# Patient Record
Sex: MALE | Race: WHITE | Employment: OTHER | URBAN - NONMETROPOLITAN AREA
[De-identification: names, ages, dates, MRNs, and addresses within clinical notes are randomized per-mention and may not be internally consistent; named-entity substitution may affect disease eponyms.]

---

## 2017-04-18 ENCOUNTER — HOSPITAL ENCOUNTER (OUTPATIENT)
Dept: LAB | Age: 82
Discharge: HOME | End: 2017-04-18
Payer: MEDICARE

## 2017-04-18 LAB
ALBUMIN SERPL-MCNC: 3.8 G/DL (ref 3.5–5.2)
ALBUMIN/GLOBULIN RATIO: 1.3 (ref 1–2.5)
ALP BLD-CCNC: 87 U/L (ref 40–129)
ALT SERPL-CCNC: 15 U/L (ref 5–41)
ANION GAP SERPL CALCULATED.3IONS-SCNC: 12 MMOL/L (ref 9–17)
AST SERPL-CCNC: 28 U/L
BILIRUB SERPL-MCNC: 0.57 MG/DL (ref 0.3–1.2)
BUN BLDV-MCNC: 22 MG/DL (ref 8–23)
BUN/CREAT BLD: 17 (ref 9–20)
CALCIUM SERPL-MCNC: 8.9 MG/DL (ref 8.6–10.4)
CHLORIDE BLD-SCNC: 104 MMOL/L (ref 98–107)
CHOLESTEROL/HDL RATIO: 2.6
CHOLESTEROL: 102 MG/DL
CO2: 24 MMOL/L (ref 20–31)
CREAT SERPL-MCNC: 1.31 MG/DL (ref 0.7–1.2)
ESTIMATED AVERAGE GLUCOSE: 183 MG/DL
GFR AFRICAN AMERICAN: >60 ML/MIN
GFR NON-AFRICAN AMERICAN: 52 ML/MIN
GFR SERPL CREATININE-BSD FRML MDRD: ABNORMAL ML/MIN/{1.73_M2}
GFR SERPL CREATININE-BSD FRML MDRD: ABNORMAL ML/MIN/{1.73_M2}
GLUCOSE BLD-MCNC: 120 MG/DL (ref 70–99)
HBA1C MFR BLD: 8 % (ref 4.8–5.9)
HDLC SERPL-MCNC: 40 MG/DL
LDL CHOLESTEROL: 39 MG/DL (ref 0–130)
POTASSIUM SERPL-SCNC: 4.7 MMOL/L (ref 3.7–5.3)
SODIUM BLD-SCNC: 140 MMOL/L (ref 135–144)
TOTAL PROTEIN: 6.7 G/DL (ref 6.4–8.3)
TRIGL SERPL-MCNC: 113 MG/DL
VLDLC SERPL CALC-MCNC: ABNORMAL MG/DL (ref 1–30)

## 2017-04-18 PROCEDURE — 80053 COMPREHEN METABOLIC PANEL: CPT

## 2017-04-18 PROCEDURE — 80061 LIPID PANEL: CPT

## 2017-04-18 PROCEDURE — 36415 COLL VENOUS BLD VENIPUNCTURE: CPT

## 2017-04-18 PROCEDURE — 83036 HEMOGLOBIN GLYCOSYLATED A1C: CPT

## 2017-09-21 ENCOUNTER — HOSPITAL ENCOUNTER (OUTPATIENT)
Dept: LAB | Age: 82
Discharge: HOME | End: 2017-09-21
Payer: MEDICARE

## 2017-09-21 LAB
ALBUMIN SERPL-MCNC: 4 G/DL (ref 3.5–5.2)
ALBUMIN/GLOBULIN RATIO: 1.3 (ref 1–2.5)
ALP BLD-CCNC: 83 U/L (ref 40–129)
ALT SERPL-CCNC: 14 U/L (ref 5–41)
ANION GAP SERPL CALCULATED.3IONS-SCNC: 11 MMOL/L (ref 9–17)
AST SERPL-CCNC: 24 U/L
BILIRUB SERPL-MCNC: 0.69 MG/DL (ref 0.3–1.2)
BUN BLDV-MCNC: 23 MG/DL (ref 8–23)
BUN/CREAT BLD: 17 (ref 9–20)
CALCIUM SERPL-MCNC: 9.2 MG/DL (ref 8.6–10.4)
CHLORIDE BLD-SCNC: 102 MMOL/L (ref 98–107)
CHOLESTEROL/HDL RATIO: 2.6
CHOLESTEROL: 104 MG/DL
CO2: 26 MMOL/L (ref 20–31)
CREAT SERPL-MCNC: 1.36 MG/DL (ref 0.7–1.2)
ESTIMATED AVERAGE GLUCOSE: 157 MG/DL
GFR AFRICAN AMERICAN: >60 ML/MIN
GFR NON-AFRICAN AMERICAN: 50 ML/MIN
GFR SERPL CREATININE-BSD FRML MDRD: ABNORMAL ML/MIN/{1.73_M2}
GFR SERPL CREATININE-BSD FRML MDRD: ABNORMAL ML/MIN/{1.73_M2}
GLUCOSE BLD-MCNC: 164 MG/DL (ref 70–99)
HBA1C MFR BLD: 7.1 % (ref 4.8–5.9)
HDLC SERPL-MCNC: 40 MG/DL
LDL CHOLESTEROL: 45 MG/DL (ref 0–130)
POTASSIUM SERPL-SCNC: 5.3 MMOL/L (ref 3.7–5.3)
SODIUM BLD-SCNC: 139 MMOL/L (ref 135–144)
TOTAL PROTEIN: 7.1 G/DL (ref 6.4–8.3)
TRIGL SERPL-MCNC: 95 MG/DL
VLDLC SERPL CALC-MCNC: ABNORMAL MG/DL (ref 1–30)

## 2017-09-21 PROCEDURE — 36415 COLL VENOUS BLD VENIPUNCTURE: CPT

## 2017-09-21 PROCEDURE — 83036 HEMOGLOBIN GLYCOSYLATED A1C: CPT

## 2017-09-21 PROCEDURE — 80053 COMPREHEN METABOLIC PANEL: CPT

## 2017-09-21 PROCEDURE — 80061 LIPID PANEL: CPT

## 2018-01-10 ENCOUNTER — HOSPITAL ENCOUNTER (OUTPATIENT)
Age: 83
Discharge: HOME | End: 2018-01-10
Payer: MEDICARE

## 2018-01-10 ENCOUNTER — HOSPITAL ENCOUNTER (OUTPATIENT)
Dept: GENERAL RADIOLOGY | Age: 83
Discharge: HOME | End: 2018-01-10
Payer: MEDICARE

## 2018-01-10 DIAGNOSIS — R10.84 ABDOMINAL PAIN, GENERALIZED: ICD-10-CM

## 2018-01-10 DIAGNOSIS — R31.9 HEMATURIA SYNDROME: ICD-10-CM

## 2018-01-10 PROCEDURE — 74018 RADEX ABDOMEN 1 VIEW: CPT

## 2018-01-23 ENCOUNTER — HOSPITAL ENCOUNTER (OUTPATIENT)
Age: 83
Discharge: HOME | End: 2018-01-23
Payer: MEDICARE

## 2018-01-23 LAB
-: ABNORMAL
AMORPHOUS: ABNORMAL
BACTERIA: ABNORMAL
BILIRUBIN URINE: NEGATIVE
CASTS UA: ABNORMAL /LPF
COLOR: YELLOW
COMMENT UA: ABNORMAL
CRYSTALS, UA: ABNORMAL /HPF
EPITHELIAL CELLS UA: ABNORMAL /HPF (ref 0–5)
GLUCOSE URINE: ABNORMAL
KETONES, URINE: NEGATIVE
LEUKOCYTE ESTERASE, URINE: NEGATIVE
MUCUS: ABNORMAL
NITRITE, URINE: NEGATIVE
OTHER OBSERVATIONS UA: ABNORMAL
PH UA: 5.5 (ref 5–9)
PROTEIN UA: NEGATIVE
RBC UA: ABNORMAL /HPF (ref 0–2)
RENAL EPITHELIAL, UA: ABNORMAL /HPF
SPECIFIC GRAVITY UA: 1.02 (ref 1.01–1.02)
TRICHOMONAS: ABNORMAL
TURBIDITY: CLEAR
URINE HGB: ABNORMAL
UROBILINOGEN, URINE: NORMAL
WBC UA: ABNORMAL /HPF (ref 0–5)
YEAST: ABNORMAL

## 2018-01-23 PROCEDURE — 87086 URINE CULTURE/COLONY COUNT: CPT

## 2018-01-23 PROCEDURE — 81001 URINALYSIS AUTO W/SCOPE: CPT

## 2018-01-24 LAB
CULTURE: NORMAL
CULTURE: NORMAL
Lab: NORMAL
Lab: NORMAL
SPECIMEN DESCRIPTION: NORMAL
SPECIMEN DESCRIPTION: NORMAL
STATUS: NORMAL

## 2018-02-21 ENCOUNTER — HOSPITAL ENCOUNTER (OUTPATIENT)
Dept: LAB | Age: 83
Discharge: HOME | End: 2018-02-21
Payer: MEDICARE

## 2018-02-21 LAB
ALBUMIN SERPL-MCNC: 3.9 G/DL (ref 3.5–5.2)
ALBUMIN/GLOBULIN RATIO: 1.1 (ref 1–2.5)
ALP BLD-CCNC: 72 U/L (ref 40–129)
ALT SERPL-CCNC: 13 U/L (ref 5–41)
ANION GAP SERPL CALCULATED.3IONS-SCNC: 12 MMOL/L (ref 9–17)
AST SERPL-CCNC: 23 U/L
BILIRUB SERPL-MCNC: 0.86 MG/DL (ref 0.3–1.2)
BUN BLDV-MCNC: 28 MG/DL (ref 8–23)
BUN/CREAT BLD: 20 (ref 9–20)
CALCIUM SERPL-MCNC: 10 MG/DL (ref 8.6–10.4)
CHLORIDE BLD-SCNC: 106 MMOL/L (ref 98–107)
CHOLESTEROL/HDL RATIO: 3.2
CHOLESTEROL: 111 MG/DL
CO2: 25 MMOL/L (ref 20–31)
CREAT SERPL-MCNC: 1.43 MG/DL (ref 0.7–1.2)
ESTIMATED AVERAGE GLUCOSE: 174 MG/DL
GFR AFRICAN AMERICAN: 57 ML/MIN
GFR NON-AFRICAN AMERICAN: 47 ML/MIN
GFR SERPL CREATININE-BSD FRML MDRD: ABNORMAL ML/MIN/{1.73_M2}
GFR SERPL CREATININE-BSD FRML MDRD: ABNORMAL ML/MIN/{1.73_M2}
GLUCOSE BLD-MCNC: 129 MG/DL (ref 70–99)
HBA1C MFR BLD: 7.7 % (ref 4.8–5.9)
HDLC SERPL-MCNC: 35 MG/DL
LDL CHOLESTEROL: 52 MG/DL (ref 0–130)
POTASSIUM SERPL-SCNC: 5.2 MMOL/L (ref 3.7–5.3)
PROSTATE SPECIFIC ANTIGEN: 0.74 UG/L
SODIUM BLD-SCNC: 143 MMOL/L (ref 135–144)
TOTAL PROTEIN: 7.3 G/DL (ref 6.4–8.3)
TRIGL SERPL-MCNC: 122 MG/DL
VLDLC SERPL CALC-MCNC: ABNORMAL MG/DL (ref 1–30)

## 2018-02-21 PROCEDURE — 80061 LIPID PANEL: CPT

## 2018-02-21 PROCEDURE — 80053 COMPREHEN METABOLIC PANEL: CPT

## 2018-02-21 PROCEDURE — 83036 HEMOGLOBIN GLYCOSYLATED A1C: CPT

## 2018-02-21 PROCEDURE — G0103 PSA SCREENING: HCPCS

## 2018-02-21 PROCEDURE — 36415 COLL VENOUS BLD VENIPUNCTURE: CPT

## 2018-03-26 ENCOUNTER — APPOINTMENT (OUTPATIENT)
Dept: CT IMAGING | Age: 83
End: 2018-03-26
Payer: MEDICARE

## 2018-03-26 ENCOUNTER — APPOINTMENT (OUTPATIENT)
Dept: MRI IMAGING | Age: 83
DRG: 066 | End: 2018-03-26
Payer: MEDICARE

## 2018-03-26 ENCOUNTER — APPOINTMENT (OUTPATIENT)
Dept: GENERAL RADIOLOGY | Age: 83
End: 2018-03-26
Payer: MEDICARE

## 2018-03-26 ENCOUNTER — HOSPITAL ENCOUNTER (INPATIENT)
Age: 83
LOS: 2 days | Discharge: HOME | DRG: 066 | End: 2018-03-28
Attending: EMERGENCY MEDICINE | Admitting: INTERNAL MEDICINE
Payer: MEDICARE

## 2018-03-26 ENCOUNTER — HOSPITAL ENCOUNTER (EMERGENCY)
Age: 83
Discharge: ANOTHER HOSPITAL | End: 2018-03-26
Attending: EMERGENCY MEDICINE
Payer: MEDICARE

## 2018-03-26 ENCOUNTER — APPOINTMENT (OUTPATIENT)
Dept: VASCULAR LAB | Age: 83
End: 2018-03-26
Payer: MEDICARE

## 2018-03-26 VITALS
WEIGHT: 166 LBS | RESPIRATION RATE: 14 BRPM | TEMPERATURE: 98.2 F | HEIGHT: 64 IN | DIASTOLIC BLOOD PRESSURE: 83 MMHG | HEART RATE: 67 BPM | SYSTOLIC BLOOD PRESSURE: 159 MMHG | OXYGEN SATURATION: 97 % | BODY MASS INDEX: 28.34 KG/M2

## 2018-03-26 DIAGNOSIS — I63.9 CEREBROVASCULAR ACCIDENT (CVA), UNSPECIFIED MECHANISM (HCC): Primary | ICD-10-CM

## 2018-03-26 DIAGNOSIS — S00.83XA CONTUSION OF FACE, INITIAL ENCOUNTER: ICD-10-CM

## 2018-03-26 DIAGNOSIS — R29.898 LEFT LEG WEAKNESS: ICD-10-CM

## 2018-03-26 LAB
% CKMB: 2.7 % (ref 0–3.5)
ABSOLUTE EOS #: 0.22 K/UL (ref 0–0.44)
ABSOLUTE IMMATURE GRANULOCYTE: 0.03 K/UL (ref 0–0.3)
ABSOLUTE LYMPH #: 1.97 K/UL (ref 1.1–3.7)
ABSOLUTE MONO #: 0.66 K/UL (ref 0.1–1.2)
ANION GAP SERPL CALCULATED.3IONS-SCNC: 12 MMOL/L (ref 9–17)
BASOPHILS # BLD: 1 % (ref 0–2)
BASOPHILS ABSOLUTE: 0.04 K/UL (ref 0–0.2)
BUN BLDV-MCNC: 23 MG/DL (ref 8–23)
BUN/CREAT BLD: 17 (ref 9–20)
CALCIUM SERPL-MCNC: 8.8 MG/DL (ref 8.6–10.4)
CHLORIDE BLD-SCNC: 101 MMOL/L (ref 98–107)
CHOLESTEROL/HDL RATIO: 3.3
CHOLESTEROL: 98 MG/DL
CK MB: 3.5 NG/ML
CKMB INTERPRETATION: ABNORMAL
CO2: 24 MMOL/L (ref 20–31)
CREAT SERPL-MCNC: 1.36 MG/DL (ref 0.7–1.2)
DIFFERENTIAL TYPE: ABNORMAL
EOSINOPHILS RELATIVE PERCENT: 3 % (ref 1–4)
GFR AFRICAN AMERICAN: >60 ML/MIN
GFR NON-AFRICAN AMERICAN: 50 ML/MIN
GFR SERPL CREATININE-BSD FRML MDRD: ABNORMAL ML/MIN/{1.73_M2}
GFR SERPL CREATININE-BSD FRML MDRD: ABNORMAL ML/MIN/{1.73_M2}
GLUCOSE BLD-MCNC: 235 MG/DL (ref 70–99)
HCT VFR BLD CALC: 35.4 % (ref 40.7–50.3)
HDLC SERPL-MCNC: 30 MG/DL
HEMOGLOBIN: 11.7 G/DL (ref 13–17)
IMMATURE GRANULOCYTES: 0 %
INR BLD: 1.1 (ref 0.9–1.2)
LDL CHOLESTEROL: 38 MG/DL (ref 0–130)
LYMPHOCYTES # BLD: 25 % (ref 24–43)
MCH RBC QN AUTO: 31.3 PG (ref 25.2–33.5)
MCHC RBC AUTO-ENTMCNC: 33.1 G/DL (ref 28.4–34.8)
MCV RBC AUTO: 94.7 FL (ref 82.6–102.9)
MONOCYTES # BLD: 8 % (ref 3–12)
MYOGLOBIN: 111 NG/ML (ref 28–72)
NRBC AUTOMATED: 0 PER 100 WBC
PARTIAL THROMBOPLASTIN TIME: 28.4 SEC (ref 23.2–34.4)
PDW BLD-RTO: 11.8 % (ref 11.8–14.4)
PLATELET # BLD: 186 K/UL (ref 138–453)
PLATELET ESTIMATE: ABNORMAL
PMV BLD AUTO: 10.1 FL (ref 8.1–13.5)
POTASSIUM SERPL-SCNC: 4.7 MMOL/L (ref 3.7–5.3)
PROTHROMBIN TIME: 11 SEC (ref 9.7–12.2)
RBC # BLD: 3.74 M/UL (ref 4.21–5.77)
RBC # BLD: ABNORMAL 10*6/UL
SEG NEUTROPHILS: 63 % (ref 36–65)
SEGMENTED NEUTROPHILS ABSOLUTE COUNT: 5.07 K/UL (ref 1.5–8.1)
SODIUM BLD-SCNC: 137 MMOL/L (ref 135–144)
TOTAL CK: 130 U/L (ref 39–308)
TRIGL SERPL-MCNC: 151 MG/DL
TROPONIN INTERP: ABNORMAL
TROPONIN T: <0.03 NG/ML
VLDLC SERPL CALC-MCNC: ABNORMAL MG/DL (ref 1–30)
WBC # BLD: 8 K/UL (ref 3.5–11.3)
WBC # BLD: ABNORMAL 10*3/UL

## 2018-03-26 PROCEDURE — 87040 BLOOD CULTURE FOR BACTERIA: CPT

## 2018-03-26 PROCEDURE — 99222 1ST HOSP IP/OBS MODERATE 55: CPT | Performed by: PSYCHIATRY & NEUROLOGY

## 2018-03-26 PROCEDURE — 80061 LIPID PANEL: CPT

## 2018-03-26 PROCEDURE — 36415 COLL VENOUS BLD VENIPUNCTURE: CPT

## 2018-03-26 PROCEDURE — 82550 ASSAY OF CK (CPK): CPT

## 2018-03-26 PROCEDURE — 85610 PROTHROMBIN TIME: CPT

## 2018-03-26 PROCEDURE — 80048 BASIC METABOLIC PNL TOTAL CA: CPT

## 2018-03-26 PROCEDURE — 2580000003 HC RX 258: Performed by: INTERNAL MEDICINE

## 2018-03-26 PROCEDURE — 85025 COMPLETE CBC W/AUTO DIFF WBC: CPT

## 2018-03-26 PROCEDURE — 84484 ASSAY OF TROPONIN QUANT: CPT

## 2018-03-26 PROCEDURE — 6370000000 HC RX 637 (ALT 250 FOR IP): Performed by: EMERGENCY MEDICINE

## 2018-03-26 PROCEDURE — 70450 CT HEAD/BRAIN W/O DYE: CPT

## 2018-03-26 PROCEDURE — 83036 HEMOGLOBIN GLYCOSYLATED A1C: CPT

## 2018-03-26 PROCEDURE — 93970 EXTREMITY STUDY: CPT

## 2018-03-26 PROCEDURE — 73110 X-RAY EXAM OF WRIST: CPT

## 2018-03-26 PROCEDURE — 70486 CT MAXILLOFACIAL W/O DYE: CPT

## 2018-03-26 PROCEDURE — 83874 ASSAY OF MYOGLOBIN: CPT

## 2018-03-26 PROCEDURE — 2060000000 HC ICU INTERMEDIATE R&B

## 2018-03-26 PROCEDURE — 99285 EMERGENCY DEPT VISIT HI MDM: CPT

## 2018-03-26 PROCEDURE — 85730 THROMBOPLASTIN TIME PARTIAL: CPT

## 2018-03-26 PROCEDURE — 82553 CREATINE MB FRACTION: CPT

## 2018-03-26 PROCEDURE — 6360000002 HC RX W HCPCS: Performed by: INTERNAL MEDICINE

## 2018-03-26 PROCEDURE — 6370000000 HC RX 637 (ALT 250 FOR IP): Performed by: INTERNAL MEDICINE

## 2018-03-26 PROCEDURE — 70551 MRI BRAIN STEM W/O DYE: CPT

## 2018-03-26 RX ORDER — ASPIRIN 81 MG/1
81 TABLET, CHEWABLE ORAL DAILY
Status: DISCONTINUED | OUTPATIENT
Start: 2018-03-26 | End: 2018-03-26

## 2018-03-26 RX ORDER — ACETAMINOPHEN 325 MG/1
650 TABLET ORAL EVERY 4 HOURS PRN
Status: DISCONTINUED | OUTPATIENT
Start: 2018-03-26 | End: 2018-03-28 | Stop reason: HOSPADM

## 2018-03-26 RX ORDER — ASPIRIN 81 MG/1
81 TABLET, CHEWABLE ORAL ONCE
Status: COMPLETED | OUTPATIENT
Start: 2018-03-26 | End: 2018-03-26

## 2018-03-26 RX ORDER — SODIUM CHLORIDE 0.9 % (FLUSH) 0.9 %
10 SYRINGE (ML) INJECTION PRN
Status: DISCONTINUED | OUTPATIENT
Start: 2018-03-26 | End: 2018-03-28 | Stop reason: HOSPADM

## 2018-03-26 RX ORDER — CLOPIDOGREL BISULFATE 75 MG/1
75 TABLET ORAL DAILY
Status: DISCONTINUED | OUTPATIENT
Start: 2018-03-26 | End: 2018-03-28 | Stop reason: HOSPADM

## 2018-03-26 RX ORDER — POTASSIUM CHLORIDE 20 MEQ/1
40 TABLET, EXTENDED RELEASE ORAL PRN
Status: DISCONTINUED | OUTPATIENT
Start: 2018-03-26 | End: 2018-03-28 | Stop reason: HOSPADM

## 2018-03-26 RX ORDER — SODIUM CHLORIDE 9 MG/ML
INJECTION, SOLUTION INTRAVENOUS CONTINUOUS
Status: DISCONTINUED | OUTPATIENT
Start: 2018-03-26 | End: 2018-03-28

## 2018-03-26 RX ORDER — ONDANSETRON 2 MG/ML
4 INJECTION INTRAMUSCULAR; INTRAVENOUS EVERY 6 HOURS PRN
Status: DISCONTINUED | OUTPATIENT
Start: 2018-03-26 | End: 2018-03-28 | Stop reason: HOSPADM

## 2018-03-26 RX ORDER — DEXTROSE MONOHYDRATE 25 G/50ML
12.5 INJECTION, SOLUTION INTRAVENOUS PRN
Status: DISCONTINUED | OUTPATIENT
Start: 2018-03-26 | End: 2018-03-28 | Stop reason: HOSPADM

## 2018-03-26 RX ORDER — POTASSIUM CHLORIDE 7.45 MG/ML
10 INJECTION INTRAVENOUS PRN
Status: DISCONTINUED | OUTPATIENT
Start: 2018-03-26 | End: 2018-03-28 | Stop reason: HOSPADM

## 2018-03-26 RX ORDER — SODIUM CHLORIDE 0.9 % (FLUSH) 0.9 %
10 SYRINGE (ML) INJECTION EVERY 12 HOURS SCHEDULED
Status: DISCONTINUED | OUTPATIENT
Start: 2018-03-26 | End: 2018-03-28 | Stop reason: HOSPADM

## 2018-03-26 RX ORDER — ACETYLCYSTEINE 200 MG/ML
600 SOLUTION ORAL; RESPIRATORY (INHALATION) 2 TIMES DAILY
Status: DISCONTINUED | OUTPATIENT
Start: 2018-03-26 | End: 2018-03-28

## 2018-03-26 RX ORDER — NICOTINE POLACRILEX 4 MG
15 LOZENGE BUCCAL PRN
Status: DISCONTINUED | OUTPATIENT
Start: 2018-03-26 | End: 2018-03-28 | Stop reason: HOSPADM

## 2018-03-26 RX ORDER — HEPARIN SODIUM 5000 [USP'U]/ML
5000 INJECTION, SOLUTION INTRAVENOUS; SUBCUTANEOUS EVERY 8 HOURS SCHEDULED
Status: DISCONTINUED | OUTPATIENT
Start: 2018-03-26 | End: 2018-03-28 | Stop reason: HOSPADM

## 2018-03-26 RX ORDER — DEXTROSE MONOHYDRATE 50 MG/ML
100 INJECTION, SOLUTION INTRAVENOUS PRN
Status: DISCONTINUED | OUTPATIENT
Start: 2018-03-26 | End: 2018-03-28 | Stop reason: HOSPADM

## 2018-03-26 RX ORDER — CLOPIDOGREL BISULFATE 75 MG/1
75 TABLET ORAL ONCE
Status: COMPLETED | OUTPATIENT
Start: 2018-03-26 | End: 2018-03-26

## 2018-03-26 RX ORDER — POTASSIUM CHLORIDE 20MEQ/15ML
40 LIQUID (ML) ORAL PRN
Status: DISCONTINUED | OUTPATIENT
Start: 2018-03-26 | End: 2018-03-28 | Stop reason: HOSPADM

## 2018-03-26 RX ORDER — ATORVASTATIN CALCIUM 40 MG/1
40 TABLET, FILM COATED ORAL NIGHTLY
Status: DISCONTINUED | OUTPATIENT
Start: 2018-03-26 | End: 2018-03-28 | Stop reason: HOSPADM

## 2018-03-26 RX ORDER — ASPIRIN 81 MG/1
81 TABLET ORAL NIGHTLY
Status: DISCONTINUED | OUTPATIENT
Start: 2018-03-26 | End: 2018-03-28 | Stop reason: HOSPADM

## 2018-03-26 RX ADMIN — HEPARIN SODIUM 5000 UNITS: 5000 INJECTION, SOLUTION INTRAVENOUS; SUBCUTANEOUS at 21:57

## 2018-03-26 RX ADMIN — CLOPIDOGREL 75 MG: 75 TABLET, FILM COATED ORAL at 21:53

## 2018-03-26 RX ADMIN — ASPIRIN 81 MG 81 MG: 81 TABLET ORAL at 12:04

## 2018-03-26 RX ADMIN — SODIUM CHLORIDE: 9 INJECTION, SOLUTION INTRAVENOUS at 21:57

## 2018-03-26 RX ADMIN — ASPIRIN 81 MG: 81 TABLET, COATED ORAL at 21:53

## 2018-03-26 RX ADMIN — DESMOPRESSIN ACETATE 40 MG: 0.2 TABLET ORAL at 21:53

## 2018-03-26 RX ADMIN — CLOPIDOGREL BISULFATE 75 MG: 75 TABLET ORAL at 12:04

## 2018-03-26 RX ADMIN — Medication 600 MG: at 21:54

## 2018-03-26 ASSESSMENT — ENCOUNTER SYMPTOMS
CONSTIPATION: 0
SHORTNESS OF BREATH: 0
WHEEZING: 0
EYE DISCHARGE: 0
VOMITING: 0
RHINORRHEA: 0
EYE PAIN: 0
NAUSEA: 0
SORE THROAT: 0
COUGH: 0
COLOR CHANGE: 1
DIARRHEA: 0
ABDOMINAL DISTENTION: 0
VOMITING: 0
DIARRHEA: 0
ABDOMINAL PAIN: 0
COUGH: 0
NAUSEA: 0
SORE THROAT: 0
SHORTNESS OF BREATH: 0

## 2018-03-26 NOTE — ED NOTES
Pt still over in radiology. Wife provided with glass of water while she is waiting.       Chandrika Jones RN  03/26/18 1492

## 2018-03-26 NOTE — ED PROVIDER NOTES
hemorrhage 2. Consider acute/subacute infarct right parieto-occipital lobe. An MRI would be helpful for further evaluation as this may be just marked focal progression of chronic ischemic small vessel disease. Final report electronically signed by Rogers Diop on 3/26/2018 9:56 AM    Ct Facial Bones Wo Contrast    Result Date: 3/26/2018  REPORT: CT maxillofacial without contrast TECHNIQUE: Contiguous thin axial slices through the face obtained without IV contrast. Axial, coronal and sagittal reformats made from the source images. INDICATION: Facial trauma status post fall, left orbital edema FINDINGS: Preseptal soft tissue swelling left orbit. The maxilla, mandible and nasal bones are intact. The osseous orbits are intact. The globes are symmetric and normal. No intra-or extraconal mass lesion or inflammation. No foreign body or focal fluid collection seen. The visualized mastoid air cells and paranasal sinuses are clear. Both ostiomeatal units and frontoethmoidal recesses are patent. Nasal septum is deviated from left. The nasopharyngeal soft tissues are within normal limits. Final report electronically signed by Rogers Diop on 3/26/2018 9:59 AM    Left preseptal soft tissue swelling of the orbit.  No findings to suggest traumatic facial injury    Vl Dup Lower Extremity Venous Bilateral    Result Date: 3/26/2018    [facility]  Vascular Lower Extremities DVT Study Procedure   Patient Name   Canary Opitz Date of Study           03/26/2018   Date of Birth  10/31/1930   Gender                  Male   Age            80 year(s)   Race                       Room Number    GOYO   Corporate ID # [id number]   Patient Acct # [de-identified]   MR #           [id number]       Dinorah Client, KAILEY Marilee   Accession #    [id number]  Interpreting Physician  Little Patel   Referring                   Referring Physician     Paula Almazan  Nurse  Practitioner  Additional Comments Results reported to Solis Gonzalez RN/ER, 0619/3106 @ [address]. Procedure Type of Study:   Veins: Lower Extremities DVT Study, Venous Scan Lower Bilateral.  Patient Status:ER. Technical Quality:Adequate visualization. Comments:INDICATIONS: Swelling to left lower extremity  Conclusions   Summary   No evidence of acute superficial or deep venous thrombosis in both lower  extremities. Signature   ----------------------------------------------------------------  Electronically signed by WICHO Vivar(Sonographer) on  03/26/2018 10:58 AM  ----------------------------------------------------------------   ----------------------------------------------------------------  Electronically signed by Alecia Farr(Interpreting  physician) on 03/26/2018 03:58 PM  ----------------------------------------------------------------  Findings:   Right Impression:                  Left Impression:  The common femoral, femoral, deep  The common femoral, femoral, deep  femoral, popliteal, tibials,       femoral, popliteal, tibials, peroneal,  peroneal, and saphenous veins were and saphenous veins were evaluated. evaluated. Chronic changes were    Chronic changes were seen in the common  seen in the mid femoral vein,      femoral, prox. and mid femoral veins, a  popliteal, tibial peroneal trunk   few gastroc/soleal veins, and in SSV.  veins and in the SSV. The GSV was  The GSV was difficult to follow from  difficult to follow in the thigh. the prox. calf to the prox. thigh. Patient S/P CABG. All visualized   Patient S/P CABG. All visualized veins  veins were compressible with       were compressible with normal doppler  normal doppler responses. responses. Edema was noted in the ankle. Edema was noted in the ankle. A complex echoic finding was visualized                                     in the medial popliteal fossa measuring                                     . 75 x 2.75 x 3.82 cm.   Velocities are measured in cm/s ; Diameters are measured in mm Right Lower Extremities DVT Study Measurements Right 2D Measurements +------------------------------------+----------+---------------+----------+ ! Location                            ! Visualized! Compressibility! Thrombosis! +------------------------------------+----------+---------------+----------+ ! Common Femoral                      !Yes       ! Yes            ! None      ! +------------------------------------+----------+---------------+----------+ ! Prox Femoral                        !Yes       ! Yes            ! None      ! +------------------------------------+----------+---------------+----------+ ! Mid Femoral                         !Yes       ! Yes            ! Chronic   ! +------------------------------------+----------+---------------+----------+ ! Dist Femoral                        !Yes       ! Yes            ! None      ! +------------------------------------+----------+---------------+----------+ ! Deep Femoral                        !Yes       ! Yes            ! None      ! +------------------------------------+----------+---------------+----------+ ! Popliteal                           !Yes       ! Yes            ! Chronic   ! +------------------------------------+----------+---------------+----------+ ! Sapheno Femoral Junction            ! Yes       ! Yes            ! None      ! +------------------------------------+----------+---------------+----------+ ! PTV                                 ! Yes       ! Yes            ! None      ! +------------------------------------+----------+---------------+----------+ ! Peroneal                            !Yes       ! Yes            ! None      ! +------------------------------------+----------+---------------+----------+ ! Gastroc                             ! Yes       ! Yes            ! None      ! +------------------------------------+----------+---------------+----------+ ! GSV Thigh                           ! Yes       ! Yes !None      ! +------------------------------------+----------+---------------+----------+ ! GSV Knee                            ! Yes       ! Yes            ! None      ! +------------------------------------+----------+---------------+----------+ ! GSV Ankle                           ! Yes       ! Yes            ! None      ! +------------------------------------+----------+---------------+----------+ ! SSV                                 ! Yes       ! Yes            ! Chronic   ! +------------------------------------+----------+---------------+----------+ Right Doppler Measurements +---------------------------+------+------+--------------------------------+ ! Location                   ! Signal!Reflux! Reflux (msec)                   ! +---------------------------+------+------+--------------------------------+ ! Common Femoral             !Phasic!      !                                ! +---------------------------+------+------+--------------------------------+ ! Prox Femoral               !Phasic!      !                                ! +---------------------------+------+------+--------------------------------+ ! Popliteal                  !Phasic!      !                                ! +---------------------------+------+------+--------------------------------+ Left Lower Extremities DVT Study Measurements Left 2D Measurements +------------------------------------+----------+---------------+----------+ ! Location                            ! Visualized! Compressibility! Thrombosis! +------------------------------------+----------+---------------+----------+ ! Common Femoral                      !Yes       ! Yes            ! Chronic   ! +------------------------------------+----------+---------------+----------+ ! Prox Femoral                        !Yes       ! Yes            ! Chronic   ! +------------------------------------+----------+---------------+----------+ ! Mid Femoral                         !Yes       ! Yes

## 2018-03-26 NOTE — ED NOTES
MRI check sheet given to pt and family. Pt and family sts they prefer to go after CTs and MRI. Dr Annette Dorsey updated. To speak to pt.      Suzanne Hernandez RN  03/26/18 9222

## 2018-03-26 NOTE — ED PROVIDER NOTES
[facility]  ED  Emergency Department Encounter  Emergency Medicine Resident     Pt Name: Kaitlynn Montenegro  MRN: [id number]  Lizagfjoaquim 10/31/1930  Date of evaluation: 3/26/18  PCP:  Todd Lal DO    CHIEF COMPLAINT       Chief Complaint   Patient presents with    Extremity Weakness     [facility]  reported pt had a STK 3 weeks ago and fell a couple of days ago. pt denied concerns at this time. HISTORY OF PRESENT ILLNESS  (Location/Symptom, Timing/Onset, Context/Setting, Quality, Duration, Modifying Factors, Severity.)      Kaitlynn Montenegro is a 80 y.o. male Past medical history of hypertension, hyperlipidemia, diabetes who Is transferred to the ED from [facility] after CT shows subacute versus acute right parietal occipital lobe stroke. Patient presented To [facility] today, 4 days after he fell, hitting his face on the concrete. Patient states is unsure why he fell but thinks his left leg gave out on him. She notes that approximately 1 month ago he was unable to get out of his chair, due to left leg weakness that has been episodic since then. Patient denies loss of consciousness with this most recent fall, denies neck or back pain. Denies dizziness, blurry vision, upper extremity weakness or numbness or confusion. PAST MEDICAL / SURGICAL / SOCIAL / FAMILY HISTORY      has a past medical history of CAD (coronary artery disease); Hyperlipidemia; Hypertension; and Type II or unspecified type diabetes mellitus without mention of complication, not stated as uncontrolled. has a past surgical history that includes Coronary artery bypass graft (2000); Cataract removal with implant (Bilateral, 11/219/2013 and 12/3/2013); and Inguinal hernia repair (Bilateral, 06/19/12). Social History     Social History    Marital status:      Spouse name: N/A    Number of children: N/A    Years of education: N/A     Occupational History    Not on file.      Social History Main Topics    Smoking status: Never Smoker    Smokeless tobacco: Never Used    Alcohol use Yes      Comment: very cvery very seldom    Drug use: No    Sexual activity: Not on file     Other Topics Concern    Not on file     Social History Narrative    No narrative on file       Family History   Problem Relation Age of Onset    Heart Disease Father     Heart Disease Brother        Allergies:  Patient has no known allergies. Home Medications:  Prior to Admission medications    Medication Sig Start Date End Date Taking? Authorizing Provider   ramipril (ALTACE) 2.5 MG capsule Take 2.5 mg by mouth every morning     Historical Provider, MD   metformin (GLUCOPHAGE-XR) 500 MG XR tablet Take 1,000 mg by mouth 2 times daily (with meals)     Historical Provider, MD   glyBURIDE (DIABETA) 5 MG tablet Take 5 mg by mouth 2 times daily (with meals). Historical Provider, MD   simvastatin (ZOCOR) 40 MG tablet Take 80 mg by mouth nightly. Historical Provider, MD   aspirin 81 MG EC tablet Take 81 mg by mouth nightly     Historical Provider, MD       REVIEW OF SYSTEMS    (2-9 systems for level 4, 10 or more for level 5)      Review of Systems   Constitutional: Negative for chills, diaphoresis and fever. HENT: Negative for congestion and sore throat. Eyes: Negative for pain, discharge and visual disturbance. Respiratory: Negative for cough and shortness of breath. Cardiovascular: Negative for chest pain and leg swelling. Gastrointestinal: Negative for abdominal pain, diarrhea, nausea and vomiting. Endocrine: Negative for polydipsia and polyuria. Genitourinary: Negative for dysuria and hematuria. Musculoskeletal: Negative for neck pain and neck stiffness. Skin: Negative for pallor and rash. Allergic/Immunologic: Negative for environmental allergies and food allergies. Neurological: Positive for weakness. Negative for dizziness, numbness and headaches.    Hematological: Negative for

## 2018-03-27 ENCOUNTER — APPOINTMENT (OUTPATIENT)
Dept: CT IMAGING | Age: 83
DRG: 066 | End: 2018-03-27
Payer: MEDICARE

## 2018-03-27 PROBLEM — E11.9 DIABETES (HCC): Status: ACTIVE | Noted: 2018-03-27

## 2018-03-27 PROBLEM — I10 HYPERTENSION: Status: ACTIVE | Noted: 2018-03-27

## 2018-03-27 LAB
ALBUMIN SERPL-MCNC: 3.2 G/DL (ref 3.5–5.2)
ALBUMIN/GLOBULIN RATIO: 1.3 (ref 1–2.5)
ALP BLD-CCNC: 61 U/L (ref 40–129)
ALT SERPL-CCNC: 12 U/L (ref 5–41)
ANION GAP SERPL CALCULATED.3IONS-SCNC: 13 MMOL/L (ref 9–17)
AST SERPL-CCNC: 21 U/L
BILIRUB SERPL-MCNC: 0.69 MG/DL (ref 0.3–1.2)
BUN BLDV-MCNC: 21 MG/DL (ref 8–23)
BUN/CREAT BLD: ABNORMAL (ref 9–20)
CALCIUM SERPL-MCNC: 8.8 MG/DL (ref 8.6–10.4)
CHLORIDE BLD-SCNC: 106 MMOL/L (ref 98–107)
CO2: 20 MMOL/L (ref 20–31)
CREAT SERPL-MCNC: 1.14 MG/DL (ref 0.7–1.2)
ESTIMATED AVERAGE GLUCOSE: 189 MG/DL
GFR AFRICAN AMERICAN: >60 ML/MIN
GFR NON-AFRICAN AMERICAN: >60 ML/MIN
GFR SERPL CREATININE-BSD FRML MDRD: ABNORMAL ML/MIN/{1.73_M2}
GFR SERPL CREATININE-BSD FRML MDRD: ABNORMAL ML/MIN/{1.73_M2}
GLUCOSE BLD-MCNC: 152 MG/DL (ref 70–99)
GLUCOSE BLD-MCNC: 161 MG/DL (ref 75–110)
GLUCOSE BLD-MCNC: 200 MG/DL (ref 75–110)
GLUCOSE BLD-MCNC: 215 MG/DL (ref 75–110)
GLUCOSE BLD-MCNC: 251 MG/DL (ref 75–110)
HBA1C MFR BLD: 8.2 % (ref 4–6)
HCT VFR BLD CALC: 34.8 % (ref 40.7–50.3)
HEMOGLOBIN: 11.3 G/DL (ref 13–17)
LV EF: 55 %
LVEF MODALITY: NORMAL
MCH RBC QN AUTO: 31.7 PG (ref 25.2–33.5)
MCHC RBC AUTO-ENTMCNC: 32.5 G/DL (ref 28.4–34.8)
MCV RBC AUTO: 97.8 FL (ref 82.6–102.9)
NRBC AUTOMATED: 0 PER 100 WBC
PDW BLD-RTO: 11.9 % (ref 11.8–14.4)
PLATELET # BLD: 168 K/UL (ref 138–453)
PMV BLD AUTO: 10.4 FL (ref 8.1–13.5)
POTASSIUM SERPL-SCNC: 4.7 MMOL/L (ref 3.7–5.3)
RBC # BLD: 3.56 M/UL (ref 4.21–5.77)
SODIUM BLD-SCNC: 139 MMOL/L (ref 135–144)
TOTAL PROTEIN: 5.7 G/DL (ref 6.4–8.3)
WBC # BLD: 6.7 K/UL (ref 3.5–11.3)

## 2018-03-27 PROCEDURE — 6360000002 HC RX W HCPCS: Performed by: INTERNAL MEDICINE

## 2018-03-27 PROCEDURE — 94762 N-INVAS EAR/PLS OXIMTRY CONT: CPT

## 2018-03-27 PROCEDURE — 85027 COMPLETE CBC AUTOMATED: CPT

## 2018-03-27 PROCEDURE — G8979 MOBILITY GOAL STATUS: HCPCS

## 2018-03-27 PROCEDURE — 2060000000 HC ICU INTERMEDIATE R&B

## 2018-03-27 PROCEDURE — 70498 CT ANGIOGRAPHY NECK: CPT

## 2018-03-27 PROCEDURE — 6360000004 HC RX CONTRAST MEDICATION: Performed by: INTERNAL MEDICINE

## 2018-03-27 PROCEDURE — 80053 COMPREHEN METABOLIC PANEL: CPT

## 2018-03-27 PROCEDURE — G8978 MOBILITY CURRENT STATUS: HCPCS

## 2018-03-27 PROCEDURE — 36415 COLL VENOUS BLD VENIPUNCTURE: CPT

## 2018-03-27 PROCEDURE — 99223 1ST HOSP IP/OBS HIGH 75: CPT | Performed by: INTERNAL MEDICINE

## 2018-03-27 PROCEDURE — G9169 MEMORY GOAL STATUS: HCPCS

## 2018-03-27 PROCEDURE — 93306 TTE W/DOPPLER COMPLETE: CPT

## 2018-03-27 PROCEDURE — 6370000000 HC RX 637 (ALT 250 FOR IP): Performed by: STUDENT IN AN ORGANIZED HEALTH CARE EDUCATION/TRAINING PROGRAM

## 2018-03-27 PROCEDURE — 92523 SPEECH SOUND LANG COMPREHEN: CPT

## 2018-03-27 PROCEDURE — 97116 GAIT TRAINING THERAPY: CPT

## 2018-03-27 PROCEDURE — 99222 1ST HOSP IP/OBS MODERATE 55: CPT | Performed by: PSYCHIATRY & NEUROLOGY

## 2018-03-27 PROCEDURE — 97162 PT EVAL MOD COMPLEX 30 MIN: CPT

## 2018-03-27 PROCEDURE — 82947 ASSAY GLUCOSE BLOOD QUANT: CPT

## 2018-03-27 PROCEDURE — G9168 MEMORY CURRENT STATUS: HCPCS

## 2018-03-27 PROCEDURE — 70496 CT ANGIOGRAPHY HEAD: CPT

## 2018-03-27 PROCEDURE — 6370000000 HC RX 637 (ALT 250 FOR IP): Performed by: INTERNAL MEDICINE

## 2018-03-27 RX ORDER — UREA 10 %
3 LOTION (ML) TOPICAL NIGHTLY PRN
Status: DISCONTINUED | OUTPATIENT
Start: 2018-03-27 | End: 2018-03-28 | Stop reason: HOSPADM

## 2018-03-27 RX ADMIN — MAGNESIUM HYDROXIDE 30 ML: 400 SUSPENSION ORAL at 20:43

## 2018-03-27 RX ADMIN — HEPARIN SODIUM 5000 UNITS: 5000 INJECTION, SOLUTION INTRAVENOUS; SUBCUTANEOUS at 17:39

## 2018-03-27 RX ADMIN — ASPIRIN 81 MG: 81 TABLET, COATED ORAL at 20:43

## 2018-03-27 RX ADMIN — INSULIN LISPRO 4 UNITS: 100 INJECTION, SOLUTION INTRAVENOUS; SUBCUTANEOUS at 17:39

## 2018-03-27 RX ADMIN — Medication 600 MG: at 08:27

## 2018-03-27 RX ADMIN — INSULIN LISPRO 2 UNITS: 100 INJECTION, SOLUTION INTRAVENOUS; SUBCUTANEOUS at 20:41

## 2018-03-27 RX ADMIN — HEPARIN SODIUM 5000 UNITS: 5000 INJECTION, SOLUTION INTRAVENOUS; SUBCUTANEOUS at 21:49

## 2018-03-27 RX ADMIN — INSULIN LISPRO 6 UNITS: 100 INJECTION, SOLUTION INTRAVENOUS; SUBCUTANEOUS at 12:06

## 2018-03-27 RX ADMIN — Medication 600 MG: at 21:08

## 2018-03-27 RX ADMIN — Medication 3 MG: at 20:43

## 2018-03-27 RX ADMIN — HEPARIN SODIUM 5000 UNITS: 5000 INJECTION, SOLUTION INTRAVENOUS; SUBCUTANEOUS at 06:43

## 2018-03-27 RX ADMIN — INSULIN LISPRO 2 UNITS: 100 INJECTION, SOLUTION INTRAVENOUS; SUBCUTANEOUS at 08:36

## 2018-03-27 RX ADMIN — DESMOPRESSIN ACETATE 40 MG: 0.2 TABLET ORAL at 20:43

## 2018-03-27 RX ADMIN — IOPAMIDOL 90 ML: 755 INJECTION, SOLUTION INTRAVENOUS at 14:01

## 2018-03-27 RX ADMIN — CLOPIDOGREL 75 MG: 75 TABLET, FILM COATED ORAL at 10:36

## 2018-03-27 ASSESSMENT — PAIN SCALES - GENERAL
PAINLEVEL_OUTOF10: 0

## 2018-03-27 NOTE — CONSULTS
NEUROLOGY INPATIENT CONSULT NOTE    3/27/2018         Ana Cook is a  80 y.o. male admitted on 3/26/2018 with  Ischemic stroke [facility]) [I63.9]      History is obtained mostly from the patient and the medical record and from the caregivers. Chart is reviewed and patient is examined. Briefly, this is a  80 y. o.right handed  male with hx of HTN, HLD, DM, CAD was admitted on 3/26/2018 with left lower extremity weakness since Feb 17th or so. Patient's family members stated that he was having weakness when he was in [place]. When he came back he was evaluated by PCP and cardiologist and brain MRI was ordered. Patient did not want to pursue that. He has had recent falls and those are described as mechanical falls. Denied dizziness, vertigo, mental status changes. Couple of days ago he fell forward and hit his head resulting in a large ecchymosis on his left face and neck and periorbitally. He denied headache, vision loss. Denied speech and swallowing difficulties. Denied earache, tinnitus. Denied orthostatic dizziness. Patient also stated that he has been on ASA, Simvastatin daily at home. On admit; he was evaluated by stroke team and started on Plavix in addition to aspirin and statin dose increased. Presently he is on atorvastatin 40 mg nightly. Presently denies headaches, dizziness, blurred vision. Denies speech and swallowing difficulties. No current facility-administered medications on file prior to encounter. Current Outpatient Prescriptions on File Prior to Encounter   Medication Sig Dispense Refill    ramipril (ALTACE) 2.5 MG capsule Take 2.5 mg by mouth every morning       metformin (GLUCOPHAGE-XR) 500 MG XR tablet Take 1,000 mg by mouth 2 times daily (with meals)       glyBURIDE (DIABETA) 5 MG tablet Take 5 mg by mouth 2 times daily (with meals).  simvastatin (ZOCOR) 40 MG tablet Take 80 mg by mouth nightly.         aspirin 81 MG EC tablet Take 81 mg by mouth nightly        Allergies: Reymundo Santoro has No Known Allergies. Past Medical History:   Diagnosis Date    CAD (coronary artery disease)     Hyperlipidemia     Hypertension     Type II or unspecified type diabetes mellitus without mention of complication, not stated as uncontrolled        Past Surgical History:   Procedure Laterality Date    CATARACT REMOVAL WITH IMPLANT Bilateral 11/219/2013 and 12/3/2013    CORONARY ARTERY BYPASS GRAFT  2000    INGUINAL HERNIA REPAIR Bilateral 06/19/12     Social History: Reymundo Santoro  reports that he has never smoked. He has never used smokeless tobacco. He reports that he drinks alcohol. He reports that he does not use drugs.     Family History   Problem Relation Age of Onset    Heart Disease Father     Heart Disease Brother        Current Medications:     sodium chloride flush  10 mL Intravenous 2 times per day    aspirin  81 mg Oral Nightly    sodium chloride flush  10 mL Intravenous 2 times per day    heparin (porcine)  5,000 Units Subcutaneous 3 times per day    atorvastatin  40 mg Oral Nightly    clopidogrel  75 mg Oral Daily    insulin lispro  0-12 Units Subcutaneous TID WC    insulin lispro  0-6 Units Subcutaneous Nightly    acetylcysteine  600 mg Oral BID     PRN Meds include: melatonin, sodium chloride flush, acetaminophen, magnesium hydroxide, ondansetron, sodium chloride flush, acetaminophen, magnesium hydroxide, ondansetron, potassium chloride **OR** potassium chloride **OR** potassium chloride, glucose, dextrose, glucagon (rDNA), dextrose    ROS:   Constitutional Negative for fever and chills   HEENT Negative for ear discharge, ear pain, nosebleed   Eyes Negative for photophobia, pain and discharge   Respiratory Negative for hemoptysis and sputum   Cardiovascular Negative for orthopnea, claudication and PND   Gastrointestinal Negative for abdominal pain, diarrhea, blood in stool   Musculoskeletal Negative for joint pain, negative for myalgia   Skin

## 2018-03-27 NOTE — PLAN OF CARE
Problem: ACTIVITY INTOLERANCE/IMPAIRED MOBILITY  Goal: Mobility/activity is maintained at optimum level for patient  Outcome: Ongoing  Up with minimal assist of one  Intervention: ASSESS FOR BARRIERS TO MOBILITY/ACTIVITY  Gait slightly unsteady  Intervention: TURN PATIENT  Patient able to turn self independently

## 2018-03-27 NOTE — PROGRESS NOTES
Smoking Cessation - topics covered   []  Health Risks  []  Benefits of Quitting   []  Smoking Cessation  [x]  Patient has no history of tobacco use  []  Patient is former smoker. [x]  No need for tobacco cessation education. []  Booklet given  []  Patient verbalizes understanding. []  Patient denies need for tobacco cessation education.   Shawna Holley  10:04 AM

## 2018-03-27 NOTE — PROGRESS NOTES
Physical Therapy    Facility/Department: [facility] NEURO  Initial Assessment    NAME: Michael Roman  : 10/31/1930  MRN: [id number]    Copied from Neurology note filed 3/27/18 at 10:48 am: \"Acute Rt frontal lobe infarct and remote Rt frontoparietal infarcts with resultant left lower extremity weakness\"      Date of Service: 3/27/2018    Patient Diagnosis(es): The encounter diagnosis was Cerebrovascular accident (CVA), unspecified mechanism ([address] has a past medical history of CAD (coronary artery disease); Hyperlipidemia; Hypertension; and Type II or unspecified type diabetes mellitus without mention of complication, not stated as uncontrolled. has a past surgical history that includes Coronary artery bypass graft (); Cataract removal with implant (Bilateral,  and 12/3/2013); and Inguinal hernia repair (Bilateral, 12). Restrictions  Restrictions/Precautions  Restrictions/Precautions: General Precautions, Fall Risk  Required Braces or Orthoses?: No  Position Activity Restriction  Other position/activity restrictions: up as tolerated, up with assist  Vision/Hearing  Vision: Impaired  Vision Exceptions: Wears glasses for reading;Wears glasses for distance  Hearing: Within functional limits     Subjective  General  Patient assessed for rehabilitation services?: Yes  Response To Previous Treatment: Not applicable  Family / Caregiver Present: Yes (wife, daughter)  Follows Commands: Within Functional Limits  Subjective  Subjective: RN and pt agreeable to PT.  Pt alert in chair upon arrival.   Pain Screening  Patient Currently in Pain: Denies  Vital Signs  Patient Currently in Pain: Denies  Pre Treatment Pain Screening  Intervention List: Patient able to continue with treatment    Orientation  Orientation  Overall Orientation Status: Within Normal Limits    Social/Functional History  Social/Functional History  Lives With: Spouse  Type of Home: House  Home Layout: Two level (full bath main and second floor. bedroom upstairs. bat upstairs tub only, wife uses (pt uses main floor bath described below))  Home Access: Stairs to enter with rails  Entrance Stairs - Number of Steps: 2 + 3  Entrance Stairs - Rails:  (on L for first 2 steps, then on R for next 3 steps.)  Bathroom Shower/Tub: Walk-in shower  Bathroom Toilet: Standard  Bathroom Equipment: Shower chair  Bathroom Accessibility: Accessible  Home Equipment: [place], Standard walker  Receives Help From: Family  ADL Assistance: Independent  Homemaking Assistance: Independent  Homemaking Responsibilities: Yes  Ambulation Assistance: Independent  Transfer Assistance: Independent  Active : Yes  Mode of Transportation: Family  Occupation: Retired, Volunteer work  Objective          AROM RLE (degrees)  RLE AROM: WFL  AROM LLE (degrees)  LLE AROM : WFL  AROM RUE (degrees)  RUE AROM : WFL  AROM LUE (degrees)  LUE AROM : WFL  Strength RLE  Strength RLE: WFL  Comment: 4+ grossly  Strength LLE  Strength LLE: WFL  Comment: 4- grossly  Strength RUE  Strength RUE: WFL  Strength LUE  Strength LUE: WFL     Sensation  Overall Sensation Status: WNL (tested BLE, knee and distal. sensation equal per pt.)     Transfers  Sit to Stand: Stand by assistance  Stand to sit: Stand by assistance  Ambulation  Ambulation?: Yes  Ambulation 1  Surface: level tile  Device:  (initally pushing IV with unilatral (R) UE, then without any UE assistance after 100')  Assistance: Contact guard assistance;Stand by assistance (1 person, CGA improving to SBA)  Quality of Gait: no signs on instability, knee not kicking back or any difficulty maintaining extension. good abiel and step length. no LOB, normal sway. Distance: 300'  Comments: Pt notes slowed abiel and slightly weak compared to baseline.   Stairs/Curb  Stairs?: No     Balance  Posture: Good  Sitting - Static: Good  Sitting - Dynamic: Good  Standing - Static: Good  Standing - Dynamic: Good;-  Comments: Pt initially pushing IV, then without use.       Therapy Time   Individual Concurrent Group Co-treatment   Time In 1439         Time Out 1510         Minutes [place] [address], [address]

## 2018-03-27 NOTE — FLOWSHEET NOTE
03/27/18 1024   Encounter Summary   Services provided to: Patient and family together   Place of [facility]   Contact Oriental orthodox No   Continue Visiting (3/27/18)   Complexity of Encounter Low   Length of Encounter 30 minutes   Spiritual Assessment Completed Yes   Routine   Type Initial     I visited the patient per stroke consult. The patient was out of the room when I arrived and I visited with his wife and daughter who were in the room. They told me that the patient had a \"mini stroke\" and fell. This happened a few weeks ago and he did not immediately come to the hospital. Due to some weakness he eventually went to the hospital in [place] and then sent to [facility] where he is undergoing tests. He returned to his room and I turned my attention to him. He was awake and alert and in good spirits. He was joking with staff. He seemed glad to see the  and expressed that he is hopeful of going home today. Patient attends [employer] [facility] in [facility]. He was receptive to spiritual care and welcomed my offer of prayer. Chaplains are available for further spiritual and emotional support as needed.

## 2018-03-28 VITALS
OXYGEN SATURATION: 99 % | HEART RATE: 102 BPM | WEIGHT: 150 LBS | RESPIRATION RATE: 12 BRPM | TEMPERATURE: 97.7 F | BODY MASS INDEX: 26.58 KG/M2 | DIASTOLIC BLOOD PRESSURE: 90 MMHG | SYSTOLIC BLOOD PRESSURE: 139 MMHG | HEIGHT: 63 IN

## 2018-03-28 PROBLEM — R29.6 FALLING: Status: ACTIVE | Noted: 2018-03-28

## 2018-03-28 PROBLEM — R29.898 LEFT LEG WEAKNESS: Status: ACTIVE | Noted: 2018-03-28

## 2018-03-28 PROBLEM — I63.411 CEREBRAL INFARCTION DUE TO EMBOLISM OF RIGHT MIDDLE CEREBRAL ARTERY (HCC): Status: ACTIVE | Noted: 2018-03-28

## 2018-03-28 PROBLEM — I63.81 STROKE, LACUNAR (HCC): Status: ACTIVE | Noted: 2018-03-28

## 2018-03-28 LAB
ANION GAP SERPL CALCULATED.3IONS-SCNC: 13 MMOL/L (ref 9–17)
BUN BLDV-MCNC: 27 MG/DL (ref 8–23)
BUN/CREAT BLD: ABNORMAL (ref 9–20)
CALCIUM SERPL-MCNC: 8.4 MG/DL (ref 8.6–10.4)
CHLORIDE BLD-SCNC: 97 MMOL/L (ref 98–107)
CO2: 21 MMOL/L (ref 20–31)
CREAT SERPL-MCNC: 1.32 MG/DL (ref 0.7–1.2)
GFR AFRICAN AMERICAN: >60 ML/MIN
GFR NON-AFRICAN AMERICAN: 51 ML/MIN
GFR SERPL CREATININE-BSD FRML MDRD: ABNORMAL ML/MIN/{1.73_M2}
GFR SERPL CREATININE-BSD FRML MDRD: ABNORMAL ML/MIN/{1.73_M2}
GLUCOSE BLD-MCNC: 171 MG/DL (ref 75–110)
GLUCOSE BLD-MCNC: 263 MG/DL (ref 75–110)
GLUCOSE BLD-MCNC: 295 MG/DL (ref 70–99)
POTASSIUM SERPL-SCNC: 4.7 MMOL/L (ref 3.7–5.3)
SODIUM BLD-SCNC: 131 MMOL/L (ref 135–144)

## 2018-03-28 PROCEDURE — 6370000000 HC RX 637 (ALT 250 FOR IP): Performed by: INTERNAL MEDICINE

## 2018-03-28 PROCEDURE — 82947 ASSAY GLUCOSE BLOOD QUANT: CPT

## 2018-03-28 PROCEDURE — 94762 N-INVAS EAR/PLS OXIMTRY CONT: CPT

## 2018-03-28 PROCEDURE — 99232 SBSQ HOSP IP/OBS MODERATE 35: CPT | Performed by: PSYCHIATRY & NEUROLOGY

## 2018-03-28 PROCEDURE — 6360000002 HC RX W HCPCS: Performed by: INTERNAL MEDICINE

## 2018-03-28 PROCEDURE — 99239 HOSP IP/OBS DSCHRG MGMT >30: CPT | Performed by: INTERNAL MEDICINE

## 2018-03-28 PROCEDURE — 97110 THERAPEUTIC EXERCISES: CPT

## 2018-03-28 PROCEDURE — 97116 GAIT TRAINING THERAPY: CPT

## 2018-03-28 PROCEDURE — 2580000003 HC RX 258: Performed by: INTERNAL MEDICINE

## 2018-03-28 PROCEDURE — 36415 COLL VENOUS BLD VENIPUNCTURE: CPT

## 2018-03-28 PROCEDURE — 80048 BASIC METABOLIC PNL TOTAL CA: CPT

## 2018-03-28 RX ORDER — ATORVASTATIN CALCIUM 40 MG/1
40 TABLET, FILM COATED ORAL NIGHTLY
Qty: 30 TABLET | Refills: 3 | Status: SHIPPED | OUTPATIENT
Start: 2018-03-28

## 2018-03-28 RX ORDER — CLOPIDOGREL BISULFATE 75 MG/1
75 TABLET ORAL DAILY
Qty: 30 TABLET | Refills: 3 | Status: SHIPPED | OUTPATIENT
Start: 2018-03-29

## 2018-03-28 RX ADMIN — Medication 30 ML: at 10:34

## 2018-03-28 RX ADMIN — Medication 600 MG: at 09:05

## 2018-03-28 RX ADMIN — Medication 10 ML: at 09:06

## 2018-03-28 RX ADMIN — HEPARIN SODIUM 5000 UNITS: 5000 INJECTION, SOLUTION INTRAVENOUS; SUBCUTANEOUS at 06:11

## 2018-03-28 RX ADMIN — CLOPIDOGREL 75 MG: 75 TABLET, FILM COATED ORAL at 09:05

## 2018-03-28 NOTE — PROGRESS NOTES
Active Problem falling with left leg weakness with acute right frontal infarction . The condition is blood pressure supine 164/63, sitting 153/74 , standing 125/68 . He is walking 300 feet in PT with contact guard assist . He retains left leg weakness at 4+/5. He does have walker at home . Significant medications plavix 75 mg po qd, aspirin 81 mg po qd , lipitor 40 mg po qd . Testing MRI of Head with acute small posterior frontal infraction , old right high frontal watershed infract with extensive bilateral chronic periventricular small vessel ischemia . CTA head and neck right ICA 45% stenosis . Mild to moderate left vertebral stenosis . Mild to moderate bilateral cavernous ICA stenosis . Moderate left intradural vertebral stenosis  . Cardiac 2 D echo normal LVF . EF 55 % . Mild AI .  Cholesterol 198 ,  , LDL 38 , Hga1c 8.2      Past Medical History:   Diagnosis Date    CAD (coronary artery disease)     Hyperlipidemia     Hypertension     Type II or unspecified type diabetes mellitus without mention of complication, not stated as uncontrolled        Past Surgical History:   Procedure Laterality Date    CATARACT REMOVAL WITH IMPLANT Bilateral 11/219/2013 and 12/3/2013    CORONARY ARTERY BYPASS GRAFT  2000    INGUINAL HERNIA REPAIR Bilateral 06/19/12       Family History   Problem Relation Age of Onset    Heart Disease Father     Heart Disease Brother        Social History     Social History    Marital status:      Spouse name: N/A    Number of children: N/A    Years of education: N/A     Social History Main Topics    Smoking status: Never Smoker    Smokeless tobacco: Never Used    Alcohol use Yes      Comment: very cvery very seldom    Drug use: No    Sexual activity: Not Asked     Other Topics Concern    None     Social History Narrative    None       Current Facility-Administered Medications   Medication Dose Route Frequency Provider Last Rate Last Dose    melatonin tablet 3 mg  3 mg Oral Nightly PRN Jaja Dhillon MD   3 mg at 03/27/18 2043    sodium chloride flush 0.9 % injection 10 mL  10 mL Intravenous 2 times per day Felicity Sanford MD        sodium chloride flush 0.9 % injection 10 mL  10 mL Intravenous PRN Felicity Sanford MD        acetaminophen (TYLENOL) tablet 650 mg  650 mg Oral Q4H PRN Felicity Sanford MD        magnesium hydroxide (MILK OF MAGNESIA) 400 MG/5ML suspension 30 mL  30 mL Oral Daily PRN Felicity Sanford MD   30 mL at 03/28/18 1034    ondansetron (ZOFRAN) injection 4 mg  4 mg Intravenous Q6H PRN Felicity Sanford MD        aspirin EC tablet 81 mg  81 mg Oral Nightly Marianne Bradley MD   81 mg at 03/27/18 2043    sodium chloride flush 0.9 % injection 10 mL  10 mL Intravenous 2 times per day Marianne Bradley MD   10 mL at 03/28/18 0906    sodium chloride flush 0.9 % injection 10 mL  10 mL Intravenous PRN Marianne Bradley MD        acetaminophen (TYLENOL) tablet 650 mg  650 mg Oral Q4H PRN Marianne Bradley MD        magnesium hydroxide (MILK OF MAGNESIA) 400 MG/5ML suspension 30 mL  30 mL Oral Daily PRN Marianne Bradley MD   30 mL at 03/27/18 2043    ondansetron (ZOFRAN) injection 4 mg  4 mg Intravenous Q6H PRN Marianne Bradley MD        potassium chloride (KLOR-CON M) extended release tablet 40 mEq  40 mEq Oral PRN Marianne Bradley MD        Or    potassium chloride 20 MEQ/15ML (10%) oral solution 40 mEq  40 mEq Oral PRN Marianne Bradley MD        Or    potassium chloride 10 mEq/100 mL IVPB (Peripheral Line)  10 mEq Intravenous PRN Marianne Bradley MD        heparin (porcine) injection 5,000 Units  5,000 Units Subcutaneous 3 times per day Marianne Bradley MD   5,000 Units at 03/28/18 0611    atorvastatin (LIPITOR) tablet 40 mg  40 mg Oral Nightly Marianne Bradley MD   40 mg at 03/27/18 2043    clopidogrel (PLAVIX) tablet 75 mg  75 mg Oral Daily Marianne Bradley MD   75 mg at 03/28/18 0905    insulin lispro (HUMALOG) injection vial 0-12 Units  0-12 Units Subcutaneous TID WC Marianne Bradley MD motor  Orientation Alert and oriented x 3   Attention and concentration normal  Short term memory normal  Language process and speech normal . No aphasia   Cranial nerve 2 normal acuety and visual fields  Cranial nerve 3, 4 and 6 . Extraocular muscles are intact . Pupils are equal and reactive   Cranial nerve 5 . Intact corneal reflex. Normal facial sensation  Cranial nerve 7 normal exam   Cranial nerve 8. Grossly intact hearing   Cranial nerve 9 and 10. Symmetric palate elevation   Cranial nerve 11 , 5 out of 5 strength   Cranial Nerve 12 midline tongue . No atrophy  Sensation . Normal pinprick and light touch   Deep Tendon Reflexes brisker on the left   Plantar response flexor bilaterally    Assessment :    Acute right frontal infarction with falling with left leg weakness       Plan:    Okay to discharge on  plavix 75 mg po qd, aspirin 81 mg po qd , lipitor 40 mg po qd. OTP PT to use walker at home .  Jobst stocking knee high 20-30 mm Hg

## 2018-03-29 NOTE — DISCHARGE SUMMARY
tablet Take 1 tablet by mouth daily, Disp-30 tablet, R-3Normal         CONTINUE these medications which have NOT CHANGED    Details   ramipril (ALTACE) 2.5 MG capsule Take 2.5 mg by mouth every morning Historical Med      aspirin 81 MG EC tablet Take 81 mg by mouth nightly Historical Med         STOP taking these medications       metformin (GLUCOPHAGE-XR) 500 MG XR tablet Comments:   Reason for Stopping:         glyBURIDE (DIABETA) 5 MG tablet Comments:   Reason for Stopping:         simvastatin (ZOCOR) 40 MG tablet Comments:   Reason for Stopping:             Activity: activity as tolerated  Diet: regular diet    Follow-up with pcp in 1 week     Discharge Medications:  [unfilled]    Time spent on discharge process is around 35 minutes. Marianne Bradley MD    Attending Physician Statement  I have reviewed and edited the discharge summary of  Luis De Souza  ,   including pertinent history and exam findings. I have reviewed the key elements of all parts of the discharge summary . I agree with the information and plans as documented by the resident. Time spent on discharge planning ;          [] less than 30 minutes . [x]   more  than 30 minutes . Electronically signed by Felicity Sanford MD on 3/30/2018 .

## 2018-04-01 LAB
CULTURE: NORMAL
Lab: NORMAL
Lab: NORMAL
SPECIMEN DESCRIPTION: NORMAL
SPECIMEN DESCRIPTION: NORMAL
STATUS: NORMAL
STATUS: NORMAL

## 2018-04-10 ENCOUNTER — HOSPITAL ENCOUNTER (OUTPATIENT)
Dept: PHYSICAL THERAPY | Age: 83
Setting detail: THERAPIES SERIES
Discharge: HOME | End: 2018-04-10
Payer: MEDICARE

## 2018-04-10 PROCEDURE — G8979 MOBILITY GOAL STATUS: HCPCS

## 2018-04-10 PROCEDURE — 97162 PT EVAL MOD COMPLEX 30 MIN: CPT

## 2018-04-10 PROCEDURE — G8978 MOBILITY CURRENT STATUS: HCPCS

## 2018-04-10 PROCEDURE — 97110 THERAPEUTIC EXERCISES: CPT

## 2018-04-12 ENCOUNTER — HOSPITAL ENCOUNTER (OUTPATIENT)
Dept: PHYSICAL THERAPY | Age: 83
Setting detail: THERAPIES SERIES
Discharge: HOME | End: 2018-04-12
Payer: MEDICARE

## 2018-04-12 PROCEDURE — 97110 THERAPEUTIC EXERCISES: CPT

## 2018-04-12 PROCEDURE — 97112 NEUROMUSCULAR REEDUCATION: CPT

## 2018-04-13 ENCOUNTER — OFFICE VISIT (OUTPATIENT)
Dept: NEUROLOGY | Age: 83
End: 2018-04-13
Payer: MEDICARE

## 2018-04-13 VITALS
HEART RATE: 98 BPM | DIASTOLIC BLOOD PRESSURE: 71 MMHG | SYSTOLIC BLOOD PRESSURE: 129 MMHG | BODY MASS INDEX: 28.7 KG/M2 | WEIGHT: 162 LBS

## 2018-04-13 DIAGNOSIS — I63.9 ISCHEMIC STROKE (HCC): Primary | ICD-10-CM

## 2018-04-13 PROCEDURE — 1111F DSCHRG MED/CURRENT MED MERGE: CPT | Performed by: PSYCHIATRY & NEUROLOGY

## 2018-04-13 PROCEDURE — G8598 ASA/ANTIPLAT THER USED: HCPCS | Performed by: PSYCHIATRY & NEUROLOGY

## 2018-04-13 PROCEDURE — 99215 OFFICE O/P EST HI 40 MIN: CPT | Performed by: PSYCHIATRY & NEUROLOGY

## 2018-04-13 PROCEDURE — G8427 DOCREV CUR MEDS BY ELIG CLIN: HCPCS | Performed by: PSYCHIATRY & NEUROLOGY

## 2018-04-13 PROCEDURE — 1123F ACP DISCUSS/DSCN MKR DOCD: CPT | Performed by: PSYCHIATRY & NEUROLOGY

## 2018-04-13 PROCEDURE — G8419 CALC BMI OUT NRM PARAM NOF/U: HCPCS | Performed by: PSYCHIATRY & NEUROLOGY

## 2018-04-13 PROCEDURE — 4040F PNEUMOC VAC/ADMIN/RCVD: CPT | Performed by: PSYCHIATRY & NEUROLOGY

## 2018-04-13 PROCEDURE — 1036F TOBACCO NON-USER: CPT | Performed by: PSYCHIATRY & NEUROLOGY

## 2018-04-17 ENCOUNTER — HOSPITAL ENCOUNTER (OUTPATIENT)
Dept: PHYSICAL THERAPY | Age: 83
Setting detail: THERAPIES SERIES
Discharge: HOME | End: 2018-04-17
Payer: MEDICARE

## 2018-04-17 PROCEDURE — 97110 THERAPEUTIC EXERCISES: CPT

## 2018-04-19 ENCOUNTER — HOSPITAL ENCOUNTER (OUTPATIENT)
Dept: PHYSICAL THERAPY | Age: 83
Setting detail: THERAPIES SERIES
Discharge: HOME | End: 2018-04-19
Payer: MEDICARE

## 2018-04-19 PROCEDURE — 97112 NEUROMUSCULAR REEDUCATION: CPT

## 2018-04-19 PROCEDURE — 97110 THERAPEUTIC EXERCISES: CPT

## 2018-04-24 ENCOUNTER — HOSPITAL ENCOUNTER (OUTPATIENT)
Dept: PHYSICAL THERAPY | Age: 83
Setting detail: THERAPIES SERIES
Discharge: HOME | End: 2018-04-24
Payer: MEDICARE

## 2018-04-24 PROCEDURE — 97110 THERAPEUTIC EXERCISES: CPT

## 2018-04-26 ENCOUNTER — HOSPITAL ENCOUNTER (OUTPATIENT)
Dept: PHYSICAL THERAPY | Age: 83
Setting detail: THERAPIES SERIES
Discharge: HOME | End: 2018-04-26
Payer: MEDICARE

## 2018-04-26 PROCEDURE — 97112 NEUROMUSCULAR REEDUCATION: CPT

## 2018-04-26 PROCEDURE — 97110 THERAPEUTIC EXERCISES: CPT

## 2018-05-01 ENCOUNTER — HOSPITAL ENCOUNTER (OUTPATIENT)
Dept: PHYSICAL THERAPY | Age: 83
Setting detail: THERAPIES SERIES
Discharge: HOME | End: 2018-05-01
Payer: MEDICARE

## 2018-05-01 PROCEDURE — 97110 THERAPEUTIC EXERCISES: CPT

## 2018-05-03 ENCOUNTER — HOSPITAL ENCOUNTER (OUTPATIENT)
Dept: PHYSICAL THERAPY | Age: 83
Setting detail: THERAPIES SERIES
Discharge: HOME | End: 2018-05-03
Payer: MEDICARE

## 2018-05-03 PROCEDURE — 97110 THERAPEUTIC EXERCISES: CPT

## 2018-05-08 ENCOUNTER — HOSPITAL ENCOUNTER (OUTPATIENT)
Dept: PHYSICAL THERAPY | Age: 83
Setting detail: THERAPIES SERIES
Discharge: HOME | End: 2018-05-08
Payer: MEDICARE

## 2018-05-08 PROCEDURE — 97110 THERAPEUTIC EXERCISES: CPT

## 2018-05-08 PROCEDURE — 97112 NEUROMUSCULAR REEDUCATION: CPT

## 2018-05-10 ENCOUNTER — HOSPITAL ENCOUNTER (OUTPATIENT)
Dept: PHYSICAL THERAPY | Age: 83
Setting detail: THERAPIES SERIES
Discharge: HOME | End: 2018-05-10
Payer: MEDICARE

## 2018-05-10 PROCEDURE — G8980 MOBILITY D/C STATUS: HCPCS

## 2018-05-10 PROCEDURE — 97110 THERAPEUTIC EXERCISES: CPT

## 2018-05-10 PROCEDURE — G8979 MOBILITY GOAL STATUS: HCPCS

## 2018-05-16 ENCOUNTER — OFFICE VISIT (OUTPATIENT)
Dept: NEUROLOGY | Age: 83
End: 2018-05-16
Payer: MEDICARE

## 2018-05-16 VITALS
SYSTOLIC BLOOD PRESSURE: 123 MMHG | HEART RATE: 72 BPM | DIASTOLIC BLOOD PRESSURE: 61 MMHG | HEIGHT: 64 IN | BODY MASS INDEX: 28.41 KG/M2 | WEIGHT: 166.4 LBS

## 2018-05-16 DIAGNOSIS — R20.0 SENSORY LOSS: ICD-10-CM

## 2018-05-16 DIAGNOSIS — I63.9 CEREBROVASCULAR ACCIDENT (CVA), UNSPECIFIED MECHANISM (HCC): Primary | ICD-10-CM

## 2018-05-16 DIAGNOSIS — I95.1 ORTHOSTASIS: ICD-10-CM

## 2018-05-16 DIAGNOSIS — G62.9 NEUROPATHY: ICD-10-CM

## 2018-05-16 PROCEDURE — G8419 CALC BMI OUT NRM PARAM NOF/U: HCPCS | Performed by: NURSE PRACTITIONER

## 2018-05-16 PROCEDURE — G8598 ASA/ANTIPLAT THER USED: HCPCS | Performed by: NURSE PRACTITIONER

## 2018-05-16 PROCEDURE — G8427 DOCREV CUR MEDS BY ELIG CLIN: HCPCS | Performed by: NURSE PRACTITIONER

## 2018-05-16 PROCEDURE — 99214 OFFICE O/P EST MOD 30 MIN: CPT | Performed by: NURSE PRACTITIONER

## 2018-05-16 PROCEDURE — 1036F TOBACCO NON-USER: CPT | Performed by: NURSE PRACTITIONER

## 2018-05-16 PROCEDURE — 4040F PNEUMOC VAC/ADMIN/RCVD: CPT | Performed by: NURSE PRACTITIONER

## 2018-05-16 PROCEDURE — 1123F ACP DISCUSS/DSCN MKR DOCD: CPT | Performed by: NURSE PRACTITIONER

## 2018-05-16 RX ORDER — GLIPIZIDE 5 MG/1
5 TABLET ORAL
COMMUNITY

## 2018-07-24 ENCOUNTER — HOSPITAL ENCOUNTER (OUTPATIENT)
Dept: LAB | Age: 83
Discharge: HOME | End: 2018-07-24
Payer: MEDICARE

## 2018-07-24 LAB
ALBUMIN SERPL-MCNC: 3.8 G/DL (ref 3.5–5.2)
ALBUMIN/GLOBULIN RATIO: 1.2 (ref 1–2.5)
ALP BLD-CCNC: 103 U/L (ref 40–129)
ALT SERPL-CCNC: 16 U/L (ref 5–41)
ANION GAP SERPL CALCULATED.3IONS-SCNC: 13 MMOL/L (ref 9–17)
AST SERPL-CCNC: 26 U/L
BILIRUB SERPL-MCNC: 0.8 MG/DL (ref 0.3–1.2)
BUN BLDV-MCNC: 26 MG/DL (ref 8–23)
BUN/CREAT BLD: 16 (ref 9–20)
CALCIUM SERPL-MCNC: 9.1 MG/DL (ref 8.6–10.4)
CHLORIDE BLD-SCNC: 107 MMOL/L (ref 98–107)
CHOLESTEROL/HDL RATIO: 2.9
CHOLESTEROL: 105 MG/DL
CO2: 21 MMOL/L (ref 20–31)
CREAT SERPL-MCNC: 1.59 MG/DL (ref 0.7–1.2)
ESTIMATED AVERAGE GLUCOSE: 154 MG/DL
GFR AFRICAN AMERICAN: 50 ML/MIN
GFR NON-AFRICAN AMERICAN: 41 ML/MIN
GFR SERPL CREATININE-BSD FRML MDRD: ABNORMAL ML/MIN/{1.73_M2}
GFR SERPL CREATININE-BSD FRML MDRD: ABNORMAL ML/MIN/{1.73_M2}
GLUCOSE BLD-MCNC: 101 MG/DL (ref 70–99)
HBA1C MFR BLD: 7 % (ref 4.8–5.9)
HDLC SERPL-MCNC: 36 MG/DL
LDL CHOLESTEROL: 50 MG/DL (ref 0–130)
POTASSIUM SERPL-SCNC: 4.6 MMOL/L (ref 3.7–5.3)
SODIUM BLD-SCNC: 141 MMOL/L (ref 135–144)
TOTAL PROTEIN: 6.9 G/DL (ref 6.4–8.3)
TRIGL SERPL-MCNC: 95 MG/DL
VLDLC SERPL CALC-MCNC: ABNORMAL MG/DL (ref 1–30)

## 2018-07-24 PROCEDURE — 80061 LIPID PANEL: CPT

## 2018-07-24 PROCEDURE — 83036 HEMOGLOBIN GLYCOSYLATED A1C: CPT

## 2018-07-24 PROCEDURE — 80053 COMPREHEN METABOLIC PANEL: CPT

## 2018-07-24 PROCEDURE — 36415 COLL VENOUS BLD VENIPUNCTURE: CPT

## 2018-10-15 NOTE — PROGRESS NOTES
Physical Therapy  Facility/Department: [facility] NEURO  Daily Treatment Note  NAME: Khushboo Alvarez  : 10/31/1930  MRN: [id number]    Date of Service: 3/28/2018    Patient Diagnosis(es): The encounter diagnosis was Cerebrovascular accident (CVA), unspecified mechanism ([facility] [address] has a past medical history of CAD (coronary artery disease); Hyperlipidemia; Hypertension; and Type II or unspecified type diabetes mellitus without mention of complication, not stated as uncontrolled. has a past surgical history that includes Coronary artery bypass graft (); Cataract removal with implant (Bilateral,  and 12/3/2013); and Inguinal hernia repair (Bilateral, 12). Restrictions  Restrictions/Precautions  Restrictions/Precautions: General Precautions, Fall Risk  Required Braces or Orthoses?: No  Position Activity Restriction  Other position/activity restrictions: up as toelrated, up with assist  Subjective   General  Response To Previous Treatment: Patient with no complaints from previous session. Family / Caregiver Present: Yes  Subjective  Subjective: Pt very eager to participate in PT and go home  Pain Screening  Patient Currently in Pain: Denies  Vital Signs  Patient Currently in Pain: Denies       Orientation  Orientation  Overall Orientation Status: Within Normal Limits  Objective      Transfers  Sit to Stand: Supervision  Stand to sit: Supervision  Bed to Chair: Supervision  Ambulation  Ambulation?: Yes  Ambulation 1  Surface: level tile  Device: No Device  Assistance: Stand by assistance  Quality of Gait: Decreased abiel, otherwise normalized gait  Distance: 600 ft  Stairs/Curb  Stairs?: No     Balance  Posture: Good  Sitting - Static: Good  Sitting - Dynamic: Good  Standing - Static: Good  Standing - Dynamic: Good    Exercise  Standing exercise program: Heel/toe raises, hip flexion, hip abduction, mini squats, hip extension, and hamstring curls.  Reps: 10x with 1 seated rest break  Lateral, ant, DISCHARGE

## 2018-12-27 ENCOUNTER — HOSPITAL ENCOUNTER (OUTPATIENT)
Dept: LAB | Age: 83
Discharge: HOME | End: 2018-12-27
Payer: MEDICARE

## 2018-12-27 LAB
ABSOLUTE EOS #: 0.27 K/UL (ref 0–0.44)
ABSOLUTE IMMATURE GRANULOCYTE: <0.03 K/UL (ref 0–0.3)
ABSOLUTE LYMPH #: 2.7 K/UL (ref 1.1–3.7)
ABSOLUTE MONO #: 0.71 K/UL (ref 0.1–1.2)
ALBUMIN SERPL-MCNC: 3.7 G/DL (ref 3.5–5.2)
ALBUMIN/GLOBULIN RATIO: 1.2 (ref 1–2.5)
ALP BLD-CCNC: 120 U/L (ref 40–129)
ALT SERPL-CCNC: 14 U/L (ref 5–41)
ANION GAP SERPL CALCULATED.3IONS-SCNC: 11 MMOL/L (ref 9–17)
AST SERPL-CCNC: 21 U/L
BASOPHILS # BLD: 0 % (ref 0–2)
BASOPHILS ABSOLUTE: 0.03 K/UL (ref 0–0.2)
BILIRUB SERPL-MCNC: 0.42 MG/DL (ref 0.3–1.2)
BUN BLDV-MCNC: 32 MG/DL (ref 8–23)
BUN/CREAT BLD: 18 (ref 9–20)
CALCIUM SERPL-MCNC: 8.9 MG/DL (ref 8.6–10.4)
CHLORIDE BLD-SCNC: 110 MMOL/L (ref 98–107)
CHOLESTEROL/HDL RATIO: 2.7
CHOLESTEROL: 101 MG/DL
CO2: 22 MMOL/L (ref 20–31)
CREAT SERPL-MCNC: 1.78 MG/DL (ref 0.7–1.2)
DIFFERENTIAL TYPE: ABNORMAL
EOSINOPHILS RELATIVE PERCENT: 3 % (ref 1–4)
ESTIMATED AVERAGE GLUCOSE: 163 MG/DL
GFR AFRICAN AMERICAN: 44 ML/MIN
GFR NON-AFRICAN AMERICAN: 36 ML/MIN
GFR SERPL CREATININE-BSD FRML MDRD: ABNORMAL ML/MIN/{1.73_M2}
GFR SERPL CREATININE-BSD FRML MDRD: ABNORMAL ML/MIN/{1.73_M2}
GLUCOSE BLD-MCNC: 135 MG/DL (ref 70–99)
HBA1C MFR BLD: 7.3 % (ref 4.8–5.9)
HCT VFR BLD CALC: 36.2 % (ref 40.7–50.3)
HDLC SERPL-MCNC: 37 MG/DL
HEMOGLOBIN: 11.5 G/DL (ref 13–17)
IMMATURE GRANULOCYTES: 0 %
LDL CHOLESTEROL: 40 MG/DL (ref 0–130)
LYMPHOCYTES # BLD: 30 % (ref 24–43)
MCH RBC QN AUTO: 31.7 PG (ref 25.2–33.5)
MCHC RBC AUTO-ENTMCNC: 31.8 G/DL (ref 28.4–34.8)
MCV RBC AUTO: 99.7 FL (ref 82.6–102.9)
MONOCYTES # BLD: 8 % (ref 3–12)
NRBC AUTOMATED: 0 PER 100 WBC
PDW BLD-RTO: 11.9 % (ref 11.8–14.4)
PLATELET # BLD: 203 K/UL (ref 138–453)
PLATELET ESTIMATE: ABNORMAL
PMV BLD AUTO: 9.8 FL (ref 8.1–13.5)
POTASSIUM SERPL-SCNC: 4.7 MMOL/L (ref 3.7–5.3)
RBC # BLD: 3.63 M/UL (ref 4.21–5.77)
RBC # BLD: ABNORMAL 10*6/UL
SEG NEUTROPHILS: 59 % (ref 36–65)
SEGMENTED NEUTROPHILS ABSOLUTE COUNT: 5.3 K/UL (ref 1.5–8.1)
SODIUM BLD-SCNC: 143 MMOL/L (ref 135–144)
TOTAL PROTEIN: 6.7 G/DL (ref 6.4–8.3)
TRIGL SERPL-MCNC: 121 MG/DL
VLDLC SERPL CALC-MCNC: ABNORMAL MG/DL (ref 1–30)
WBC # BLD: 9 K/UL (ref 3.5–11.3)
WBC # BLD: ABNORMAL 10*3/UL

## 2018-12-27 PROCEDURE — 80053 COMPREHEN METABOLIC PANEL: CPT

## 2018-12-27 PROCEDURE — 83036 HEMOGLOBIN GLYCOSYLATED A1C: CPT

## 2018-12-27 PROCEDURE — 85025 COMPLETE CBC W/AUTO DIFF WBC: CPT

## 2018-12-27 PROCEDURE — 80061 LIPID PANEL: CPT

## 2018-12-27 PROCEDURE — 36415 COLL VENOUS BLD VENIPUNCTURE: CPT

## 2019-05-30 ENCOUNTER — HOSPITAL ENCOUNTER (OUTPATIENT)
Dept: LAB | Age: 84
Discharge: HOME | End: 2019-05-30
Payer: MEDICARE

## 2019-05-30 LAB
ABSOLUTE EOS #: 0.31 K/UL (ref 0–0.44)
ABSOLUTE IMMATURE GRANULOCYTE: 0.03 K/UL (ref 0–0.3)
ABSOLUTE LYMPH #: 2.91 K/UL (ref 1.1–3.7)
ABSOLUTE MONO #: 0.83 K/UL (ref 0.1–1.2)
ALBUMIN SERPL-MCNC: 3.4 G/DL (ref 3.5–5.2)
ALBUMIN/GLOBULIN RATIO: 1 (ref 1–2.5)
ALP BLD-CCNC: 123 U/L (ref 40–129)
ALT SERPL-CCNC: 15 U/L (ref 5–41)
ANION GAP SERPL CALCULATED.3IONS-SCNC: 10 MMOL/L (ref 9–17)
AST SERPL-CCNC: 25 U/L
BASOPHILS # BLD: 1 % (ref 0–2)
BASOPHILS ABSOLUTE: 0.04 K/UL (ref 0–0.2)
BILIRUB SERPL-MCNC: 0.48 MG/DL (ref 0.3–1.2)
BUN BLDV-MCNC: 45 MG/DL (ref 8–23)
BUN/CREAT BLD: 18 (ref 9–20)
CALCIUM SERPL-MCNC: 8.8 MG/DL (ref 8.6–10.4)
CHLORIDE BLD-SCNC: 106 MMOL/L (ref 98–107)
CHOLESTEROL/HDL RATIO: 2.9
CHOLESTEROL: 90 MG/DL
CO2: 22 MMOL/L (ref 20–31)
CREAT SERPL-MCNC: 2.54 MG/DL (ref 0.7–1.2)
DIFFERENTIAL TYPE: ABNORMAL
EOSINOPHILS RELATIVE PERCENT: 4 % (ref 1–4)
ESTIMATED AVERAGE GLUCOSE: 166 MG/DL
GFR AFRICAN AMERICAN: 29 ML/MIN
GFR NON-AFRICAN AMERICAN: 24 ML/MIN
GFR SERPL CREATININE-BSD FRML MDRD: ABNORMAL ML/MIN/{1.73_M2}
GFR SERPL CREATININE-BSD FRML MDRD: ABNORMAL ML/MIN/{1.73_M2}
GLUCOSE BLD-MCNC: 86 MG/DL (ref 70–99)
HBA1C MFR BLD: 7.4 % (ref 4.8–5.9)
HCT VFR BLD CALC: 35 % (ref 40.7–50.3)
HDLC SERPL-MCNC: 31 MG/DL
HEMOGLOBIN: 10.9 G/DL (ref 13–17)
IMMATURE GRANULOCYTES: 0 %
LDL CHOLESTEROL: 41 MG/DL (ref 0–130)
LYMPHOCYTES # BLD: 35 % (ref 24–43)
MCH RBC QN AUTO: 31.1 PG (ref 25.2–33.5)
MCHC RBC AUTO-ENTMCNC: 31.1 G/DL (ref 28.4–34.8)
MCV RBC AUTO: 100 FL (ref 82.6–102.9)
MONOCYTES # BLD: 10 % (ref 3–12)
NRBC AUTOMATED: 0 PER 100 WBC
PDW BLD-RTO: 12.1 % (ref 11.8–14.4)
PLATELET # BLD: 218 K/UL (ref 138–453)
PLATELET ESTIMATE: ABNORMAL
PMV BLD AUTO: 10 FL (ref 8.1–13.5)
POTASSIUM SERPL-SCNC: 5.4 MMOL/L (ref 3.7–5.3)
RBC # BLD: 3.5 M/UL (ref 4.21–5.77)
RBC # BLD: ABNORMAL 10*6/UL
SEG NEUTROPHILS: 50 % (ref 36–65)
SEGMENTED NEUTROPHILS ABSOLUTE COUNT: 4.18 K/UL (ref 1.5–8.1)
SODIUM BLD-SCNC: 138 MMOL/L (ref 135–144)
TOTAL PROTEIN: 6.9 G/DL (ref 6.4–8.3)
TRIGL SERPL-MCNC: 92 MG/DL
VLDLC SERPL CALC-MCNC: ABNORMAL MG/DL (ref 1–30)
WBC # BLD: 8.3 K/UL (ref 3.5–11.3)
WBC # BLD: ABNORMAL 10*3/UL

## 2019-05-30 PROCEDURE — 36415 COLL VENOUS BLD VENIPUNCTURE: CPT

## 2019-05-30 PROCEDURE — 83036 HEMOGLOBIN GLYCOSYLATED A1C: CPT

## 2019-05-30 PROCEDURE — 85025 COMPLETE CBC W/AUTO DIFF WBC: CPT

## 2019-05-30 PROCEDURE — 80053 COMPREHEN METABOLIC PANEL: CPT

## 2019-05-30 PROCEDURE — 80061 LIPID PANEL: CPT

## 2019-06-08 ENCOUNTER — APPOINTMENT (OUTPATIENT)
Dept: CT IMAGING | Age: 84
End: 2019-06-08
Payer: MEDICARE

## 2019-06-08 ENCOUNTER — HOSPITAL ENCOUNTER (EMERGENCY)
Age: 84
Discharge: ANOTHER HOSPITAL | End: 2019-06-08
Attending: EMERGENCY MEDICINE
Payer: MEDICARE

## 2019-06-08 VITALS
OXYGEN SATURATION: 97 % | DIASTOLIC BLOOD PRESSURE: 79 MMHG | HEART RATE: 83 BPM | BODY MASS INDEX: 27.12 KG/M2 | WEIGHT: 158 LBS | TEMPERATURE: 99 F | SYSTOLIC BLOOD PRESSURE: 112 MMHG | RESPIRATION RATE: 16 BRPM

## 2019-06-08 DIAGNOSIS — N13.8 OBSTRUCTIVE NEPHROPATHY: Primary | ICD-10-CM

## 2019-06-08 DIAGNOSIS — E87.5 HYPERKALEMIA: ICD-10-CM

## 2019-06-08 DIAGNOSIS — N20.1 URETEROLITHIASIS: ICD-10-CM

## 2019-06-08 LAB
-: ABNORMAL
ABSOLUTE EOS #: 0.13 K/UL (ref 0–0.44)
ABSOLUTE IMMATURE GRANULOCYTE: 0.04 K/UL (ref 0–0.3)
ABSOLUTE LYMPH #: 1.64 K/UL (ref 1.1–3.7)
ABSOLUTE MONO #: 0.81 K/UL (ref 0.1–1.2)
ALBUMIN SERPL-MCNC: 3.5 G/DL (ref 3.5–5.2)
ALBUMIN/GLOBULIN RATIO: 0.9 (ref 1–2.5)
ALP BLD-CCNC: 109 U/L (ref 40–129)
ALT SERPL-CCNC: 13 U/L (ref 5–41)
AMORPHOUS: ABNORMAL
ANION GAP SERPL CALCULATED.3IONS-SCNC: 13 MMOL/L (ref 9–17)
AST SERPL-CCNC: 22 U/L
BACTERIA: ABNORMAL
BASOPHILS # BLD: 0 % (ref 0–2)
BASOPHILS ABSOLUTE: 0.03 K/UL (ref 0–0.2)
BILIRUB SERPL-MCNC: 0.58 MG/DL (ref 0.3–1.2)
BILIRUBIN URINE: NEGATIVE
BUN BLDV-MCNC: 54 MG/DL (ref 8–23)
BUN/CREAT BLD: 14 (ref 9–20)
CALCIUM SERPL-MCNC: 9.1 MG/DL (ref 8.6–10.4)
CASTS UA: ABNORMAL /LPF
CHLORIDE BLD-SCNC: 99 MMOL/L (ref 98–107)
CO2: 22 MMOL/L (ref 20–31)
COLOR: YELLOW
COMMENT UA: ABNORMAL
CREAT SERPL-MCNC: 3.88 MG/DL (ref 0.7–1.2)
CRYSTALS, UA: ABNORMAL /HPF
DIFFERENTIAL TYPE: ABNORMAL
EKG ATRIAL RATE: 69 BPM
EKG P AXIS: 20 DEGREES
EKG P-R INTERVAL: 162 MS
EKG Q-T INTERVAL: 386 MS
EKG QRS DURATION: 112 MS
EKG QTC CALCULATION (BAZETT): 413 MS
EKG R AXIS: -35 DEGREES
EKG T AXIS: 6 DEGREES
EKG VENTRICULAR RATE: 69 BPM
EOSINOPHILS RELATIVE PERCENT: 1 % (ref 1–4)
EPITHELIAL CELLS UA: ABNORMAL /HPF (ref 0–5)
GFR AFRICAN AMERICAN: 18 ML/MIN
GFR NON-AFRICAN AMERICAN: 15 ML/MIN
GFR SERPL CREATININE-BSD FRML MDRD: ABNORMAL ML/MIN/{1.73_M2}
GFR SERPL CREATININE-BSD FRML MDRD: ABNORMAL ML/MIN/{1.73_M2}
GLUCOSE BLD-MCNC: 241 MG/DL (ref 70–99)
GLUCOSE URINE: ABNORMAL
HCT VFR BLD CALC: 35.9 % (ref 40.7–50.3)
HEMOGLOBIN: 11.5 G/DL (ref 13–17)
IMMATURE GRANULOCYTES: 0 %
KETONES, URINE: NEGATIVE
LEUKOCYTE ESTERASE, URINE: ABNORMAL
LYMPHOCYTES # BLD: 17 % (ref 24–43)
MCH RBC QN AUTO: 30.9 PG (ref 25.2–33.5)
MCHC RBC AUTO-ENTMCNC: 32 G/DL (ref 28.4–34.8)
MCV RBC AUTO: 96.5 FL (ref 82.6–102.9)
MONOCYTES # BLD: 8 % (ref 3–12)
MUCUS: ABNORMAL
NITRITE, URINE: NEGATIVE
NRBC AUTOMATED: 0 PER 100 WBC
OTHER OBSERVATIONS UA: ABNORMAL
PDW BLD-RTO: 12.1 % (ref 11.8–14.4)
PH UA: 5.5 (ref 5–9)
PLATELET # BLD: 209 K/UL (ref 138–453)
PLATELET ESTIMATE: ABNORMAL
PMV BLD AUTO: 10.2 FL (ref 8.1–13.5)
POTASSIUM SERPL-SCNC: 6 MMOL/L (ref 3.7–5.3)
PROTEIN UA: ABNORMAL
RBC # BLD: 3.72 M/UL (ref 4.21–5.77)
RBC # BLD: ABNORMAL 10*6/UL
RBC UA: ABNORMAL /HPF (ref 0–2)
RENAL EPITHELIAL, UA: ABNORMAL /HPF
SEG NEUTROPHILS: 74 % (ref 36–65)
SEGMENTED NEUTROPHILS ABSOLUTE COUNT: 7.09 K/UL (ref 1.5–8.1)
SODIUM BLD-SCNC: 134 MMOL/L (ref 135–144)
SPECIFIC GRAVITY UA: 1.02 (ref 1.01–1.02)
TOTAL PROTEIN: 7.4 G/DL (ref 6.4–8.3)
TRICHOMONAS: ABNORMAL
TURBIDITY: ABNORMAL
URINE HGB: ABNORMAL
UROBILINOGEN, URINE: NORMAL
WBC # BLD: 9.7 K/UL (ref 3.5–11.3)
WBC # BLD: ABNORMAL 10*3/UL
WBC UA: ABNORMAL /HPF (ref 0–5)
YEAST: ABNORMAL

## 2019-06-08 PROCEDURE — 2580000003 HC RX 258: Performed by: EMERGENCY MEDICINE

## 2019-06-08 PROCEDURE — 93010 ELECTROCARDIOGRAM REPORT: CPT | Performed by: INTERNAL MEDICINE

## 2019-06-08 PROCEDURE — 81001 URINALYSIS AUTO W/SCOPE: CPT

## 2019-06-08 PROCEDURE — 87086 URINE CULTURE/COLONY COUNT: CPT

## 2019-06-08 PROCEDURE — 74176 CT ABD & PELVIS W/O CONTRAST: CPT

## 2019-06-08 PROCEDURE — 36415 COLL VENOUS BLD VENIPUNCTURE: CPT

## 2019-06-08 PROCEDURE — 80053 COMPREHEN METABOLIC PANEL: CPT

## 2019-06-08 PROCEDURE — 85025 COMPLETE CBC W/AUTO DIFF WBC: CPT

## 2019-06-08 PROCEDURE — 99284 EMERGENCY DEPT VISIT MOD MDM: CPT

## 2019-06-08 PROCEDURE — 93005 ELECTROCARDIOGRAM TRACING: CPT | Performed by: EMERGENCY MEDICINE

## 2019-06-08 RX ORDER — 0.9 % SODIUM CHLORIDE 0.9 %
500 INTRAVENOUS SOLUTION INTRAVENOUS ONCE
Status: COMPLETED | OUTPATIENT
Start: 2019-06-08 | End: 2019-06-08

## 2019-06-08 RX ADMIN — SODIUM CHLORIDE 500 ML: 9 INJECTION, SOLUTION INTRAVENOUS at 13:54

## 2019-06-08 ASSESSMENT — ENCOUNTER SYMPTOMS
BACK PAIN: 1
COLOR CHANGE: 0
ABDOMINAL PAIN: 0
DIARRHEA: 0
COUGH: 0
SORE THROAT: 0
NAUSEA: 0
VOMITING: 0
SHORTNESS OF BREATH: 0

## 2019-06-08 ASSESSMENT — PAIN DESCRIPTION - LOCATION: LOCATION: ABDOMEN

## 2019-06-08 ASSESSMENT — PAIN DESCRIPTION - ORIENTATION: ORIENTATION: RIGHT

## 2019-06-08 ASSESSMENT — PAIN DESCRIPTION - PAIN TYPE: TYPE: ACUTE PAIN

## 2019-06-08 ASSESSMENT — PAIN SCALES - GENERAL: PAINLEVEL_OUTOF10: 7

## 2019-06-08 NOTE — ED PROVIDER NOTES
[facility] ED  eMERGENCY dEPARTMENT eNCOUnter      Pt Name: Radha Nuñez  MRN: [id number]  [place] 10/31/1930  Date of evaluation: 6/8/2019  Provider: Emily Atkinson [facility]       Chief Complaint   Patient presents with    Abdominal Pain     right sided, onset this past week         HISTORY OF PRESENT ILLNESS   (Location/Symptom, Timing/Onset, Context/Setting, Quality, Duration, Modifying Factors, Severity) Note limiting factors. HPI    Radha Nuñez is a 80 y.o. male who presents to the emergency department with complaint of right flank pain. Patient states she's had intermittent right flank pain for the past 7-10 days. Patient states that he had recent blood work by his family doctor which documented a \"kidney issue\"and he states he is to see a kidney specialist.  Patient reports however that the pain is worsened and secondary to this he comes in for evaluation. Patient denies any fevers chills or dysuria      Nursing Notes were reviewed. REVIEW OF SYSTEMS    (2+ for level 4; 10+ for level 5)   Review of Systems   Constitutional: Negative for chills and fever. HENT: Negative for congestion and sore throat. Respiratory: Negative for cough and shortness of breath. Cardiovascular: Negative for chest pain. Gastrointestinal: Negative for abdominal pain, diarrhea, nausea and vomiting. Genitourinary: Positive for flank pain. Negative for dysuria and hematuria. Musculoskeletal: Positive for back pain. Skin: Negative for color change and rash. Neurological: Negative for weakness, light-headedness and headaches. All other systems reviewed and are negative.       PAST MEDICAL HISTORY     Past Medical History:   Diagnosis Date    CAD (coronary artery disease)     Cataracts, bilateral     Glaucoma     Hyperlipidemia     Hypertension     TIA (transient ischemic attack)     Type II or unspecified type diabetes mellitus without mention of complication, not stated as uncontrolled        SURGICAL HISTORY       Past Surgical History:   Procedure Laterality Date    CATARACT REMOVAL WITH IMPLANT Bilateral 11/219/2013 and 12/3/2013    CORONARY ARTERY BYPASS GRAFT  2000    INGUINAL HERNIA REPAIR Bilateral 06/19/12       CURRENT MEDICATIONS       Discharge Medication List as of 6/8/2019  2:26 PM      CONTINUE these medications which have NOT CHANGED    Details   glipiZIDE (GLUCOTROL) 5 MG tablet Take 5 mg by mouth 2 times daily (before meals)Historical Med      metFORMIN (GLUCOPHAGE) 500 MG tablet Take 1,000 mg by mouth 2 times daily (with meals) Historical Med      atorvastatin (LIPITOR) 40 MG tablet Take 1 tablet by mouth nightly, Disp-30 tablet, R-3Normal      clopidogrel (PLAVIX) 75 MG tablet Take 1 tablet by mouth daily, Disp-30 tablet, R-3Normal      ramipril (ALTACE) 2.5 MG capsule Take 2.5 mg by mouth every morning Historical Med      aspirin 81 MG EC tablet Take 81 mg by mouth nightly Historical Med             ALLERGIES     Patient has no known allergies.     FAMILY HISTORY       Family History   Problem Relation Age of Onset    Heart Disease Father     Heart Disease Brother         SOCIAL HISTORY       Social History     Socioeconomic History    Marital status:      Spouse name: None    Number of children: None    Years of education: None    Highest education level: None   Occupational History    None   Social Needs    Financial resource strain: None    Food insecurity:     Worry: None     Inability: None    Transportation needs:     Medical: None     Non-medical: None   Tobacco Use    Smoking status: Never Smoker    Smokeless tobacco: Never Used   Substance and Sexual Activity    Alcohol use: Yes     Comment: very very very seldom    Drug use: No    Sexual activity: None   Lifestyle    Physical activity:     Days per week: None     Minutes per session: None    Stress: None   Relationships    Social connections:     Talks on phone: None     Gets together: None     Attends Voodoo service: None     Active member of club or organization: None     Attends meetings of clubs or organizations: None     Relationship status: None    Intimate partner violence:     Fear of current or ex partner: None     Emotionally abused: None     Physically abused: None     Forced sexual activity: None   Other Topics Concern    None   Social History Narrative    None       SCREENINGS           PHYSICAL EXAM    (up to 7 for level 4, 8 or more for level 5)   @[email or web address]    Physical Exam   Constitutional: He is oriented to person, place, and time. He appears well-developed and well-nourished. No distress. HENT:   Head: Normocephalic and atraumatic. Nose: Nose normal.   Mouth/Throat: Oropharynx is clear and moist. No oropharyngeal exudate. Eyes: Pupils are equal, round, and reactive to light. Conjunctivae and EOM are normal. Right eye exhibits no discharge. Left eye exhibits no discharge. No scleral icterus. Neck: Normal range of motion. Neck supple. Cardiovascular: Normal rate, regular rhythm, normal heart sounds and intact distal pulses. Exam reveals no gallop and no friction rub. No murmur heard. Radial pulses are +2 out of 4 bilaterally they're equal and symmetric   Pulmonary/Chest: Effort normal and breath sounds normal. No stridor. No respiratory distress. He has no wheezes. Breath sounds are diminished throughout but overall clear to auscultation with no signs of respiratory distress   Abdominal: Soft. Bowel sounds are normal. He exhibits no distension. There is no tenderness. There is no guarding. No voluntary guarding or rigidity no pulsatile mass   Musculoskeletal: Normal range of motion. He exhibits tenderness. He exhibits no edema or deformity. Positive right CVA pain   Neurological: He is alert and oriented to person, place, and time. No cranial nerve deficit or sensory deficit. Skin: Skin is warm and dry.  Capillary refill takes less than 2 seconds. No rash noted. He is not diaphoretic. No erythema. No pallor. No abrasions or ecchymosis no soft tissue changes to suggest infection   Psychiatric: He has a normal mood and affect. His behavior is normal. Judgment and thought content normal.   Nursing note and vitals reviewed. DIAGNOSTIC RESULTS     EKG (Per Emergency Physician):   EKG shows sinus rhythm at a rate of 69 with no acute current of injury or dysrhythmia changes QTC is 413    RADIOLOGY (Per Emergency Physician): Interpretation per the Radiologist below, if available at the time of this note:  Ct Abdomen Pelvis Wo Contrast Additional Contrast? None    Result Date: 6/8/2019  EXAMINATION: CT OF THE ABDOMEN AND PELVIS WITHOUT CONTRAST 6/8/2019 12:24 pm TECHNIQUE: CT of the abdomen and pelvis was performed without the administration of intravenous contrast. Multiplanar reformatted images are provided for review. Dose modulation, iterative reconstruction, and/or weight based adjustment of the mA/kV was utilized to reduce the radiation dose to as low as reasonably achievable. COMPARISON: None HISTORY: ORDERING SYSTEM PROVIDED HISTORY: right flank pain TECHNOLOGIST PROVIDED HISTORY: FINDINGS: Evaluation is limited by motion. Lower Chest: Dependent lung changes with suspected fibrosis. Organs: Evaluation of the solid organs is limited without intravenous contrast. No liver lesion. Cholelithiasis without CT evidence of acute cholecystitis. Fatty atrophy of the pancreas. No focal lesion. Punctate calcifications within the spleen are likely related to prior granulomatous disease. No adrenal lesion. No left-sided hydronephrosis. Nonobstructing left renal calculi. Moderate right-sided hydronephrosis and hydroureter along with perinephric stranding. Within the right proximal ureter there is a large calculus measuring at least 9 mm. GI/Bowel: No bowel obstruction. Large stool burden. Sigmoid diverticulosis without acute diverticulitis. No acute inflammatory process. Pelvis: Calcification along the posterior bladder wall versus multiple small calculi. No free fluid within the pelvis. Peritoneum/Retroperitoneum: Atherosclerotic calcification of the abdominal aorta without aneurysmal dilatation. No adenopathy. Bones/Soft Tissues: No acute soft tissue abnormality. Lumbar spine degenerative changes. Right-sided obstructive uropathy with a large calculus within the right proximal ureter measuring at least 9 mm. Right perinephric stranding may be related to obstructive uropathy with superimposed infection not excluded. Bladder wall calcification versus multiple small dependent calculi. ED BEDSIDE ULTRASOUND:   Performed by ED Physician - none    LABS:  Labs Reviewed   CBC WITH AUTO DIFFERENTIAL - Abnormal; Notable for the following components:       Result Value    RBC 3.72 (*)     Hemoglobin 11.5 (*)     Hematocrit 35.9 (*)     Seg Neutrophils 74 (*)     Lymphocytes 17 (*)     All other components within normal limits   COMPREHENSIVE METABOLIC PANEL - Abnormal; Notable for the following components:    Glucose 241 (*)     BUN 54 (*)     CREATININE 3.88 (*)     Sodium 134 (*)     Potassium 6.0 (*)     Albumin/Globulin Ratio 0.9 (*)     GFR Non- 15 (*)     GFR  18 (*)     All other components within normal limits   URINE RT REFLEX TO CULTURE - Abnormal; Notable for the following components:    Turbidity UA SLIGHTLY CLOUDY (*)     Glucose, Ur 1+ (*)     Urine Hgb 3+ (*)     Protein, UA 1+ (*)     Leukocyte Esterase, Urine TRACE (*)     All other components within normal limits   MICROSCOPIC URINALYSIS - Abnormal; Notable for the following components:    Bacteria, UA TRACE (*)     Amorphous, UA 1+ (*)     All other components within normal limits   URINE CULTURE        All other labs were within normal range or not returned as of this dictation.     EMERGENCY DEPARTMENT COURSE and DIFFERENTIAL DIAGNOSIS/MDM: Vitals:    Vitals:    06/08/19 1151   BP: 112/79   Pulse: 83   Resp: 16   Temp: 99 °F (37.2 °C)   TempSrc: Tympanic   SpO2: 97%   Weight: 158 lb (71.7 kg)       Medications   0.9 % sodium chloride IV bolus 500 mL (0 mLs Intravenous Stopped 6/8/19 1422)       MDM. Patient arrived with stable vitals in no acute distress. With pain in the right flank there was concern for underlying kidney stone or possible infection so basic labs with a noncontrast CT were obtained. Labs showed acute kidney injury with elevation of his creatinine to 3.88 from 2.54. The source the elevation was found on CT scan to be a proximal ureter stone on the right at 9 mm causing obstruction. With need for surgical intervention based on the acute kidney injury patient was transferred to [facility] for further evaluation and treatment. Please note transferred to [facility] was requested by the patient as they have seen urology at this facility    REVAL:         Theresa Sheth 124 time was  minutes, excluding separately reportable procedures. There was a high probability of clinically significant/life threatening deterioration in the patient's condition which required my urgent intervention. CONSULTS:  None    PROCEDURES:  Unless otherwise noted below, none     Procedures    FINAL IMPRESSION      1. Obstructive nephropathy    2. Ureterolithiasis    3. Hyperkalemia          DISPOSITION/PLAN   DISPOSITION Decision To Transfer 06/08/2019 01:49:44 PM      PATIENT REFERRED TO:  No follow-up provider specified. DISCHARGE MEDICATIONS:  Discharge Medication List as of 6/8/2019  2:26 PM             (Please note:  Portions of this note were completed with a voice recognition program.  Efforts were made to edit the dictations but occasionally words and phrases are mis-transcribed.)  Form v2016. J.5-cn    Antoine Gilman,  (electronically signed)  Emergency Medicine Provider        Josh DO  06/08/19 1438

## 2019-06-09 LAB
CULTURE: NORMAL
Lab: NORMAL
SPECIMEN DESCRIPTION: NORMAL

## 2019-07-09 ENCOUNTER — HOSPITAL ENCOUNTER (OUTPATIENT)
Dept: LAB | Age: 84
Discharge: HOME | End: 2019-07-09
Payer: MEDICARE

## 2019-07-09 LAB
-: ABNORMAL
ALBUMIN SERPL-MCNC: 3.4 G/DL (ref 3.5–5.2)
AMORPHOUS: ABNORMAL
ANION GAP SERPL CALCULATED.3IONS-SCNC: 13 MMOL/L (ref 9–17)
BACTERIA: ABNORMAL
BILIRUBIN URINE: NEGATIVE
BUN BLDV-MCNC: 43 MG/DL (ref 8–23)
BUN/CREAT BLD: 18 (ref 9–20)
CALCIUM SERPL-MCNC: 8.9 MG/DL (ref 8.6–10.4)
CASTS UA: ABNORMAL /LPF
CHLORIDE BLD-SCNC: 99 MMOL/L (ref 98–107)
CO2: 23 MMOL/L (ref 20–31)
COLOR: ABNORMAL
COMMENT UA: ABNORMAL
CREAT SERPL-MCNC: 2.35 MG/DL (ref 0.7–1.2)
CREATININE URINE: 94.2 MG/DL (ref 39–259)
CRYSTALS, UA: ABNORMAL /HPF
EPITHELIAL CELLS UA: ABNORMAL /HPF (ref 0–5)
GFR AFRICAN AMERICAN: 32 ML/MIN
GFR NON-AFRICAN AMERICAN: 26 ML/MIN
GFR SERPL CREATININE-BSD FRML MDRD: ABNORMAL ML/MIN/{1.73_M2}
GFR SERPL CREATININE-BSD FRML MDRD: ABNORMAL ML/MIN/{1.73_M2}
GLUCOSE BLD-MCNC: 134 MG/DL (ref 70–99)
GLUCOSE URINE: NEGATIVE
HCT VFR BLD CALC: 31.7 % (ref 40.7–50.3)
HEMOGLOBIN: 10.2 G/DL (ref 13–17)
KETONES, URINE: NEGATIVE
LEUKOCYTE ESTERASE, URINE: ABNORMAL
MAGNESIUM: 1.7 MG/DL (ref 1.6–2.6)
MCH RBC QN AUTO: 31 PG (ref 25.2–33.5)
MCHC RBC AUTO-ENTMCNC: 32.2 G/DL (ref 28.4–34.8)
MCV RBC AUTO: 96.4 FL (ref 82.6–102.9)
MUCUS: ABNORMAL
NITRITE, URINE: POSITIVE
NRBC AUTOMATED: 0 PER 100 WBC
OTHER OBSERVATIONS UA: ABNORMAL
PDW BLD-RTO: 12.2 % (ref 11.8–14.4)
PH UA: 5.5 (ref 5–9)
PHOSPHORUS: 4 MG/DL (ref 2.5–4.5)
PLATELET # BLD: 281 K/UL (ref 138–453)
PMV BLD AUTO: 9.6 FL (ref 8.1–13.5)
POTASSIUM SERPL-SCNC: 4.8 MMOL/L (ref 3.7–5.3)
PROTEIN UA: ABNORMAL
RBC # BLD: 3.29 M/UL (ref 4.21–5.77)
RBC UA: ABNORMAL /HPF (ref 0–2)
RENAL EPITHELIAL, UA: ABNORMAL /HPF
SODIUM BLD-SCNC: 135 MMOL/L (ref 135–144)
SPECIFIC GRAVITY UA: 1.02 (ref 1.01–1.02)
TOTAL PROTEIN, URINE: 274 MG/DL
TRICHOMONAS: ABNORMAL
TURBIDITY: ABNORMAL
URINE HGB: ABNORMAL
UROBILINOGEN, URINE: NORMAL
WBC # BLD: 10.7 K/UL (ref 3.5–11.3)
WBC UA: ABNORMAL /HPF (ref 0–5)
YEAST: ABNORMAL

## 2019-07-09 PROCEDURE — 87186 SC STD MICRODIL/AGAR DIL: CPT

## 2019-07-09 PROCEDURE — 36415 COLL VENOUS BLD VENIPUNCTURE: CPT

## 2019-07-09 PROCEDURE — 84156 ASSAY OF PROTEIN URINE: CPT

## 2019-07-09 PROCEDURE — 81001 URINALYSIS AUTO W/SCOPE: CPT

## 2019-07-09 PROCEDURE — 80069 RENAL FUNCTION PANEL: CPT

## 2019-07-09 PROCEDURE — 85027 COMPLETE CBC AUTOMATED: CPT

## 2019-07-09 PROCEDURE — 83735 ASSAY OF MAGNESIUM: CPT

## 2019-07-09 PROCEDURE — 82570 ASSAY OF URINE CREATININE: CPT

## 2019-07-09 PROCEDURE — 87086 URINE CULTURE/COLONY COUNT: CPT

## 2019-07-09 PROCEDURE — 87077 CULTURE AEROBIC IDENTIFY: CPT

## 2019-07-11 LAB
CULTURE: ABNORMAL
Lab: ABNORMAL
SPECIMEN DESCRIPTION: ABNORMAL

## 2019-07-12 ENCOUNTER — HOSPITAL ENCOUNTER (OUTPATIENT)
Age: 84
Discharge: HOME | End: 2019-07-12
Payer: MEDICARE

## 2019-07-12 LAB
-: ABNORMAL
AMORPHOUS: ABNORMAL
BACTERIA: ABNORMAL
BILIRUBIN URINE: ABNORMAL
CASTS UA: ABNORMAL /LPF
COLOR: ABNORMAL
COMMENT UA: ABNORMAL
CRYSTALS, UA: ABNORMAL /HPF
EPITHELIAL CELLS UA: ABNORMAL /HPF (ref 0–5)
GLUCOSE URINE: ABNORMAL
KETONES, URINE: ABNORMAL
LEUKOCYTE ESTERASE, URINE: ABNORMAL
MUCUS: ABNORMAL
NITRITE, URINE: ABNORMAL
OTHER OBSERVATIONS UA: ABNORMAL
PH UA: ABNORMAL (ref 5–9)
PROTEIN UA: ABNORMAL
RBC UA: ABNORMAL /HPF (ref 0–2)
RENAL EPITHELIAL, UA: ABNORMAL /HPF
SPECIFIC GRAVITY UA: 1.01 (ref 1.01–1.02)
TRICHOMONAS: ABNORMAL
TURBIDITY: CLEAR
URINE HGB: ABNORMAL
UROBILINOGEN, URINE: ABNORMAL
WBC UA: ABNORMAL /HPF (ref 0–5)
YEAST: ABNORMAL

## 2019-07-12 PROCEDURE — 81001 URINALYSIS AUTO W/SCOPE: CPT

## 2019-07-12 PROCEDURE — 87086 URINE CULTURE/COLONY COUNT: CPT

## 2019-07-12 PROCEDURE — 87186 SC STD MICRODIL/AGAR DIL: CPT

## 2019-07-12 PROCEDURE — 87077 CULTURE AEROBIC IDENTIFY: CPT

## 2019-07-15 LAB
CULTURE: ABNORMAL
CULTURE: ABNORMAL
Lab: ABNORMAL
SPECIMEN DESCRIPTION: ABNORMAL

## 2019-10-08 ENCOUNTER — HOSPITAL ENCOUNTER (OUTPATIENT)
Dept: LAB | Age: 84
Discharge: HOME | End: 2019-10-08
Payer: MEDICARE

## 2019-10-08 LAB
-: ABNORMAL
ALBUMIN SERPL-MCNC: 3.8 G/DL (ref 3.5–5.2)
AMORPHOUS: ABNORMAL
ANION GAP SERPL CALCULATED.3IONS-SCNC: 12 MMOL/L (ref 9–17)
BACTERIA: ABNORMAL
BILIRUBIN URINE: NEGATIVE
BUN BLDV-MCNC: 34 MG/DL (ref 8–23)
BUN/CREAT BLD: 19 (ref 9–20)
CALCIUM SERPL-MCNC: 9.4 MG/DL (ref 8.6–10.4)
CASTS UA: ABNORMAL /LPF
CHLORIDE BLD-SCNC: 99 MMOL/L (ref 98–107)
CO2: 23 MMOL/L (ref 20–31)
COLOR: YELLOW
COMMENT UA: ABNORMAL
CREAT SERPL-MCNC: 1.79 MG/DL (ref 0.7–1.2)
CREATININE URINE: 72.1 MG/DL (ref 39–259)
CRYSTALS, UA: ABNORMAL /HPF
EPITHELIAL CELLS UA: ABNORMAL /HPF (ref 0–5)
FOLATE: 16.8 NG/ML
GFR AFRICAN AMERICAN: 44 ML/MIN
GFR NON-AFRICAN AMERICAN: 36 ML/MIN
GFR SERPL CREATININE-BSD FRML MDRD: ABNORMAL ML/MIN/{1.73_M2}
GFR SERPL CREATININE-BSD FRML MDRD: ABNORMAL ML/MIN/{1.73_M2}
GLUCOSE BLD-MCNC: 136 MG/DL (ref 70–99)
GLUCOSE URINE: NEGATIVE
HCT VFR BLD CALC: 37.3 % (ref 40.7–50.3)
HEMOGLOBIN: 11.6 G/DL (ref 13–17)
IRON SATURATION: 30 % (ref 20–55)
IRON: 81 UG/DL (ref 59–158)
KETONES, URINE: NEGATIVE
LEUKOCYTE ESTERASE, URINE: ABNORMAL
MAGNESIUM: 1.5 MG/DL (ref 1.6–2.6)
MCH RBC QN AUTO: 30.5 PG (ref 25.2–33.5)
MCHC RBC AUTO-ENTMCNC: 31.1 G/DL (ref 28.4–34.8)
MCV RBC AUTO: 98.2 FL (ref 82.6–102.9)
MUCUS: ABNORMAL
NITRITE, URINE: NEGATIVE
NRBC AUTOMATED: 0 PER 100 WBC
OTHER OBSERVATIONS UA: ABNORMAL
PDW BLD-RTO: 12.4 % (ref 11.8–14.4)
PH UA: 5.5 (ref 5–9)
PHOSPHORUS: 4 MG/DL (ref 2.5–4.5)
PLATELET # BLD: 200 K/UL (ref 138–453)
PMV BLD AUTO: 10 FL (ref 8.1–13.5)
POTASSIUM SERPL-SCNC: 5.1 MMOL/L (ref 3.7–5.3)
PROTEIN UA: ABNORMAL
PTH INTACT: 87.74 PG/ML (ref 15–65)
RBC # BLD: 3.8 M/UL (ref 4.21–5.77)
RBC UA: ABNORMAL /HPF (ref 0–2)
RENAL EPITHELIAL, UA: ABNORMAL /HPF
SODIUM BLD-SCNC: 134 MMOL/L (ref 135–144)
SPECIFIC GRAVITY UA: 1.01 (ref 1.01–1.02)
TOTAL IRON BINDING CAPACITY: 266 UG/DL (ref 250–450)
TOTAL PROTEIN, URINE: 22 MG/DL
TRICHOMONAS: ABNORMAL
TURBIDITY: CLEAR
UNSATURATED IRON BINDING CAPACITY: 185 UG/DL (ref 112–347)
URIC ACID: 5.6 MG/DL (ref 3.4–7)
URINE HGB: ABNORMAL
UROBILINOGEN, URINE: NORMAL
VITAMIN B-12: 574 PG/ML (ref 232–1245)
VITAMIN D 25-HYDROXY: 23.8 NG/ML (ref 30–100)
WBC # BLD: 10.3 K/UL (ref 3.5–11.3)
WBC UA: ABNORMAL /HPF (ref 0–5)
YEAST: ABNORMAL

## 2019-10-08 PROCEDURE — 36415 COLL VENOUS BLD VENIPUNCTURE: CPT

## 2019-10-08 PROCEDURE — 84550 ASSAY OF BLOOD/URIC ACID: CPT

## 2019-10-08 PROCEDURE — 82607 VITAMIN B-12: CPT

## 2019-10-08 PROCEDURE — 83550 IRON BINDING TEST: CPT

## 2019-10-08 PROCEDURE — 83970 ASSAY OF PARATHORMONE: CPT

## 2019-10-08 PROCEDURE — 81001 URINALYSIS AUTO W/SCOPE: CPT

## 2019-10-08 PROCEDURE — 85027 COMPLETE CBC AUTOMATED: CPT

## 2019-10-08 PROCEDURE — 83735 ASSAY OF MAGNESIUM: CPT

## 2019-10-08 PROCEDURE — 83540 ASSAY OF IRON: CPT

## 2019-10-08 PROCEDURE — 82306 VITAMIN D 25 HYDROXY: CPT

## 2019-10-08 PROCEDURE — 80069 RENAL FUNCTION PANEL: CPT

## 2019-10-08 PROCEDURE — 82570 ASSAY OF URINE CREATININE: CPT

## 2019-10-08 PROCEDURE — 82746 ASSAY OF FOLIC ACID SERUM: CPT

## 2019-10-08 PROCEDURE — 84156 ASSAY OF PROTEIN URINE: CPT

## 2019-10-12 ENCOUNTER — HOSPITAL ENCOUNTER (OUTPATIENT)
Dept: LAB | Age: 84
Discharge: HOME | End: 2019-10-12
Payer: MEDICARE

## 2019-10-12 LAB
-: ABNORMAL
AMORPHOUS: ABNORMAL
BACTERIA: ABNORMAL
BILIRUBIN URINE: NEGATIVE
CASTS UA: ABNORMAL /LPF
COLOR: YELLOW
COMMENT UA: ABNORMAL
CRYSTALS, UA: ABNORMAL /HPF
EPITHELIAL CELLS UA: ABNORMAL /HPF (ref 0–5)
GLUCOSE URINE: ABNORMAL
KETONES, URINE: NEGATIVE
LEUKOCYTE ESTERASE, URINE: ABNORMAL
MUCUS: ABNORMAL
NITRITE, URINE: NEGATIVE
OTHER OBSERVATIONS UA: ABNORMAL
PH UA: 5.5 (ref 5–9)
PROTEIN UA: NEGATIVE
RBC UA: ABNORMAL /HPF (ref 0–2)
RENAL EPITHELIAL, UA: ABNORMAL /HPF
SPECIFIC GRAVITY UA: 1.01 (ref 1.01–1.02)
TRICHOMONAS: ABNORMAL
TURBIDITY: CLEAR
URINE HGB: ABNORMAL
UROBILINOGEN, URINE: NORMAL
WBC UA: ABNORMAL /HPF (ref 0–5)
YEAST: ABNORMAL

## 2019-10-12 PROCEDURE — 87086 URINE CULTURE/COLONY COUNT: CPT

## 2019-10-12 PROCEDURE — 81001 URINALYSIS AUTO W/SCOPE: CPT

## 2019-10-13 LAB
CULTURE: NORMAL
Lab: NORMAL
SPECIMEN DESCRIPTION: NORMAL

## 2019-10-25 ENCOUNTER — HOSPITAL ENCOUNTER (OUTPATIENT)
Dept: LAB | Age: 84
Discharge: HOME | End: 2019-10-25
Payer: MEDICARE

## 2019-10-25 LAB
ABSOLUTE EOS #: 0.26 K/UL (ref 0–0.44)
ABSOLUTE IMMATURE GRANULOCYTE: 0.04 K/UL (ref 0–0.3)
ABSOLUTE LYMPH #: 3.65 K/UL (ref 1.1–3.7)
ABSOLUTE MONO #: 0.77 K/UL (ref 0.1–1.2)
ALBUMIN SERPL-MCNC: 4 G/DL (ref 3.5–5.2)
ALBUMIN/GLOBULIN RATIO: 1.2 (ref 1–2.5)
ALP BLD-CCNC: 125 U/L (ref 40–129)
ALT SERPL-CCNC: 15 U/L (ref 5–41)
ANION GAP SERPL CALCULATED.3IONS-SCNC: 12 MMOL/L (ref 9–17)
AST SERPL-CCNC: 26 U/L
BASOPHILS # BLD: 0 % (ref 0–2)
BASOPHILS ABSOLUTE: 0.03 K/UL (ref 0–0.2)
BILIRUB SERPL-MCNC: 0.62 MG/DL (ref 0.3–1.2)
BUN BLDV-MCNC: 47 MG/DL (ref 8–23)
BUN/CREAT BLD: 23 (ref 9–20)
CALCIUM SERPL-MCNC: 9.5 MG/DL (ref 8.6–10.4)
CHLORIDE BLD-SCNC: 105 MMOL/L (ref 98–107)
CHOLESTEROL/HDL RATIO: 2.8
CHOLESTEROL: 108 MG/DL
CO2: 22 MMOL/L (ref 20–31)
CREAT SERPL-MCNC: 2.05 MG/DL (ref 0.7–1.2)
DIFFERENTIAL TYPE: ABNORMAL
EOSINOPHILS RELATIVE PERCENT: 3 % (ref 1–4)
ESTIMATED AVERAGE GLUCOSE: 200 MG/DL
GFR AFRICAN AMERICAN: 37 ML/MIN
GFR NON-AFRICAN AMERICAN: 31 ML/MIN
GFR SERPL CREATININE-BSD FRML MDRD: ABNORMAL ML/MIN/{1.73_M2}
GFR SERPL CREATININE-BSD FRML MDRD: ABNORMAL ML/MIN/{1.73_M2}
GLUCOSE BLD-MCNC: 162 MG/DL (ref 70–99)
HBA1C MFR BLD: 8.6 % (ref 4.8–5.9)
HCT VFR BLD CALC: 37.4 % (ref 40.7–50.3)
HDLC SERPL-MCNC: 39 MG/DL
HEMOGLOBIN: 11.8 G/DL (ref 13–17)
IMMATURE GRANULOCYTES: 0 %
LDL CHOLESTEROL: 47 MG/DL (ref 0–130)
LYMPHOCYTES # BLD: 36 % (ref 24–43)
MCH RBC QN AUTO: 30.4 PG (ref 25.2–33.5)
MCHC RBC AUTO-ENTMCNC: 31.6 G/DL (ref 28.4–34.8)
MCV RBC AUTO: 96.4 FL (ref 82.6–102.9)
MONOCYTES # BLD: 8 % (ref 3–12)
NRBC AUTOMATED: 0 PER 100 WBC
PDW BLD-RTO: 12.6 % (ref 11.8–14.4)
PLATELET # BLD: 193 K/UL (ref 138–453)
PLATELET ESTIMATE: ABNORMAL
PMV BLD AUTO: 10 FL (ref 8.1–13.5)
POTASSIUM SERPL-SCNC: 5.1 MMOL/L (ref 3.7–5.3)
RBC # BLD: 3.88 M/UL (ref 4.21–5.77)
RBC # BLD: ABNORMAL 10*6/UL
SEG NEUTROPHILS: 53 % (ref 36–65)
SEGMENTED NEUTROPHILS ABSOLUTE COUNT: 5.37 K/UL (ref 1.5–8.1)
SODIUM BLD-SCNC: 139 MMOL/L (ref 135–144)
TOTAL PROTEIN: 7.4 G/DL (ref 6.4–8.3)
TRIGL SERPL-MCNC: 108 MG/DL
VLDLC SERPL CALC-MCNC: ABNORMAL MG/DL (ref 1–30)
WBC # BLD: 10.1 K/UL (ref 3.5–11.3)
WBC # BLD: ABNORMAL 10*3/UL

## 2019-10-25 PROCEDURE — 85025 COMPLETE CBC W/AUTO DIFF WBC: CPT

## 2019-10-25 PROCEDURE — 80053 COMPREHEN METABOLIC PANEL: CPT

## 2019-10-25 PROCEDURE — 83036 HEMOGLOBIN GLYCOSYLATED A1C: CPT

## 2019-10-25 PROCEDURE — 36415 COLL VENOUS BLD VENIPUNCTURE: CPT

## 2019-10-25 PROCEDURE — 80061 LIPID PANEL: CPT

## 2020-01-21 ENCOUNTER — HOSPITAL ENCOUNTER (OUTPATIENT)
Dept: LAB | Age: 85
Discharge: HOME | End: 2020-01-21
Payer: MEDICARE

## 2020-01-21 LAB
ABSOLUTE EOS #: 0.45 K/UL (ref 0–0.44)
ABSOLUTE IMMATURE GRANULOCYTE: 0.04 K/UL (ref 0–0.3)
ABSOLUTE LYMPH #: 2.86 K/UL (ref 1.1–3.7)
ABSOLUTE MONO #: 0.82 K/UL (ref 0.1–1.2)
ALBUMIN SERPL-MCNC: 4 G/DL (ref 3.5–5.2)
ALBUMIN/GLOBULIN RATIO: 1.3 (ref 1–2.5)
ALP BLD-CCNC: 122 U/L (ref 40–129)
ALT SERPL-CCNC: 12 U/L (ref 5–41)
ANION GAP SERPL CALCULATED.3IONS-SCNC: 11 MMOL/L (ref 9–17)
AST SERPL-CCNC: 21 U/L
BASOPHILS # BLD: 0 % (ref 0–2)
BASOPHILS ABSOLUTE: 0.03 K/UL (ref 0–0.2)
BILIRUB SERPL-MCNC: 0.67 MG/DL (ref 0.3–1.2)
BUN BLDV-MCNC: 43 MG/DL (ref 8–23)
BUN/CREAT BLD: 18 (ref 9–20)
CALCIUM SERPL-MCNC: 9.2 MG/DL (ref 8.6–10.4)
CHLORIDE BLD-SCNC: 97 MMOL/L (ref 98–107)
CHOLESTEROL/HDL RATIO: 3
CHOLESTEROL: 107 MG/DL
CO2: 24 MMOL/L (ref 20–31)
CREAT SERPL-MCNC: 2.35 MG/DL (ref 0.7–1.2)
DIFFERENTIAL TYPE: ABNORMAL
EOSINOPHILS RELATIVE PERCENT: 5 % (ref 1–4)
ESTIMATED AVERAGE GLUCOSE: 217 MG/DL
GFR AFRICAN AMERICAN: 32 ML/MIN
GFR NON-AFRICAN AMERICAN: 26 ML/MIN
GFR SERPL CREATININE-BSD FRML MDRD: ABNORMAL ML/MIN/{1.73_M2}
GFR SERPL CREATININE-BSD FRML MDRD: ABNORMAL ML/MIN/{1.73_M2}
GLUCOSE BLD-MCNC: 164 MG/DL (ref 70–99)
HBA1C MFR BLD: 9.2 % (ref 4.8–5.9)
HCT VFR BLD CALC: 37.6 % (ref 40.7–50.3)
HDLC SERPL-MCNC: 36 MG/DL
HEMOGLOBIN: 11.8 G/DL (ref 13–17)
IMMATURE GRANULOCYTES: 0 %
LDL CHOLESTEROL: 46 MG/DL (ref 0–130)
LYMPHOCYTES # BLD: 30 % (ref 24–43)
MCH RBC QN AUTO: 31 PG (ref 25.2–33.5)
MCHC RBC AUTO-ENTMCNC: 31.4 G/DL (ref 28.4–34.8)
MCV RBC AUTO: 98.7 FL (ref 82.6–102.9)
MONOCYTES # BLD: 9 % (ref 3–12)
NRBC AUTOMATED: 0 PER 100 WBC
PDW BLD-RTO: 11.9 % (ref 11.8–14.4)
PLATELET # BLD: 197 K/UL (ref 138–453)
PLATELET ESTIMATE: ABNORMAL
PMV BLD AUTO: 10.3 FL (ref 8.1–13.5)
POTASSIUM SERPL-SCNC: 5.4 MMOL/L (ref 3.7–5.3)
RBC # BLD: 3.81 M/UL (ref 4.21–5.77)
RBC # BLD: ABNORMAL 10*6/UL
SEG NEUTROPHILS: 56 % (ref 36–65)
SEGMENTED NEUTROPHILS ABSOLUTE COUNT: 5.41 K/UL (ref 1.5–8.1)
SODIUM BLD-SCNC: 132 MMOL/L (ref 135–144)
TOTAL PROTEIN: 7.1 G/DL (ref 6.4–8.3)
TRIGL SERPL-MCNC: 126 MG/DL
VLDLC SERPL CALC-MCNC: ABNORMAL MG/DL (ref 1–30)
WBC # BLD: 9.6 K/UL (ref 3.5–11.3)
WBC # BLD: ABNORMAL 10*3/UL

## 2020-01-21 PROCEDURE — 80053 COMPREHEN METABOLIC PANEL: CPT

## 2020-01-21 PROCEDURE — 36415 COLL VENOUS BLD VENIPUNCTURE: CPT

## 2020-01-21 PROCEDURE — 80061 LIPID PANEL: CPT

## 2020-01-21 PROCEDURE — 85025 COMPLETE CBC W/AUTO DIFF WBC: CPT

## 2020-01-21 PROCEDURE — 83036 HEMOGLOBIN GLYCOSYLATED A1C: CPT

## 2020-02-04 ENCOUNTER — HOSPITAL ENCOUNTER (OUTPATIENT)
Dept: LAB | Age: 85
Discharge: HOME | End: 2020-02-04
Payer: MEDICARE

## 2020-02-04 LAB
-: ABNORMAL
ALBUMIN SERPL-MCNC: 4.1 G/DL (ref 3.5–5.2)
AMORPHOUS: ABNORMAL
ANION GAP SERPL CALCULATED.3IONS-SCNC: 13 MMOL/L (ref 9–17)
BACTERIA: ABNORMAL
BILIRUBIN URINE: NEGATIVE
BUN BLDV-MCNC: 39 MG/DL (ref 8–23)
BUN/CREAT BLD: 20 (ref 9–20)
CALCIUM SERPL-MCNC: 9.2 MG/DL (ref 8.6–10.4)
CASTS UA: ABNORMAL /LPF
CHLORIDE BLD-SCNC: 99 MMOL/L (ref 98–107)
CO2: 24 MMOL/L (ref 20–31)
COLOR: YELLOW
COMMENT UA: ABNORMAL
CREAT SERPL-MCNC: 1.99 MG/DL (ref 0.7–1.2)
CREATININE URINE: 32.1 MG/DL (ref 39–259)
CRYSTALS, UA: ABNORMAL /HPF
EPITHELIAL CELLS UA: ABNORMAL /HPF (ref 0–5)
GFR AFRICAN AMERICAN: 39 ML/MIN
GFR NON-AFRICAN AMERICAN: 32 ML/MIN
GFR SERPL CREATININE-BSD FRML MDRD: ABNORMAL ML/MIN/{1.73_M2}
GFR SERPL CREATININE-BSD FRML MDRD: ABNORMAL ML/MIN/{1.73_M2}
GLUCOSE BLD-MCNC: 208 MG/DL (ref 70–99)
GLUCOSE URINE: ABNORMAL
HCT VFR BLD CALC: 38.6 % (ref 40.7–50.3)
HEMOGLOBIN: 12.2 G/DL (ref 13–17)
KETONES, URINE: NEGATIVE
LEUKOCYTE ESTERASE, URINE: NEGATIVE
MAGNESIUM: 1.9 MG/DL (ref 1.6–2.6)
MCH RBC QN AUTO: 31.5 PG (ref 25.2–33.5)
MCHC RBC AUTO-ENTMCNC: 31.6 G/DL (ref 28.4–34.8)
MCV RBC AUTO: 99.7 FL (ref 82.6–102.9)
MUCUS: ABNORMAL
NITRITE, URINE: NEGATIVE
NRBC AUTOMATED: 0 PER 100 WBC
OTHER OBSERVATIONS UA: ABNORMAL
PDW BLD-RTO: 12 % (ref 11.8–14.4)
PH UA: 6 (ref 5–9)
PHOSPHORUS: 3.7 MG/DL (ref 2.5–4.5)
PLATELET # BLD: 204 K/UL (ref 138–453)
PMV BLD AUTO: 10.3 FL (ref 8.1–13.5)
POTASSIUM SERPL-SCNC: 4.6 MMOL/L (ref 3.7–5.3)
PROTEIN UA: NEGATIVE
RBC # BLD: 3.87 M/UL (ref 4.21–5.77)
RBC UA: ABNORMAL /HPF (ref 0–2)
RENAL EPITHELIAL, UA: ABNORMAL /HPF
SODIUM BLD-SCNC: 136 MMOL/L (ref 135–144)
SPECIFIC GRAVITY UA: 1.01 (ref 1.01–1.02)
TOTAL PROTEIN, URINE: 12 MG/DL
TRICHOMONAS: ABNORMAL
TURBIDITY: CLEAR
URIC ACID: 5.4 MG/DL (ref 3.4–7)
URINE HGB: ABNORMAL
UROBILINOGEN, URINE: NORMAL
WBC # BLD: 10.3 K/UL (ref 3.5–11.3)
WBC UA: ABNORMAL /HPF (ref 0–5)
YEAST: ABNORMAL

## 2020-02-04 PROCEDURE — 85027 COMPLETE CBC AUTOMATED: CPT

## 2020-02-04 PROCEDURE — 80069 RENAL FUNCTION PANEL: CPT

## 2020-02-04 PROCEDURE — 84156 ASSAY OF PROTEIN URINE: CPT

## 2020-02-04 PROCEDURE — 83735 ASSAY OF MAGNESIUM: CPT

## 2020-02-04 PROCEDURE — 84550 ASSAY OF BLOOD/URIC ACID: CPT

## 2020-02-04 PROCEDURE — 81001 URINALYSIS AUTO W/SCOPE: CPT

## 2020-02-04 PROCEDURE — 82570 ASSAY OF URINE CREATININE: CPT

## 2020-02-04 PROCEDURE — 36415 COLL VENOUS BLD VENIPUNCTURE: CPT

## 2020-06-02 ENCOUNTER — HOSPITAL ENCOUNTER (OUTPATIENT)
Dept: LAB | Age: 85
Discharge: HOME | End: 2020-06-02
Payer: MEDICARE

## 2020-06-02 LAB
ABSOLUTE EOS #: 0.36 K/UL (ref 0–0.44)
ABSOLUTE IMMATURE GRANULOCYTE: 0.04 K/UL (ref 0–0.3)
ABSOLUTE LYMPH #: 3.5 K/UL (ref 1.1–3.7)
ABSOLUTE MONO #: 0.92 K/UL (ref 0.1–1.2)
ALBUMIN SERPL-MCNC: 4 G/DL (ref 3.5–5.2)
ALBUMIN/GLOBULIN RATIO: 1.3 (ref 1–2.5)
ALP BLD-CCNC: 119 U/L (ref 40–129)
ALT SERPL-CCNC: 15 U/L (ref 5–41)
ANION GAP SERPL CALCULATED.3IONS-SCNC: 13 MMOL/L (ref 9–17)
AST SERPL-CCNC: 26 U/L
BASOPHILS # BLD: 0 % (ref 0–2)
BASOPHILS ABSOLUTE: 0.05 K/UL (ref 0–0.2)
BILIRUB SERPL-MCNC: 0.81 MG/DL (ref 0.3–1.2)
BUN BLDV-MCNC: 44 MG/DL (ref 8–23)
BUN/CREAT BLD: 21 (ref 9–20)
CALCIUM SERPL-MCNC: 9.4 MG/DL (ref 8.6–10.4)
CHLORIDE BLD-SCNC: 103 MMOL/L (ref 98–107)
CHOLESTEROL/HDL RATIO: 3.2
CHOLESTEROL: 112 MG/DL
CO2: 22 MMOL/L (ref 20–31)
CREAT SERPL-MCNC: 2.14 MG/DL (ref 0.7–1.2)
DIFFERENTIAL TYPE: ABNORMAL
EOSINOPHILS RELATIVE PERCENT: 3 % (ref 1–4)
ESTIMATED AVERAGE GLUCOSE: 217 MG/DL
GFR AFRICAN AMERICAN: 35 ML/MIN
GFR NON-AFRICAN AMERICAN: 29 ML/MIN
GFR SERPL CREATININE-BSD FRML MDRD: ABNORMAL ML/MIN/{1.73_M2}
GFR SERPL CREATININE-BSD FRML MDRD: ABNORMAL ML/MIN/{1.73_M2}
GLUCOSE BLD-MCNC: 161 MG/DL (ref 70–99)
HBA1C MFR BLD: 9.2 % (ref 4.8–5.9)
HCT VFR BLD CALC: 38.9 % (ref 40.7–50.3)
HDLC SERPL-MCNC: 35 MG/DL
HEMOGLOBIN: 12.8 G/DL (ref 13–17)
IMMATURE GRANULOCYTES: 0 %
LDL CHOLESTEROL: 52 MG/DL (ref 0–130)
LYMPHOCYTES # BLD: 31 % (ref 24–43)
MCH RBC QN AUTO: 31.7 PG (ref 25.2–33.5)
MCHC RBC AUTO-ENTMCNC: 32.9 G/DL (ref 28.4–34.8)
MCV RBC AUTO: 96.3 FL (ref 82.6–102.9)
MONOCYTES # BLD: 8 % (ref 3–12)
NRBC AUTOMATED: 0 PER 100 WBC
PDW BLD-RTO: 11.8 % (ref 11.8–14.4)
PLATELET # BLD: 179 K/UL (ref 138–453)
PLATELET ESTIMATE: ABNORMAL
PMV BLD AUTO: 10.3 FL (ref 8.1–13.5)
POTASSIUM SERPL-SCNC: 5 MMOL/L (ref 3.7–5.3)
RBC # BLD: 4.04 M/UL (ref 4.21–5.77)
RBC # BLD: ABNORMAL 10*6/UL
SEG NEUTROPHILS: 58 % (ref 36–65)
SEGMENTED NEUTROPHILS ABSOLUTE COUNT: 6.33 K/UL (ref 1.5–8.1)
SODIUM BLD-SCNC: 138 MMOL/L (ref 135–144)
TOTAL PROTEIN: 7.1 G/DL (ref 6.4–8.3)
TRIGL SERPL-MCNC: 126 MG/DL
VLDLC SERPL CALC-MCNC: ABNORMAL MG/DL (ref 1–30)
WBC # BLD: 11.2 K/UL (ref 3.5–11.3)
WBC # BLD: ABNORMAL 10*3/UL

## 2020-06-02 PROCEDURE — 80053 COMPREHEN METABOLIC PANEL: CPT

## 2020-06-02 PROCEDURE — 83036 HEMOGLOBIN GLYCOSYLATED A1C: CPT

## 2020-06-02 PROCEDURE — 85025 COMPLETE CBC W/AUTO DIFF WBC: CPT

## 2020-06-02 PROCEDURE — 80061 LIPID PANEL: CPT

## 2020-06-02 PROCEDURE — 36415 COLL VENOUS BLD VENIPUNCTURE: CPT

## 2020-06-16 ENCOUNTER — HOSPITAL ENCOUNTER (OUTPATIENT)
Dept: LAB | Age: 85
Discharge: HOME | End: 2020-06-16
Payer: MEDICARE

## 2020-06-16 LAB
-: ABNORMAL
ALBUMIN SERPL-MCNC: 3.7 G/DL (ref 3.5–5.2)
AMORPHOUS: ABNORMAL
ANION GAP SERPL CALCULATED.3IONS-SCNC: 10 MMOL/L (ref 9–17)
BACTERIA: ABNORMAL
BILIRUBIN URINE: NEGATIVE
BUN BLDV-MCNC: 33 MG/DL (ref 8–23)
BUN/CREAT BLD: 16 (ref 9–20)
CALCIUM SERPL-MCNC: 9.2 MG/DL (ref 8.6–10.4)
CASTS UA: ABNORMAL /LPF
CHLORIDE BLD-SCNC: 102 MMOL/L (ref 98–107)
CO2: 24 MMOL/L (ref 20–31)
COLOR: YELLOW
COMMENT UA: ABNORMAL
CREAT SERPL-MCNC: 2.02 MG/DL (ref 0.7–1.2)
CREATININE URINE: 125 MG/DL (ref 39–259)
CRYSTALS, UA: ABNORMAL /HPF
EPITHELIAL CELLS UA: ABNORMAL /HPF (ref 0–5)
GFR AFRICAN AMERICAN: 38 ML/MIN
GFR NON-AFRICAN AMERICAN: 31 ML/MIN
GFR SERPL CREATININE-BSD FRML MDRD: ABNORMAL ML/MIN/{1.73_M2}
GFR SERPL CREATININE-BSD FRML MDRD: ABNORMAL ML/MIN/{1.73_M2}
GLUCOSE BLD-MCNC: 205 MG/DL (ref 70–99)
GLUCOSE URINE: ABNORMAL
HCT VFR BLD CALC: 36.3 % (ref 40.7–50.3)
HEMOGLOBIN: 11.6 G/DL (ref 13–17)
KETONES, URINE: NEGATIVE
LEUKOCYTE ESTERASE, URINE: ABNORMAL
MAGNESIUM: 1.7 MG/DL (ref 1.6–2.6)
MCH RBC QN AUTO: 31.4 PG (ref 25.2–33.5)
MCHC RBC AUTO-ENTMCNC: 32 G/DL (ref 28.4–34.8)
MCV RBC AUTO: 98.4 FL (ref 82.6–102.9)
MUCUS: ABNORMAL
NITRITE, URINE: NEGATIVE
NRBC AUTOMATED: 0 PER 100 WBC
OTHER OBSERVATIONS UA: ABNORMAL
PDW BLD-RTO: 11.8 % (ref 11.8–14.4)
PH UA: 6 (ref 5–9)
PHOSPHORUS: 2.6 MG/DL (ref 2.5–4.5)
PLATELET # BLD: 185 K/UL (ref 138–453)
PMV BLD AUTO: 9.9 FL (ref 8.1–13.5)
POTASSIUM SERPL-SCNC: 4.9 MMOL/L (ref 3.7–5.3)
PROTEIN UA: NEGATIVE
PTH INTACT: 95.64 PG/ML (ref 15–65)
RBC # BLD: 3.69 M/UL (ref 4.21–5.77)
RBC UA: ABNORMAL /HPF (ref 0–2)
RENAL EPITHELIAL, UA: ABNORMAL /HPF
SODIUM BLD-SCNC: 136 MMOL/L (ref 135–144)
SPECIFIC GRAVITY UA: 1.02 (ref 1.01–1.02)
TOTAL PROTEIN, URINE: 35 MG/DL
TRICHOMONAS: ABNORMAL
TURBIDITY: CLEAR
URIC ACID: 6 MG/DL (ref 3.4–7)
URINE HGB: ABNORMAL
UROBILINOGEN, URINE: NORMAL
VITAMIN D 25-HYDROXY: 21.6 NG/ML (ref 30–100)
WBC # BLD: 11.1 K/UL (ref 3.5–11.3)
WBC UA: ABNORMAL /HPF (ref 0–5)
YEAST: ABNORMAL

## 2020-06-16 PROCEDURE — 82306 VITAMIN D 25 HYDROXY: CPT

## 2020-06-16 PROCEDURE — 84156 ASSAY OF PROTEIN URINE: CPT

## 2020-06-16 PROCEDURE — 85027 COMPLETE CBC AUTOMATED: CPT

## 2020-06-16 PROCEDURE — 36415 COLL VENOUS BLD VENIPUNCTURE: CPT

## 2020-06-16 PROCEDURE — 81001 URINALYSIS AUTO W/SCOPE: CPT

## 2020-06-16 PROCEDURE — 84550 ASSAY OF BLOOD/URIC ACID: CPT

## 2020-06-16 PROCEDURE — 82570 ASSAY OF URINE CREATININE: CPT

## 2020-06-16 PROCEDURE — 83970 ASSAY OF PARATHORMONE: CPT

## 2020-06-16 PROCEDURE — 80069 RENAL FUNCTION PANEL: CPT

## 2020-06-16 PROCEDURE — 83735 ASSAY OF MAGNESIUM: CPT

## 2020-10-13 ENCOUNTER — HOSPITAL ENCOUNTER (OUTPATIENT)
Dept: LAB | Age: 85
Discharge: HOME | End: 2020-10-13
Payer: MEDICARE

## 2020-10-13 LAB
-: ABNORMAL
ALBUMIN SERPL-MCNC: 4 G/DL (ref 3.5–5.2)
AMORPHOUS: ABNORMAL
ANION GAP SERPL CALCULATED.3IONS-SCNC: 9 MMOL/L (ref 9–17)
BACTERIA: ABNORMAL
BILIRUBIN URINE: NEGATIVE
BUN BLDV-MCNC: 46 MG/DL (ref 8–23)
BUN/CREAT BLD: 20 (ref 9–20)
CALCIUM SERPL-MCNC: 9.3 MG/DL (ref 8.6–10.4)
CASTS UA: ABNORMAL /LPF
CHLORIDE BLD-SCNC: 102 MMOL/L (ref 98–107)
CO2: 26 MMOL/L (ref 20–31)
COLOR: YELLOW
COMMENT UA: ABNORMAL
CREAT SERPL-MCNC: 2.28 MG/DL (ref 0.7–1.2)
CREATININE URINE: 64.2 MG/DL (ref 39–259)
CRYSTALS, UA: ABNORMAL /HPF
EPITHELIAL CELLS UA: ABNORMAL /HPF (ref 0–5)
GFR AFRICAN AMERICAN: 33 ML/MIN
GFR NON-AFRICAN AMERICAN: 27 ML/MIN
GFR SERPL CREATININE-BSD FRML MDRD: ABNORMAL ML/MIN/{1.73_M2}
GFR SERPL CREATININE-BSD FRML MDRD: ABNORMAL ML/MIN/{1.73_M2}
GLUCOSE BLD-MCNC: 316 MG/DL (ref 70–99)
GLUCOSE URINE: ABNORMAL
HCT VFR BLD CALC: 38.8 % (ref 40.7–50.3)
HEMOGLOBIN: 12.2 G/DL (ref 13–17)
KETONES, URINE: NEGATIVE
LEUKOCYTE ESTERASE, URINE: NEGATIVE
MAGNESIUM: 1.9 MG/DL (ref 1.6–2.6)
MCH RBC QN AUTO: 31.1 PG (ref 25.2–33.5)
MCHC RBC AUTO-ENTMCNC: 31.4 G/DL (ref 28.4–34.8)
MCV RBC AUTO: 99 FL (ref 82.6–102.9)
MUCUS: ABNORMAL
NITRITE, URINE: NEGATIVE
NRBC AUTOMATED: 0 PER 100 WBC
OTHER OBSERVATIONS UA: ABNORMAL
PDW BLD-RTO: 11.9 % (ref 11.8–14.4)
PH UA: 5.5 (ref 5–9)
PHOSPHORUS: 3.5 MG/DL (ref 2.5–4.5)
PLATELET # BLD: 202 K/UL (ref 138–453)
PMV BLD AUTO: 10.6 FL (ref 8.1–13.5)
POTASSIUM SERPL-SCNC: 5.4 MMOL/L (ref 3.7–5.3)
PROTEIN UA: NEGATIVE
PTH INTACT: 89.5 PG/ML (ref 15–65)
RBC # BLD: 3.92 M/UL (ref 4.21–5.77)
RBC UA: ABNORMAL /HPF (ref 0–2)
RENAL EPITHELIAL, UA: ABNORMAL /HPF
SODIUM BLD-SCNC: 137 MMOL/L (ref 135–144)
SPECIFIC GRAVITY UA: 1.01 (ref 1.01–1.02)
TOTAL PROTEIN, URINE: 18 MG/DL
TRICHOMONAS: ABNORMAL
TURBIDITY: CLEAR
URIC ACID: 7.2 MG/DL (ref 3.4–7)
URINE HGB: ABNORMAL
UROBILINOGEN, URINE: NORMAL
VITAMIN D 25-HYDROXY: 28.8 NG/ML (ref 30–100)
WBC # BLD: 10 K/UL (ref 3.5–11.3)
WBC UA: ABNORMAL /HPF (ref 0–5)
YEAST: ABNORMAL

## 2020-10-13 PROCEDURE — 81001 URINALYSIS AUTO W/SCOPE: CPT

## 2020-10-13 PROCEDURE — 85027 COMPLETE CBC AUTOMATED: CPT

## 2020-10-13 PROCEDURE — 84156 ASSAY OF PROTEIN URINE: CPT

## 2020-10-13 PROCEDURE — 83735 ASSAY OF MAGNESIUM: CPT

## 2020-10-13 PROCEDURE — 82570 ASSAY OF URINE CREATININE: CPT

## 2020-10-13 PROCEDURE — 84550 ASSAY OF BLOOD/URIC ACID: CPT

## 2020-10-13 PROCEDURE — 82306 VITAMIN D 25 HYDROXY: CPT

## 2020-10-13 PROCEDURE — 80069 RENAL FUNCTION PANEL: CPT

## 2020-10-13 PROCEDURE — 36415 COLL VENOUS BLD VENIPUNCTURE: CPT

## 2020-10-13 PROCEDURE — 83970 ASSAY OF PARATHORMONE: CPT

## 2020-10-27 ENCOUNTER — HOSPITAL ENCOUNTER (OUTPATIENT)
Dept: LAB | Age: 85
Discharge: HOME | End: 2020-10-27
Payer: MEDICARE

## 2020-10-27 LAB
ABSOLUTE EOS #: 0.32 K/UL (ref 0–0.44)
ABSOLUTE IMMATURE GRANULOCYTE: 0.04 K/UL (ref 0–0.3)
ABSOLUTE LYMPH #: 3.58 K/UL (ref 1.1–3.7)
ABSOLUTE MONO #: 1.09 K/UL (ref 0.1–1.2)
ALBUMIN SERPL-MCNC: 4.1 G/DL (ref 3.5–5.2)
ALBUMIN/GLOBULIN RATIO: 1.4 (ref 1–2.5)
ALP BLD-CCNC: 98 U/L (ref 40–129)
ALT SERPL-CCNC: 14 U/L (ref 5–41)
ANION GAP SERPL CALCULATED.3IONS-SCNC: 10 MMOL/L (ref 9–17)
AST SERPL-CCNC: 22 U/L
BASOPHILS # BLD: 0 % (ref 0–2)
BASOPHILS ABSOLUTE: 0.05 K/UL (ref 0–0.2)
BILIRUB SERPL-MCNC: 0.87 MG/DL (ref 0.3–1.2)
BUN BLDV-MCNC: 52 MG/DL (ref 8–23)
BUN/CREAT BLD: 22 (ref 9–20)
CALCIUM SERPL-MCNC: 9.5 MG/DL (ref 8.6–10.4)
CHLORIDE BLD-SCNC: 99 MMOL/L (ref 98–107)
CHOLESTEROL/HDL RATIO: 3.5
CHOLESTEROL: 111 MG/DL
CO2: 24 MMOL/L (ref 20–31)
CREAT SERPL-MCNC: 2.35 MG/DL (ref 0.7–1.2)
DIFFERENTIAL TYPE: ABNORMAL
EOSINOPHILS RELATIVE PERCENT: 3 % (ref 1–4)
ESTIMATED AVERAGE GLUCOSE: 220 MG/DL
GFR AFRICAN AMERICAN: 32 ML/MIN
GFR NON-AFRICAN AMERICAN: 26 ML/MIN
GFR SERPL CREATININE-BSD FRML MDRD: ABNORMAL ML/MIN/{1.73_M2}
GFR SERPL CREATININE-BSD FRML MDRD: ABNORMAL ML/MIN/{1.73_M2}
GLUCOSE BLD-MCNC: 208 MG/DL (ref 70–99)
HBA1C MFR BLD: 9.3 % (ref 4–6)
HCT VFR BLD CALC: 40.4 % (ref 40.7–50.3)
HDLC SERPL-MCNC: 32 MG/DL
HEMOGLOBIN: 13 G/DL (ref 13–17)
IMMATURE GRANULOCYTES: 0 %
LDL CHOLESTEROL: 53 MG/DL (ref 0–130)
LYMPHOCYTES # BLD: 31 % (ref 24–43)
MCH RBC QN AUTO: 31.6 PG (ref 25.2–33.5)
MCHC RBC AUTO-ENTMCNC: 32.2 G/DL (ref 28.4–34.8)
MCV RBC AUTO: 98.3 FL (ref 82.6–102.9)
MONOCYTES # BLD: 10 % (ref 3–12)
NRBC AUTOMATED: 0 PER 100 WBC
PDW BLD-RTO: 11.9 % (ref 11.8–14.4)
PLATELET # BLD: 192 K/UL (ref 138–453)
PLATELET ESTIMATE: ABNORMAL
PMV BLD AUTO: 10.5 FL (ref 8.1–13.5)
POTASSIUM SERPL-SCNC: 5 MMOL/L (ref 3.7–5.3)
RBC # BLD: 4.11 M/UL (ref 4.21–5.77)
RBC # BLD: ABNORMAL 10*6/UL
SEG NEUTROPHILS: 56 % (ref 36–65)
SEGMENTED NEUTROPHILS ABSOLUTE COUNT: 6.44 K/UL (ref 1.5–8.1)
SODIUM BLD-SCNC: 133 MMOL/L (ref 135–144)
TOTAL PROTEIN: 7.1 G/DL (ref 6.4–8.3)
TRIGL SERPL-MCNC: 130 MG/DL
VLDLC SERPL CALC-MCNC: ABNORMAL MG/DL (ref 1–30)
WBC # BLD: 11.5 K/UL (ref 3.5–11.3)
WBC # BLD: ABNORMAL 10*3/UL

## 2020-10-27 PROCEDURE — 80061 LIPID PANEL: CPT

## 2020-10-27 PROCEDURE — 80053 COMPREHEN METABOLIC PANEL: CPT

## 2020-10-27 PROCEDURE — 83036 HEMOGLOBIN GLYCOSYLATED A1C: CPT

## 2020-10-27 PROCEDURE — 36415 COLL VENOUS BLD VENIPUNCTURE: CPT

## 2020-10-27 PROCEDURE — 85025 COMPLETE CBC W/AUTO DIFF WBC: CPT

## 2021-02-11 ENCOUNTER — HOSPITAL ENCOUNTER (OUTPATIENT)
Age: 86
Discharge: HOME | End: 2021-02-11
Payer: MEDICARE

## 2021-02-11 LAB
-: ABNORMAL
ALBUMIN SERPL-MCNC: 3.8 G/DL (ref 3.5–5.2)
AMORPHOUS: ABNORMAL
ANION GAP SERPL CALCULATED.3IONS-SCNC: 11 MMOL/L (ref 9–17)
BACTERIA: ABNORMAL
BILIRUBIN URINE: NEGATIVE
BUN BLDV-MCNC: 46 MG/DL (ref 8–23)
BUN/CREAT BLD: 21 (ref 9–20)
CALCIUM SERPL-MCNC: 9.7 MG/DL (ref 8.6–10.4)
CASTS UA: ABNORMAL /LPF
CHLORIDE BLD-SCNC: 102 MMOL/L (ref 98–107)
CO2: 22 MMOL/L (ref 20–31)
COLOR: YELLOW
COMMENT UA: ABNORMAL
CREAT SERPL-MCNC: 2.18 MG/DL (ref 0.7–1.2)
CREATININE URINE: 51.4 MG/DL (ref 39–259)
CRYSTALS, UA: ABNORMAL /HPF
EPITHELIAL CELLS UA: ABNORMAL /HPF (ref 0–5)
FOLATE: 16.4 NG/ML
GFR AFRICAN AMERICAN: 35 ML/MIN
GFR NON-AFRICAN AMERICAN: 29 ML/MIN
GFR SERPL CREATININE-BSD FRML MDRD: ABNORMAL ML/MIN/{1.73_M2}
GFR SERPL CREATININE-BSD FRML MDRD: ABNORMAL ML/MIN/{1.73_M2}
GLUCOSE BLD-MCNC: 295 MG/DL (ref 70–99)
GLUCOSE URINE: ABNORMAL
HCT VFR BLD CALC: 40.5 % (ref 40.7–50.3)
HEMOGLOBIN: 13 G/DL (ref 13–17)
IRON SATURATION: 23 % (ref 20–55)
IRON: 63 UG/DL (ref 59–158)
KETONES, URINE: NEGATIVE
LEUKOCYTE ESTERASE, URINE: NEGATIVE
MAGNESIUM: 2 MG/DL (ref 1.6–2.6)
MCH RBC QN AUTO: 31.3 PG (ref 25.2–33.5)
MCHC RBC AUTO-ENTMCNC: 32.1 G/DL (ref 28.4–34.8)
MCV RBC AUTO: 97.4 FL (ref 82.6–102.9)
MUCUS: ABNORMAL
NITRITE, URINE: NEGATIVE
NRBC AUTOMATED: 0 PER 100 WBC
OTHER OBSERVATIONS UA: ABNORMAL
PDW BLD-RTO: 11.8 % (ref 11.8–14.4)
PH UA: 5.5 (ref 5–9)
PHOSPHORUS: 3.2 MG/DL (ref 2.5–4.5)
PLATELET # BLD: 208 K/UL (ref 138–453)
PMV BLD AUTO: 10.6 FL (ref 8.1–13.5)
POTASSIUM SERPL-SCNC: 5.4 MMOL/L (ref 3.7–5.3)
PROTEIN UA: NEGATIVE
RBC # BLD: 4.16 M/UL (ref 4.21–5.77)
RBC UA: ABNORMAL /HPF (ref 0–2)
RENAL EPITHELIAL, UA: ABNORMAL /HPF
SODIUM BLD-SCNC: 135 MMOL/L (ref 135–144)
SPECIFIC GRAVITY UA: 1.02 (ref 1.01–1.02)
TOTAL IRON BINDING CAPACITY: 269 UG/DL (ref 250–450)
TOTAL PROTEIN, URINE: 14 MG/DL
TRICHOMONAS: ABNORMAL
TURBIDITY: CLEAR
UNSATURATED IRON BINDING CAPACITY: 206 UG/DL (ref 112–347)
URIC ACID: 6.7 MG/DL (ref 3.4–7)
URINE HGB: NEGATIVE
UROBILINOGEN, URINE: NORMAL
VITAMIN B-12: 774 PG/ML (ref 232–1245)
WBC # BLD: 10.6 K/UL (ref 3.5–11.3)
WBC UA: ABNORMAL /HPF (ref 0–5)
YEAST: ABNORMAL

## 2021-02-11 PROCEDURE — 83550 IRON BINDING TEST: CPT

## 2021-02-11 PROCEDURE — 84550 ASSAY OF BLOOD/URIC ACID: CPT

## 2021-02-11 PROCEDURE — 83540 ASSAY OF IRON: CPT

## 2021-02-11 PROCEDURE — 82570 ASSAY OF URINE CREATININE: CPT

## 2021-02-11 PROCEDURE — 82607 VITAMIN B-12: CPT

## 2021-02-11 PROCEDURE — 83735 ASSAY OF MAGNESIUM: CPT

## 2021-02-11 PROCEDURE — 80069 RENAL FUNCTION PANEL: CPT

## 2021-02-11 PROCEDURE — 81001 URINALYSIS AUTO W/SCOPE: CPT

## 2021-02-11 PROCEDURE — 85027 COMPLETE CBC AUTOMATED: CPT

## 2021-02-11 PROCEDURE — 84156 ASSAY OF PROTEIN URINE: CPT

## 2021-02-11 PROCEDURE — 82746 ASSAY OF FOLIC ACID SERUM: CPT

## 2021-02-11 PROCEDURE — 36415 COLL VENOUS BLD VENIPUNCTURE: CPT

## 2021-04-02 ENCOUNTER — HOSPITAL ENCOUNTER (OUTPATIENT)
Age: 86
Discharge: HOME | End: 2021-04-02
Payer: MEDICARE

## 2021-04-02 LAB
ABSOLUTE EOS #: 0.35 K/UL (ref 0–0.44)
ABSOLUTE IMMATURE GRANULOCYTE: 0.08 K/UL (ref 0–0.3)
ABSOLUTE LYMPH #: 4.3 K/UL (ref 1.1–3.7)
ABSOLUTE MONO #: 1.31 K/UL (ref 0.1–1.2)
ALBUMIN SERPL-MCNC: 3.8 G/DL (ref 3.5–5.2)
ALBUMIN/GLOBULIN RATIO: 1.1 (ref 1–2.5)
ALP BLD-CCNC: 102 U/L (ref 40–129)
ALT SERPL-CCNC: 11 U/L (ref 5–41)
ANION GAP SERPL CALCULATED.3IONS-SCNC: 8 MMOL/L (ref 9–17)
AST SERPL-CCNC: 18 U/L
BASOPHILS # BLD: 0 % (ref 0–2)
BASOPHILS ABSOLUTE: 0.07 K/UL (ref 0–0.2)
BILIRUB SERPL-MCNC: 0.91 MG/DL (ref 0.3–1.2)
BUN BLDV-MCNC: 41 MG/DL (ref 8–23)
BUN/CREAT BLD: 19 (ref 9–20)
CALCIUM SERPL-MCNC: 9.8 MG/DL (ref 8.6–10.4)
CHLORIDE BLD-SCNC: 101 MMOL/L (ref 98–107)
CHOLESTEROL/HDL RATIO: 3.5
CHOLESTEROL: 105 MG/DL
CO2: 26 MMOL/L (ref 20–31)
CREAT SERPL-MCNC: 2.17 MG/DL (ref 0.7–1.2)
DIFFERENTIAL TYPE: ABNORMAL
EOSINOPHILS RELATIVE PERCENT: 2 % (ref 1–4)
GFR AFRICAN AMERICAN: 35 ML/MIN
GFR NON-AFRICAN AMERICAN: 29 ML/MIN
GFR SERPL CREATININE-BSD FRML MDRD: ABNORMAL ML/MIN/{1.73_M2}
GFR SERPL CREATININE-BSD FRML MDRD: ABNORMAL ML/MIN/{1.73_M2}
GLUCOSE BLD-MCNC: 205 MG/DL (ref 70–99)
HCT VFR BLD CALC: 40.4 % (ref 40.7–50.3)
HDLC SERPL-MCNC: 30 MG/DL
HEMOGLOBIN: 12.9 G/DL (ref 13–17)
IMMATURE GRANULOCYTES: 1 %
LDL CHOLESTEROL: 46 MG/DL (ref 0–130)
LYMPHOCYTES # BLD: 25 % (ref 24–43)
MCH RBC QN AUTO: 30.6 PG (ref 25.2–33.5)
MCHC RBC AUTO-ENTMCNC: 31.9 G/DL (ref 28.4–34.8)
MCV RBC AUTO: 96 FL (ref 82.6–102.9)
MONOCYTES # BLD: 8 % (ref 3–12)
NRBC AUTOMATED: 0 PER 100 WBC
PDW BLD-RTO: 12.1 % (ref 11.8–14.4)
PLATELET # BLD: 256 K/UL (ref 138–453)
PLATELET ESTIMATE: ABNORMAL
PMV BLD AUTO: 10.1 FL (ref 8.1–13.5)
POTASSIUM SERPL-SCNC: 5.3 MMOL/L (ref 3.7–5.3)
RBC # BLD: 4.21 M/UL (ref 4.21–5.77)
RBC # BLD: ABNORMAL 10*6/UL
SEG NEUTROPHILS: 64 % (ref 36–65)
SEGMENTED NEUTROPHILS ABSOLUTE COUNT: 10.97 K/UL (ref 1.5–8.1)
SODIUM BLD-SCNC: 135 MMOL/L (ref 135–144)
TOTAL PROTEIN: 7.4 G/DL (ref 6.4–8.3)
TRIGL SERPL-MCNC: 147 MG/DL
VLDLC SERPL CALC-MCNC: ABNORMAL MG/DL (ref 1–30)
WBC # BLD: 17.1 K/UL (ref 3.5–11.3)
WBC # BLD: ABNORMAL 10*3/UL

## 2021-04-02 PROCEDURE — 85025 COMPLETE CBC W/AUTO DIFF WBC: CPT

## 2021-04-02 PROCEDURE — 83036 HEMOGLOBIN GLYCOSYLATED A1C: CPT

## 2021-04-02 PROCEDURE — 80061 LIPID PANEL: CPT

## 2021-04-02 PROCEDURE — 36415 COLL VENOUS BLD VENIPUNCTURE: CPT

## 2021-04-02 PROCEDURE — 80053 COMPREHEN METABOLIC PANEL: CPT

## 2021-04-06 LAB
ESTIMATED AVERAGE GLUCOSE: 263 MG/DL
HBA1C MFR BLD: 10.8 % (ref 4–6)

## 2021-08-27 ENCOUNTER — HOSPITAL ENCOUNTER (OUTPATIENT)
Age: 86
Discharge: HOME | End: 2021-08-27
Payer: MEDICARE

## 2021-08-27 LAB
ABSOLUTE EOS #: 0.46 K/UL (ref 0–0.44)
ABSOLUTE IMMATURE GRANULOCYTE: 0.05 K/UL (ref 0–0.3)
ABSOLUTE LYMPH #: 3.77 K/UL (ref 1.1–3.7)
ABSOLUTE MONO #: 1.03 K/UL (ref 0.1–1.2)
ALBUMIN SERPL-MCNC: 4 G/DL (ref 3.5–5.2)
ALBUMIN/GLOBULIN RATIO: 1.2 (ref 1–2.5)
ALP BLD-CCNC: 104 U/L (ref 40–129)
ALT SERPL-CCNC: 12 U/L (ref 5–41)
ANION GAP SERPL CALCULATED.3IONS-SCNC: 10 MMOL/L (ref 9–17)
AST SERPL-CCNC: 20 U/L
BASOPHILS # BLD: 0 % (ref 0–2)
BASOPHILS ABSOLUTE: 0.04 K/UL (ref 0–0.2)
BILIRUB SERPL-MCNC: 0.88 MG/DL (ref 0.3–1.2)
BUN BLDV-MCNC: 42 MG/DL (ref 8–23)
BUN/CREAT BLD: 19 (ref 9–20)
CALCIUM SERPL-MCNC: 9.4 MG/DL (ref 8.6–10.4)
CHLORIDE BLD-SCNC: 100 MMOL/L (ref 98–107)
CHOLESTEROL/HDL RATIO: 3.9
CHOLESTEROL: 113 MG/DL
CO2: 24 MMOL/L (ref 20–31)
CREAT SERPL-MCNC: 2.2 MG/DL (ref 0.7–1.2)
DIFFERENTIAL TYPE: ABNORMAL
EOSINOPHILS RELATIVE PERCENT: 4 % (ref 1–4)
ESTIMATED AVERAGE GLUCOSE: 226 MG/DL
GFR AFRICAN AMERICAN: 34 ML/MIN
GFR NON-AFRICAN AMERICAN: 28 ML/MIN
GFR SERPL CREATININE-BSD FRML MDRD: ABNORMAL ML/MIN/{1.73_M2}
GFR SERPL CREATININE-BSD FRML MDRD: ABNORMAL ML/MIN/{1.73_M2}
GLUCOSE BLD-MCNC: 143 MG/DL (ref 70–99)
HBA1C MFR BLD: 9.5 % (ref 4–6)
HCT VFR BLD CALC: 39.2 % (ref 40.7–50.3)
HDLC SERPL-MCNC: 29 MG/DL
HEMOGLOBIN: 12.6 G/DL (ref 13–17)
IMMATURE GRANULOCYTES: 0 %
LDL CHOLESTEROL: 56 MG/DL (ref 0–130)
LYMPHOCYTES # BLD: 31 % (ref 24–43)
MCH RBC QN AUTO: 31.2 PG (ref 25.2–33.5)
MCHC RBC AUTO-ENTMCNC: 32.1 G/DL (ref 28.4–34.8)
MCV RBC AUTO: 97 FL (ref 82.6–102.9)
MONOCYTES # BLD: 8 % (ref 3–12)
NRBC AUTOMATED: 0 PER 100 WBC
PDW BLD-RTO: 12 % (ref 11.8–14.4)
PLATELET # BLD: 222 K/UL (ref 138–453)
PLATELET ESTIMATE: ABNORMAL
PMV BLD AUTO: 10.1 FL (ref 8.1–13.5)
POTASSIUM SERPL-SCNC: 5.1 MMOL/L (ref 3.7–5.3)
RBC # BLD: 4.04 M/UL (ref 4.21–5.77)
RBC # BLD: ABNORMAL 10*6/UL
SEG NEUTROPHILS: 57 % (ref 36–65)
SEGMENTED NEUTROPHILS ABSOLUTE COUNT: 6.93 K/UL (ref 1.5–8.1)
SODIUM BLD-SCNC: 134 MMOL/L (ref 135–144)
TOTAL PROTEIN: 7.3 G/DL (ref 6.4–8.3)
TRIGL SERPL-MCNC: 138 MG/DL
VLDLC SERPL CALC-MCNC: ABNORMAL MG/DL (ref 1–30)
WBC # BLD: 12.3 K/UL (ref 3.5–11.3)
WBC # BLD: ABNORMAL 10*3/UL

## 2021-08-27 PROCEDURE — 80053 COMPREHEN METABOLIC PANEL: CPT

## 2021-08-27 PROCEDURE — 80061 LIPID PANEL: CPT

## 2021-08-27 PROCEDURE — 85025 COMPLETE CBC W/AUTO DIFF WBC: CPT

## 2021-08-27 PROCEDURE — 83036 HEMOGLOBIN GLYCOSYLATED A1C: CPT

## 2021-08-27 PROCEDURE — 36415 COLL VENOUS BLD VENIPUNCTURE: CPT

## 2021-11-15 ENCOUNTER — HOSPITAL ENCOUNTER (INPATIENT)
Age: 86
LOS: 12 days | Discharge: NURSING, REHAB OR LONG-TERM CARE | DRG: 177 | End: 2021-11-27
Attending: INTERNAL MEDICINE | Admitting: INTERNAL MEDICINE
Payer: MEDICARE

## 2021-11-15 ENCOUNTER — APPOINTMENT (OUTPATIENT)
Dept: GENERAL RADIOLOGY | Age: 86
DRG: 177 | End: 2021-11-15
Payer: MEDICARE

## 2021-11-15 DIAGNOSIS — R77.8 ELEVATED TROPONIN: ICD-10-CM

## 2021-11-15 DIAGNOSIS — U07.1 PNEUMONIA DUE TO COVID-19 VIRUS: Primary | ICD-10-CM

## 2021-11-15 DIAGNOSIS — J12.82 PNEUMONIA DUE TO COVID-19 VIRUS: Primary | ICD-10-CM

## 2021-11-15 LAB
ABSOLUTE EOS #: <0.03 K/UL (ref 0–0.44)
ABSOLUTE IMMATURE GRANULOCYTE: 0.05 K/UL (ref 0–0.3)
ABSOLUTE LYMPH #: 1.05 K/UL (ref 1.1–3.7)
ABSOLUTE MONO #: 1.16 K/UL (ref 0.1–1.2)
ALBUMIN SERPL-MCNC: 3.1 G/DL (ref 3.5–5.2)
ALBUMIN/GLOBULIN RATIO: 0.8 (ref 1–2.5)
ALP BLD-CCNC: 114 U/L (ref 40–129)
ALT SERPL-CCNC: 27 U/L (ref 5–41)
ANION GAP SERPL CALCULATED.3IONS-SCNC: 16 MMOL/L (ref 9–17)
AST SERPL-CCNC: 42 U/L
BASOPHILS # BLD: 0 % (ref 0–2)
BASOPHILS ABSOLUTE: <0.03 K/UL (ref 0–0.2)
BILIRUB SERPL-MCNC: 0.77 MG/DL (ref 0.3–1.2)
BNP INTERPRETATION: ABNORMAL
BUN BLDV-MCNC: 55 MG/DL (ref 8–23)
BUN/CREAT BLD: 21 (ref 9–20)
CALCIUM SERPL-MCNC: 8.4 MG/DL (ref 8.6–10.4)
CHLORIDE BLD-SCNC: 98 MMOL/L (ref 98–107)
CO2: 18 MMOL/L (ref 20–31)
CREAT SERPL-MCNC: 2.6 MG/DL (ref 0.7–1.2)
DIFFERENTIAL TYPE: ABNORMAL
EOSINOPHILS RELATIVE PERCENT: 0 % (ref 1–4)
GFR AFRICAN AMERICAN: 28 ML/MIN
GFR NON-AFRICAN AMERICAN: 23 ML/MIN
GFR SERPL CREATININE-BSD FRML MDRD: ABNORMAL ML/MIN/{1.73_M2}
GFR SERPL CREATININE-BSD FRML MDRD: ABNORMAL ML/MIN/{1.73_M2}
GLUCOSE BLD-MCNC: 423 MG/DL (ref 70–99)
HCT VFR BLD CALC: 32.7 % (ref 40.7–50.3)
HEMOGLOBIN: 10.4 G/DL (ref 13–17)
IMMATURE GRANULOCYTES: 0 %
INR BLD: 1.1
LACTIC ACID: 2.2 MMOL/L (ref 0.5–2.2)
LYMPHOCYTES # BLD: 9 % (ref 24–43)
MCH RBC QN AUTO: 31.1 PG (ref 25.2–33.5)
MCHC RBC AUTO-ENTMCNC: 31.8 G/DL (ref 28.4–34.8)
MCV RBC AUTO: 97.9 FL (ref 82.6–102.9)
MONOCYTES # BLD: 10 % (ref 3–12)
NRBC AUTOMATED: 0 PER 100 WBC
PDW BLD-RTO: 12.1 % (ref 11.8–14.4)
PLATELET # BLD: 167 K/UL (ref 138–453)
PLATELET ESTIMATE: ABNORMAL
PMV BLD AUTO: 10.9 FL (ref 8.1–13.5)
POTASSIUM SERPL-SCNC: 4.5 MMOL/L (ref 3.7–5.3)
PRO-BNP: 3736 PG/ML
PROTHROMBIN TIME: 14.3 SEC (ref 11.5–14.2)
RBC # BLD: 3.34 M/UL (ref 4.21–5.77)
RBC # BLD: ABNORMAL 10*6/UL
SARS-COV-2, RAPID: DETECTED
SEG NEUTROPHILS: 81 % (ref 36–65)
SEGMENTED NEUTROPHILS ABSOLUTE COUNT: 9.41 K/UL (ref 1.5–8.1)
SODIUM BLD-SCNC: 132 MMOL/L (ref 135–144)
SPECIMEN DESCRIPTION: ABNORMAL
TOTAL PROTEIN: 7.2 G/DL (ref 6.4–8.3)
TROPONIN INTERP: ABNORMAL
TROPONIN T: ABNORMAL NG/ML
TROPONIN, HIGH SENSITIVITY: 151 NG/L (ref 0–22)
WBC # BLD: 11.7 K/UL (ref 3.5–11.3)
WBC # BLD: ABNORMAL 10*3/UL

## 2021-11-15 PROCEDURE — 6370000000 HC RX 637 (ALT 250 FOR IP): Performed by: EMERGENCY MEDICINE

## 2021-11-15 PROCEDURE — 85025 COMPLETE CBC W/AUTO DIFF WBC: CPT

## 2021-11-15 PROCEDURE — 93005 ELECTROCARDIOGRAM TRACING: CPT | Performed by: PHYSICIAN ASSISTANT

## 2021-11-15 PROCEDURE — 84484 ASSAY OF TROPONIN QUANT: CPT

## 2021-11-15 PROCEDURE — 85610 PROTHROMBIN TIME: CPT

## 2021-11-15 PROCEDURE — 99284 EMERGENCY DEPT VISIT MOD MDM: CPT

## 2021-11-15 PROCEDURE — 83880 ASSAY OF NATRIURETIC PEPTIDE: CPT

## 2021-11-15 PROCEDURE — 83605 ASSAY OF LACTIC ACID: CPT

## 2021-11-15 PROCEDURE — 80053 COMPREHEN METABOLIC PANEL: CPT

## 2021-11-15 PROCEDURE — 87635 SARS-COV-2 COVID-19 AMP PRB: CPT

## 2021-11-15 PROCEDURE — 36415 COLL VENOUS BLD VENIPUNCTURE: CPT

## 2021-11-15 PROCEDURE — 1200000000 HC SEMI PRIVATE

## 2021-11-15 PROCEDURE — 6360000002 HC RX W HCPCS: Performed by: PHYSICIAN ASSISTANT

## 2021-11-15 PROCEDURE — 71045 X-RAY EXAM CHEST 1 VIEW: CPT

## 2021-11-15 RX ORDER — DEXAMETHASONE SODIUM PHOSPHATE 10 MG/ML
10 INJECTION INTRAMUSCULAR; INTRAVENOUS ONCE
Status: COMPLETED | OUTPATIENT
Start: 2021-11-15 | End: 2021-11-15

## 2021-11-15 RX ORDER — FUROSEMIDE 40 MG/1
40 TABLET ORAL DAILY
Status: ON HOLD | COMMUNITY
End: 2021-12-29 | Stop reason: HOSPADM

## 2021-11-15 RX ORDER — ACETAMINOPHEN 325 MG/1
650 TABLET ORAL ONCE
Status: COMPLETED | OUTPATIENT
Start: 2021-11-15 | End: 2021-11-15

## 2021-11-15 RX ADMIN — DEXAMETHASONE SODIUM PHOSPHATE 10 MG: 10 INJECTION INTRAMUSCULAR; INTRAVENOUS at 21:52

## 2021-11-15 RX ADMIN — ACETAMINOPHEN 650 MG: 325 TABLET ORAL at 23:51

## 2021-11-15 ASSESSMENT — ENCOUNTER SYMPTOMS
SHORTNESS OF BREATH: 1
GASTROINTESTINAL NEGATIVE: 1
EYES NEGATIVE: 1

## 2021-11-15 ASSESSMENT — PAIN SCALES - GENERAL: PAINLEVEL_OUTOF10: 0

## 2021-11-16 ENCOUNTER — HOSPITAL ENCOUNTER (OUTPATIENT)
Dept: NON INVASIVE DIAGNOSTICS | Age: 86
Discharge: HOME | DRG: 177 | End: 2021-11-16
Payer: MEDICARE

## 2021-11-16 PROBLEM — N18.4 STAGE 4 CHRONIC KIDNEY DISEASE (HCC): Status: ACTIVE | Noted: 2021-11-16

## 2021-11-16 PROBLEM — J96.21 ACUTE AND CHRONIC RESPIRATORY FAILURE WITH HYPOXIA (HCC): Status: ACTIVE | Noted: 2021-11-16

## 2021-11-16 PROBLEM — I48.92 ATRIAL FLUTTER (HCC): Status: ACTIVE | Noted: 2021-11-16

## 2021-11-16 LAB
D-DIMER QUANTITATIVE: 2.58 MG/L FEU (ref 0–0.59)
EKG ATRIAL RATE: 113 BPM
EKG P AXIS: 31 DEGREES
EKG P-R INTERVAL: 166 MS
EKG Q-T INTERVAL: 336 MS
EKG QRS DURATION: 112 MS
EKG QTC CALCULATION (BAZETT): 460 MS
EKG R AXIS: -44 DEGREES
EKG T AXIS: 72 DEGREES
EKG VENTRICULAR RATE: 113 BPM
GLUCOSE BLD-MCNC: 199 MG/DL (ref 74–100)
GLUCOSE BLD-MCNC: 336 MG/DL (ref 74–100)
GLUCOSE BLD-MCNC: 412 MG/DL (ref 74–100)
LV EF: 55 %
LVEF MODALITY: NORMAL
TROPONIN INTERP: ABNORMAL
TROPONIN T: ABNORMAL NG/ML
TROPONIN, HIGH SENSITIVITY: 117 NG/L (ref 0–22)

## 2021-11-16 PROCEDURE — 2700000000 HC OXYGEN THERAPY PER DAY

## 2021-11-16 PROCEDURE — 82947 ASSAY GLUCOSE BLOOD QUANT: CPT

## 2021-11-16 PROCEDURE — 85379 FIBRIN DEGRADATION QUANT: CPT

## 2021-11-16 PROCEDURE — 87040 BLOOD CULTURE FOR BACTERIA: CPT

## 2021-11-16 PROCEDURE — 93005 ELECTROCARDIOGRAM TRACING: CPT | Performed by: NURSE PRACTITIONER

## 2021-11-16 PROCEDURE — 2580000003 HC RX 258: Performed by: NURSE PRACTITIONER

## 2021-11-16 PROCEDURE — 6360000002 HC RX W HCPCS: Performed by: NURSE PRACTITIONER

## 2021-11-16 PROCEDURE — 93010 ELECTROCARDIOGRAM REPORT: CPT | Performed by: INTERNAL MEDICINE

## 2021-11-16 PROCEDURE — 94669 MECHANICAL CHEST WALL OSCILL: CPT

## 2021-11-16 PROCEDURE — 2580000003 HC RX 258: Performed by: INTERNAL MEDICINE

## 2021-11-16 PROCEDURE — 6370000000 HC RX 637 (ALT 250 FOR IP): Performed by: EMERGENCY MEDICINE

## 2021-11-16 PROCEDURE — 6370000000 HC RX 637 (ALT 250 FOR IP): Performed by: NURSE PRACTITIONER

## 2021-11-16 PROCEDURE — 36415 COLL VENOUS BLD VENIPUNCTURE: CPT

## 2021-11-16 PROCEDURE — 99222 1ST HOSP IP/OBS MODERATE 55: CPT | Performed by: INTERNAL MEDICINE

## 2021-11-16 PROCEDURE — 84484 ASSAY OF TROPONIN QUANT: CPT

## 2021-11-16 PROCEDURE — 94640 AIRWAY INHALATION TREATMENT: CPT

## 2021-11-16 PROCEDURE — 2500000003 HC RX 250 WO HCPCS: Performed by: NURSE PRACTITIONER

## 2021-11-16 PROCEDURE — 6370000000 HC RX 637 (ALT 250 FOR IP): Performed by: INTERNAL MEDICINE

## 2021-11-16 PROCEDURE — 2000000000 HC ICU R&B

## 2021-11-16 PROCEDURE — 94761 N-INVAS EAR/PLS OXIMETRY MLT: CPT

## 2021-11-16 PROCEDURE — 2580000003 HC RX 258: Performed by: EMERGENCY MEDICINE

## 2021-11-16 PROCEDURE — 93306 TTE W/DOPPLER COMPLETE: CPT

## 2021-11-16 PROCEDURE — 93005 ELECTROCARDIOGRAM TRACING: CPT | Performed by: INTERNAL MEDICINE

## 2021-11-16 PROCEDURE — 6360000002 HC RX W HCPCS: Performed by: INTERNAL MEDICINE

## 2021-11-16 RX ORDER — HEPARIN SODIUM 5000 [USP'U]/ML
5000 INJECTION, SOLUTION INTRAVENOUS; SUBCUTANEOUS 3 TIMES DAILY
Status: DISCONTINUED | OUTPATIENT
Start: 2021-11-16 | End: 2021-11-16

## 2021-11-16 RX ORDER — GLIPIZIDE 5 MG/1
5 TABLET ORAL
Status: DISCONTINUED | OUTPATIENT
Start: 2021-11-16 | End: 2021-11-27 | Stop reason: HOSPADM

## 2021-11-16 RX ORDER — GLIPIZIDE 5 MG/1
5 TABLET ORAL ONCE
Status: COMPLETED | OUTPATIENT
Start: 2021-11-16 | End: 2021-11-16

## 2021-11-16 RX ORDER — SODIUM CHLORIDE 9 MG/ML
INJECTION, SOLUTION INTRAVENOUS CONTINUOUS
Status: DISCONTINUED | OUTPATIENT
Start: 2021-11-16 | End: 2021-11-18

## 2021-11-16 RX ORDER — FAMOTIDINE 20 MG/1
20 TABLET, FILM COATED ORAL 2 TIMES DAILY
Status: DISCONTINUED | OUTPATIENT
Start: 2021-11-16 | End: 2021-11-18

## 2021-11-16 RX ORDER — SODIUM CHLORIDE 0.9 % (FLUSH) 0.9 %
5-40 SYRINGE (ML) INJECTION EVERY 12 HOURS SCHEDULED
Status: DISCONTINUED | OUTPATIENT
Start: 2021-11-16 | End: 2021-11-27 | Stop reason: HOSPADM

## 2021-11-16 RX ORDER — AMIODARONE HYDROCHLORIDE 200 MG/1
200 TABLET ORAL 2 TIMES DAILY
Status: DISCONTINUED | OUTPATIENT
Start: 2021-11-16 | End: 2021-11-27 | Stop reason: HOSPADM

## 2021-11-16 RX ORDER — CARVEDILOL 6.25 MG/1
6.25 TABLET ORAL 2 TIMES DAILY WITH MEALS
Status: DISCONTINUED | OUTPATIENT
Start: 2021-11-16 | End: 2021-11-16

## 2021-11-16 RX ORDER — POLYETHYLENE GLYCOL 3350 17 G/17G
17 POWDER, FOR SOLUTION ORAL DAILY PRN
Status: DISCONTINUED | OUTPATIENT
Start: 2021-11-16 | End: 2021-11-27 | Stop reason: HOSPADM

## 2021-11-16 RX ORDER — ATORVASTATIN CALCIUM 40 MG/1
40 TABLET, FILM COATED ORAL NIGHTLY
Status: DISCONTINUED | OUTPATIENT
Start: 2021-11-16 | End: 2021-11-27 | Stop reason: HOSPADM

## 2021-11-16 RX ORDER — DEXTROSE MONOHYDRATE 50 MG/ML
100 INJECTION, SOLUTION INTRAVENOUS PRN
Status: DISCONTINUED | OUTPATIENT
Start: 2021-11-16 | End: 2021-11-27 | Stop reason: HOSPADM

## 2021-11-16 RX ORDER — METOPROLOL SUCCINATE 25 MG/1
25 TABLET, EXTENDED RELEASE ORAL DAILY
Status: DISCONTINUED | OUTPATIENT
Start: 2021-11-16 | End: 2021-11-22

## 2021-11-16 RX ORDER — 0.9 % SODIUM CHLORIDE 0.9 %
250 INTRAVENOUS SOLUTION INTRAVENOUS ONCE
Status: COMPLETED | OUTPATIENT
Start: 2021-11-16 | End: 2021-11-16

## 2021-11-16 RX ORDER — ONDANSETRON 4 MG/1
4 TABLET, ORALLY DISINTEGRATING ORAL EVERY 8 HOURS PRN
Status: DISCONTINUED | OUTPATIENT
Start: 2021-11-16 | End: 2021-11-27 | Stop reason: HOSPADM

## 2021-11-16 RX ORDER — ZINC SULFATE 50(220)MG
100 CAPSULE ORAL DAILY
Status: DISCONTINUED | OUTPATIENT
Start: 2021-11-16 | End: 2021-11-27 | Stop reason: HOSPADM

## 2021-11-16 RX ORDER — CLOPIDOGREL BISULFATE 75 MG/1
75 TABLET ORAL DAILY
Status: DISCONTINUED | OUTPATIENT
Start: 2021-11-17 | End: 2021-11-27 | Stop reason: HOSPADM

## 2021-11-16 RX ORDER — INSULIN GLARGINE 100 [IU]/ML
20 INJECTION, SOLUTION SUBCUTANEOUS 2 TIMES DAILY
Status: DISCONTINUED | OUTPATIENT
Start: 2021-11-16 | End: 2021-11-18

## 2021-11-16 RX ORDER — RAMIPRIL 1.25 MG/1
2.5 CAPSULE ORAL EVERY MORNING
Status: DISCONTINUED | OUTPATIENT
Start: 2021-11-17 | End: 2021-11-18

## 2021-11-16 RX ORDER — RAMIPRIL 1.25 MG/1
2.5 CAPSULE ORAL ONCE
Status: COMPLETED | OUTPATIENT
Start: 2021-11-16 | End: 2021-11-16

## 2021-11-16 RX ORDER — METOPROLOL TARTRATE 5 MG/5ML
5 INJECTION INTRAVENOUS ONCE
Status: COMPLETED | OUTPATIENT
Start: 2021-11-16 | End: 2021-11-16

## 2021-11-16 RX ORDER — ASCORBIC ACID 500 MG
2000 TABLET ORAL 2 TIMES DAILY
Status: DISCONTINUED | OUTPATIENT
Start: 2021-11-16 | End: 2021-11-27 | Stop reason: HOSPADM

## 2021-11-16 RX ORDER — NICOTINE POLACRILEX 4 MG
15 LOZENGE BUCCAL PRN
Status: DISCONTINUED | OUTPATIENT
Start: 2021-11-16 | End: 2021-11-27 | Stop reason: HOSPADM

## 2021-11-16 RX ORDER — ONDANSETRON 2 MG/ML
4 INJECTION INTRAMUSCULAR; INTRAVENOUS EVERY 6 HOURS PRN
Status: DISCONTINUED | OUTPATIENT
Start: 2021-11-16 | End: 2021-11-27 | Stop reason: HOSPADM

## 2021-11-16 RX ORDER — ACETAMINOPHEN 650 MG/1
650 SUPPOSITORY RECTAL EVERY 6 HOURS PRN
Status: DISCONTINUED | OUTPATIENT
Start: 2021-11-16 | End: 2021-11-27 | Stop reason: HOSPADM

## 2021-11-16 RX ORDER — SODIUM CHLORIDE 9 MG/ML
25 INJECTION, SOLUTION INTRAVENOUS PRN
Status: DISCONTINUED | OUTPATIENT
Start: 2021-11-16 | End: 2021-11-27 | Stop reason: HOSPADM

## 2021-11-16 RX ORDER — ACETAMINOPHEN 325 MG/1
650 TABLET ORAL EVERY 6 HOURS PRN
Status: DISCONTINUED | OUTPATIENT
Start: 2021-11-16 | End: 2021-11-27 | Stop reason: HOSPADM

## 2021-11-16 RX ORDER — FUROSEMIDE 40 MG/1
40 TABLET ORAL DAILY
Status: DISCONTINUED | OUTPATIENT
Start: 2021-11-17 | End: 2021-11-18

## 2021-11-16 RX ORDER — 0.9 % SODIUM CHLORIDE 0.9 %
500 INTRAVENOUS SOLUTION INTRAVENOUS ONCE
Status: COMPLETED | OUTPATIENT
Start: 2021-11-16 | End: 2021-11-16

## 2021-11-16 RX ORDER — METHYLPREDNISOLONE SODIUM SUCCINATE 40 MG/ML
40 INJECTION, POWDER, LYOPHILIZED, FOR SOLUTION INTRAMUSCULAR; INTRAVENOUS EVERY 12 HOURS
Status: DISCONTINUED | OUTPATIENT
Start: 2021-11-16 | End: 2021-11-18

## 2021-11-16 RX ORDER — FUROSEMIDE 40 MG/1
40 TABLET ORAL ONCE
Status: COMPLETED | OUTPATIENT
Start: 2021-11-16 | End: 2021-11-16

## 2021-11-16 RX ORDER — IPRATROPIUM BROMIDE AND ALBUTEROL SULFATE 2.5; .5 MG/3ML; MG/3ML
1 SOLUTION RESPIRATORY (INHALATION) 4 TIMES DAILY
Status: DISCONTINUED | OUTPATIENT
Start: 2021-11-16 | End: 2021-11-18

## 2021-11-16 RX ORDER — SODIUM CHLORIDE 0.9 % (FLUSH) 0.9 %
5-40 SYRINGE (ML) INJECTION PRN
Status: DISCONTINUED | OUTPATIENT
Start: 2021-11-16 | End: 2021-11-27 | Stop reason: HOSPADM

## 2021-11-16 RX ORDER — DEXTROSE MONOHYDRATE 25 G/50ML
12.5 INJECTION, SOLUTION INTRAVENOUS PRN
Status: DISCONTINUED | OUTPATIENT
Start: 2021-11-16 | End: 2021-11-27 | Stop reason: HOSPADM

## 2021-11-16 RX ORDER — CLOPIDOGREL BISULFATE 75 MG/1
75 TABLET ORAL ONCE
Status: COMPLETED | OUTPATIENT
Start: 2021-11-16 | End: 2021-11-16

## 2021-11-16 RX ADMIN — GLIPIZIDE 5 MG: 5 TABLET ORAL at 17:03

## 2021-11-16 RX ADMIN — IPRATROPIUM BROMIDE AND ALBUTEROL SULFATE 1 AMPULE: 2.5; .5 SOLUTION RESPIRATORY (INHALATION) at 20:37

## 2021-11-16 RX ADMIN — METHYLPREDNISOLONE SODIUM SUCCINATE 40 MG: 40 INJECTION, POWDER, LYOPHILIZED, FOR SOLUTION INTRAMUSCULAR; INTRAVENOUS at 17:03

## 2021-11-16 RX ADMIN — INSULIN LISPRO 18 UNITS: 100 INJECTION, SOLUTION INTRAVENOUS; SUBCUTANEOUS at 16:59

## 2021-11-16 RX ADMIN — AMIODARONE HYDROCHLORIDE 1 MG/MIN: 50 INJECTION, SOLUTION INTRAVENOUS at 19:26

## 2021-11-16 RX ADMIN — METOPROLOL TARTRATE 5 MG: 5 INJECTION INTRAVENOUS at 16:54

## 2021-11-16 RX ADMIN — CLOPIDOGREL BISULFATE 75 MG: 75 TABLET ORAL at 09:13

## 2021-11-16 RX ADMIN — INSULIN GLARGINE 20 UNITS: 100 INJECTION, SOLUTION SUBCUTANEOUS at 21:19

## 2021-11-16 RX ADMIN — SODIUM CHLORIDE 250 ML: 9 INJECTION, SOLUTION INTRAVENOUS at 02:06

## 2021-11-16 RX ADMIN — METOPROLOL SUCCINATE 25 MG: 25 TABLET, EXTENDED RELEASE ORAL at 17:11

## 2021-11-16 RX ADMIN — METFORMIN HYDROCHLORIDE 500 MG: 500 TABLET ORAL at 09:13

## 2021-11-16 RX ADMIN — FUROSEMIDE 40 MG: 40 TABLET ORAL at 09:13

## 2021-11-16 RX ADMIN — FAMOTIDINE 20 MG: 20 TABLET, FILM COATED ORAL at 21:18

## 2021-11-16 RX ADMIN — SODIUM CHLORIDE 500 ML: 9 INJECTION, SOLUTION INTRAVENOUS at 05:11

## 2021-11-16 RX ADMIN — GLIPIZIDE 5 MG: 5 TABLET ORAL at 01:33

## 2021-11-16 RX ADMIN — SODIUM CHLORIDE 250 ML: 9 INJECTION, SOLUTION INTRAVENOUS at 00:57

## 2021-11-16 RX ADMIN — ATORVASTATIN CALCIUM 40 MG: 40 TABLET, FILM COATED ORAL at 21:18

## 2021-11-16 RX ADMIN — APIXABAN 2.5 MG: 2.5 TABLET, FILM COATED ORAL at 21:18

## 2021-11-16 RX ADMIN — OXYCODONE HYDROCHLORIDE AND ACETAMINOPHEN 2000 MG: 500 TABLET ORAL at 21:17

## 2021-11-16 RX ADMIN — SODIUM CHLORIDE: 9 INJECTION, SOLUTION INTRAVENOUS at 17:07

## 2021-11-16 RX ADMIN — ZINC SULFATE 220 MG (50 MG) CAPSULE 100 MG: CAPSULE at 17:03

## 2021-11-16 RX ADMIN — Medication 20000 UNITS: at 17:03

## 2021-11-16 RX ADMIN — RAMIPRIL 2.5 MG: 1.25 CAPSULE ORAL at 09:13

## 2021-11-16 RX ADMIN — HEPARIN SODIUM 5000 UNITS: 5000 INJECTION INTRAVENOUS; SUBCUTANEOUS at 16:59

## 2021-11-16 RX ADMIN — GLIPIZIDE 5 MG: 5 TABLET ORAL at 09:13

## 2021-11-16 ASSESSMENT — PAIN SCALES - GENERAL
PAINLEVEL_OUTOF10: 0
PAINLEVEL_OUTOF10: 0

## 2021-11-16 NOTE — ED NOTES
Patient updated. Aware that we are still awaiting a bed upstairs. Patient denies needs at this time.       Hilario Rae RN  11/16/21 8508

## 2021-11-16 NOTE — ED NOTES
Dr. Yoshi Preston 336 aware of patients current BP. Patient awakens easily denies dizziness or other symptoms. Patient continues to rest comfortably. No new orders obtained.       Gera Styles RN  11/16/21 6800

## 2021-11-16 NOTE — PROGRESS NOTES
[facility] Department of Pharmacy   Pharmacy Renal Adjustment Note    Na Diego is a 80 y.o. male. Pharmacist assessment of renally cleared medications. Recent Labs     11/15/21  2115   CREATININE 2.60*     Estimated Creatinine Clearance: 15 mL/min (A) (based on SCr of 2.6 mg/dL (H)).     Height:   Ht Readings from Last 1 Encounters:   11/16/21 5' 3\" (1.6 m)     Weight:  Wt Readings from Last 1 Encounters:   11/15/21 145 lb (65.8 kg)       The following medication has been adjusted based upon renal function:             Lovenox 30 mg SQ every 12 hours to Heparin 5,000 units SQ every 8 hours (for CrCl <20 mL/min and BMI <30)        Lidia Ca [facility],11/16/2021,4:17 PM

## 2021-11-16 NOTE — ED NOTES
Discussed current vital signs with Dr. Yoshi Preston 336, new orders obtained.       Cachorro Contreras RN  11/16/21 0019

## 2021-11-16 NOTE — PROGRESS NOTES
[facility] Department of Pharmacy   Pharmacy Renal Adjustment Note    Merry Schaefer is a 80 y.o. male. Pharmacist assessment of renally cleared medications. Recent Labs     11/15/21  2115   CREATININE 2.60*     Estimated Creatinine Clearance: 15 mL/min (A) (based on SCr of 2.6 mg/dL (H)).     Height:   Ht Readings from Last 1 Encounters:   11/16/21 5' 3\" (1.6 m)     Weight:  Wt Readings from Last 1 Encounters:   11/15/21 145 lb (65.8 kg)       The following medication has been adjusted based upon renal function:             Famotidine 40 mg by mouth BID to famotidine 20 mg by mouth BID         Lidia Ca [facility],11/16/2021,4:24 PM

## 2021-11-16 NOTE — ED PROVIDER NOTES
[facility] ED  EMERGENCY DEPARTMENT ENCOUNTER      Pt Name: Yamilet Fernandez  MRN: [id number]  [place] 10/31/1930  Date of evaluation: 11/15/2021  Provider: BASIM Orellana PA-C    CHIEF COMPLAINT     Chief Complaint   Patient presents with    Cough     onset 6 weeks ago, states was prescribed an antibiotic and seemed to get a little better, states worse since Friday    Shortness of Breath         HISTORY OF PRESENT ILLNESS   (Location/Symptom, Timing/Onset, Context/Setting,Quality, Duration, Modifying Factors, Severity)  Note limiting factors. Yamilet Fernandez is a80 y.o. male who presents to the emergency department      60-year-old male presents here with chief complaint of a 6-week history of shortness of breath. Primary care physician placed him on antibiotics at that time and did seem to help and began to worsen 3 days ago. He has been vaccinated. He had a booster 1 week ago. Patient denies any chest pain. He is able to speak full sentences does have take a break to take his breath. He is not hypoxic. Wife is at bedside. Patient is a full code. Nursing Notes werereviewed. REVIEW OF SYSTEMS    (2-9 systems for level 4, 10 or more for level 5)     Review of Systems   Constitutional: Negative. Negative for fever. HENT: Positive for hearing loss. Eyes: Negative. Respiratory: Positive for shortness of breath. Cardiovascular: Negative. Negative for chest pain. Gastrointestinal: Negative. Endocrine: Negative. Genitourinary: Negative. Musculoskeletal: Negative. Skin: Negative. Neurological: Negative. Psychiatric/Behavioral: Negative. All other systems reviewed and are negative. Except as noted above the remainder of the review of systems was reviewed and negative.        PAST MEDICAL HISTORY     Past Medical History:   Diagnosis Date    CAD (coronary artery disease)     Cataracts, bilateral     Glaucoma     Hyperlipidemia     Hypertension     TIA (transient ischemic attack)     Type II or unspecified type diabetes mellitus without mention of complication, not stated as uncontrolled          SURGICALHISTORY       Past Surgical History:   Procedure Laterality Date    CATARACT REMOVAL WITH IMPLANT Bilateral 11/219/2013 and 12/3/2013    CORONARY ARTERY BYPASS GRAFT  2000    INGUINAL HERNIA REPAIR Bilateral 06/19/12         CURRENT MEDICATIONS       Previous Medications    ASPIRIN 81 MG EC TABLET    Take 81 mg by mouth nightly     ATORVASTATIN (LIPITOR) 40 MG TABLET    Take 1 tablet by mouth nightly    CLOPIDOGREL (PLAVIX) 75 MG TABLET    Take 1 tablet by mouth daily    FUROSEMIDE (LASIX) 40 MG TABLET    Take 40 mg by mouth daily    GLIPIZIDE (GLUCOTROL) 5 MG TABLET    Take 5 mg by mouth 2 times daily (before meals)    METFORMIN (GLUCOPHAGE) 500 MG TABLET    Take 1,000 mg by mouth 2 times daily (with meals)     RAMIPRIL (ALTACE) 2.5 MG CAPSULE    Take 2.5 mg by mouth every morning          ALLERGIES   Patient has no known allergies.     FAMILY HISTORY       Family History   Problem Relation Age of Onset    Heart Disease Father     Heart Disease Brother           SOCIAL HISTORY       Social History     Socioeconomic History    Marital status:      Spouse name: None    Number of children: None    Years of education: None    Highest education level: None   Occupational History    None   Tobacco Use    Smoking status: Never Smoker    Smokeless tobacco: Never Used   Substance and Sexual Activity    Alcohol use: Yes     Comment: very very very seldom    Drug use: No    Sexual activity: None   Other Topics Concern    None   Social History Narrative    None     Social Determinants of Health     Financial Resource Strain:     Difficulty of Paying Living Expenses: Not on file   Food Insecurity:     Worried About Running Out of Food in the Last Year: Not on file    Mera of Food in the Last Year: Not on file   Transportation Needs:     Lack of Transportation (Medical): Not on file    Lack of Transportation (Non-Medical): Not on file   Physical Activity:     Days of Exercise per Week: Not on file    Minutes of Exercise per Session: Not on file   Stress:     Feeling of Stress : Not on file   Social Connections:     Frequency of Communication with Friends and Family: Not on file    Frequency of Social Gatherings with Friends and Family: Not on file    Attends Synagogue Services: Not on file    Active Member of [address] [place] [address] or Organizations: Not on file    Attends Club or Organization Meetings: Not on file    Marital Status: Not on file   Intimate Partner Violence:     Fear of Current or Ex-Partner: Not on file    Emotionally Abused: Not on file    Physically Abused: Not on file    Sexually Abused: Not on file   Housing Stability:     Unable to Pay for Housing in the Last Year: Not on file    Number of [place] in the Last Year: Not on file    Unstable Housing in the Last Year: Not on file       SCREENINGS             PHYSICAL EXAM    (up to 7 for level 4, 8 or more for level 5)     ED Triage Vitals [11/15/21 2051]   BP Temp Temp src Pulse Resp SpO2 Height Weight   (!) 172/99 -- -- 114 22 98 % -- 145 lb (65.8 kg)       Physical Exam  Vitals and nursing note reviewed. Constitutional:       Appearance: Normal appearance. HENT:      Head: Normocephalic. Comments: Positive cancer-like lesion in the forehead currently being treated with purple light scabbing noted     Right Ear: Tympanic membrane and ear canal normal.      Left Ear: Tympanic membrane and ear canal normal.      Nose: Nose normal.      Mouth/Throat:      Mouth: Mucous membranes are dry. Pharynx: Oropharynx is clear. Eyes:      Extraocular Movements: Extraocular movements intact. Conjunctiva/sclera: Conjunctivae normal.      Pupils: Pupils are equal, round, and reactive to light. Cardiovascular:      Rate and Rhythm: Normal rate and regular rhythm.    Pulmonary: Effort: Tachypnea and accessory muscle usage present. Breath sounds: Decreased breath sounds present. No wheezing, rhonchi or rales. Abdominal:      General: Abdomen is flat. Bowel sounds are normal.      Palpations: Abdomen is soft. Musculoskeletal:         General: Normal range of motion. Cervical back: Normal range of motion and neck supple. Skin:     General: Skin is warm and dry. Capillary Refill: Capillary refill takes 2 to 3 seconds. Neurological:      General: No focal deficit present. Mental Status: He is alert and oriented to person, place, and time. Mental status is at baseline. Psychiatric:         Mood and Affect: Mood normal.         Behavior: Behavior normal.         Thought Content: Thought content normal.         Judgment: Judgment normal.         DIAGNOSTIC RESULTS     EKG: All EKG's are interpreted by the Emergency Department Physician who either signs orCo-signs this chart in the absence of a cardiologist.  2059 EKG showed a sinus tachycardia with a rate of 113, NH interval 166 ms QRS duration 112 ms    RADIOLOGY:   Non-plainfilm images such as CT, Ultrasound and MRI are read by the radiologist. Plain radiographic images are visualized and preliminarily interpreted by the emergency physician with the below findings:      Interpretationper the Radiologist below, if available at the time of this note:    XR CHEST PORTABLE   Final Result   Significant peripheral and basal predominant pulmonary opacities diffusely   throughout both lungs. This pattern can be seen in the setting of COVID-19   pneumonia, though this requires clinical correlation. Sequelae of underlying   interstitial lung disease is also possible, though less favored. Stability   is indeterminate in the absence of prior comparison imaging. Small right pleural effusion. Age-indeterminate fracture of the left anterolateral 9th rib.                ED BEDSIDE ULTRASOUND:   Performed by ED Physician - none    LABS:  Labs Reviewed   COVID-19, RAPID - Abnormal; Notable for the following components:       Result Value    SARS-CoV-2, Rapid DETECTED (*)     All other components within normal limits   COMPREHENSIVE METABOLIC PANEL W/ REFLEX TO MG FOR LOW K - Abnormal; Notable for the following components:    Glucose 423 (*)     BUN 55 (*)     CREATININE 2.60 (*)     Bun/Cre Ratio 21 (*)     Calcium 8.4 (*)     Sodium 132 (*)     CO2 18 (*)     AST 42 (*)     Albumin 3.1 (*)     Albumin/Globulin Ratio 0.8 (*)     GFR Non- 23 (*)     GFR  28 (*)     All other components within normal limits   TROPONIN - Abnormal; Notable for the following components:    Troponin, High Sensitivity 151 (*)     All other components within normal limits   BRAIN NATRIURETIC PEPTIDE - Abnormal; Notable for the following components:    Pro-BNP 3,736 (*)     All other components within normal limits   PROTIME-INR - Abnormal; Notable for the following components:    Protime 14.3 (*)     All other components within normal limits   LACTIC ACID   CBC WITH AUTO DIFFERENTIAL       All other labs were within normal range or not returned as of this dictation. EMERGENCY DEPARTMENT COURSE and DIFFERENTIAL DIAGNOSIS/MDM:   Vitals:    Vitals:    11/15/21 2100 11/15/21 2115 11/15/21 2130 11/15/21 2145   BP: 122/62 (!) 118/53 (!) 104/50 (!) 102/44   Pulse:       Resp:       SpO2: 97% 99% 98% 99%   Weight:             MDM  Number of Diagnoses or Management Options  Pneumonia due to COVID-19 virus  Diagnosis management comments: Patient presenting her chief complaint increased shortness of breath. He did receive his Covid vaccine last week. He was more short of breath earlier today. He states he was at the dermatologist and just cannot catch his breath. Patient is tachypneic upon arrival but not hypoxic. Patient is positive for Covid. X-ray show establishing Covid pneumonia.   We spoke to hospitalist concerning this patient's care. He agrees to admit the patient. Blood work is currently pending. We will notify physician if there are any outstanding abnormal labs. Patient is stable at this time. He has not been hypoxic but is tachypneic and complains of weakness. Procedures    FINAL IMPRESSION      1. Pneumonia due to COVID-19 virus New Problem   2. Elevated troponin        DISPOSITION/PLAN   DISPOSITION        PATIENT REFERRED TO:  No follow-up provider specified. DISCHARGE MEDICATIONS:  New Prescriptions    No medications on file              Summation      Patient Course:      ED Medications administered this visit:    Medications   dexamethasone (DECADRON) injection 10 mg (10 mg IntraVENous Given 11/15/21 2152)       New Prescriptions from this visit:    New Prescriptions    No medications on file       Follow-up:  No follow-up provider specified. Final Impression:   1. Pneumonia due to COVID-19 virus New Problem   2.  Elevated troponin               (Please note that portions of this note were completed with a voice recognition program.  Efforts were made to edit the dictations but occasionally words are mis-transcribed.)         Lesly Conway PA-C  11/15/21 2143       Lesly Conway PA-C  11/15/21 2150       Lesly Conway PA-C  11/15/21 2223       Lesly Conway PA-C  11/15/21 2223

## 2021-11-16 NOTE — ED NOTES
Writer at bedside. Report given to [facility]. Patient aware that we are awaiting a bed on the inpatient unit. Patient denies needs at this time.       Cachorro Contreras RN  11/16/21 9523

## 2021-11-16 NOTE — ED NOTES
Spoke with pharmacist, they do not recommend metformin at this time due to patients renal function. Dr. Anna Zheng updated.       Wilfredo Li RN  11/16/21 0067

## 2021-11-16 NOTE — ED NOTES
Amena WASHINGTON accepting nurse on [facility] made aware that pt is hypptensive and tachy and that we have a call out to Dr. Hellen Meehan and that patient will be kept here until we talk to Dr. Alford , RN  11/16/21 [phone]

## 2021-11-16 NOTE — PROGRESS NOTES
Patient brought up to [facility] room 318 at this time for treatment for COVID. Patients vitals obtained and assessment completed as charted. Patient is tachycardic in the 140s and was like this in the ED as well. Patient denies chest pain. Shortness of breath with exertion. Has 2 L NC oxygen in place for SOB and decreased oxygen levels per ED staff. Navigator reviewed with patient.

## 2021-11-16 NOTE — PROGRESS NOTES
[facility] Department of Pharmacy   Pharmacy Renal Adjustment Note    Na Diego is a 80 y.o. male. Pharmacist assessment of renally cleared medications. Recent Labs     11/15/21  2115   CREATININE 2.60*     Estimated Creatinine Clearance: 15 mL/min (A) (based on SCr of 2.6 mg/dL (H)). Height:   Ht Readings from Last 1 Encounters:   11/16/21 5' 3\" (1.6 m)     Weight:  Wt Readings from Last 1 Encounters:   11/15/21 145 lb (65.8 kg)     EGFR=23 today. Holding metformin until >30.         Margarita Che [facility],11/16/2021,4:06 PM

## 2021-11-16 NOTE — ED NOTES
Patient pulse ox when sleeping drops to 88-89%. Patient placed on 2l NC.       Cachorro Contreras RN  11/16/21 7698

## 2021-11-16 NOTE — ED NOTES
Dr. Finesse Warner made aware of pt's low BP of 67/40  and it was higher at 90/72 with manual check and that pt is tachy at 139. She would like to speak with Dr. Garry Craft again.  Anibal Pak put call out to Dr. Garry Craft.       Emilee Olivares RN  11/16/21 1501 [facility]  11/16/21 1331

## 2021-11-16 NOTE — H&P
History and Physical    Patient:  Clinton Abrams  MRN: [id number]    Chief Complaint: Cough and shortness of breath    History Obtained From:  patient, electronic medical record    PCP: Rosebud Schlatter, DO    History of Present Illness: The patient is a 80 y.o. male who presented to the emergency room on November 15 with complaints of shortness of breath. Symptoms ongoing approximately 6 weeks cording to the ER notes. Patient reported being on antibiotics at that time without relief. Patient symptoms began approximately 3 days ago. Patient stated he is Covid vaccinated as well as received his boosters. He received all 3 in 2021 which was January, February and November. Patient denied chest pain or palpitations. He is able to speak in full sentences. He does get short of breath with exertion. During his evaluation in the emergency room patient was found to be tachypneic and using accessory muscles to breathe. Initial EKG showed sinus tachycardia with a heart rate of 113. Chest x-ray showed small pleural effusion as well as basilar opacities consistent with Covid pneumonia. Patient was Covid positive. Patient's glucose was 423 creatinine of 2.60 with a BUN of 55. Patient's troponin was elevated to 151. His proBNP was 3736. Patient does have history of skin cancer and follows with dermatologist.  Patient was boarded in the emergency room until bed was available. Upon arrival to the floor patient was found to be tachycardic with a heart rate of 146 on 2 L of oxygen as well. Patient denied being short of breath and states that he has felt fine and did not know he was sick. He stated he thought he had a code and was getting over it however it continued. Patient does follow with cardiologist in [facility]. Patient denies any history of atrial fibrillation or atrial flutter. Patient is hypotensive during his time in the emergency room with systolic blood pressures ranging from 58-1 21.   Patient has been Problem: Patient Care Overview  Goal: Plan of Care Review  Outcome: Ongoing (interventions implemented as appropriate)  Quiet shift. Headache relieved with PO medications X 2. Saline lock patent. O2 per N/C and tolerating well. Up to bedside commode with assistance. Dressing Left hip dry and intact. Congested non productive cough still using IS. Short arm cast intact with neurovascular checks WNL. Left arm and left leg elevated. Call bell in reach and use. Plan of care discussed during care and verbalizes understanding. RODNEY Churchill RN       afebrile. Past Medical History:        Diagnosis Date    Acute and chronic respiratory failure with hypoxia ([facility] [address] 11/16/2021    CAD (coronary artery disease)     Cataracts, bilateral     Glaucoma     Hyperlipidemia     Hypertension     TIA (transient ischemic attack)     Type II or unspecified type diabetes mellitus without mention of complication, not stated as uncontrolled        Past Surgical History:        Procedure Laterality Date    CATARACT REMOVAL WITH IMPLANT Bilateral 11/219/2013 and 12/3/2013    CORONARY ARTERY BYPASS GRAFT  2000    INGUINAL HERNIA REPAIR Bilateral 06/19/12       Medications Prior to Admission:    Prior to Admission medications    Medication Sig Start Date End Date Taking? Authorizing Provider   furosemide (LASIX) 40 MG tablet Take 40 mg by mouth daily   Yes Historical Provider, MD   glipiZIDE (GLUCOTROL) 5 MG tablet Take 5 mg by mouth 2 times daily (before meals)   Yes Historical Provider, MD   metFORMIN (GLUCOPHAGE) 500 MG tablet Take 1,000 mg by mouth 2 times daily (with meals)    Yes Historical Provider, MD   atorvastatin (LIPITOR) 40 MG tablet Take 1 tablet by mouth nightly 3/28/18  Yes Elroy Redd MD   clopidogrel (PLAVIX) 75 MG tablet Take 1 tablet by mouth daily 3/29/18  Yes Elroy Redd MD   ramipril (ALTACE) 2.5 MG capsule Take 2.5 mg by mouth every morning    Yes Historical Provider, MD   aspirin 81 MG EC tablet Take 81 mg by mouth nightly     Historical Provider, MD       Allergies:  Patient has no known allergies. Social History:   TOBACCO:   reports that he has never smoked. He has never used smokeless tobacco.  ETOH:   reports current alcohol use. Family History:       Problem Relation Age of Onset    Heart Disease Father     Heart Disease Brother        Allergies:  Patient has no known allergies. Medications Prior to Admission:    Prior to Admission medications    Medication Sig Start Date End Date Taking?  Authorizing Provider   furosemide (LASIX) 40 MG tablet Take 40 mg by mouth daily   Yes Historical Provider, MD   glipiZIDE (GLUCOTROL) 5 MG tablet Take 5 mg by mouth 2 times daily (before meals)   Yes Historical Provider, MD   metFORMIN (GLUCOPHAGE) 500 MG tablet Take 1,000 mg by mouth 2 times daily (with meals)    Yes Historical Provider, MD   atorvastatin (LIPITOR) 40 MG tablet Take 1 tablet by mouth nightly 3/28/18  Yes Orlin Cote MD   clopidogrel (PLAVIX) 75 MG tablet Take 1 tablet by mouth daily 3/29/18  Yes Orlin Cote MD   ramipril (ALTACE) 2.5 MG capsule Take 2.5 mg by mouth every morning    Yes Historical Provider, MD   aspirin 81 MG EC tablet Take 81 mg by mouth nightly     Historical Provider, MD       Review of Systems:  Constitutional:negative  for fevers, and negative for chills. Eyes: negative for visual disturbance   ENT: negative for sore throat, negative nasal congestion, and negative for earache  Respiratory: positive for shortness of breath, negative for cough, and negative for wheezing  Cardiovascular: negative for chest pain, negative for palpitations, and negative for syncope  Gastrointestinal: negative for abdominal pain, negative for nausea,negative for vomiting, negative for diarrhea, negative for constipation, and negative for hematochezia or melena  Genitourinary: negative for dysuria, negative for urinary urgency, negative for urinary frequency, and negative for hematuria  Skin: negative for skin rash, and negative for skin lesions  Neurological: negative for unilateral weakness, numbness or tingling. Physical Exam:    Vitals:   Temp: 96.8 °F (36 °C)  BP: 119/72  Resp: 22  Pulse: 143  SpO2: 96 %  24HR INTAKE/OUTPUT:      Intake/Output Summary (Last 24 hours) at 11/16/2021 1726  Last data filed at 11/16/2021 7370  Gross per 24 hour   Intake 1005.49 ml   Output    Net 1005.49 ml       Weight    Body mass index is 25.69 kg/m². Exam:  GEN:    Awake, alert and oriented x3.    EYES:  EOMI, pupils equal   NECK: Supple. No lymphadenopathy. No carotid bruit  CVS:    regular but tachycardic, no audible murmur  PULM:  Diminished with bibasilar Rales, mild acute respiratory distress  ABD:    Bowels sounds normal.  Abdomen is soft. No distention. no tenderness to palpation. EXT:   no edema bilaterally . No calf tenderness. NEURO: Moves all extremities. Motor and sensory are grossly intact  SKIN:  No rashes.   No skin lesions.    -----------------------------------------------------------------  Diagnostic Data:     DATA:    CBC:   Lab Results   Component Value Date    WBC 11.7 (H) 11/15/2021    RBC 3.34 (L) 11/15/2021    HGB 10.4 (L) 11/15/2021    HCT 32.7 (L) 11/15/2021    MCV 97.9 11/15/2021     11/15/2021        CMP:   Lab Results   Component Value Date    GLUCOSE 423 (HH) 11/15/2021    BUN 55 (H) 11/15/2021    CREATININE 2.60 (H) 11/15/2021     (L) 11/15/2021    K 4.5 11/15/2021    CALCIUM 8.4 (L) 11/15/2021    CL 98 11/15/2021    CO2 18 (L) 11/15/2021    PROT 7.2 11/15/2021    LABALBU 3.1 (L) 11/15/2021    BILITOT 0.77 11/15/2021    ALKPHOS 114 11/15/2021    ALT 27 11/15/2021    AST 42 (H) 11/15/2021       UA:   Lab Results   Component Value Date    COLORU YELLOW 02/11/2021    SPECGRAV 1.020 02/11/2021    WBCUA 0 TO 2 02/11/2021    RBCUA None 02/11/2021    EPITHUA None 02/11/2021    LEUKOCYTESUR NEGATIVE 02/11/2021    GLUCOSEU 3+ (A) 02/11/2021    KETUA NEGATIVE 02/11/2021    PROTEINU NEGATIVE 02/11/2021    HGBUR NEGATIVE 02/11/2021    CASTUA NOT REPORTED 02/11/2021    CRYSTUA NOT REPORTED 02/11/2021    BACTERIA TRACE (A) 02/11/2021    YEAST NOT REPORTED 02/11/2021       Lactic Acid:   Lab Results   Component Value Date    LACTA 2.2 11/15/2021       D-Dimer:  No results found for: DDIMER    PT/INR:  Lab Results   Component Value Date    PROTIME 14.3 11/15/2021    INR 1.1 11/15/2021       High Sensitivity Troponin:  Recent Labs     11/15/21  2115   TROPHS 151*       ABGs:   No results found for: PHART,

## 2021-11-16 NOTE — ED NOTES
Nursing supervisor called with inpatient room assignment of 318 and that Amena will be accepting nurse at [address], [facility]  11/16/21 5097

## 2021-11-16 NOTE — ED NOTES
Waiting on a bed assignment from supervisor. Will not have until shift change.       Rupesh Rojo  11/15/21 4957

## 2021-11-16 NOTE — ED NOTES
Patient repositioned for comfort. Additional blanket provided. Patient denies needs at this time.       Julianne Fernández RN  11/16/21 3219

## 2021-11-16 NOTE — ED NOTES
Spoke with Dr. Mckayla Hurst regarding vitals. New orders obtained.       Jose Bradford RN  11/16/21 9080

## 2021-11-17 ENCOUNTER — APPOINTMENT (OUTPATIENT)
Dept: ULTRASOUND IMAGING | Age: 86
DRG: 177 | End: 2021-11-17
Payer: MEDICARE

## 2021-11-17 PROBLEM — E44.1 MILD MALNUTRITION (HCC): Status: ACTIVE | Noted: 2021-11-17

## 2021-11-17 LAB
ABSOLUTE EOS #: <0.03 K/UL (ref 0–0.44)
ABSOLUTE IMMATURE GRANULOCYTE: 0.13 K/UL (ref 0–0.3)
ABSOLUTE LYMPH #: 1.03 K/UL (ref 1.1–3.7)
ABSOLUTE MONO #: 0.73 K/UL (ref 0.1–1.2)
ALBUMIN SERPL-MCNC: 2.7 G/DL (ref 3.5–5.2)
ALBUMIN/GLOBULIN RATIO: 0.8 (ref 1–2.5)
ALP BLD-CCNC: 128 U/L (ref 40–129)
ALT SERPL-CCNC: 25 U/L (ref 5–41)
ANION GAP SERPL CALCULATED.3IONS-SCNC: 15 MMOL/L (ref 9–17)
AST SERPL-CCNC: 40 U/L
BASOPHILS # BLD: 0 % (ref 0–2)
BASOPHILS ABSOLUTE: <0.03 K/UL (ref 0–0.2)
BILIRUB SERPL-MCNC: 0.33 MG/DL (ref 0.3–1.2)
BNP INTERPRETATION: ABNORMAL
BUN BLDV-MCNC: 85 MG/DL (ref 8–23)
BUN/CREAT BLD: 28 (ref 9–20)
C-REACTIVE PROTEIN: 101.4 MG/L (ref 0–5)
CALCIUM SERPL-MCNC: 7.8 MG/DL (ref 8.6–10.4)
CHLORIDE BLD-SCNC: 99 MMOL/L (ref 98–107)
CHOLESTEROL/HDL RATIO: 3.2
CHOLESTEROL: 87 MG/DL
CK MB: 9.2 NG/ML
CO2: 18 MMOL/L (ref 20–31)
CREAT SERPL-MCNC: 3 MG/DL (ref 0.7–1.2)
DIFFERENTIAL TYPE: ABNORMAL
EKG ATRIAL RATE: 272 BPM
EKG ATRIAL RATE: 82 BPM
EKG P AXIS: -22 DEGREES
EKG P-R INTERVAL: 164 MS
EKG Q-T INTERVAL: 330 MS
EKG Q-T INTERVAL: 396 MS
EKG QRS DURATION: 112 MS
EKG QRS DURATION: 114 MS
EKG QTC CALCULATION (BAZETT): 462 MS
EKG QTC CALCULATION (BAZETT): 496 MS
EKG R AXIS: -29 DEGREES
EKG R AXIS: -41 DEGREES
EKG T AXIS: -30 DEGREES
EKG T AXIS: 64 DEGREES
EKG VENTRICULAR RATE: 136 BPM
EKG VENTRICULAR RATE: 82 BPM
EOSINOPHILS RELATIVE PERCENT: 0 % (ref 1–4)
FERRITIN: 866 UG/L (ref 30–400)
FIBRINOGEN: 477 MG/DL (ref 179–518)
GFR AFRICAN AMERICAN: 24 ML/MIN
GFR NON-AFRICAN AMERICAN: 20 ML/MIN
GFR SERPL CREATININE-BSD FRML MDRD: ABNORMAL ML/MIN/{1.73_M2}
GFR SERPL CREATININE-BSD FRML MDRD: ABNORMAL ML/MIN/{1.73_M2}
GLUCOSE BLD-MCNC: 124 MG/DL (ref 74–100)
GLUCOSE BLD-MCNC: 153 MG/DL (ref 70–99)
GLUCOSE BLD-MCNC: 174 MG/DL (ref 74–100)
GLUCOSE BLD-MCNC: 261 MG/DL (ref 74–100)
GLUCOSE BLD-MCNC: 282 MG/DL (ref 74–100)
HCT VFR BLD CALC: 30.9 % (ref 40.7–50.3)
HDLC SERPL-MCNC: 27 MG/DL
HEMOGLOBIN: 10 G/DL (ref 13–17)
IMMATURE GRANULOCYTES: 1 %
INR BLD: 1.3
LACTATE DEHYDROGENASE: 218 U/L (ref 135–225)
LDL CHOLESTEROL: 47 MG/DL (ref 0–130)
LYMPHOCYTES # BLD: 6 % (ref 24–43)
MCH RBC QN AUTO: 31.6 PG (ref 25.2–33.5)
MCHC RBC AUTO-ENTMCNC: 32.4 G/DL (ref 28.4–34.8)
MCV RBC AUTO: 97.8 FL (ref 82.6–102.9)
MONOCYTES # BLD: 5 % (ref 3–12)
NRBC AUTOMATED: 0 PER 100 WBC
PARTIAL THROMBOPLASTIN TIME: 37.3 SEC (ref 23.9–33.8)
PDW BLD-RTO: 12.2 % (ref 11.8–14.4)
PLATELET # BLD: 193 K/UL (ref 138–453)
PLATELET ESTIMATE: ABNORMAL
PMV BLD AUTO: 11 FL (ref 8.1–13.5)
POTASSIUM SERPL-SCNC: 5 MMOL/L (ref 3.7–5.3)
PRO-BNP: ABNORMAL PG/ML
PROTHROMBIN TIME: 16.3 SEC (ref 11.5–14.2)
RBC # BLD: 3.16 M/UL (ref 4.21–5.77)
RBC # BLD: ABNORMAL 10*6/UL
SEG NEUTROPHILS: 88 % (ref 36–65)
SEGMENTED NEUTROPHILS ABSOLUTE COUNT: 14.28 K/UL (ref 1.5–8.1)
SODIUM BLD-SCNC: 132 MMOL/L (ref 135–144)
TOTAL PROTEIN: 6.2 G/DL (ref 6.4–8.3)
TRIGL SERPL-MCNC: 63 MG/DL
TROPONIN INTERP: ABNORMAL
TROPONIN T: ABNORMAL NG/ML
TROPONIN, HIGH SENSITIVITY: 141 NG/L (ref 0–22)
TSH SERPL DL<=0.05 MIU/L-ACNC: 1.12 MIU/L (ref 0.3–5)
VITAMIN D 25-HYDROXY: 26.5 NG/ML (ref 30–100)
VLDLC SERPL CALC-MCNC: ABNORMAL MG/DL (ref 1–30)
WBC # BLD: 16.2 K/UL (ref 3.5–11.3)
WBC # BLD: ABNORMAL 10*3/UL

## 2021-11-17 PROCEDURE — 80061 LIPID PANEL: CPT

## 2021-11-17 PROCEDURE — 6370000000 HC RX 637 (ALT 250 FOR IP): Performed by: NURSE PRACTITIONER

## 2021-11-17 PROCEDURE — 2580000003 HC RX 258: Performed by: NURSE PRACTITIONER

## 2021-11-17 PROCEDURE — 94640 AIRWAY INHALATION TREATMENT: CPT

## 2021-11-17 PROCEDURE — 85610 PROTHROMBIN TIME: CPT

## 2021-11-17 PROCEDURE — 82306 VITAMIN D 25 HYDROXY: CPT

## 2021-11-17 PROCEDURE — 84484 ASSAY OF TROPONIN QUANT: CPT

## 2021-11-17 PROCEDURE — 86140 C-REACTIVE PROTEIN: CPT

## 2021-11-17 PROCEDURE — APPSS60 APP SPLIT SHARED TIME 46-60 MINUTES: Performed by: NURSE PRACTITIONER

## 2021-11-17 PROCEDURE — 6360000002 HC RX W HCPCS: Performed by: NURSE PRACTITIONER

## 2021-11-17 PROCEDURE — 93010 ELECTROCARDIOGRAM REPORT: CPT | Performed by: INTERNAL MEDICINE

## 2021-11-17 PROCEDURE — 85730 THROMBOPLASTIN TIME PARTIAL: CPT

## 2021-11-17 PROCEDURE — 86141 C-REACTIVE PROTEIN HS: CPT

## 2021-11-17 PROCEDURE — 85384 FIBRINOGEN ACTIVITY: CPT

## 2021-11-17 PROCEDURE — 97166 OT EVAL MOD COMPLEX 45 MIN: CPT

## 2021-11-17 PROCEDURE — 1200000000 HC SEMI PRIVATE

## 2021-11-17 PROCEDURE — 36415 COLL VENOUS BLD VENIPUNCTURE: CPT

## 2021-11-17 PROCEDURE — 76770 US EXAM ABDO BACK WALL COMP: CPT

## 2021-11-17 PROCEDURE — 84443 ASSAY THYROID STIM HORMONE: CPT

## 2021-11-17 PROCEDURE — 85025 COMPLETE CBC W/AUTO DIFF WBC: CPT

## 2021-11-17 PROCEDURE — 99233 SBSQ HOSP IP/OBS HIGH 50: CPT | Performed by: INTERNAL MEDICINE

## 2021-11-17 PROCEDURE — 82728 ASSAY OF FERRITIN: CPT

## 2021-11-17 PROCEDURE — 94761 N-INVAS EAR/PLS OXIMETRY MLT: CPT

## 2021-11-17 PROCEDURE — 87086 URINE CULTURE/COLONY COUNT: CPT

## 2021-11-17 PROCEDURE — 6370000000 HC RX 637 (ALT 250 FOR IP): Performed by: INTERNAL MEDICINE

## 2021-11-17 PROCEDURE — 94669 MECHANICAL CHEST WALL OSCILL: CPT

## 2021-11-17 PROCEDURE — 82947 ASSAY GLUCOSE BLOOD QUANT: CPT

## 2021-11-17 PROCEDURE — 2700000000 HC OXYGEN THERAPY PER DAY

## 2021-11-17 PROCEDURE — 83880 ASSAY OF NATRIURETIC PEPTIDE: CPT

## 2021-11-17 PROCEDURE — 82553 CREATINE MB FRACTION: CPT

## 2021-11-17 PROCEDURE — 83615 LACTATE (LD) (LDH) ENZYME: CPT

## 2021-11-17 PROCEDURE — 80053 COMPREHEN METABOLIC PANEL: CPT

## 2021-11-17 RX ADMIN — IPRATROPIUM BROMIDE AND ALBUTEROL SULFATE 1 AMPULE: 2.5; .5 SOLUTION RESPIRATORY (INHALATION) at 20:27

## 2021-11-17 RX ADMIN — OXYCODONE HYDROCHLORIDE AND ACETAMINOPHEN 2000 MG: 500 TABLET ORAL at 21:30

## 2021-11-17 RX ADMIN — IPRATROPIUM BROMIDE AND ALBUTEROL SULFATE 1 AMPULE: 2.5; .5 SOLUTION RESPIRATORY (INHALATION) at 16:41

## 2021-11-17 RX ADMIN — INSULIN GLARGINE 20 UNITS: 100 INJECTION, SOLUTION SUBCUTANEOUS at 21:31

## 2021-11-17 RX ADMIN — ZINC SULFATE 220 MG (50 MG) CAPSULE 100 MG: CAPSULE at 08:07

## 2021-11-17 RX ADMIN — ATORVASTATIN CALCIUM 40 MG: 40 TABLET, FILM COATED ORAL at 21:30

## 2021-11-17 RX ADMIN — INSULIN GLARGINE 20 UNITS: 100 INJECTION, SOLUTION SUBCUTANEOUS at 08:48

## 2021-11-17 RX ADMIN — AMIODARONE HYDROCHLORIDE 200 MG: 200 TABLET ORAL at 08:07

## 2021-11-17 RX ADMIN — METHYLPREDNISOLONE SODIUM SUCCINATE 40 MG: 40 INJECTION, POWDER, LYOPHILIZED, FOR SOLUTION INTRAMUSCULAR; INTRAVENOUS at 04:07

## 2021-11-17 RX ADMIN — RAMIPRIL 2.5 MG: 1.25 CAPSULE ORAL at 08:07

## 2021-11-17 RX ADMIN — FAMOTIDINE 20 MG: 20 TABLET, FILM COATED ORAL at 08:07

## 2021-11-17 RX ADMIN — INSULIN LISPRO 9 UNITS: 100 INJECTION, SOLUTION INTRAVENOUS; SUBCUTANEOUS at 16:21

## 2021-11-17 RX ADMIN — FAMOTIDINE 20 MG: 20 TABLET, FILM COATED ORAL at 21:30

## 2021-11-17 RX ADMIN — SODIUM CHLORIDE: 9 INJECTION, SOLUTION INTRAVENOUS at 21:34

## 2021-11-17 RX ADMIN — APIXABAN 2.5 MG: 2.5 TABLET, FILM COATED ORAL at 21:30

## 2021-11-17 RX ADMIN — IPRATROPIUM BROMIDE AND ALBUTEROL SULFATE 1 AMPULE: 2.5; .5 SOLUTION RESPIRATORY (INHALATION) at 05:30

## 2021-11-17 RX ADMIN — POLYETHYLENE GLYCOL (3350) 17 G: 17 POWDER, FOR SOLUTION ORAL at 16:19

## 2021-11-17 RX ADMIN — GLIPIZIDE 5 MG: 5 TABLET ORAL at 16:11

## 2021-11-17 RX ADMIN — OXYCODONE HYDROCHLORIDE AND ACETAMINOPHEN 2000 MG: 500 TABLET ORAL at 08:07

## 2021-11-17 RX ADMIN — SODIUM CHLORIDE, PRESERVATIVE FREE 10 ML: 5 INJECTION INTRAVENOUS at 08:07

## 2021-11-17 RX ADMIN — APIXABAN 2.5 MG: 2.5 TABLET, FILM COATED ORAL at 08:07

## 2021-11-17 RX ADMIN — FUROSEMIDE 40 MG: 40 TABLET ORAL at 08:07

## 2021-11-17 RX ADMIN — METOPROLOL SUCCINATE 25 MG: 25 TABLET, EXTENDED RELEASE ORAL at 08:07

## 2021-11-17 RX ADMIN — SODIUM CHLORIDE: 9 INJECTION, SOLUTION INTRAVENOUS at 08:08

## 2021-11-17 RX ADMIN — METHYLPREDNISOLONE SODIUM SUCCINATE 40 MG: 40 INJECTION, POWDER, LYOPHILIZED, FOR SOLUTION INTRAMUSCULAR; INTRAVENOUS at 16:12

## 2021-11-17 RX ADMIN — IPRATROPIUM BROMIDE AND ALBUTEROL SULFATE 1 AMPULE: 2.5; .5 SOLUTION RESPIRATORY (INHALATION) at 10:45

## 2021-11-17 RX ADMIN — CLOPIDOGREL BISULFATE 75 MG: 75 TABLET ORAL at 08:07

## 2021-11-17 RX ADMIN — INSULIN LISPRO 3 UNITS: 100 INJECTION, SOLUTION INTRAVENOUS; SUBCUTANEOUS at 11:37

## 2021-11-17 RX ADMIN — GLIPIZIDE 5 MG: 5 TABLET ORAL at 08:07

## 2021-11-17 NOTE — PROGRESS NOTES
PT is alert and oriented. Skin color, turgor and temp are normal. Respirations are unlabored while at rest. Becomes SOB with exertion. Has wheezing both lungs. A-flutter on amiodarone. Denies any chest pain. No digestive issues. No lower extremity edema. Denies any wants or needs.

## 2021-11-17 NOTE — PLAN OF CARE
Problem: Falls - Risk of:  Goal: Will remain free from falls  Description: Will remain free from falls  Outcome: Ongoing  Goal: Absence of physical injury  Description: Absence of physical injury  Outcome: Ongoing     Problem: Airway Clearance - Ineffective  Goal: Achieve or maintain patent airway  Outcome: Ongoing     Problem: Gas Exchange - Impaired  Goal: Absence of hypoxia  Outcome: Ongoing  Goal: Promote optimal lung function  Outcome: Ongoing     Problem: Breathing Pattern - Ineffective  Goal: Ability to achieve and maintain a regular respiratory rate  Outcome: Ongoing     Problem:  Body Temperature -  Risk of, Imbalanced  Goal: Will regain or maintain usual level of consciousness  Outcome: Ongoing  Goal: Complications related to the disease process, condition or treatment will be avoided or minimized  Outcome: Ongoing     Problem: Isolation Precautions - Risk of Spread of Infection  Goal: Prevent transmission of infection  Outcome: Ongoing     Problem: Nutrition Deficits  Goal: Optimize nutritional status  Outcome: Ongoing     Problem: Risk for Fluid Volume Deficit  Goal: Maintain normal heart rhythm  Outcome: Ongoing  Goal: Maintain absence of muscle cramping  Outcome: Ongoing  Goal: Maintain normal serum potassium, sodium, calcium, phosphorus, and pH  Outcome: Ongoing     Problem: Loneliness or Risk for Loneliness  Goal: Demonstrate positive use of time alone when socialization is not possible  Outcome: Ongoing     Problem: Fatigue  Goal: Verbalize increase energy and improved vitality  Outcome: Ongoing     Problem: Patient Education: Go to Patient Education Activity  Goal: Patient/Family Education  Outcome: Ongoing

## 2021-11-17 NOTE — CONSULTS
[facility] - Initial Consult Note COVID 19 Patient-Tele-visit  Today's Date and Time: 11/17/2021, 12:55 PM    Impression :     · COVID 19 Confirmed Infection  · Covid tests:  · 11/15/21 Positive  · Urinary retention  · Renal calculi  · Cholelithiasis  · DM II  · Essential HTN  · Atrial Flutter  · CKD IV  · CVA 2018  · Received COVID vaccines      Patient evaluated by Telemedicine. Requesting Institution: [facility]  Provider Institution: [address] [place] [address],[facility] [address] [place], [address] [facility]: Ms Gagandeep Rolon, APRN- IM    Recommendations:   · Antibiotic treatment:  · Obtain urine culture  · Covid Rx:  · Clinical Research will approach patient to explore if he qualifies for any of the COVID 19 treatment protocols. · Remdesivir-contraindicated with renal insufficiency  · Solu-medrol initiated 11/16/21  · Actemra-Not indicated at this time  · Monitor CRP      Medical Decision Making/Summary/Discussion:11/17/2021     · Patient admitted with suspected COVID 19 infection  · Covid test confirmed positive. Infection Control Recommendations   · Universal Precautions  · Airborne isolation  · Droplet Isolation    Antimicrobial Stewardship Recommendations     · Discontinuation of therapy  Coordination of Outpatient Care:   · Estimated Length of IV antimicrobials:TBD  · Patient will need Midline Catheter Insertion: TBD  · Patient will need PICC line Insertion: No  · Patient will need: Home IV , [place],  SNF,  LTAC:TBD  · Patient will need outpatient wound care:No    Chief complaint/reason for consultation:   · Concern for COVID infection      History of Present Illness:   Maggi Morton is a 80y.o.-year-old male who was initially admitted on 11/15/2021. Patient seen at the request of Dr. Aby Simpson:    Patient presented through ER with complaints of increased shortness of breath worsening over the past six weeks.  He received his Covid vaccines. He was treated with antibiotics without relief. The patient's HR was noted to be in the 140's and an EKG revealed acute onset atrial flutter. Cardiology was consulted and he was started on an amiodarone gtt. Covid swab was positive. The patient was also found to have acute urinary retention, 6 mm left pole renal calculi and cholelithiasis. Echocardiogram revealed a possible acute small to moderate sized inter-atrial communication. WBC is increasing      Impression CXR 11/15/21   Significant peripheral and basal predominant pulmonary opacities diffusely   throughout both lungs.  This pattern can be seen in the setting of COVID-19   pneumonia, though this requires clinical correlation.  Sequelae of underlying   interstitial lung disease is also possible, though less favored.  Stability   is indeterminate in the absence of prior comparison imaging.       Small right pleural effusion.       Age-indeterminate fracture of the left anterolateral 9th rib.                 Increased risk 3-dose inadvertent series    Dose 1  01/29/2021 (COVID-19, Moderna, Primary or Immunocompromised, PF, 100mcg/0.5mL)      Dose 2  02/18/2021 (COVID-19, Moderna, Primary or Immunocompromised, PF, 100mcg/0.5mL) - Given too close to previous dose     02/26/2021 (COVID-19, Foreign Scrape, Primary or Immunocompromised, PF, 100mcg/0.5mL) - Given too close to previous dose     11/02/2021 (COVID-19, Pfizer, PF, 30mcg/0.3mL)      Booster dose  05/02/2022 - Dose Forecast         Patient admitted because of concerns with COVID 19.    CURRENT EVALUATION : 11/17/2021    Afebrile  VS stable    Patient exhibiting respiratory distress. No  Respiratory secretions:     Patient receiving supplemental oxygen. No  RR: 19  02 sat: 97      % FIO2:   PEEP:      QTc:       NEWS Score: 0-4 Low risk group; 5-6: Medium risk group; 7 or above: High risk group  Parameters 3 2 1 0 1 2 3   Age    < 65   ? 65   RR ? 8  9-11 12-20  21-24 ? 25   O2 Sats ?  [address] 92-93 94-95 ? 96      Suppl O2  Yes  No      SBP ? 90  101-110 111-219   ? 220   HR ? 40  41-50 51-90  111-130 ? 131   Consciousness    Alert   Drowsiness, lethargy, or confusion   Temperature ? 35.0 C (95.0 F)  35.1-36.0 C 95.1-96.9 F 36.1-38.0 C 97.0-100.4 F 38.1-39.0 C 100.5-102.3 F ? 39.1 C ? 102.4 F      NEWS Score:   11/17/21: 3 Low risk    Overall Daily Picture:    Improving      Presence of secondary bacterial Infection:    Possible   Additional antibiotics:     Labs, X rays reviewed: 11/17/2021    BUN:85  Cr:3.0  BNP: 52429    WBC:11.7-->16.2  Hb:10  Plat: 193    Absolute Neutrophils:14.28  Absolute Lymphocytes:1.03  Neutrophil/Lymphocyte Ratio: 13.8 high risk    CRP:pending  Ferritin:  LDH:     Pro Calcitonin:      Cultures:  Urine:  · 11/17: Pending  Blood:  ·   Sputum :  ·   Wound:       CXR:   11/16/21      CAT:      Discussed with patient, RN, CC, IM. I have personally reviewed the past medical history, past surgical history, medications, social history, and family history, and I have updated the database accordingly.   Past Medical History:     Past Medical History:   Diagnosis Date    Acute and chronic respiratory failure with hypoxia ([facility] [address] 11/16/2021    CAD (coronary artery disease)     Cataracts, bilateral     Glaucoma     Hyperlipidemia     Hypertension     TIA (transient ischemic attack)     Type II or unspecified type diabetes mellitus without mention of complication, not stated as uncontrolled        Past Surgical  History:     Past Surgical History:   Procedure Laterality Date    CATARACT REMOVAL WITH IMPLANT Bilateral 11/219/2013 and 12/3/2013    CORONARY ARTERY BYPASS GRAFT  2000    INGUINAL HERNIA REPAIR Bilateral 06/19/12       Medications:      atorvastatin  40 mg Oral Nightly    clopidogrel  75 mg Oral Daily    furosemide  40 mg Oral Daily    glipiZIDE  5 mg Oral BID AC    ramipril  2.5 mg Oral QAM    sodium chloride flush  5-40 mL IntraVENous 2 times per day    methylPREDNISolone  40 mg IntraVENous Q12H    vitamin D  20,000 Units Oral Weekly    ascorbic acid  2,000 mg Oral BID    insulin lispro  0-18 Units SubCUTAneous TID WC    insulin lispro  0-9 Units SubCUTAneous Nightly    insulin glargine  20 Units SubCUTAneous BID    famotidine  20 mg Oral BID    zinc sulfate  100 mg Oral Daily    ipratropium-albuterol  1 ampule Inhalation 4x daily    metoprolol succinate  25 mg Oral Daily    apixaban  2.5 mg Oral BID    amiodarone  200 mg Oral BID       Social History:     Social History     Socioeconomic History    Marital status:      Spouse name: Not on file    Number of children: Not on file    Years of education: Not on file    Highest education level: Not on file   Occupational History    Not on file   Tobacco Use    Smoking status: Never Smoker    Smokeless tobacco: Never Used   Substance and Sexual Activity    Alcohol use: Yes     Comment: very very very seldom    Drug use: No    Sexual activity: Not on file   Other Topics Concern    Not on file   Social History Narrative    Not on file     Social Determinants of Health     Financial Resource Strain:     Difficulty of Paying Living Expenses: Not on file   Food Insecurity:     Worried About Running Out of Food in the Last Year: Not on file    Mera of Food in the Last Year: Not on file   Transportation Needs:     Lack of Transportation (Medical): Not on file    Lack of Transportation (Non-Medical):  Not on file   Physical Activity:     Days of Exercise per Week: Not on file    Minutes of Exercise per Session: Not on file   Stress:     Feeling of Stress : Not on file   Social Connections:     Frequency of Communication with Friends and Family: Not on file    Frequency of Social Gatherings with Friends and Family: Not on file    Attends Jainism Services: Not on file    Active Member of Clubs or Organizations: Not on file    Attends Club or Organization Meetings: Not on file    Marital Status: Not on file   Intimate Partner Violence:     Fear of Current or Ex-Partner: Not on file    Emotionally Abused: Not on file    Physically Abused: Not on file    Sexually Abused: Not on file   Housing Stability:     Unable to Pay for Housing in the Last Year: Not on file    Number of [place] in the Last Year: Not on file    Unstable Housing in the Last Year: Not on file       Family History:     Family History   Problem Relation Age of Onset    Heart Disease Father     Heart Disease Brother         Allergies:   Patient has no known allergies. Review of Systems:       Constitutional: No fevers or chills. No systemic complaints  Head: No headaches  Eyes: No double vision or blurry vision. No conjunctival inflammation. ENT: No sore throat or runny nose. . No hearing loss, tinnitus or vertigo. Cardiovascular: No chest pain or palpitations. No Shortness of breath. No SPEARS  Lung: No Shortness of breath or cough. No sputum production  Abdomen: No nausea, vomiting, diarrhea, or abdominal pain. Algis Broaden No cramps. Genitourinary: No increased urinary frequency, or dysuria. No hematuria. No suprapubic or CVA pain  Musculoskeletal: No muscle aches or pains. No joint effusions, swelling or deformities  Hematologic: No bleeding or bruising. Neurologic: No headache, weakness, numbness, or tingling. Integument: No rash, no ulcers. Psychiatric: No depression. Endocrine: No polyuria, no polydipsia, no polyphagia.     Physical Examination :     Patient Vitals for the past 8 hrs:   BP Temp Temp src Pulse Resp SpO2 Height   11/17/21 0900 (!) 110/53   79 24     11/17/21 0800 (!) 113/47   78 25 99 %    11/17/21 0700 (!) 114/50 97.8 °F (36.6 °C) Temporal 83 20 99 %    11/17/21 0634       5' 3\" (1.6 m)   11/17/21 0530    71 16 100 %    11/17/21 0515    70 16 100 %    11/17/21 0500 (!) 99/54   72 21 100 %      General Appearance: Awake, alert, and in no apparent distress  Head: Normocephalic, no trauma  Eyes: Pupils equal, round, reactive to light; sclera anicteric; conjunctivae pink. No embolic phenomena. ENT: Oropharynx clear, without erythema, exudate, or thrush. No tenderness of sinuses. Mouth/throat: mucosa pink and moist. No lesions. Dentition in good repair. Neck:Supple, without lymphadenopathy. Thyroid normal, No bruits. Pulmonary/Chest: Clear to auscultation, without wheezes, rales, or rhonchi. No dullness to percussion. Cardiovascular: Regular rate and rhythm without murmurs, rubs, or gallops. Abdomen: Soft, non tender. Bowel sounds normal. No organomegaly  All four Extremities: No cyanosis, clubbing, edema, or effusions. Neurologic: No gross sensory or motor deficits. Skin: Warm and dry with good turgor. No signs of peripheral arterial or venous insufficiency. No ulcerations. No open wounds. Medical Decision Making -Laboratory:   I have independently reviewed/ordered the following labs:    CBC with Differential:   Recent Labs     11/15/21  2115 11/17/21  0525   WBC 11.7* 16.2*   HGB 10.4* 10.0*   HCT 32.7* 30.9*    193   LYMPHOPCT 9* 6*   MONOPCT 10 5     BMP:   Recent Labs     11/15/21  2115 11/17/21  0525   * 132*   K 4.5 5.0   CL 98 99   CO2 18* 18*   BUN 55* 85*   CREATININE 2.60* 3.00*     Hepatic Function Panel:   Recent Labs     11/15/21  2115 11/17/21  0525   PROT 7.2 6.2*   LABALBU 3.1* 2.7*   BILITOT 0.77 0.33   ALKPHOS 114 128   ALT 27 25   AST 42* 40*     No results for input(s): RPR in the last 72 hours. No results for input(s): HIV in the last 72 hours. No results for input(s): BC in the last 72 hours.   Lab Results   Component Value Date    MUCUS NOT REPORTED 02/11/2021    RBC 3.16 11/17/2021    TRICHOMONAS NOT REPORTED 02/11/2021    WBC 16.2 11/17/2021    YEAST NOT REPORTED 02/11/2021    TURBIDITY CLEAR 02/11/2021     Lab Results   Component Value Date    CREATININE 3.00 11/17/2021    GLUCOSE 153 11/17/2021    GLUCOSE 124 05/17/2012 Medical Decision Making-Imaging:     Narrative   EXAMINATION:   ONE XRAY VIEW OF THE CHEST       11/15/2021 6:06 pm       COMPARISON:   None.       HISTORY:   ORDERING SYSTEM PROVIDED HISTORY: short of breath   TECHNOLOGIST PROVIDED HISTORY:   short of breath       FINDINGS:   There are diffuse peripheral and basal pulmonary opacities bilaterally of   indeterminate stability in the absence of prior comparison imaging.  Small   right pleural effusion.  No pneumothorax.  Cardiomediastinal contours are   within normal limits for size with prior CABG and a tortuous, atherosclerotic   aorta.  Degenerative changes of the spine and shoulders.  Age-indeterminate   fracture of the anterolateral left 9th rib.           Impression   Significant peripheral and basal predominant pulmonary opacities diffusely   throughout both lungs.  This pattern can be seen in the setting of COVID-19   pneumonia, though this requires clinical correlation.  Sequelae of underlying   interstitial lung disease is also possible, though less favored.  Stability   is indeterminate in the absence of prior comparison imaging.       Small right pleural effusion.       Age-indeterminate fracture of the left anterolateral 9th rib.             Narrative   EXAMINATION:   RETROPERITONEAL ULTRASOUND OF THE KIDNEYS AND URINARY BLADDER       11/17/2021       COMPARISON:   None       HISTORY:   ORDERING SYSTEM PROVIDED HISTORY: Rule out obstructive uropathy.    TECHNOLOGIST PROVIDED HISTORY:       Rule out obstructive uropathy.       54-year-old male; rule out obstructive uropathy       FINDINGS:       Kidneys:       Exam limited due to rib shadowing and difficulty breathing.       Right kidney measures 9.1 x 4.9 x 4.9 cm.  Right renal cortical thickness   measures 1.1 cm.       Left kidney measures 9.6 x 4.4 x 5.0 cm.  Left renal cortical thickness   measures 8 mm.       Color flow projects over the bilateral renal parenchyma.       No hydronephrosis.  No perinephric fluid.       Gross preservation of the bilateral corticomedullary differentiation.       Echogenic focus with posterior shadowing measuring 6 mm at the midpole left   kidney consistent with a renal calculus.       Gallstones incidentally noted within the gallbladder with posterior acoustic   shadowing and image 56.           Bladder:       Urinary bladder measures 7.5 by 6.0 x 7.2 cm for a bladder volume of 229 mL. Urinary bladder wall thickness measures 3 mm.       Postvoid urinary bladder measures 5.1 x 6.2 x 6.3 cm for a postvoid urinary   bladder volume of 105 mL.       Left ureteral jet is visualized.  Nonvisualization of the right ureteral jet.           Impression   1. Limited exam.  Large postvoid residual.  Nonvisualization of the right   ureteral jet. 2. No hydronephrosis. 3. 6 mm calculus at the midpole left kidney. 4. Cholelithiasis.         Summary  Global left ventricular systolic function is difficult to assess due to the  patients rate in the 120's but appears preserved with an estimated ejection  fraction of 55%. Mildly increased left ventricular wall thickness with a normal left  ventricular cavity size. Left atrium is normal in size. Evidence of a small to moderate inter-atrial communication is seen by color  Doppler imaging with an intermittent left to right shunt. This appears most  consistent with a patent foramen ovale (PFO) vs a small ASD. Bowing intra-atrial septal motion consistent with an atrial septal aneurysm  is seen. The clinical significant of this finding is unknown, but has been  associated with an increased risk of TIA's and strokes. Mild to moderate aortic leaflets calcification without restriction of  motion. Mild aortic insufficiency was seen. Mild pulmonary hypertension with an estimated right ventricular systolic  pressure of 30 mmHg.   Mild to moderate tricuspid regurgitation.     Compared to the previous study of 3/27/18, the patient now has evidence of a  small to moderate sized inter-atrial communication. Consider additional  testing by transesophageal echocardiography (PATT) if clinically indicated,  especially if this might . Clinical correlation required.       Medical Decision [place]-[place]:       Medical Decision Making-Other:     Note:  · Labs, medications, radiologic studies were reviewed with personal review of films  · Large amounts of data were reviewed  · Discussed with nursing Staff, Discharge planner  · Infection Control and Prevention measures reviewed  · All prior entries were reviewed  · Administer medications as ordered  · Prognosis: Guarded  · Discharge planning reviewed      Thank you for allowing us to participate in the care of this patient. Please call with questions. Jimmie Stallings APRN - CNP     ATTESTATION:    I have discussed the case, including pertinent history and exam findings with the APRN. I have evaluated the  History, physical findings and pictures of the patient and the key elements of the encounter have been performed by me. I have reviewed the laboratory data, other diagnostic studies and discussed them with the APRN. I have updated the medical record where necessary. I agree with the assessment, plan and orders as documented by the APRN.     MD Osito.      Pager: [phone] - Office: [phone]

## 2021-11-17 NOTE — PROGRESS NOTES
PT converted to SR around 2110. PT has been hypotensive SBP in the 80's and 90's. Dr Elvie Sood was contacted and updated. Ordered to stop amiodarone drip and start on amiodarone 200mg po BID start tonight.

## 2021-11-17 NOTE — PROGRESS NOTES
Covid-19  Subjective  Subjective: Pt has no reports of pain. General Comment  Comments: Pt supine in bed upon arrival. Pt agreeable to OT evaluation. Social/Functional History  Social/Functional History  Lives With: Spouse  Type of Home: House  Home Layout: Two level  Home Access: Stairs to enter with rails  Entrance Stairs - Number of Steps: 25  Entrance Stairs - Rails: Right  Bathroom Shower/Tub: Walk-in shower, Shower chair with back  Bathroom Toilet: Standard  Bathroom Equipment: Shower chair  Home Equipment: Walker-Lift, Rolling walker  Receives Help From: Family  ADL Assistance: Independent  Homemaking Assistance: Independent  Homemaking Responsibilities: Yes  Ambulation Assistance: Independent  Transfer Assistance: Independent  Active : Yes  Mode of Transportation: Car       Objective   Vision: Impaired  Vision Exceptions: Wears glasses at all times  Hearing: Exceptions to [facility]  Hearing Exceptions: Bilateral hearing aid    Orientation  Overall Orientation Status: Within Functional Limits     Balance  Sitting Balance: Stand by assistance  Standing Balance: Contact guard assistance  Functional Mobility  Functional - Mobility Device: No device  Activity: Other  Assist Level: Contact guard assistance  Functional Mobility Comments: Pt demo unsteady gait and fair postural control. Pt demo one LOB when negotiating to bedside chair. Pt would benefit from use of RW.   ADL  Feeding: Setup  Grooming: Contact guard assistance  UE Bathing: Contact guard assistance  LE Bathing: Contact guard assistance  UE Dressing: Contact guard assistance  LE Dressing: Contact guard assistance  Toileting: Contact guard assistance        Bed mobility  Rolling to Right: Minimal assistance  Supine to Sit: Minimal assistance  Scooting: Minimal assistance  Transfers  Sit to stand: Contact guard assistance  Stand to sit: Contact guard assistance     Cognition  Overall Cognitive Status: WFL  Perception  Overall Perceptual Status: [facility] LUE AROM (degrees)  LUE AROM : WFL  Left Hand AROM (degrees)  Left Hand AROM: WFL  RUE AROM (degrees)  RUE AROM : WFL  Right Hand AROM (degrees)  Right Hand AROM: WFL  LUE Strength  Gross LUE Strength: WFL  RUE Strength  Gross RUE Strength: WFL                   Plan   Plan  Times per week: 7x  Times per day: Daily  Current Treatment Recommendations: Strengthening, ROM, Safety Education & Training, Patient/Caregiver Education & Training, Self-Care / ADL, Endurance Training, Functional Mobility Training    G-Code     OutComes Score                                                  AM-PAC Score        AM-[facility] Inpatient Daily Activity Raw Score: 18 (11/17/21 1106)  AM-PAC Inpatient ADL T-Scale Score : 38.66 (11/17/21 1106)  ADL Inpatient CMS 0-100% Score: 46.65 (11/17/21 1106)  ADL Inpatient CMS G-Code Modifier : CK (11/17/21 1106)    Goals  Short term goals  Short term goal 1: Patient to complete self care routine c SUP to ensure safe return home. Short term goal 2: Patient to engage in 15 minutes of ther ex/ther act s significant shortness of breath to improve strength as well as activity tolerance for self care tasks. Short term goal 3: Patient to tolerate standing >7' while participating in functional task of choice to improve standing tolerane, balance and safety during ADL, mobility and transfers. Short term goal 4: Patient to be educated on d/c folder, AE/DME, and general safety to ensure safe return home.        Therapy Time   Individual Concurrent Group Co-treatment   Time In [address] [place] [address]         Time Out 1049         Minutes [address]

## 2021-11-17 NOTE — PLAN OF CARE
Problem: Falls - Risk of:  Goal: Will remain free from falls  Description: Will remain free from falls  11/17/2021 0817 by Lindsey Dempsey RN  Outcome: Ongoing     Problem: Falls - Risk of:  Goal: Absence of physical injury  Description: Absence of physical injury  11/17/2021 0817 by Lindsey Dempsey RN  Outcome: Ongoing     Problem: Airway Clearance - Ineffective  Goal: Achieve or maintain patent airway  11/17/2021 0817 by Lindsey Dempsey RN  Outcome: Ongoing     Problem: Gas Exchange - Impaired  Goal: Absence of hypoxia  11/17/2021 0817 by Lindsey Dempsey RN  Outcome: Ongoing     Problem: Gas Exchange - Impaired  Goal: Promote optimal lung function  11/17/2021 0817 by Lindsey Dempsey RN  Outcome: Ongoing     Problem: Breathing Pattern - Ineffective  Goal: Ability to achieve and maintain a regular respiratory rate  11/17/2021 0817 by Lindsey Dempsey RN  Outcome: Ongoing     Problem: Body Temperature -  Risk of, Imbalanced  Goal: Ability to maintain a body temperature within defined limits  Outcome: Ongoing     Problem: Body Temperature -  Risk of, Imbalanced  Goal: Will regain or maintain usual level of consciousness  11/17/2021 0817 by Lindsey Dempsey RN  Outcome: Ongoing     Problem:  Body Temperature -  Risk of, Imbalanced  Goal: Complications related to the disease process, condition or treatment will be avoided or minimized  11/17/2021 0817 by Lindsey Dempsey RN  Outcome: Ongoing     Problem: Isolation Precautions - Risk of Spread of Infection  Goal: Prevent transmission of infection  11/17/2021 0817 by Lindsey Dempsey RN  Outcome: Ongoing     Problem: Nutrition Deficits  Goal: Optimize nutritional status  11/17/2021 0817 by Lindsey Dempsey RN  Outcome: Ongoing     Problem: Risk for Fluid Volume Deficit  Goal: Maintain normal heart rhythm  11/17/2021 0817 by Lindsey Dempsey RN  Outcome: Ongoing     Problem: Risk for Fluid Volume Deficit  Goal: Maintain absence of muscle cramping  11/17/2021 0817 by Lindsey Dempsey

## 2021-11-17 NOTE — PROGRESS NOTES
Dr. Conrado Hernandez at patients bedside at this time and echo being performed. Patients heart rate has not responded to IV Lopressor. Plans to move patient to the ICU and start on a cardiac drip.

## 2021-11-17 NOTE — H&P
Patient: Tho Rojo  : 10/31/1930  Date of Admission: 11/15/2021  Primary Care Physician: Cynthia Wilson  Today's Date: 2021    REASON FOR CONSULTATION: Cough (onset 6 weeks ago, states was prescribed an antibiotic and seemed to get a little better, states worse since Friday) and Shortness of Breath      HPI: Mr. Kalia Nelson is a 80 y.o. male with extensive medical history as outlined below who presented because of worsening shortness of breath for the past 3 days. Cardiology consulted because of atrial flutter with 2:1 conduction. Patient reported having history of coronary artery disease status post bypass surgery back in . He follows up at [facility], [place].  We will obtain records from his primary cardiologist tomorrow morning. He also has a history of TIA, hypertension and dyslipidemia. He also has type 2 diabetes and his hemoglobin A1c is 9.6% according to his most recent blood work. He presented with worsening shortness of breath and cough for 3 days. He tested positive for COVID-19 pneumonia. He has acute on chronic renal injury: Stage IV CKD superimposed on stage III chronic kidney disease. He denies any chest pain, pressure or tightness. Complaining of dry cough and progressive shortness of breath along with muscle pain and fatigue. Patient found to have atrial flutter leading to cardiac consultation. He was in normal sinus rhythm yesterday so anticoagulation and rhythm control was medication is appropriate. We ended up moving him to the ICU and start him on Eliquis 2.5 mg twice daily and amiodarone drip. Management of his COVID-19 pneumonia as per primary team and pulmonary. Cardiac biomarkers reviewed. Significantly elevated BNP and troponin level. We will get her follow-up blood work tomorrow morning.      Past Medical History:   Diagnosis Date    Acute and chronic respiratory failure with hypoxia ([facility] [facility] 2021    CAD (coronary artery disease)     Cataracts, bilateral     Glaucoma     Hyperlipidemia     Hypertension     TIA (transient ischemic attack)     Type II or unspecified type diabetes mellitus without mention of complication, not stated as uncontrolled        CURRENT ALLERGIES: Patient has no known allergies. REVIEW OF SYSTEMS: 10 systems were reviewed. Pertinent positives and negatives as above, all else negative. Past Surgical History:   Procedure Laterality Date    CATARACT REMOVAL WITH IMPLANT Bilateral 11/219/2013 and 12/3/2013    CORONARY ARTERY BYPASS GRAFT  2000    INGUINAL HERNIA REPAIR Bilateral 06/19/12    Social History:  Social History     Tobacco Use    Smoking status: Never Smoker    Smokeless tobacco: Never Used   Substance Use Topics    Alcohol use: Yes     Comment: very very very seldom    Drug use: No        CURRENT MEDICATIONS:  Outpatient Medications Marked as Taking for the 11/15/21 encounter [facility] Encounter)   Medication Sig Dispense Refill    furosemide (LASIX) 40 MG tablet Take 40 mg by mouth daily      glipiZIDE (GLUCOTROL) 5 MG tablet Take 5 mg by mouth 2 times daily (before meals)      metFORMIN (GLUCOPHAGE) 500 MG tablet Take 1,000 mg by mouth 2 times daily (with meals)       atorvastatin (LIPITOR) 40 MG tablet Take 1 tablet by mouth nightly 30 tablet 3    clopidogrel (PLAVIX) 75 MG tablet Take 1 tablet by mouth daily 30 tablet 3    ramipril (ALTACE) 2.5 MG capsule Take 2.5 mg by mouth every morning          FAMILY HISTORY: family history includes Heart Disease in his brother and father. PHYSICAL EXAM:   /61   Pulse 129   Temp 97.7 °F (36.5 °C) (Temporal)   Resp 18   Ht 5' 3\" (1.6 m)   Wt 145 lb (65.8 kg)   SpO2 95%   BMI 25.69 kg/m²  Body mass index is 25.69 kg/m². Constitutional: He is oriented to person, place, and time. He appears well-developed and well-nourished. In no acute distress. HEENT: Normocephalic and atraumatic. No JVD present. Carotid bruit is not present.  No mass and no thyromegaly present. No lymphadenopathy present. Cardiovascular: Normal rate, regular rhythm, normal heart sounds and intact distal pulses. Exam reveals no gallop and no friction rub. Short apical systolic murmur noted. Pulmonary/Chest:. Poor air entry bilaterally with diffuse medium size crackles Abdominal: Soft, non-tender. Bowel sounds and aorta are normal. He exhibits no organomegaly, mass or bruit. Extremities: No edema, cyanosis, or clubbing. Pulses are 2+ radial/carotid/dorsalis pedis and posterior tibial bilaterally. Neurological: He is alert and oriented to person, place, and time. No evidence of gross cranial nerve deficit. Coordination appeared normal.   Skin: Skin is warm and dry. There is no rash or diaphoresis. Psychiatric: He has a normal mood and affect.  His speech is normal and behavior is normal.      MOST RECENT LABS ON RECORD:   Lab Results   Component Value Date    WBC 11.7 (H) 11/15/2021    HGB 10.4 (L) 11/15/2021    HCT 32.7 (L) 11/15/2021     11/15/2021    CHOL 113 08/27/2021    TRIG 138 08/27/2021    HDL 29 (L) 08/27/2021    ALT 27 11/15/2021    AST 42 (H) 11/15/2021     (L) 11/15/2021    K 4.5 11/15/2021    CL 98 11/15/2021    CREATININE 2.60 (H) 11/15/2021    BUN 55 (H) 11/15/2021    CO2 18 (L) 11/15/2021    PSA 0.74 02/21/2018    INR 1.1 11/15/2021    LABA1C 9.5 (H) 08/27/2021       ASSESSMENT:  Patient Active Problem List    Diagnosis Date Noted    Acute and chronic respiratory failure with hypoxia ([address] 11/16/2021    Atrial flutter ([address] 11/16/2021    Stage 4 chronic kidney disease ([address] 11/16/2021    CAD (coronary artery disease)     COVID-19 virus infection 11/15/2021    Cerebral infarction due to embolism of right middle cerebral artery ([address] 03/28/2018    Left leg weakness 03/28/2018    Falling 03/28/2018    Stroke, lacunar ([address] 03/28/2018    Hypertension 03/27/2018    Type 2 diabetes mellitus without complication, without long-term current use of insulin ([facility] 03/27/2018    C[facility] accident [facility] 03/26/2018    Right inguinal hernia 04/02/2014       PLAN:  Patient with extensive cardiovascular and cerebrovascular history as outlined in HPI. History of coronary artery disease status post CABG back in 2000. History of TIA. Hypertension, dyslipidemia and type 2 diabetes. Patient is admitted with COVID-19 pneumonia and acute hypoxemic respiratory failure in addition to acute on chronic kidney injury. Patient developed atrial flutter with 2-1 conduction. He was in sinus tachycardia yesterday. Giving known onsets atrial flutter we decided proceed with rhythm control. I moved him to the ICU, and started him on Eliquis 2.5 mg twice daily and started on amiodarone drip. We will may consider cardioversion if his does not convert to normal sinus rhythm spontaneously. I did explain to him that the prognosis is poor given multiorgan failure but patient said we should do what ever were able to do to help him out. He is not interested and comfort care and continues to be full code. We will discuss this further with him and his family in the next 24 hours. Management of diabetes and COVID-19 pneumonia as per primary team.    Follow-up repeat troponin, ECG, lipid profile, CK-MB and BNP tomorrow morning. I will review the echo images by myself tomorrow morning. Once again, thank you for allowing me to participate in this patients care. Please do not hesitate to contact me could I be of further assistance. Sincerely,  Kalyn Quiles MD, MS, F.A.C.C.   [facility]) [facility], [address], [facility], [address] [facility]  Phone: [phone], Fax: [phone]      Based on the patients current medical conditions, I believe the risk of significant morbidity and mortality is: High

## 2021-11-17 NOTE — PROGRESS NOTES
INTENSIVE CARE UNIT   APRN - Progress Note    Patient - Hiwot Hayes  Date of Admission -  11/15/2021  8:38 PM  Date of Evaluation -  2021  Hospital Day - 2      SUBJECTIVE:     The Hiwot Hayes is a 80 y.o. male who is seen for follow up in the ICU. Per nursing report and notes, overnight events include: no significant events. He sitting up in chair alert oriented no acute distress. Patient denies shortness of breath or chest pain. Patient is currently in normal sinus rhythm and converted. He is off all oxygen. ROS:   Constitutional: negative  for fevers, and negative for chills. Respiratory: negative for shortness of breath, negative for cough, and negative for wheezing  Cardiovascular: negative for chest pain, and negative for palpitations  Gastrointestinal: negative for abdominal pain, negative for nausea,negative for vomiting, negative for diarrhea, and negative for constipation    All other systems were reviewed with the patient and are negative unless otherwise stated in HPI.       OBJECTIVE:     VITAL SIGNS:  Patient Vitals for the past 8 hrs:   BP Temp Temp src Pulse Resp SpO2 Height   21 0900 (!) 110/53   79 24     21 0800 (!) 113/47   78 25 99 %    21 0700 (!) 114/50 97.8 °F (36.6 °C) Temporal 83 20 99 %    21 0634       5' 3\" (1.6 m)         Temp: 97.8 °F (36.6 °C)  Temp range:    Temp  Av.5 °F (36.4 °C)  Min: 96.8 °F (36 °C)  Max: 98.2 °F (36.8 °C)    BP: (!) 110/53  BP Range:      Systolic (80QTP), ALU:63 , Min:57 , XSH:966      Diastolic (73SWF), UJZ:80, Min:36, Max:72    Pulse: 79  Pulse Range:    Pulse  Av.9  Min: 70  Max: 143    Resp: 24  Resp Range:   Resp  Av.2  Min: 16  Max: 33    SpO2: 99 % on room air  SpO2 range:   SpO2  Av.9 %  Min: 89 %  Max: 100 %    Weight  Wt Readings from Last 3 Encounters:   11/15/21 145 lb (65.8 kg)   19 158 lb (71.7 kg)   18 166 lb 6.4 oz (75.5 kg)     Body mass index is 25.69 kg/m². 24HR INTAKE/OUTPUT:      Intake/Output Summary (Last 24 hours) at 11/17/2021 1411  Last data filed at 11/17/2021 1150  Gross per 24 hour   Intake 509 ml   Output 1000 ml   Net -491 ml     Date 11/17/21 0000 - 11/17/21 2359   Shift 8917-2408 1489-2901 8305-3943 24 Hour Total   INTAKE   I.V.(mL/kg/hr) 509(1)   509   Shift Total(mL/kg) 509(7.7)   509(7.7)   OUTPUT   Urine(mL/kg/hr) 200(0.4) 425  625   Shift Total(mL/kg) 200(3) 425(6.5)  625(9.5)   Weight (kg) 65.8 65.8 65.8 65.8         PHYSICAL EXAM:  GEN:    Awake and following commands:     [] No   [] Yes  MENTAL STATUS: alert and oriented x3. DISTRESS: Acute respiratory distress:       [] No   [] Yes  EYES:  EOMI, pupils equal   NECK: Supple. No lymphadenopathy. No carotid bruit  CVS:    irregularly irregular, no audible murmur  PULM:  Diminished with fine basilar rales, no acute respiratory distress  ABD:    Bowels sounds normal.  Abdomen is soft. No distention. no tenderness to palpation. EXT:   no edema bilaterally . No calf tenderness. NEURO: Moves all extremities. Motor and sensory are grossly intact  SKIN:  No rashes. No skin lesions.           MEDICATIONS:  Scheduled Meds:   atorvastatin  40 mg Oral Nightly    clopidogrel  75 mg Oral Daily    furosemide  40 mg Oral Daily    glipiZIDE  5 mg Oral BID AC    ramipril  2.5 mg Oral QAM    sodium chloride flush  5-40 mL IntraVENous 2 times per day    methylPREDNISolone  40 mg IntraVENous Q12H    vitamin D  20,000 Units Oral Weekly    ascorbic acid  2,000 mg Oral BID    insulin lispro  0-18 Units SubCUTAneous TID WC    insulin lispro  0-9 Units SubCUTAneous Nightly    insulin glargine  20 Units SubCUTAneous BID    famotidine  20 mg Oral BID    zinc sulfate  100 mg Oral Daily    ipratropium-albuterol  1 ampule Inhalation 4x daily    metoprolol succinate  25 mg Oral Daily    apixaban  2.5 mg Oral BID    amiodarone  200 mg Oral BID     Continuous Infusions:   sodium chloride 75 mL/hr at 11/17/21 0808    sodium chloride      dextrose       PRN Meds:   sodium chloride flush, 5-40 mL, PRN  sodium chloride, 25 mL, PRN  ondansetron, 4 mg, Q8H PRN   Or  ondansetron, 4 mg, Q6H PRN  polyethylene glycol, 17 g, Daily PRN  acetaminophen, 650 mg, Q6H PRN   Or  acetaminophen, 650 mg, Q6H PRN  glucose, 15 g, PRN  dextrose, 12.5 g, PRN  glucagon (rDNA), 1 mg, PRN  dextrose, 100 mL/hr, PRN            VASOPRESSORS:    [] No      [] Yes  [] Levophed      [] Dopamine     [] Vasopressin      [] Dobutamine     [] Phenylephrine     [] Epinephrine        DATA:  Complete Blood Count:   Recent Labs     11/15/21  2115 11/17/21  0525   WBC 11.7* 16.2*   RBC 3.34* 3.16*   HGB 10.4* 10.0*   HCT 32.7* 30.9*   MCV 97.9 97.8   RDW 12.1 12.2    193   SEGS 81* 88*   NEUTROABS 9.41* 14.28*   LYMPHOPCT 9* 6*   LYMPHSABS 1.05* 1.03*   MONOPCT 10 5   EOSRELPCT 0* 0*   BASOPCT 0 0   IMMGRAN 0 1*        Recent Blood Glucose:   Recent Labs     11/15/21  2115 11/17/21  0525   GLUCOSE 423* 153*        Comprehensive Metabolic Profile:   Recent Labs     11/15/21  2115 11/17/21  0525   BUN 55* 85*   CREATININE 2.60* 3.00*   * 132*   K 4.5 5.0   CL 98 99   CALCIUM 8.4* 7.8*   ANIONGAP 16 15   CO2 18* 18*   PROT 7.2 6.2*   LABALBU 3.1* 2.7*   BILITOT 0.77 0.33   ALKPHOS 114 128   AST 42* 40*   ALT 27 25        Urinalysis:   Lab Results   Component Value Date    NITRU NEGATIVE 02/11/2021    COLORU YELLOW 02/11/2021    PHUR 5.5 02/11/2021    WBCUA 0 TO 2 02/11/2021    RBCUA None 02/11/2021    MUCUS NOT REPORTED 02/11/2021    TRICHOMONAS NOT REPORTED 02/11/2021    YEAST NOT REPORTED 02/11/2021    BACTERIA TRACE 02/11/2021    SPECGRAV 1.020 02/11/2021    LEUKOCYTESUR NEGATIVE 02/11/2021    UROBILINOGEN Normal 02/11/2021    BILIRUBINUR NEGATIVE 02/11/2021    GLUCOSEU 3+ 02/11/2021    KETUA NEGATIVE 02/11/2021    AMORPHOUS NOT REPORTED 02/11/2021       HgBA1c:    Lab Results   Component Value Date    LABA1C 9.5 08/27/2021 TSH:    Lab Results   Component Value Date    TSH 1.12 11/17/2021       Lactic Acid:   Lab Results   Component Value Date    LACTA 2.2 11/15/2021        High Sensitivity Troponin:  Recent Labs     11/15/21  2115 11/16/21  1855 11/17/21  0525   TROPHS 151* 117* 141*     Pro-BNP:  Lab Results   Component Value Date    PROBNP 11,739 (H) 11/17/2021     D-Dimer:  Lab Results   Component Value Date    DDIMER 2.58 (H) 11/16/2021     PT/INR:    Lab Results   Component Value Date    PROTIME 16.3 11/17/2021    INR 1.3 11/17/2021     PTT:    Lab Results   Component Value Date    APTT 37.3 11/17/2021       CRP: No results for input(s): CRP in the last 72 hours. ABGs:   No results found for: PHART, PH, SKB5QWC, PCO2, PO2ART, PO2, AQP6KGU, HCO3, BEART, BE, THGBART, THB, KQW8DOG, P7UMAVTF, O2SAT, FIO2      Radiology/Imaging:  US RETROPERITONEAL COMPLETE   Final Result   1. Limited exam.  Large postvoid residual.  Nonvisualization of the right   ureteral jet. 2. No hydronephrosis. 3. 6 mm calculus at the midpole left kidney. 4. Cholelithiasis. XR CHEST PORTABLE   Final Result   Significant peripheral and basal predominant pulmonary opacities diffusely   throughout both lungs. This pattern can be seen in the setting of COVID-19   pneumonia, though this requires clinical correlation. Sequelae of underlying   interstitial lung disease is also possible, though less favored. Stability   is indeterminate in the absence of prior comparison imaging. Small right pleural effusion. Age-indeterminate fracture of the left anterolateral 9th rib.                ASSESSMENT / PLAN:     COVID-19 virus infection  · Continue current therapy  · Consults: Dr. Fadia Watson  · Remdesivir not indicated  · Appreciate infectious disease   · Continue vitamin C, D and zinc  · Trend labs  · VaccinatedJanuary, February booster in November 2021  · Acute respiratory failure with hypoxia  · Resolved  · Acapella  · PT OT  · Nebs  · CAD  · Continue Lipitor, Plavix, Lasix  · New onset atrial flutter  · Appreciate cardiology  · EKG2-1 conduction  · Converted  · Continue Lopressor  · Amiodarone drip completed  · Started on amiodarone 200 mg twice daily  · Continue Eliquis  · Type 2 diabetes  · POCT before meals and at bedtime  · High-dose sliding scale  · Continue Lantus  · Hold Glucophage due to CKD  · Continue glipizide  · Hypertension  · Continue Altace  · CKD  · Trend labs  · No remdesivir or Lovenox  · Nutrition status:   · Well developed, well nourished with no malnutrition  · Dietician consult initiated  · [] NPO [] TPN      [] Tube feed [] Clear liquid        [] Full liquid [] regular diet         [] Fluid restriction   [x] Diabetic diet   · ICU Prophylaxis:   · DVT: Eliquis   · Stress Ulcer: H2 Blocker   · High risk medications: none   · Disposition:    · Transfer out of ICU:  [x] yes [] no   · Discharge plan is pending      FRANKLIN Gonzalez CNP , RYANNP-C  11/17/2021  2:11 PM

## 2021-11-17 NOTE — PROGRESS NOTES
Spoke with Patient via telephone conversation this p.m. to discuss discharge planning. Patient is a 80year old , white male, admitted with a diagnosis of Covid 19 Virus infection. Patient is alert and oriented at this time, pleasant and cooperative with this assessment. States that he wishes to return home with his family following this hospitalization. Patient resides in [place] [facility] [place] with his wife and adult son. Patient uses a cane, walker and a shower chair at home for assistance. Has been independent with his ADL's prior to hospitalization, per his own report. Patient currently utilizing no outside resources or services. He drives himself and provides for his own transportation needs and son also drives and takes Patient and wife to their medical appointments. PCP is Dr. Tierney Odell. Patient has medical insurance and denies need for further assistance with the cost of his medications. Discharge plan is home with family when he is stable. Patient remains a 'Full Code' status and reports that he does have medical directives in place just not on file at the hospital.  Patient wishes for his wife to be his primary decision maker and then his son, Reyes Steiner. ACP note completed at this time as well. LSW to remain involved and monitor/assist with discharge planning as needs/concerns arise.     [address]  11/17/2021

## 2021-11-17 NOTE — CONSULTS
Olivia Ford am scribing for and in the presence of Lucila Green MD, F.A.C.C..    Patient: Katy Underwood  : 10/31/1930  Date of Admission: 11/15/2021  Primary Care Physician: Christos Méndez  Today's Date: 2021    REASON FOR CONSULTATION: Cough (onset 6 weeks ago, states was prescribed an antibiotic and seemed to get a little better, states worse since Friday) and Shortness of Breath      HPI: Mr. Abbey Jennings is a 80 y.o. male with extensive medical history as outlined below who presented because of worsening shortness of breath for the past 3 days. Cardiology consulted because of atrial flutter with 2:1 conduction. Patient reported having history of coronary artery disease status post bypass surgery back in . He follows up at [facility].  We will obtain records from his primary cardiologist tomorrow morning. He also has a history of TIA, hypertension and dyslipidemia. He also has type 2 diabetes and his hemoglobin A1c is 9.6% according to his most recent blood work. He presented with worsening shortness of breath and cough for 3 days. He tested positive for COVID-19 pneumonia. He has acute on chronic renal injury: Stage IV CKD superimposed on stage III chronic kidney disease. Patient was to ICU and started on amiodarone drip, he converted to normal sinus rhythm with amiodarone. I reviewed his blood work today, his kidney function is getting worse. Renal ultrasound showed large amount of residual volume in the bladder. Please consider nephrology consultation. When I saw him this afternoon, he was feeling well. Sitting on a chair requiring no oxygen and his heart rate and blood pressure were good.      Past Medical History:   Diagnosis Date    Acute and chronic respiratory failure with hypoxia ([address] 2021    CAD (coronary artery disease)     Cataracts, bilateral     Glaucoma     Hyperlipidemia     Hypertension     TIA (transient ischemic attack)     Type II or unspecified type diabetes mellitus without mention of complication, not stated as uncontrolled        CURRENT ALLERGIES: Patient has no known allergies. REVIEW OF SYSTEMS: 14 systems were reviewed. Pertinent positives and negatives as above, all else negative.      Past Surgical History:   Procedure Laterality Date    CATARACT REMOVAL WITH IMPLANT Bilateral 11/219/2013 and 12/3/2013    CORONARY ARTERY BYPASS GRAFT  2000    INGUINAL HERNIA REPAIR Bilateral 06/19/12    Social History:  Social History     Tobacco Use    Smoking status: Never Smoker    Smokeless tobacco: Never Used   Substance Use Topics    Alcohol use: Yes     Comment: very very very seldom    Drug use: No        CURRENT MEDICATIONS:  Outpatient Medications Marked as Taking for the 11/15/21 encounter [facility] Encounter)   Medication Sig Dispense Refill    furosemide (LASIX) 40 MG tablet Take 40 mg by mouth daily      glipiZIDE (GLUCOTROL) 5 MG tablet Take 5 mg by mouth 2 times daily (before meals)      metFORMIN (GLUCOPHAGE) 500 MG tablet Take 1,000 mg by mouth 2 times daily (with meals)       atorvastatin (LIPITOR) 40 MG tablet Take 1 tablet by mouth nightly 30 tablet 3    clopidogrel (PLAVIX) 75 MG tablet Take 1 tablet by mouth daily 30 tablet 3    ramipril (ALTACE) 2.5 MG capsule Take 2.5 mg by mouth every morning         atorvastatin  40 mg Oral Nightly    clopidogrel  75 mg Oral Daily    furosemide  40 mg Oral Daily    glipiZIDE  5 mg Oral BID AC    sodium chloride flush  5-40 mL IntraVENous 2 times per day    methylPREDNISolone  40 mg IntraVENous Q12H    vitamin D  20,000 Units Oral Weekly    ascorbic acid  2,000 mg Oral BID    insulin lispro  0-18 Units SubCUTAneous TID WC    insulin lispro  0-9 Units SubCUTAneous Nightly    insulin glargine  20 Units SubCUTAneous BID    famotidine  20 mg Oral BID    zinc sulfate  100 mg Oral Daily    ipratropium-albuterol  1 ampule Inhalation 4x daily    metoprolol succinate  25 mg Oral Daily    apixaban  2.5 mg Oral BID    amiodarone  200 mg Oral BID       FAMILY HISTORY: family history includes Heart Disease in his brother and father. PHYSICAL EXAM:   BP (!) 118/43   Pulse 78   Temp 97.6 °F (36.4 °C) (Temporal)   Resp 23   Ht 5' 3\" (1.6 m)   Wt 145 lb (65.8 kg)   SpO2 96%   BMI 25.69 kg/m²  Body mass index is 25.69 kg/m². Constitutional: He is oriented to person, place, and time. He appears well-developed and well-nourished. In no acute distress. HEENT: Normocephalic and atraumatic. No JVD present. Carotid bruit is not present. No mass and no thyromegaly present. No lymphadenopathy present. Cardiovascular: Normal rate, regular rhythm, normal heart sounds and intact distal pulses. Exam reveals no gallop and no friction rub. Short apical systolic murmur noted. Pulmonary/Chest:. Poor air entry bilaterally with diffuse medium size crackles Abdominal: Soft, non-tender. Bowel sounds and aorta are normal. He exhibits no organomegaly, mass or bruit. Extremities: No edema, cyanosis, or clubbing. Pulses are 2+ radial/carotid/dorsalis pedis and posterior tibial bilaterally. Neurological: He is alert and oriented to person, place, and time. No evidence of gross cranial nerve deficit. Coordination appeared normal.   Skin: Skin is warm and dry. There is no rash or diaphoresis. Psychiatric: He has a normal mood and affect.  His speech is normal and behavior is normal.      MOST RECENT LABS ON RECORD:   Lab Results   Component Value Date    WBC 16.2 (H) 11/17/2021    HGB 10.0 (L) 11/17/2021    HCT 30.9 (L) 11/17/2021     11/17/2021    CHOL 87 11/17/2021    TRIG 63 11/17/2021    HDL 27 (L) 11/17/2021    ALT 25 11/17/2021    AST 40 (H) 11/17/2021     (L) 11/17/2021    K 5.0 11/17/2021    CL 99 11/17/2021    CREATININE 3.00 (H) 11/17/2021    BUN 85 (H) 11/17/2021    CO2 18 (L) 11/17/2021    TSH 1.12 11/17/2021    PSA 0.74 02/21/2018    INR 1.3 11/17/2021    LABA1C 9.5 (H) 08/27/2021       ASSESSMENT:  Patient Active Problem List    Diagnosis Date Noted    [facility] malnutrition [facility] 11/17/2021    Acute and chronic respiratory failure with hypoxia ([facility] 11/16/2021    Atrial flutter ([facility] 11/16/2021    Stage 4 chronic kidney disease ([facility] 11/16/2021    ASHD (arteriosclerotic heart disease)     S/P CABG (coronary artery bypass graft)     COVID-19 virus infection 11/15/2021    Cerebral infarction due to embolism of right middle cerebral artery ([facility] 03/28/2018    Left leg weakness 03/28/2018    Falling 03/28/2018    Stroke, lacunar ([facility] 03/28/2018    Hypertension 03/27/2018    Type 2 diabetes mellitus without complication, without long-term current use of insulin ([facility] 03/27/2018    Cerebrovascular accident (CVA) ([facility] 03/26/2018    Right inguinal hernia 04/02/2014       PLAN:  Patient with extensive cardiovascular and cerebrovascular history as outlined in HPI. History of coronary artery disease status post CABG back in 2000. History of TIA. Hypertension, dyslipidemia and type 2 diabetes. Patient is admitted with COVID-19 pneumonia and acute hypoxemic respiratory failure in addition to acute on chronic kidney injury. Patient developed atrial flutter with 2-1 conduction. · New Onset Atrial Flutter: converted to normal sinus rhythm with amiodarone.  Beta Blocker: Continue Metoprolol tartrate (Lopressor) 25 mg bid.  Calcium Channel Blocker: Not indicated at this time.  Anti-Arrhythmic: amiodarone (Pacerone) 200 mg daily. Monitoring: Since they will being maintained on Amiodarone, I told them that we will need to closely monitor them for potential side effects. These include monitoring LFTs and TSH at least every 6 months as well as chest x-rays, pulmonary function tests, and eye exams at least yearly.   [id number] Score for Atrial Fibrillation Stroke Risk   Risk   Factors  Component Value   C CHF No 0   H HTN Yes 1   A2 Age >= 76 Yes,  (80 y.o.) 2   D DM Yes 1   S2 Prior Stroke/TIA No 0   V Vascular Disease No 0   A Age 74-69 No,  (80 y.o.) 0   Sc Sex male 0    [id number]  Score  4   Score last updated 78/39/23 1:37 AM EST  Click here for a link to the UpToDate guideline \"Atrial Fibrillation: Anticoagulation therapy to prevent embolization    Disclaimer: Risk Score calculation is dependent on accuracy of patient problem list and past encounter diagnosis.  Stroke Risk: CHADS2-VASc Score: 4/9 (4% stroke risk)   Anticoagulation: Continue Apixaban (Eliquis) 2.5 mg every 12 hours. · Acute on chronic kidney injury  · Ultrasound reviewed. · Blood pressures controlled. · Discontinue Altace. · Discontinue Lasix, currently euvolemic. · Nephrology consultation requested.  Atherosclerotic Heart Disease:   Antiplatelet Agent: Continue clopidogrel (Plavix): Plavix 75 mg daily.  Beta Blocker: Continue Metoprolol tartrate (Lopressor) 25 mg bid.  Anti-anginal medications: START nitroglycerin 0.4 mg tablets as needed for chest pain.  Cholesterol Reduction Therapy: Continue Atorvastatin (Lipitor) 40 mg daily.  Controlled.  LDL at goal.   Denies any chest pain.  Follow-up repeat ECG, troponin and BNP. Management of diabetes and COVID-19 pneumonia as per primary team.    Once again, thank you for allowing me to participate in this patients care. Please do not hesitate to contact me could I be of further assistance. Sincerely,  Mary Fuentes MD, MS, F.A.C.C. [facility], [address] [place] [address], [address]  Phone: [phone], Fax: [phone]      Based on the patients current medical conditions, I believe the risk of significant morbidity and mortality is: High    The documentation recorded by the scribe, accurately and completely reflects the services I personally performed and the decisions made by me. Mary Fuentes MD, F.A.C.C.  November 17, 2021

## 2021-11-17 NOTE — PROGRESS NOTES
Patient straight cathed at this time. Patient able to urinate 125 mL and straight cath was 250 mL yellow clear urine, sent urine down for culture at this time. Blood culture drawn as well.

## 2021-11-17 NOTE — ACP (ADVANCE CARE PLANNING)
Advance Care Planning     Advance Care Planning Activator (Inpatient)  Conversation Note      Date of ACP Conversation: 11/17/2021     Conversation Conducted with: Patient with Decision Making Capacity    ACP Activator: [address]:     Current Designated Health Care Decision Maker:     Primary Decision Maker: Shelton Martínez - [phone]    Secondary Decision Maker: Abdiel Gonzalez  Rosemarie - [phone]    Today we documented Decision Maker(s). The patient will provide ACP documents. Care Preferences    Ventilation: \"If you were in your present state of health and suddenly became very ill and were unable to breathe on your own, what would your preference be about the use of a ventilator (breathing machine) if it were available to you? \"      Would the patient desire the use of ventilator (breathing machine)?: yes    \"If your health worsens and it becomes clear that your chance of recovery is unlikely, what would your preference be about the use of a ventilator (breathing machine) if it were available to you? \"     Would the patient desire the use of ventilator (breathing machine)?: Yes   \"I want to go on as long as I can\"    Resuscitation  \"CPR works best to restart the heart when there is a sudden event, like a heart attack, in someone who is otherwise healthy. Unfortunately, CPR does not typically restart the heart for people who have serious health conditions or who are very sick. \"    \"In the event your heart stopped as a result of an underlying serious health condition, would you want attempts to be made to restart your heart (answer \"yes\" for attempt to resuscitate) or would you prefer a natural death (answer \"no\" for do not attempt to resuscitate)? \" yes       [] Yes   [x] No   Educated Patient / Estel Disha regarding differences between Advance Directives and portable DNR orders.     Length of ACP Conversation in minutes:      Franco Mathis Outcomes:  [x] ACP discussion completed  [] Existing advance directive reviewed with patient; no changes to patient's previously recorded wishes  [] New Advance Directive completed  [] Portable Do Not Rescitate prepared for Provider review and signature  [] POLST/POST/MOLST/MOST prepared for Provider review and signature      Follow-up plan:    [] Schedule follow-up conversation to continue planning  [] Referred individual to Provider for additional questions/concerns   [] Advised patient/agent/surrogate to review completed ACP document and update if needed with changes in condition, patient preferences or care setting    [x] This note routed to one or more involved healthcare providers

## 2021-11-17 NOTE — PROGRESS NOTES
Comprehensive Nutrition Assessment    Type and Reason for Visit:  Initial (missing nutrition screenings)    Nutrition Recommendations/Plan:  Encourage oral intakes    Nutrition Assessment:  Predicted suboptimal nutrient intakes r/t altered respiratory status, AEB covid. Hx of DM of poor control, exacerbated by current steroid use. Weight declines over time, some of which may be from poorer controlled diabetes, but also from a chronic decrease in PO intakes. Did lose sense of taste and preferring colder items for breakfast, per interview. Will initiate a nutrition supplement. On vit C, D and zinc. Renal on higher side of historical values. Malnutrition Assessment:  Malnutrition Status:  Mild malnutrition    Context:  Acute Illness     Findings of the 6 clinical characteristics of malnutrition:  Energy Intake:  1 - 75% or less of estimated energy requirements for 7 or more days  Weight Loss:  No significant weight loss     Body Fat Loss:  Unable to assess     Muscle Mass Loss:  Unable to assess    Fluid Accumulation:  No significant fluid accumulation     Strength:  Not Performed    Estimated Daily Nutrient Needs:  Energy (kcal):  5471-7411 (20-25); Weight Used for Energy Requirements:  Current     Protein (g):  73-85 (1.3-1.5); Weight Used for Protein Requirements:  Ideal        Fluid (ml/day):  1700; Method Used for Fluid Requirements:  1 ml/kcal      Nutrition Related Findings:  COLLINS in isolation      Wounds:  None       Current Nutrition Therapies:    ADULT DIET;  Regular; 3 carb choices (45 gm/meal)  ADULT ORAL NUTRITION SUPPLEMENT; Breakfast, Lunch, Dinner; Diabetic Oral Supplement    Anthropometric Measures:  · Height: 5' 3\" (160 cm)  · Current Body Weight: 145 lb (65.8 kg)   · Admission Body Weight: 145 lb (65.8 kg)    · Usual Body Weight: 158 lb (71.7 kg) (in 2019)     · Ideal Body Weight: 124 lbs; % Ideal Body Weight 116.9 %   · BMI: 25.7  · Adjusted Body Weight:  ; No Adjustment   · BMI Categories: Overweight (BMI 25.0-29. 9)       Nutrition Diagnosis:   · Predicted inadequate energy intake related to impaired respiratory function as evidenced by other (comment) (covid)    Lab Results   Component Value Date     (L) 11/17/2021    K 5.0 11/17/2021    CL 99 11/17/2021    CO2 18 (L) 11/17/2021    BUN 85 (H) 11/17/2021    CREATININE 3.00 (H) 11/17/2021    GLUCOSE 153 (H) 11/17/2021    CALCIUM 7.8 (L) 11/17/2021    PROT 6.2 (L) 11/17/2021    LABALBU 2.7 (L) 11/17/2021    BILITOT 0.33 11/17/2021    ALKPHOS 128 11/17/2021    AST 40 (H) 11/17/2021    ALT 25 11/17/2021    LABGLOM 20 (L) 11/17/2021    GFRAA 24 (L) 11/17/2021     Lab Results   Component Value Date    LABA1C 9.5 (H) 08/27/2021     Lab Results   Component Value Date     08/27/2021     Lab Results   Component Value Date    VITD25 28.8 (L) 10/13/2020     Nutrition Interventions:   Food and/or Nutrient Delivery:  Continue Current Diet, Start Oral Nutrition Supplement  Nutrition Education/Counseling:  No recommendation at this time   Coordination of Nutrition Care:  Continue to monitor while inpatient    Goals:  PO >75% meals and supplements       Nutrition Monitoring and Evaluation:   Behavioral-Environmental Outcomes:  None Identified   Food/Nutrient Intake Outcomes:  Food and Nutrient Intake, Supplement Intake  Physical Signs/Symptoms Outcomes:  Biochemical Data, Weight     Discharge Planning:     Too soon to determine     Electronically signed by Claudia Gray RD, LD on 11/17/21 at 7:16 AM EST    Contact: [phone]

## 2021-11-17 NOTE — PROGRESS NOTES
EKG completed at this time for patients increased heart rate. Calleen May, [address] [facility] notified of results.

## 2021-11-18 LAB
ABSOLUTE EOS #: 0 K/UL (ref 0–0.44)
ABSOLUTE IMMATURE GRANULOCYTE: 0.26 K/UL (ref 0–0.3)
ABSOLUTE LYMPH #: 1.72 K/UL (ref 1.1–3.7)
ABSOLUTE MONO #: 0.92 K/UL (ref 0.1–1.2)
ALBUMIN SERPL-MCNC: 2.9 G/DL (ref 3.5–5.2)
ALBUMIN/GLOBULIN RATIO: 0.9 (ref 1–2.5)
ALP BLD-CCNC: 103 U/L (ref 40–129)
ALT SERPL-CCNC: 36 U/L (ref 5–41)
ANION GAP SERPL CALCULATED.3IONS-SCNC: 18 MMOL/L (ref 9–17)
AST SERPL-CCNC: 47 U/L
BASOPHILS # BLD: 0 % (ref 0–2)
BASOPHILS ABSOLUTE: 0 K/UL (ref 0–0.2)
BILIRUB SERPL-MCNC: 0.34 MG/DL (ref 0.3–1.2)
BNP INTERPRETATION: ABNORMAL
BUN BLDV-MCNC: 88 MG/DL (ref 8–23)
BUN/CREAT BLD: 32 (ref 9–20)
C-REACTIVE PROTEIN: 63.2 MG/L (ref 0–5)
CALCIUM SERPL-MCNC: 8.2 MG/DL (ref 8.6–10.4)
CHLORIDE BLD-SCNC: 103 MMOL/L (ref 98–107)
CO2: 15 MMOL/L (ref 20–31)
CREAT SERPL-MCNC: 2.73 MG/DL (ref 0.7–1.2)
CULTURE: NO GROWTH
DIFFERENTIAL TYPE: ABNORMAL
EKG ATRIAL RATE: 85 BPM
EKG P-R INTERVAL: 154 MS
EKG Q-T INTERVAL: 400 MS
EKG QRS DURATION: 110 MS
EKG QTC CALCULATION (BAZETT): 476 MS
EKG R AXIS: -25 DEGREES
EKG T AXIS: 152 DEGREES
EKG VENTRICULAR RATE: 85 BPM
EOSINOPHILS RELATIVE PERCENT: 0 % (ref 1–4)
GFR AFRICAN AMERICAN: 27 ML/MIN
GFR NON-AFRICAN AMERICAN: 22 ML/MIN
GFR SERPL CREATININE-BSD FRML MDRD: ABNORMAL ML/MIN/{1.73_M2}
GFR SERPL CREATININE-BSD FRML MDRD: ABNORMAL ML/MIN/{1.73_M2}
GLUCOSE BLD-MCNC: 141 MG/DL (ref 74–100)
GLUCOSE BLD-MCNC: 212 MG/DL (ref 74–100)
GLUCOSE BLD-MCNC: 48 MG/DL (ref 70–99)
GLUCOSE BLD-MCNC: 74 MG/DL (ref 74–100)
GLUCOSE BLD-MCNC: 90 MG/DL (ref 74–100)
HCT VFR BLD CALC: 31.1 % (ref 40.7–50.3)
HEMOGLOBIN: 10.2 G/DL (ref 13–17)
HIGH SENSITIVE C-REACTIVE PROTEIN: 124.3 MG/L
HIGH SENSITIVE C-REACTIVE PROTEIN: 76.5 MG/L
IMMATURE GRANULOCYTES: 2 %
LYMPHOCYTES # BLD: 13 % (ref 24–43)
Lab: NORMAL
MCH RBC QN AUTO: 31.5 PG (ref 25.2–33.5)
MCHC RBC AUTO-ENTMCNC: 32.8 G/DL (ref 28.4–34.8)
MCV RBC AUTO: 96 FL (ref 82.6–102.9)
MONOCYTES # BLD: 7 % (ref 3–12)
MORPHOLOGY: NORMAL
NRBC AUTOMATED: 0 PER 100 WBC
PDW BLD-RTO: 12.2 % (ref 11.8–14.4)
PLATELET # BLD: 234 K/UL (ref 138–453)
PLATELET ESTIMATE: ABNORMAL
PMV BLD AUTO: 10.8 FL (ref 8.1–13.5)
POTASSIUM SERPL-SCNC: 4 MMOL/L (ref 3.7–5.3)
PRO-BNP: ABNORMAL PG/ML
PROSTATE SPECIFIC ANTIGEN: 1.37 UG/L
RBC # BLD: 3.24 M/UL (ref 4.21–5.77)
RBC # BLD: ABNORMAL 10*6/UL
SEG NEUTROPHILS: 78 % (ref 36–65)
SEGMENTED NEUTROPHILS ABSOLUTE COUNT: 10.3 K/UL (ref 1.5–8.1)
SODIUM BLD-SCNC: 136 MMOL/L (ref 135–144)
SPECIMEN DESCRIPTION: NORMAL
TOTAL PROTEIN: 6 G/DL (ref 6.4–8.3)
TROPONIN INTERP: ABNORMAL
TROPONIN T: ABNORMAL NG/ML
TROPONIN, HIGH SENSITIVITY: 123 NG/L (ref 0–22)
WBC # BLD: 13.2 K/UL (ref 3.5–11.3)
WBC # BLD: ABNORMAL 10*3/UL

## 2021-11-18 PROCEDURE — 51702 INSERT TEMP BLADDER CATH: CPT

## 2021-11-18 PROCEDURE — 82947 ASSAY GLUCOSE BLOOD QUANT: CPT

## 2021-11-18 PROCEDURE — 1200000000 HC SEMI PRIVATE

## 2021-11-18 PROCEDURE — 36415 COLL VENOUS BLD VENIPUNCTURE: CPT

## 2021-11-18 PROCEDURE — 2580000003 HC RX 258: Performed by: NURSE PRACTITIONER

## 2021-11-18 PROCEDURE — 2580000003 HC RX 258: Performed by: INTERNAL MEDICINE

## 2021-11-18 PROCEDURE — 94761 N-INVAS EAR/PLS OXIMETRY MLT: CPT

## 2021-11-18 PROCEDURE — 2500000003 HC RX 250 WO HCPCS: Performed by: INTERNAL MEDICINE

## 2021-11-18 PROCEDURE — G0103 PSA SCREENING: HCPCS

## 2021-11-18 PROCEDURE — 85025 COMPLETE CBC W/AUTO DIFF WBC: CPT

## 2021-11-18 PROCEDURE — 99223 1ST HOSP IP/OBS HIGH 75: CPT | Performed by: INTERNAL MEDICINE

## 2021-11-18 PROCEDURE — 97110 THERAPEUTIC EXERCISES: CPT

## 2021-11-18 PROCEDURE — 86140 C-REACTIVE PROTEIN: CPT

## 2021-11-18 PROCEDURE — 80053 COMPREHEN METABOLIC PANEL: CPT

## 2021-11-18 PROCEDURE — 97535 SELF CARE MNGMENT TRAINING: CPT

## 2021-11-18 PROCEDURE — 84484 ASSAY OF TROPONIN QUANT: CPT

## 2021-11-18 PROCEDURE — 6370000000 HC RX 637 (ALT 250 FOR IP): Performed by: INTERNAL MEDICINE

## 2021-11-18 PROCEDURE — 97116 GAIT TRAINING THERAPY: CPT

## 2021-11-18 PROCEDURE — 99233 SBSQ HOSP IP/OBS HIGH 50: CPT | Performed by: INTERNAL MEDICINE

## 2021-11-18 PROCEDURE — 86141 C-REACTIVE PROTEIN HS: CPT

## 2021-11-18 PROCEDURE — APPSS30 APP SPLIT SHARED TIME 16-30 MINUTES: Performed by: NURSE PRACTITIONER

## 2021-11-18 PROCEDURE — 97162 PT EVAL MOD COMPLEX 30 MIN: CPT

## 2021-11-18 PROCEDURE — 6370000000 HC RX 637 (ALT 250 FOR IP): Performed by: NURSE PRACTITIONER

## 2021-11-18 PROCEDURE — 93010 ELECTROCARDIOGRAM REPORT: CPT | Performed by: INTERNAL MEDICINE

## 2021-11-18 PROCEDURE — 83880 ASSAY OF NATRIURETIC PEPTIDE: CPT

## 2021-11-18 PROCEDURE — 93005 ELECTROCARDIOGRAM TRACING: CPT | Performed by: INTERNAL MEDICINE

## 2021-11-18 PROCEDURE — 94640 AIRWAY INHALATION TREATMENT: CPT

## 2021-11-18 PROCEDURE — 99232 SBSQ HOSP IP/OBS MODERATE 35: CPT | Performed by: INTERNAL MEDICINE

## 2021-11-18 RX ORDER — FAMOTIDINE 10 MG
10 TABLET ORAL NIGHTLY
Status: DISCONTINUED | OUTPATIENT
Start: 2021-11-18 | End: 2021-11-27 | Stop reason: HOSPADM

## 2021-11-18 RX ORDER — TAMSULOSIN HYDROCHLORIDE 0.4 MG/1
0.4 CAPSULE ORAL DAILY
Status: DISCONTINUED | OUTPATIENT
Start: 2021-11-18 | End: 2021-11-27 | Stop reason: HOSPADM

## 2021-11-18 RX ADMIN — INSULIN LISPRO 2 UNITS: 100 INJECTION, SOLUTION INTRAVENOUS; SUBCUTANEOUS at 16:50

## 2021-11-18 RX ADMIN — SODIUM BICARBONATE: 84 INJECTION, SOLUTION INTRAVENOUS at 11:33

## 2021-11-18 RX ADMIN — GLIPIZIDE 5 MG: 5 TABLET ORAL at 16:51

## 2021-11-18 RX ADMIN — OXYCODONE HYDROCHLORIDE AND ACETAMINOPHEN 2000 MG: 500 TABLET ORAL at 09:08

## 2021-11-18 RX ADMIN — IPRATROPIUM BROMIDE AND ALBUTEROL SULFATE 1 AMPULE: 2.5; .5 SOLUTION RESPIRATORY (INHALATION) at 06:14

## 2021-11-18 RX ADMIN — OXYCODONE HYDROCHLORIDE AND ACETAMINOPHEN 2000 MG: 500 TABLET ORAL at 20:18

## 2021-11-18 RX ADMIN — ATORVASTATIN CALCIUM 40 MG: 40 TABLET, FILM COATED ORAL at 20:19

## 2021-11-18 RX ADMIN — FAMOTIDINE 10 MG: 10 TABLET ORAL at 20:18

## 2021-11-18 RX ADMIN — TAMSULOSIN HYDROCHLORIDE 0.4 MG: 0.4 CAPSULE ORAL at 09:09

## 2021-11-18 RX ADMIN — ACETAMINOPHEN 650 MG: 325 TABLET ORAL at 19:55

## 2021-11-18 RX ADMIN — APIXABAN 2.5 MG: 2.5 TABLET, FILM COATED ORAL at 20:19

## 2021-11-18 RX ADMIN — AMIODARONE HYDROCHLORIDE 200 MG: 200 TABLET ORAL at 20:18

## 2021-11-18 RX ADMIN — SODIUM CHLORIDE, PRESERVATIVE FREE 10 ML: 5 INJECTION INTRAVENOUS at 09:12

## 2021-11-18 RX ADMIN — AMIODARONE HYDROCHLORIDE 200 MG: 200 TABLET ORAL at 09:08

## 2021-11-18 RX ADMIN — ZINC SULFATE 220 MG (50 MG) CAPSULE 100 MG: CAPSULE at 09:09

## 2021-11-18 RX ADMIN — GLIPIZIDE 5 MG: 5 TABLET ORAL at 09:08

## 2021-11-18 RX ADMIN — CLOPIDOGREL BISULFATE 75 MG: 75 TABLET ORAL at 09:08

## 2021-11-18 RX ADMIN — METOPROLOL SUCCINATE 25 MG: 25 TABLET, EXTENDED RELEASE ORAL at 09:09

## 2021-11-18 RX ADMIN — APIXABAN 2.5 MG: 2.5 TABLET, FILM COATED ORAL at 09:09

## 2021-11-18 ASSESSMENT — PAIN SCALES - GENERAL: PAINLEVEL_OUTOF10: 0

## 2021-11-18 NOTE — CONSULTS
Problem Relation Age of Onset    Heart Disease Father     Heart Disease Brother        Social History:  Social History     Socioeconomic History    Marital status:      Spouse name: Not on file    Number of children: Not on file    Years of education: Not on file    Highest education level: Not on file   Occupational History    Not on file   Tobacco Use    Smoking status: Never Smoker    Smokeless tobacco: Never Used   Substance and Sexual Activity    Alcohol use: Yes     Comment: very very very seldom    Drug use: No    Sexual activity: Not on file   Other Topics Concern    Not on file   Social History Narrative    Not on file     Social Determinants of Health     Financial Resource Strain:     Difficulty of Paying Living Expenses: Not on file   Food Insecurity:     Worried About [address] in the Last Year: Not on file    Mera of Food in the Last Year: Not on file   Transportation Needs:     Lack of Transportation (Medical): Not on file    Lack of Transportation (Non-Medical):  Not on file   Physical Activity:     Days of Exercise per Week: Not on file    Minutes of Exercise per Session: Not on file   Stress:     Feeling of Stress : Not on file   Social Connections:     Frequency of Communication with Friends and Family: Not on file    Frequency of Social Gatherings with Friends and Family: Not on file    Attends [employer] Services: Not on file    Active Member of [address] [place] [address] [employer] or Organizations: Not on file    Attends Club or Organization Meetings: Not on file    Marital Status: Not on file   Intimate Partner Violence:     Fear of Current or Ex-Partner: Not on file    Emotionally Abused: Not on file    Physically Abused: Not on file    Sexually Abused: Not on file   Housing Stability:     Unable to Pay for Housing in the Last Year: Not on file    Number of [place] in the Last Year: Not on file    Unstable Housing in the Last Year: Not on file       Home Meds:  Prior to Admission medications    Medication Sig Start Date End Date Taking?  Authorizing Provider   furosemide (LASIX) 40 MG tablet Take 40 mg by mouth daily   Yes Historical Provider, MD   glipiZIDE (GLUCOTROL) 5 MG tablet Take 5 mg by mouth 2 times daily (before meals)   Yes Historical Provider, MD   metFORMIN (GLUCOPHAGE) 500 MG tablet Take 1,000 mg by mouth 2 times daily (with meals)    Yes Historical Provider, MD   atorvastatin (LIPITOR) 40 MG tablet Take 1 tablet by mouth nightly 3/28/18  Yes Mayco Singh MD   clopidogrel (PLAVIX) 75 MG tablet Take 1 tablet by mouth daily 3/29/18  Yes Mayco Singh MD   ramipril (ALTACE) 2.5 MG capsule Take 2.5 mg by mouth every morning    Yes Historical Provider, MD   aspirin 81 MG EC tablet Take 81 mg by mouth nightly     Historical Provider, MD       Review of Systems:  Constitutional: no fever or chills  Head: No headaches  Eyes: no blurry vision, no discharge  Ears: no ear pain or hearing changes  Nose: no runny nose or epistaxis  Respiratory: +shortness of breath  Cardiovascular: no chest pain, no edema  GI: no nausea, no vomiting or diarrhea  : denies any hematuria, no flank pain  Skin: no rash  Musculoskeletal: no joint pain, moves all ext  Neuro: no tremor, no slurred speech  Psychiatric: stable mood, no depression or insomnia    Current Meds:  Infusion:    sodium chloride 75 mL/hr at 11/17/21 2134    sodium chloride      dextrose       Meds:    famotidine  10 mg Oral Nightly    tamsulosin  0.4 mg Oral Daily    atorvastatin  40 mg Oral Nightly    clopidogrel  75 mg Oral Daily    glipiZIDE  5 mg Oral BID AC    sodium chloride flush  5-40 mL IntraVENous 2 times per day    vitamin D  20,000 Units Oral Weekly    ascorbic acid  2,000 mg Oral BID    insulin lispro  0-18 Units SubCUTAneous TID WC    insulin lispro  0-9 Units SubCUTAneous Nightly    insulin glargine  20 Units SubCUTAneous BID    zinc sulfate  100 mg Oral Daily    metoprolol succinate 25 mg Oral Daily    apixaban  2.5 mg Oral BID    amiodarone  200 mg Oral BID     Meds prn: sodium chloride flush, sodium chloride, ondansetron **OR** ondansetron, polyethylene glycol, acetaminophen **OR** acetaminophen, glucose, dextrose, glucagon (rDNA), dextrose     Allergies/Intolerances: ALLERGIES: Patient has no known allergies. 24HR INTAKE/OUTPUT:      Intake/Output Summary (Last 24 hours) at 11/18/2021 1036  Last data filed at 11/18/2021 0900  Gross per 24 hour   Intake 1673 ml   Output 1625 ml   Net 48 ml     I/O last 3 completed shifts: In: 0879 [I.V.:1273]  Out: 1075 [Urine:1075]  I/O this shift: In: 400 [P.O.:400]  Out: 775 [Urine:775]  Admission weight: 145 lb (65.8 kg)  Wt Readings from Last 3 Encounters:   11/15/21 145 lb (65.8 kg)   06/08/19 158 lb (71.7 kg)   05/16/18 166 lb 6.4 oz (75.5 kg)     Body mass index is 25.69 kg/m². Physical Examination:  VITALS:  BP (!) 170/95 Comment: pt anxious, bp meds given  Pulse 100   Temp 97.8 °F (36.6 °C) (Temporal)   Resp 20   Ht 5' 3\" (1.6 m)   Wt 145 lb (65.8 kg)   SpO2 97%   BMI 25.69 kg/m²   Weight:   Wt Readings from Last 3 Encounters:   11/15/21 145 lb (65.8 kg)   06/08/19 158 lb (71.7 kg)   05/16/18 166 lb 6.4 oz (75.5 kg)     Constitutional and General Appearance: asleep but easily arousable, nontoxic appearing  Eyes: no icteric sclera, no pallor conjunctiva, no discharge seen from either eye  Ears and Nose: normal external appearance of left and right ear and nose. No active drainage from nose.    Oral: moist oral mucus membranes  Neck: No jugular venous distention, appears symmetric, good ROM  Lungs: diminished breath sounds  Heart: regular rate, S1, S2  Extremities: no significant LE edema  GI: soft, non-tender, no guarding  Skin: no rash seen on exposed extremities  Musculo: moves all extremities  Neuro: no slurred speech, no facial drooping,   Psychiatric: Awake, alert    Lab Data  CBC:   Recent Labs     11/15/21  2115 11/17/21 0525 11/18/21  0600   WBC 11.7* 16.2* 13.2*   HGB 10.4* 10.0* 10.2*   HCT 32.7* 30.9* 31.1*    193 234     BMP:  Recent Labs     11/15/21  2115 11/17/21  0525 11/18/21  0600   * 132* 136   K 4.5 5.0 4.0   CL 98 99 103   CO2 18* 18* 15*   BUN 55* 85* 88*   CREATININE 2.60* 3.00* 2.73*   GLUCOSE 423* 153* 48*   CALCIUM 8.4* 7.8* 8.2*     PTH: @PTH@  TSH:   Recent Labs     11/17/21 0525   TSH 1.12     HgBa1c: No results for input(s): LABA1C in the last 72 hours. Hepatic:   Recent Labs     11/15/21  2115 11/17/21  0525 11/18/21  0600   LABALBU 3.1* 2.7* 2.9*   AST 42* 40* 47*   ALT 27 25 36   BILITOT 0.77 0.33 0.34   ALKPHOS 114 128 103     ABGs: No results found for: PHART, PO2ART, PHD7ISP  Troponin: No results for input(s): TROPONINI in the last 72 hours. BNP: No results for input(s): BNP in the last 72 hours. Old labs reviewed. Impression and Plan:  1. JANINA on CKD IV: likely prerenal from hyperglycemia and possible postrenal component from urinary retention, although creatinine already improving prior to del rio placement. Agree with holding ACE-I until creatinine back to baseline. Diuretic on hold. Add bicarb to IV fluids given metabolic acidosis. Avoid nephrotoxic agents. Metformin needs to be discontinued at discharge  2. Metabolic acidosis from JANINA/CKD: add bicarb to IV fluids  3. Urinary retention: has del rio  4. Diastolic CHF: appears euvolemic  5. A flutter with RVR, now in NSR  6. COVID-19 +   7. DM, uncontrolled  8. Anemia    Patient will need outpatient nephrology follow up given his CKD IV. Will likely need to be on oral bicarb at home. I can see him in the office 3-4 weeks after discharge. Thank you for allowing me to participate in the care of this patient. Please feel free to call me if you have any questions.      Electronically signed by Severo Kilts, DO on 11/18/21 at 10:36 AM EST    [facility]

## 2021-11-18 NOTE — PROGRESS NOTES
Physical Therapy  Facility/Department: Critical [facility] hospital AT THE [facility]  Daily Treatment Note  NAME: Adriana Kapadia  : 10/31/1930  MRN: [id number]    Date of Service: 2021    Discharge Recommendations:  Continue to assess pending progress, Patient would benefit from continued therapy after discharge        Assessment   Treatment Diagnosis: Difficutly walking  Prognosis: Good  Decision Making: Medium Complexity  PT Education: Goals; PT Role; Plan of Care  REQUIRES PT FOLLOW UP: Yes  Activity Tolerance  Activity Tolerance: Patient Tolerated treatment well     Patient Diagnosis(es): The primary encounter diagnosis was Pneumonia due to COVID-19 virus. A diagnosis of Elevated troponin was also pertinent to this visit. has a past medical history of Acute and chronic respiratory failure with hypoxia ([address] CAD (coronary artery disease), Cataracts, bilateral, Glaucoma, Hyperlipidemia, Hypertension, TIA (transient ischemic attack), and Type II or unspecified type diabetes mellitus without mention of complication, not stated as uncontrolled. has a past surgical history that includes Coronary artery bypass graft (); Cataract removal with implant (Bilateral,  and 12/3/2013); and Inguinal hernia repair (Bilateral, 12). Restrictions     Subjective   General  Chart Reviewed: Yes  Response To Previous Treatment: Patient with no complaints from previous session.   Family / Caregiver Present: No  Subjective  Subjective: Pt. denies pain upon arrival and is agreeable to get up to chair for lunch  Pain Screening  Patient Currently in Pain: Denies  Vital Signs  Patient Currently in Pain: Denies       Orientation  Orientation  Overall Orientation Status: Within Functional Limits  Cognition      Objective   Bed mobility  Rolling to Right: Contact guard assistance  Supine to Sit: Contact guard assistance  Sit to Supine: Contact guard assistance  Scooting: Contact guard assistance  Transfers  Sit to Stand: Minimal Assistance; Contact guard assistance  Stand to sit: Minimal Assistance; Contact guard assistance  Bed to Chair: Minimal assistance; Contact guard assistance  Ambulation  Ambulation?: Yes  WB Status: unrestricted  Ambulation 1  Surface: level tile  Device: Hand-Held Assist  Assistance: Minimal assistance; Contact guard assistance  Quality of Gait: unsteady, posterior LOB  Distance: 5ftx1 HHA  Stairs/Curb  Stairs?: No     G-Code     OutComes Score    AM-PAC Score    Goals  Short term goals  Time Frame for Short term goals: 20 days  Short term goal 1: Pt will be initiated on strong ex program to address deficits in endurance and balance. Short term goal 2: Pt will be independent with bed mobility without use of handrails for discharge home and negotiation of bed. Short term goal 3: Pt will transfer independently with least restrictive device and good balance to reduce fall risk  Short term goal 4: Pt will be able to ambulate up to 350 feet with supervision to independent with least restrictive device to allow discharge home with his wife.   Short term goal 5: Pt will be able to negoitiate 4 steps with chani handrails and SBA +1  Patient Goals   Patient goals : go home and hopes for Thursday    Plan    Plan  Times per week: 7  Times per day: Twice a day  Plan Comment: Initiate POC  Safety Devices  Type of devices: Call light within reach, Patient at risk for falls, Left in bed, Left in chair, Chair alarm in place, All fall risk precautions in place, Nurse notified, Gait belt     Therapy Time   Individual Concurrent Group Co-treatment   Time In 1145         Time Out 1200         Minutes 15         Timed Code Treatment Minutes: [facility], [facility]

## 2021-11-18 NOTE — PROGRESS NOTES
Was restless most of the time during the night. Attempted to climb out of bed several times and required frequent redirection. Never

## 2021-11-18 NOTE — PROGRESS NOTES
[facility], [facility]. Department of Pharmacy   Pharmacy Renal Adjustment Note    Gigi Talley is a 80 y.o. male. Pharmacist assessment of renally cleared medications. Recent Labs     11/15/21  2115 11/17/21  0525   CREATININE 2.60* 3.00*     Estimated Creatinine Clearance: 13 mL/min (A) (based on SCr of 3 mg/dL (H)).     Height:   Ht Readings from Last 1 Encounters:   11/17/21 5' 3\" (1.6 m)     Weight:  Wt Readings from Last 1 Encounters:   11/15/21 145 lb (65.8 kg)       The following medication has been adjusted based upon renal function:             Pepcid adjusted to 10 mg po daily for Crcl < 30 ml/min        Lillian Baker [facility],11/18/2021,8:03 AM

## 2021-11-18 NOTE — PROGRESS NOTES
Physical Therapy  Facility/Department: [facility] AT THE [facility] ICU  Daily Treatment Note  NAME: Chetan Palmer  : 10/31/1930  MRN: [id number]    Date of Service: 2021    Discharge Recommendations:  Continue to assess pending progress, Patient would benefit from continued therapy after discharge        Assessment   Treatment Diagnosis: Difficutly walking  Prognosis: Good  Decision Making: Medium Complexity  PT Education: Goals; PT Role; Plan of Care  REQUIRES PT FOLLOW UP: Yes  Activity Tolerance  Activity Tolerance: Patient Tolerated treatment well     Patient Diagnosis(es): The primary encounter diagnosis was Pneumonia due to COVID-19 virus. A diagnosis of Elevated troponin was also pertinent to this visit. has a past medical history of Acute and chronic respiratory failure with hypoxia ([address] CAD (coronary artery disease), Cataracts, bilateral, Glaucoma, Hyperlipidemia, Hypertension, TIA (transient ischemic attack), and Type II or unspecified type diabetes mellitus without mention of complication, not stated as uncontrolled. has a past surgical history that includes Coronary artery bypass graft (); Cataract removal with implant (Bilateral,  and 12/3/2013); and Inguinal hernia repair (Bilateral, 12). Restrictions     Subjective   General  Chart Reviewed: Yes  Response To Previous Treatment: Patient with no complaints from previous session. Family / Caregiver Present: No  Subjective  Subjective: Pt. denies pain upon arrivla other than when coughing.   Pain Screening  Patient Currently in Pain: Denies  Vital Signs  Patient Currently in Pain: Denies       Orientation  Orientation  Overall Orientation Status: Within Functional Limits  Cognition      Objective   Bed mobility  Rolling to Right: Contact guard assistance  Supine to Sit: Contact guard assistance  Sit to Supine: Contact guard assistance  Scooting: Contact guard assistance  Transfers  Sit to Stand: Minimal Assistance; Contact guard assistance  Stand to sit: Minimal Assistance; Contact guard assistance  Bed to Chair: Minimal assistance; Contact guard assistance  Ambulation  Ambulation?: Yes  WB Status: unrestricted  Ambulation 1  Surface: level tile  Device: Hand-Held Assist  Assistance: Minimal assistance; Contact guard assistance  Quality of Gait: unsteady, posterior LOB  Distance: 5ftx1 HHA  Stairs/Curb  Stairs?: No        Exercises  Comments: Reclined and seated exercises B Le x20      G-Code     OutComes Score    AM-PAC Score    Goals  Short term goals  Time Frame for Short term goals: 20 days  Short term goal 1: Pt will be initiated on strong ex program to address deficits in endurance and balance. Short term goal 2: Pt will be independent with bed mobility without use of handrails for discharge home and negotiation of bed. Short term goal 3: Pt will transfer independently with least restrictive device and good balance to reduce fall risk  Short term goal 4: Pt will be able to ambulate up to 350 feet with supervision to independent with least restrictive device to allow discharge home with his wife.   Short term goal 5: Pt will be able to negoitiate 4 steps with chani handrails and SBA +1  Patient Goals   Patient goals : go home and hopes for Thursday    Plan    Plan  Times per week: 7  Times per day: Twice a day  Plan Comment: Initiate POC  Safety Devices  Type of devices: Call light within reach, Patient at risk for falls, Left in bed, Left in chair, Chair alarm in place, All fall risk precautions in place, Nurse notified, Gait belt     Therapy Time   Individual Concurrent Group Co-treatment   Time In 1615         Time Out 1630         Minutes 15         Timed Code Treatment Minutes: [address]

## 2021-11-18 NOTE — PROGRESS NOTES
PT is alert confused as to date and time, reorients easily. Skin color, turgor and temp are normal. Respirations are unlabored with some wheezing. PT is in a SR rates in the 80's. Denies any chest pain. No digestive problems at most of his PM meal. Denies any wants or needs at this time.

## 2021-11-18 NOTE — PROGRESS NOTES
Physical Therapy    Facility/Department: [facility] AT THE [facility]  Initial Assessment    NAME: Chery Puente  : 10/31/1930  MRN: [id number]    Date of Service: 2021    Discharge Recommendations:  Continue to assess pending progress, Patient would benefit from continued therapy after discharge        Assessment   Assessment: Pt referred for general debility. Pt has significant amount of steps to his home. Progress patient as able. Treatment Diagnosis: Difficutly walking  Prognosis: Good  Decision Making: Medium Complexity  PT Education: Goals; PT Role; Plan of Care  REQUIRES PT FOLLOW UP: Yes  Activity Tolerance  Activity Tolerance: Patient Tolerated treatment well       Patient Diagnosis(es): The primary encounter diagnosis was Pneumonia due to COVID-19 virus. A diagnosis of Elevated troponin was also pertinent to this visit. has a past medical history of Acute and chronic respiratory failure with hypoxia ([address] CAD (coronary artery disease), Cataracts, bilateral, Glaucoma, Hyperlipidemia, Hypertension, TIA (transient ischemic attack), and Type II or unspecified type diabetes mellitus without mention of complication, not stated as uncontrolled. has a past surgical history that includes Coronary artery bypass graft (); Cataract removal with implant (Bilateral,  and 12/3/2013); and Inguinal hernia repair (Bilateral, 12). Restrictions     Vision/Hearing  Vision: Impaired  Vision Exceptions: Wears glasses at all times  Hearing: Exceptions to [facility]  Hearing Exceptions: Bilateral hearing aid     Subjective  General  Chart Reviewed: Yes  Patient assessed for rehabilitation services?: Yes  Follows Commands: Within Functional Limits  Subjective  Subjective: Pt notes she does feel weak but otherwise hopes to go home soon.   Pain Screening  Patient Currently in Pain: Denies          Orientation  Orientation  Overall Orientation Status: Within Functional Limits  Social/Functional History  Social/Functional History  Lives With: Spouse  Type of Home: House  Home Layout: Two level  Home Access: Stairs to enter with rails  Entrance Stairs - Number of Steps: 25  Entrance Stairs - Rails: Right  Bathroom Shower/Tub: Walk-in shower, Shower chair with back  Bathroom Toilet: Standard  Bathroom Equipment: Shower chair  Home Equipment: Walker-Lift, Rolling walker  Receives Help From: Family  ADL Assistance: Independent  Homemaking Assistance: Independent  Homemaking Responsibilities: Yes  Ambulation Assistance: Independent  Transfer Assistance: Independent  Active : Yes  Mode of Transportation: Car  Cognition        Objective     Observation/Palpation  Observation: pt is slow paced in order to catch his breath but was able to complete    AROM RLE (degrees)  RLE AROM: WFL  AROM LLE (degrees)  LLE AROM : WFL  Strength RLE  Comment: grossly 4/5  Strength LLE  Comment: grossly 4/5        Bed mobility  Rolling to Right: Minimal assistance  Supine to Sit: Minimal assistance  Sit to Supine: Minimal assistance  Scooting: Minimal assistance  Transfers  Sit to Stand: Minimal Assistance  Stand to sit: Minimal Assistance  Bed to Chair: Minimal assistance  Lateral Transfers: Minimal Assistance  Ambulation  Ambulation?: Yes  WB Status: unrestricted  Ambulation 1  Surface: level tile  Device:  (face to face)  Assistance: Minimal assistance  Quality of Gait: unsteady, posterior LOB  Distance: 5 steps bedside face to face.   Stairs/Curb  Stairs?: No     Balance  Posture: Fair  Sitting - Static: Good  Sitting - Dynamic: Good  Standing - Static: Fair  Standing - Dynamic: Fair; -        Plan   Plan  Times per week: 7  Times per day: Twice a day (with exception of weekends, daily)  Plan Comment: Initiate POC  Safety Devices  Type of devices: Bed alarm in place, Call light within reach, Patient at risk for falls, Left in bed      OutComes Score    AM-PAC Score  AM-PAC Inpatient Mobility Raw Score : 16 (11/18/21 8657)  AM-PAC Inpatient T-Scale Score : 40.78 (11/18/21 1455)  Mobility Inpatient CMS 0-100% Score: 54.16 (11/18/21 1455)  Mobility Inpatient CMS G-Code Modifier : CK (11/18/21 1455)          Goals  Short term goals  Time Frame for Short term goals: 20 days  Short term goal 1: Pt will be initiated on strong ex program to address deficits in endurance and balance. Short term goal 2: Pt will be independent with bed mobility without use of handrails for discharge home and negotiation of bed. Short term goal 3: Pt will transfer independently with least restrictive device and good balance to reduce fall risk  Short term goal 4: Pt will be able to ambulate up to 350 feet with supervision to independent with least restrictive device to allow discharge home with his wife.   Short term goal 5: Pt will be able to negoitiate 4 steps with chani handrails and SBA +1  Patient Goals   Patient goals : go home and hopes for Thursday       Therapy Time   Individual Concurrent Group Co-treatment   Time In 8197         Time Out 1426         Minutes 29         Timed Code Treatment Minutes: [address], PT, DPT

## 2021-11-18 NOTE — PROGRESS NOTES
Occupational Therapy  Facility/Department: [facility] AT THE [facility]  Daily Treatment Note  NAME: Yanique Haynes  : 10/31/1930  MRN: [id number]    Date of Service: 2021    Discharge Recommendations:  Continue to assess pending progress, Home with assist PRN       Assessment      Activity Tolerance  Activity Tolerance: Patient Tolerated treatment well; Patient limited by fatigue  Safety Devices  Type of devices: All fall risk precautions in place; Left in chair; Call light within reach         Patient Diagnosis(es): The primary encounter diagnosis was Pneumonia due to COVID-19 virus. A diagnosis of Elevated troponin was also pertinent to this visit. has a past medical history of Acute and chronic respiratory failure with hypoxia ([address] CAD (coronary artery disease), Cataracts, bilateral, Glaucoma, Hyperlipidemia, Hypertension, TIA (transient ischemic attack), and Type II or unspecified type diabetes mellitus without mention of complication, not stated as uncontrolled. has a past surgical history that includes Coronary artery bypass graft (); Cataract removal with implant (Bilateral,  and 12/3/2013); and Inguinal hernia repair (Bilateral, 12). Restrictions     Subjective   General  Chart Reviewed: Yes  Patient assessed for rehabilitation services?: Yes  Response to previous treatment: Patient with no complaints from previous session  Family / Caregiver Present: No  Referring Practitioner: Elvie Sood  Diagnosis: Covid-19  Subjective  Subjective: Pt has no reports of pain. General Comment  Comments: Pt in bedside chair. Agreeable to OT tx. Vital Signs  Patient Currently in Pain: Denies   Orientation     Objective    ADL  LE Dressing: Moderate assistance (to jagdeep slippers)                                                     Additional Activities Comment  Additional Activities: Educated on safety, DME, AE and Fair understanding and pt stating he is good and just ready to go home.      Type of ROM/Therapeutic Exercise  Type of ROM/Therapeutic Exercise: Free weights  Comment: MANSI UE ther ex with 1# free weight 10 reps x1 set in 4 planes. MOD VC and visual to remain on task. Plan   Plan  Times per week: 7x  Times per day: Daily  Current Treatment Recommendations: Strengthening, ROM, Safety Education & Training, Patient/Caregiver Education & Training, Self-Care / ADL, Endurance Training, Functional Mobility Training  G-Code     OutComes Score                                                  AM-PAC Score             Goals  Short term goals  Short term goal 1: Patient to complete self care routine c SUP to ensure safe return home. Short term goal 2: Patient to engage in 15 minutes of ther ex/ther act s significant shortness of breath to improve strength as well as activity tolerance for self care tasks. Short term goal 3: Patient to tolerate standing >7' while participating in functional task of choice to improve standing tolerane, balance and safety during ADL, mobility and transfers. Short term goal 4: Patient to be educated on d/c folder, AE/DME, and general safety to ensure safe return home.        Therapy Time   Individual Concurrent Group Co-treatment   Time In 1435         Time Out 1505         Minutes 30         Timed Code Treatment Minutes: [address] [facility], [facility]

## 2021-11-18 NOTE — PLAN OF CARE
Problem: Falls - Risk of:  Goal: Will remain free from falls  Description: Will remain free from falls  Outcome: Ongoing  Goal: Absence of physical injury  Description: Absence of physical injury  Outcome: Ongoing     Problem: Airway Clearance - Ineffective  Goal: Achieve or maintain patent airway  Outcome: Ongoing     Problem: Gas Exchange - Impaired  Goal: Absence of hypoxia  Outcome: Ongoing  Goal: Promote optimal lung function  Outcome: Ongoing     Problem: Breathing Pattern - Ineffective  Goal: Ability to achieve and maintain a regular respiratory rate  Outcome: Ongoing     Problem:  Body Temperature -  Risk of, Imbalanced  Goal: Ability to maintain a body temperature within defined limits  Outcome: Ongoing  Goal: Will regain or maintain usual level of consciousness  Outcome: Ongoing  Goal: Complications related to the disease process, condition or treatment will be avoided or minimized  Outcome: Ongoing     Problem: Isolation Precautions - Risk of Spread of Infection  Goal: Prevent transmission of infection  Outcome: Ongoing     Problem: Nutrition Deficits  Goal: Optimize nutritional status  Outcome: Ongoing     Problem: Risk for Fluid Volume Deficit  Goal: Maintain normal heart rhythm  Outcome: Ongoing  Goal: Maintain absence of muscle cramping  Outcome: Ongoing  Goal: Maintain normal serum potassium, sodium, calcium, phosphorus, and pH  Outcome: Ongoing     Problem: Loneliness or Risk for Loneliness  Goal: Demonstrate positive use of time alone when socialization is not possible  Outcome: Ongoing     Problem: Fatigue  Goal: Verbalize increase energy and improved vitality  Outcome: Ongoing     Problem: Patient Education: Go to Patient Education Activity  Goal: Patient/Family Education  Outcome: Ongoing     Problem: Skin Integrity:  Goal: Will show no infection signs and symptoms  Description: Will show no infection signs and symptoms  Outcome: Ongoing  Goal: Absence of new skin breakdown  Description: Absence of new skin breakdown  Outcome: Ongoing     Problem: Nutrition  Goal: Optimal nutrition therapy  Outcome: Ongoing     Problem: Risk for Fluid Volume Deficit  Goal: Maintain normal heart rhythm  Outcome: Ongoing     Problem: Risk for Fluid Volume Deficit  Goal: Maintain absence of muscle cramping  Outcome: Ongoing     Problem: Risk for Fluid Volume Deficit  Goal: Maintain normal serum potassium, sodium, calcium, phosphorus, and pH  Outcome: Ongoing     Problem: Loneliness or Risk for Loneliness  Goal: Demonstrate positive use of time alone when socialization is not possible  Outcome: Ongoing     Problem: Fatigue  Goal: Verbalize increase energy and improved vitality  Outcome: Ongoing     Problem: Patient Education: Go to Patient Education Activity  Goal: Patient/Family Education  Outcome: Ongoing     Problem: Skin Integrity:  Goal: Will show no infection signs and symptoms  Description: Will show no infection signs and symptoms  Outcome: Ongoing     Problem: Skin Integrity:  Goal: Absence of new skin breakdown  Description: Absence of new skin breakdown  Outcome: Ongoing     Problem: Nutrition  Goal: Optimal nutrition therapy  Outcome: Ongoing

## 2021-11-18 NOTE — PROGRESS NOTES
Patient: Katy Underwood  : 10/31/1930  Date of Admission: 11/15/2021  Primary Care Physician: Christos Méndez  Today's Date: 2021    REASON FOR CONSULTATION: Cough (onset 6 weeks ago, states was prescribed an antibiotic and seemed to get a little better, states worse since Friday) and Shortness of Breath      HPI: Mr. Abbey Jennings is a 80 y.o. male with extensive medical history as outlined below who presented because of worsening shortness of breath for the past 3 days. Cardiology consulted because of atrial flutter with 2:1 conduction. Patient reported having history of coronary artery disease status post bypass surgery back in . He follows up at [facility].  We will obtain records from his primary cardiologist tomorrow morning. He also has a history of TIA, hypertension and dyslipidemia. He also has type 2 diabetes and his hemoglobin A1c is 9.6% according to his most recent blood work. He presented with worsening shortness of breath and cough for 3 days. He tested positive for COVID-19 pneumonia. He has acute on chronic renal injury: Stage IV CKD superimposed on stage III chronic kidney disease. He converted to normal sinus rhythm with amiodarone. He is maintaining sinus rhythm on oral amiodarone. Blood pressure is controlled. Kidney function is better. I ended up stopping his ACE inhibitor and diuretics. Nephrology consult appreciated. Past Medical History:   Diagnosis Date    Acute and chronic respiratory failure with hypoxia ([facility] 2021    CAD (coronary artery disease)     Cataracts, bilateral     Glaucoma     Hyperlipidemia     Hypertension     TIA (transient ischemic attack)     Type II or unspecified type diabetes mellitus without mention of complication, not stated as uncontrolled        CURRENT ALLERGIES: Patient has no known allergies. REVIEW OF SYSTEMS: 14 systems were reviewed. Pertinent positives and negatives as above, all else negative. Past Surgical History:   Procedure Laterality Date    CATARACT REMOVAL WITH IMPLANT Bilateral 11/219/2013 and 12/3/2013    CORONARY ARTERY BYPASS GRAFT  2000    INGUINAL HERNIA REPAIR Bilateral 06/19/12    Social History:  Social History     Tobacco Use    Smoking status: Never Smoker    Smokeless tobacco: Never Used   Substance Use Topics    Alcohol use: Yes     Comment: very very very seldom    Drug use: No        CURRENT MEDICATIONS:  Outpatient Medications Marked as Taking for the 11/15/21 encounter [facility] Encounter)   Medication Sig Dispense Refill    furosemide (LASIX) 40 MG tablet Take 40 mg by mouth daily      glipiZIDE (GLUCOTROL) 5 MG tablet Take 5 mg by mouth 2 times daily (before meals)      metFORMIN (GLUCOPHAGE) 500 MG tablet Take 1,000 mg by mouth 2 times daily (with meals)       atorvastatin (LIPITOR) 40 MG tablet Take 1 tablet by mouth nightly 30 tablet 3    clopidogrel (PLAVIX) 75 MG tablet Take 1 tablet by mouth daily 30 tablet 3    ramipril (ALTACE) 2.5 MG capsule Take 2.5 mg by mouth every morning         famotidine  10 mg Oral Nightly    tamsulosin  0.4 mg Oral Daily    atorvastatin  40 mg Oral Nightly    clopidogrel  75 mg Oral Daily    glipiZIDE  5 mg Oral BID AC    sodium chloride flush  5-40 mL IntraVENous 2 times per day    vitamin D  20,000 Units Oral Weekly    ascorbic acid  2,000 mg Oral BID    insulin lispro  0-18 Units SubCUTAneous TID WC    insulin lispro  0-9 Units SubCUTAneous Nightly    insulin glargine  20 Units SubCUTAneous BID    zinc sulfate  100 mg Oral Daily    metoprolol succinate  25 mg Oral Daily    apixaban  2.5 mg Oral BID    amiodarone  200 mg Oral BID       FAMILY HISTORY: family history includes Heart Disease in his brother and father.      PHYSICAL EXAM:   BP (!) 170/95 Comment: pt anxious, bp meds given  Pulse 100   Temp 97.8 °F (36.6 °C) (Temporal)   Resp 20   Ht 5' 3\" (1.6 m)   Wt 145 lb (65.8 kg)   SpO2 97%   BMI 25.69 kg/m²  Body mass index is 25.69 kg/m². Constitutional: He is oriented to person, place, and time. He appears well-developed and well-nourished. In no acute distress. HEENT: Normocephalic and atraumatic. No JVD present. Carotid bruit is not present. No mass and no thyromegaly present. No lymphadenopathy present. Cardiovascular: Normal rate, regular rhythm, normal heart sounds and intact distal pulses. Exam reveals no gallop and no friction rub. Short apical systolic murmur noted. Pulmonary/Chest:. Poor air entry bilaterally with diffuse medium size crackles Abdominal: Soft, non-tender. Bowel sounds and aorta are normal. He exhibits no organomegaly, mass or bruit. Extremities: No edema, cyanosis, or clubbing. Pulses are 2+ radial/carotid/dorsalis pedis and posterior tibial bilaterally. Neurological: He is alert and oriented to person, place, and time. No evidence of gross cranial nerve deficit. Coordination appeared normal.   Skin: Skin is warm and dry. There is no rash or diaphoresis. Psychiatric: He has a normal mood and affect.  His speech is normal and behavior is normal.      MOST RECENT LABS ON RECORD:   Lab Results   Component Value Date    WBC 13.2 (H) 11/18/2021    HGB 10.2 (L) 11/18/2021    HCT 31.1 (L) 11/18/2021     11/18/2021    CHOL 87 11/17/2021    TRIG 63 11/17/2021    HDL 27 (L) 11/17/2021    ALT 36 11/18/2021    AST 47 (H) 11/18/2021     11/18/2021    K 4.0 11/18/2021     11/18/2021    CREATININE 2.73 (H) 11/18/2021    BUN 88 (H) 11/18/2021    CO2 15 (L) 11/18/2021    TSH 1.12 11/17/2021    PSA 0.74 02/21/2018    INR 1.3 11/17/2021    LABA1C 9.5 (H) 08/27/2021       ASSESSMENT:  Patient Active Problem List    Diagnosis Date Noted    Mild malnutrition ([facility] [address] 11/17/2021    Acute and chronic respiratory failure with hypoxia (HCC) 11/16/2021    Atrial flutter ([facility] 11/16/2021    Stage 4 chronic kidney disease ([facility] 11/16/2021    ASHD (arteriosclerotic heart disease)  S/P CABG (coronary artery bypass graft)     COVID-19 virus infection 11/15/2021    Cerebral infarction due to embolism of right middle cerebral artery ([facility] [address] [address] 03/28/2018    Left leg weakness 03/28/2018    Falling 03/28/2018    Stroke, lacunar ([facility] 03/28/2018    Hypertension 03/27/2018    Type 2 diabetes mellitus without complication, without long-term current use of insulin ([facility] 03/27/2018    Cerebrovascular accident (CVA) ([facility] 03/26/2018    Right inguinal hernia 04/02/2014       PLAN:  Patient with extensive cardiovascular and cerebrovascular history as outlined in HPI. History of coronary artery disease status post CABG back in 2000. History of TIA. Hypertension, dyslipidemia and type 2 diabetes. Patient is admitted with COVID-19 pneumonia and acute hypoxemic respiratory failure in addition to acute on chronic kidney injury. Patient developed atrial flutter with 2-1 conduction. · New Onset Atrial Flutter: converted to normal sinus rhythm with amiodarone.  Beta Blocker: Continue Metoprolol tartrate (Lopressor) 25 mg bid.  Calcium Channel Blocker: Not indicated at this time.  Anti-Arrhythmic: amiodarone (Pacerone) 200 mg daily. Monitoring: Since they will being maintained on Amiodarone, I told them that we will need to closely monitor them for potential side effects. These include monitoring LFTs and TSH at least every 6 months as well as chest x-rays, pulmonary function tests, and eye exams at least yearly.   [id number] Score for Atrial Fibrillation Stroke Risk   Risk   Factors  Component Value   C CHF No 0   H HTN Yes 1   A2 Age >= 76 Yes,  (80 y.o.) 2   D DM Yes 1   S2 Prior Stroke/TIA No 0   V Vascular Disease No 0   A Age 74-69 No,  (80 y.o.) 0   Sc Sex male 0    [id number]  Score  4   Score last updated 92/89/06 8:91 AM EST  Click here for a link to the UpToDate guideline \"Atrial Fibrillation: Anticoagulation therapy to prevent embolization    Disclaimer: Risk Score calculation is dependent on accuracy of patient problem list and past encounter diagnosis.  Stroke Risk: CHADS2-VASc Score: 4/9 (4% stroke risk)   Anticoagulation: Continue Apixaban (Eliquis) 2.5 mg every 12 hours. · Acute on chronic kidney injury  · Ultrasound reviewed. · Robertson catheter in place. · Blood pressures controlled. · Discontinued Altace. · Discontinued Lasix, currently euvolemic. · Nephrology consultation appreciated.  Atherosclerotic Heart Disease:   Antiplatelet Agent: Continue clopidogrel (Plavix): Plavix 75 mg daily.  Beta Blocker: Continue Metoprolol tartrate (Lopressor) 25 mg bid.  Anti-anginal medications: Continue nitroglycerin 0.4 mg tablets as needed for chest pain.  Cholesterol Reduction Therapy: Continue Atorvastatin (Lipitor) 40 mg daily.  Controlled.  LDL at goal.   Denies any chest pain.  Repeat ECG shows NSR with questionable changes in the anterior leads, nonspecific T wave abnormality worse in lateral lead.  Repeat troponin 123 today and BNP 10,948 today. I am not concerned about the elevated troponin and BNP giving his stage IV chronic kidney disease. We will continue to monitor. A repeat troponin EKG tomorrow morning. Management of diabetes and COVID-19 pneumonia as per primary team.    Once again, thank you for allowing me to participate in this patients care. Please do not hesitate to contact me could I be of further assistance. Sincerely,  Edenilson Caputo MD, MS, F.A.C.C.   [facility]) [facility], [address] [facility], [facility], [facility], [address] [place] [address]  Phone: [phone], Fax: [phone]        Based on the patients current medical conditions, I believe the risk of significant morbidity and mortality is: High

## 2021-11-18 NOTE — PROGRESS NOTES
[facility] - Progress Note   COVID 19 Patient-Tele-visit  Today's Date and Time: 11/18/2021, 9:20 AM    Impression :     · COVID 19 Confirmed Infection  · Covid tests:  · 11/15/21 Positive  · Urinary retention  · Renal calculi  · Cholelithiasis  · DM II  · Essential HTN  · Atrial Flutter  · CKD IV  · CVA 2018  · Received COVID vaccines      Patient evaluated by Telemedicine. Requesting Institution: [facility]  Provider Institution: [address] [place] [address],[facility] [address] [place], [address] [facility] Ms Whitaker Terrell, APRN- IM    Recommendations:   · Antibiotic treatment:  · Obtain urine culture  · Covid Rx:  · Remdesivir-contraindicated with renal insufficiency  · Solu-medrol initiated 11/16/21  · Actemra-Not indicated at this time  · Monitor CRP      Medical Decision Making/Summary/Discussion:11/18/2021     · Patient admitted with suspected COVID 19 infection  · Covid test confirmed positive. Infection Control Recommendations   · Universal Precautions  · Airborne isolation  · Droplet Isolation    Antimicrobial Stewardship Recommendations     · Discontinuation of therapy  Coordination of Outpatient Care:   · Estimated Length of IV antimicrobials:TBD  · Patient will need Midline Catheter Insertion: TBD  · Patient will need PICC line Insertion: No  · Patient will need: Home IV , [place],  SNF,  LTAC:TBD  · Patient will need outpatient wound care:No    Chief complaint/reason for consultation:   · Concern for COVID infection      History of Present Illness:   Emiliano Page is a 80y.o.-year-old male who was initially admitted on 11/15/2021. Patient seen at the request of Dr. Emma Mcmullen:    Patient presented through ER with complaints of increased shortness of breath worsening over the past six weeks. He received his Covid vaccines. He was treated with antibiotics without relief.      The patient's HR was noted to be in the 140's and an EKG revealed acute onset atrial flutter. Cardiology was consulted and he was started on an amiodarone gtt. Covid swab was positive. The patient was also found to have acute urinary retention, 6 mm left pole renal calculi and cholelithiasis. Echocardiogram revealed a possible acute small to moderate sized inter-atrial communication. WBC is increasing      Impression CXR 11/15/21   Significant peripheral and basal predominant pulmonary opacities diffusely   throughout both lungs.  This pattern can be seen in the setting of COVID-19   pneumonia, though this requires clinical correlation.  Sequelae of underlying   interstitial lung disease is also possible, though less favored.  Stability   is indeterminate in the absence of prior comparison imaging.       Small right pleural effusion.       Age-indeterminate fracture of the left anterolateral 9th rib.                 Increased risk 3-dose inadvertent series    Dose 1  01/29/2021 (COVID-19, Moderna, Primary or Immunocompromised, PF, 100mcg/0.5mL)      Dose 2  02/18/2021 (COVID-19, Moderna, Primary or Immunocompromised, PF, 100mcg/0.5mL) - Given too close to previous dose     02/26/2021 (COVID-19, Babar Bran, Primary or Immunocompromised, PF, 100mcg/0.5mL) - Given too close to previous dose     11/02/2021 (COVID-19, Pfizer, PF, 30mcg/0.3mL)      Booster dose  05/02/2022 - Dose Forecast         Patient admitted because of concerns with COVID 19.    CURRENT EVALUATION : 11/18/2021    Afebrile  VS stable with hypertension    The patient is more confused with some agitation. Patient exhibiting respiratory distress. No  Respiratory secretions:     Patient receiving supplemental oxygen. No  RR: 19-->19  02 sat: 97-->97      % FIO2:   PEEP:      QTc:       NEWS Score: 0-4 Low risk group; 5-6: Medium risk group; 7 or above: High risk group  Parameters 3 2 1 0 1 2 3   Age    < 65   ? 65   RR ? 8  9-11 12-20  21-24 ? 25   O2 Sats ?  91 92-93 94-95 ? 96      Suppl O2  Yes  No SBP ? 90  101-110 111-219   ? 220   HR ? 40  41-50 51-90  111-130 ? 131   Consciousness    Alert   Drowsiness, lethargy, or confusion   Temperature ? 35.0 C (95.0 F)  35.1-36.0 C 95.1-96.9 F 36.1-38.0 C 97.0-100.4 F 38.1-39.0 C 100.5-102.3 F ? 39.1 C ? 102.4 F      NEWS Score:   11/17/21: 3 Low risk    Overall Daily Picture:    Improving    Presence of secondary bacterial Infection:    Possible   Additional antibiotics: No    Labs, X rays reviewed: 11/18/2021    BUN:85-->88  Cr:3.0-->2.73  BNP: 38582    WBC:11.7-->16.2-->13.2  Hb:10  Plat: 193    Absolute Neutrophils:14.28  Absolute Lymphocytes:1.03  Neutrophil/Lymphocyte Ratio: 13.8 high risk    CRP:101.4  Ferritin:866  LDH:     Pro Calcitonin:      Cultures:  Urine:  · 11/17: Pending  Blood:  ·   Sputum :  ·   Wound:       CXR:   11/16/21      CAT:      Discussed with patient, RN, CC, IM. I have personally reviewed the past medical history, past surgical history, medications, social history, and family history, and I have updated the database accordingly.   Past Medical History:     Past Medical History:   Diagnosis Date    Acute and chronic respiratory failure with hypoxia ([facility] [address] 11/16/2021    CAD (coronary artery disease)     Cataracts, bilateral     Glaucoma     Hyperlipidemia     Hypertension     TIA (transient ischemic attack)     Type II or unspecified type diabetes mellitus without mention of complication, not stated as uncontrolled        Past Surgical  History:     Past Surgical History:   Procedure Laterality Date    CATARACT REMOVAL WITH IMPLANT Bilateral 11/219/2013 and 12/3/2013    CORONARY ARTERY BYPASS GRAFT  2000    INGUINAL HERNIA REPAIR Bilateral 06/19/12       Medications:      famotidine  10 mg Oral Nightly    tamsulosin  0.4 mg Oral Daily    atorvastatin  40 mg Oral Nightly    clopidogrel  75 mg Oral Daily    glipiZIDE  5 mg Oral BID AC    sodium chloride flush  5-40 mL IntraVENous 2 times per day    vitamin D  20,000 Units Oral Weekly    ascorbic acid  2,000 mg Oral BID    insulin lispro  0-18 Units SubCUTAneous TID WC    insulin lispro  0-9 Units SubCUTAneous Nightly    insulin glargine  20 Units SubCUTAneous BID    zinc sulfate  100 mg Oral Daily    metoprolol succinate  25 mg Oral Daily    apixaban  2.5 mg Oral BID    amiodarone  200 mg Oral BID       Social History:     Social History     Socioeconomic History    Marital status:      Spouse name: Not on file    Number of children: Not on file    Years of education: Not on file    Highest education level: Not on file   Occupational History    Not on file   Tobacco Use    Smoking status: Never Smoker    Smokeless tobacco: Never Used   Substance and Sexual Activity    Alcohol use: Yes     Comment: very very very seldom    Drug use: No    Sexual activity: Not on file   Other Topics Concern    Not on file   Social History Narrative    Not on file     Social Determinants of Health     Financial Resource Strain:     Difficulty of Paying Living Expenses: Not on file   Food Insecurity:     Worried About Running Out of Food in the Last Year: Not on file    Mera of Food in the Last Year: Not on file   Transportation Needs:     Lack of Transportation (Medical): Not on file    Lack of Transportation (Non-Medical):  Not on file   Physical Activity:     Days of Exercise per Week: Not on file    Minutes of Exercise per Session: Not on file   Stress:     Feeling of Stress : Not on file   Social Connections:     Frequency of Communication with Friends and Family: Not on file    Frequency of Social Gatherings with Friends and Family: Not on file    Attends Jainism Services: Not on file    Active Member of Clubs or Organizations: Not on file    Attends Club or Organization Meetings: Not on file    Marital Status: Not on file   Intimate Partner Violence:     Fear of Current or Ex-Partner: Not on file    Emotionally Abused: Not on file   Tracey Physically Abused: Not on file    Sexually Abused: Not on file   Housing Stability:     Unable to Pay for Housing in the Last Year: Not on file    Number of Places Lived in the Last Year: Not on file    Unstable Housing in the Last Year: Not on file       Family History:     Family History   Problem Relation Age of Onset    Heart Disease Father     Heart Disease Brother         Allergies:   Patient has no known allergies. Review of Systems:       Constitutional: No fevers or chills. No systemic complaints  Head: No headaches  Eyes: No double vision or blurry vision. No conjunctival inflammation. ENT: No sore throat or runny nose. . No hearing loss, tinnitus or vertigo. Cardiovascular: No chest pain or palpitations. No Shortness of breath. No SPEARS  Lung: No Shortness of breath or cough. No sputum production  Abdomen: No nausea, vomiting, diarrhea, or abdominal pain. Jonas Amend No cramps. Genitourinary: No increased urinary frequency, or dysuria. No hematuria. No suprapubic or CVA pain  Musculoskeletal: No muscle aches or pains. No joint effusions, swelling or deformities  Hematologic: No bleeding or bruising. Neurologic: No headache, weakness, numbness, or tingling. Integument: No rash, no ulcers. Psychiatric: No depression. Endocrine: No polyuria, no polydipsia, no polyphagia. Physical Examination :     Patient Vitals for the past 8 hrs:   Resp SpO2   11/18/21 0614 20 97 %     General Appearance: Awake, alert, and in no apparent distress  Head:  Normocephalic, no trauma  Eyes: Pupils equal, round, reactive to light; sclera anicteric; conjunctivae pink. No embolic phenomena. ENT: Oropharynx clear, without erythema, exudate, or thrush. No tenderness of sinuses. Mouth/throat: mucosa pink and moist. No lesions. Dentition in good repair. Neck:Supple, without lymphadenopathy. Thyroid normal, No bruits. Pulmonary/Chest: Clear to auscultation, without wheezes, rales, or rhonchi. No dullness to percussion. Cardiovascular: Regular rate and rhythm without murmurs, rubs, or gallops. Abdomen: Soft, non tender. Bowel sounds normal. No organomegaly  All four Extremities: No cyanosis, clubbing, edema, or effusions. Neurologic: No gross sensory or motor deficits. Skin: Warm and dry with good turgor. No signs of peripheral arterial or venous insufficiency. No ulcerations. No open wounds. Medical Decision Making -Laboratory:   I have independently reviewed/ordered the following labs:    CBC with Differential:   Recent Labs     11/17/21 0525 11/18/21  0600   WBC 16.2* 13.2*   HGB 10.0* 10.2*   HCT 30.9* 31.1*    234   LYMPHOPCT 6* 13*   MONOPCT 5 7     BMP:   Recent Labs     11/17/21 0525 11/18/21  0600   * 136   K 5.0 4.0   CL 99 103   CO2 18* 15*   BUN 85* 88*   CREATININE 3.00* 2.73*     Hepatic Function Panel:   Recent Labs     11/17/21 0525 11/18/21  0600   PROT 6.2* 6.0*   LABALBU 2.7* 2.9*   BILITOT 0.33 0.34   ALKPHOS 128 103   ALT 25 36   AST 40* 47*     No results for input(s): RPR in the last 72 hours. No results for input(s): HIV in the last 72 hours. No results for input(s): BC in the last 72 hours.   Lab Results   Component Value Date    MUCUS NOT REPORTED 02/11/2021    RBC 3.24 11/18/2021    TRICHOMONAS NOT REPORTED 02/11/2021    WBC 13.2 11/18/2021    YEAST NOT REPORTED 02/11/2021    TURBIDITY CLEAR 02/11/2021     Lab Results   Component Value Date    CREATININE 2.73 11/18/2021    GLUCOSE PENDING 11/18/2021    GLUCOSE 124 05/17/2012       Medical Decision Making-Imaging:     Narrative   EXAMINATION:   ONE XRAY VIEW OF THE CHEST       11/15/2021 6:06 pm       COMPARISON:   None.       HISTORY:   ORDERING SYSTEM PROVIDED HISTORY: short of breath   TECHNOLOGIST PROVIDED HISTORY:   short of breath       FINDINGS:   There are diffuse peripheral and basal pulmonary opacities bilaterally of   indeterminate stability in the absence of prior comparison imaging.  Small   right pleural effusion.  No pneumothorax.  Cardiomediastinal contours are   within normal limits for size with prior CABG and a tortuous, atherosclerotic   aorta.  Degenerative changes of the spine and shoulders.  Age-indeterminate   fracture of the anterolateral left 9th rib.           Impression   Significant peripheral and basal predominant pulmonary opacities diffusely   throughout both lungs.  This pattern can be seen in the setting of COVID-19   pneumonia, though this requires clinical correlation.  Sequelae of underlying   interstitial lung disease is also possible, though less favored.  Stability   is indeterminate in the absence of prior comparison imaging.       Small right pleural effusion.       Age-indeterminate fracture of the left anterolateral 9th rib.             Narrative   EXAMINATION:   RETROPERITONEAL ULTRASOUND OF THE KIDNEYS AND URINARY BLADDER       11/17/2021       COMPARISON:   None       HISTORY:   ORDERING SYSTEM PROVIDED HISTORY: Rule out obstructive uropathy. TECHNOLOGIST PROVIDED HISTORY:       Rule out obstructive uropathy.       57-year-old male; rule out obstructive uropathy       FINDINGS:       Kidneys:       Exam limited due to rib shadowing and difficulty breathing.       Right kidney measures 9.1 x 4.9 x 4.9 cm.  Right renal cortical thickness   measures 1.1 cm.       Left kidney measures 9.6 x 4.4 x 5.0 cm.  Left renal cortical thickness   measures 8 mm.       Color flow projects over the bilateral renal parenchyma.       No hydronephrosis.  No perinephric fluid.       Gross preservation of the bilateral corticomedullary differentiation.       Echogenic focus with posterior shadowing measuring 6 mm at the midpole left   kidney consistent with a renal calculus.       Gallstones incidentally noted within the gallbladder with posterior acoustic   shadowing and image 56.           Bladder:       Urinary bladder measures 7.5 by 6.0 x 7.2 cm for a bladder volume of 229 mL.    Urinary bladder wall thickness measures 3 mm.       Postvoid urinary bladder measures 5.1 x 6.2 x 6.3 cm for a postvoid urinary   bladder volume of 105 mL.       Left ureteral jet is visualized.  Nonvisualization of the right ureteral jet.           Impression   1. Limited exam.  Large postvoid residual.  Nonvisualization of the right   ureteral jet. 2. No hydronephrosis. 3. 6 mm calculus at the midpole left kidney. 4. Cholelithiasis.         Summary  Global left ventricular systolic function is difficult to assess due to the  patients rate in the 120's but appears preserved with an estimated ejection  fraction of 55%. Mildly increased left ventricular wall thickness with a normal left  ventricular cavity size. Left atrium is normal in size. Evidence of a small to moderate inter-atrial communication is seen by color  Doppler imaging with an intermittent left to right shunt. This appears most  consistent with a patent foramen ovale (PFO) vs a small ASD. Bowing intra-atrial septal motion consistent with an atrial septal aneurysm  is seen. The clinical significant of this finding is unknown, but has been  associated with an increased risk of TIA's and strokes. Mild to moderate aortic leaflets calcification without restriction of  motion. Mild aortic insufficiency was seen. Mild pulmonary hypertension with an estimated right ventricular systolic  pressure of 30 mmHg. Mild to moderate tricuspid regurgitation.     Compared to the previous study of 3/27/18, the patient now has evidence of a  small to moderate sized inter-atrial communication. Consider additional  testing by transesophageal echocardiography (PATT) if clinically indicated,  especially if this might .  Clinical correlation required.       Medical Decision Tuymxi-Qjhvhspw-Lbope:       Medical Decision Making-Other:     Note:  · Labs, medications, radiologic studies were reviewed with personal review of films  · Large amounts of data were reviewed  · Discussed with nursing Staff, Discharge planner  · Infection Control and Prevention measures reviewed  · All prior entries were reviewed  · Administer medications as ordered  · Prognosis: Guarded  · Discharge planning reviewed      Thank you for allowing us to participate in the care of this patient. Please call with questions. FRANKLIN Causey - CNP       ATTESTATION:    I have discussed the case, including pertinent history and exam findings with the APRN. I have evaluated the  History, physical findings and pictures of the patient and the key elements of the encounter have been performed by me. I have reviewed the laboratory data, other diagnostic studies and discussed them with the APRN. I have updated the medical record where necessary. I agree with the assessment, plan and orders as documented by the APRN.     Bj Chaney MD.      Pager: [phone] - Office: [phone]

## 2021-11-18 NOTE — PROGRESS NOTES
[facility]   APRN - Progress Note    Patient - Tho Rojo  Date of Admission -  11/15/2021  8:38 PM  Date of Evaluation -  2021  Hospital Day - 3      SUBJECTIVE:     The Tho Rojo is a 80 y.o. male who is seen for follow up for atrial fibrillation with RVR and COVID-19. Per nursing report and notes, overnight events include: no significant events. He sitting up in bed alert oriented no acute distress. Patient denies shortness of breath or chest pain. Patient is currently remains in normal sinus rhythm. He is off all oxygen. Patient has no complaints. Patient is alert and oriented. ROS:   Constitutional: negative  for fevers, and negative for chills. Respiratory: negative for shortness of breath, negative for cough, and negative for wheezing  Cardiovascular: negative for chest pain, and negative for palpitations  Gastrointestinal: negative for abdominal pain, negative for nausea,negative for vomiting, negative for diarrhea, and negative for constipation    All other systems were reviewed with the patient and are negative unless otherwise stated in HPI.       OBJECTIVE:     VITAL SIGNS:  Patient Vitals for the past 8 hrs:   BP Temp Temp src Pulse Resp SpO2   21 0823 (!) 170/95 97.8 °F (36.6 °C) Temporal 100 20 97 %   21 0614     20 97 %         Temp: 97.8 °F (36.6 °C)  Temp range:    Temp  Av.8 °F (36.6 °C)  Min: 97.6 °F (36.4 °C)  Max: 98.1 °F (36.7 °C)    BP: (!) 170/95 (pt anxious, bp meds given)  BP Range:      Systolic (05JGV), AGC:708 , Min:101 , ANM:657      Diastolic (08NMT), RTX:39, Min:41, Max:95    Pulse: 100  Pulse Range:    Pulse  Av.3  Min: 78  Max: 100    Resp: 20  Resp Range:   Resp  Av.3  Min: 18  Max: 23    SpO2: 97 % on room air  SpO2 range:   SpO2  Av.7 %  Min: 96 %  Max: 97 %    Weight  Wt Readings from Last 3 Encounters:   11/15/21 145 lb (65.8 kg)   19 158 lb (71.7 kg)   18 166 lb 6.4 oz (75.5 kg)     Body mass index is 25.69 kg/m². 24HR INTAKE/OUTPUT:      Intake/Output Summary (Last 24 hours) at 11/18/2021 1158  Last data filed at 11/18/2021 0900  Gross per 24 hour   Intake 1673 ml   Output 1425 ml   Net 248 ml     Date 11/18/21 0000 - 11/18/21 2359   Shift 5456-5747 3905-9329 0433-7833 24 Hour Total   INTAKE   P.O.  400  400   Shift Total(mL/kg)  400(6.1)  400(6.1)   OUTPUT   Urine(mL/kg/hr)  775  775   Shift Total(mL/kg)  775(11.8)  775(11.8)   Weight (kg) 65.8 65.8 65.8 65.8         PHYSICAL EXAM:  GEN:    Awake and following commands:     [] No   [x] Yes  MENTAL STATUS: alert and oriented x3. DISTRESS: Acute respiratory distress:       [x] No   [] Yes  EYES:  EOMI, pupils equal   NECK: Supple. No lymphadenopathy. No carotid bruit  CVS: Regular rhythm, no audible murmur  PULM:  Diminished with fine basilar rales, no acute respiratory distress  ABD:    Bowels sounds normal.  Abdomen is soft. No distention. no tenderness to palpation. EXT:   no edema bilaterally . No calf tenderness. NEURO: Moves all extremities. Motor and sensory are grossly intact  SKIN:  No rashes. No skin lesions.           MEDICATIONS:  Scheduled Meds:   famotidine  10 mg Oral Nightly    tamsulosin  0.4 mg Oral Daily    atorvastatin  40 mg Oral Nightly    clopidogrel  75 mg Oral Daily    glipiZIDE  5 mg Oral BID AC    sodium chloride flush  5-40 mL IntraVENous 2 times per day    vitamin D  20,000 Units Oral Weekly    ascorbic acid  2,000 mg Oral BID    insulin lispro  0-18 Units SubCUTAneous TID WC    insulin lispro  0-9 Units SubCUTAneous Nightly    insulin glargine  20 Units SubCUTAneous BID    zinc sulfate  100 mg Oral Daily    metoprolol succinate  25 mg Oral Daily    apixaban  2.5 mg Oral BID    amiodarone  200 mg Oral BID     Continuous Infusions:   sodium bicarbonate infusion 75 mL/hr at 11/18/21 1133    sodium chloride      dextrose       PRN Meds:   sodium chloride flush, 5-40 mL, PRN  sodium chloride, 25 mL, PRN  ondansetron, 4 mg, Q8H PRN   Or  ondansetron, 4 mg, Q6H PRN  polyethylene glycol, 17 g, Daily PRN  acetaminophen, 650 mg, Q6H PRN   Or  acetaminophen, 650 mg, Q6H PRN  glucose, 15 g, PRN  dextrose, 12.5 g, PRN  glucagon (rDNA), 1 mg, PRN  dextrose, 100 mL/hr, PRN        DATA:  Complete Blood Count:   Recent Labs     11/15/21  2115 11/17/21  0525 11/18/21  0600   WBC 11.7* 16.2* 13.2*   RBC 3.34* 3.16* 3.24*   HGB 10.4* 10.0* 10.2*   HCT 32.7* 30.9* 31.1*   MCV 97.9 97.8 96.0   RDW 12.1 12.2 12.2    193 234   SEGS 81* 88* 78*   NEUTROABS 9.41* 14.28* 10.30*   LYMPHOPCT 9* 6* 13*   LYMPHSABS 1.05* 1.03* 1.72   MONOPCT 10 5 7   EOSRELPCT 0* 0* 0*   BASOPCT 0 0 0   IMMGRAN 0 1* 2*        Recent Blood Glucose:   Recent Labs     11/15/21  2115 11/17/21  0525 11/18/21  0600   GLUCOSE 423* 153* 48*        Comprehensive Metabolic Profile:   Recent Labs     11/15/21  2115 11/17/21  0525 11/18/21  0600   BUN 55* 85* 88*   CREATININE 2.60* 3.00* 2.73*   * 132* 136   K 4.5 5.0 4.0   CL 98 99 103   CALCIUM 8.4* 7.8* 8.2*   ANIONGAP 16 15 18*   CO2 18* 18* 15*   PROT 7.2 6.2* 6.0*   LABALBU 3.1* 2.7* 2.9*   BILITOT 0.77 0.33 0.34   ALKPHOS 114 128 103   AST 42* 40* 47*   ALT 27 25 36        Urinalysis:   Lab Results   Component Value Date    NITRU NEGATIVE 02/11/2021    COLORU YELLOW 02/11/2021    PHUR 5.5 02/11/2021    WBCUA 0 TO 2 02/11/2021    RBCUA None 02/11/2021    MUCUS NOT REPORTED 02/11/2021    TRICHOMONAS NOT REPORTED 02/11/2021    YEAST NOT REPORTED 02/11/2021    BACTERIA TRACE 02/11/2021    SPECGRAV 1.020 02/11/2021    LEUKOCYTESUR NEGATIVE 02/11/2021    UROBILINOGEN Normal 02/11/2021    BILIRUBINUR NEGATIVE 02/11/2021    GLUCOSEU 3+ 02/11/2021    KETUA NEGATIVE 02/11/2021    AMORPHOUS NOT REPORTED 02/11/2021       HgBA1c:    Lab Results   Component Value Date    LABA1C 9.5 08/27/2021       TSH:    Lab Results   Component Value Date    TSH 1.12 11/17/2021       Lactic Acid:   Lab Results   Component Value Date    LACTA 2.2 11/15/2021        High Sensitivity Troponin:  Recent Labs     11/16/21  1855 11/17/21  0525 11/18/21  0600   TROPHS 117* 141* 123*     Pro-BNP:  Lab Results   Component Value Date    PROBNP 10,948 (H) 11/18/2021     D-Dimer:  Lab Results   Component Value Date    DDIMER 2.58 (H) 11/16/2021     PT/INR:    Lab Results   Component Value Date    PROTIME 16.3 11/17/2021    INR 1.3 11/17/2021     PTT:    Lab Results   Component Value Date    APTT 37.3 11/17/2021       CRP:   Recent Labs     11/17/21  0525   .4*       ABGs:   No results found for: PHART, PH, UDA6UBY, PCO2, PO2ART, PO2, QDL0JJC, HCO3, BEART, BE, THGBART, THB, VRY3DNB, K1HLUNLZ, O2SAT, FIO2      Radiology/Imaging:  US RETROPERITONEAL COMPLETE   Final Result   1. Limited exam.  Large postvoid residual.  Nonvisualization of the right   ureteral jet. 2. No hydronephrosis. 3. 6 mm calculus at the midpole left kidney. 4. Cholelithiasis. XR CHEST PORTABLE   Final Result   Significant peripheral and basal predominant pulmonary opacities diffusely   throughout both lungs. This pattern can be seen in the setting of COVID-19   pneumonia, though this requires clinical correlation. Sequelae of underlying   interstitial lung disease is also possible, though less favored. Stability   is indeterminate in the absence of prior comparison imaging. Small right pleural effusion. Age-indeterminate fracture of the left anterolateral 9th rib.                ASSESSMENT / PLAN:     COVID-19 virus infection  · Continue current therapy  · Consults: Dr. Joanne Valle  · Remdesivir not indicated  · Appreciate infectious disease   · Continue vitamin C, D and zinc  · Trend labs  · IV steroidsstoppedelevated creatinine and no hypoxia  · VaccinatedJanuary, February booster in November 2021  · Acute respiratory failure with hypoxia  · Resolved  · Acapella  · PT OT  · Nebs  · CAD  · Continue Lipitor, Plavix, Lasix  · New onset atrial flutter  · Appreciate cardiology  · EKG2-1 conduction  · Converted  · Continue Lopressor  · Amiodarone drip completed  · Continue amiodarone 200 mg twice daily  · Continue Eliquis  · Type 2 diabetes  · POCT before meals and at bedtime  · High-dose sliding scale  · Continue Lantus  · Hold Glucophage due to CKD  · Continue glipizide  · Hypertension  · Continue Altace  · CKD  · Trend labs  · No remdesivir or Lovenox  · Stop steroidspossible cause of elevated creatinine  · Start Flomaxurinary retention  · Robertson catheter  · Prescient nephrology  · Nutrition status:   · Well developed, well nourished with no malnutrition  · Dietician consult initiated  · [] NPO [] TPN      [] Tube feed [] Clear liquid        [] Full liquid [] regular diet         [] Fluid restriction   [x] Diabetic diet   · Prophylaxis:   · DVT: Eliquis   · Stress Ulcer: H2 Blocker   · High risk medications: none   · Disposition:    · Discharge plan is pendinghome Friday      FRANKLIN Elkins CNP , FRANKLIN-NP-C  11/18/2021  11:58 AM

## 2021-11-18 NOTE — PROGRESS NOTES
Robertson catheter inserted due to urinary retention. Patient states he feels much better. Flomax started.

## 2021-11-19 ENCOUNTER — APPOINTMENT (OUTPATIENT)
Dept: GENERAL RADIOLOGY | Age: 86
DRG: 177 | End: 2021-11-19
Payer: MEDICARE

## 2021-11-19 LAB
ABSOLUTE EOS #: 0 K/UL (ref 0–0.44)
ABSOLUTE IMMATURE GRANULOCYTE: 0.22 K/UL (ref 0–0.3)
ABSOLUTE LYMPH #: 1.78 K/UL (ref 1.1–3.7)
ABSOLUTE MONO #: 0.67 K/UL (ref 0.1–1.2)
ALBUMIN SERPL-MCNC: 2.5 G/DL (ref 3.5–5.2)
ALBUMIN/GLOBULIN RATIO: 0.8 (ref 1–2.5)
ALP BLD-CCNC: 103 U/L (ref 40–129)
ALT SERPL-CCNC: 38 U/L (ref 5–41)
ANION GAP SERPL CALCULATED.3IONS-SCNC: 12 MMOL/L (ref 9–17)
AST SERPL-CCNC: 50 U/L
BASOPHILS # BLD: 0 % (ref 0–2)
BASOPHILS ABSOLUTE: 0 K/UL (ref 0–0.2)
BILIRUB SERPL-MCNC: 0.41 MG/DL (ref 0.3–1.2)
BUN BLDV-MCNC: 75 MG/DL (ref 8–23)
BUN/CREAT BLD: 33 (ref 9–20)
C-REACTIVE PROTEIN: 108.6 MG/L (ref 0–5)
CALCIUM SERPL-MCNC: 8.1 MG/DL (ref 8.6–10.4)
CHLORIDE BLD-SCNC: 101 MMOL/L (ref 98–107)
CO2: 21 MMOL/L (ref 20–31)
CREAT SERPL-MCNC: 2.3 MG/DL (ref 0.7–1.2)
D-DIMER QUANTITATIVE: 1.31 MG/L FEU (ref 0–0.59)
DIFFERENTIAL TYPE: ABNORMAL
EKG ATRIAL RATE: 88 BPM
EKG P AXIS: 50 DEGREES
EKG P-R INTERVAL: 156 MS
EKG Q-T INTERVAL: 382 MS
EKG QRS DURATION: 108 MS
EKG QTC CALCULATION (BAZETT): 462 MS
EKG R AXIS: -16 DEGREES
EKG T AXIS: 38 DEGREES
EKG VENTRICULAR RATE: 88 BPM
EOSINOPHILS RELATIVE PERCENT: 0 % (ref 1–4)
GFR AFRICAN AMERICAN: 32 ML/MIN
GFR NON-AFRICAN AMERICAN: 27 ML/MIN
GFR SERPL CREATININE-BSD FRML MDRD: ABNORMAL ML/MIN/{1.73_M2}
GFR SERPL CREATININE-BSD FRML MDRD: ABNORMAL ML/MIN/{1.73_M2}
GLUCOSE BLD-MCNC: 118 MG/DL (ref 70–99)
GLUCOSE BLD-MCNC: 144 MG/DL (ref 74–100)
GLUCOSE BLD-MCNC: 226 MG/DL (ref 74–100)
GLUCOSE BLD-MCNC: 393 MG/DL (ref 74–100)
GLUCOSE BLD-MCNC: 409 MG/DL (ref 74–100)
GLUCOSE BLD-MCNC: 56 MG/DL (ref 74–100)
GLUCOSE BLD-MCNC: 61 MG/DL (ref 74–100)
GLUCOSE BLD-MCNC: 79 MG/DL (ref 74–100)
HCT VFR BLD CALC: 31.6 % (ref 40.7–50.3)
HEMOGLOBIN: 10.4 G/DL (ref 13–17)
HIGH SENSITIVE C-REACTIVE PROTEIN: 135.2 MG/L
IMMATURE GRANULOCYTES: 2 %
LYMPHOCYTES # BLD: 16 % (ref 24–43)
MCH RBC QN AUTO: 31.2 PG (ref 25.2–33.5)
MCHC RBC AUTO-ENTMCNC: 32.9 G/DL (ref 28.4–34.8)
MCV RBC AUTO: 94.9 FL (ref 82.6–102.9)
MONOCYTES # BLD: 6 % (ref 3–12)
MORPHOLOGY: NORMAL
NRBC AUTOMATED: 0 PER 100 WBC
PDW BLD-RTO: 12.2 % (ref 11.8–14.4)
PLATELET # BLD: 207 K/UL (ref 138–453)
PLATELET ESTIMATE: ABNORMAL
PMV BLD AUTO: 10.9 FL (ref 8.1–13.5)
POTASSIUM SERPL-SCNC: 5 MMOL/L (ref 3.7–5.3)
RBC # BLD: 3.33 M/UL (ref 4.21–5.77)
RBC # BLD: ABNORMAL 10*6/UL
SEG NEUTROPHILS: 76 % (ref 36–65)
SEGMENTED NEUTROPHILS ABSOLUTE COUNT: 8.43 K/UL (ref 1.5–8.1)
SODIUM BLD-SCNC: 134 MMOL/L (ref 135–144)
TOTAL PROTEIN: 5.6 G/DL (ref 6.4–8.3)
TROPONIN INTERP: ABNORMAL
TROPONIN T: ABNORMAL NG/ML
TROPONIN, HIGH SENSITIVITY: 145 NG/L (ref 0–22)
WBC # BLD: 11.1 K/UL (ref 3.5–11.3)
WBC # BLD: ABNORMAL 10*3/UL

## 2021-11-19 PROCEDURE — 99232 SBSQ HOSP IP/OBS MODERATE 35: CPT | Performed by: INTERNAL MEDICINE

## 2021-11-19 PROCEDURE — 1200000000 HC SEMI PRIVATE

## 2021-11-19 PROCEDURE — 2500000003 HC RX 250 WO HCPCS: Performed by: INTERNAL MEDICINE

## 2021-11-19 PROCEDURE — 2700000000 HC OXYGEN THERAPY PER DAY

## 2021-11-19 PROCEDURE — 94761 N-INVAS EAR/PLS OXIMETRY MLT: CPT

## 2021-11-19 PROCEDURE — 6370000000 HC RX 637 (ALT 250 FOR IP): Performed by: NURSE PRACTITIONER

## 2021-11-19 PROCEDURE — 94640 AIRWAY INHALATION TREATMENT: CPT

## 2021-11-19 PROCEDURE — 99233 SBSQ HOSP IP/OBS HIGH 50: CPT | Performed by: INTERNAL MEDICINE

## 2021-11-19 PROCEDURE — APPSS30 APP SPLIT SHARED TIME 16-30 MINUTES: Performed by: NURSE PRACTITIONER

## 2021-11-19 PROCEDURE — 51702 INSERT TEMP BLADDER CATH: CPT

## 2021-11-19 PROCEDURE — 6370000000 HC RX 637 (ALT 250 FOR IP): Performed by: INTERNAL MEDICINE

## 2021-11-19 PROCEDURE — 97110 THERAPEUTIC EXERCISES: CPT

## 2021-11-19 PROCEDURE — 2580000003 HC RX 258: Performed by: INTERNAL MEDICINE

## 2021-11-19 PROCEDURE — 97530 THERAPEUTIC ACTIVITIES: CPT

## 2021-11-19 PROCEDURE — 94664 DEMO&/EVAL PT USE INHALER: CPT

## 2021-11-19 PROCEDURE — 93010 ELECTROCARDIOGRAM REPORT: CPT | Performed by: INTERNAL MEDICINE

## 2021-11-19 PROCEDURE — 2580000003 HC RX 258: Performed by: NURSE PRACTITIONER

## 2021-11-19 PROCEDURE — 93005 ELECTROCARDIOGRAM TRACING: CPT | Performed by: INTERNAL MEDICINE

## 2021-11-19 PROCEDURE — 85025 COMPLETE CBC W/AUTO DIFF WBC: CPT

## 2021-11-19 PROCEDURE — 84484 ASSAY OF TROPONIN QUANT: CPT

## 2021-11-19 PROCEDURE — 71045 X-RAY EXAM CHEST 1 VIEW: CPT

## 2021-11-19 PROCEDURE — 82947 ASSAY GLUCOSE BLOOD QUANT: CPT

## 2021-11-19 PROCEDURE — 80053 COMPREHEN METABOLIC PANEL: CPT

## 2021-11-19 PROCEDURE — 85379 FIBRIN DEGRADATION QUANT: CPT

## 2021-11-19 PROCEDURE — 86141 C-REACTIVE PROTEIN HS: CPT

## 2021-11-19 PROCEDURE — 36415 COLL VENOUS BLD VENIPUNCTURE: CPT

## 2021-11-19 PROCEDURE — 86140 C-REACTIVE PROTEIN: CPT

## 2021-11-19 PROCEDURE — 94669 MECHANICAL CHEST WALL OSCILL: CPT

## 2021-11-19 RX ORDER — IPRATROPIUM BROMIDE AND ALBUTEROL SULFATE 2.5; .5 MG/3ML; MG/3ML
1 SOLUTION RESPIRATORY (INHALATION) 4 TIMES DAILY
Status: DISCONTINUED | OUTPATIENT
Start: 2021-11-19 | End: 2021-11-27 | Stop reason: HOSPADM

## 2021-11-19 RX ORDER — ALBUTEROL SULFATE 2.5 MG/3ML
2.5 SOLUTION RESPIRATORY (INHALATION) EVERY 4 HOURS PRN
Status: DISCONTINUED | OUTPATIENT
Start: 2021-11-19 | End: 2021-11-27 | Stop reason: HOSPADM

## 2021-11-19 RX ORDER — IPRATROPIUM BROMIDE AND ALBUTEROL SULFATE 2.5; .5 MG/3ML; MG/3ML
SOLUTION RESPIRATORY (INHALATION)
Status: DISPENSED
Start: 2021-11-19 | End: 2021-11-19

## 2021-11-19 RX ORDER — SODIUM BICARBONATE 650 MG/1
650 TABLET ORAL 2 TIMES DAILY
Status: DISCONTINUED | OUTPATIENT
Start: 2021-11-19 | End: 2021-11-27 | Stop reason: HOSPADM

## 2021-11-19 RX ORDER — IPRATROPIUM BROMIDE AND ALBUTEROL SULFATE 2.5; .5 MG/3ML; MG/3ML
1 SOLUTION RESPIRATORY (INHALATION)
Status: DISCONTINUED | OUTPATIENT
Start: 2021-11-19 | End: 2021-11-19

## 2021-11-19 RX ORDER — BUMETANIDE 0.25 MG/ML
1 INJECTION, SOLUTION INTRAMUSCULAR; INTRAVENOUS ONCE
Status: COMPLETED | OUTPATIENT
Start: 2021-11-19 | End: 2021-11-19

## 2021-11-19 RX ADMIN — DEXTROSE 15 G: 15 GEL ORAL at 03:20

## 2021-11-19 RX ADMIN — BISACODYL 10 MG: 5 TABLET, COATED ORAL at 08:33

## 2021-11-19 RX ADMIN — SODIUM BICARBONATE TAB 650 MG 650 MG: 650 TAB at 20:44

## 2021-11-19 RX ADMIN — OXYCODONE HYDROCHLORIDE AND ACETAMINOPHEN 2000 MG: 500 TABLET ORAL at 08:24

## 2021-11-19 RX ADMIN — GLIPIZIDE 5 MG: 5 TABLET ORAL at 08:24

## 2021-11-19 RX ADMIN — ZINC SULFATE 220 MG (50 MG) CAPSULE 100 MG: CAPSULE at 08:24

## 2021-11-19 RX ADMIN — CLOPIDOGREL BISULFATE 75 MG: 75 TABLET ORAL at 08:24

## 2021-11-19 RX ADMIN — INSULIN LISPRO 2 UNITS: 100 INJECTION, SOLUTION INTRAVENOUS; SUBCUTANEOUS at 12:42

## 2021-11-19 RX ADMIN — OXYCODONE HYDROCHLORIDE AND ACETAMINOPHEN 2000 MG: 500 TABLET ORAL at 20:44

## 2021-11-19 RX ADMIN — APIXABAN 2.5 MG: 2.5 TABLET, FILM COATED ORAL at 20:44

## 2021-11-19 RX ADMIN — IPRATROPIUM BROMIDE AND ALBUTEROL SULFATE 1 AMPULE: 2.5; .5 SOLUTION RESPIRATORY (INHALATION) at 21:24

## 2021-11-19 RX ADMIN — SODIUM CHLORIDE, PRESERVATIVE FREE 10 ML: 5 INJECTION INTRAVENOUS at 20:54

## 2021-11-19 RX ADMIN — IPRATROPIUM BROMIDE AND ALBUTEROL SULFATE 1 AMPULE: 2.5; .5 SOLUTION RESPIRATORY (INHALATION) at 15:23

## 2021-11-19 RX ADMIN — FAMOTIDINE 10 MG: 10 TABLET ORAL at 20:44

## 2021-11-19 RX ADMIN — INSULIN LISPRO 5 UNITS: 100 INJECTION, SOLUTION INTRAVENOUS; SUBCUTANEOUS at 17:47

## 2021-11-19 RX ADMIN — SODIUM CHLORIDE, PRESERVATIVE FREE 10 ML: 5 INJECTION INTRAVENOUS at 08:34

## 2021-11-19 RX ADMIN — BUMETANIDE 1 MG: 0.25 INJECTION INTRAMUSCULAR; INTRAVENOUS at 11:09

## 2021-11-19 RX ADMIN — AMIODARONE HYDROCHLORIDE 200 MG: 200 TABLET ORAL at 08:24

## 2021-11-19 RX ADMIN — GLIPIZIDE 5 MG: 5 TABLET ORAL at 17:00

## 2021-11-19 RX ADMIN — METOPROLOL SUCCINATE 25 MG: 25 TABLET, EXTENDED RELEASE ORAL at 08:24

## 2021-11-19 RX ADMIN — AMIODARONE HYDROCHLORIDE 200 MG: 200 TABLET ORAL at 20:44

## 2021-11-19 RX ADMIN — ATORVASTATIN CALCIUM 40 MG: 40 TABLET, FILM COATED ORAL at 20:43

## 2021-11-19 RX ADMIN — DEXTROSE 15 G: 15 GEL ORAL at 03:58

## 2021-11-19 RX ADMIN — MAGNESIUM HYDROXIDE 30 ML: 400 SUSPENSION ORAL at 08:24

## 2021-11-19 RX ADMIN — SODIUM BICARBONATE: 84 INJECTION, SOLUTION INTRAVENOUS at 02:47

## 2021-11-19 RX ADMIN — TAMSULOSIN HYDROCHLORIDE 0.4 MG: 0.4 CAPSULE ORAL at 08:24

## 2021-11-19 RX ADMIN — IPRATROPIUM BROMIDE AND ALBUTEROL SULFATE 1 AMPULE: 2.5; .5 SOLUTION RESPIRATORY (INHALATION) at 10:39

## 2021-11-19 RX ADMIN — APIXABAN 2.5 MG: 2.5 TABLET, FILM COATED ORAL at 08:24

## 2021-11-19 ASSESSMENT — PAIN SCALES - GENERAL
PAINLEVEL_OUTOF10: 0
PAINLEVEL_OUTOF10: 0

## 2021-11-19 NOTE — PROGRESS NOTES
Occupational Therapy  Facility/Department: [facility] AT THE [facility] MED SURG  Daily Treatment Note  NAME: Chery Puente  : 10/31/1930  MRN: [id number]    Date of Service: 2021    Discharge Recommendations:  Continue to assess pending progress, Home with assist PRN       Assessment      OT Education: Energy Conservation  Patient Education: Deep breathing techniques  Barriers to [address] in place: Yes  Type of devices: All fall risk precautions in place; Left in chair; Call light within reach; Chair alarm in place         Patient Diagnosis(es): The primary encounter diagnosis was Pneumonia due to COVID-19 virus. A diagnosis of Elevated troponin was also pertinent to this visit. has a past medical history of Acute and chronic respiratory failure with hypoxia ([address] CAD (coronary artery disease), Cataracts, bilateral, Glaucoma, Hyperlipidemia, Hypertension, TIA (transient ischemic attack), and Type II or unspecified type diabetes mellitus without mention of complication, not stated as uncontrolled. has a past surgical history that includes Coronary artery bypass graft (); Cataract removal with implant (Bilateral,  and 12/3/2013); and Inguinal hernia repair (Bilateral, 12). Restrictions  Restrictions/Precautions  Restrictions/Precautions: General Precautions, Fall Risk, Contact Precautions  Subjective   General  Chart Reviewed: Yes  Patient assessed for rehabilitation services?: Yes  Response to previous treatment: Patient with no complaints from previous session  Family / Caregiver Present: No  Referring Practitioner: Gabriela Courser  Diagnosis: Covid-19  Subjective  Subjective: Pt denies pain this date. Pt on 2 liters of O2 for comfort per Respiratory. General Comment  Comments: Pt in bedside chair upon arrival.  Agreeable to B UE ther ex. Pleasant and compliant throughout tx.       Orientation     Objective

## 2021-11-19 NOTE — PROGRESS NOTES
Dr. Randi Ochoa and Linda Kenny, RANDAL, informed of critical Troponin 145.  N.O. D/C Troponin lab draws

## 2021-11-19 NOTE — PROGRESS NOTES
Patient calls the writer and complaining chest heaviness while coughing. Patient's oxygen saturation is 99%. Writer put oxygen at 2 lpm and patient verbalized a relief.

## 2021-11-19 NOTE — PROGRESS NOTES
Writer rechecks patient's blood sugar. Patient's blood sugar 56. Writer gives 1 tube  Glucose gel. See MAR.

## 2021-11-19 NOTE — PROGRESS NOTES
Pt resting in bed, call light within reach. SpO2 maintained in high 90s at [facility]. Pt denies pain, denies needs. Will continue to monitor.

## 2021-11-19 NOTE — PROGRESS NOTES
non-prod. [x]  Effective & prod. Less than 25 ml (2 TBSP) over past 24 hrs []  Ineffective & non-prod to less than 25 ML over past 24 hrs []  Ineffective and, or greater than 25 ml sputum prod. past 24 hrs. []  Nonspon- taneous; Requires suctioning 1   Pulmonary History  (PULM HX) []  No smoking and no chronic pulmonaryhistory []  Former smoker. Quit over 12 mos. ago []  Current smoker or quit w/ in 12 mos []  Pulm. History and, or 20 pk/yr smoking hx [x]  Admitted w/ acute pulm. dx and, or has been admitted w/ pulm. dx 2 or more times over past 12 mos 4   Surgical History this Admit  (SURG HX) [x]  No surgery []  General surgery []  Lower abdominal []  Thoracic or upper abdominal   []  Thoracic w/ pulm. disease 0   Chest X-Ray (CXR)/CT Scan []  Clear or not applicable []  Not available []  Atelect- asis or pleural effusions []  Localized infiltrate or pulm. edema [x]  Con-solidated Infiltrates, bilateral, or in more than 1 lobe 4   Slow or Forced VC, FEV1 OR PEFR (PULM FXN)  [x]  80% or greater, or not indicated []  Pt. unable to perform []  FEV1 or PEFR or VC 51-79%. []  FEV1 or PEFR or VC  30-49%   []  FEV1 or PEFR or VC less than 30%   0   TOTAL ACUITY: 13       CARE PLAN    If Acuity Level is 2, 3, or 4 in any of the following:    [x] BILATERAL BREATH SOUNDS (BBS)     [x] PULMONARY HISTORY (PULM HX)  [] PULMONARY FUNCTION (PULM FX)    Goal: Improve respiratory functions in patients with airway disease and decrease WOB    [x] AEROSOL PROTOCOL    Total Acuity:   16-32  []  Secondary Assessment in 24 hrs Total Acuity:  9-15  [x]  Secondary Assessment in 24 hrs Total Acuity:  4-8  []  Secondary Assessment in 48 hrs Total Acuity:  0-3  []  Secondary Assessment in 72 hrs   HHN AEROSOL THERAPY with  [physician-ordered bronchodilator(s)] q 4 & Albuterol PRN q2 hrs. Breath-Actuated Neb if BBS Acuity = 4, and pt. can use MP. Notify physician if condition deteriorates.   HHN AEROSOL THERAPY with  [physician-ordered bronchodilator(s)]  QID and Albuterol PRN q4 hrs. Breath-Actuated Neb if BBS Acuity = 4, and pt. can use MP. Notify physician if condition deteriorates. MDI THERAPY with  2 actuations of [physician-ordered bronchodilator(s)] via spacer TID Albuterol and PRNq4 hrs. If unable to utilize MDI: HHN [physician-ordered bronchodilator(s)] TID and Albuterol PRN q4 hrs. Notify physician if condition deteriorates. MDI THERAPY with  [physician-ordered bronchodilator(s)] via spacer TID PRN. If unable to utilize MDI: HHN [physician-ordered bronchodilator(s)] TID PRN. Notify physician if condition deteriorates. If Acuity Level is 2, 3, or 4 in any of the following:    [] COUGH     [] SURGICAL HISTORY (SURG HX)  [x] CHEST XRAY (CXR)    Goal: Improvement in sputum mobilization in patients with ineffective airway clearance. Reverse atelectasis. [x] Bronchopulmonary Hygiene Protocol    Total Acuity:   16-32  []  Secondary Assessment in 24 hrs Total Acuity:  9-15  [x]  Secondary Assessment in 24 hrs Total Acuity:  4-8  []  Secondary Assessment in 48 hrs Total Acuity:  0-3  []  Secondary Assessment in 72 hrs   METANEB QID with [physician-ordered bronchodilator(s)] if CXR Acuity = 4; otherwise:  PD&P, PEP, or Vest QID & PRN  NT Sxn PRN for ineffective cough  METANEB QID with [physician-ordered bronchodilator(s)] if CXR Acuity = 4; otherwise:  PD&P, PEP, or Vest TID & PRN  NT Sxn PRN for ineffective cough  Instruct patient to self-perform IS q1hr WA   Directed Cough self-performed q1hr WA     If Acuity Level is 2 or above in the following:    [] PULMONARY HISTORY (PULM HX)    Goal: Assist patient in quitting smoking to slow or stop the progression of lung disease.     [] Smoking Cessation Protocol    SMOKING CESSATION EDUCATION provided according to policy [id number]: (kelsi with an X)  ____Yes    ____ No     ____ NA    Smoking Cessation Booklet given:  ____Yes  ____No ____Patient [facility]

## 2021-11-19 NOTE — PROGRESS NOTES
Pt lung sounds moist/rhonci, unable to maintain O2 sat >90%, pt wheezing. Radha RT, notified, assessed pt and O2 at [facility] administered at this time. Pt in bed, agreed to get up to chair for breakfast. Writer assisted pt to chair, 1 assist, jeannie fairly well. Pt able to feed self. Call light within reach. Will continue to monitor.

## 2021-11-19 NOTE — PROGRESS NOTES
Physical Therapy  Facility/Department: [facility] AT THE [facility] MED SURG  Daily Treatment Note  NAME: Yamilet Fernandez  : 10/31/1930  MRN: [id number]    Date of Service: 2021    Discharge Recommendations:  Continue to assess pending progress, Patient would benefit from continued therapy after discharge        Assessment   Assessment: Stand pivot transfer bed to commode with CGA/ Min A. Pt fatigued quickly and SpO2 dropped to 66% with standing. Required max v/c for pursed lip breathing and to focus on breathing instead of talking. Pt unable to tolerate any further activity. Treatment Diagnosis: Difficutly walking  Prognosis: Good  Decision Making: Medium Complexity  PT Education: Energy Conservation  Patient Education: Educated pt on pursed lip breathing with fair follow through. REQUIRES PT FOLLOW UP: Yes  Activity Tolerance  Activity Tolerance: Patient limited by endurance  Activity Tolerance: Upon standing from bed, pt SpO2 dropped to 66%. Pt required max v/c for pursed lip breathing and to focus on breathing instead of talking. Patient Diagnosis(es): The primary encounter diagnosis was Pneumonia due to COVID-19 virus. A diagnosis of Elevated troponin was also pertinent to this visit. has a past medical history of Acute and chronic respiratory failure with hypoxia ([address] CAD (coronary artery disease), Cataracts, bilateral, Glaucoma, Hyperlipidemia, Hypertension, TIA (transient ischemic attack), and Type II or unspecified type diabetes mellitus without mention of complication, not stated as uncontrolled. has a past surgical history that includes Coronary artery bypass graft (); Cataract removal with implant (Bilateral,  and 12/3/2013); and Inguinal hernia repair (Bilateral, 12).     Restrictions  Restrictions/Precautions  Restrictions/Precautions: General Precautions, Fall Risk, Isolation  Subjective   General  Chart Reviewed: Yes  Response To Previous Treatment: Patient with no complaints from previous session. Family / Caregiver Present: No  Subjective  Subjective: Pt reports pain with coughing is 5/10 and otherwise is no pain. Pain Screening  Patient Currently in Pain: Denies  Vital Signs  Patient Currently in Pain: Denies       Orientation  Orientation  Overall Orientation Status: Within Functional Limits  Cognition      Objective   Bed mobility  Supine to Sit: Moderate assistance  Sit to Supine: Minimal assistance  Scooting: Contact guard assistance  Transfers  Sit to Stand: Minimal Assistance; Contact guard assistance  Stand to sit: Minimal Assistance; Contact guard assistance  Stand Pivot Transfers: Minimal Assistance; Contact guard assistance  Ambulation  Ambulation?: No     Balance  Posture: Fair  Sitting - Static: Good  Sitting - Dynamic: Good  Standing - Static: Fair  Standing - Dynamic: Fair; -  Comment: Transfer to commode from bed with CGA/ Min A.     Goals  Short term goals  Time Frame for Short term goals: 20 days  Short term goal 1: Pt will be initiated on strong ex program to address deficits in endurance and balance. Short term goal 2: Pt will be independent with bed mobility without use of handrails for discharge home and negotiation of bed. Short term goal 3: Pt will transfer independently with least restrictive device and good balance to reduce fall risk  Short term goal 4: Pt will be able to ambulate up to 350 feet with supervision to independent with least restrictive device to allow discharge home with his wife.   Short term goal 5: Pt will be able to negoitiate 4 steps with chani handrails and SBA +1  Patient Goals   Patient goals : go home and hopes for Thursday    Plan    Plan  Times per week: 7  Times per day: Twice a day  Plan Comment: Initiate POC  Safety Devices  Type of devices: Call light within reach, Patient at risk for falls, All fall risk precautions in place, Nurse notified, Left in bed, Bed alarm in place     Therapy Time   Individual Concurrent Group Co-treatment Time In 1404         Time Out 1432         Minutes [address] [place] [address]

## 2021-11-19 NOTE — PROGRESS NOTES
Dr. Kailee Self called for update on pt's condition. MD informed pt lung sounds with crackles and now back on [facility]. MD informed IV fluids stopped by Lizzette Schrader, CNP. Dr. Kailee Self agreed with stopping fluids and states she will order dose of IV Bumex to be given. MD to call back later for video visit. Will continue to monitor.

## 2021-11-19 NOTE — PROGRESS NOTES
[facility] - Progress Note   COVID 19 Patient-Tele-visit  Today's Date and Time: 11/19/2021, 10:43 AM    Impression :     · COVID 19 Confirmed Infection  · Covid tests:  · 11/15/21 Positive  · Urinary retention  · Renal calculi  · Cholelithiasis  · DM II  · Essential HTN  · Atrial Flutter  · CHF  · CKD IV  · CVA 2018  · Received COVID vaccines      Patient evaluated by Telemedicine. Requesting Institution: [facility]  Provider Institution: [address] [place] [address],[address], [address] [facility] Person at [facility] [place]: Ms Bowen Erm, APRN- IM    Recommendations:   · Antibiotic treatment:  · Obtain urine culture  · Covid Rx:  · Remdesivir-contraindicated with renal insufficiency  · Solu-medrol initiated 11/16/21  · Actemra-Not indicated at this time  · Monitor CRP      Medical Decision Making/Summary/Discussion:11/19/2021     · Patient admitted with suspected COVID 19 infection  · Covid test confirmed positive. Infection Control Recommendations   · Universal Precautions  · Airborne isolation  · Droplet Isolation    Antimicrobial Stewardship Recommendations     · Discontinuation of therapy  Coordination of Outpatient Care:   · Estimated Length of IV antimicrobials:TBD  · Patient will need Midline Catheter Insertion: TBD  · Patient will need PICC line Insertion: No  · Patient will need: Home IV , [place],  SNF,  LTAC:TBD  · Patient will need outpatient wound care:No    Chief complaint/reason for consultation:   · Concern for COVID infection      History of Present Illness:   Maritza Brito is a 80y.o.-year-old male who was initially admitted on 11/15/2021. Patient seen at the request of Dr. Duglas Jo:    Patient presented through ER with complaints of increased shortness of breath worsening over the past six weeks. He received his Covid vaccines. He was treated with antibiotics without relief.      The patient's HR was noted to be in the 140's and an EKG revealed acute onset atrial flutter. Cardiology was consulted and he was started on an amiodarone gtt. Covid swab was positive. The patient was also found to have acute urinary retention, 6 mm left pole renal calculi and cholelithiasis. Echocardiogram revealed a possible acute small to moderate sized inter-atrial communication. WBC is increasing      Impression CXR 11/15/21   Significant peripheral and basal predominant pulmonary opacities diffusely   throughout both lungs.  This pattern can be seen in the setting of COVID-19   pneumonia, though this requires clinical correlation.  Sequelae of underlying   interstitial lung disease is also possible, though less favored.  Stability   is indeterminate in the absence of prior comparison imaging.       Small right pleural effusion.       Age-indeterminate fracture of the left anterolateral 9th rib.                 Increased risk 3-dose inadvertent series    Dose 1  01/29/2021 (COVID-19, Moderna, Primary or Immunocompromised, PF, 100mcg/0.5mL)      Dose 2  02/18/2021 (COVID-19, Moderna, Primary or Immunocompromised, PF, 100mcg/0.5mL) - Given too close to previous dose     02/26/2021 (COVID-19, [place], Primary or Immunocompromised, PF, 100mcg/0.5mL) - Given too close to previous dose     11/02/2021 (COVID-19, Pfizer, PF, 30mcg/0.3mL)      Booster dose  05/02/2022 - Dose Forecast         Patient admitted because of concerns with COVID 19.    CURRENT EVALUATION : 11/19/2021    Afebrile  VS stable     The patient has a moist cough and crackles concerning for fluid overload today. He is on 2 L NC    Bumex ordered per primary service    Patient exhibiting respiratory distress.  No  Respiratory secretions:     Patient receiving supplemental oxygen: 2 L NC  RR: 19-->19-->20  02 sat: 97-->97-->95      % FIO2:   PEEP:      QTc:       NEWS Score: 0-4 Low risk group; 5-6: Medium risk group; 7 or above: High risk group  Parameters 3 2 1 0 1 2 3   Age    < 72   ? 72 RR ? 8  9-11 12-20  21-24 ? 25   O2 Sats ? 91 92-93 94-95 ? 96      Suppl O2  Yes  No      SBP ? 90  101-110 111-219   ? 220   HR ? 40  41-50 51-90  111-130 ? 131   Consciousness    Alert   Drowsiness, lethargy, or confusion   Temperature ? 35.0 C (95.0 F)  35.1-36.0 C 95.1-96.9 F 36.1-38.0 C 97.0-100.4 F 38.1-39.0 C 100.5-102.3 F ? 39.1 C ? 102.4 F      NEWS Score:   11/17/21: 3 Low risk    Overall Daily Picture:    unchanged    Presence of secondary bacterial Infection:    Possible   Additional antibiotics: No    Labs, X rays reviewed: 11/19/2021    BUN:85-->88-->75  Cr:3.0-->2.73-->2.30  BNP: 95829    WBC:11.7-->16.2-->13.2-->11.1  Hb:10-->10.4  Plat: 193-->207    Absolute Neutrophils:14.28  Absolute Lymphocytes:1.03  Neutrophil/Lymphocyte Ratio: 13.8 high risk    CRP:101.4-->63.2  Ferritin:866  LDH:     Pro Calcitonin:      Cultures:  Urine:  · 11/17: No growth  Blood:  · 11/17: No growth  Sputum :  ·   Wound:       CXR:   11/19/21 11/16/21      CAT:      Discussed with patient, RN, CC, IM. I have personally reviewed the past medical history, past surgical history, medications, social history, and family history, and I have updated the database accordingly.   Past Medical History:     Past Medical History:   Diagnosis Date    Acute and chronic respiratory failure with hypoxia ([address] 11/16/2021    CAD (coronary artery disease)     Cataracts, bilateral     Glaucoma     Hyperlipidemia     Hypertension     TIA (transient ischemic attack)     Type II or unspecified type diabetes mellitus without mention of complication, not stated as uncontrolled        Past Surgical  History:     Past Surgical History:   Procedure Laterality Date    CATARACT REMOVAL WITH IMPLANT Bilateral 11/219/2013 and 12/3/2013    CORONARY ARTERY BYPASS GRAFT  2000    INGUINAL HERNIA REPAIR Bilateral 06/19/12       Medications:      sodium bicarbonate        sodium bicarbonate        ipratropium-albuterol  1 ampule Inhalation Q4H WA    bumetanide  1 mg IntraVENous Once    famotidine  10 mg Oral Nightly    tamsulosin  0.4 mg Oral Daily    insulin lispro  0-6 Units SubCUTAneous TID WC    insulin lispro  0-3 Units SubCUTAneous Nightly    atorvastatin  40 mg Oral Nightly    clopidogrel  75 mg Oral Daily    glipiZIDE  5 mg Oral BID AC    sodium chloride flush  5-40 mL IntraVENous 2 times per day    vitamin D  20,000 Units Oral Weekly    ascorbic acid  2,000 mg Oral BID    zinc sulfate  100 mg Oral Daily    metoprolol succinate  25 mg Oral Daily    apixaban  2.5 mg Oral BID    amiodarone  200 mg Oral BID       Social History:     Social History     Socioeconomic History    Marital status:      Spouse name: Not on file    Number of children: Not on file    Years of education: Not on file    Highest education level: Not on file   Occupational History    Not on file   Tobacco Use    Smoking status: Never Smoker    Smokeless tobacco: Never Used   Substance and Sexual Activity    Alcohol use: Yes     Comment: very very very seldom    Drug use: No    Sexual activity: Not on file   Other Topics Concern    Not on file   Social History Narrative    Not on file     Social Determinants of Health     Financial Resource Strain:     Difficulty of Paying Living Expenses: Not on file   Food Insecurity:     Worried About Running Out of Food in the Last Year: Not on file    Mera of Food in the Last Year: Not on file   Transportation Needs:     Lack of Transportation (Medical): Not on file    Lack of Transportation (Non-Medical):  Not on file   Physical Activity:     Days of Exercise per Week: Not on file    Minutes of Exercise per Session: Not on file   Stress:     Feeling of Stress : Not on file   Social Connections:     Frequency of Communication with Friends and Family: Not on file    Frequency of Social Gatherings with Friends and Family: Not on file    Attends Orthodox Services: Not on file  Active Member of Clubs or Organizations: Not on file    Attends Club or Organization Meetings: Not on file    Marital Status: Not on file   Intimate Partner Violence:     Fear of Current or Ex-Partner: Not on file    Emotionally Abused: Not on file    Physically Abused: Not on file    Sexually Abused: Not on file   Housing Stability:     Unable to Pay for Housing in the Last Year: Not on file    Number of [place] in the Last Year: Not on file    Unstable Housing in the Last Year: Not on file       Family History:     Family History   Problem Relation Age of Onset    Heart Disease Father     Heart Disease Brother         Allergies:   Patient has no known allergies. Review of Systems:       Constitutional: No fevers or chills. No systemic complaints  Head: No headaches  Eyes: No double vision or blurry vision. No conjunctival inflammation. ENT: No sore throat or runny nose. . No hearing loss, tinnitus or vertigo. Cardiovascular: No chest pain or palpitations. No Shortness of breath. No SPEARS  Lung: No Shortness of breath or cough. No sputum production  Abdomen: No nausea, vomiting, diarrhea, or abdominal pain. Renetta Crumble No cramps. Genitourinary: No increased urinary frequency, or dysuria. No hematuria. No suprapubic or CVA pain  Musculoskeletal: No muscle aches or pains. No joint effusions, swelling or deformities  Hematologic: No bleeding or bruising. Neurologic: No headache, weakness, numbness, or tingling. Integument: No rash, no ulcers. Psychiatric: No depression. Endocrine: No polyuria, no polydipsia, no polyphagia.     Physical Examination :     Patient Vitals for the past 8 hrs:   BP Temp Temp src Pulse Resp SpO2 Weight   11/19/21 0742      95 %    11/19/21 0700 127/70 97.2 °F (36.2 °C) Temporal 79 20 90 %    11/19/21 0545       168 lb 9.6 oz (76.5 kg)     General Appearance: Awake, alert, and in no apparent distress  Head:  Normocephalic, no trauma  Eyes: Pupils equal, round, reactive to light; sclera anicteric; conjunctivae pink. No embolic phenomena. ENT: Oropharynx clear, without erythema, exudate, or thrush. No tenderness of sinuses. Mouth/throat: mucosa pink and moist. No lesions. Dentition in good repair. Neck:Supple, without lymphadenopathy. Thyroid normal, No bruits. Pulmonary/Chest: Clear to auscultation, without wheezes, rales, or rhonchi. No dullness to percussion. Cardiovascular: Regular rate and rhythm without murmurs, rubs, or gallops. Abdomen: Soft, non tender. Bowel sounds normal. No organomegaly  All four Extremities: No cyanosis, clubbing, edema, or effusions. Neurologic: No gross sensory or motor deficits. Skin: Warm and dry with good turgor. No signs of peripheral arterial or venous insufficiency. No ulcerations. No open wounds. Medical Decision Making -Laboratory:   I have independently reviewed/ordered the following labs:    CBC with Differential:   Recent Labs     11/18/21  0600 11/19/21  0557   WBC 13.2* 11.1   HGB 10.2* 10.4*   HCT 31.1* 31.6*    207   LYMPHOPCT 13* 16*   MONOPCT 7 6     BMP:   Recent Labs     11/18/21  0600 11/19/21  0557    134*   K 4.0 5.0    101   CO2 15* 21   BUN 88* 75*   CREATININE 2.73* 2.30*     Hepatic Function Panel:   Recent Labs     11/18/21  0600 11/19/21  0557   PROT 6.0* 5.6*   LABALBU 2.9* 2.5*   BILITOT 0.34 0.41   ALKPHOS 103 103   ALT 36 38   AST 47* 50*     No results for input(s): RPR in the last 72 hours. No results for input(s): HIV in the last 72 hours. No results for input(s): BC in the last 72 hours.   Lab Results   Component Value Date    MUCUS NOT REPORTED 02/11/2021    RBC 3.33 11/19/2021    TRICHOMONAS NOT REPORTED 02/11/2021    WBC 11.1 11/19/2021    YEAST NOT REPORTED 02/11/2021    TURBIDITY CLEAR 02/11/2021     Lab Results   Component Value Date    CREATININE 2.30 11/19/2021    GLUCOSE 118 11/19/2021    GLUCOSE 124 05/17/2012       Medical Decision Making-Imaging:     Narrative EXAMINATION:   ONE XRAY VIEW OF THE CHEST       11/15/2021 6:06 pm       COMPARISON:   None.       HISTORY:   ORDERING SYSTEM PROVIDED HISTORY: short of breath   TECHNOLOGIST PROVIDED HISTORY:   short of breath       FINDINGS:   There are diffuse peripheral and basal pulmonary opacities bilaterally of   indeterminate stability in the absence of prior comparison imaging.  Small   right pleural effusion.  No pneumothorax.  Cardiomediastinal contours are   within normal limits for size with prior CABG and a tortuous, atherosclerotic   aorta.  Degenerative changes of the spine and shoulders.  Age-indeterminate   fracture of the anterolateral left 9th rib.           Impression   Significant peripheral and basal predominant pulmonary opacities diffusely   throughout both lungs.  This pattern can be seen in the setting of COVID-19   pneumonia, though this requires clinical correlation.  Sequelae of underlying   interstitial lung disease is also possible, though less favored.  Stability   is indeterminate in the absence of prior comparison imaging.       Small right pleural effusion.       Age-indeterminate fracture of the left anterolateral 9th rib.             Narrative   EXAMINATION:   RETROPERITONEAL ULTRASOUND OF THE KIDNEYS AND URINARY BLADDER       11/17/2021       COMPARISON:   None       HISTORY:   ORDERING SYSTEM PROVIDED HISTORY: Rule out obstructive uropathy.    TECHNOLOGIST PROVIDED HISTORY:       Rule out obstructive uropathy.       60-year-old male; rule out obstructive uropathy       FINDINGS:       Kidneys:       Exam limited due to rib shadowing and difficulty breathing.       Right kidney measures 9.1 x 4.9 x 4.9 cm.  Right renal cortical thickness   measures 1.1 cm.       Left kidney measures 9.6 x 4.4 x 5.0 cm.  Left renal cortical thickness   measures 8 mm.       Color flow projects over the bilateral renal parenchyma.       No hydronephrosis.  No perinephric fluid.       Gross preservation of the communication. Consider additional  testing by transesophageal echocardiography (PATT) if clinically indicated,  especially if this might . Clinical correlation required.       Medical Decision [facility]:       Medical Decision Making-Other:     Note:  · Labs, medications, radiologic studies were reviewed with personal review of films  · Large amounts of data were reviewed  · Discussed with nursing Staff, Discharge planner  · Infection Control and Prevention measures reviewed  · All prior entries were reviewed  · Administer medications as ordered  · Prognosis: Guarded  · Discharge planning reviewed      Thank you for allowing us to participate in the care of this patient. Please call with questions. Ashley Marin, APRN - CNP       ATTESTATION:    I have discussed the case, including pertinent history and exam findings with the APRN. I have evaluated the  History, physical findings and pictures of the patient and the key elements of the encounter have been performed by me. I have reviewed the laboratory data, other diagnostic studies and discussed them with the APRN. I have updated the medical record where necessary. I agree with the assessment, plan and orders as documented by the APRN.     Hannah Rick MD.        Pager: [phone] - Office: [phone]

## 2021-11-19 NOTE — PROGRESS NOTES
[facility]   APRN - Progress Note    Patient - Percy Parker  Date of Admission -  11/15/2021  8:38 PM  Date of Evaluation -  2021  Hospital Day - 4      SUBJECTIVE:     The Percy Parker is a 80 y.o. male who is seen for follow up for atrial fibrillation with RVR and COVID-19. Per nursing report and notes, overnight events include: no significant events. He sitting up in bed alert oriented in mild respiratory distress. Patient remains in normal sinus rhythm. ROS:   Constitutional: negative  for fevers, and negative for chills. Respiratory: positive for shortness of breath, negative for cough, and negative for wheezing  Cardiovascular: negative for chest pain, and negative for palpitations  Gastrointestinal: negative for abdominal pain, negative for nausea,negative for vomiting, negative for diarrhea, and negative for constipation    All other systems were reviewed with the patient and are negative unless otherwise stated in HPI. OBJECTIVE:     VITAL SIGNS:  Patient Vitals for the past 8 hrs:   BP Temp Temp src Pulse Resp SpO2   21 1415      95 %   21 1405    122     21 1115    95  96 %   21 0742      95 %   21 0700 127/70 97.2 °F (36.2 °C) Temporal 79 20 90 %         Temp: 97.2 °F (36.2 °C)  Temp range:    Temp  Av.5 °F (36.9 °C)  Min: 97 °F (36.1 °C)  Max: 101.1 °F (38.4 °C)    BP: 127/70  BP Range:      Systolic (23PJB), OLV:940 , Min:121 , ASHU:156      Diastolic (79FCB), CMD:42, Min:45, Max:70    Pulse: 122  Pulse Range:    Pulse  Av.2  Min: 79  Max: 122    Resp: 20  Resp Range:   Resp  Av.3  Min: 20  Max: 21    SpO2: 95 % 3L NC  SpO2 range:   SpO2  Av.7 %  Min: 90 %  Max: 100 %    Weight  Wt Readings from Last 3 Encounters:   21 168 lb 9.6 oz (76.5 kg)   19 158 lb (71.7 kg)   18 166 lb 6.4 oz (75.5 kg)     Body mass index is 29.87 kg/m².     24HR INTAKE/OUTPUT:      Intake/Output Summary (Last 24 hours) at 11/19/2021 1424  Last data filed at 11/19/2021 1113  Gross per 24 hour   Intake 950 ml   Output 2700 ml   Net -1750 ml     Date 11/19/21 0000 - 11/19/21 2359   Shift 8691-3510 4505-4311 8574-9833 24 Hour Total   INTAKE   I.V.(mL/kg/hr) 950(1.6)   950   Shift Total(mL/kg) 950(12.4)   950(12.4)   OUTPUT   Urine(mL/kg/hr) 1400(2.3) 600  2000   Shift Total(mL/kg) 1400(18.3) 600(7.8)  2000(26.2)   Weight (kg) 76.5 76.5 76.5 76.5         PHYSICAL EXAM:  GEN:    Awake and following commands:     [] No   [x] Yes  MENTAL STATUS: alert and oriented x3. DISTRESS: Acute respiratory distress:       [x] No   [x] Yes  EYES:  EOMI, pupils equal   NECK: Supple. No lymphadenopathy. No carotid bruit  CVS: Regular rhythm, no audible murmur  PULM: Wheeze throughout no acute respiratory distress  ABD:    Bowels sounds normal.  Abdomen is soft. No distention. no tenderness to palpation. EXT:   no edema bilaterally . No calf tenderness. NEURO: Moves all extremities. Motor and sensory are grossly intact  SKIN:  No rashes. No skin lesions.           MEDICATIONS:  Scheduled Meds:   sodium bicarbonate        sodium bicarbonate        ipratropium-albuterol  1 ampule Inhalation 4x daily    ipratropium-albuterol        famotidine  10 mg Oral Nightly    tamsulosin  0.4 mg Oral Daily    insulin lispro  0-6 Units SubCUTAneous TID WC    insulin lispro  0-3 Units SubCUTAneous Nightly    atorvastatin  40 mg Oral Nightly    clopidogrel  75 mg Oral Daily    glipiZIDE  5 mg Oral BID AC    sodium chloride flush  5-40 mL IntraVENous 2 times per day    vitamin D  20,000 Units Oral Weekly    ascorbic acid  2,000 mg Oral BID    zinc sulfate  100 mg Oral Daily    metoprolol succinate  25 mg Oral Daily    apixaban  2.5 mg Oral BID    amiodarone  200 mg Oral BID     Continuous Infusions:   sodium chloride      dextrose       PRN Meds:   albuterol, 2.5 mg, Q4H PRN  sodium chloride flush, 5-40 mL, PRN  sodium chloride, 25 mL, PRN  ondansetron, 4 mg, Q8H PRN   Or  ondansetron, 4 mg, Q6H PRN  polyethylene glycol, 17 g, Daily PRN  acetaminophen, 650 mg, Q6H PRN   Or  acetaminophen, 650 mg, Q6H PRN  glucose, 15 g, PRN  dextrose, 12.5 g, PRN  glucagon (rDNA), 1 mg, PRN  dextrose, 100 mL/hr, PRN        DATA:  Complete Blood Count:   Recent Labs     11/17/21  0525 11/18/21  0600 11/19/21  0557   WBC 16.2* 13.2* 11.1   RBC 3.16* 3.24* 3.33*   HGB 10.0* 10.2* 10.4*   HCT 30.9* 31.1* 31.6*   MCV 97.8 96.0 94.9   RDW 12.2 12.2 12.2    234 207   SEGS 88* 78* 76*   NEUTROABS 14.28* 10.30* 8.43*   LYMPHOPCT 6* 13* 16*   LYMPHSABS 1.03* 1.72 1.78   MONOPCT 5 7 6   EOSRELPCT 0* 0* 0*   BASOPCT 0 0 0   IMMGRAN 1* 2* 2*        Recent Blood Glucose:   Recent Labs     11/17/21  0525 11/18/21  0600 11/19/21  0557   GLUCOSE 153* 48* 118*        Comprehensive Metabolic Profile:   Recent Labs     11/17/21  0525 11/18/21  0600 11/19/21  0557   BUN 85* 88* 75*   CREATININE 3.00* 2.73* 2.30*   * 136 134*   K 5.0 4.0 5.0   CL 99 103 101   CALCIUM 7.8* 8.2* 8.1*   ANIONGAP 15 18* 12   CO2 18* 15* 21   PROT 6.2* 6.0* 5.6*   LABALBU 2.7* 2.9* 2.5*   BILITOT 0.33 0.34 0.41   ALKPHOS 128 103 103   AST 40* 47* 50*   ALT 25 36 38        Urinalysis:   Lab Results   Component Value Date    NITRU NEGATIVE 02/11/2021    COLORU YELLOW 02/11/2021    PHUR 5.5 02/11/2021    WBCUA 0 TO 2 02/11/2021    RBCUA None 02/11/2021    MUCUS NOT REPORTED 02/11/2021    TRICHOMONAS NOT REPORTED 02/11/2021    YEAST NOT REPORTED 02/11/2021    BACTERIA TRACE 02/11/2021    SPECGRAV 1.020 02/11/2021    LEUKOCYTESUR NEGATIVE 02/11/2021    UROBILINOGEN Normal 02/11/2021    BILIRUBINUR NEGATIVE 02/11/2021    GLUCOSEU 3+ 02/11/2021    KETUA NEGATIVE 02/11/2021    AMORPHOUS NOT REPORTED 02/11/2021       HgBA1c:    Lab Results   Component Value Date    LABA1C 9.5 08/27/2021       TSH:    Lab Results   Component Value Date    TSH 1.12 11/17/2021       Lactic Acid:   Lab Results   Component Value Date    LACTA 2.2 11/15/2021        High Sensitivity Troponin:  Recent Labs     11/17/21  0525 11/18/21  0600 11/19/21  0557   TROPHS 141* 123* 145*     Pro-BNP:  Lab Results   Component Value Date    PROBNP 10,948 (H) 11/18/2021     D-Dimer:  Lab Results   Component Value Date    DDIMER 1.31 (H) 11/19/2021     PT/INR:    Lab Results   Component Value Date    PROTIME 16.3 11/17/2021    INR 1.3 11/17/2021     PTT:    Lab Results   Component Value Date    APTT 37.3 11/17/2021       CRP:   Recent Labs     11/17/21  0525 11/18/21  0600   .4* 63.2*       ABGs:   No results found for: PHART, PH, VDY0UEY, PCO2, PO2ART, PO2, MJF5XZK, HCO3, BEART, BE, THGBART, THB, BRC7OVE, H9WMYUTF, O2SAT, FIO2      Radiology/Imaging:  XR CHEST PORTABLE   Final Result   No significant change in bilateral airspace disease. Unchanged appearance of   small pleural effusions versus pleuroparenchymal reaction. Some of these opacities likely represent underlying chronic lung disease and   scarring. A superimposed acute inflammatory process or infection should be   considered in the appropriate clinical setting. US RETROPERITONEAL COMPLETE   Final Result   1. Limited exam.  Large postvoid residual.  Nonvisualization of the right   ureteral jet. 2. No hydronephrosis. 3. 6 mm calculus at the midpole left kidney. 4. Cholelithiasis. XR CHEST PORTABLE   Final Result   Significant peripheral and basal predominant pulmonary opacities diffusely   throughout both lungs. This pattern can be seen in the setting of COVID-19   pneumonia, though this requires clinical correlation. Sequelae of underlying   interstitial lung disease is also possible, though less favored. Stability   is indeterminate in the absence of prior comparison imaging. Small right pleural effusion. Age-indeterminate fracture of the left anterolateral 9th rib.                ASSESSMENT / PLAN:     COVID-19 virus infection  · Continue current therapy  · Consults: Dr. Fadia Watson  · Remdesivir not indicated  · Appreciate infectious disease   · Continue vitamin C, D and zinc  · Trend labs  · IV steroidsstoppedelevated creatinine and no hypoxia  · VaccinatedJanuary, February booster in November 2021  · Acute respiratory failure with hypoxia  · Resolved  · Acapella  · PT OT  · Nebs  · Repeat chest x-ray  · Stop IV fluids  · CAD  · Continue Lipitor, Plavix, Lasix  · New onset atrial flutter  · Appreciate cardiology  · EKG2-1 conduction  · Converted  · Continue Lopressor  · Amiodarone drip completed  · Continue amiodarone 200 mg twice daily  · Continue Eliquis  · Type 2 diabetes  · POCT before meals and at bedtime  · High-dose sliding scale  · Continue Lantus  · Hold Glucophage due to CKD  · Continue glipizide  · Hypertension  · Continue Altace  · CKD  · Trend labs  · No remdesivir or Lovenox  · Stop steroidspossible cause of elevated creatinine  · Continue Flomaxurinary retention  · Robertson catheter  · Appreciate nephrology  · Nutrition status:   · Well developed, well nourished with no malnutrition  · Dietician consult initiated  · [] NPO [] TPN      [] Tube feed [] Clear liquid        [] Full liquid [] regular diet         [] Fluid restriction   [x] Diabetic diet   · Prophylaxis:   · DVT: Eliquis   · Stress Ulcer: H2 Blocker   · High risk medications: none   · Disposition:    · Discharge plan is pending      Terrea Meckel, APRN - CNP , FRANKLIN-NP-C  11/19/2021  2:24 PM

## 2021-11-19 NOTE — PROGRESS NOTES
insulin lispro  0-6 Units SubCUTAneous TID     insulin lispro  0-3 Units SubCUTAneous Nightly    atorvastatin  40 mg Oral Nightly    clopidogrel  75 mg Oral Daily    glipiZIDE  5 mg Oral BID AC    sodium chloride flush  5-40 mL IntraVENous 2 times per day    vitamin D  20,000 Units Oral Weekly    ascorbic acid  2,000 mg Oral BID    zinc sulfate  100 mg Oral Daily    metoprolol succinate  25 mg Oral Daily    apixaban  2.5 mg Oral BID    amiodarone  200 mg Oral BID     Continuous Infusions:    sodium chloride      dextrose       PRN Meds:  albuterol, sodium chloride flush, sodium chloride, ondansetron **OR** ondansetron, polyethylene glycol, acetaminophen **OR** acetaminophen, glucose, dextrose, glucagon (rDNA), dextrose    Input/Output:       I/O last 3 completed shifts: In: 950 [I.V.:950]  Out: 2700 [Urine:2700]. Patient Vitals for the past 96 hrs (Last 3 readings):   Weight   21 0545 168 lb 9.6 oz (76.5 kg)   11/15/21 2051 145 lb (65.8 kg)       Vital Signs:   Temperature:  Temp: 98.4 °F (36.9 °C)  TMax:   Temp (24hrs), Av.5 °F (36.9 °C), Min:97 °F (36.1 °C), Max:101.1 °F (38.4 °C)    Respirations:  Resp: 22  Pulse:   Pulse: 97  BP:    BP: 130/70  BP Range: Systolic (81FCX), ZY , Min:121 , BTE:514       Diastolic (60DFP), TLL:84, Min:45, Max:70      Physical Examination:     General -- no distress  Oral Mucosa -- moist  Neck --  JVD - no  Extremities -- no edema, moves all extremeties  CNS - awake and alert   Pschy - not agitated, mood and memory normal    Due to this being a TeleHealth encounter, evaluation of the following organ systems is limited: Vitals/EENT/Resp/CV/GI//MS/Neuro/Skin/Heme-Lymph-Imm.     Labs:       Recent Labs     21  0525 21  0600 21  0557   WBC 16.2* 13.2* 11.1   RBC 3.16* 3.24* 3.33*   HGB 10.0* 10.2* 10.4*   HCT 30.9* 31.1* 31.6*   MCV 97.8 96.0 94.9   MCH 31.6 31.5 31.2   MCHC 32.4 32.8 32.9   RDW 12.2 12.2 12.2    234 207   MPV 11.0 10.8 10.9      BMP:   Recent Labs     11/17/21  0525 11/18/21  0600 11/19/21  0557   * 136 134*   K 5.0 4.0 5.0   CL 99 103 101   CO2 18* 15* 21   BUN 85* 88* 75*   CREATININE 3.00* 2.73* 2.30*   GLUCOSE 153* 48* 118*   CALCIUM 7.8* 8.2* 8.1*      Phosphorus:   No results for input(s): PHOS in the last 72 hours. Magnesium:  No results for input(s): MG in the last 72 hours. Albumin:    Recent Labs     11/17/21  0525 11/18/21  0600 11/19/21  0557   LABALBU 2.7* 2.9* 2.5*     BNP:    No results found for: BNP  HÉCTOR:    No results found for: HÉCTOR  SPEP:  Lab Results   Component Value Date    PROT 5.6 11/19/2021     UPEP:   No results found for: LABPE  C3:   No results found for: C3  C4:   No results found for: C4  MPO ANCA:   No results found for: MPO  PR3 ANCA:   No results found for: PR3  Anti-GBM:   No results found for: GBMABIGG  Hep BsAg:       No results found for: HEPBSAG  Hep C AB:        No results found for: HEPCAB    Urinalysis/Chemistries:      Lab Results   Component Value Date    NITRU NEGATIVE 02/11/2021    COLORU YELLOW 02/11/2021    PHUR 5.5 02/11/2021    WBCUA 0 TO 2 02/11/2021    RBCUA None 02/11/2021    MUCUS NOT REPORTED 02/11/2021    TRICHOMONAS NOT REPORTED 02/11/2021    YEAST NOT REPORTED 02/11/2021    BACTERIA TRACE 02/11/2021    SPECGRAV 1.020 02/11/2021    LEUKOCYTESUR NEGATIVE 02/11/2021    UROBILINOGEN Normal 02/11/2021    BILIRUBINUR NEGATIVE 02/11/2021    GLUCOSEU 3+ 02/11/2021    KETUA NEGATIVE 02/11/2021    AMORPHOUS NOT REPORTED 02/11/2021     Urine Sodium:   No results found for: SOHAM  Urine Potassium:  No results found for: KUR  Urine Chloride:  No results found for: CLUR  Urine Osmolarity: No results found for: OSMOU  Urine Protein:   No components found for: TOTALPROTEIN, URINE   Urine Creatinine:     Lab Results   Component Value Date    LABCREA 51.4 02/11/2021     Urine Eosinophils:  No components found for: UEOS        Impression and Plan:  1.   JANINA on CKD IV: likely prerenal from hyperglycemia and possible postrenal component from urinary retention, improving near his baseline. IV fluids stopped. Ok with diuretics if needed. 2. Metabolic acidosis from JANINA/CKD: improved, start oral bicarb  3. Urinary retention: has del rio  4. Diastolic CHF: more SOB this AM. IVF stopped. Breathing better after bumex this AM  5. A flutter  6. COVID-19 +   7. DM, uncontrolled  8. Anemia          Please don't hesitate to call with any questions.   Electronically signed by Marito Reyna DO on 11/19/2021 at 3:01 PM

## 2021-11-19 NOTE — PROGRESS NOTES
Patient resting in bed when writer entered room. Patient is ST on monitor and patient's face is red upon entry. Temperature taken and it was 101.1. [place] was removed and PRN tylenol given. FSBS was 90 and patient was given some juice at this time. Patient is oriented to self, time and place at this time but does have periods of confusion. Patient situated in bed. Patient denies needs at this time. Call light within reach and bed alarm engaged. Will continue to monitor.

## 2021-11-19 NOTE — PROGRESS NOTES
Desat to 71% 2LNC, pt working with PTA, attempting to ambulate to bathroom, although, they had not started walking yet. Writer informed PTA that would need to put pt back to bed. Pt requested to use toilet, BSC provided, pt transferred 2 assist, jeannie poorly. Pt encouraged not to talk, take deep breaths, concentrate on breathing. O2 increased to [facility] to recover, pt slow to recover, SpO2 95% on 3LNC at this time. Will continue to monitor.

## 2021-11-19 NOTE — PROGRESS NOTES
Physical Therapy  Facility/Department: [facility] AT THE [facility] MED SURG  Daily Treatment Note  NAME: Sheila Reed  : 10/31/1930  MRN: [id number]    Date of Service: 2021    Discharge Recommendations:  Continue to assess pending progress, Patient would benefit from continued therapy after discharge        Assessment   Treatment Diagnosis: Difficutly walking  Prognosis: Good  Decision Making: Medium Complexity  Patient Education: educated patient on importance of daily exercise, pt iwth fair understanding  REQUIRES PT FOLLOW UP: Yes  Activity Tolerance  Activity Tolerance: Patient Tolerated treatment well     Patient Diagnosis(es): The primary encounter diagnosis was Pneumonia due to COVID-19 virus. A diagnosis of Elevated troponin was also pertinent to this visit. has a past medical history of Acute and chronic respiratory failure with hypoxia ([address] CAD (coronary artery disease), Cataracts, bilateral, Glaucoma, Hyperlipidemia, Hypertension, TIA (transient ischemic attack), and Type II or unspecified type diabetes mellitus without mention of complication, not stated as uncontrolled. has a past surgical history that includes Coronary artery bypass graft (); Cataract removal with implant (Bilateral,  and 12/3/2013); and Inguinal hernia repair (Bilateral, 12). Restrictions  Restrictions/Precautions  Restrictions/Precautions: General Precautions, Fall Risk, Contact Precautions  Subjective   General  Chart Reviewed: Yes  Response To Previous Treatment: Patient with no complaints from previous session. Family / Caregiver Present: No  Subjective  Subjective: Pt miladis pain except when coughing.           Orientation  Orientation  Overall Orientation Status: Within Functional Limits  Cognition      Objective         Ambulation  Ambulation?: No        Exercises  Straight Leg Raise: x15  Quad Sets: x15  Heelslides: x15  Hip Abduction: x15  Knee Long Arc Quad: x15  Knee Short Arc Quad: x15  Ankle Pumps: 15x2  Comments: seated marching and hip add with pillow x15 reps, LE ther ex completed reclined and seated                        G-Code     OutComes Score                                                     AM-PAC Score             Goals  Short term goals  Time Frame for Short term goals: 20 days  Short term goal 1: Pt will be initiated on strong ex program to address deficits in endurance and balance. Short term goal 2: Pt will be independent with bed mobility without use of handrails for discharge home and negotiation of bed. Short term goal 3: Pt will transfer independently with least restrictive device and good balance to reduce fall risk  Short term goal 4: Pt will be able to ambulate up to 350 feet with supervision to independent with least restrictive device to allow discharge home with his wife.   Short term goal 5: Pt will be able to negoitiate 4 steps with chani handrails and SBA +1  Patient Goals   Patient goals : go home and hopes for Thursday    Plan    Plan  Times per week: 7  Times per day: Twice a day  Plan Comment: Initiate POC  Safety Devices  Type of devices: Call light within reach, Patient at risk for falls, Left in chair, Chair alarm in place, All fall risk precautions in place, Nurse notified     Therapy Time   Individual Concurrent Group Co-treatment   Time In [address]         Time Out [facility]         Minutes [address]

## 2021-11-20 LAB
ABSOLUTE EOS #: 0 K/UL (ref 0–0.44)
ABSOLUTE IMMATURE GRANULOCYTE: 0 K/UL (ref 0–0.3)
ABSOLUTE LYMPH #: 2.93 K/UL (ref 1.1–3.7)
ABSOLUTE MONO #: 0.93 K/UL (ref 0.1–1.2)
ALBUMIN SERPL-MCNC: 2.5 G/DL (ref 3.5–5.2)
ALBUMIN/GLOBULIN RATIO: 0.8 (ref 1–2.5)
ALP BLD-CCNC: 214 U/L (ref 40–129)
ALT SERPL-CCNC: 29 U/L (ref 5–41)
ANION GAP SERPL CALCULATED.3IONS-SCNC: 13 MMOL/L (ref 9–17)
AST SERPL-CCNC: 28 U/L
BASOPHILS # BLD: 0 % (ref 0–2)
BASOPHILS ABSOLUTE: 0 K/UL (ref 0–0.2)
BILIRUB SERPL-MCNC: 0.39 MG/DL (ref 0.3–1.2)
BUN BLDV-MCNC: 60 MG/DL (ref 8–23)
BUN/CREAT BLD: 27 (ref 9–20)
CALCIUM SERPL-MCNC: 8.3 MG/DL (ref 8.6–10.4)
CHLORIDE BLD-SCNC: 101 MMOL/L (ref 98–107)
CO2: 22 MMOL/L (ref 20–31)
CREAT SERPL-MCNC: 2.21 MG/DL (ref 0.7–1.2)
DIFFERENTIAL TYPE: ABNORMAL
EOSINOPHILS RELATIVE PERCENT: 0 % (ref 1–4)
GFR AFRICAN AMERICAN: 34 ML/MIN
GFR NON-AFRICAN AMERICAN: 28 ML/MIN
GFR SERPL CREATININE-BSD FRML MDRD: ABNORMAL ML/MIN/{1.73_M2}
GFR SERPL CREATININE-BSD FRML MDRD: ABNORMAL ML/MIN/{1.73_M2}
GLUCOSE BLD-MCNC: 256 MG/DL (ref 74–100)
GLUCOSE BLD-MCNC: 268 MG/DL (ref 74–100)
GLUCOSE BLD-MCNC: 289 MG/DL (ref 70–99)
GLUCOSE BLD-MCNC: 291 MG/DL (ref 74–100)
GLUCOSE BLD-MCNC: 431 MG/DL (ref 74–100)
HCT VFR BLD CALC: 32.3 % (ref 40.7–50.3)
HEMOGLOBIN: 10.4 G/DL (ref 13–17)
IMMATURE GRANULOCYTES: 0 %
LYMPHOCYTES # BLD: 22 % (ref 24–43)
MCH RBC QN AUTO: 30.4 PG (ref 25.2–33.5)
MCHC RBC AUTO-ENTMCNC: 32.2 G/DL (ref 28.4–34.8)
MCV RBC AUTO: 94.4 FL (ref 82.6–102.9)
MONOCYTES # BLD: 7 % (ref 3–12)
MORPHOLOGY: ABNORMAL
NRBC AUTOMATED: 0 PER 100 WBC
PDW BLD-RTO: 12.5 % (ref 11.8–14.4)
PLATELET # BLD: 224 K/UL (ref 138–453)
PLATELET ESTIMATE: ABNORMAL
PMV BLD AUTO: 11.2 FL (ref 8.1–13.5)
POTASSIUM SERPL-SCNC: 4.8 MMOL/L (ref 3.7–5.3)
RBC # BLD: 3.42 M/UL (ref 4.21–5.77)
RBC # BLD: ABNORMAL 10*6/UL
SEG NEUTROPHILS: 71 % (ref 36–65)
SEGMENTED NEUTROPHILS ABSOLUTE COUNT: 9.44 K/UL (ref 1.5–8.1)
SODIUM BLD-SCNC: 136 MMOL/L (ref 135–144)
TOTAL PROTEIN: 5.8 G/DL (ref 6.4–8.3)
WBC # BLD: 13.3 K/UL (ref 3.5–11.3)
WBC # BLD: ABNORMAL 10*3/UL

## 2021-11-20 PROCEDURE — 94640 AIRWAY INHALATION TREATMENT: CPT

## 2021-11-20 PROCEDURE — 97116 GAIT TRAINING THERAPY: CPT

## 2021-11-20 PROCEDURE — 94669 MECHANICAL CHEST WALL OSCILL: CPT

## 2021-11-20 PROCEDURE — 97110 THERAPEUTIC EXERCISES: CPT

## 2021-11-20 PROCEDURE — 6370000000 HC RX 637 (ALT 250 FOR IP): Performed by: NURSE PRACTITIONER

## 2021-11-20 PROCEDURE — 99232 SBSQ HOSP IP/OBS MODERATE 35: CPT | Performed by: INTERNAL MEDICINE

## 2021-11-20 PROCEDURE — 99233 SBSQ HOSP IP/OBS HIGH 50: CPT | Performed by: INTERNAL MEDICINE

## 2021-11-20 PROCEDURE — 80053 COMPREHEN METABOLIC PANEL: CPT

## 2021-11-20 PROCEDURE — 97530 THERAPEUTIC ACTIVITIES: CPT

## 2021-11-20 PROCEDURE — 6370000000 HC RX 637 (ALT 250 FOR IP): Performed by: INTERNAL MEDICINE

## 2021-11-20 PROCEDURE — 2700000000 HC OXYGEN THERAPY PER DAY

## 2021-11-20 PROCEDURE — 85025 COMPLETE CBC W/AUTO DIFF WBC: CPT

## 2021-11-20 PROCEDURE — 2580000003 HC RX 258: Performed by: NURSE PRACTITIONER

## 2021-11-20 PROCEDURE — 1200000000 HC SEMI PRIVATE

## 2021-11-20 PROCEDURE — 36415 COLL VENOUS BLD VENIPUNCTURE: CPT

## 2021-11-20 PROCEDURE — 82947 ASSAY GLUCOSE BLOOD QUANT: CPT

## 2021-11-20 PROCEDURE — 93005 ELECTROCARDIOGRAM TRACING: CPT | Performed by: INTERNAL MEDICINE

## 2021-11-20 RX ORDER — LACTULOSE 10 G/15ML
20 SOLUTION ORAL ONCE
Status: COMPLETED | OUTPATIENT
Start: 2021-11-20 | End: 2021-11-20

## 2021-11-20 RX ADMIN — SODIUM BICARBONATE TAB 650 MG 650 MG: 650 TAB at 21:26

## 2021-11-20 RX ADMIN — LACTULOSE 20 G: 20 SOLUTION ORAL at 16:16

## 2021-11-20 RX ADMIN — GLIPIZIDE 5 MG: 5 TABLET ORAL at 16:16

## 2021-11-20 RX ADMIN — IPRATROPIUM BROMIDE AND ALBUTEROL SULFATE 1 AMPULE: 2.5; .5 SOLUTION RESPIRATORY (INHALATION) at 21:02

## 2021-11-20 RX ADMIN — OXYCODONE HYDROCHLORIDE AND ACETAMINOPHEN 2000 MG: 500 TABLET ORAL at 21:26

## 2021-11-20 RX ADMIN — GLIPIZIDE 5 MG: 5 TABLET ORAL at 07:19

## 2021-11-20 RX ADMIN — AMIODARONE HYDROCHLORIDE 200 MG: 200 TABLET ORAL at 08:19

## 2021-11-20 RX ADMIN — SODIUM CHLORIDE, PRESERVATIVE FREE 10 ML: 5 INJECTION INTRAVENOUS at 21:27

## 2021-11-20 RX ADMIN — INSULIN LISPRO 6 UNITS: 100 INJECTION, SOLUTION INTRAVENOUS; SUBCUTANEOUS at 16:16

## 2021-11-20 RX ADMIN — APIXABAN 2.5 MG: 2.5 TABLET, FILM COATED ORAL at 08:19

## 2021-11-20 RX ADMIN — APIXABAN 2.5 MG: 2.5 TABLET, FILM COATED ORAL at 21:26

## 2021-11-20 RX ADMIN — AMIODARONE HYDROCHLORIDE 200 MG: 200 TABLET ORAL at 21:26

## 2021-11-20 RX ADMIN — IPRATROPIUM BROMIDE AND ALBUTEROL SULFATE 1 AMPULE: 2.5; .5 SOLUTION RESPIRATORY (INHALATION) at 05:55

## 2021-11-20 RX ADMIN — SODIUM CHLORIDE, PRESERVATIVE FREE 10 ML: 5 INJECTION INTRAVENOUS at 08:19

## 2021-11-20 RX ADMIN — IPRATROPIUM BROMIDE AND ALBUTEROL SULFATE 1 AMPULE: 2.5; .5 SOLUTION RESPIRATORY (INHALATION) at 11:12

## 2021-11-20 RX ADMIN — ATORVASTATIN CALCIUM 40 MG: 40 TABLET, FILM COATED ORAL at 21:26

## 2021-11-20 RX ADMIN — POLYETHYLENE GLYCOL (3350) 17 G: 17 POWDER, FOR SOLUTION ORAL at 08:19

## 2021-11-20 RX ADMIN — FAMOTIDINE 10 MG: 10 TABLET ORAL at 21:26

## 2021-11-20 RX ADMIN — CLOPIDOGREL BISULFATE 75 MG: 75 TABLET ORAL at 08:19

## 2021-11-20 RX ADMIN — INSULIN LISPRO 3 UNITS: 100 INJECTION, SOLUTION INTRAVENOUS; SUBCUTANEOUS at 08:20

## 2021-11-20 RX ADMIN — INSULIN LISPRO 3 UNITS: 100 INJECTION, SOLUTION INTRAVENOUS; SUBCUTANEOUS at 12:16

## 2021-11-20 RX ADMIN — METOPROLOL SUCCINATE 25 MG: 25 TABLET, EXTENDED RELEASE ORAL at 08:19

## 2021-11-20 RX ADMIN — SODIUM BICARBONATE TAB 650 MG 650 MG: 650 TAB at 08:19

## 2021-11-20 RX ADMIN — ZINC SULFATE 220 MG (50 MG) CAPSULE 100 MG: CAPSULE at 08:19

## 2021-11-20 RX ADMIN — OXYCODONE HYDROCHLORIDE AND ACETAMINOPHEN 2000 MG: 500 TABLET ORAL at 08:19

## 2021-11-20 RX ADMIN — IPRATROPIUM BROMIDE AND ALBUTEROL SULFATE 1 AMPULE: 2.5; .5 SOLUTION RESPIRATORY (INHALATION) at 16:02

## 2021-11-20 RX ADMIN — TAMSULOSIN HYDROCHLORIDE 0.4 MG: 0.4 CAPSULE ORAL at 08:19

## 2021-11-20 ASSESSMENT — PAIN SCALES - GENERAL
PAINLEVEL_OUTOF10: 0

## 2021-11-20 NOTE — PROGRESS NOTES
[facility]   [phone]   Nephrology progress note  11/20/2021, 2:51 PM      Pt Name:    Hiwot Hayes  MRN:     [id number]     YOB: 1930  Admit Date:    11/15/2021  8:38 PM  Primary Care Physician:  Ondina Gallagher DO   Room number  9544/9066-25    TELEHEALTH EVALUATION -- Audio/Visual (During YWAKK-07 public health emergency)    Patient's Location: [address] [place] [address] (myself) Location: [facility]  Patient's nurse: Present    Chief Complaint: Nephrology following for JANINA/CKD    Subjective:  Patient seen and examined  Under COVID isolation  No chest pain or shortness of breath  Feels okay  On 1L NC  No distress  Very poor historian  Reports some constipation    Objective:  24HR INTAKE/OUTPUT:    Intake/Output Summary (Last 24 hours) at 11/20/2021 1451  Last data filed at 11/20/2021 1352  Gross per 24 hour   Intake 10 ml   Output 4350 ml   Net -4340 ml     I/O last 3 completed shifts: In: 10 [I.V.:10]  Out: 3550 [[id number]:9410]  I/O this shift:  In: -   Out: 1400 [Urine:1400]  Admission weight: 145 lb (65.8 kg)  Wt Readings from Last 3 Encounters:   11/20/21 164 lb 8 oz (74.6 kg)   06/08/19 158 lb (71.7 kg)   05/16/18 166 lb 6.4 oz (75.5 kg)     Body mass index is 29.14 kg/m².     Physical examination  VITALS:     Vitals:    11/20/21 0500 11/20/21 0555 11/20/21 0715 11/20/21 1315   BP:   139/83 116/60   Pulse:   115 108   Resp:  24 22 20   Temp:   99.5 °F (37.5 °C) 99 °F (37.2 °C)   TempSrc:   Temporal Temporal   SpO2:  95% 95% 98%   Weight: 164 lb 8 oz (74.6 kg)      Height:         Constitutional and General Appearance: alert and cooperative, appears comfortable  Lungs: no use of accessory muscles or labored breathing noted, Respiratory effort observed to be normal  Extremities: No visible swelling      Due to this being a TeleHealth encounter, evaluation of the following organ systems is limited: EENT/Resp/CV/GI//MS/Neuro/Skin/Lymph    Lab Data  CBC:   Recent Labs 11/18/21  0600 11/19/21  0557 11/20/21  0625   WBC 13.2* 11.1 13.3*   HGB 10.2* 10.4* 10.4*   HCT 31.1* 31.6* 32.3*    207 224     BMP:  Recent Labs     11/18/21  0600 11/19/21  0557 11/20/21  0625    134* 136   K 4.0 5.0 4.8    101 101   CO2 15* 21 22   BUN 88* 75* 60*   CREATININE 2.73* 2.30* 2.21*   GLUCOSE 48* 118* 289*   CALCIUM 8.2* 8.1* 8.3*     Hepatic:   Recent Labs     11/18/21  0600 11/19/21  0557 11/20/21  0625   LABALBU 2.9* 2.5* 2.5*   AST 47* 50* 28   ALT 36 38 29   BILITOT 0.34 0.41 0.39   ALKPHOS 103 103 214*         Meds:  Infusion:    sodium chloride      dextrose       Meds:    ipratropium-albuterol  1 ampule Inhalation 4x daily    sodium bicarbonate  650 mg Oral BID    famotidine  10 mg Oral Nightly    tamsulosin  0.4 mg Oral Daily    insulin lispro  0-6 Units SubCUTAneous TID WC    insulin lispro  0-3 Units SubCUTAneous Nightly    atorvastatin  40 mg Oral Nightly    clopidogrel  75 mg Oral Daily    glipiZIDE  5 mg Oral BID AC    sodium chloride flush  5-40 mL IntraVENous 2 times per day    vitamin D  20,000 Units Oral Weekly    ascorbic acid  2,000 mg Oral BID    zinc sulfate  100 mg Oral Daily    metoprolol succinate  25 mg Oral Daily    apixaban  2.5 mg Oral BID    amiodarone  200 mg Oral BID     Meds prn: albuterol, sodium chloride flush, sodium chloride, ondansetron **OR** ondansetron, polyethylene glycol, acetaminophen **OR** acetaminophen, glucose, dextrose, glucagon (rDNA), dextrose       Impression and Plan:  1. JANINA/CKD IV: overall stable  Lytes are stable  Volume status stable  IVF stopped yesterday  No peripheral edema. On 1 L of oxygen. Diuretics as needed    2. Mild met acidosis: improved, on oral bicarbonate  3. Urinary retention: has del rio  4. Constipation: will order lactulose  5. Covid positive  6.  Diabetes mellitus    D/W patient and RN    Yael Pardo MD  [facility]    Pursuant to the emergency declaration under

## 2021-11-20 NOTE — PROGRESS NOTES
mobility  Rolling to Left: Contact guard assistance  Supine to Sit: Minimal assistance; Moderate assistance  Scooting: Contact guard assistance (To scoot to EOB.)  Comment: Verbal cues for technique with fair to good understanding noted. Transfers  Sit to Stand: Minimal Assistance; Contact guard assistance  Stand to sit: Minimal Assistance; Contact guard assistance  Ambulation  Ambulation?: Yes  WB Status: unrestricted  Ambulation 1  Surface: level tile  Device: Hand-Held Assist  Assistance: Minimal assistance  Distance: 15 feet x 1  Comments: Verbal cues for posture and increased step length. Pt with fair to good understanding. Stairs/Curb  Stairs?: No     Balance  Posture: Fair  Sitting - Static: Good  Sitting - Dynamic: Good  Standing - Static: Fair  Standing - Dynamic: Fair; -  Exercises  Straight Leg Raise: 15x  Quad Sets: 15x  Heelslides: 15x  Hip Abduction: 15x  Knee Short Arc Quad: 15x  Ankle Pumps: 20x  Comments: Above exercises completed in supine. Pt needed frequent short rest breaks d/t fatigue/SOB. SPO2 93-96%. G-Code     OutComes Score    AM-PAC Score    Goals  Short term goals  Time Frame for Short term goals: 20 days  Short term goal 1: Pt will be initiated on strong ex program to address deficits in endurance and balance. Short term goal 2: Pt will be independent with bed mobility without use of handrails for discharge home and negotiation of bed. Short term goal 3: Pt will transfer independently with least restrictive device and good balance to reduce fall risk  Short term goal 4: Pt will be able to ambulate up to 350 feet with supervision to independent with least restrictive device to allow discharge home with his wife.   Short term goal 5: Pt will be able to negoitiate 4 steps with chani handrails and SBA +1  Patient Goals   Patient goals : go home and hopes for Thursday    Plan    Plan  Times per week: 7  Times per day: Twice a day  Plan Comment: Initiate POC  Safety Devices  Type of devices: Call light within reach, All fall risk precautions in place, Nurse notified, Left in chair, Chair alarm in place, Gait belt, Patient at risk for falls     Therapy Time   Individual Concurrent Group Co-treatment   Time In 1632         Time Out [address] [place], [place]

## 2021-11-20 NOTE — PROGRESS NOTES
[facility] - Progress Note   COVID 19 Patient-Tele-visit  Today's Date and Time: 11/20/2021, 3:52 PM    Impression :     · COVID 19 Confirmed Infection  · Covid tests:  · 11/15/21 Positive  · Urinary retention  · Renal calculi  · Cholelithiasis  · DM II  · Essential HTN  · Atrial Flutter  · CHF  · CKD IV  · CVA 2018  · Received COVID vaccines      Patient evaluated by Telemedicine. Requesting Institution: [facility]  Provider Institution: [address] [place] [address],[address], [address] [facility] Person at [facility] [place]: Ms Trevin Lucia, APRN- IM    Recommendations:   · Antibiotic treatment:  · Obtain urine culture  · Covid Rx:  · Remdesivir-contraindicated with renal insufficiency  · Solu-medrol initiated 11/16/21  · Actemra-Not indicated at this time  · Monitor CRP      Medical Decision Making/Summary/Discussion:11/20/2021     · Patient admitted with suspected COVID 19 infection  · Covid test confirmed positive. · On Rx with Solumedrol  · Diuresis    Infection Control Recommendations   · Universal Precautions  · Airborne isolation  · Droplet Isolation    Antimicrobial Stewardship Recommendations     · Discontinuation of therapy  Coordination of Outpatient Care:   · Estimated Length of IV antimicrobials:TBD  · Patient will need Midline Catheter Insertion: TBD  · Patient will need PICC line Insertion: No  · Patient will need: Home IV , [place],  SNF,  LTAC:TBD  · Patient will need outpatient wound care:No    Chief complaint/reason for consultation:   · Concern for COVID infection      History of Present Illness:   Romario Zambrano is a 80y.o.-year-old male who was initially admitted on 11/15/2021. Patient seen at the request of Dr. Ivan King:    Patient presented through ER with complaints of increased shortness of breath worsening over the past six weeks. He received his Covid vaccines. He was treated with antibiotics without relief.      The patient's HR was noted to be in the 140's and an EKG revealed acute onset atrial flutter. Cardiology was consulted and he was started on an amiodarone gtt. Covid swab was positive. The patient was also found to have acute urinary retention, 6 mm left pole renal calculi and cholelithiasis. Echocardiogram revealed a possible acute small to moderate sized inter-atrial communication. WBC is increasing      Impression CXR 11/15/21   Significant peripheral and basal predominant pulmonary opacities diffusely   throughout both lungs.  This pattern can be seen in the setting of COVID-19   pneumonia, though this requires clinical correlation.  Sequelae of underlying   interstitial lung disease is also possible, though less favored.  Stability   is indeterminate in the absence of prior comparison imaging.       Small right pleural effusion.       Age-indeterminate fracture of the left anterolateral 9th rib.                 Increased risk 3-dose inadvertent series    Dose 1  01/29/2021 (COVID-19, Moderna, Primary or Immunocompromised, PF, 100mcg/0.5mL)      Dose 2  02/18/2021 (COVID-19, Moderna, Primary or Immunocompromised, PF, 100mcg/0.5mL) - Given too close to previous dose     02/26/2021 (COVID-19, Ladene Mariela, Primary or Immunocompromised, PF, 100mcg/0.5mL) - Given too close to previous dose     11/02/2021 (COVID-19, Pfizer, PF, 30mcg/0.3mL)      Booster dose  05/02/2022 - Dose Forecast         Patient admitted because of concerns with COVID 19.    CURRENT EVALUATION : 11/20/2021    Afebrile  VS stable     The patient has a moist cough and crackles concerning for fluid overload. He is being diuresed successfully with Bumex. Feeling weak and tired    He is on 2-->1 L NC    Patient exhibiting respiratory distress.  No  Respiratory secretions: No    Patient receiving supplemental oxygen: 2-->1 L NC  RR: 19-->19-->20  02 sat: 97-->97-->95-->98        NEWS Score: 0-4 Low risk group; 5-6: Medium risk group; 7 or above: High risk group  Parameters 3 2 1 0 1 2 3   Age    < 72   ? 65   RR ? 8  9-11 12-20  21-24 ? 25   O2 Sats ? 91 92-93 94-95 ? 96      Suppl O2  Yes  No      SBP ? 90  101-110 111-219   ? 220   HR ? 40  41-50 51-90  111-130 ? 131   Consciousness    Alert   Drowsiness, lethargy, or confusion   Temperature ? 35.0 C (95.0 F)  35.1-36.0 C 95.1-96.9 F 36.1-38.0 C 97.0-100.4 F 38.1-39.0 C 100.5-102.3 F ? 39.1 C ? 102.4 F      NEWS Score:   11/17/21: 3 Low risk    Overall Daily Picture:    unchanged    Presence of secondary bacterial Infection:    No  Additional antibiotics: No    Labs, X rays reviewed: 11/20/2021    BUN:85-->88-->75-->60  Cr:3.0-->2.73-->2.30-->2.2  Pro BNP: 86212-->10,948    WBC:11.7-->16.2-->13.2-->11.1  Hb:10-->10.4  Plat: 193-->207    Absolute Neutrophils:14.28  Absolute Lymphocytes:1.03  Neutrophil/Lymphocyte Ratio: 13.8 high risk    CRP:101.4-->63.2-->108  Ferritin:866  LDH:     Pro Calcitonin:  Troponin 145    Cultures:  Urine:  · 11/17: No growth  Blood:  · 11/17: No growth  Sputum :  ·   Wound:       CXR:       11/19/21 11/16/21      CAT:      Discussed with patient, RN, CC, IM. I have personally reviewed the past medical history, past surgical history, medications, social history, and family history, and I have updated the database accordingly.   Past Medical History:     Past Medical History:   Diagnosis Date    Acute and chronic respiratory failure with hypoxia ([facility] [address] 11/16/2021    CAD (coronary artery disease)     Cataracts, bilateral     Glaucoma     Hyperlipidemia     Hypertension     TIA (transient ischemic attack)     Type II or unspecified type diabetes mellitus without mention of complication, not stated as uncontrolled        Past Surgical  History:     Past Surgical History:   Procedure Laterality Date    CATARACT REMOVAL WITH IMPLANT Bilateral 11/219/2013 and 12/3/2013    CORONARY ARTERY BYPASS GRAFT  2000    INGUINAL HERNIA REPAIR Bilateral 06/19/12 Medications:      lactulose  20 g Oral Once    ipratropium-albuterol  1 ampule Inhalation 4x daily    sodium bicarbonate  650 mg Oral BID    famotidine  10 mg Oral Nightly    tamsulosin  0.4 mg Oral Daily    insulin lispro  0-6 Units SubCUTAneous TID WC    insulin lispro  0-3 Units SubCUTAneous Nightly    atorvastatin  40 mg Oral Nightly    clopidogrel  75 mg Oral Daily    glipiZIDE  5 mg Oral BID AC    sodium chloride flush  5-40 mL IntraVENous 2 times per day    vitamin D  20,000 Units Oral Weekly    ascorbic acid  2,000 mg Oral BID    zinc sulfate  100 mg Oral Daily    metoprolol succinate  25 mg Oral Daily    apixaban  2.5 mg Oral BID    amiodarone  200 mg Oral BID       Social History:     Social History     Socioeconomic History    Marital status:      Spouse name: Not on file    Number of children: Not on file    Years of education: Not on file    Highest education level: Not on file   Occupational History    Not on file   Tobacco Use    Smoking status: Never Smoker    Smokeless tobacco: Never Used   Substance and Sexual Activity    Alcohol use: Yes     Comment: very very very seldom    Drug use: No    Sexual activity: Not on file   Other Topics Concern    Not on file   Social History Narrative    Not on file     Social Determinants of Health     Financial Resource Strain:     Difficulty of Paying Living Expenses: Not on file   Food Insecurity:     Worried About Running Out of Food in the Last Year: Not on file    Mera of Food in the Last Year: Not on file   Transportation Needs:     Lack of Transportation (Medical): Not on file    Lack of Transportation (Non-Medical):  Not on file   Physical Activity:     Days of Exercise per Week: Not on file    Minutes of Exercise per Session: Not on file   Stress:     Feeling of Stress : Not on file   Social Connections:     Frequency of Communication with Friends and Family: Not on file    Frequency of Social reactive to light; sclera anicteric; conjunctivae pink. No embolic phenomena. ENT: Oropharynx clear, without erythema, exudate, or thrush. No tenderness of sinuses. Mouth/throat: mucosa pink and moist. No lesions. Dentition in good repair. Neck:Supple, without lymphadenopathy. Thyroid normal, No bruits. Pulmonary/Chest: Clear to auscultation, without wheezes, rales, or rhonchi. No dullness to percussion. Cardiovascular: Regular rate and rhythm without murmurs, rubs, or gallops. Abdomen: Soft, non tender. Bowel sounds normal. No organomegaly  All four Extremities: No cyanosis, clubbing, edema, or effusions. Neurologic: No gross sensory or motor deficits. Skin: Warm and dry with good turgor. No signs of peripheral arterial or venous insufficiency. No ulcerations. No open wounds. Medical Decision Making -Laboratory:   I have independently reviewed/ordered the following labs:    CBC with Differential:   Recent Labs     11/19/21  0557 11/20/21  0625   WBC 11.1 13.3*   HGB 10.4* 10.4*   HCT 31.6* 32.3*    224   LYMPHOPCT 16* 22*   MONOPCT 6 7     BMP:   Recent Labs     11/19/21  0557 11/20/21  0625   * 136   K 5.0 4.8    101   CO2 21 22   BUN 75* 60*   CREATININE 2.30* 2.21*     Hepatic Function Panel:   Recent Labs     11/19/21  0557 11/20/21  0625   PROT 5.6* 5.8*   LABALBU 2.5* 2.5*   BILITOT 0.41 0.39   ALKPHOS 103 214*   ALT 38 29   AST 50* 28     No results for input(s): RPR in the last 72 hours. No results for input(s): HIV in the last 72 hours. No results for input(s): BC in the last 72 hours.   Lab Results   Component Value Date    MUCUS NOT REPORTED 02/11/2021    RBC 3.42 11/20/2021    TRICHOMONAS NOT REPORTED 02/11/2021    WBC 13.3 11/20/2021    YEAST NOT REPORTED 02/11/2021    TURBIDITY CLEAR 02/11/2021     Lab Results   Component Value Date    CREATININE 2.21 11/20/2021    GLUCOSE 289 11/20/2021    GLUCOSE 124 05/17/2012       Medical Decision Making-Imaging:     Narrative EXAMINATION:   ONE XRAY VIEW OF THE CHEST       11/15/2021 6:06 pm       COMPARISON:   None.       HISTORY:   ORDERING SYSTEM PROVIDED HISTORY: short of breath   TECHNOLOGIST PROVIDED HISTORY:   short of breath       FINDINGS:   There are diffuse peripheral and basal pulmonary opacities bilaterally of   indeterminate stability in the absence of prior comparison imaging.  Small   right pleural effusion.  No pneumothorax.  Cardiomediastinal contours are   within normal limits for size with prior CABG and a tortuous, atherosclerotic   aorta.  Degenerative changes of the spine and shoulders.  Age-indeterminate   fracture of the anterolateral left 9th rib.           Impression   Significant peripheral and basal predominant pulmonary opacities diffusely   throughout both lungs.  This pattern can be seen in the setting of COVID-19   pneumonia, though this requires clinical correlation.  Sequelae of underlying   interstitial lung disease is also possible, though less favored.  Stability   is indeterminate in the absence of prior comparison imaging.       Small right pleural effusion.       Age-indeterminate fracture of the left anterolateral 9th rib.             Narrative   EXAMINATION:   RETROPERITONEAL ULTRASOUND OF THE KIDNEYS AND URINARY BLADDER       11/17/2021       COMPARISON:   None       HISTORY:   ORDERING SYSTEM PROVIDED HISTORY: Rule out obstructive uropathy.    TECHNOLOGIST PROVIDED HISTORY:       Rule out obstructive uropathy.       66-year-old male; rule out obstructive uropathy       FINDINGS:       Kidneys:       Exam limited due to rib shadowing and difficulty breathing.       Right kidney measures 9.1 x 4.9 x 4.9 cm.  Right renal cortical thickness   measures 1.1 cm.       Left kidney measures 9.6 x 4.4 x 5.0 cm.  Left renal cortical thickness   measures 8 mm.       Color flow projects over the bilateral renal parenchyma.       No hydronephrosis.  No perinephric fluid.       Gross preservation of the bilateral corticomedullary differentiation.       Echogenic focus with posterior shadowing measuring 6 mm at the midpole left   kidney consistent with a renal calculus.       Gallstones incidentally noted within the gallbladder with posterior acoustic   shadowing and image 56.           Bladder:       Urinary bladder measures 7.5 by 6.0 x 7.2 cm for a bladder volume of 229 mL. Urinary bladder wall thickness measures 3 mm.       Postvoid urinary bladder measures 5.1 x 6.2 x 6.3 cm for a postvoid urinary   bladder volume of 105 mL.       Left ureteral jet is visualized.  Nonvisualization of the right ureteral jet.           Impression   1. Limited exam.  Large postvoid residual.  Nonvisualization of the right   ureteral jet. 2. No hydronephrosis. 3. 6 mm calculus at the midpole left kidney. 4. Cholelithiasis.         Summary  Global left ventricular systolic function is difficult to assess due to the  patients rate in the 120's but appears preserved with an estimated ejection  fraction of 55%. Mildly increased left ventricular wall thickness with a normal left  ventricular cavity size. Left atrium is normal in size. Evidence of a small to moderate inter-atrial communication is seen by color  Doppler imaging with an intermittent left to right shunt. This appears most  consistent with a patent foramen ovale (PFO) vs a small ASD. Bowing intra-atrial septal motion consistent with an atrial septal aneurysm  is seen. The clinical significant of this finding is unknown, but has been  associated with an increased risk of TIA's and strokes. Mild to moderate aortic leaflets calcification without restriction of  motion. Mild aortic insufficiency was seen. Mild pulmonary hypertension with an estimated right ventricular systolic  pressure of 30 mmHg.   Mild to moderate tricuspid regurgitation.     Compared to the previous study of 3/27/18, the patient now has evidence of a  small to moderate sized inter-atrial communication. Consider additional  testing by transesophageal echocardiography (PATT) if clinically indicated,  especially if this might . Clinical correlation required.       Medical Decision [id number]-Haizr:       Medical Decision Making-Other:     Note:  · Labs, medications, radiologic studies were reviewed with personal review of films  · Large amounts of data were reviewed  · Discussed with nursing Staff, Discharge planner  · Infection Control and Prevention measures reviewed  · All prior entries were reviewed  · Administer medications as ordered  · Prognosis: Guarded  · Discharge planning reviewed      Thank you for allowing us to participate in the care of this patient. Please call with questions.     Warren Fernandes MD             Pager: [phone] - Office: [phone]

## 2021-11-20 NOTE — PROGRESS NOTES
Patient aspirated water as writer was walking in. HOB was raised and patient was able to clear airway. Audible upper airway cardiac wheezing heard. Duoneb given. Wheezing worse after treatment.

## 2021-11-20 NOTE — PROGRESS NOTES
Progress Note    SUBJECTIVE:  FU related to still some shortness of breath feels weak overall. OBJECTIVE:    Vitals:   TEMPERATURE:  Current - Temp: 99.5 °F (37.5 °C); Max - Temp  Av.5 °F (36.9 °C)  Min: 97.4 °F (36.3 °C)  Max: 99.5 °F (37.5 °C)  RESPIRATIONS RANGE: Resp  Av.7  Min: 20  Max: 24  PULSE RANGE: Pulse  Av  Min: 95  Max: 122  BLOOD PRESSURE RANGE:  Systolic (89AZY), IUZ:439 , Min:130 , LTD:301   ; Diastolic (11DFA), SFW:39, Min:70, Max:83    PULSE OXIMETRY RANGE: SpO2  Av.6 %  Min: 93 %  Max: 100 %  24HR INTAKE/OUTPUT:    Intake/Output Summary (Last 24 hours) at 2021 0954  Last data filed at 2021 0400  Gross per 24 hour   Intake 10 ml   Output 3550 ml   Net -3540 ml     -----------------------------------------------------------------  Exam:  General: Oriented x2 and alert  HEENT: Supple neck & negative  Heart: Regular  Lungs: Crackles. No wheeze.   Abdomen: Normal & soft, No tenderness and BS normal  Extremities:  No edema   Neuro: NonFocal     -----------------------------------------------------------------  Diagnostic Data:  Lab Results   Component Value Date    WBC 13.3 (H) 2021    HGB 10.4 (L) 2021     2021       Lab Results   Component Value Date    BUN 60 (H) 2021    CREATININE 2.21 (H) 2021     2021    K 4.8 2021    CALCIUM 8.3 (L) 2021     2021    CO2 22 2021    LABGLOM 28 (L) 2021       Lab Results   Component Value Date    WBCUA 0 TO 2 2021    RBCUA None 2021    EPITHUA None 2021    LEUKOCYTESUR NEGATIVE 2021    SPECGRAV 1.020 2021    GLUCOSEU 3+ (A) 2021    KETUA NEGATIVE 2021    PROTEINU NEGATIVE 2021    HGBUR NEGATIVE 2021    CASTUA NOT REPORTED 2021    CRYSTUA NOT REPORTED 2021    BACTERIA TRACE (A) 2021    YEAST NOT REPORTED 2021       Lab Results   Component Value Date    MYOGLOBIN 111 (H) 03/26/2018    TROPONINT NOT REPORTED 11/19/2021    CKTOTAL 130 03/26/2018    CKMB 9.2 11/17/2021    PROBNP 10,948 (H) 11/18/2021       Echocardiogram complete 2D with doppler with color    Result Date: 11/16/2021  [facility] Transthoracic Echocardiography Report (TTE)  Patient Name Marvel Almonte  Date of Study               11/16/2021               H   Date of      10/31/1930  Gender                      Male  Birth   Age          80 year(s)  Race                           Room Number  I307        Height:                     63 inch, 160.02 cm   Corporate ID [id number]    Weight:                     145 pounds, 65.8 kg  #   Patient Acct [de-identified]   BSA:          1.69 m^2      BMI:      25.69  #                                                              kg/m^2   MR #         [id number]      Sonographer                 Joelle Massey   Accession #  [id number]  Interpreting Physician      [facility]   Fellow                   Referring Nurse             Delia Paiz, CNP                           Practitioner   Interpreting             Referring Physician  Fellow  Type of Study   TTE procedure:2D Echocardiogram, M-Mode, Doppler, Color Doppler. Procedure Date Date: 11/16/2021 Start: 05:21 PM Study Location: [facility] Indications:Irregular Heart Rate. History / Tech. Comments: Dx: irregular heart rate Hx: CABG (1970s), HTN, hyperlipidemia, DM, TIA Patient Status: Inpatient Height: 63 inches Weight: 145 pounds BSA: 1.69 m^2 BMI: 25.69 kg/m^2 BP: 119/72 mmHg CONCLUSIONS Summary Global left ventricular systolic function is difficult to assess due to the patients rate in the 120's but appears preserved with an estimated ejection fraction of 55%. Mildly increased left ventricular wall thickness with a normal left ventricular cavity size. Left atrium is normal in size.  Evidence of a small to moderate inter-atrial communication is seen by color Doppler imaging with an intermittent left to right shunt. This appears most consistent with a patent foramen ovale (PFO) vs a small ASD. 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 The clinical significant of this finding is unknown, but has been associated with an increased risk of TIA's and strokes. Mild to moderate aortic leaflets calcification without restriction of motion. Mild aortic insufficiency was seen. Mild pulmonary hypertension with an estimated right ventricular systolic pressure of 30 mmHg. Mild to moderate tricuspid regurgitation. Compared to the previous study of 3/27/18, the patient now has evidence of a small to moderate sized inter-atrial communication. Consider additional testing by transesophageal echocardiography (PATT) if clinically indicated, especially if this might . Clinical correlation required. Signature ----------------------------------------------------------------------------  Electronically signed by Joelle Massey(Sonographer) on 11/16/2021 05:56 PM ---------------------------------------------------------------------------- ----------------------------------------------------------------------------  Electronically signed by Aung Smith(Interpreting physician) on  11/16/2021 06:54 PM ---------------------------------------------------------------------------- FINDINGS Left Atrium Left atrium is normal in size. Evidence of a small to moderate inter-atrial communication is seen by color Doppler imaging with an intermittent left to right shunt. This appears most consistent with a patent foramen ovale (PFO) vs a small ASD. 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 The clinical significant of this finding is unknown, but has been associated with an increased risk of TIA's and strokes.  Left Ventricle Global left ventricular systolic function is difficult to assess due to the patients rate in the 120's but appears preserved with an estimated ejection fraction of 55%. Mildly increased left ventricular wall thickness with a normal left ventricular cavity size. Right Atrium Right atrium is normal in size. Right Ventricle Normal right ventricular size and function. Mitral Valve Normal mitral valve structure and function. Aortic Valve Mild to moderate aortic leaflets calcification without restriction of motion. Mild aortic insufficiency was seen. Tricuspid Valve Mild pulmonary hypertension with an estimated right ventricular systolic pressure of 30 mmHg. Mild to moderate tricuspid regurgitation. Pulmonic Valve The pulmonic valve is normal in structure. Pericardial Effusion No significant pericardial effusion is seen. Miscellaneous Diastology cannot be properly assessed due to the patients rhythm. Normal aortic root dimension.  M-mode / 2D Measurements & Calculations:   LVIDd:3.29 cm(3.7 - 5.6 cm)      Diastolic KKXALA:89.76 ml  LVIDs:2.83 cm(2.2 - 4.0 cm)      Systolic SQPAYW:04.39 ml  IVSd:1.08 cm(0.6 - 1.1 cm)       Aortic Root:3.3 cm(2.0 - 3.7 cm)  LVPWd:1.11 cm(0.6 - 1.1 cm)      LA Dimension: 3.73 cm(1.9 - 4.0 cm)  Fractional Shortenin.98 %    LA volume/Index: 32.1 ml /19m^2  Calculated LVEF (%): 64.43 %     AV Cusp Separation: 1.87 cm   Mitral:                                Aortic   Valve Area (P1/2-Time): 4.73 cm^2      Peak Velocity: 1.17 m/s  Peak E-Wave: 1.02 m/s                  Mean Velocity: 0.99 m/s                                         Peak Gradient: 5.51 mmHg  Peak Gradient: 4.14 mmHg               Mean Gradient: 4.09 mmHg   P1/2t: 46.47 msec                      Acceleration Time: 111.88 msec                                          AV VTI: 22.14 cm   Tricuspid:                             Pulmonic:   Estimated RVSP: 30.46 mmHg  Peak TR Velocity: 2.62 m/s  Peak TR Gradient: 27.4576 mmHg  Estimated RA Pressure: 3 mmHg                                         Estimated PASP: 30.46 mmHg  Diastology / Tissue Doppler Lateral Wall E' velocity:0.13 m/s Lateral Wall E/E':7.82    US RETROPERITONEAL COMPLETE    Result Date: 11/17/2021  EXAMINATION: RETROPERITONEAL ULTRASOUND OF THE KIDNEYS AND URINARY BLADDER 11/17/2021 COMPARISON: None HISTORY: ORDERING SYSTEM PROVIDED HISTORY: Rule out obstructive uropathy. TECHNOLOGIST PROVIDED HISTORY: Rule out obstructive uropathy. 63-year-old male; rule out obstructive uropathy FINDINGS: Kidneys: Exam limited due to rib shadowing and difficulty breathing. Right kidney measures 9.1 x 4.9 x 4.9 cm. Right renal cortical thickness measures 1.1 cm. Left kidney measures 9.6 x 4.4 x 5.0 cm. Left renal cortical thickness measures 8 mm. Color flow projects over the bilateral renal parenchyma. No hydronephrosis. No perinephric fluid. Gross preservation of the bilateral corticomedullary differentiation. Echogenic focus with posterior shadowing measuring 6 mm at the midpole left kidney consistent with a renal calculus. Gallstones incidentally noted within the gallbladder with posterior acoustic shadowing and image 56. Bladder: Urinary bladder measures 7.5 by 6.0 x 7.2 cm for a bladder volume of 229 mL. Urinary bladder wall thickness measures 3 mm. Postvoid urinary bladder measures 5.1 x 6.2 x 6.3 cm for a postvoid urinary bladder volume of 105 mL. Left ureteral jet is visualized. Nonvisualization of the right ureteral jet. 1. Limited exam.  Large postvoid residual.  Nonvisualization of the right ureteral jet. 2. No hydronephrosis. 3. 6 mm calculus at the midpole left kidney. 4. Cholelithiasis.        ASSESSMENT:    Principal Problem:    COVID-19 virus infection / [facility] Vaccine  Active Problems:    Hypertension    Type 2 diabetes mellitus without complication, without long-term current use of insulin (HCC)    Acute and chronic respiratory failure with hypoxia (HCC)    ASHD (arteriosclerotic heart disease)    Atrial flutter (HCC)    Stage 4 chronic kidney disease (HCC)    S/P CABG (coronary artery bypass graft)    Mild malnutrition ([address]  Resolved Problems:    * No resolved hospital problems.  *      Patient Active Problem List    Diagnosis Date Noted    Mild malnutrition ([address] 11/17/2021    Acute and chronic respiratory failure with hypoxia ([address] 11/16/2021    Atrial flutter ([address] 11/16/2021    Stage 4 chronic kidney disease ([address] 11/16/2021    ASHD (arteriosclerotic heart disease)     S/P CABG (coronary artery bypass graft)     COVID-19 virus infection / Harle Distel Vaccine 11/15/2021    Cerebral infarction due to embolism of right middle cerebral artery ([address] 03/28/2018    Left leg weakness 03/28/2018    Falling 03/28/2018    Stroke, lacunar ([address] 03/28/2018    Hypertension 03/27/2018    Type 2 diabetes mellitus without complication, without long-term current use of insulin ([address] 03/27/2018    Cerebrovascular accident (CVA) ([address] 03/26/2018    Right inguinal hernia 04/02/2014       PLAN:  · COVID-19 virus protocol  · PT  · Disposition Home versus rehab on Monday  · Critical Care Time: Zoe Valladares MD , M.D.

## 2021-11-20 NOTE — PROGRESS NOTES
occOccupational Therapy  Facility/Department: [facility] AT THE [facility] MED SURG  Daily Treatment Note  NAME: Chery Puente  : 10/31/1930  MRN: [id number]    Date of Service: 2021    Discharge Recommendations:  Continue to assess pending progress, Home with assist PRN       Assessment      Safety Devices  Safety Devices in place: Yes  Type of devices: Call light within reach         Patient Diagnosis(es): The primary encounter diagnosis was Pneumonia due to COVID-19 virus. A diagnosis of Elevated troponin was also pertinent to this visit. has a past medical history of Acute and chronic respiratory failure with hypoxia ([facility] CAD (coronary artery disease), Cataracts, bilateral, Glaucoma, Hyperlipidemia, Hypertension, TIA (transient ischemic attack), and Type II or unspecified type diabetes mellitus without mention of complication, not stated as uncontrolled. has a past surgical history that includes Coronary artery bypass graft (); Cataract removal with implant (Bilateral,  and 12/3/2013); and Inguinal hernia repair (Bilateral, 12). Restrictions  Restrictions/Precautions  Restrictions/Precautions: General Precautions, Fall Risk, Isolation  Subjective   General  Chart Reviewed: Yes  Patient assessed for rehabilitation services?: Yes  Response to previous treatment: Patient with no complaints from previous session  Family / Caregiver Present: No  Referring Practitioner: Gabriela Courser  Diagnosis: Covid-19  Subjective  Subjective: Pt denies pain this date. Pt on 2 liters of O2 for comfort per Respiratory. General Comment  Comments: Pt in bedside chair upon arrival.  Agreeable to B UE ther ex. Pleasant and compliant throughout tx. Pre Treatment Pain Screening  Comments / Details: No c/o pain.   Vital Signs  Patient Currently in Pain: No   Orientation     Objective                                                                Type of ROM/Therapeutic Exercise  Type of ROM/Therapeutic Exercise: AROM  Comment: B UE ther ex with 0 wt X 10 reps for all sets sitting up in chair. Plan   Plan  Times per week: 7x  Times per day: Daily  Current Treatment Recommendations: Strengthening, ROM, Safety Education & Training, Patient/Caregiver Education & Training, Self-Care / ADL, Endurance Training, Functional Mobility Training  G-Code     OutComes Score                                                  AM-PAC Score             Goals  Short term goals  Short term goal 1: Patient to complete self care routine c SUP to ensure safe return home. Short term goal 2: Patient to engage in 15 minutes of ther ex/ther act s significant shortness of breath to improve strength as well as activity tolerance for self care tasks. Short term goal 3: Patient to tolerate standing >7' while participating in functional task of choice to improve standing tolerane, balance and safety during ADL, mobility and transfers. Short term goal 4: Patient to be educated on d/c folder, AE/DME, and general safety to ensure safe return home.        Therapy Time   Individual Concurrent Group Co-treatment   Time In  855         Time Out  910         Minutes  15              Estephanie PETERSEN [id number]    Liyah Gutiérrez

## 2021-11-20 NOTE — PROGRESS NOTES
Patient is awake, alert and orient. Patient has 1 lpm oxygen via nasal cannula, not complaining of shortness of breath at this moment. Vital signs taken and assessment done.

## 2021-11-20 NOTE — PROGRESS NOTES
Patient sitting up in chair watching tv, denies needs vitals and assessment complete.  acapella and proning discussed with patient

## 2021-11-21 LAB
ABSOLUTE EOS #: 0.09 K/UL (ref 0–0.44)
ABSOLUTE IMMATURE GRANULOCYTE: 0.27 K/UL (ref 0–0.3)
ABSOLUTE LYMPH #: 1.64 K/UL (ref 1.1–3.7)
ABSOLUTE MONO #: 1.16 K/UL (ref 0.1–1.2)
ALBUMIN SERPL-MCNC: 2.4 G/DL (ref 3.5–5.2)
ALBUMIN/GLOBULIN RATIO: 0.7 (ref 1–2.5)
ALP BLD-CCNC: 145 U/L (ref 40–129)
ALT SERPL-CCNC: 25 U/L (ref 5–41)
ANION GAP SERPL CALCULATED.3IONS-SCNC: 11 MMOL/L (ref 9–17)
AST SERPL-CCNC: 25 U/L
BASOPHILS # BLD: 0 % (ref 0–2)
BASOPHILS ABSOLUTE: 0.03 K/UL (ref 0–0.2)
BILIRUB SERPL-MCNC: 0.47 MG/DL (ref 0.3–1.2)
BUN BLDV-MCNC: 52 MG/DL (ref 8–23)
BUN/CREAT BLD: 24 (ref 9–20)
CALCIUM SERPL-MCNC: 8.6 MG/DL (ref 8.6–10.4)
CHLORIDE BLD-SCNC: 98 MMOL/L (ref 98–107)
CO2: 23 MMOL/L (ref 20–31)
CREAT SERPL-MCNC: 2.2 MG/DL (ref 0.7–1.2)
DIFFERENTIAL TYPE: ABNORMAL
EKG ATRIAL RATE: 286 BPM
EKG Q-T INTERVAL: 314 MS
EKG QRS DURATION: 112 MS
EKG QTC CALCULATION (BAZETT): 484 MS
EKG R AXIS: -33 DEGREES
EKG T AXIS: 80 DEGREES
EKG VENTRICULAR RATE: 143 BPM
EOSINOPHILS RELATIVE PERCENT: 1 % (ref 1–4)
GFR AFRICAN AMERICAN: 34 ML/MIN
GFR NON-AFRICAN AMERICAN: 28 ML/MIN
GFR SERPL CREATININE-BSD FRML MDRD: ABNORMAL ML/MIN/{1.73_M2}
GFR SERPL CREATININE-BSD FRML MDRD: ABNORMAL ML/MIN/{1.73_M2}
GLUCOSE BLD-MCNC: 216 MG/DL (ref 70–99)
GLUCOSE BLD-MCNC: 224 MG/DL (ref 74–100)
GLUCOSE BLD-MCNC: 398 MG/DL (ref 74–100)
GLUCOSE BLD-MCNC: 439 MG/DL (ref 74–100)
HCT VFR BLD CALC: 33.3 % (ref 40.7–50.3)
HEMOGLOBIN: 10.6 G/DL (ref 13–17)
IMMATURE GRANULOCYTES: 2 %
LYMPHOCYTES # BLD: 10 % (ref 24–43)
MCH RBC QN AUTO: 30.7 PG (ref 25.2–33.5)
MCHC RBC AUTO-ENTMCNC: 31.8 G/DL (ref 28.4–34.8)
MCV RBC AUTO: 96.5 FL (ref 82.6–102.9)
MONOCYTES # BLD: 7 % (ref 3–12)
NRBC AUTOMATED: 0 PER 100 WBC
PDW BLD-RTO: 12.7 % (ref 11.8–14.4)
PLATELET # BLD: 242 K/UL (ref 138–453)
PLATELET ESTIMATE: ABNORMAL
PMV BLD AUTO: 10.9 FL (ref 8.1–13.5)
POTASSIUM SERPL-SCNC: 5.3 MMOL/L (ref 3.7–5.3)
RBC # BLD: 3.45 M/UL (ref 4.21–5.77)
RBC # BLD: ABNORMAL 10*6/UL
SEG NEUTROPHILS: 80 % (ref 36–65)
SEGMENTED NEUTROPHILS ABSOLUTE COUNT: 13.78 K/UL (ref 1.5–8.1)
SODIUM BLD-SCNC: 132 MMOL/L (ref 135–144)
TOTAL PROTEIN: 6 G/DL (ref 6.4–8.3)
WBC # BLD: 17 K/UL (ref 3.5–11.3)
WBC # BLD: ABNORMAL 10*3/UL

## 2021-11-21 PROCEDURE — 6370000000 HC RX 637 (ALT 250 FOR IP): Performed by: INTERNAL MEDICINE

## 2021-11-21 PROCEDURE — 2700000000 HC OXYGEN THERAPY PER DAY

## 2021-11-21 PROCEDURE — 36415 COLL VENOUS BLD VENIPUNCTURE: CPT

## 2021-11-21 PROCEDURE — 93010 ELECTROCARDIOGRAM REPORT: CPT | Performed by: INTERNAL MEDICINE

## 2021-11-21 PROCEDURE — 94669 MECHANICAL CHEST WALL OSCILL: CPT

## 2021-11-21 PROCEDURE — 94640 AIRWAY INHALATION TREATMENT: CPT

## 2021-11-21 PROCEDURE — 80053 COMPREHEN METABOLIC PANEL: CPT

## 2021-11-21 PROCEDURE — 2000000000 HC ICU R&B

## 2021-11-21 PROCEDURE — 99233 SBSQ HOSP IP/OBS HIGH 50: CPT | Performed by: INTERNAL MEDICINE

## 2021-11-21 PROCEDURE — 85025 COMPLETE CBC W/AUTO DIFF WBC: CPT

## 2021-11-21 PROCEDURE — 6370000000 HC RX 637 (ALT 250 FOR IP): Performed by: NURSE PRACTITIONER

## 2021-11-21 PROCEDURE — 2500000003 HC RX 250 WO HCPCS: Performed by: INTERNAL MEDICINE

## 2021-11-21 PROCEDURE — 2580000003 HC RX 258: Performed by: NURSE PRACTITIONER

## 2021-11-21 PROCEDURE — 2580000003 HC RX 258: Performed by: INTERNAL MEDICINE

## 2021-11-21 PROCEDURE — 99232 SBSQ HOSP IP/OBS MODERATE 35: CPT | Performed by: INTERNAL MEDICINE

## 2021-11-21 PROCEDURE — 82947 ASSAY GLUCOSE BLOOD QUANT: CPT

## 2021-11-21 PROCEDURE — 94761 N-INVAS EAR/PLS OXIMETRY MLT: CPT

## 2021-11-21 RX ADMIN — OXYCODONE HYDROCHLORIDE AND ACETAMINOPHEN 2000 MG: 500 TABLET ORAL at 21:29

## 2021-11-21 RX ADMIN — IPRATROPIUM BROMIDE AND ALBUTEROL SULFATE 1 AMPULE: 2.5; .5 SOLUTION RESPIRATORY (INHALATION) at 10:58

## 2021-11-21 RX ADMIN — FAMOTIDINE 10 MG: 10 TABLET ORAL at 21:28

## 2021-11-21 RX ADMIN — APIXABAN 2.5 MG: 2.5 TABLET, FILM COATED ORAL at 21:30

## 2021-11-21 RX ADMIN — ZINC SULFATE 220 MG (50 MG) CAPSULE 100 MG: CAPSULE at 08:19

## 2021-11-21 RX ADMIN — SODIUM BICARBONATE TAB 650 MG 650 MG: 650 TAB at 08:18

## 2021-11-21 RX ADMIN — IPRATROPIUM BROMIDE AND ALBUTEROL SULFATE 1 AMPULE: 2.5; .5 SOLUTION RESPIRATORY (INHALATION) at 07:13

## 2021-11-21 RX ADMIN — SODIUM CHLORIDE, PRESERVATIVE FREE 10 ML: 5 INJECTION INTRAVENOUS at 08:19

## 2021-11-21 RX ADMIN — INSULIN LISPRO 2 UNITS: 100 INJECTION, SOLUTION INTRAVENOUS; SUBCUTANEOUS at 08:21

## 2021-11-21 RX ADMIN — SODIUM BICARBONATE TAB 650 MG 650 MG: 650 TAB at 21:28

## 2021-11-21 RX ADMIN — CLOPIDOGREL BISULFATE 75 MG: 75 TABLET ORAL at 08:18

## 2021-11-21 RX ADMIN — ATORVASTATIN CALCIUM 40 MG: 40 TABLET, FILM COATED ORAL at 21:28

## 2021-11-21 RX ADMIN — TAMSULOSIN HYDROCHLORIDE 0.4 MG: 0.4 CAPSULE ORAL at 08:18

## 2021-11-21 RX ADMIN — Medication 1 MG/MIN: at 05:57

## 2021-11-21 RX ADMIN — GLIPIZIDE 5 MG: 5 TABLET ORAL at 08:32

## 2021-11-21 RX ADMIN — OXYCODONE HYDROCHLORIDE AND ACETAMINOPHEN 2000 MG: 500 TABLET ORAL at 08:19

## 2021-11-21 RX ADMIN — DILTIAZEM HYDROCHLORIDE 5 MG/HR: 5 INJECTION, SOLUTION INTRAVENOUS at 02:05

## 2021-11-21 RX ADMIN — SODIUM CHLORIDE, PRESERVATIVE FREE 10 ML: 5 INJECTION INTRAVENOUS at 21:34

## 2021-11-21 RX ADMIN — Medication 1 MG/MIN: at 16:19

## 2021-11-21 RX ADMIN — APIXABAN 2.5 MG: 2.5 TABLET, FILM COATED ORAL at 08:18

## 2021-11-21 RX ADMIN — METOPROLOL SUCCINATE 25 MG: 25 TABLET, EXTENDED RELEASE ORAL at 08:19

## 2021-11-21 RX ADMIN — INSULIN LISPRO 6 UNITS: 100 INJECTION, SOLUTION INTRAVENOUS; SUBCUTANEOUS at 12:07

## 2021-11-21 RX ADMIN — GLIPIZIDE 5 MG: 5 TABLET ORAL at 16:19

## 2021-11-21 ASSESSMENT — PAIN SCALES - GENERAL
PAINLEVEL_OUTOF10: 0

## 2021-11-21 NOTE — PROGRESS NOTES
Patient sitting up in bed. Patient remains in SVT, HR varies from 120-140's. Denies any chest pain or palpitations. Amiodarone infusing.

## 2021-11-21 NOTE — PROGRESS NOTES
[facility]   [phone]   Nephrology progress note  11/21/2021, 2:08 PM      Pt Name:    Duglas Guevara  MRN:     [id number]     YOB: 1930  Admit Date:    11/15/2021  8:38 PM  Primary Care Physician:  Chace Munoz DO   Room number  I306/I306-01    TELEHEALTH EVALUATION -- Audio/Visual (During RQNYE-13 public health emergency)    Patient's Location: [address] [place] [address] (myself) Location: [facility]  Patient's nurse: Present    Chief Complaint: Nephrology following for JANINA/CKD    Subjective:  Patient seen and examined  Under COVID isolation  In ICU due to atrial fibrillation and tachycardia  Doing better today  Not a very good historian  On Iv amiodarone drip    Objective:  24HR INTAKE/OUTPUT:      Intake/Output Summary (Last 24 hours) at 11/21/2021 1408  Last data filed at 11/21/2021 0515  Gross per 24 hour   Intake 510 ml   Output 1000 ml   Net -490 ml     I/O last 3 completed shifts: In: 18 [P.O.:400; I.V.:110]  Out: 2400 [Urine:2400]  No intake/output data recorded. Admission weight: 145 lb (65.8 kg)  Wt Readings from Last 3 Encounters:   11/21/21 164 lb 11.2 oz (74.7 kg)   06/08/19 158 lb (71.7 kg)   05/16/18 166 lb 6.4 oz (75.5 kg)     Body mass index is 29.18 kg/m².     Physical examination  VITALS:     Vitals:    11/21/21 1300 11/21/21 1315 11/21/21 1345 11/21/21 1400   BP: (!) 103/58 (!) 98/44 108/75 (!) 124/48   Pulse: 108 124 115 96   Resp: (!) 32 (!) 35 29 (!) 31   Temp:       TempSrc:       SpO2: 93% 93% 92% 94%   Weight:       Height:         Constitutional and General Appearance: alert and cooperative, appears comfortable  Lungs: no use of accessory muscles or labored breathing noted, Respiratory effort observed to be normal  Extremities: No visible swelling      Due to this being a TeleHealth encounter, evaluation of the following organ systems is limited: EENT/Resp/CV/GI//MS/Neuro/Skin/Lymph    Lab Data  CBC:   Recent Labs     11/19/21  8693 11/20/21  0625 11/21/21  0518   WBC 11.1 13.3* 17.0*   HGB 10.4* 10.4* 10.6*   HCT 31.6* 32.3* 33.3*    224 242     BMP:  Recent Labs     11/19/21  0557 11/20/21  0625 11/21/21 0518   * 136 132*   K 5.0 4.8 5.3    101 98   CO2 21 22 23   BUN 75* 60* 52*   CREATININE 2.30* 2.21* 2.20*   GLUCOSE 118* 289* 216*   CALCIUM 8.1* 8.3* 8.6     Hepatic:   Recent Labs     11/19/21  0557 11/20/21 0625 11/21/21 0518   LABALBU 2.5* 2.5* 2.4*   AST 50* 28 25   ALT 38 29 25   BILITOT 0.41 0.39 0.47   ALKPHOS 103 214* 145*         Meds:  Infusion:    amiodarone 1 mg/min (11/21/21 0557)    sodium chloride      dextrose       Meds:    insulin lispro  0-12 Units SubCUTAneous TID WC    insulin lispro  0-6 Units SubCUTAneous Nightly    ipratropium-albuterol  1 ampule Inhalation 4x daily    sodium bicarbonate  650 mg Oral BID    famotidine  10 mg Oral Nightly    tamsulosin  0.4 mg Oral Daily    atorvastatin  40 mg Oral Nightly    clopidogrel  75 mg Oral Daily    glipiZIDE  5 mg Oral BID AC    sodium chloride flush  5-40 mL IntraVENous 2 times per day    vitamin D  20,000 Units Oral Weekly    ascorbic acid  2,000 mg Oral BID    zinc sulfate  100 mg Oral Daily    metoprolol succinate  25 mg Oral Daily    apixaban  2.5 mg Oral BID    [Held by provider] amiodarone  200 mg Oral BID     Meds prn: albuterol, sodium chloride flush, sodium chloride, ondansetron **OR** ondansetron, polyethylene glycol, acetaminophen **OR** acetaminophen, glucose, dextrose, glucagon (rDNA), dextrose       Impression and Plan:  1. JANINA/CKD IV: overall stable  Lytes are stable, renal function overall stable within last 48 hours  Continue with current mgmt  Not requiring much oxygen-infact currently on room air at this time  Diuretics as needed    2. Mild met acidosis: improved, on oral bicarbonate  3. Urinary retention: has del rio  4. Atrial Fibrillation: on Iv amiodarone  5. Covid positive  6. Diabetes mellitus  7.  Mild hyperkalemia: advised low K diet, see orders    D/W patient and RN    Meredith Gowers, MD  [facility]    Pursuant to the emergency declaration under the [facility] waiver authority and the Coronavirus Preparedness and [employer] Act, this Virtual  Visit was conducted, with patient's consent, to reduce the patient's risk of exposure to COVID-19. Caregiver (Patient's RN) was present when appropriate. Virtual visit was done to address concerns as mentioned above. Due to this being a TeleHealth encounter (During XOGLJ-30 public health emergency), evaluation of the following organ systems was limited: EENT/Resp/CV/GI//MS/Neuro/Skin/Lymph     Services were provided through a video synchronous discussion virtually to substitute for in-person visit.

## 2021-11-21 NOTE — PROGRESS NOTES
Writer contacted Dr. Luci Orta due to Cardizem not lowering heart rate and converting pt to NSR. See orders. Will continue to monitor.

## 2021-11-21 NOTE — PROGRESS NOTES
Writer speaks to Lorena Jimenez the son of the patient to give an update about transferring the patient to ICU.

## 2021-11-21 NOTE — PROGRESS NOTES
vaccines. He was treated with antibiotics without relief. The patient's HR was noted to be in the 140's and an EKG revealed acute onset atrial flutter. Cardiology was consulted and he was started on an amiodarone gtt. Covid swab was positive. The patient was also found to have acute urinary retention, 6 mm left pole renal calculi and cholelithiasis. Echocardiogram revealed a possible acute small to moderate sized inter-atrial communication. WBC is increasing      Impression CXR 11/15/21   Significant peripheral and basal predominant pulmonary opacities diffusely   throughout both lungs.  This pattern can be seen in the setting of COVID-19   pneumonia, though this requires clinical correlation.  Sequelae of underlying   interstitial lung disease is also possible, though less favored.  Stability   is indeterminate in the absence of prior comparison imaging.       Small right pleural effusion.       Age-indeterminate fracture of the left anterolateral 9th rib.                 Increased risk 3-dose inadvertent series    Dose 1  01/29/2021 (COVID-19, Moderna, Primary or Immunocompromised, PF, 100mcg/0.5mL)      Dose 2  02/18/2021 (COVID-19, Moderna, Primary or Immunocompromised, PF, 100mcg/0.5mL) - Given too close to previous dose     02/26/2021 (COVID-19, Sandhya [place], Primary or Immunocompromised, PF, 100mcg/0.5mL) - Given too close to previous dose     11/02/2021 (COVID-19, Pfizer, PF, 30mcg/0.3mL)      Booster dose  05/02/2022 - Dose Forecast         Patient admitted because of concerns with COVID 19.    CURRENT EVALUATION : 11/21/2021    Patient evaluated in the ICU. Afebrile  VS stable     The patient is being diuresed successfully with Bumex. Feeling weak and tired    Has come off nasal 02  He is on room air    Patient exhibiting respiratory distress.  No  Respiratory secretions: No    Patient receiving supplemental oxygen: 2-->1 L NC-->room air  RR: 19-->19-->20-->32  02 sat: 97-->97-->95-->98-->95        NEWS Score: 0-4 Low risk group; 5-6: Medium risk group; 7 or above: High risk group  Parameters 3 2 1 0 1 2 3   Age    < 72   ? 65   RR ? 8  9-11 12-20  21-24 ? 25   O2 Sats ? 91 92-93 94-95 ? 96      Suppl O2  Yes  No      SBP ? 90  101-110 111-219   ? 220   HR ? 40  41-50 51-90  111-130 ? 131   Consciousness    Alert   Drowsiness, lethargy, or confusion   Temperature ? 35.0 C (95.0 F)  35.1-36.0 C 95.1-96.9 F 36.1-38.0 C 97.0-100.4 F 38.1-39.0 C 100.5-102.3 F ? 39.1 C ? 102.4 F      NEWS Score:   11/17/21: 3 Low risk    Overall Daily Picture:    Improved    Presence of secondary bacterial Infection:    No  Additional antibiotics: No    Labs, X rays reviewed: 11/21/2021    BUN:85-->88-->75-->60-->52  Cr:3.0-->2.73-->2.30-->2.2    Pro BNP: 81707-->10,948    WBC:16.2-->13.2-->11.1-->17  Hb:10-->10.6  Plat: 193-->207-->242    Absolute Neutrophils:14.28  Absolute Lymphocytes:1.03  Neutrophil/Lymphocyte Ratio: 13.8 high risk    CRP:101.4-->63.2-->108  Ferritin:866  LDH: 218    Pro Calcitonin:  Troponin 145    Cultures:  Urine:  · 11/17: No growth  Blood:  · 11/17: No growth  Sputum :  ·   Wound:       CXR:       11/19/21 11/16/21      CAT:      Discussed with patient, RN, CC, IM. I have personally reviewed the past medical history, past surgical history, medications, social history, and family history, and I have updated the database accordingly.   Past Medical History:     Past Medical History:   Diagnosis Date    Acute and chronic respiratory failure with hypoxia ([address] 11/16/2021    CAD (coronary artery disease)     Cataracts, bilateral     Glaucoma     Hyperlipidemia     Hypertension     TIA (transient ischemic attack)     Type II or unspecified type diabetes mellitus without mention of complication, not stated as uncontrolled        Past Surgical  History:     Past Surgical History:   Procedure Laterality Date    CATARACT REMOVAL WITH IMPLANT Bilateral 11/219/2013 and 12/3/2013    CORONARY ARTERY BYPASS GRAFT  2000    INGUINAL HERNIA REPAIR Bilateral 06/19/12       Medications:      insulin lispro  0-12 Units SubCUTAneous TID WC    insulin lispro  0-6 Units SubCUTAneous Nightly    ipratropium-albuterol  1 ampule Inhalation 4x daily    sodium bicarbonate  650 mg Oral BID    famotidine  10 mg Oral Nightly    tamsulosin  0.4 mg Oral Daily    atorvastatin  40 mg Oral Nightly    clopidogrel  75 mg Oral Daily    glipiZIDE  5 mg Oral BID AC    sodium chloride flush  5-40 mL IntraVENous 2 times per day    vitamin D  20,000 Units Oral Weekly    ascorbic acid  2,000 mg Oral BID    zinc sulfate  100 mg Oral Daily    metoprolol succinate  25 mg Oral Daily    apixaban  2.5 mg Oral BID    [Held by provider] amiodarone  200 mg Oral BID       Social History:     Social History     Socioeconomic History    Marital status:      Spouse name: Not on file    Number of children: Not on file    Years of education: Not on file    Highest education level: Not on file   Occupational History    Not on file   Tobacco Use    Smoking status: Never Smoker    Smokeless tobacco: Never Used   Substance and Sexual Activity    Alcohol use: Yes     Comment: very very very seldom    Drug use: No    Sexual activity: Not on file   Other Topics Concern    Not on file   Social History Narrative    Not on file     Social Determinants of Health     Financial Resource Strain:     Difficulty of Paying Living Expenses: Not on file   Food Insecurity:     Worried About Running Out of Food in the Last Year: Not on file    Mera of Food in the Last Year: Not on file   Transportation Needs:     Lack of Transportation (Medical): Not on file    Lack of Transportation (Non-Medical):  Not on file   Physical Activity:     Days of Exercise per Week: Not on file    Minutes of Exercise per Session: Not on file   Stress:     Feeling of Stress : Not on file   Social Connections:  Frequency of Communication with Friends and Family: Not on file    Frequency of Social Gatherings with Friends and Family: Not on file    Attends [employer] Services: Not on file    Active Member of Clubs or Organizations: Not on file    Attends Club or Organization Meetings: Not on file    Marital Status: Not on file   Intimate Partner Violence:     Fear of Current or Ex-Partner: Not on file    Emotionally Abused: Not on file    Physically Abused: Not on file    Sexually Abused: Not on file   Housing Stability:     Unable to Pay for Housing in the Last Year: Not on file    Number of [place] in the Last Year: Not on file    Unstable Housing in the Last Year: Not on file       Family History:     Family History   Problem Relation Age of Onset    Heart Disease Father     Heart Disease Brother         Allergies:   Patient has no known allergies. Review of Systems:       Constitutional: No fevers or chills. No systemic complaints  Head: No headaches  Eyes: No double vision or blurry vision. No conjunctival inflammation. ENT: No sore throat or runny nose. . No hearing loss, tinnitus or vertigo. Cardiovascular: No chest pain or palpitations. No Shortness of breath. No SPEARS  Lung: No Shortness of breath or cough. No sputum production  Abdomen: No nausea, vomiting, diarrhea, or abdominal pain. Erleen Butler No cramps. Genitourinary: No increased urinary frequency, or dysuria. No hematuria. No suprapubic or CVA pain  Musculoskeletal: No muscle aches or pains. No joint effusions, swelling or deformities  Hematologic: No bleeding or bruising. Neurologic: No headache, weakness, numbness, or tingling. Integument: No rash, no ulcers. Psychiatric: No depression. Endocrine: No polyuria, no polydipsia, no polyphagia.     Physical Examination :     Patient Vitals for the past 8 hrs:   BP Pulse Resp SpO2   11/21/21 1430 95/60 114 29 96 %   11/21/21 1415 (!) 135/101 98 25 95 %   11/21/21 1400 (!) 124/48 96 (!) 31 94 % 11/21/21 1345 108/75 115 29 92 %   11/21/21 1315 (!) 98/44 124 (!) 35 93 %   11/21/21 1300 (!) 103/58 108 (!) 32 93 %   11/21/21 1230 (!) 120/56 129 (!) 38 90 %   11/21/21 1215 110/75 110 (!) 33 94 %   11/21/21 1200 (!) 112/26 115 27 (!) 89 %   11/21/21 1145 (!) 113/59 143 27 (!) 89 %   11/21/21 1130 (!) 100/53 114 27 94 %   11/21/21 1115 109/74 112 29 94 %   11/21/21 1100 (!) 109/56 112 29 99 %   11/21/21 1045 (!) 97/49 110 30 93 %   11/21/21 1030 (!) 99/52 120 29 91 %   11/21/21 1015 (!) 83/44 115 26 92 %   11/21/21 1000 (!) 87/35 114 25 92 %   11/21/21 0945 (!) 94/40 121 30 92 %   11/21/21 0930 (!) 98/38 133 24 93 %   11/21/21 0915 (!) 88/51 123 22 94 %   11/21/21 0900 (!) 88/55 112 (!) 31 (!) 89 %   11/21/21 0845 (!) 106/58 133 19 91 %   11/21/21 0830 (!) 99/38 137 25 93 %     General Appearance: Awake, alert, and in no apparent distress  Head:  Normocephalic, no trauma  Eyes: Pupils equal, round, reactive to light; sclera anicteric; conjunctivae pink. No embolic phenomena. ENT: Oropharynx clear, without erythema, exudate, or thrush. No tenderness of sinuses. Mouth/throat: mucosa pink and moist. No lesions. Dentition in good repair. Neck:Supple, without lymphadenopathy. Thyroid normal, No bruits. Pulmonary/Chest: Clear to auscultation, without wheezes, rales, or rhonchi. No dullness to percussion. Cardiovascular: Regular rate and rhythm without murmurs, rubs, or gallops. Abdomen: Soft, non tender. Bowel sounds normal. No organomegaly  All four Extremities: No cyanosis, clubbing, edema, or effusions. Neurologic: No gross sensory or motor deficits. Skin: Warm and dry with good turgor. No signs of peripheral arterial or venous insufficiency. No ulcerations. No open wounds.     Medical Decision Making -Laboratory:   I have independently reviewed/ordered the following labs:    CBC with Differential:   Recent Labs     11/20/21  0625 11/21/21  0518   WBC 13.3* 17.0*   HGB 10.4* 10.6*   HCT 32.3* 33.3*   PLT [address] 7 7     BMP:   Recent Labs     11/20/21  0625 11/21/21 0518    132*   K 4.8 5.3    98   CO2 22 23   BUN 60* 52*   CREATININE 2.21* 2.20*     Hepatic Function Panel:   Recent Labs     11/20/21  0625 11/21/21 0518   PROT 5.8* 6.0*   LABALBU 2.5* 2.4*   BILITOT 0.39 0.47   ALKPHOS 214* 145*   ALT 29 25   AST 28 25     No results for input(s): RPR in the last 72 hours. No results for input(s): HIV in the last 72 hours. No results for input(s): BC in the last 72 hours.   Lab Results   Component Value Date    MUCUS NOT REPORTED 02/11/2021    RBC 3.45 11/21/2021    TRICHOMONAS NOT REPORTED 02/11/2021    WBC 17.0 11/21/2021    YEAST NOT REPORTED 02/11/2021    TURBIDITY CLEAR 02/11/2021     Lab Results   Component Value Date    CREATININE 2.20 11/21/2021    GLUCOSE 216 11/21/2021    GLUCOSE 124 05/17/2012       Medical Decision Making-Imaging:     Narrative   EXAMINATION:   ONE XRAY VIEW OF THE CHEST       11/15/2021 6:06 pm       COMPARISON:   None.       HISTORY:   ORDERING SYSTEM PROVIDED HISTORY: short of breath   TECHNOLOGIST PROVIDED HISTORY:   short of breath       FINDINGS:   There are diffuse peripheral and basal pulmonary opacities bilaterally of   indeterminate stability in the absence of prior comparison imaging.  Small   right pleural effusion.  No pneumothorax.  Cardiomediastinal contours are   within normal limits for size with prior CABG and a tortuous, atherosclerotic   aorta.  Degenerative changes of the spine and shoulders.  Age-indeterminate   fracture of the anterolateral left 9th rib.           Impression   Significant peripheral and basal predominant pulmonary opacities diffusely   throughout both lungs.  This pattern can be seen in the setting of COVID-19   pneumonia, though this requires clinical correlation.  Sequelae of underlying   interstitial lung disease is also possible, though less favored.  Stability   is indeterminate in the absence of prior comparison imaging.       Small right pleural effusion.       Age-indeterminate fracture of the left anterolateral 9th rib.             Narrative   EXAMINATION:   RETROPERITONEAL ULTRASOUND OF THE KIDNEYS AND URINARY BLADDER       11/17/2021       COMPARISON:   None       HISTORY:   ORDERING SYSTEM PROVIDED HISTORY: Rule out obstructive uropathy. TECHNOLOGIST PROVIDED HISTORY:       Rule out obstructive uropathy.       51-year-old male; rule out obstructive uropathy       FINDINGS:       Kidneys:       Exam limited due to rib shadowing and difficulty breathing.       Right kidney measures 9.1 x 4.9 x 4.9 cm.  Right renal cortical thickness   measures 1.1 cm.       Left kidney measures 9.6 x 4.4 x 5.0 cm.  Left renal cortical thickness   measures 8 mm.       Color flow projects over the bilateral renal parenchyma.       No hydronephrosis.  No perinephric fluid.       Gross preservation of the bilateral corticomedullary differentiation.       Echogenic focus with posterior shadowing measuring 6 mm at the midpole left   kidney consistent with a renal calculus.       Gallstones incidentally noted within the gallbladder with posterior acoustic   shadowing and image 56.           Bladder:       Urinary bladder measures 7.5 by 6.0 x 7.2 cm for a bladder volume of 229 mL. Urinary bladder wall thickness measures 3 mm.       Postvoid urinary bladder measures 5.1 x 6.2 x 6.3 cm for a postvoid urinary   bladder volume of 105 mL.       Left ureteral jet is visualized.  Nonvisualization of the right ureteral jet.           Impression   1. Limited exam.  Large postvoid residual.  Nonvisualization of the right   ureteral jet. 2. No hydronephrosis. 3. 6 mm calculus at the midpole left kidney. 4. Cholelithiasis.         Summary  Global left ventricular systolic function is difficult to assess due to the  patients rate in the 120's but appears preserved with an estimated ejection  fraction of 55%.   Mildly increased left ventricular wall thickness with a normal left  ventricular cavity size. Left atrium is normal in size. Evidence of a small to moderate inter-atrial communication is seen by color  Doppler imaging with an intermittent left to right shunt. This appears most  consistent with a patent foramen ovale (PFO) vs a small ASD. Bowing intra-atrial septal motion consistent with an atrial septal aneurysm  is seen. The clinical significant of this finding is unknown, but has been  associated with an increased risk of TIA's and strokes. Mild to moderate aortic leaflets calcification without restriction of  motion. Mild aortic insufficiency was seen. Mild pulmonary hypertension with an estimated right ventricular systolic  pressure of 30 mmHg. Mild to moderate tricuspid regurgitation.     Compared to the previous study of 3/27/18, the patient now has evidence of a  small to moderate sized inter-atrial communication. Consider additional  testing by transesophageal echocardiography (PATT) if clinically indicated,  especially if this might . Clinical correlation required.       Medical Decision [id number]:       Medical Decision Making-Other:     Note:  · Labs, medications, radiologic studies were reviewed with personal review of films  · Large amounts of data were reviewed  · Discussed with nursing Staff, Discharge planner  · Infection Control and Prevention measures reviewed  · All prior entries were reviewed  · Administer medications as ordered  · Prognosis: Guarded  · Discharge planning reviewed      Thank you for allowing us to participate in the care of this patient. Please call with questions.     Jameel Bearden MD             Pager: [phone] - Office: [phone]

## 2021-11-21 NOTE — PROGRESS NOTES
Writer speaks to Dr. Kulwant Conroy about patient's heart rate ranging 140-145 bpm. EKG done. Dr. Kulwant Conroy wants the patient to transfer to ICU for Cardizem drip.

## 2021-11-21 NOTE — PROGRESS NOTES
Progress Note    SUBJECTIVE:  FU related to still some shortness of breath feels weak overall. Back in A. fib    OBJECTIVE:    Vitals:   TEMPERATURE:  Current - Temp: 98.9 °F (37.2 °C); Max - Temp  Av.9 °F (37.2 °C)  Min: 98.6 °F (37 °C)  Max: 99 °F (37.2 °C)  RESPIRATIONS RANGE: Resp  Av.8  Min: 18  Max: 34  PULSE RANGE: Pulse  Av.8  Min: 103  Max: 143  BLOOD PRESSURE RANGE:  Systolic (75WIL), KNE:507 , Min:83 , OIC:279   ; Diastolic (39WXJ), VSW:94, Min:38, Max:97    PULSE OXIMETRY RANGE: SpO2  Av.6 %  Min: 89 %  Max: 100 %  24HR INTAKE/OUTPUT:      Intake/Output Summary (Last 24 hours) at 2021 0953  Last data filed at 2021 0515  Gross per 24 hour   Intake 510 ml   Output 1850 ml   Net -1340 ml     -----------------------------------------------------------------  Exam:  General: Oriented x2 and alert  HEENT: Supple neck & negative  Heart: Irregular and tachycardic  Lungs: Crackles. No wheeze.   Abdomen: Normal & soft, No tenderness and BS normal  Extremities:  No edema   Neuro: NonFocal     -----------------------------------------------------------------  Diagnostic Data:  Lab Results   Component Value Date    WBC 17.0 (H) 2021    HGB 10.6 (L) 2021     2021       Lab Results   Component Value Date    BUN 52 (H) 2021    CREATININE 2.20 (H) 2021     (L) 2021    K 5.3 2021    CALCIUM 8.6 2021    CL 98 2021    CO2 23 2021    LABGLOM 28 (L) 2021       Lab Results   Component Value Date    WBCUA 0 TO 2 2021    RBCUA None 2021    EPITHUA None 2021    LEUKOCYTESUR NEGATIVE 2021    SPECGRAV 1.020 2021    GLUCOSEU 3+ (A) 2021    KETUA NEGATIVE 2021    PROTEINU NEGATIVE 2021    HGBUR NEGATIVE 2021    CASTUA NOT REPORTED 2021    CRYSTUA NOT REPORTED 2021    BACTERIA TRACE (A) 2021    YEAST NOT REPORTED 2021       Lab Results   Component Value Date    MYOGLOBIN 111 (H) 03/26/2018    TROPONINT NOT REPORTED 11/19/2021    CKTOTAL 130 03/26/2018    CKMB 9.2 11/17/2021    PROBNP 10,948 (H) 11/18/2021       Echocardiogram complete 2D with doppler with color    Result Date: 11/16/2021  [facility] Transthoracic Echocardiography Report (TTE)  Patient Name Rikki Young  Date of Study               11/16/2021               H   Date of      10/31/1930  Gender                      Male  Birth   Age          80 year(s)  Race                           Room Number  I307        Height:                     63 inch, 160.02 cm   Corporate ID [id number]    Weight:                     145 pounds, 65.8 kg  #   Patient Acct [de-identified]   BSA:          1.69 m^2      BMI:      25.69  #                                                              kg/m^2   MR #         [id number]      Sonographer                 Work,Joelle   Accession #  [id number]  Interpreting Physician      Jey Rahman   Fellow                   Referring Nurse             Andrés Jalloh, RANDAL                           Practitioner   Interpreting             Referring Physician  Fellow  Type of Study   TTE procedure:2D Echocardiogram, M-Mode, Doppler, Color Doppler. Procedure Date Date: 11/16/2021 Start: 05:21 PM Study Location: [facility] Indications:Irregular Heart Rate. History / Tech. Comments: Dx: irregular heart rate Hx: CABG (1970s), HTN, hyperlipidemia, DM, TIA Patient Status: Inpatient Height: 63 inches Weight: 145 pounds BSA: 1.69 m^2 BMI: 25.69 kg/m^2 BP: 119/72 mmHg CONCLUSIONS Summary Global left ventricular systolic function is difficult to assess due to the patients rate in the 120's but appears preserved with an estimated ejection fraction of 55%. Mildly increased left ventricular wall thickness with a normal left ventricular cavity size. Left atrium is normal in size.  Evidence of a small to moderate inter-atrial communication is seen by color Doppler imaging with an intermittent left to right shunt. This appears most consistent with a patent foramen ovale (PFO) vs a small ASD. 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 The clinical significant of this finding is unknown, but has been associated with an increased risk of TIA's and strokes. Mild to moderate aortic leaflets calcification without restriction of motion. Mild aortic insufficiency was seen. Mild pulmonary hypertension with an estimated right ventricular systolic pressure of 30 mmHg. Mild to moderate tricuspid regurgitation. Compared to the previous study of 3/27/18, the patient now has evidence of a small to moderate sized inter-atrial communication. Consider additional testing by transesophageal echocardiography (PATT) if clinically indicated, especially if this might . Clinical correlation required. Signature ----------------------------------------------------------------------------  Electronically signed by Joelle Massey(Sonographer) on 11/16/2021 05:56 PM ---------------------------------------------------------------------------- ----------------------------------------------------------------------------  Electronically signed by Luis SmithInterpreting physician) on  11/16/2021 06:54 PM ---------------------------------------------------------------------------- FINDINGS Left Atrium Left atrium is normal in size. Evidence of a small to moderate inter-atrial communication is seen by color Doppler imaging with an intermittent left to right shunt. This appears most consistent with a patent foramen ovale (PFO) vs a small ASD. 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 The clinical significant of this finding is unknown, but has been associated with an increased risk of TIA's and strokes.  Left Ventricle Global left ventricular systolic function is difficult to assess due to the patients rate in the 120's but appears preserved with an estimated ejection fraction of 55%. Mildly increased left ventricular wall thickness with a normal left ventricular cavity size. Right Atrium Right atrium is normal in size. Right Ventricle Normal right ventricular size and function. Mitral Valve Normal mitral valve structure and function. Aortic Valve Mild to moderate aortic leaflets calcification without restriction of motion. Mild aortic insufficiency was seen. Tricuspid Valve Mild pulmonary hypertension with an estimated right ventricular systolic pressure of 30 mmHg. Mild to moderate tricuspid regurgitation. Pulmonic Valve The pulmonic valve is normal in structure. Pericardial Effusion No significant pericardial effusion is seen. Miscellaneous Diastology cannot be properly assessed due to the patients rhythm. Normal aortic root dimension.  M-mode / 2D Measurements & Calculations:   LVIDd:3.29 cm(3.7 - 5.6 cm)      Diastolic TRNXE.30 ml  LVIDs:2.83 cm(2.2 - 4.0 cm)      Systolic BTZHXQ:67.51 ml  IVSd:1.08 cm(0.6 - 1.1 cm)       Aortic Root:3.3 cm(2.0 - 3.7 cm)  LVPWd:1.11 cm(0.6 - 1.1 cm)      LA Dimension: 3.73 cm(1.9 - 4.0 cm)  Fractional Shortenin.98 %    LA volume/Index: 32.1 ml /19m^2  Calculated LVEF (%): 64.43 %     AV Cusp Separation: 1.87 cm   Mitral:                                Aortic   Valve Area (P1/2-Time): 4.73 cm^2      Peak Velocity: 1.17 m/s  Peak E-Wave: 1.02 m/s                  Mean Velocity: 0.99 m/s                                         Peak Gradient: 5.51 mmHg  Peak Gradient: 4.14 mmHg               Mean Gradient: 4.09 mmHg   P1/2t: 46.47 msec                      Acceleration Time: 111.88 msec                                          AV VTI: 22.14 cm   Tricuspid:                             Pulmonic:   Estimated RVSP: 30.46 mmHg  Peak TR Velocity: 2.62 m/s  Peak TR Gradient: 27.4576 mmHg  Estimated RA Pressure: 3 mmHg                                         Estimated PASP: 30.46 mmHg  Diastology / Tissue Doppler Lateral Wall E' velocity:0.13 m/s Lateral Wall E/E':7.82    US RETROPERITONEAL COMPLETE    Result Date: 11/17/2021  EXAMINATION: RETROPERITONEAL ULTRASOUND OF THE KIDNEYS AND URINARY BLADDER 11/17/2021 COMPARISON: None HISTORY: ORDERING SYSTEM PROVIDED HISTORY: Rule out obstructive uropathy. TECHNOLOGIST PROVIDED HISTORY: Rule out obstructive uropathy. 70-year-old male; rule out obstructive uropathy FINDINGS: Kidneys: Exam limited due to rib shadowing and difficulty breathing. Right kidney measures 9.1 x 4.9 x 4.9 cm. Right renal cortical thickness measures 1.1 cm. Left kidney measures 9.6 x 4.4 x 5.0 cm. Left renal cortical thickness measures 8 mm. Color flow projects over the bilateral renal parenchyma. No hydronephrosis. No perinephric fluid. Gross preservation of the bilateral corticomedullary differentiation. Echogenic focus with posterior shadowing measuring 6 mm at the midpole left kidney consistent with a renal calculus. Gallstones incidentally noted within the gallbladder with posterior acoustic shadowing and image 56. Bladder: Urinary bladder measures 7.5 by 6.0 x 7.2 cm for a bladder volume of 229 mL. Urinary bladder wall thickness measures 3 mm. Postvoid urinary bladder measures 5.1 x 6.2 x 6.3 cm for a postvoid urinary bladder volume of 105 mL. Left ureteral jet is visualized. Nonvisualization of the right ureteral jet. 1. Limited exam.  Large postvoid residual.  Nonvisualization of the right ureteral jet. 2. No hydronephrosis. 3. 6 mm calculus at the midpole left kidney. 4. Cholelithiasis.        ASSESSMENT:    Principal Problem:    COVID-19 virus infection / Matthew Sow Vaccine  Active Problems:    Hypertension    Type 2 diabetes mellitus without complication, without long-term current use of insulin (HCC)    Acute and chronic respiratory failure with hypoxia (HCC)    ASHD (arteriosclerotic heart disease)    Atrial flutter (HCC)    Stage 4 chronic kidney disease (HCC)    S/P CABG (coronary artery bypass graft)    Mild malnutrition (HCC)  Resolved Problems:    * No resolved hospital problems.  *      Patient Active Problem List    Diagnosis Date Noted    Mild malnutrition ([address] 11/17/2021    Acute and chronic respiratory failure with hypoxia ([address] 11/16/2021    Atrial flutter ([address] 11/16/2021    Stage 4 chronic kidney disease ([address] 11/16/2021    ASHD (arteriosclerotic heart disease)     S/P CABG (coronary artery bypass graft)     COVID-19 virus infection / Sandhya Leonore Vaccine 11/15/2021    Cerebral infarction due to embolism of right middle cerebral artery ([address] 03/28/2018    Left leg weakness 03/28/2018    Falling 03/28/2018    Stroke, lacunar ([address] 03/28/2018    Hypertension 03/27/2018    Type 2 diabetes mellitus without complication, without long-term current use of insulin ([address] 03/27/2018    Cerebrovascular accident (CVA) ([facility] [address] 03/26/2018    Right inguinal hernia 04/02/2014       PLAN:  · COVID-19 virus protocol  · PT  · Amiodarone  · Cardiology  · Disposition Home versus rehab on Monday  · Critical Care Time: Hailey García MD , M.D.

## 2021-11-21 NOTE — PROGRESS NOTES
[facility]  Inpatient/Observation/Outpatient Rehabilitation    Date: 2021  Patient Name: Katy Underwood       [x] Inpatient Acute/Observation       []  Outpatient  : 10/31/1930       Pt transferred to ICU and will need new orders/reassessment to resume physical therapy once medically appropriate.       [place], [place] Date: 2021

## 2021-11-21 NOTE — PROGRESS NOTES
[facility] Pt is to be put on a Cardizem gtt per Dr. Santo Forbes. Pt hooked up to monitor. Assessment and vital signs as charted. Pt denies pan. POC reviewed with pt, verbalizes understanding. Pt on 1L nasal canula for comfort. Call light in reach. Side rails up x 3. Bed alarm on. Will continue to monitor.

## 2021-11-22 ENCOUNTER — APPOINTMENT (OUTPATIENT)
Dept: GENERAL RADIOLOGY | Age: 86
DRG: 177 | End: 2021-11-22
Payer: MEDICARE

## 2021-11-22 LAB
ABSOLUTE EOS #: 0.35 K/UL (ref 0–0.44)
ABSOLUTE IMMATURE GRANULOCYTE: 0.37 K/UL (ref 0–0.3)
ABSOLUTE LYMPH #: 1.51 K/UL (ref 1.1–3.7)
ABSOLUTE MONO #: 1.06 K/UL (ref 0.1–1.2)
ALBUMIN SERPL-MCNC: 2.1 G/DL (ref 3.5–5.2)
ALBUMIN/GLOBULIN RATIO: 0.7 (ref 1–2.5)
ALP BLD-CCNC: 160 U/L (ref 40–129)
ALT SERPL-CCNC: 30 U/L (ref 5–41)
ANION GAP SERPL CALCULATED.3IONS-SCNC: 12 MMOL/L (ref 9–17)
AST SERPL-CCNC: 34 U/L
BASOPHILS # BLD: 0 % (ref 0–2)
BASOPHILS ABSOLUTE: 0.03 K/UL (ref 0–0.2)
BILIRUB SERPL-MCNC: 0.4 MG/DL (ref 0.3–1.2)
BUN BLDV-MCNC: 60 MG/DL (ref 8–23)
BUN/CREAT BLD: 23 (ref 9–20)
CALCIUM SERPL-MCNC: 7.9 MG/DL (ref 8.6–10.4)
CHLORIDE BLD-SCNC: 98 MMOL/L (ref 98–107)
CO2: 21 MMOL/L (ref 20–31)
CREAT SERPL-MCNC: 2.59 MG/DL (ref 0.7–1.2)
CULTURE: NORMAL
CULTURE: NORMAL
DIFFERENTIAL TYPE: ABNORMAL
EKG ATRIAL RATE: 84 BPM
EKG P AXIS: -28 DEGREES
EKG P-R INTERVAL: 164 MS
EKG Q-T INTERVAL: 396 MS
EKG QRS DURATION: 114 MS
EKG QTC CALCULATION (BAZETT): 467 MS
EKG R AXIS: -27 DEGREES
EKG T AXIS: 151 DEGREES
EKG VENTRICULAR RATE: 84 BPM
EOSINOPHILS RELATIVE PERCENT: 2 % (ref 1–4)
GFR AFRICAN AMERICAN: 28 ML/MIN
GFR NON-AFRICAN AMERICAN: 23 ML/MIN
GFR SERPL CREATININE-BSD FRML MDRD: ABNORMAL ML/MIN/{1.73_M2}
GFR SERPL CREATININE-BSD FRML MDRD: ABNORMAL ML/MIN/{1.73_M2}
GLUCOSE BLD-MCNC: 127 MG/DL (ref 74–100)
GLUCOSE BLD-MCNC: 144 MG/DL (ref 74–100)
GLUCOSE BLD-MCNC: 166 MG/DL (ref 70–99)
GLUCOSE BLD-MCNC: 180 MG/DL (ref 74–100)
GLUCOSE BLD-MCNC: 256 MG/DL (ref 74–100)
HCT VFR BLD CALC: 28.1 % (ref 40.7–50.3)
HEMOGLOBIN: 9.3 G/DL (ref 13–17)
IMMATURE GRANULOCYTES: 2 %
LYMPHOCYTES # BLD: 10 % (ref 24–43)
Lab: NORMAL
Lab: NORMAL
MCH RBC QN AUTO: 32 PG (ref 25.2–33.5)
MCHC RBC AUTO-ENTMCNC: 33.1 G/DL (ref 28.4–34.8)
MCV RBC AUTO: 96.6 FL (ref 82.6–102.9)
MONOCYTES # BLD: 7 % (ref 3–12)
NRBC AUTOMATED: 0 PER 100 WBC
PDW BLD-RTO: 12.8 % (ref 11.8–14.4)
PLATELET # BLD: 215 K/UL (ref 138–453)
PLATELET ESTIMATE: ABNORMAL
PMV BLD AUTO: 11 FL (ref 8.1–13.5)
POTASSIUM SERPL-SCNC: 4.8 MMOL/L (ref 3.7–5.3)
RBC # BLD: 2.91 M/UL (ref 4.21–5.77)
RBC # BLD: ABNORMAL 10*6/UL
SEG NEUTROPHILS: 79 % (ref 36–65)
SEGMENTED NEUTROPHILS ABSOLUTE COUNT: 12.18 K/UL (ref 1.5–8.1)
SODIUM BLD-SCNC: 131 MMOL/L (ref 135–144)
SPECIMEN DESCRIPTION: NORMAL
SPECIMEN DESCRIPTION: NORMAL
TOTAL PROTEIN: 5.3 G/DL (ref 6.4–8.3)
WBC # BLD: 15.5 K/UL (ref 3.5–11.3)
WBC # BLD: ABNORMAL 10*3/UL

## 2021-11-22 PROCEDURE — 85025 COMPLETE CBC W/AUTO DIFF WBC: CPT

## 2021-11-22 PROCEDURE — 97530 THERAPEUTIC ACTIVITIES: CPT

## 2021-11-22 PROCEDURE — 6370000000 HC RX 637 (ALT 250 FOR IP): Performed by: INTERNAL MEDICINE

## 2021-11-22 PROCEDURE — 97110 THERAPEUTIC EXERCISES: CPT

## 2021-11-22 PROCEDURE — APPSS30 APP SPLIT SHARED TIME 16-30 MINUTES: Performed by: NURSE PRACTITIONER

## 2021-11-22 PROCEDURE — 2580000003 HC RX 258: Performed by: INTERNAL MEDICINE

## 2021-11-22 PROCEDURE — 1200000000 HC SEMI PRIVATE

## 2021-11-22 PROCEDURE — 93010 ELECTROCARDIOGRAM REPORT: CPT | Performed by: INTERNAL MEDICINE

## 2021-11-22 PROCEDURE — 93005 ELECTROCARDIOGRAM TRACING: CPT | Performed by: INTERNAL MEDICINE

## 2021-11-22 PROCEDURE — 2580000003 HC RX 258: Performed by: NURSE PRACTITIONER

## 2021-11-22 PROCEDURE — 6370000000 HC RX 637 (ALT 250 FOR IP): Performed by: NURSE PRACTITIONER

## 2021-11-22 PROCEDURE — 99232 SBSQ HOSP IP/OBS MODERATE 35: CPT | Performed by: INTERNAL MEDICINE

## 2021-11-22 PROCEDURE — 94669 MECHANICAL CHEST WALL OSCILL: CPT

## 2021-11-22 PROCEDURE — 71045 X-RAY EXAM CHEST 1 VIEW: CPT

## 2021-11-22 PROCEDURE — 97162 PT EVAL MOD COMPLEX 30 MIN: CPT

## 2021-11-22 PROCEDURE — 94761 N-INVAS EAR/PLS OXIMETRY MLT: CPT

## 2021-11-22 PROCEDURE — 94640 AIRWAY INHALATION TREATMENT: CPT

## 2021-11-22 PROCEDURE — 80053 COMPREHEN METABOLIC PANEL: CPT

## 2021-11-22 PROCEDURE — 97116 GAIT TRAINING THERAPY: CPT

## 2021-11-22 PROCEDURE — 82947 ASSAY GLUCOSE BLOOD QUANT: CPT

## 2021-11-22 PROCEDURE — 36415 COLL VENOUS BLD VENIPUNCTURE: CPT

## 2021-11-22 RX ORDER — METOPROLOL SUCCINATE 50 MG/1
50 TABLET, EXTENDED RELEASE ORAL DAILY
Status: DISCONTINUED | OUTPATIENT
Start: 2021-11-23 | End: 2021-11-27 | Stop reason: HOSPADM

## 2021-11-22 RX ORDER — DILTIAZEM HYDROCHLORIDE 60 MG/1
60 TABLET, FILM COATED ORAL EVERY 6 HOURS SCHEDULED
Status: DISCONTINUED | OUTPATIENT
Start: 2021-11-22 | End: 2021-11-27 | Stop reason: HOSPADM

## 2021-11-22 RX ORDER — METOPROLOL SUCCINATE 25 MG/1
25 TABLET, EXTENDED RELEASE ORAL ONCE
Status: COMPLETED | OUTPATIENT
Start: 2021-11-22 | End: 2021-11-22

## 2021-11-22 RX ORDER — SODIUM CHLORIDE 9 MG/ML
INJECTION, SOLUTION INTRAVENOUS CONTINUOUS
Status: DISCONTINUED | OUTPATIENT
Start: 2021-11-22 | End: 2021-11-24

## 2021-11-22 RX ADMIN — CLOPIDOGREL BISULFATE 75 MG: 75 TABLET ORAL at 09:19

## 2021-11-22 RX ADMIN — SODIUM BICARBONATE TAB 650 MG 650 MG: 650 TAB at 20:32

## 2021-11-22 RX ADMIN — OXYCODONE HYDROCHLORIDE AND ACETAMINOPHEN 2000 MG: 500 TABLET ORAL at 20:32

## 2021-11-22 RX ADMIN — ACETAMINOPHEN 650 MG: 325 TABLET ORAL at 19:00

## 2021-11-22 RX ADMIN — SODIUM CHLORIDE: 9 INJECTION, SOLUTION INTRAVENOUS at 20:38

## 2021-11-22 RX ADMIN — APIXABAN 2.5 MG: 2.5 TABLET, FILM COATED ORAL at 20:32

## 2021-11-22 RX ADMIN — METOPROLOL SUCCINATE 25 MG: 25 TABLET, EXTENDED RELEASE ORAL at 09:18

## 2021-11-22 RX ADMIN — AMIODARONE HYDROCHLORIDE 200 MG: 200 TABLET ORAL at 20:32

## 2021-11-22 RX ADMIN — FAMOTIDINE 10 MG: 10 TABLET ORAL at 20:31

## 2021-11-22 RX ADMIN — DILTIAZEM HYDROCHLORIDE 60 MG: 60 TABLET, FILM COATED ORAL at 20:35

## 2021-11-22 RX ADMIN — GLIPIZIDE 5 MG: 5 TABLET ORAL at 09:18

## 2021-11-22 RX ADMIN — AMIODARONE HYDROCHLORIDE 200 MG: 200 TABLET ORAL at 09:19

## 2021-11-22 RX ADMIN — SODIUM CHLORIDE, PRESERVATIVE FREE 10 ML: 5 INJECTION INTRAVENOUS at 09:19

## 2021-11-22 RX ADMIN — OXYCODONE HYDROCHLORIDE AND ACETAMINOPHEN 2000 MG: 500 TABLET ORAL at 09:18

## 2021-11-22 RX ADMIN — METOPROLOL SUCCINATE 25 MG: 25 TABLET, EXTENDED RELEASE ORAL at 16:35

## 2021-11-22 RX ADMIN — ATORVASTATIN CALCIUM 40 MG: 40 TABLET, FILM COATED ORAL at 20:32

## 2021-11-22 RX ADMIN — SODIUM BICARBONATE TAB 650 MG 650 MG: 650 TAB at 09:19

## 2021-11-22 RX ADMIN — TAMSULOSIN HYDROCHLORIDE 0.4 MG: 0.4 CAPSULE ORAL at 09:19

## 2021-11-22 RX ADMIN — IPRATROPIUM BROMIDE AND ALBUTEROL SULFATE 1 AMPULE: 2.5; .5 SOLUTION RESPIRATORY (INHALATION) at 20:47

## 2021-11-22 RX ADMIN — ZINC SULFATE 220 MG (50 MG) CAPSULE 100 MG: CAPSULE at 09:25

## 2021-11-22 RX ADMIN — IPRATROPIUM BROMIDE AND ALBUTEROL SULFATE 1 AMPULE: 2.5; .5 SOLUTION RESPIRATORY (INHALATION) at 17:01

## 2021-11-22 RX ADMIN — GLIPIZIDE 5 MG: 5 TABLET ORAL at 16:35

## 2021-11-22 RX ADMIN — APIXABAN 2.5 MG: 2.5 TABLET, FILM COATED ORAL at 09:19

## 2021-11-22 ASSESSMENT — PAIN SCALES - GENERAL
PAINLEVEL_OUTOF10: 0

## 2021-11-22 NOTE — PROGRESS NOTES
Occupational Therapy  Facility/Department: [facility] AT THE [facility]  Daily Treatment Note  NAME: Duglas Guevara  : 10/31/1930  MRN: [id number]    Date of Service: 2021    Discharge Recommendations:  Continue to assess pending progress  OT Equipment Recommendations  Equipment Needed: Yes  Mobility Devices: Estefania Nailer: Rolling    Assessment   Performance deficits / Impairments: Decreased functional mobility ; Decreased balance; Decreased ADL status; Decreased endurance; Decreased strength; Decreased high-level IADLs  Assessment: Pt is a 80 y.o male admitted with Covid-19. Pt recently transferred to ICU d/t decline in health status. Pt demo decreased endurance, strength, functional mobility, postural control, poor activity tolerare and decreased participation in ADL/IADL's. Pt would benefit from skilled OT services to address deficits and to improve QOL. Prognosis: Fair  Decision Making: High Complexity  OT Education: OT Role; Plan of Care  Patient Education: Pt provided with d/c folder and educated on materials inside. Pt did not have any questions. REQUIRES OT FOLLOW UP: Yes  Activity Tolerance  Activity Tolerance: Patient limited by fatigue; Treatment limited secondary to medical complications (free text)  Safety Devices  Safety Devices in place: Yes  Type of devices: All fall risk precautions in place; Left in chair; Call light within reach         Patient Diagnosis(es): The primary encounter diagnosis was Pneumonia due to COVID-19 virus. A diagnosis of Elevated troponin was also pertinent to this visit. has a past medical history of Acute and chronic respiratory failure with hypoxia ([facility] CAD (coronary artery disease), Cataracts, bilateral, Glaucoma, Hyperlipidemia, Hypertension, TIA (transient ischemic attack), and Type II or unspecified type diabetes mellitus without mention of complication, not stated as uncontrolled. has a past surgical history that includes Coronary artery bypass graft ();  Cataract removal with implant (Bilateral, 11/219/2013 and 12/3/2013); and Inguinal hernia repair (Bilateral, 06/19/12). Restrictions  Restrictions/Precautions  Restrictions/Precautions: General Precautions, Fall Risk, Isolation  Subjective   General  Chart Reviewed: Yes  Patient assessed for rehabilitation services?: Yes  Response to previous treatment: Patient with no complaints from previous session  Family / Caregiver Present: No  Referring Practitioner: Christos Roberts  Diagnosis: Covid-19  Subjective  Subjective: Pt denies pain this date. General Comment  Comments: Pt sitting in bedside chair upon arrival. Agreeable to OT tx. Orientation     Objective    ADL  Feeding: Setup  Grooming: Contact guard assistance  UE Bathing: Contact guard assistance; Minimal assistance  LE Bathing: Contact guard assistance; Minimal assistance  UE Dressing: Contact guard assistance; Minimal assistance  LE Dressing: Contact guard assistance; Minimal assistance  Toileting: Contact guard assistance; Minimal assistance  Additional Comments: Pt with increased fatigue and increased HR limiting ADL participation requiring assistance. Balance  Sitting Balance: Stand by assistance  Standing Balance: Minimal assistance  Bed mobility  Supine to Sit: Minimal assistance; Moderate assistance  Sit to Supine: Minimal assistance  Scooting: Contact guard assistance; Minimal assistance  Transfers  Sit to stand: Contact guard assistance; Minimal assistance  Stand to sit: Contact guard assistance                                            Type of ROM/Therapeutic Exercise  Type of ROM/Therapeutic Exercise: Free weights  Comment: Pt engaged in BUE ther ex to increase strength for transfers and functional activity tolerance for ADL's. Pt completed 10-15 reps x 1 set of chest press, bicep curl, front raise,tricep extension, forearm pronation/supination, scapular protraction/retraction using a 1# weight.  Pt required frequent rest breaks to keep O2 sat in 90's.                    Plan   Plan  Times per week: 7x  Times per day: Daily  Current Treatment Recommendations: Strengthening, ROM, Safety Education & Training, Patient/Caregiver Education & Training, Self-Care / ADL, Endurance Training, Functional Mobility Training  G-Code     OutComes Score                                                  AM-PAC Score        AM-PAC Inpatient Daily Activity Raw Score: 17 (11/22/21 0816)  AM-PAC Inpatient ADL T-Scale Score : 37.26 (11/22/21 0816)  ADL Inpatient CMS 0-100% Score: 50.11 (11/22/21 0816)  ADL Inpatient CMS G-Code Modifier : CK (11/22/21 0816)    Goals  Short term goals  Time Frame for Short term goals: 21  Short term goal 1: Patient to complete self care routine c SUP to ensure safe return home. Short term goal 2: Patient to engage in 15 minutes of ther ex/ther act s significant shortness of breath to improve strength as well as activity tolerance for self care tasks. Short term goal 3: Patient to tolerate standing >7' while participating in functional task of choice to improve standing tolerane, balance and safety during ADL, mobility and transfers. Short term goal 4: Patient to be educated on d/c folder, AE/DME, and general safety to ensure safe return home.        Therapy Time   Individual Concurrent Group Co-treatment   Time In 1100         Time Out 1124         Minutes [id number] [place], [place]

## 2021-11-22 NOTE — PROGRESS NOTES
[facility] UNIT   APRN - Progress Note    Patient - Annie Marie  Date of Admission -  11/15/2021  8:38 PM  Date of Evaluation -  2021  Hospital Day - 7      SUBJECTIVE:     The Annie Marie is a 80 y.o. male who is seen for follow up for atrial fibrillation with RVR and COVID-19. Per nursing report and notes, overnight events include: no significant events. He sitting up in bed alert oriented in mild respiratory distress. Patient remains in normal sinus rhythm. ROS:   Constitutional: negative  for fevers, and negative for chills. Respiratory: positive for shortness of breath, negative for cough, and negative for wheezing  Cardiovascular: negative for chest pain, and negative for palpitations  Gastrointestinal: negative for abdominal pain, negative for nausea,negative for vomiting, negative for diarrhea, and negative for constipation    All other systems were reviewed with the patient and are negative unless otherwise stated in HPI.       OBJECTIVE:     VITAL SIGNS:  Patient Vitals for the past 8 hrs:   BP Temp Temp src Pulse Resp SpO2 Weight   21 0700 (!) 138/58   81 24 94 %    21 0630 129/63   82 23 95 %    21 0600 125/66   83 30 99 %    21 0530 133/65   80 21 95 % 161 lb 9.6 oz (73.3 kg)   21 0500 (!) 88/43   77 22 92 %    21 0445 (!) 81/49   77 22 93 %    21 0430 (!) 106/56   78 27 92 %    21 0415 (!) 104/49 98.7 °F (37.1 °C) Temporal 77 21 92 %    21 0400 (!) 106/47   75 16 92 %    21 0345 (!) 108/48   78 17 (!) 86 %    21 0330 (!) 113/50   79 21 94 %    21 0315 (!) 134/58   80 28 93 %    21 0300 (!) 98/43   77 21 92 %    21 0245 109/60   85 28 96 %    21 0230 (!) 87/41   76 23 93 %          Temp: 98.7 °F (37.1 °C)  Temp range:    Temp  Av.5 °F (36.9 °C)  Min: 97.9 °F (36.6 °C)  Max: 98.7 °F (37.1 °C)    BP: (!) 138/58  BP Range:      Systolic (40JGB), IGQ:590 , Min:67 , YLR:663      Diastolic (83BKT), ZYZ:40, Min:26, Max:121    Pulse: 81  Pulse Range:    Pulse  Av.5  Min: 74  Max: 143    Resp: 24  Resp Range:   Resp  Av.5  Min: 16  Max: 38    SpO2: 94 % room air  SpO2 range:   SpO2  Av.4 %  Min: 86 %  Max: 99 %    Weight  Wt Readings from Last 3 Encounters:   21 161 lb 9.6 oz (73.3 kg)   19 158 lb (71.7 kg)   18 166 lb 6.4 oz (75.5 kg)     Body mass index is 28.63 kg/m². 24HR INTAKE/OUTPUT:      Intake/Output Summary (Last 24 hours) at 2021 1025  Last data filed at 2021 0415  Gross per 24 hour   Intake 1860 ml   Output 450 ml   Net 1410 ml     Date 21 0000 - 21 2359   Shift 1694-6610 5066-5830 2026-9331 24 Hour Total   INTAKE   P.O. 750   750   Shift Total(mL/kg) 750(10.2)   750(10.2)   OUTPUT   Urine(mL/kg/hr) 250(0.4)   250   Shift Total(mL/kg) 250(3.4)   250(3.4)   Weight (kg) 73.3 73.3 73.3 73.3         PHYSICAL EXAM:  GEN:    Awake and following commands:     [] No   [x] Yes  MENTAL STATUS: alert and oriented x3. DISTRESS: Acute respiratory distress:       [x] No   [x] Yes  EYES:  EOMI, pupils equal   NECK: Supple. No lymphadenopathy. No carotid bruit  CVS: Regular rhythm, no audible murmur  PULM: Wheeze throughout no acute respiratory distress  ABD:    Bowels sounds normal.  Abdomen is soft. No distention. no tenderness to palpation. EXT:   no edema bilaterally . No calf tenderness. NEURO: Moves all extremities. Motor and sensory are grossly intact  SKIN:  No rashes. No skin lesions.           MEDICATIONS:  Scheduled Meds:   insulin lispro  0-12 Units SubCUTAneous TID WC    insulin lispro  0-6 Units SubCUTAneous Nightly    ipratropium-albuterol  1 ampule Inhalation 4x daily    sodium bicarbonate  650 mg Oral BID    famotidine  10 mg Oral Nightly    tamsulosin  0.4 mg Oral Daily    atorvastatin  40 mg Oral Nightly    clopidogrel  75 mg Oral Daily    glipiZIDE  5 mg Oral BID AC    sodium chloride flush  5-40 mL IntraVENous 2 times per day    vitamin D  20,000 Units Oral Weekly    ascorbic acid  2,000 mg Oral BID    zinc sulfate  100 mg Oral Daily    metoprolol succinate  25 mg Oral Daily    apixaban  2.5 mg Oral BID    amiodarone  200 mg Oral BID     Continuous Infusions:   sodium chloride      dextrose       PRN Meds:   albuterol, 2.5 mg, Q4H PRN  sodium chloride flush, 5-40 mL, PRN  sodium chloride, 25 mL, PRN  ondansetron, 4 mg, Q8H PRN   Or  ondansetron, 4 mg, Q6H PRN  polyethylene glycol, 17 g, Daily PRN  acetaminophen, 650 mg, Q6H PRN   Or  acetaminophen, 650 mg, Q6H PRN  glucose, 15 g, PRN  dextrose, 12.5 g, PRN  glucagon (rDNA), 1 mg, PRN  dextrose, 100 mL/hr, PRN        DATA:  Complete Blood Count:   Recent Labs     11/20/21  0625 11/21/21  0518 11/22/21  0526   WBC 13.3* 17.0* 15.5*   RBC 3.42* 3.45* 2.91*   HGB 10.4* 10.6* 9.3*   HCT 32.3* 33.3* 28.1*   MCV 94.4 96.5 96.6   RDW 12.5 12.7 12.8    242 215   SEGS 71* 80* 79*   NEUTROABS 9.44* 13.78* 12.18*   LYMPHOPCT 22* 10* 10*   LYMPHSABS 2.93 1.64 1.51   MONOPCT 7 7 7   EOSRELPCT 0* 1 2   BASOPCT 0 0 0   IMMGRAN 0 2* 2*        Recent Blood Glucose:   Recent Labs     11/20/21  0625 11/21/21  0518 11/22/21  0526   GLUCOSE 289* 216* 166*        Comprehensive Metabolic Profile:   Recent Labs     11/20/21  0625 11/21/21  0518 11/22/21  0526   BUN 60* 52* 60*   CREATININE 2.21* 2.20* 2.59*    132* 131*   K 4.8 5.3 4.8    98 98   CALCIUM 8.3* 8.6 7.9*   ANIONGAP 13 11 12   CO2 22 23 21   PROT 5.8* 6.0* 5.3*   LABALBU 2.5* 2.4* 2.1*   BILITOT 0.39 0.47 0.40   ALKPHOS 214* 145* 160*   AST 28 25 34   ALT 29 25 30        Urinalysis:   Lab Results   Component Value Date    NITRU NEGATIVE 02/11/2021    COLORU YELLOW 02/11/2021    PHUR 5.5 02/11/2021    WBCUA 0 TO 2 02/11/2021    RBCUA None 02/11/2021    MUCUS NOT REPORTED 02/11/2021    TRICHOMONAS NOT REPORTED 02/11/2021    YEAST NOT REPORTED 02/11/2021    BACTERIA TRACE 02/11/2021    SPECGRAV 1.020 02/11/2021    LEUKOCYTESUR NEGATIVE 02/11/2021    UROBILINOGEN Normal 02/11/2021    BILIRUBINUR NEGATIVE 02/11/2021    GLUCOSEU 3+ 02/11/2021    KETUA NEGATIVE 02/11/2021    AMORPHOUS NOT REPORTED 02/11/2021       HgBA1c:    Lab Results   Component Value Date    LABA1C 9.5 08/27/2021       TSH:    Lab Results   Component Value Date    TSH 1.12 11/17/2021       Lactic Acid:   Lab Results   Component Value Date    LACTA 2.2 11/15/2021        High Sensitivity Troponin:  No results for input(s): TROPHS in the last 72 hours. Pro-BNP:  Lab Results   Component Value Date    PROBNP 10,948 (H) 11/18/2021     D-Dimer:  Lab Results   Component Value Date    DDIMER 1.31 (H) 11/19/2021     PT/INR:    Lab Results   Component Value Date    PROTIME 16.3 11/17/2021    INR 1.3 11/17/2021     PTT:    Lab Results   Component Value Date    APTT 37.3 11/17/2021       CRP:   No results for input(s): CRP in the last 72 hours. ABGs:   No results found for: PHART, PH, TFT4QDS, PCO2, PO2ART, PO2, UDG0CKB, HCO3, BEART, BE, THGBART, THB, DHP3RKG, A8YQGSTL, O2SAT, FIO2      Radiology/Imaging:  XR CHEST PORTABLE   Final Result   Bilateral airspace disease and small pleural effusions appear improved. XR CHEST PORTABLE   Final Result   No significant change in bilateral airspace disease. Unchanged appearance of   small pleural effusions versus pleuroparenchymal reaction. Some of these opacities likely represent underlying chronic lung disease and   scarring. A superimposed acute inflammatory process or infection should be   considered in the appropriate clinical setting. US RETROPERITONEAL COMPLETE   Final Result   1. Limited exam.  Large postvoid residual.  Nonvisualization of the right   ureteral jet. 2. No hydronephrosis. 3. 6 mm calculus at the midpole left kidney. 4. Cholelithiasis.          XR CHEST PORTABLE   Final Result   Significant peripheral and basal predominant pulmonary opacities diffusely   throughout both lungs. This pattern can be seen in the setting of COVID-19   pneumonia, though this requires clinical correlation. Sequelae of underlying   interstitial lung disease is also possible, though less favored. Stability   is indeterminate in the absence of prior comparison imaging. Small right pleural effusion. Age-indeterminate fracture of the left anterolateral 9th rib.                ASSESSMENT / PLAN:     COVID-19 virus infection  · Continue current therapy  · Consults: Dr. Keyur Cui  · Remdesivir not indicated  · Appreciate infectious disease   · Continue vitamin C, D and zinc  · Trend labs  · IV steroidsstoppedelevated creatinine and no hypoxia  · VaccinatedJanuary, February booster in November 2021  · Acute respiratory failure with hypoxia  · Resolved  · Acapella  · PT OT  · Nebs  · Stop IV fluids  · CAD  · Continue Lipitor, Plavix, Lasix  · New onset atrial flutter  · Appreciate cardiology  · EKG2-1 conduction  · Converted  · Continue Lopressor  · Amiodarone drip completed again   · Continue amiodarone 200 mg twice daily  · Continue Eliquis  · Type 2 diabetes  · POCT before meals and at bedtime  · Med-dose sliding scale  · Hold Glucophage due to CKD  · Continue glipizide  · Hypertension  · Continue Altace  · CKD  · Trend labs  · No remdesivir or Lovenox  · Stop steroidspossible cause of elevated creatinine  · Continue Flomaxurinary retention  · Robertson catheter  · Appreciate nephrology  · Nutrition status:   · Well developed, well nourished with no malnutrition  · Dietician consult initiated  · [] NPO [] TPN      [] Tube feed [] Clear liquid        [] Full liquid [] regular diet         [] Fluid restriction   [x] Diabetic diet   · Prophylaxis:   · DVT: Eliquis   · Stress Ulcer: H2 Blocker   · High risk medications: none   · Disposition:    · [facility]  · Discharge plan is pending      FRANKLIN Zaman - CNP , APRN-NP-C  11/22/2021  10:25 AM

## 2021-11-22 NOTE — PROGRESS NOTES
[facility] - Telemedicine Progress Note   COVID 19 Patient-Tele-visit  Today's Date and Time: 11/22/2021, 10:37 AM    Impression :     · COVID 19 Confirmed Infection  · Covid tests:  · 11/15/21 Positive  · Urinary retention  · Renal calculi  · Cholelithiasis  · DM II  · Essential HTN  · Atrial Flutter  · CHF  · CKD IV  · CVA 2018  · Received COVID vaccines      Patient evaluated by Telemedicine. Requesting Institution: [facility]  Provider Institution: [address] [place] [address],Health system [facility] [place], [address] [facility] Person at [facility]: Ms Raya Carballo, APRN- IM    Recommendations:   · Antibiotic treatment:  · No  · Covid Rx:  · Remdesivir-contraindicated with renal insufficiency  · Solu-medrol initiated 11/16/21  · Actemra-Not indicated at this time  · Monitor CRP      Medical Decision Making/Summary/Discussion:11/22/2021     · Patient admitted with suspected COVID 19 infection  · Covid test confirmed positive. · On Rx with Solumedrol  · Diuresis has improved his overall picture. · On room air at this point    Infection Control Recommendations   · Universal Precautions  · Airborne isolation  · Droplet Isolation    Antimicrobial Stewardship Recommendations     · Discontinuation of therapy  Coordination of Outpatient Care:   · Estimated Length of IV antimicrobials:TBD  · Patient will need Midline Catheter Insertion: TBD  · Patient will need PICC line Insertion: No  · Patient will need: Home IV , [place],  SNF,  LTAC:TBD  · Patient will need outpatient wound care:No    Chief complaint/reason for consultation:   · Concern for COVID infection      History of Present Illness:   Jolane Lundborg is a 80y.o.-year-old male who was initially admitted on 11/15/2021. Patient seen at the request of Dr. Celi Romero:    Patient presented through ER with complaints of increased shortness of breath worsening over the past six weeks. He received his Covid vaccines.  He was treated with antibiotics without relief. The patient's HR was noted to be in the 140's and an EKG revealed acute onset atrial flutter. Cardiology was consulted and he was started on an amiodarone gtt. Covid swab was positive. The patient was also found to have acute urinary retention, 6 mm left pole renal calculi and cholelithiasis. Echocardiogram revealed a possible acute small to moderate sized inter-atrial communication. WBC is increasing      Impression CXR 11/15/21   Significant peripheral and basal predominant pulmonary opacities diffusely   throughout both lungs.  This pattern can be seen in the setting of COVID-19   pneumonia, though this requires clinical correlation.  Sequelae of underlying   interstitial lung disease is also possible, though less favored.  Stability   is indeterminate in the absence of prior comparison imaging.       Small right pleural effusion.       Age-indeterminate fracture of the left anterolateral 9th rib.                 Increased risk 3-dose inadvertent series    Dose 1  01/29/2021 (COVID-19, Moderna, Primary or Immunocompromised, PF, 100mcg/0.5mL)      Dose 2  02/18/2021 (COVID-19, Moderna, Primary or Immunocompromised, PF, 100mcg/0.5mL) - Given too close to previous dose     02/26/2021 (COVID-19, [facility], Primary or Immunocompromised, PF, 100mcg/0.5mL) - Given too close to previous dose     11/02/2021 (COVID-19, Pfizer, PF, 30mcg/0.3mL)      Booster dose  05/02/2022 - Dose Forecast         Patient admitted because of concerns with COVID 19.    CURRENT EVALUATION : 11/22/2021    Patient evaluated in the ICU. Afebrile  VS stable     The patient is doing well today and is up to the chair  No acute issues noted    Plan is to discharge today to [facility]    Patient exhibiting respiratory distress.  No  Respiratory secretions: No    Patient receiving supplemental oxygen: 2-->1 L NC-->room air  RR: 24  02 sat: 96        NEWS Score: 0-4 Low risk group; 5-6: Medium risk group; 7 or above: High risk group  Parameters 3 2 1 0 1 2 3   Age    < 72   ? 65   RR ? 8  9-11 12-20  21-24 ? 25   O2 Sats ? 91 92-93 94-95 ? 96      Suppl O2  Yes  No      SBP ? 90  101-110 111-219   ? 220   HR ? 40  41-50 51-90  111-130 ? 131   Consciousness    Alert   Drowsiness, lethargy, or confusion   Temperature ? 35.0 C (95.0 F)  35.1-36.0 C 95.1-96.9 F 36.1-38.0 C 97.0-100.4 F 38.1-39.0 C 100.5-102.3 F ? 39.1 C ? 102.4 F      NEWS Score:   11/17/21: 3 Low risk    Overall Daily Picture:    Improved    Presence of secondary bacterial Infection:    No  Additional antibiotics: No    Labs, X rays reviewed: 11/22/2021    BUN:60  Cr:2.59    Pro BNP: 48587-->10,948    WBC:16.2-->13.2-->11.1-->17-->15.5  Hb:10-->10.6-->9.3  Plat: 215    Absolute Neutrophils:14.28  Absolute Lymphocytes:1.03  Neutrophil/Lymphocyte Ratio: 13.8 high risk    CRP:101.4-->63.2-->108  Ferritin:866  LDH: 218    Pro Calcitonin:  Troponin 145    Cultures:  Urine:  · 11/17: No growth  Blood:  · 11/17: No growth  Sputum :  ·   Wound:       CXR:       11/19/21 11/16/21      CAT:      Discussed with patient, RN, CC, IM. I have personally reviewed the past medical history, past surgical history, medications, social history, and family history, and I have updated the database accordingly.   Past Medical History:     Past Medical History:   Diagnosis Date    Acute and chronic respiratory failure with hypoxia ([address] 11/16/2021    CAD (coronary artery disease)     Cataracts, bilateral     Glaucoma     Hyperlipidemia     Hypertension     TIA (transient ischemic attack)     Type II or unspecified type diabetes mellitus without mention of complication, not stated as uncontrolled        Past Surgical  History:     Past Surgical History:   Procedure Laterality Date    CATARACT REMOVAL WITH IMPLANT Bilateral 11/219/2013 and 12/3/2013    CORONARY ARTERY BYPASS GRAFT  2000    INGUINAL HERNIA REPAIR Bilateral 06/19/12       Medications:  insulin lispro  0-12 Units SubCUTAneous TID WC    insulin lispro  0-6 Units SubCUTAneous Nightly    ipratropium-albuterol  1 ampule Inhalation 4x daily    sodium bicarbonate  650 mg Oral BID    famotidine  10 mg Oral Nightly    tamsulosin  0.4 mg Oral Daily    atorvastatin  40 mg Oral Nightly    clopidogrel  75 mg Oral Daily    glipiZIDE  5 mg Oral BID AC    sodium chloride flush  5-40 mL IntraVENous 2 times per day    vitamin D  20,000 Units Oral Weekly    ascorbic acid  2,000 mg Oral BID    zinc sulfate  100 mg Oral Daily    metoprolol succinate  25 mg Oral Daily    apixaban  2.5 mg Oral BID    amiodarone  200 mg Oral BID       Social History:     Social History     Socioeconomic History    Marital status:      Spouse name: Not on file    Number of children: Not on file    Years of education: Not on file    Highest education level: Not on file   Occupational History    Not on file   Tobacco Use    Smoking status: Never Smoker    Smokeless tobacco: Never Used   Substance and Sexual Activity    Alcohol use: Yes     Comment: very very very seldom    Drug use: No    Sexual activity: Not on file   Other Topics Concern    Not on file   Social History Narrative    Not on file     Social Determinants of Health     Financial Resource Strain:     Difficulty of Paying Living Expenses: Not on file   Food Insecurity:     Worried About Running Out of Food in the Last Year: Not on file    Mera of Food in the Last Year: Not on file   Transportation Needs:     Lack of Transportation (Medical): Not on file    Lack of Transportation (Non-Medical):  Not on file   Physical Activity:     Days of Exercise per Week: Not on file    Minutes of Exercise per Session: Not on file   Stress:     Feeling of Stress : Not on file   Social Connections:     Frequency of Communication with Friends and Family: Not on file    Frequency of Social Gatherings with Friends and Family: Not on file   [place] Attends Hinduism Services: Not on file    Active Member of Clubs or Organizations: Not on file    Attends Club or Organization Meetings: Not on file    Marital Status: Not on file   Intimate Partner Violence:     Fear of Current or Ex-Partner: Not on file    Emotionally Abused: Not on file    Physically Abused: Not on file    Sexually Abused: Not on file   Housing Stability:     Unable to Pay for Housing in the Last Year: Not on file    Number of [place] in the Last Year: Not on file    Unstable Housing in the Last Year: Not on file       Family History:     Family History   Problem Relation Age of Onset    Heart Disease Father     Heart Disease Brother         Allergies:   Patient has no known allergies. Review of Systems:       Constitutional: No fevers or chills. No systemic complaints  Head: No headaches  Eyes: No double vision or blurry vision. No conjunctival inflammation. ENT: No sore throat or runny nose. . No hearing loss, tinnitus or vertigo. Cardiovascular: No chest pain or palpitations. No Shortness of breath. No SPEARS  Lung: No Shortness of breath or cough. No sputum production  Abdomen: No nausea, vomiting, diarrhea, or abdominal pain. Cloteal Valladares No cramps. Genitourinary: No increased urinary frequency, or dysuria. No hematuria. No suprapubic or CVA pain  Musculoskeletal: No muscle aches or pains. No joint effusions, swelling or deformities  Hematologic: No bleeding or bruising. Neurologic: No headache, weakness, numbness, or tingling. Integument: No rash, no ulcers. Psychiatric: No depression. Endocrine: No polyuria, no polydipsia, no polyphagia.     Physical Examination :     Patient Vitals for the past 8 hrs:   BP Temp Temp src Pulse Resp SpO2 Weight   11/22/21 0700 (!) 138/58   81 24 94 %    11/22/21 0630 129/63   82 23 95 %    11/22/21 0600 125/66   83 30 99 %    11/22/21 0530 133/65   80 21 95 % 161 lb 9.6 oz (73.3 kg)   11/22/21 0500 (!) 88/43   77 22 92 %  11/22/21 0445 (!) 81/49   77 22 93 %    11/22/21 0430 (!) 106/56   78 27 92 %    11/22/21 0415 (!) 104/49 98.7 °F (37.1 °C) Temporal 77 21 92 %    11/22/21 0400 (!) 106/47   75 16 92 %    11/22/21 0345 (!) 108/48   78 17 (!) 86 %    11/22/21 0330 (!) 113/50   79 21 94 %    11/22/21 0315 (!) 134/58   80 28 93 %    11/22/21 0300 (!) 98/43   77 21 92 %    11/22/21 0245 109/60   85 28 96 %      General Appearance: Awake, alert, and in no apparent distress  Head:  Normocephalic, no trauma  Eyes: Pupils equal, round, reactive to light; sclera anicteric; conjunctivae pink. No embolic phenomena. ENT: Oropharynx clear, without erythema, exudate, or thrush. No tenderness of sinuses. Mouth/throat: mucosa pink and moist. No lesions. Dentition in good repair. Neck:Supple, without lymphadenopathy. Thyroid normal, No bruits. Pulmonary/Chest: Clear to auscultation, without wheezes, rales, or rhonchi. No dullness to percussion. Cardiovascular: Regular rate and rhythm without murmurs, rubs, or gallops. Abdomen: Soft, non tender. Bowel sounds normal. No organomegaly  All four Extremities: No cyanosis, clubbing, edema, or effusions. Neurologic: No gross sensory or motor deficits. Skin: Warm and dry with good turgor. No signs of peripheral arterial or venous insufficiency. No ulcerations. No open wounds.     Medical Decision Making -Laboratory:   I have independently reviewed/ordered the following labs:    CBC with Differential:   Recent Labs     11/21/21 0518 11/22/21 0526   WBC 17.0* 15.5*   HGB 10.6* 9.3*   HCT 33.3* 28.1*    215   LYMPHOPCT 10* 10*   MONOPCT 7 7     BMP:   Recent Labs     11/21/21 0518 11/22/21 0526   * 131*   K 5.3 4.8   CL 98 98   CO2 23 21   BUN 52* 60*   CREATININE 2.20* 2.59*     Hepatic Function Panel:   Recent Labs     11/21/21  0518 11/22/21  0526   PROT 6.0* 5.3*   LABALBU 2.4* 2.1*   BILITOT 0.47 0.40   ALKPHOS 145* 160*   ALT 25 30   AST 25 34     No results for input(s): RPR in the last 72 hours. No results for input(s): HIV in the last 72 hours. No results for input(s): BC in the last 72 hours. Lab Results   Component Value Date    MUCUS NOT REPORTED 02/11/2021    RBC 2.91 11/22/2021    TRICHOMONAS NOT REPORTED 02/11/2021    WBC 15.5 11/22/2021    YEAST NOT REPORTED 02/11/2021    TURBIDITY CLEAR 02/11/2021     Lab Results   Component Value Date    CREATININE 2.59 11/22/2021    GLUCOSE 166 11/22/2021    GLUCOSE 124 05/17/2012       Medical Decision Making-Imaging:     Narrative   EXAMINATION:   ONE XRAY VIEW OF THE CHEST       11/15/2021 6:06 pm       COMPARISON:   None.       HISTORY:   ORDERING SYSTEM PROVIDED HISTORY: short of breath   TECHNOLOGIST PROVIDED HISTORY:   short of breath       FINDINGS:   There are diffuse peripheral and basal pulmonary opacities bilaterally of   indeterminate stability in the absence of prior comparison imaging.  Small   right pleural effusion.  No pneumothorax.  Cardiomediastinal contours are   within normal limits for size with prior CABG and a tortuous, atherosclerotic   aorta.  Degenerative changes of the spine and shoulders.  Age-indeterminate   fracture of the anterolateral left 9th rib.           Impression   Significant peripheral and basal predominant pulmonary opacities diffusely   throughout both lungs.  This pattern can be seen in the setting of COVID-19   pneumonia, though this requires clinical correlation.  Sequelae of underlying   interstitial lung disease is also possible, though less favored.  Stability   is indeterminate in the absence of prior comparison imaging.       Small right pleural effusion.       Age-indeterminate fracture of the left anterolateral 9th rib.             Narrative   EXAMINATION:   RETROPERITONEAL ULTRASOUND OF THE KIDNEYS AND URINARY BLADDER       11/17/2021       COMPARISON:   None       HISTORY:   ORDERING SYSTEM PROVIDED HISTORY: Rule out obstructive uropathy.    TECHNOLOGIST PROVIDED HISTORY:       Rule out obstructive uropathy.       61-year-old male; rule out obstructive uropathy       FINDINGS:       Kidneys:       Exam limited due to rib shadowing and difficulty breathing.       Right kidney measures 9.1 x 4.9 x 4.9 cm.  Right renal cortical thickness   measures 1.1 cm.       Left kidney measures 9.6 x 4.4 x 5.0 cm.  Left renal cortical thickness   measures 8 mm.       Color flow projects over the bilateral renal parenchyma.       No hydronephrosis.  No perinephric fluid.       Gross preservation of the bilateral corticomedullary differentiation.       Echogenic focus with posterior shadowing measuring 6 mm at the midpole left   kidney consistent with a renal calculus.       Gallstones incidentally noted within the gallbladder with posterior acoustic   shadowing and image 56.           Bladder:       Urinary bladder measures 7.5 by 6.0 x 7.2 cm for a bladder volume of 229 mL. Urinary bladder wall thickness measures 3 mm.       Postvoid urinary bladder measures 5.1 x 6.2 x 6.3 cm for a postvoid urinary   bladder volume of 105 mL.       Left ureteral jet is visualized.  Nonvisualization of the right ureteral jet.           Impression   1. Limited exam.  Large postvoid residual.  Nonvisualization of the right   ureteral jet. 2. No hydronephrosis. 3. 6 mm calculus at the midpole left kidney. 4. Cholelithiasis.         Summary  Global left ventricular systolic function is difficult to assess due to the  patients rate in the 120's but appears preserved with an estimated ejection  fraction of 55%. Mildly increased left ventricular wall thickness with a normal left  ventricular cavity size. Left atrium is normal in size. Evidence of a small to moderate inter-atrial communication is seen by color  Doppler imaging with an intermittent left to right shunt. This appears most  consistent with a patent foramen ovale (PFO) vs a small ASD.   Bowing intra-atrial septal motion consistent

## 2021-11-22 NOTE — PROGRESS NOTES
Physical Therapy  Facility/Department: [facility] AT THE [facility] MED SURG  Daily Treatment Note  NAME: Lois Spatz  : 10/31/1930  MRN: [id number]    Date of Service: 2021    Discharge Recommendations:  Continue to assess pending progress, Patient would benefit from continued therapy after discharge        Assessment   Treatment Diagnosis: Difficutly walking  Prognosis: Good  Decision Making: Medium Complexity  PT Education: General Safety; Gait Training; Energy Conservation  Patient Education: Educated pt on safety with AD while ambulating with fair understanding noted. REQUIRES PT FOLLOW UP: Yes  Activity Tolerance  Activity Tolerance: Patient Tolerated treatment well; Patient limited by endurance  Activity Tolerance: SpO2 monitor not working accurately and had difficulties reading with RN changing monitor following tx. When SpO2 read, it was 84- 96% throughout tx. Patient Diagnosis(es): The primary encounter diagnosis was Pneumonia due to COVID-19 virus. A diagnosis of Elevated troponin was also pertinent to this visit. has a past medical history of Acute and chronic respiratory failure with hypoxia ([address] CAD (coronary artery disease), Cataracts, bilateral, Glaucoma, Hyperlipidemia, Hypertension, TIA (transient ischemic attack), and Type II or unspecified type diabetes mellitus without mention of complication, not stated as uncontrolled. has a past surgical history that includes Coronary artery bypass graft (); Cataract removal with implant (Bilateral,  and 12/3/2013); and Inguinal hernia repair (Bilateral, 12). Restrictions  Restrictions/Precautions  Restrictions/Precautions: General Precautions, Fall Risk, Isolation  Subjective   General  Chart Reviewed: Yes  Response To Previous Treatment: Patient with no complaints from previous session. Family / Caregiver Present: No  Referring Practitioner: Pryor Kawasaki, APRN - CNP  Subjective  Subjective: Pt denies pain and is agreeable to tx. Pt reports his breathing is feeling a lot better. On room air. Pain Screening  Patient Currently in Pain: Denies  Vital Signs  Patient Currently in Pain: Denies       Orientation  Orientation  Overall Orientation Status: Within Functional Limits  Cognition      Objective      Transfers  Sit to Stand: Minimal Assistance; Contact guard assistance  Stand to sit: Minimal Assistance; Contact guard assistance  Ambulation  Ambulation?: Yes  WB Status: unrestricted  Ambulation 1  Surface: level tile  Device: Rolling Walker  Assistance: Contact guard assistance; Minimal assistance  Gait Deviations: Shuffles; Slow Nayeli  Distance: 10 ft x1  Comments: V/c for upright posture and to stay closer to AD for increased safety. Stairs/Curb  Stairs?: No  Neuromuscular Education  Neuromuscular Comments: Pt stood in front of chair with UUE support on RW and CGA while reaching across midline and outside MIGUEL with no LOB. Balance  Posture: Fair  Sitting - Static: Good  Sitting - Dynamic: Good  Standing - Static: Fair  Standing - Dynamic: Fair; -  Exercises  Comments: Seated B LE exercises x20 reps. Frequent short rest breaks d/t SOB and fatigue. SpO2 88- 96% on room air. Goals  Short term goals  Time Frame for Short term goals: 20 days  Short term goal 1: Pt will be initiated on strong ex program to address deficits in endurance and balance. Short term goal 2: Pt will be independent with bed mobility without use of handrails for discharge home and negotiation of bed. Short term goal 3: Pt will transfer independently with least restrictive device and good balance to reduce fall risk  Short term goal 4: Pt will be able to ambulate up to 350 feet with supervision to independent with least restrictive device to allow discharge home with his wife.   Short term goal 5: Pt will be able to negoitiate 4 steps with chani handrails and SBA +1  Patient Goals   Patient goals : go home and hopes for Thursday    Plan    Plan  Times per week: 7  Times per day: Twice a day  Plan weeks: 2x/daily ecept weekends 1x/daily  Current Treatment Recommendations: Strengthening, Neuromuscular Re-education, Home Exercise Program, ROM, Safety Education & Training, Balance Training, Endurance Training, Patient/Caregiver Education & Training, Functional Mobility Training, Transfer Training, Gait Training, Stair training  Plan Comment: Initiate POC  Safety Devices  Type of devices: Call light within reach, All fall risk precautions in place, Nurse notified, Left in chair, Gait belt, Patient at risk for falls     Therapy Time   Individual Concurrent Group Co-treatment   Time In 1446         Time Out 1520         Minutes [address] [place] [address]

## 2021-11-22 NOTE — PROGRESS NOTES
Writer spoke with Dr. Susy Okeefe about converting to NSR. He said to antonino/c ivania and he will see him in the morning.

## 2021-11-22 NOTE — PROGRESS NOTES
Physical Therapy    Facility/Department: [facility] ICU  Re-assessment    NAME: Yanique aHynes  : 10/31/1930  MRN: [id number]    Date of Service: 2021    Discharge Recommendations:  Continue to assess pending progress, Patient would benefit from continued therapy after discharge        Assessment   Assessment: Pt was re-evaluated this date. Pt still demonstrates BLE weakness, decreased activity and ambulation tolerance and reduced dynamic standing balance. Pt will benefit from skilled PT in order to address these deficits. Treatment Diagnosis: Difficutly walking  Prognosis: Good  Decision Making: Medium Complexity  PT Education: PT Role; Plan of Care; Goals  Patient Education: continue with POC  Activity Tolerance  Activity Tolerance: Patient Tolerated treatment well; Patient limited by endurance       Patient Diagnosis(es): The primary encounter diagnosis was Pneumonia due to COVID-19 virus. A diagnosis of Elevated troponin was also pertinent to this visit. has a past medical history of Acute and chronic respiratory failure with hypoxia ([facility] [address] CAD (coronary artery disease), Cataracts, bilateral, Glaucoma, Hyperlipidemia, Hypertension, TIA (transient ischemic attack), and Type II or unspecified type diabetes mellitus without mention of complication, not stated as uncontrolled. has a past surgical history that includes Coronary artery bypass graft (); Cataract removal with implant (Bilateral,  and 12/3/2013); and Inguinal hernia repair (Bilateral, 12).     Restrictions  Restrictions/Precautions  Restrictions/Precautions: General Precautions, Fall Risk, Isolation  Vision/Hearing  Vision: Impaired  Vision Exceptions: Wears glasses at all times  Hearing: Exceptions to [facility]  Hearing Exceptions: Bilateral hearing aid     Subjective  General  Chart Reviewed: Yes  Patient assessed for rehabilitation services?: Yes  Response To Previous Treatment: Patient with no complaints from previous session. Family / Caregiver Present: No  Referring Practitioner: FRANKLIN Holder CNP  Subjective  Subjective: pt reports he is doing alright  Pain Screening  Patient Currently in Pain: Denies  Vital Signs  Patient Currently in Pain: Denies       Orientation  Orientation  Overall Orientation Status: Within Functional Limits  Social/Functional History  Social/Functional History  Lives With: Spouse  Type of Home: House  Home Layout: Two level  Home Access: Stairs to enter with rails  Entrance Stairs - Number of Steps: 25  Entrance Stairs - Rails: Right  Bathroom Shower/Tub: Walk-in shower, Shower chair with back  Bathroom Toilet: Standard  Bathroom Equipment: Shower chair  Home Equipment: Walker-Lift, Rolling walker  Receives Help From: Family  ADL Assistance: Independent  Homemaking Assistance: Independent  Homemaking Responsibilities: Yes  Ambulation Assistance: Independent  Transfer Assistance: Independent  Active : Yes  Mode of Transportation: Car    Objective    AROM RLE (degrees)  RLE AROM: WFL  AROM LLE (degrees)  LLE AROM : WFL  Strength RLE  Comment: grossly 4/5  Strength LLE  Comment: grossly 4/5        Bed mobility  Supine to Sit: Minimal assistance;  Moderate assistance  Transfers  Sit to Stand: Minimal Assistance; Contact guard assistance  Stand to sit: Minimal Assistance; Contact guard assistance  Ambulation  Ambulation?: Yes  WB Status: unrestricted  Ambulation 1  Surface: level tile  Device: Hand-Held Assist  Assistance: Minimal assistance  Distance: 5 feet     Balance  Posture: Fair  Sitting - Static: Good  Sitting - Dynamic: Good  Standing - Static: Fair  Standing - Dynamic: Fair; -        Plan   Plan  Times per week: 7  Times per day: Twice a day  Plan weeks: 2x/daily ecept weekends 1x/daily  Current Treatment Recommendations: Strengthening, Neuromuscular Re-education, Home Exercise Program, ROM, Safety Education & Training, Balance Training, Endurance Training, Patient/Caregiver Education & Training, Functional Mobility Training, Transfer Training, Gait Training, Stair training  Plan Comment: Initiate POC  Safety Devices  Type of devices: Call light within reach, All fall risk precautions in place, Nurse notified, Left in chair, Chair alarm in place, Gait belt, Patient at risk for falls    Goals  Short term goals  Time Frame for Short term goals: 20 days  Short term goal 1: Pt will be initiated on strong ex program to address deficits in endurance and balance. Short term goal 2: Pt will be independent with bed mobility without use of handrails for discharge home and negotiation of bed. Short term goal 3: Pt will transfer independently with least restrictive device and good balance to reduce fall risk  Short term goal 4: Pt will be able to ambulate up to 350 feet with supervision to independent with least restrictive device to allow discharge home with his wife.   Short term goal 5: Pt will be able to negoitiate 4 steps with chani handrails and SBA +1  Patient Goals   Patient goals : go home and hopes for Thursday       Therapy Time   Individual Concurrent Group Co-treatment   Time In 0710         Time Out 0732         Minutes 2050 [facility],

## 2021-11-22 NOTE — PROGRESS NOTES
Renal Progress Note  [facility]      TELEHEALTH EVALUATION -- Audio/Visual (During SJIOJ-90 public health emergency)     Telehealth service was provided with the patient [place] [facility] and myself the physician in my office in [address] [place] [address] and the patient's RN, Afia Westfall, who has initiated the visit. Pursuant to the emergency declaration under the [address] [place] [address] [facility] authority and the [employer] Act, this Virtual  Visit was conducted, with patient's consent, to reduce the patient's risk of exposure to COVID-19 and provide continuity of care for an established patient. Services were provided through a video synchronous discussion virtually to substitute for in-person clinic visit. Patient :  Romario Zambrano; 80 y.o. MRN# [id number]  Location:  N007/R498-29  Attending:  Abagail Mortimer, MD  Admit Date:  11/15/2021   Hospital Day: 7      Subjective:     Nephrology is following the patient for JANINA/CKD. Patient back in sinus rhythm. On room air. Has had some lower blood pressures noted. Pt states he feels ok.     Outpatient Medications:     Medications Prior to Admission: furosemide (LASIX) 40 MG tablet, Take 40 mg by mouth daily  glipiZIDE (GLUCOTROL) 5 MG tablet, Take 5 mg by mouth 2 times daily (before meals)  metFORMIN (GLUCOPHAGE) 500 MG tablet, Take 1,000 mg by mouth 2 times daily (with meals)   atorvastatin (LIPITOR) 40 MG tablet, Take 1 tablet by mouth nightly  clopidogrel (PLAVIX) 75 MG tablet, Take 1 tablet by mouth daily  ramipril (ALTACE) 2.5 MG capsule, Take 2.5 mg by mouth every morning   aspirin 81 MG EC tablet, Take 81 mg by mouth nightly     Current Medications:     Scheduled Meds:    insulin lispro  0-12 Units SubCUTAneous TID WC    insulin lispro  0-6 Units SubCUTAneous Nightly    ipratropium-albuterol  1 ampule Inhalation 4x daily    sodium bicarbonate  650 mg Oral BID    famotidine  10 mg Oral Nightly    tamsulosin  0.4 mg Oral Daily    atorvastatin  40 mg Oral Nightly    clopidogrel  75 mg Oral Daily    glipiZIDE  5 mg Oral BID AC    sodium chloride flush  5-40 mL IntraVENous 2 times per day    vitamin D  20,000 Units Oral Weekly    ascorbic acid  2,000 mg Oral BID    zinc sulfate  100 mg Oral Daily    metoprolol succinate  25 mg Oral Daily    apixaban  2.5 mg Oral BID    amiodarone  200 mg Oral BID     Continuous Infusions:    sodium chloride      dextrose       PRN Meds:  albuterol, sodium chloride flush, sodium chloride, ondansetron **OR** ondansetron, polyethylene glycol, acetaminophen **OR** acetaminophen, glucose, dextrose, glucagon (rDNA), dextrose    Input/Output:       I/O last 3 completed shifts: In: 1860 [P.O.:1550; I.V.:310]  Out: 450 [Urine:450]. Patient Vitals for the past 96 hrs (Last 3 readings):   Weight   21 0530 161 lb 9.6 oz (73.3 kg)   21 0515 164 lb 11.2 oz (74.7 kg)   21 0500 164 lb 8 oz (74.6 kg)       Vital Signs:   Temperature:  Temp: 98.7 °F (37.1 °C)  TMax:   Temp (24hrs), Av.5 °F (36.9 °C), Min:97.9 °F (36.6 °C), Max:98.7 °F (37.1 °C)    Respirations:  Resp: 26  Pulse:   Pulse: 87  BP:    BP: 117/67  BP Range: Systolic (67WJC), RNN:041 , Min:67 , TA       Diastolic (01JGI), ABU:51, Min:26, Max:121      Physical Examination:     General -- no distress  Oral Mucosa -- moist  Neck --  JVD - no  Extremities -- trace edema, moves all extremeties  CNS - awake and alert   Pschy - not agitated, mood and memory normal    Due to this being a TeleHealth encounter, evaluation of the following organ systems is limited: Vitals/EENT/Resp/CV/GI//MS/Neuro/Skin/Heme-Lymph-Imm.     Labs:       Recent Labs     21  0625 21  0518 21  0526   WBC 13.3* 17.0* 15.5*   RBC 3.42* 3.45* 2.91*   HGB 10.4* 10.6* 9.3*   HCT 32.3* 33.3* 28.1*   MCV 94.4 96.5 96.6   MCH 30.4 30.7 32.0   MCHC 32.2 31.8 33.1 RDW 12.5 12.7 12.8    242 215   MPV 11.2 10.9 11.0      BMP:   Recent Labs     11/20/21  0625 11/21/21  0518 11/22/21  0526    132* 131*   K 4.8 5.3 4.8    98 98   CO2 22 23 21   BUN 60* 52* 60*   CREATININE 2.21* 2.20* 2.59*   GLUCOSE 289* 216* 166*   CALCIUM 8.3* 8.6 7.9*      Phosphorus:   No results for input(s): PHOS in the last 72 hours. Magnesium:  No results for input(s): MG in the last 72 hours.   Albumin:    Recent Labs     11/20/21  0625 11/21/21  0518 11/22/21  0526   LABALBU 2.5* 2.4* 2.1*     BNP:    No results found for: BNP  HÉCTOR:    No results found for: HÉCTOR  SPEP:  Lab Results   Component Value Date    PROT 5.3 11/22/2021     UPEP:   No results found for: LABPE  C3:   No results found for: C3  C4:   No results found for: C4  MPO ANCA:   No results found for: MPO  PR3 ANCA:   No results found for: PR3  Anti-GBM:   No results found for: GBMABIGG  Hep BsAg:       No results found for: HEPBSAG  Hep C AB:        No results found for: HEPCAB    Urinalysis/Chemistries:      Lab Results   Component Value Date    NITRU NEGATIVE 02/11/2021    COLORU YELLOW 02/11/2021    PHUR 5.5 02/11/2021    WBCUA 0 TO 2 02/11/2021    RBCUA None 02/11/2021    MUCUS NOT REPORTED 02/11/2021    TRICHOMONAS NOT REPORTED 02/11/2021    YEAST NOT REPORTED 02/11/2021    BACTERIA TRACE 02/11/2021    SPECGRAV 1.020 02/11/2021    LEUKOCYTESUR NEGATIVE 02/11/2021    UROBILINOGEN Normal 02/11/2021    BILIRUBINUR NEGATIVE 02/11/2021    GLUCOSEU 3+ 02/11/2021    KETUA NEGATIVE 02/11/2021    AMORPHOUS NOT REPORTED 02/11/2021     Urine Sodium:   No results found for: SOHAM  Urine Potassium:  No results found for: KUR  Urine Chloride:  No results found for: CLUR  Urine Osmolarity: No results found for: OSMOU  Urine Protein:   No components found for: TOTALPROTEIN, URINE   Urine Creatinine:     Lab Results   Component Value Date    LABCREA 51.4 02/11/2021     Urine Eosinophils:  No components found for: UEOS        Impression and Plan:  1. JANINA on CKD IV: multifactorial. Improved from admission but creatinine bumped up today likely due to intermittent lower blood pressures. Blood pressures are better now  2. Metabolic acidosis from JANINA/CKD: improved, continue oral bicarb  3. Hyponatremia from decreased water excretion from JANINA/CKD  4. Urinary retention: has [place]  5. Diastolic CHF. Diuretics as needed. 6. A flutter  7. COVID-19 +   8. DM, uncontrolled  9. Anemia          Please don't hesitate to call with any questions.   Electronically signed by Hoang Lira DO on 11/22/2021 at 11:58 AM

## 2021-11-22 NOTE — PROGRESS NOTES
Assessment completed at this time as charted. Patient sitting up in chair but assisted back to bed x2 assist. Lungs are diminished with crackles. Patient continues to cough up green/yellow thick sputum. Patient remains on room air. Telemetry reads a-fib. Patient does still have bloody discharge from around [place]. Patient denies any pain. Call light within reach and bed alarm engaged for safety. Will continue to monitor.

## 2021-11-22 NOTE — PROGRESS NOTES
Occupational Therapy   Occupational Therapy Re-Assessment  Date: 2021   Patient Name: Adriana Kapadia  MRN: [id number]     : 10/31/1930    Date of Service: 2021    Discharge Recommendations:  Continue to assess pending progress  OT Equipment Recommendations  Equipment Needed: Yes  Mobility Devices: Leonie Salines: Rolling    Assessment   Performance deficits / Impairments: Decreased functional mobility ; Decreased balance; Decreased ADL status; Decreased endurance; Decreased strength; Decreased high-level IADLs  Assessment: Pt is a 80 y.o male admitted with Covid-19. Pt recently transferred to ICU d/t decline in health status. Pt demo decreased endurance, strength, functional mobility, postural control, poor activity tolerare and decreased participation in ADL/IADL's. Pt would benefit from skilled OT services to address deficits and to improve QOL. Prognosis: Fair  Decision Making: High Complexity  OT Education: OT Role; Plan of Care  REQUIRES OT FOLLOW UP: Yes  Activity Tolerance  Activity Tolerance: Treatment limited secondary to medical complications (free text); Patient limited by fatigue  Safety Devices  Safety Devices in place: Yes  Type of devices: All fall risk precautions in place; Call light within reach; Left in bed; Nurse notified           Patient Diagnosis(es): The primary encounter diagnosis was Pneumonia due to COVID-19 virus. A diagnosis of Elevated troponin was also pertinent to this visit. has a past medical history of Acute and chronic respiratory failure with hypoxia ([address] CAD (coronary artery disease), Cataracts, bilateral, Glaucoma, Hyperlipidemia, Hypertension, TIA (transient ischemic attack), and Type II or unspecified type diabetes mellitus without mention of complication, not stated as uncontrolled. has a past surgical history that includes Coronary artery bypass graft ();  Cataract removal with implant (Bilateral,  and 12/3/2013); and Inguinal hernia repair (Bilateral, 06/19/12). Treatment Diagnosis: generalized weakness      Restrictions  Restrictions/Precautions  Restrictions/Precautions: General Precautions, Fall Risk, Isolation    Subjective   General  Chart Reviewed: Yes  Patient assessed for rehabilitation services?: Yes  Response to previous treatment: Patient with no complaints from previous session  Family / Caregiver Present: No  Referring Practitioner: Randi Ochoa  Diagnosis: Covid-19  Subjective  Subjective: Pt denies pain this date. General Comment  Comments: Pt in bed upon entry. Agreeable to OT re-assessment  Vital Signs  Pulse: 81  Resp: 24  BP: (!) 138/58  MAP (mmHg): 85  Oxygen Therapy  SpO2: 94 %  Social/Functional History  Social/Functional History  Lives With: Spouse  Type of Home: House  Home Layout: Two level  Home Access: Stairs to enter with rails  Entrance Stairs - Number of Steps: 25  Entrance Stairs - Rails: Right  Bathroom Shower/Tub: Walk-in shower, Shower chair with back  Bathroom Toilet: Standard  Bathroom Equipment: Shower chair  Home Equipment: Walker-Lift, Rolling walker  Receives Help From: Family  ADL Assistance: Independent  Homemaking Assistance: Independent  Homemaking Responsibilities: Yes  Ambulation Assistance: Independent  Transfer Assistance: Independent  Active : Yes  Mode of Transportation: Car       Objective              Balance  Sitting Balance: Stand by assistance  Standing Balance: Minimal assistance  ADL  Feeding: Setup  Grooming: Contact guard assistance  UE Bathing: Contact guard assistance; Minimal assistance  LE Bathing: Contact guard assistance; Minimal assistance  UE Dressing: Contact guard assistance; Minimal assistance  LE Dressing: Contact guard assistance; Minimal assistance  Toileting: Contact guard assistance; Minimal assistance  Additional Comments: Pt with increased fatigue and increased HR limiting ADL participation requiring assistance.         Bed mobility  Supine to Sit: Minimal assistance; Moderate assistance  Sit to Supine: Minimal assistance  Scooting: Contact guard assistance; Minimal assistance  Transfers  Sit to stand: Contact guard assistance; Minimal assistance  Stand to sit: Contact guard assistance                                              Plan   Plan  Times per week: 7x  Times per day: Daily  Current Treatment Recommendations: Strengthening, ROM, Safety Education & Training, Patient/Caregiver Education & Training, Self-Care / ADL, Endurance Training, Functional Mobility Training    G-Code     OutComes Score                                                  AM-PAC Score        AM-PAC Inpatient Daily Activity Raw Score: 17 (11/22/21 0816)  AM-PAC Inpatient ADL T-Scale Score : 37.26 (11/22/21 0816)  ADL Inpatient CMS 0-100% Score: 50.11 (11/22/21 0816)  ADL Inpatient CMS G-Code Modifier : CK (11/22/21 0816)    Goals  Short term goals  Time Frame for Short term goals: 21  Short term goal 1: Patient to complete self care routine c SUP to ensure safe return home. Short term goal 2: Patient to engage in 15 minutes of ther ex/ther act s significant shortness of breath to improve strength as well as activity tolerance for self care tasks. Short term goal 3: Patient to tolerate standing >7' while participating in functional task of choice to improve standing tolerane, balance and safety during ADL, mobility and transfers. Short term goal 4: Patient to be educated on d/c folder, AE/DME, and general safety to ensure safe return home.        Therapy Time   Individual Concurrent Group Co-treatment   Time In  [address] [place] [address]         Time Out  1313         Minutes  [address] [place] [address]

## 2021-11-23 ENCOUNTER — APPOINTMENT (OUTPATIENT)
Dept: GENERAL RADIOLOGY | Age: 86
DRG: 177 | End: 2021-11-23
Payer: MEDICARE

## 2021-11-23 LAB
% CKMB: 2 % (ref 0–3.5)
ABSOLUTE EOS #: 0.4 K/UL (ref 0–0.44)
ABSOLUTE IMMATURE GRANULOCYTE: 0.43 K/UL (ref 0–0.3)
ABSOLUTE LYMPH #: 1.51 K/UL (ref 1.1–3.7)
ABSOLUTE MONO #: 1.02 K/UL (ref 0.1–1.2)
ALBUMIN SERPL-MCNC: 2.1 G/DL (ref 3.5–5.2)
ALBUMIN/GLOBULIN RATIO: 0.7 (ref 1–2.5)
ALP BLD-CCNC: 147 U/L (ref 40–129)
ALT SERPL-CCNC: 37 U/L (ref 5–41)
ANION GAP SERPL CALCULATED.3IONS-SCNC: 11 MMOL/L (ref 9–17)
AST SERPL-CCNC: 42 U/L
BASOPHILS # BLD: 0 % (ref 0–2)
BASOPHILS ABSOLUTE: 0.03 K/UL (ref 0–0.2)
BILIRUB SERPL-MCNC: 0.4 MG/DL (ref 0.3–1.2)
BNP INTERPRETATION: ABNORMAL
BUN BLDV-MCNC: 62 MG/DL (ref 8–23)
BUN/CREAT BLD: 24 (ref 9–20)
CALCIUM SERPL-MCNC: 8 MG/DL (ref 8.6–10.4)
CHLORIDE BLD-SCNC: 97 MMOL/L (ref 98–107)
CK MB: 2.3 NG/ML
CKMB INTERPRETATION: NORMAL
CO2: 21 MMOL/L (ref 20–31)
CREAT SERPL-MCNC: 2.56 MG/DL (ref 0.7–1.2)
DIFFERENTIAL TYPE: ABNORMAL
EOSINOPHILS RELATIVE PERCENT: 3 % (ref 1–4)
GFR AFRICAN AMERICAN: 29 ML/MIN
GFR NON-AFRICAN AMERICAN: 24 ML/MIN
GFR SERPL CREATININE-BSD FRML MDRD: ABNORMAL ML/MIN/{1.73_M2}
GFR SERPL CREATININE-BSD FRML MDRD: ABNORMAL ML/MIN/{1.73_M2}
GLUCOSE BLD-MCNC: 156 MG/DL (ref 74–100)
GLUCOSE BLD-MCNC: 179 MG/DL (ref 70–99)
GLUCOSE BLD-MCNC: 204 MG/DL (ref 74–100)
GLUCOSE BLD-MCNC: 215 MG/DL (ref 74–100)
GLUCOSE BLD-MCNC: 341 MG/DL (ref 74–100)
HCT VFR BLD CALC: 31.7 % (ref 40.7–50.3)
HEMOGLOBIN: 9.8 G/DL (ref 13–17)
IMMATURE GRANULOCYTES: 3 %
LYMPHOCYTES # BLD: 10 % (ref 24–43)
MCH RBC QN AUTO: 30.8 PG (ref 25.2–33.5)
MCHC RBC AUTO-ENTMCNC: 30.9 G/DL (ref 28.4–34.8)
MCV RBC AUTO: 99.7 FL (ref 82.6–102.9)
MONOCYTES # BLD: 7 % (ref 3–12)
NRBC AUTOMATED: 0 PER 100 WBC
PDW BLD-RTO: 12.5 % (ref 11.8–14.4)
PLATELET # BLD: 227 K/UL (ref 138–453)
PLATELET ESTIMATE: ABNORMAL
PMV BLD AUTO: 10.7 FL (ref 8.1–13.5)
POTASSIUM SERPL-SCNC: 5 MMOL/L (ref 3.7–5.3)
PRO-BNP: ABNORMAL PG/ML
RBC # BLD: 3.18 M/UL (ref 4.21–5.77)
RBC # BLD: ABNORMAL 10*6/UL
SEG NEUTROPHILS: 78 % (ref 36–65)
SEGMENTED NEUTROPHILS ABSOLUTE COUNT: 12.06 K/UL (ref 1.5–8.1)
SODIUM BLD-SCNC: 129 MMOL/L (ref 135–144)
TOTAL CK: 113 U/L (ref 39–308)
TOTAL PROTEIN: 5.3 G/DL (ref 6.4–8.3)
TROPONIN INTERP: ABNORMAL
TROPONIN T: ABNORMAL NG/ML
TROPONIN, HIGH SENSITIVITY: 120 NG/L (ref 0–22)
WBC # BLD: 15.5 K/UL (ref 3.5–11.3)
WBC # BLD: ABNORMAL 10*3/UL

## 2021-11-23 PROCEDURE — 85025 COMPLETE CBC W/AUTO DIFF WBC: CPT

## 2021-11-23 PROCEDURE — 2700000000 HC OXYGEN THERAPY PER DAY

## 2021-11-23 PROCEDURE — 1200000000 HC SEMI PRIVATE

## 2021-11-23 PROCEDURE — 94761 N-INVAS EAR/PLS OXIMETRY MLT: CPT

## 2021-11-23 PROCEDURE — 94640 AIRWAY INHALATION TREATMENT: CPT

## 2021-11-23 PROCEDURE — 97110 THERAPEUTIC EXERCISES: CPT

## 2021-11-23 PROCEDURE — 6370000000 HC RX 637 (ALT 250 FOR IP): Performed by: INTERNAL MEDICINE

## 2021-11-23 PROCEDURE — 80053 COMPREHEN METABOLIC PANEL: CPT

## 2021-11-23 PROCEDURE — 82553 CREATINE MB FRACTION: CPT

## 2021-11-23 PROCEDURE — APPSS30 APP SPLIT SHARED TIME 16-30 MINUTES: Performed by: NURSE PRACTITIONER

## 2021-11-23 PROCEDURE — 84484 ASSAY OF TROPONIN QUANT: CPT

## 2021-11-23 PROCEDURE — 99232 SBSQ HOSP IP/OBS MODERATE 35: CPT | Performed by: INTERNAL MEDICINE

## 2021-11-23 PROCEDURE — 6370000000 HC RX 637 (ALT 250 FOR IP): Performed by: NURSE PRACTITIONER

## 2021-11-23 PROCEDURE — 83880 ASSAY OF NATRIURETIC PEPTIDE: CPT

## 2021-11-23 PROCEDURE — 94669 MECHANICAL CHEST WALL OSCILL: CPT

## 2021-11-23 PROCEDURE — 82550 ASSAY OF CK (CPK): CPT

## 2021-11-23 PROCEDURE — 82947 ASSAY GLUCOSE BLOOD QUANT: CPT

## 2021-11-23 PROCEDURE — 97116 GAIT TRAINING THERAPY: CPT

## 2021-11-23 PROCEDURE — 71045 X-RAY EXAM CHEST 1 VIEW: CPT

## 2021-11-23 PROCEDURE — 2580000003 HC RX 258: Performed by: NURSE PRACTITIONER

## 2021-11-23 PROCEDURE — 2580000003 HC RX 258: Performed by: INTERNAL MEDICINE

## 2021-11-23 PROCEDURE — 36415 COLL VENOUS BLD VENIPUNCTURE: CPT

## 2021-11-23 RX ORDER — SODIUM CHLORIDE 1000 MG
2 TABLET, SOLUBLE MISCELLANEOUS ONCE
Status: COMPLETED | OUTPATIENT
Start: 2021-11-23 | End: 2021-11-23

## 2021-11-23 RX ORDER — INSULIN GLARGINE 100 [IU]/ML
15 INJECTION, SOLUTION SUBCUTANEOUS 2 TIMES DAILY
Status: DISCONTINUED | OUTPATIENT
Start: 2021-11-23 | End: 2021-11-27 | Stop reason: HOSPADM

## 2021-11-23 RX ADMIN — DILTIAZEM HYDROCHLORIDE 60 MG: 60 TABLET, FILM COATED ORAL at 12:03

## 2021-11-23 RX ADMIN — TAMSULOSIN HYDROCHLORIDE 0.4 MG: 0.4 CAPSULE ORAL at 07:29

## 2021-11-23 RX ADMIN — IPRATROPIUM BROMIDE AND ALBUTEROL SULFATE 1 AMPULE: 2.5; .5 SOLUTION RESPIRATORY (INHALATION) at 10:40

## 2021-11-23 RX ADMIN — Medication 2 G: at 17:23

## 2021-11-23 RX ADMIN — IPRATROPIUM BROMIDE AND ALBUTEROL SULFATE 1 AMPULE: 2.5; .5 SOLUTION RESPIRATORY (INHALATION) at 06:26

## 2021-11-23 RX ADMIN — OXYCODONE HYDROCHLORIDE AND ACETAMINOPHEN 2000 MG: 500 TABLET ORAL at 07:28

## 2021-11-23 RX ADMIN — SODIUM CHLORIDE: 9 INJECTION, SOLUTION INTRAVENOUS at 22:23

## 2021-11-23 RX ADMIN — IPRATROPIUM BROMIDE AND ALBUTEROL SULFATE 1 AMPULE: 2.5; .5 SOLUTION RESPIRATORY (INHALATION) at 20:36

## 2021-11-23 RX ADMIN — AMIODARONE HYDROCHLORIDE 200 MG: 200 TABLET ORAL at 22:09

## 2021-11-23 RX ADMIN — SODIUM BICARBONATE TAB 650 MG 650 MG: 650 TAB at 22:08

## 2021-11-23 RX ADMIN — ZINC SULFATE 220 MG (50 MG) CAPSULE 100 MG: CAPSULE at 07:28

## 2021-11-23 RX ADMIN — OXYCODONE HYDROCHLORIDE AND ACETAMINOPHEN 2000 MG: 500 TABLET ORAL at 22:09

## 2021-11-23 RX ADMIN — CLOPIDOGREL BISULFATE 75 MG: 75 TABLET ORAL at 07:29

## 2021-11-23 RX ADMIN — FAMOTIDINE 10 MG: 10 TABLET ORAL at 22:09

## 2021-11-23 RX ADMIN — Medication 20000 UNITS: at 09:41

## 2021-11-23 RX ADMIN — IPRATROPIUM BROMIDE AND ALBUTEROL SULFATE 1 AMPULE: 2.5; .5 SOLUTION RESPIRATORY (INHALATION) at 15:39

## 2021-11-23 RX ADMIN — AMIODARONE HYDROCHLORIDE 200 MG: 200 TABLET ORAL at 07:29

## 2021-11-23 RX ADMIN — SODIUM BICARBONATE TAB 650 MG 650 MG: 650 TAB at 07:28

## 2021-11-23 RX ADMIN — APIXABAN 2.5 MG: 2.5 TABLET, FILM COATED ORAL at 07:28

## 2021-11-23 RX ADMIN — METOPROLOL SUCCINATE 50 MG: 50 TABLET, EXTENDED RELEASE ORAL at 07:29

## 2021-11-23 RX ADMIN — ATORVASTATIN CALCIUM 40 MG: 40 TABLET, FILM COATED ORAL at 22:09

## 2021-11-23 RX ADMIN — SODIUM CHLORIDE, PRESERVATIVE FREE 10 ML: 5 INJECTION INTRAVENOUS at 09:41

## 2021-11-23 RX ADMIN — GLIPIZIDE 5 MG: 5 TABLET ORAL at 07:28

## 2021-11-23 RX ADMIN — INSULIN GLARGINE 15 UNITS: 100 INJECTION, SOLUTION SUBCUTANEOUS at 22:17

## 2021-11-23 RX ADMIN — SODIUM CHLORIDE: 9 INJECTION, SOLUTION INTRAVENOUS at 09:49

## 2021-11-23 RX ADMIN — GLIPIZIDE 5 MG: 5 TABLET ORAL at 17:23

## 2021-11-23 RX ADMIN — APIXABAN 2.5 MG: 2.5 TABLET, FILM COATED ORAL at 22:09

## 2021-11-23 ASSESSMENT — PAIN SCALES - GENERAL: PAINLEVEL_OUTOF10: 0

## 2021-11-23 NOTE — PROGRESS NOTES
Occupational Therapy  Facility/Department: Count includes the [facility] MED SURG  Daily Treatment Note  NAME: Lois Spatz  : 10/31/1930  MRN: [id number]    Date of Service: 2021    Discharge Recommendations:  Continue to assess pending progress       Assessment      OT Education: Energy [employer]  Patient Education: Deep breathing techniques during ther ex  Barriers to [address] in place: Yes  Type of devices: All fall risk precautions in place; Left in chair; Call light within reach         Patient Diagnosis(es): The primary encounter diagnosis was Pneumonia due to COVID-19 virus. A diagnosis of Elevated troponin was also pertinent to this visit. has a past medical history of Acute and chronic respiratory failure with hypoxia ([address] CAD (coronary artery disease), Cataracts, bilateral, Glaucoma, Hyperlipidemia, Hypertension, TIA (transient ischemic attack), and Type II or unspecified type diabetes mellitus without mention of complication, not stated as uncontrolled. has a past surgical history that includes Coronary artery bypass graft (); Cataract removal with implant (Bilateral,  and 12/3/2013); and Inguinal hernia repair (Bilateral, 12). Restrictions  Restrictions/Precautions  Restrictions/Precautions: General Precautions, Fall Risk, Isolation  Subjective   General  Chart Reviewed: Yes  Patient assessed for rehabilitation services?: Yes  Response to previous treatment: Patient with no complaints from previous session  Family / Caregiver Present: No  Referring Practitioner: Natalya Pagan  Diagnosis: Covid-19  Subjective  Subjective: Pt seated in bedside chair upon arrival.  Agreeable to OT B UE ther ex participation. General Comment  Comments: Pt denies pain this date.       Orientation     Objective                                                                Type of ROM/Therapeutic Exercise  Type of ROM/Therapeutic Exercise: Free weights  Comment: Pt completed B UE ther ex with 1# dumbell, 20 reps x 1 set x 5 planes. Pt completed flex bar ther ex with red flex bar, 10 reps x 1 set x 3 planes. Exercises  Other: Reviewed discharge folder. Plan   Plan  Times per week: 7x  Times per day: Daily  Current Treatment Recommendations: Strengthening, ROM, Safety Education & Training, Patient/Caregiver Education & Training, Self-Care / ADL, Endurance Training, Functional Mobility Training  G-Code     OutComes Score                                                  AM-PAC Score             Goals  Short term goals  Time Frame for Short term goals: 21  Short term goal 1: Patient to complete self care routine c SUP to ensure safe return home. Short term goal 2: Patient to engage in 15 minutes of ther ex/ther act s significant shortness of breath to improve strength as well as activity tolerance for self care tasks. Short term goal 3: Patient to tolerate standing >7' while participating in functional task of choice to improve standing tolerane, balance and safety during ADL, mobility and transfers. Short term goal 4: Patient to be educated on d/c folder, AE/DME, and general safety to ensure safe return home.        Therapy Time   Individual Concurrent Group Co-treatment   Time In 0827         Time Out 0853         Minutes 72 [facility], SILVA/L

## 2021-11-23 NOTE — PROGRESS NOTES
[facility]   APRN - Progress Note    Patient - Na Diego  Date of Admission -  11/15/2021  8:38 PM  Date of Evaluation -  2021  Hospital Day - 8      SUBJECTIVE:     The Na Diego is a 80 y.o. male who is seen for follow up for atrial fibrillation with RVR and COVID-19. Per nursing report and notes, overnight events include: no significant events. He resting in bed alert oriented no acute distress. Patient has oxygen on at 2 L per nasal cannula. Patient denies complaints this morning. ROS:   Constitutional: negative  for fevers, and negative for chills. Respiratory: positive for shortness of breath, negative for cough, and negative for wheezing  Cardiovascular: negative for chest pain, and negative for palpitations  Gastrointestinal: negative for abdominal pain, negative for nausea,negative for vomiting, negative for diarrhea, and negative for constipation    All other systems were reviewed with the patient and are negative unless otherwise stated in HPI. OBJECTIVE:     VITAL SIGNS:  Patient Vitals for the past 8 hrs:   BP Temp Temp src Pulse Resp SpO2   21 1502      97 %   21 1330 (!) 100/57 97.9 °F (36.6 °C) Temporal 92 20 98 %   21 1203 (!) 124/57   102  100 %   21 1040     20 96 %         Temp: 97.9 °F (36.6 °C)  Temp range:    Temp  Av.6 °F (37 °C)  Min: 97.7 °F (36.5 °C)  Max: 99.9 °F (37.7 °C)    BP: (!) 100/57  BP Range:      Systolic (94ZSF), WWR:220 , Min:98 , QVA:718      Diastolic (34ZMI), ICC:08, Min:56, Max:80    Pulse: 92  Pulse Range:    Pulse  Av.6  Min: 92  Max: 129    Resp: 20  Resp Range:   Resp  Av.7  Min: 20  Max: 24    SpO2: 97 % room air  SpO2 range:   SpO2  Av.7 %  Min: 86 %  Max: 100 %    Weight  Wt Readings from Last 3 Encounters:   21 164 lb 3.2 oz (74.5 kg)   19 158 lb (71.7 kg)   18 166 lb 6.4 oz (75.5 kg)     Body mass index is 29.09 kg/m².     24HR INTAKE/OUTPUT:      Intake/Output Summary (Last 24 hours) at 11/23/2021 1552  Last data filed at 11/23/2021 1337  Gross per 24 hour   Intake 1565.39 ml   Output 975 ml   Net 590.39 ml     Date 11/23/21 0000 - 11/23/21 2359   Shift 5968-9012 1294-1322 7742-5276 24 Hour Total   INTAKE   P.O. 300   300   I. V.(mL/kg/hr) 663(1.1) 602.4  1265.4   Shift Total(mL/kg) 963(12.9) 602. 4(8.1)  1565.4(21)   OUTPUT   Urine(mL/kg/hr) 300(0.5) 325  625   Shift Total(mL/kg) 300(4) 325(4.4)  625(8.4)   Weight (kg) 74.5 74.5 74.5 74.5         PHYSICAL EXAM:  GEN:    Awake and following commands:     [] No   [x] Yes  MENTAL STATUS: alert and oriented x3. DISTRESS: Acute respiratory distress:       [x] No   [] Yes  EYES:  EOMI, pupils equal   NECK: Supple. No lymphadenopathy. No carotid bruit  CVS: Irregular rhythm, no audible murmur  PULM: Wheeze throughout no acute respiratory distress  ABD:    Bowels sounds normal.  Abdomen is soft. No distention. no tenderness to palpation. EXT:   no edema bilaterally . No calf tenderness. NEURO: Moves all extremities. Motor and sensory are grossly intact  SKIN:  No rashes. No skin lesions.           MEDICATIONS:  Scheduled Meds:   metoprolol succinate  50 mg Oral Daily    dilTIAZem  60 mg Oral 4 times per day    insulin lispro  0-12 Units SubCUTAneous TID     insulin lispro  0-6 Units SubCUTAneous Nightly    ipratropium-albuterol  1 ampule Inhalation 4x daily    sodium bicarbonate  650 mg Oral BID    famotidine  10 mg Oral Nightly    tamsulosin  0.4 mg Oral Daily    atorvastatin  40 mg Oral Nightly    clopidogrel  75 mg Oral Daily    glipiZIDE  5 mg Oral BID AC    sodium chloride flush  5-40 mL IntraVENous 2 times per day    vitamin D  20,000 Units Oral Weekly    ascorbic acid  2,000 mg Oral BID    zinc sulfate  100 mg Oral Daily    apixaban  2.5 mg Oral BID    amiodarone  200 mg Oral BID     Continuous Infusions:   sodium chloride 75 mL/hr at 11/23/21 0949    sodium chloride      dextrose       PRN Meds: albuterol, 2.5 mg, Q4H PRN  sodium chloride flush, 5-40 mL, PRN  sodium chloride, 25 mL, PRN  ondansetron, 4 mg, Q8H PRN   Or  ondansetron, 4 mg, Q6H PRN  polyethylene glycol, 17 g, Daily PRN  acetaminophen, 650 mg, Q6H PRN   Or  acetaminophen, 650 mg, Q6H PRN  glucose, 15 g, PRN  dextrose, 12.5 g, PRN  glucagon (rDNA), 1 mg, PRN  dextrose, 100 mL/hr, PRN        DATA:  Complete Blood Count:   Recent Labs     11/21/21  0518 11/22/21  0526 11/23/21  0540   WBC 17.0* 15.5* 15.5*   RBC 3.45* 2.91* 3.18*   HGB 10.6* 9.3* 9.8*   HCT 33.3* 28.1* 31.7*   MCV 96.5 96.6 99.7   RDW 12.7 12.8 12.5    215 227   SEGS 80* 79* 78*   NEUTROABS 13.78* 12.18* 12.06*   LYMPHOPCT 10* 10* 10*   LYMPHSABS 1.64 1.51 1.51   MONOPCT 7 7 7   EOSRELPCT 1 2 3   BASOPCT 0 0 0   IMMGRAN 2* 2* 3*        Recent Blood Glucose:   Recent Labs     11/21/21  0518 11/22/21  0526 11/23/21  0540   GLUCOSE 216* 166* 179*        Comprehensive Metabolic Profile:   Recent Labs     11/21/21  0518 11/22/21  0526 11/23/21  0540   BUN 52* 60* 62*   CREATININE 2.20* 2.59* 2.56*   * 131* 129*   K 5.3 4.8 5.0   CL 98 98 97*   CALCIUM 8.6 7.9* 8.0*   ANIONGAP 11 12 11   CO2 23 21 21   PROT 6.0* 5.3* 5.3*   LABALBU 2.4* 2.1* 2.1*   BILITOT 0.47 0.40 0.40   ALKPHOS 145* 160* 147*   AST 25 34 42*   ALT 25 30 37        Urinalysis:   Lab Results   Component Value Date    NITRU NEGATIVE 02/11/2021    COLORU YELLOW 02/11/2021    PHUR 5.5 02/11/2021    WBCUA 0 TO 2 02/11/2021    RBCUA None 02/11/2021    MUCUS NOT REPORTED 02/11/2021    TRICHOMONAS NOT REPORTED 02/11/2021    YEAST NOT REPORTED 02/11/2021    BACTERIA TRACE 02/11/2021    SPECGRAV 1.020 02/11/2021    LEUKOCYTESUR NEGATIVE 02/11/2021    UROBILINOGEN Normal 02/11/2021    BILIRUBINUR NEGATIVE 02/11/2021    GLUCOSEU 3+ 02/11/2021    KETUA NEGATIVE 02/11/2021    AMORPHOUS NOT REPORTED 02/11/2021       HgBA1c:    Lab Results   Component Value Date    LABA1C 9.5 08/27/2021       TSH:    Lab Results Component Value Date    TSH 1.12 11/17/2021       Lactic Acid:   Lab Results   Component Value Date    LACTA 2.2 11/15/2021        High Sensitivity Troponin:  Recent Labs     11/23/21  0540   TROPHS 120*     Pro-BNP:  Lab Results   Component Value Date    PROBNP 11,281 (H) 11/23/2021     D-Dimer:  Lab Results   Component Value Date    DDIMER 1.31 (H) 11/19/2021     PT/INR:    Lab Results   Component Value Date    PROTIME 16.3 11/17/2021    INR 1.3 11/17/2021     PTT:    Lab Results   Component Value Date    APTT 37.3 11/17/2021       CRP:   No results for input(s): CRP in the last 72 hours. ABGs:   No results found for: PHART, PH, FDO5RAL, PCO2, PO2ART, PO2, OUR8WFS, HCO3, BEART, BE, THGBART, THB, ONS0LTK, V4RXXJCO, O2SAT, FIO2      Radiology/Imaging:  XR CHEST PORTABLE   Final Result   Unchanged bilateral peripheral opacities. Improvement in pleural effusions. XR CHEST PORTABLE   Final Result   Bilateral airspace disease and small pleural effusions appear improved. XR CHEST PORTABLE   Final Result   No significant change in bilateral airspace disease. Unchanged appearance of   small pleural effusions versus pleuroparenchymal reaction. Some of these opacities likely represent underlying chronic lung disease and   scarring. A superimposed acute inflammatory process or infection should be   considered in the appropriate clinical setting. US RETROPERITONEAL COMPLETE   Final Result   1. Limited exam.  Large postvoid residual.  Nonvisualization of the right   ureteral jet. 2. No hydronephrosis. 3. 6 mm calculus at the midpole left kidney. 4. Cholelithiasis. XR CHEST PORTABLE   Final Result   Significant peripheral and basal predominant pulmonary opacities diffusely   throughout both lungs. This pattern can be seen in the setting of COVID-19   pneumonia, though this requires clinical correlation.   Sequelae of underlying   interstitial lung disease is also possible, though less favored. Stability   is indeterminate in the absence of prior comparison imaging. Small right pleural effusion. Age-indeterminate fracture of the left anterolateral 9th rib.                ASSESSMENT / PLAN:     COVID-19 virus infection  · Continue current therapy  · Consults: Dr. Paola Rojas  · Remdesivir not indicated  · Appreciate infectious disease   · Continue vitamin C, D and zinc  · Trend labs  · IV steroidsstoppedelevated creatinine and no hypoxia  · VaccinatedJanuary, February booster in November 2021  · Acute respiratory failure with hypoxia  · Supplemental oxygen maintain SPO2 greater than 92%  · Nebs  · Acapella  · PT OT  · Nebs  · Stop IV fluids  · CAD  · Continue Lipitor, Plavix, Lasix  · New onset atrial flutter  · Appreciate cardiology  · EKG2-1 conduction  · Increase Toprol-XL to 50 mg daily for rate control  · Amiodarone drip completed twice  · Continue amiodarone 200 mg twice daily  · Continue Eliquis  · Type 2 diabetes  · POCT before meals and at bedtime  · Med-dose sliding scale  · Hold Glucophage due to CKD  · Continue glipizide  · Start Lantus 15 units twice daily  · Hypertension  · Continue Altace  · CKD  · Trend labs  · No remdesivir or Lovenox  · Stop steroidspossible cause of elevated creatinine  · Continue Flomaxurinary retention  · Remove Robertson catheter  · Appreciate nephrology  · Nutrition status:   · Well developed, well nourished with no malnutrition  · Dietician consult initiated  · [] NPO [] TPN      [] Tube feed [] Clear liquid        [] Full liquid [] regular diet         [] Fluid restriction   [x] Diabetic diet   · Prophylaxis:   · DVT: Eliquis   · Stress Ulcer: H2 Blocker   · High risk medications: none   · Disposition:    · Discharge plan is pending   · DT/OT      FRANKLIN Elkins CNP , FRANKLIN-NP-C  11/23/2021  3:52 PM

## 2021-11-23 NOTE — PROGRESS NOTES
Afternoon assessment completed. PT at bedside to get pt up to bathroom. Pt denies needs from writer at this time. Call light in reach. Will continue to monitor.

## 2021-11-23 NOTE — PROGRESS NOTES
[facility] - Telemedicine Progress Note   COVID 19 Patient-Tele-visit  Today's Date and Time: 11/23/2021, 11:09 AM    Impression :     · COVID 19 Confirmed Infection  · Covid tests:  · 11/15/21 Positive  · Urinary retention  · Renal calculi  · Cholelithiasis  · DM II  · Essential HTN  · Atrial Flutter  · CHF  · CKD IV  · CVA 2018  · Received COVID vaccines      Patient evaluated by Telemedicine. Requesting Institution: [facility]  Provider Institution: [address] [place] [address],[facility] [place], [address] [facility] Person at [facility] [place]: Ms Hummelell Night, APRN- IM    Recommendations:   · Antibiotic treatment:  · No  · Covid Rx:  · Remdesivir-contraindicated with renal insufficiency  · Solu-medrol initiated 11/16/21  · Actemra-Not indicated at this time  · Monitor CRP      Medical Decision Making/Summary/Discussion:11/23/2021     · Patient admitted with suspected COVID 19 infection  · Covid test confirmed positive. · On Rx with Solumedrol  · Diuresis has improved his overall picture. · On room air at this point    Infection Control Recommendations   · Universal Precautions  · Airborne isolation  · Droplet Isolation    Antimicrobial Stewardship Recommendations     · Discontinuation of therapy  Coordination of Outpatient Care:   · Estimated Length of IV antimicrobials:TBD  · Patient will need Midline Catheter Insertion: TBD  · Patient will need PICC line Insertion: No  · Patient will need: Home IV , [place],  SNF,  LTAC:TBD  · Patient will need outpatient wound care:No    Chief complaint/reason for consultation:   · Concern for COVID infection      History of Present Illness:   Yamilet Fernandez is a 80y.o.-year-old male who was initially admitted on 11/15/2021. Patient seen at the request of Dr. Ken López:    Patient presented through ER with complaints of increased shortness of breath worsening over the past six weeks. He received his Covid vaccines.  He was treated with antibiotics without relief. The patient's HR was noted to be in the 140's and an EKG revealed acute onset atrial flutter. Cardiology was consulted and he was started on an amiodarone gtt. Covid swab was positive. The patient was also found to have acute urinary retention, 6 mm left pole renal calculi and cholelithiasis. Echocardiogram revealed a possible acute small to moderate sized inter-atrial communication. WBC is increasing      Impression CXR 11/15/21   Significant peripheral and basal predominant pulmonary opacities diffusely   throughout both lungs.  This pattern can be seen in the setting of COVID-19   pneumonia, though this requires clinical correlation.  Sequelae of underlying   interstitial lung disease is also possible, though less favored.  Stability   is indeterminate in the absence of prior comparison imaging.       Small right pleural effusion.       Age-indeterminate fracture of the left anterolateral 9th rib.                 Increased risk 3-dose inadvertent series    Dose 1  01/29/2021 (COVID-19, Moderna, Primary or Immunocompromised, PF, 100mcg/0.5mL)      Dose 2  02/18/2021 (COVID-19, Moderna, Primary or Immunocompromised, PF, 100mcg/0.5mL) - Given too close to previous dose     02/26/2021 (COVID-19, Georgeana Crumble, Primary or Immunocompromised, PF, 100mcg/0.5mL) - Given too close to previous dose     11/02/2021 (COVID-19, Pfizer, PF, 30mcg/0.3mL)      Booster dose  05/02/2022 - Dose Forecast         Patient admitted because of concerns with COVID 19.    CURRENT EVALUATION : 11/23/2021      Afebrile  VS stable     The patient is on 2 L NC today    BNP and troponin elevated    CXR shows some improvement in pleural effusions    Patient exhibiting respiratory distress.  No  Respiratory secretions: No    Patient receiving supplemental oxygen: 2-->1 L NC-->room air--> 2 L  RR: 24-->20  02 sat: 96-->98        NEWS Score: 0-4 Low risk group; 5-6: Medium risk group; 7 or above: High risk group  Parameters 3 2 1 0 1 2 3   Age    < 72   ? 65   RR ? 8  9-11 12-20  21-24 ? 25   O2 Sats ? 91 92-93 94-95 ? 96      Suppl O2  Yes  No      SBP ? 90  101-110 111-219   ? 220   HR ? 40  41-50 51-90  111-130 ? 131   Consciousness    Alert   Drowsiness, lethargy, or confusion   Temperature ? 35.0 C (95.0 F)  35.1-36.0 C 95.1-96.9 F 36.1-38.0 C 97.0-100.4 F 38.1-39.0 C 100.5-102.3 F ? 39.1 C ? 102.4 F      NEWS Score:   11/17/21: 3 Low risk    Overall Daily Picture:    Improved    Presence of secondary bacterial Infection:    No  Additional antibiotics: No    Labs, X rays reviewed: 11/23/2021    BUN:60-->62  Cr:2.59-->2.56    Pro BNP: 79935-->10,948    WBC:16.2-->13.2-->11.1-->17-->15.5-->15.5  Hb:10-->10.6-->9.3-->9.8  Plat: 215-->227    Absolute Neutrophils:14.28  Absolute Lymphocytes:1.03  Neutrophil/Lymphocyte Ratio: 13.8 high risk    CRP:101.4-->63.2-->108  Ferritin:866  LDH: 218    Pro Calcitonin:  Troponin 145    Cultures:  Urine:  · 11/17: No growth  Blood:  · 11/17: No growth  Sputum :  ·   Wound:       CXR:       11/19/21 11/16/21      CAT:      Discussed with patient, RN, CC, IM. I have personally reviewed the past medical history, past surgical history, medications, social history, and family history, and I have updated the database accordingly.   Past Medical History:     Past Medical History:   Diagnosis Date    Acute and chronic respiratory failure with hypoxia ([facility] [address] 11/16/2021    CAD (coronary artery disease)     Cataracts, bilateral     Glaucoma     Hyperlipidemia     Hypertension     TIA (transient ischemic attack)     Type II or unspecified type diabetes mellitus without mention of complication, not stated as uncontrolled        Past Surgical  History:     Past Surgical History:   Procedure Laterality Date    CATARACT REMOVAL WITH IMPLANT Bilateral 11/219/2013 and 12/3/2013    CORONARY ARTERY BYPASS GRAFT  2000    INGUINAL HERNIA REPAIR Bilateral 06/19/12 Medications:      metoprolol succinate  50 mg Oral Daily    dilTIAZem  60 mg Oral 4 times per day    insulin lispro  0-12 Units SubCUTAneous TID WC    insulin lispro  0-6 Units SubCUTAneous Nightly    ipratropium-albuterol  1 ampule Inhalation 4x daily    sodium bicarbonate  650 mg Oral BID    famotidine  10 mg Oral Nightly    tamsulosin  0.4 mg Oral Daily    atorvastatin  40 mg Oral Nightly    clopidogrel  75 mg Oral Daily    glipiZIDE  5 mg Oral BID AC    sodium chloride flush  5-40 mL IntraVENous 2 times per day    vitamin D  20,000 Units Oral Weekly    ascorbic acid  2,000 mg Oral BID    zinc sulfate  100 mg Oral Daily    apixaban  2.5 mg Oral BID    amiodarone  200 mg Oral BID       Social History:     Social History     Socioeconomic History    Marital status:      Spouse name: Not on file    Number of children: Not on file    Years of education: Not on file    Highest education level: Not on file   Occupational History    Not on file   Tobacco Use    Smoking status: Never Smoker    Smokeless tobacco: Never Used   Substance and Sexual Activity    Alcohol use: Yes     Comment: very very very seldom    Drug use: No    Sexual activity: Not on file   Other Topics Concern    Not on file   Social History Narrative    Not on file     Social Determinants of Health     Financial Resource Strain:     Difficulty of Paying Living Expenses: Not on file   Food Insecurity:     Worried About Running Out of Food in the Last Year: Not on file    Mera of Food in the Last Year: Not on file   Transportation Needs:     Lack of Transportation (Medical): Not on file    Lack of Transportation (Non-Medical):  Not on file   Physical Activity:     Days of Exercise per Week: Not on file    Minutes of Exercise per Session: Not on file   Stress:     Feeling of Stress : Not on file   Social Connections:     Frequency of Communication with Friends and Family: Not on file    Frequency of Social Gatherings with Friends and Family: Not on file    Attends Restoration Services: Not on file    Active Member of Clubs or Organizations: Not on file    Attends Club or Organization Meetings: Not on file    Marital Status: Not on file   Intimate Partner Violence:     Fear of Current or Ex-Partner: Not on file    Emotionally Abused: Not on file    Physically Abused: Not on file    Sexually Abused: Not on file   Housing Stability:     Unable to Pay for Housing in the Last Year: Not on file    Number of [place] in the Last Year: Not on file    Unstable Housing in the Last Year: Not on file       Family History:     Family History   Problem Relation Age of Onset    Heart Disease Father     Heart Disease Brother         Allergies:   Patient has no known allergies. Review of Systems:       Constitutional: No fevers or chills. No systemic complaints  Head: No headaches  Eyes: No double vision or blurry vision. No conjunctival inflammation. ENT: No sore throat or runny nose. . No hearing loss, tinnitus or vertigo. Cardiovascular: No chest pain or palpitations. No Shortness of breath. No SPEARS  Lung: No Shortness of breath or cough. No sputum production  Abdomen: No nausea, vomiting, diarrhea, or abdominal pain. Lawernce Roughen No cramps. Genitourinary: No increased urinary frequency, or dysuria. No hematuria. No suprapubic or CVA pain  Musculoskeletal: No muscle aches or pains. No joint effusions, swelling or deformities  Hematologic: No bleeding or bruising. Neurologic: No headache, weakness, numbness, or tingling. Integument: No rash, no ulcers. Psychiatric: No depression. Endocrine: No polyuria, no polydipsia, no polyphagia.     Physical Examination :     Patient Vitals for the past 8 hrs:   BP Temp Temp src Pulse Resp SpO2 Weight   11/23/21 0723 (!) 109/59 97.7 °F (36.5 °C) Temporal 92 20 98 %    11/23/21 0629      99 %    11/23/21 0527       164 lb 3.2 oz (74.5 kg)     General Appearance: Awake, alert, and in no apparent distress  Head:  Normocephalic, no trauma  Eyes: Pupils equal, round, reactive to light; sclera anicteric; conjunctivae pink. No embolic phenomena. ENT: Oropharynx clear, without erythema, exudate, or thrush. No tenderness of sinuses. Mouth/throat: mucosa pink and moist. No lesions. Dentition in good repair. Neck:Supple, without lymphadenopathy. Thyroid normal, No bruits. Pulmonary/Chest: Clear to auscultation, without wheezes, rales, or rhonchi. No dullness to percussion. Cardiovascular: Regular rate and rhythm without murmurs, rubs, or gallops. Abdomen: Soft, non tender. Bowel sounds normal. No organomegaly  All four Extremities: No cyanosis, clubbing, edema, or effusions. Neurologic: No gross sensory or motor deficits. Skin: Warm and dry with good turgor. No signs of peripheral arterial or venous insufficiency. No ulcerations. No open wounds. Medical Decision Making -Laboratory:   I have independently reviewed/ordered the following labs:    CBC with Differential:   Recent Labs     11/22/21 0526 11/23/21  0540   WBC 15.5* 15.5*   HGB 9.3* 9.8*   HCT 28.1* 31.7*    227   LYMPHOPCT 10* 10*   MONOPCT 7 7     BMP:   Recent Labs     11/22/21 0526 11/23/21  0540   * 129*   K 4.8 5.0   CL 98 97*   CO2 21 21   BUN 60* 62*   CREATININE 2.59* 2.56*     Hepatic Function Panel:   Recent Labs     11/22/21 0526 11/23/21  0540   PROT 5.3* 5.3*   LABALBU 2.1* 2.1*   BILITOT 0.40 0.40   ALKPHOS 160* 147*   ALT 30 37   AST 34 42*     No results for input(s): RPR in the last 72 hours. No results for input(s): HIV in the last 72 hours. No results for input(s): BC in the last 72 hours.   Lab Results   Component Value Date    MUCUS NOT REPORTED 02/11/2021    RBC 3.18 11/23/2021    TRICHOMONAS NOT REPORTED 02/11/2021    WBC 15.5 11/23/2021    YEAST NOT REPORTED 02/11/2021    TURBIDITY CLEAR 02/11/2021     Lab Results   Component Value Date    CREATININE 2.56 11/23/2021 GLUCOSE 179 11/23/2021    GLUCOSE 124 05/17/2012       Medical Decision Making-Imaging:     Narrative   EXAMINATION:   ONE XRAY VIEW OF THE CHEST       11/15/2021 6:06 pm       COMPARISON:   None.       HISTORY:   ORDERING SYSTEM PROVIDED HISTORY: short of breath   TECHNOLOGIST PROVIDED HISTORY:   short of breath       FINDINGS:   There are diffuse peripheral and basal pulmonary opacities bilaterally of   indeterminate stability in the absence of prior comparison imaging.  Small   right pleural effusion.  No pneumothorax.  Cardiomediastinal contours are   within normal limits for size with prior CABG and a tortuous, atherosclerotic   aorta.  Degenerative changes of the spine and shoulders.  Age-indeterminate   fracture of the anterolateral left 9th rib.           Impression   Significant peripheral and basal predominant pulmonary opacities diffusely   throughout both lungs.  This pattern can be seen in the setting of COVID-19   pneumonia, though this requires clinical correlation.  Sequelae of underlying   interstitial lung disease is also possible, though less favored.  Stability   is indeterminate in the absence of prior comparison imaging.       Small right pleural effusion.       Age-indeterminate fracture of the left anterolateral 9th rib.             Narrative   EXAMINATION:   RETROPERITONEAL ULTRASOUND OF THE KIDNEYS AND URINARY BLADDER       11/17/2021       COMPARISON:   None       HISTORY:   ORDERING SYSTEM PROVIDED HISTORY: Rule out obstructive uropathy.    TECHNOLOGIST PROVIDED HISTORY:       Rule out obstructive uropathy.       80-year-old male; rule out obstructive uropathy       FINDINGS:       Kidneys:       Exam limited due to rib shadowing and difficulty breathing.       Right kidney measures 9.1 x 4.9 x 4.9 cm.  Right renal cortical thickness   measures 1.1 cm.       Left kidney measures 9.6 x 4.4 x 5.0 cm.  Left renal cortical thickness   measures 8 mm.       Color flow projects over the bilateral renal parenchyma.       No hydronephrosis.  No perinephric fluid.       Gross preservation of the bilateral corticomedullary differentiation.       Echogenic focus with posterior shadowing measuring 6 mm at the midpole left   kidney consistent with a renal calculus.       Gallstones incidentally noted within the gallbladder with posterior acoustic   shadowing and image 56.           Bladder:       Urinary bladder measures 7.5 by 6.0 x 7.2 cm for a bladder volume of 229 mL. Urinary bladder wall thickness measures 3 mm.       Postvoid urinary bladder measures 5.1 x 6.2 x 6.3 cm for a postvoid urinary   bladder volume of 105 mL.       Left ureteral jet is visualized.  Nonvisualization of the right ureteral jet.           Impression   1. Limited exam.  Large postvoid residual.  Nonvisualization of the right   ureteral jet. 2. No hydronephrosis. 3. 6 mm calculus at the midpole left kidney. 4. Cholelithiasis.         Summary  Global left ventricular systolic function is difficult to assess due to the  patients rate in the 120's but appears preserved with an estimated ejection  fraction of 55%. Mildly increased left ventricular wall thickness with a normal left  ventricular cavity size. Left atrium is normal in size. Evidence of a small to moderate inter-atrial communication is seen by color  Doppler imaging with an intermittent left to right shunt. This appears most  consistent with a patent foramen ovale (PFO) vs a small ASD. Bowing intra-atrial septal motion consistent with an atrial septal aneurysm  is seen. The clinical significant of this finding is unknown, but has been  associated with an increased risk of TIA's and strokes. Mild to moderate aortic leaflets calcification without restriction of  motion. Mild aortic insufficiency was seen. Mild pulmonary hypertension with an estimated right ventricular systolic  pressure of 30 mmHg.   Mild to moderate tricuspid regurgitation.     Compared to the previous study of 3/27/18, the patient now has evidence of a  small to moderate sized inter-atrial communication. Consider additional  testing by transesophageal echocardiography (PATT) if clinically indicated,  especially if this might . Clinical correlation required.       Medical Decision Zvcivw-Glbwflzm-Hxbrz:       Medical Decision Making-Other:     Note:  · Labs, medications, radiologic studies were reviewed with personal review of films  · Large amounts of data were reviewed  · Discussed with nursing Staff, Discharge planner  · Infection Control and Prevention measures reviewed  · All prior entries were reviewed  · Administer medications as ordered  · Prognosis: Guarded  · Discharge planning reviewed      Thank you for allowing us to participate in the care of this patient. Please call with questions. Rosa Beyrs, APRN - CNP     ATTESTATION:    I have discussed the case, including pertinent history and exam findings with the APRN. I have evaluated the  History, physical findings and pictures of the patient and the key elements of the encounter have been performed by me. I have reviewed the laboratory data, other diagnostic studies and discussed them with the APRN. I have updated the medical record where necessary. I agree with the assessment, plan and orders as documented by the APRN.     Tyrell Menon MD.          Pager: [phone] - Office: [phone]

## 2021-11-23 NOTE — PROGRESS NOTES
Pt laying in bed awake when writer entered the room. Vitals and assessment as charted. Pt denies any further needs. Call light within reach. Bed alarm on.

## 2021-11-23 NOTE — PROGRESS NOTES
Renal Progress Note  [facility]      TELEHEALTH EVALUATION -- Audio/Visual (During RHMTS-51 public health emergency)     Telehealth service was provided with the patient at his room in [facility] and myself the physician in my office in [facility] and the patient's RN, Lesly, who has initiated the visit. Pursuant to the emergency declaration under the [facility] waiver authority and the [employer] Act, this Virtual  Visit was conducted, with patient's consent, to reduce the patient's risk of exposure to COVID-19 and provide continuity of care for an established patient. Services were provided through a video synchronous discussion virtually to substitute for in-person clinic visit. Patient :  Irineo Zamarripa; 80 y.o. MRN# [id number]  Location:  0303/0303-01  Attending:  Kal Rico MD  Admit Date:  11/15/2021   Hospital Day: 8      Subjective:     Nephrology is following the patient for JANINA/CKD. Patient seen. Remains on nasal canula, desats when off. Started on IV fluids last night due to tachycardia and lower Bps. Heart rate seems imprvoed.      Outpatient Medications:     Medications Prior to Admission: furosemide (LASIX) 40 MG tablet, Take 40 mg by mouth daily  glipiZIDE (GLUCOTROL) 5 MG tablet, Take 5 mg by mouth 2 times daily (before meals)  metFORMIN (GLUCOPHAGE) 500 MG tablet, Take 1,000 mg by mouth 2 times daily (with meals)   atorvastatin (LIPITOR) 40 MG tablet, Take 1 tablet by mouth nightly  clopidogrel (PLAVIX) 75 MG tablet, Take 1 tablet by mouth daily  ramipril (ALTACE) 2.5 MG capsule, Take 2.5 mg by mouth every morning   aspirin 81 MG EC tablet, Take 81 mg by mouth nightly     Current Medications:     Scheduled Meds:    insulin glargine  15 Units SubCUTAneous BID    sodium chloride  2 g Oral Once    metoprolol succinate  50 mg Oral Daily    dilTIAZem  60 mg Oral 4 times per day    insulin lispro  0-12 Units SubCUTAneous TID     insulin lispro  0-6 Units SubCUTAneous Nightly    ipratropium-albuterol  1 ampule Inhalation 4x daily    sodium bicarbonate  650 mg Oral BID    famotidine  10 mg Oral Nightly    tamsulosin  0.4 mg Oral Daily    atorvastatin  40 mg Oral Nightly    clopidogrel  75 mg Oral Daily    glipiZIDE  5 mg Oral BID AC    sodium chloride flush  5-40 mL IntraVENous 2 times per day    vitamin D  20,000 Units Oral Weekly    ascorbic acid  2,000 mg Oral BID    zinc sulfate  100 mg Oral Daily    apixaban  2.5 mg Oral BID    amiodarone  200 mg Oral BID     Continuous Infusions:    sodium chloride 75 mL/hr at 21 0949    sodium chloride      dextrose       PRN Meds:  albuterol, sodium chloride flush, sodium chloride, ondansetron **OR** ondansetron, polyethylene glycol, acetaminophen **OR** acetaminophen, glucose, dextrose, glucagon (rDNA), dextrose    Input/Output:       I/O last 3 completed shifts: In: 1565.4 [P.O.:300; I.V.:1265.4]  Out: 975 [Urine:975]. Patient Vitals for the past 96 hrs (Last 3 readings):   Weight   21 0527 164 lb 3.2 oz (74.5 kg)   21 0530 161 lb 9.6 oz (73.3 kg)   21 0515 164 lb 11.2 oz (74.7 kg)       Vital Signs:   Temperature:  Temp: 97.9 °F (36.6 °C)  TMax:   Temp (24hrs), Av.6 °F (37 °C), Min:97.7 °F (36.5 °C), Max:99.9 °F (37.7 °C)    Respirations:  Resp: 20  Pulse:   Pulse: 92  BP:    BP: (!) 100/57  BP Range: Systolic (43RFM), LDE:112 , Min:98 , KSC:913       Diastolic (89BIR), CMA:54, Min:56, Max:80      Physical Examination:     General -- no distress  Oral Mucosa -- moist  Neck --  JVD - no  Extremities -- trace edema, moves all extremeties  CNS - awake and alert   Pschy - not agitated, mood and memory normal    Due to this being a TeleHealth encounter, evaluation of the following organ systems is limited: Vitals/EENT/Resp/CV/GI//MS/Neuro/Skin/Heme-Lymph-Imm.     Labs:       Recent Labs     11/21/21  0518 11/22/21  0526 11/23/21  0540   WBC 17.0* 15.5* 15.5*   RBC 3.45* 2.91* 3.18*   HGB 10.6* 9.3* 9.8*   HCT 33.3* 28.1* 31.7*   MCV 96.5 96.6 99.7   MCH 30.7 32.0 30.8   MCHC 31.8 33.1 30.9   RDW 12.7 12.8 12.5    215 227   MPV 10.9 11.0 10.7      BMP:   Recent Labs     11/21/21  0518 11/22/21  0526 11/23/21  0540   * 131* 129*   K 5.3 4.8 5.0   CL 98 98 97*   CO2 23 21 21   BUN 52* 60* 62*   CREATININE 2.20* 2.59* 2.56*   GLUCOSE 216* 166* 179*   CALCIUM 8.6 7.9* 8.0*      Phosphorus:   No results for input(s): PHOS in the last 72 hours. Magnesium:  No results for input(s): MG in the last 72 hours.   Albumin:    Recent Labs     11/21/21  0518 11/22/21  0526 11/23/21  0540   LABALBU 2.4* 2.1* 2.1*     BNP:    No results found for: BNP  HÉCTOR:    No results found for: HÉCTOR  SPEP:  Lab Results   Component Value Date    PROT 5.3 11/23/2021     UPEP:   No results found for: LABPE  C3:   No results found for: C3  C4:   No results found for: C4  MPO ANCA:   No results found for: MPO  PR3 ANCA:   No results found for: PR3  Anti-GBM:   No results found for: GBMABIGG  Hep BsAg:       No results found for: HEPBSAG  Hep C AB:        No results found for: HEPCAB    Urinalysis/Chemistries:      Lab Results   Component Value Date    NITRU NEGATIVE 02/11/2021    COLORU YELLOW 02/11/2021    PHUR 5.5 02/11/2021    WBCUA 0 TO 2 02/11/2021    RBCUA None 02/11/2021    MUCUS NOT REPORTED 02/11/2021    TRICHOMONAS NOT REPORTED 02/11/2021    YEAST NOT REPORTED 02/11/2021    BACTERIA TRACE 02/11/2021    SPECGRAV 1.020 02/11/2021    LEUKOCYTESUR NEGATIVE 02/11/2021    UROBILINOGEN Normal 02/11/2021    BILIRUBINUR NEGATIVE 02/11/2021    GLUCOSEU 3+ 02/11/2021    KETUA NEGATIVE 02/11/2021    AMORPHOUS NOT REPORTED 02/11/2021     Urine Sodium:   No results found for: SOHAM  Urine Potassium:  No results found for: KUR  Urine Chloride:  No results found for: CLUR  Urine Osmolarity: No results found for: OSMOU  Urine

## 2021-11-23 NOTE — PROGRESS NOTES
Writer called  due to heart rate remaining in the 130's. Rakan Robert stated he will put orders in. Rakan Robert also stated he is ok with the patients heart rate being in the 130's. Will continue to monitor.

## 2021-11-23 NOTE — PROGRESS NOTES
Physical Therapy  Facility/Department: [facility] AT THE [facility] MED SURG  Daily Treatment Note  NAME: Jessica Inman  : 10/31/1930  MRN: [id number]    Date of Service: 2021    Discharge Recommendations:  Continue to assess pending progress, Patient would benefit from continued therapy after discharge        Assessment   Treatment Diagnosis: Difficutly walking  Prognosis: Good  Decision Making: Medium Complexity  PT Education: General Safety; Gait Training  REQUIRES PT FOLLOW UP: Yes  Activity Tolerance  Activity Tolerance: Patient Tolerated treatment well; Patient limited by endurance     Patient Diagnosis(es): The primary encounter diagnosis was Pneumonia due to COVID-19 virus. A diagnosis of Elevated troponin was also pertinent to this visit. has a past medical history of Acute and chronic respiratory failure with hypoxia ([address] CAD (coronary artery disease), Cataracts, bilateral, Glaucoma, Hyperlipidemia, Hypertension, TIA (transient ischemic attack), and Type II or unspecified type diabetes mellitus without mention of complication, not stated as uncontrolled. has a past surgical history that includes Coronary artery bypass graft (); Cataract removal with implant (Bilateral,  and 12/3/2013); and Inguinal hernia repair (Bilateral, 12). Restrictions  Restrictions/Precautions  Restrictions/Precautions: General Precautions, Fall Risk, Isolation  Subjective   General  Chart Reviewed: Yes  Response To Previous Treatment: Patient with no complaints from previous session. Family / Caregiver Present: No  Referring Practitioner: FRANKLIN Weldon CNP  Subjective  Subjective: Pt states he is feeling ok today, tired but willing to particpate in therapy. Pt denies pain, pt states his breathing is good when he is resting, but get worse as he moves. Pt on 2L of O2.           Orientation  Orientation  Overall Orientation Status: Within Functional Limits  Cognition      Objective   Bed mobility  Supine to Sit: Minimal assistance  Sit to Supine: Minimal assistance  Comment: Pt required assistance to get B LE into and out of bed. Transfers  Sit to Stand: Contact guard assistance; Minimal Assistance  Stand to sit: Contact guard assistance; Minimal Assistance  Ambulation  Ambulation?: Yes  WB Status: unrestricted  Ambulation 1  Surface: level tile  Device: Rolling Walker  Other Apparatus: O2  Assistance: Contact guard assistance  Quality of Gait: Slow abiel, uneven step height/length, forward flexed posture, no LOB. Distance: 20 ft x2  Comments: Pt required VCs for AD proximity and assistance for O2 management. Balance  Posture: Fair  Sitting - Static: Good  Sitting - Dynamic: Good  Standing - Static: Fair  Standing - Dynamic: Fair; -  Exercises  Straight Leg Raise: 15x  Quad Sets: 15x  Heelslides: 15x  Hip Abduction: 15x  Knee Short Arc Quad: 15x  Ankle Pumps: 20x  Comments: BLE ther ex completed in supine. Pt required frequent RBs d/t fatigue. SPO2 90-95% throughout tx. Goals  Short term goals  Time Frame for Short term goals: 20 days  Short term goal 1: Pt will be initiated on strong ex program to address deficits in endurance and balance. Short term goal 2: Pt will be independent with bed mobility without use of handrails for discharge home and negotiation of bed. Short term goal 3: Pt will transfer independently with least restrictive device and good balance to reduce fall risk  Short term goal 4: Pt will be able to ambulate up to 350 feet with supervision to independent with least restrictive device to allow discharge home with his wife.   Short term goal 5: Pt will be able to negoitiate 4 steps with chani handrails and SBA +1  Patient Goals   Patient goals : go home and hopes for Thursday    Plan    Plan  Times per week: 7  Times per day: Twice a day  Plan weeks: 2x/daily ecept weekends 1x/daily  Current Treatment Recommendations: Strengthening, Neuromuscular Re-education, Home Exercise Program, ROM, Safety Education & Training, Balance Training, Endurance Training, Patient/Caregiver Education & Training, Functional Mobility Training, Transfer Training, Gait Training, Stair training  Plan Comment: Initiate POC  Safety Devices  Type of devices: Call light within reach, All fall risk precautions in place, Nurse notified, Gait belt, Patient at risk for falls, Left in bed     Therapy Time   Individual Concurrent Group Co-treatment   Time In 0947         Time Out 1022         Minutes [address]

## 2021-11-23 NOTE — PROGRESS NOTES
Pt laying in bed awake. Vitals and assessment as charted. Pt requested Tylenol due to his temp being 99.9. Writer gave pt 650mg of Tylenol PO. Will continue to monitor. Call light within reach. Bed alarm on.

## 2021-11-23 NOTE — PROGRESS NOTES
Pt SpO2 drops to 82% on monitor. Upon entering room, pt is observed to have NC off. This RN puts pt back on O2. Pt states \"well I was breathing pretty good so I took it off\". Pt educated on for need to keep O2 on, verbalizes understanding. Call light within reach, bed alarm on for safety. Denies further needs.

## 2021-11-23 NOTE — PROGRESS NOTES
Writer to bedside to complete morning assessment. Upon entry to room, pt in bed awake, respirations even and unlabored while on 2 liters of oxygen per nasal cannula, SpO2 98% pt placed on room air as he has been sating in the mid to upper 90's while awake on room air. Vitals obtained and assessment completed, see flow sheet for details. Pt denies needs from writer at this time. Call light in reach. Will continue to monitor.

## 2021-11-23 NOTE — PROGRESS NOTES
Physical Therapy  Facility/Department: [facility] AT THE [facility] MED SURG  Daily Treatment Note  NAME: Clinton Abrams  : 10/31/1930  MRN: [id number]    Date of Service: 2021    Discharge Recommendations:  Continue to assess pending progress, Patient would benefit from continued therapy after discharge        Assessment   Treatment Diagnosis: Difficutly walking  Prognosis: Good  Decision Making: Medium Complexity  PT Education: General Safety; Gait Training; Transfer Training  REQUIRES PT FOLLOW UP: Yes  Activity Tolerance  Activity Tolerance: Patient Tolerated treatment well; Patient limited by endurance     Patient Diagnosis(es): The primary encounter diagnosis was Pneumonia due to COVID-19 virus. A diagnosis of Elevated troponin was also pertinent to this visit. has a past medical history of Acute and chronic respiratory failure with hypoxia ([address] CAD (coronary artery disease), Cataracts, bilateral, Glaucoma, Hyperlipidemia, Hypertension, TIA (transient ischemic attack), and Type II or unspecified type diabetes mellitus without mention of complication, not stated as uncontrolled. has a past surgical history that includes Coronary artery bypass graft (); Cataract removal with implant (Bilateral,  and 12/3/2013); and Inguinal hernia repair (Bilateral, 12). Restrictions  Restrictions/Precautions  Restrictions/Precautions: General Precautions, Fall Risk, Isolation  Subjective   General  Chart Reviewed: Yes  Response To Previous Treatment: Patient with no complaints from previous session. Family / Caregiver Present: No  Referring Practitioner: FRANKLIN Cuadra CNP  Subjective  Subjective: Pt reports feeling better this afternoon, wants to get up and walk.           Orientation  Orientation  Overall Orientation Status: Within Functional Limits  Cognition      Objective   Bed mobility  Supine to Sit: Minimal assistance  Sit to Supine: Minimal assistance  Comment: Pt required assistance to get B LE into and out of bed. Transfers  Sit to Stand: Contact guard assistance; Minimal Assistance  Stand to sit: Contact guard assistance; Minimal Assistance  Ambulation  Ambulation?: Yes  WB Status: unrestricted  Ambulation 1  Surface: level tile  Device: Rolling Walker  Other Apparatus: O2  Assistance: Contact guard assistance  Quality of Gait: Slow abiel, uneven step height/length, forward flexed posture, no LOB. Distance: 20 ft x2  Comments: Pt required VCs for AD proximity and assistance for O2 management. Balance  Posture: Fair  Sitting - Static: Good  Sitting - Dynamic: Good  Standing - Static: Fair  Standing - Dynamic: Fair; -  Exercises  Straight Leg Raise: 15x  Quad Sets: 15x  Heelslides: 15x  Gluteal Sets: x15  Hip Flexion: x20  Hip Abduction: 15x  Knee Long Arc Quad: x20  Knee Short Arc Quad: 15x  Ankle Pumps: 20x  Comments: BLE ther ex completed in seated. Pt required frequent RBs d/t fatigue. Goals  Short term goals  Time Frame for Short term goals: 20 days  Short term goal 1: Pt will be initiated on strong ex program to address deficits in endurance and balance. Short term goal 2: Pt will be independent with bed mobility without use of handrails for discharge home and negotiation of bed. Short term goal 3: Pt will transfer independently with least restrictive device and good balance to reduce fall risk  Short term goal 4: Pt will be able to ambulate up to 350 feet with supervision to independent with least restrictive device to allow discharge home with his wife.   Short term goal 5: Pt will be able to negoitiate 4 steps with chani handrails and SBA +1  Patient Goals   Patient goals : go home and hopes for Thursday    Plan    Plan  Times per week: 7  Times per day: Twice a day  Plan weeks: 2x/daily ecept weekends 1x/daily  Current Treatment Recommendations: Strengthening, Neuromuscular Re-education, Home Exercise Program, ROM, Safety Education & Training, Balance Training, Endurance Training, Patient/Caregiver Education & Training, Functional Mobility Training, Transfer Training, Gait Training, Stair training  Plan Comment: Initiate POC  Safety Devices  Type of devices:  All fall risk precautions in place, Left in chair, Call light within reach, Chair alarm in place, Nurse notified, Gait belt, Patient at risk for falls     Therapy Time   Individual Concurrent Group Co-treatment   Time In 1332         Time Out 1407         Minutes [address]

## 2021-11-24 LAB
ABSOLUTE EOS #: 0.28 K/UL (ref 0–0.44)
ABSOLUTE IMMATURE GRANULOCYTE: 0.42 K/UL (ref 0–0.3)
ABSOLUTE LYMPH #: 1.05 K/UL (ref 1.1–3.7)
ABSOLUTE MONO #: 0.75 K/UL (ref 0.1–1.2)
ALBUMIN SERPL-MCNC: 2.1 G/DL (ref 3.5–5.2)
ALBUMIN/GLOBULIN RATIO: 0.7 (ref 1–2.5)
ALP BLD-CCNC: 121 U/L (ref 40–129)
ALT SERPL-CCNC: 40 U/L (ref 5–41)
ANION GAP SERPL CALCULATED.3IONS-SCNC: 13 MMOL/L (ref 9–17)
AST SERPL-CCNC: 45 U/L
BASOPHILS # BLD: 0 % (ref 0–2)
BASOPHILS ABSOLUTE: <0.03 K/UL (ref 0–0.2)
BILIRUB SERPL-MCNC: 0.37 MG/DL (ref 0.3–1.2)
BUN BLDV-MCNC: 59 MG/DL (ref 8–23)
BUN/CREAT BLD: 25 (ref 9–20)
CALCIUM SERPL-MCNC: 7.6 MG/DL (ref 8.6–10.4)
CHLORIDE BLD-SCNC: 102 MMOL/L (ref 98–107)
CO2: 19 MMOL/L (ref 20–31)
CREAT SERPL-MCNC: 2.37 MG/DL (ref 0.7–1.2)
DIFFERENTIAL TYPE: ABNORMAL
EOSINOPHILS RELATIVE PERCENT: 2 % (ref 1–4)
GFR AFRICAN AMERICAN: 31 ML/MIN
GFR NON-AFRICAN AMERICAN: 26 ML/MIN
GFR SERPL CREATININE-BSD FRML MDRD: ABNORMAL ML/MIN/{1.73_M2}
GFR SERPL CREATININE-BSD FRML MDRD: ABNORMAL ML/MIN/{1.73_M2}
GLUCOSE BLD-MCNC: 149 MG/DL (ref 74–100)
GLUCOSE BLD-MCNC: 306 MG/DL (ref 74–100)
GLUCOSE BLD-MCNC: 62 MG/DL (ref 74–100)
GLUCOSE BLD-MCNC: 74 MG/DL (ref 70–99)
GLUCOSE BLD-MCNC: 91 MG/DL (ref 74–100)
HCT VFR BLD CALC: 27.6 % (ref 40.7–50.3)
HEMOGLOBIN: 9 G/DL (ref 13–17)
IMMATURE GRANULOCYTES: 3 %
LYMPHOCYTES # BLD: 7 % (ref 24–43)
MCH RBC QN AUTO: 31.1 PG (ref 25.2–33.5)
MCHC RBC AUTO-ENTMCNC: 32.6 G/DL (ref 28.4–34.8)
MCV RBC AUTO: 95.5 FL (ref 82.6–102.9)
MONOCYTES # BLD: 5 % (ref 3–12)
NRBC AUTOMATED: 0 PER 100 WBC
PDW BLD-RTO: 12.5 % (ref 11.8–14.4)
PLATELET # BLD: 247 K/UL (ref 138–453)
PLATELET ESTIMATE: ABNORMAL
PMV BLD AUTO: 10.7 FL (ref 8.1–13.5)
POTASSIUM SERPL-SCNC: 4.7 MMOL/L (ref 3.7–5.3)
RBC # BLD: 2.89 M/UL (ref 4.21–5.77)
RBC # BLD: ABNORMAL 10*6/UL
SEG NEUTROPHILS: 83 % (ref 36–65)
SEGMENTED NEUTROPHILS ABSOLUTE COUNT: 12.61 K/UL (ref 1.5–8.1)
SODIUM BLD-SCNC: 134 MMOL/L (ref 135–144)
TOTAL PROTEIN: 5.1 G/DL (ref 6.4–8.3)
WBC # BLD: 15.1 K/UL (ref 3.5–11.3)
WBC # BLD: ABNORMAL 10*3/UL

## 2021-11-24 PROCEDURE — 6370000000 HC RX 637 (ALT 250 FOR IP): Performed by: NURSE PRACTITIONER

## 2021-11-24 PROCEDURE — 82947 ASSAY GLUCOSE BLOOD QUANT: CPT

## 2021-11-24 PROCEDURE — 2580000003 HC RX 258: Performed by: NURSE PRACTITIONER

## 2021-11-24 PROCEDURE — 97110 THERAPEUTIC EXERCISES: CPT

## 2021-11-24 PROCEDURE — 99232 SBSQ HOSP IP/OBS MODERATE 35: CPT | Performed by: INTERNAL MEDICINE

## 2021-11-24 PROCEDURE — 85025 COMPLETE CBC W/AUTO DIFF WBC: CPT

## 2021-11-24 PROCEDURE — 94761 N-INVAS EAR/PLS OXIMETRY MLT: CPT

## 2021-11-24 PROCEDURE — 6370000000 HC RX 637 (ALT 250 FOR IP): Performed by: INTERNAL MEDICINE

## 2021-11-24 PROCEDURE — 80053 COMPREHEN METABOLIC PANEL: CPT

## 2021-11-24 PROCEDURE — 94640 AIRWAY INHALATION TREATMENT: CPT

## 2021-11-24 PROCEDURE — 2700000000 HC OXYGEN THERAPY PER DAY

## 2021-11-24 PROCEDURE — 97116 GAIT TRAINING THERAPY: CPT

## 2021-11-24 PROCEDURE — 1200000000 HC SEMI PRIVATE

## 2021-11-24 PROCEDURE — 94669 MECHANICAL CHEST WALL OSCILL: CPT

## 2021-11-24 PROCEDURE — APPSS30 APP SPLIT SHARED TIME 16-30 MINUTES: Performed by: NURSE PRACTITIONER

## 2021-11-24 PROCEDURE — 36415 COLL VENOUS BLD VENIPUNCTURE: CPT

## 2021-11-24 RX ADMIN — SODIUM BICARBONATE TAB 650 MG 650 MG: 650 TAB at 09:07

## 2021-11-24 RX ADMIN — SODIUM CHLORIDE, PRESERVATIVE FREE 10 ML: 5 INJECTION INTRAVENOUS at 09:08

## 2021-11-24 RX ADMIN — AMIODARONE HYDROCHLORIDE 200 MG: 200 TABLET ORAL at 09:07

## 2021-11-24 RX ADMIN — OXYCODONE HYDROCHLORIDE AND ACETAMINOPHEN 2000 MG: 500 TABLET ORAL at 09:07

## 2021-11-24 RX ADMIN — IPRATROPIUM BROMIDE AND ALBUTEROL SULFATE 1 AMPULE: 2.5; .5 SOLUTION RESPIRATORY (INHALATION) at 10:06

## 2021-11-24 RX ADMIN — ATORVASTATIN CALCIUM 40 MG: 40 TABLET, FILM COATED ORAL at 20:49

## 2021-11-24 RX ADMIN — GLIPIZIDE 5 MG: 5 TABLET ORAL at 07:25

## 2021-11-24 RX ADMIN — AMIODARONE HYDROCHLORIDE 200 MG: 200 TABLET ORAL at 20:49

## 2021-11-24 RX ADMIN — IPRATROPIUM BROMIDE AND ALBUTEROL SULFATE 1 AMPULE: 2.5; .5 SOLUTION RESPIRATORY (INHALATION) at 15:58

## 2021-11-24 RX ADMIN — FAMOTIDINE 10 MG: 10 TABLET ORAL at 20:49

## 2021-11-24 RX ADMIN — OXYCODONE HYDROCHLORIDE AND ACETAMINOPHEN 2000 MG: 500 TABLET ORAL at 20:49

## 2021-11-24 RX ADMIN — IPRATROPIUM BROMIDE AND ALBUTEROL SULFATE 1 AMPULE: 2.5; .5 SOLUTION RESPIRATORY (INHALATION) at 05:45

## 2021-11-24 RX ADMIN — APIXABAN 2.5 MG: 2.5 TABLET, FILM COATED ORAL at 20:49

## 2021-11-24 RX ADMIN — SODIUM BICARBONATE TAB 650 MG 650 MG: 650 TAB at 20:49

## 2021-11-24 RX ADMIN — INSULIN GLARGINE 15 UNITS: 100 INJECTION, SOLUTION SUBCUTANEOUS at 20:49

## 2021-11-24 RX ADMIN — SODIUM CHLORIDE, PRESERVATIVE FREE 10 ML: 5 INJECTION INTRAVENOUS at 21:00

## 2021-11-24 RX ADMIN — METOPROLOL SUCCINATE 50 MG: 50 TABLET, EXTENDED RELEASE ORAL at 09:06

## 2021-11-24 RX ADMIN — APIXABAN 2.5 MG: 2.5 TABLET, FILM COATED ORAL at 09:07

## 2021-11-24 RX ADMIN — TAMSULOSIN HYDROCHLORIDE 0.4 MG: 0.4 CAPSULE ORAL at 09:06

## 2021-11-24 RX ADMIN — IPRATROPIUM BROMIDE AND ALBUTEROL SULFATE 1 AMPULE: 2.5; .5 SOLUTION RESPIRATORY (INHALATION) at 19:56

## 2021-11-24 RX ADMIN — GLIPIZIDE 5 MG: 5 TABLET ORAL at 16:44

## 2021-11-24 RX ADMIN — ZINC SULFATE 220 MG (50 MG) CAPSULE 100 MG: CAPSULE at 09:07

## 2021-11-24 RX ADMIN — CLOPIDOGREL BISULFATE 75 MG: 75 TABLET ORAL at 09:07

## 2021-11-24 ASSESSMENT — PAIN SCALES - GENERAL
PAINLEVEL_OUTOF10: 0

## 2021-11-24 NOTE — PROGRESS NOTES
Patient walked 1 assist with a walker to the bathroom but was only able to pass gas at this time. He then walked around the room before getting into the chair. Patient's legs are elevated and he has been repositioned for comfort. Zinc paste was applied to his coccyx. Patient denies further needs. Respiratory is in the room with him at this time.

## 2021-11-24 NOTE — PROGRESS NOTES
Patient is awake, alert and oriented. Patient is not complaining shortness of breath at this moment. Patient is on 1 lpm oxygen. Vital signs taken and assessment done.

## 2021-11-24 NOTE — PROGRESS NOTES
Comprehensive Nutrition Assessment    Type and Reason for Visit:  Reassess    Nutrition Recommendations/Plan:  Encourage oral intakes    Nutrition Assessment:  Improved nutrient intakes r/t altered respiratory status, AEB PO >75% per I/O. Varied glycemia, with hypoglycemia noted this AM and highs of 341 mg/dl yesterday. Renal indices are improving slowly. No record of whether patient is consuming nutrition supplement or not. He does not answer his phone, upon call to room this AM. Remains on suppelmental D, C and zinc.    Malnutrition Assessment:  Malnutrition Status:  Mild malnutrition    Context:  Acute Illness     Findings of the 6 clinical characteristics of malnutrition:  Energy Intake:  1 - 75% or less of estimated energy requirements for 7 or more days  Weight Loss:  No significant weight loss     Body Fat Loss:  Unable to assess     Muscle Mass Loss:  Unable to assess    Fluid Accumulation:  No significant fluid accumulation     Strength:  Not Performed    Estimated Daily Nutrient Needs:  Energy (kcal):  5391-5538 (20-25); Weight Used for Energy Requirements:  Current     Protein (g):  73-85 (1.3-1.5); Weight Used for Protein Requirements:  Ideal        Fluid (ml/day):  1700; Method Used for Fluid Requirements:  1 ml/kcal      Nutrition Related Findings:  COLLINS in isolation      Wounds:  None       Current Nutrition Therapies:    ADULT ORAL NUTRITION SUPPLEMENT; Breakfast, Lunch, Dinner; Diabetic Oral Supplement  ADULT DIET; Regular; 3 carb choices (45 gm/meal); Low Potassium (Less than 3000 mg/day)    Anthropometric Measures:  · Height: 5' 3\" (160 cm)  · Current Body Weight: 165 lb (74.8 kg)   · Admission Body Weight: 145 lb (65.8 kg)    · Usual Body Weight: 158 lb (71.7 kg) (2019)     · Ideal Body Weight: 124 lbs; % Ideal Body Weight 116.9 %   · BMI: 29.2  · Adjusted Body Weight:  ; No Adjustment   · BMI Categories: Overweight (BMI 25.0-29. 9)       Nutrition Diagnosis:   · Predicted inadequate energy intake related to impaired respiratory function as evidenced by other (comment) (covid)    Recent Labs     11/22/21  0526 11/22/21  0526 11/23/21  0540 11/24/21  0550   *  --  129* 134*   K 4.8  --  5.0 4.7   CL 98  --  97* 102   CO2 21  --  21 19*   BUN 60*  --  62* 59*   CREATININE 2.59*  --  2.56* 2.37*   GLUCOSE 166*  --  179* 74   ALT 30  --  37 40   ALKPHOS 160*  --  147* 121   GFR              < >                          < > = values in this interval not displayed. Lab Results   Component Value Date    LABALBU 2.1 11/24/2021    LABALBU 4.2 05/17/2012      Lab Results   Component Value Date    PHOS 3.2 02/11/2021      Lab Results   Component Value Date    MG 2.0 02/11/2021    No results found for: PREALBUMIN    Nutrition Interventions:   Food and/or Nutrient Delivery:  Continue Oral Nutrition Supplement, Continue Current Diet  Nutrition Education/Counseling:  No recommendation at this time   Coordination of Nutrition Care:  Continue to monitor while inpatient    Goals:  PO >75% meals and supplements       Nutrition Monitoring and Evaluation:   Behavioral-Environmental Outcomes:  None Identified   Food/Nutrient Intake Outcomes:  Food and Nutrient Intake, Supplement Intake  Physical Signs/Symptoms Outcomes:  Fluid Status or Edema, Biochemical Data, Weight     Discharge Planning:     Too soon to determine     Electronically signed by Brittanie Melissa RD, LD on 11/24/21 at 8:43 AM EST    Contact: [phone]

## 2021-11-24 NOTE — PROGRESS NOTES
Renal Progress Note  [facility]      TELEHEALTH EVALUATION -- Audio/Visual (During TKSFT-46 public health emergency)     Telehealth service was provided with the patient at his room in [facility] and myself the physician in my office in [place], [place] and the patient's Maddie Zavala , who has initiated the visit. Pursuant to the emergency declaration under the [facility] [place] [address] [facility] authority and the [employer] Act, this Virtual  Visit was conducted, with patient's consent, to reduce the patient's risk of exposure to COVID-19 and provide continuity of care for an established patient. Services were provided through a video synchronous discussion virtually to substitute for in-person clinic visit. Patient :  Geovani Huynh; 80 y.o. MRN# [id number]  Location:  8235/7373-88  Attending:  Cecil Page MD  Admit Date:  11/15/2021   Hospital Day: 9      Subjective:     Nephrology is following the patient for JANINA/CKD. Patient seen. He is off oxygen, on room air. Robertson was removed. Has voided since then. Appetite is poor.     Outpatient Medications:     Medications Prior to Admission: furosemide (LASIX) 40 MG tablet, Take 40 mg by mouth daily  glipiZIDE (GLUCOTROL) 5 MG tablet, Take 5 mg by mouth 2 times daily (before meals)  metFORMIN (GLUCOPHAGE) 500 MG tablet, Take 1,000 mg by mouth 2 times daily (with meals)   atorvastatin (LIPITOR) 40 MG tablet, Take 1 tablet by mouth nightly  clopidogrel (PLAVIX) 75 MG tablet, Take 1 tablet by mouth daily  ramipril (ALTACE) 2.5 MG capsule, Take 2.5 mg by mouth every morning   aspirin 81 MG EC tablet, Take 81 mg by mouth nightly     Current Medications:     Scheduled Meds:    insulin glargine  15 Units SubCUTAneous BID    metoprolol succinate  50 mg Oral Daily    dilTIAZem  60 mg Oral 4 times per day    insulin lispro  0-12 Units SubCUTAneous TID WC    insulin lispro  0-6 Units SubCUTAneous Nightly    ipratropium-albuterol  1 ampule Inhalation 4x daily    sodium bicarbonate  650 mg Oral BID    famotidine  10 mg Oral Nightly    tamsulosin  0.4 mg Oral Daily    atorvastatin  40 mg Oral Nightly    clopidogrel  75 mg Oral Daily    glipiZIDE  5 mg Oral BID AC    sodium chloride flush  5-40 mL IntraVENous 2 times per day    vitamin D  20,000 Units Oral Weekly    ascorbic acid  2,000 mg Oral BID    zinc sulfate  100 mg Oral Daily    apixaban  2.5 mg Oral BID    amiodarone  200 mg Oral BID     Continuous Infusions:    sodium chloride      dextrose       PRN Meds:  albuterol, sodium chloride flush, sodium chloride, ondansetron **OR** ondansetron, polyethylene glycol, acetaminophen **OR** acetaminophen, glucose, dextrose, glucagon (rDNA), dextrose    Input/Output:       I/O last 3 completed shifts: In: 602.4 [I.V.:602.4]  Out: 1125 [Urine:1125]. Patient Vitals for the past 96 hrs (Last 3 readings):   Weight   21 0545 165 lb (74.8 kg)   21 0527 164 lb 3.2 oz (74.5 kg)   21 0530 161 lb 9.6 oz (73.3 kg)       Vital Signs:   Temperature:  Temp: 96 °F (35.6 °C)  TMax:   Temp (24hrs), Av.9 °F (36.1 °C), Min:96 °F (35.6 °C), Max:97.3 °F (36.3 °C)    Respirations:  Resp: 16  Pulse:   Pulse: 87  BP:    BP: 130/69  BP Range: Systolic (75EFX), VDR:442 , Min:115 , SABI:850       Diastolic (58MRF), XUW:86, Min:61, Max:69      Physical Examination:     General -- no distress  Oral Mucosa -- moist  Neck --  JVD - no  Extremities -- trace edema, moves all extremeties  CNS - awake and alert   Pschy - not agitated, mood and memory normal    Due to this being a TeleHealth encounter, evaluation of the following organ systems is limited: Vitals/EENT/Resp/CV/GI//MS/Neuro/Skin/Heme-Lymph-Imm.     Labs:       Recent Labs     21  0526 21  0540 21  0550   WBC 15.5* 15.5* 15.1*   RBC 2.91* 3.18* 2.89*   HGB 9.3* 9.8* 9.0*   HCT 28.1* 31.7* 27.6* MCV 96.6 99.7 95.5   MCH 32.0 30.8 31.1   MCHC 33.1 30.9 32.6   RDW 12.8 12.5 12.5    227 247   MPV 11.0 10.7 10.7      BMP:   Recent Labs     11/22/21  0526 11/23/21  0540 11/24/21  0550   * 129* 134*   K 4.8 5.0 4.7   CL 98 97* 102   CO2 21 21 19*   BUN 60* 62* 59*   CREATININE 2.59* 2.56* 2.37*   GLUCOSE 166* 179* 74   CALCIUM 7.9* 8.0* 7.6*      Phosphorus:   No results for input(s): PHOS in the last 72 hours. Magnesium:  No results for input(s): MG in the last 72 hours.   Albumin:    Recent Labs     11/22/21  0526 11/23/21  0540 11/24/21  0550   LABALBU 2.1* 2.1* 2.1*     BNP:    No results found for: BNP  HÉCTOR:    No results found for: HÉCTOR  SPEP:  Lab Results   Component Value Date    PROT 5.1 11/24/2021     UPEP:   No results found for: LABPE  C3:   No results found for: C3  C4:   No results found for: C4  MPO ANCA:   No results found for: MPO  PR3 ANCA:   No results found for: PR3  Anti-GBM:   No results found for: GBMABIGG  Hep BsAg:       No results found for: HEPBSAG  Hep C AB:        No results found for: HEPCAB    Urinalysis/Chemistries:      Lab Results   Component Value Date    NITRU NEGATIVE 02/11/2021    COLORU YELLOW 02/11/2021    PHUR 5.5 02/11/2021    WBCUA 0 TO 2 02/11/2021    RBCUA None 02/11/2021    MUCUS NOT REPORTED 02/11/2021    TRICHOMONAS NOT REPORTED 02/11/2021    YEAST NOT REPORTED 02/11/2021    BACTERIA TRACE 02/11/2021    SPECGRAV 1.020 02/11/2021    LEUKOCYTESUR NEGATIVE 02/11/2021    UROBILINOGEN Normal 02/11/2021    BILIRUBINUR NEGATIVE 02/11/2021    GLUCOSEU 3+ 02/11/2021    KETUA NEGATIVE 02/11/2021    AMORPHOUS NOT REPORTED 02/11/2021     Urine Sodium:   No results found for: SOHAM  Urine Potassium:  No results found for: KUR  Urine Chloride:  No results found for: CLUR  Urine Osmolarity: No results found for: OSMOU  Urine Protein:   No components found for: TOTALPROTEIN, URINE   Urine Creatinine:     Lab Results   Component Value Date    LABCREA 51.4 02/11/2021 Urine Eosinophils:  No components found for: UEOS        Impression and Plan:  1. JANINA on CKD IV: multifactorial. Overall improved from admission and stable  2. Metabolic acidosis from JANINA/CKD: continue with po bicarb  3. Hyponatremia from decreased water excretion from JANINA/CKD:improved. 4. Urinary retention: del rio removed today, has been voiding ok  5. Diastolic CHF. Diuretics as needed. 6. A flutter  7. COVID-19 +   8. DM  9. Anemia          Please don't hesitate to call with any questions.   Electronically signed by Constantino Maki DO on 11/24/2021 at 3:00 PM

## 2021-11-24 NOTE — PROGRESS NOTES
Pt. Is alert and oriented at time of assessment. Pt. Denies any pain. Pt. Vitals are assessed, vitals are WDL. Pt. Positioned for comfort in bed, call light in reach, will continue to monitor pt.

## 2021-11-24 NOTE — PROGRESS NOTES
Physical Therapy  Facility/Department: [facility] AT THE [facility] MED SURG  Daily Treatment Note  NAME: Percy Parker  : 10/31/1930  MRN: [id number]    Date of Service: 2021    Discharge Recommendations:  Continue to assess pending progress, Patient would benefit from continued therapy after discharge        Assessment   Treatment Diagnosis: Difficutly walking  Prognosis: Good  Decision Making: Medium Complexity  PT Education: General Safety; Gait Training; Transfer Training; Goals  Patient Education: Educated pt on the above with fair understanding noted. REQUIRES PT FOLLOW UP: Yes  Activity Tolerance  Activity Tolerance: Patient Tolerated treatment well; Patient limited by endurance     Patient Diagnosis(es): The primary encounter diagnosis was Pneumonia due to COVID-19 virus. A diagnosis of Elevated troponin was also pertinent to this visit. has a past medical history of Acute and chronic respiratory failure with hypoxia ([address] CAD (coronary artery disease), Cataracts, bilateral, Glaucoma, Hyperlipidemia, Hypertension, TIA (transient ischemic attack), and Type II or unspecified type diabetes mellitus without mention of complication, not stated as uncontrolled. has a past surgical history that includes Coronary artery bypass graft (); Cataract removal with implant (Bilateral,  and 12/3/2013); and Inguinal hernia repair (Bilateral, 12). Restrictions  Restrictions/Precautions  Restrictions/Precautions: General Precautions, Fall Risk  Subjective   General  Chart Reviewed: Yes  Response To Previous Treatment: Patient with no complaints from previous session. Family / Caregiver Present: No  Referring Practitioner: FRANKLIN Glasgow CNP  Subjective  Subjective: Pt. reports having pain only when coughing and denies any other pain at this time.   Pain Screening  Patient Currently in Pain: Denies  Vital Signs  Patient Currently in Pain: Denies       Orientation  Orientation  Overall Orientation Status: Within Functional Limits  Cognition      Objective      Transfers  Sit to Stand: Contact guard assistance; Minimal Assistance  Stand to sit: Contact guard assistance; Minimal Assistance  Ambulation  Ambulation?: Yes  WB Status: unrestricted  Ambulation 1  Surface: level tile  Device: Rolling Walker  Other Apparatus: O2  Assistance: Contact guard assistance  Quality of Gait: Slow abiel, uneven step height/length, forward flexed posture, no LOB. Gait Deviations: Slow Abiel; Shuffles  Distance: 25ftx1, 40ftx1  Comments: v/c to stay closer to walker with ambulation with fair carry-over  Stairs/Curb  Stairs?: No        Exercises  Comments: Reclined B LE exercises x20 reps. Comment: Transfer to commode from chair CGA/ Min A    Goals  Short term goals  Time Frame for Short term goals: 20 days  Short term goal 1: Pt will be initiated on strong ex program to address deficits in endurance and balance. Short term goal 2: Pt will be independent with bed mobility without use of handrails for discharge home and negotiation of bed. Short term goal 3: Pt will transfer independently with least restrictive device and good balance to reduce fall risk  Short term goal 4: Pt will be able to ambulate up to 350 feet with supervision to independent with least restrictive device to allow discharge home with his wife.   Short term goal 5: Pt will be able to negoitiate 4 steps with chani handrails and SBA +1  Patient Goals   Patient goals : go home and hopes for Thursday    Plan    Plan  Times per week: 7  Times per day: Twice a day  Plan weeks: 2x/daily ecept weekends 1x/daily  Current Treatment Recommendations: Strengthening, Neuromuscular Re-education, Home Exercise Program, ROM, Safety Education & Training, Balance Training, Endurance Training, Patient/Caregiver Education & Training, Functional Mobility Training, Transfer Training, Gait Training, Stair training  Plan Comment: Initiate POC  Safety Devices  Type of devices:  All fall risk precautions in place, Left in chair, Call light within reach, Chair alarm in place, Nurse notified, Gait belt, Patient at risk for falls     Therapy Time   Individual Concurrent Group Co-treatment   Time In 1440         Time Out 1505         Minutes [address] [place] [address]

## 2021-11-24 NOTE — PROGRESS NOTES
Occupational Therapy  Facility/Department: [facility] AT THE [facility] MED SURG  Daily Treatment Note  NAME: Maritza Brito  : 10/31/1930  MRN: [id number]    Date of Service: 2021    Discharge Recommendations:  Continue to assess pending progress       Assessment      OT Education: Energy Conservation  Patient Education: Pt educated on energy conservation handout as well as discharge folder this date. Barriers to Learning: [facility]  Activity Tolerance  Activity Tolerance: Patient limited by fatigue  Safety Devices  Safety Devices in place: Yes  Type of devices: All fall risk precautions in place; Left in chair; Call light within reach; Chair alarm in place         Patient Diagnosis(es): The primary encounter diagnosis was Pneumonia due to COVID-19 virus. A diagnosis of Elevated troponin was also pertinent to this visit. has a past medical history of Acute and chronic respiratory failure with hypoxia ([address] CAD (coronary artery disease), Cataracts, bilateral, Glaucoma, Hyperlipidemia, Hypertension, TIA (transient ischemic attack), and Type II or unspecified type diabetes mellitus without mention of complication, not stated as uncontrolled. has a past surgical history that includes Coronary artery bypass graft (); Cataract removal with implant (Bilateral,  and 12/3/2013); and Inguinal hernia repair (Bilateral, 12). Restrictions  Restrictions/Precautions  Restrictions/Precautions: General Precautions, Fall Risk  Subjective   General  Chart Reviewed: Yes  Patient assessed for rehabilitation services?: Yes  Response to previous treatment: Patient with no complaints from previous session  Family / Caregiver Present: No  Referring Practitioner: Ashia Xavier  Diagnosis: Covid-19  Subjective  Subjective: Pt denies pain at time of session. General Comment  Comments: Pt seated in bedside chair upon therapist arrival. Pt agreeable to completing ther ex tasks. Pt taken off of isolation precautions this date. Orientation     Objective                                                                Type of ROM/Therapeutic Exercise  Type of ROM/Therapeutic Exercise: Free weights  Comment: (B)UE ther ex, 1# dumbells x15 reps and in all variations for increased UE stg necessary in ADL tasks. Pt completed task with RBs between each variation secondary to increased fatigue. Exercises  Other: O2 stats monitored throughout each variation, results of %. Plan   Plan  Times per week: 7x  Times per day: Daily  Current Treatment Recommendations: Strengthening, ROM, Safety Education & Training, Patient/Caregiver Education & Training, Self-Care / ADL, Endurance Training, Functional Mobility Training  G-Code     OutComes Score                                                  AM-PAC Score             Goals  Short term goals  Time Frame for Short term goals: 21  Short term goal 1: Patient to complete self care routine c SUP to ensure safe return home. Short term goal 2: Patient to engage in 15 minutes of ther ex/ther act s significant shortness of breath to improve strength as well as activity tolerance for self care tasks. Short term goal 3: Patient to tolerate standing >7' while participating in functional task of choice to improve standing tolerane, balance and safety during ADL, mobility and transfers. Short term goal 4: Patient to be educated on d/c folder, AE/DME, and general safety to ensure safe return home.        Therapy Time   Individual Concurrent Group Co-treatment   Time In 1307         Time Out 1333         Minutes [address]

## 2021-11-24 NOTE — PROGRESS NOTES
[facility]   AP[facility] - Progress Note    Patient - Lois Spatz  Date of Admission -  11/15/2021  8:38 PM  Date of Evaluation -  2021  Hospital Day - 9      SUBJECTIVE:     The Lois Spatz is a 80 y.o. male who is seen for follow up for atrial fibrillation with RVR and COVID-19. Per nursing report and notes, overnight events include: no significant events. He resting in bed drowsy but alert oriented no acute distress. Appears fatigued. ROS:   Constitutional: negative  for fevers, and negative for chills. Respiratory: positive for shortness of breath, negative for cough, and negative for wheezing  Cardiovascular: negative for chest pain, and negative for palpitations  Gastrointestinal: negative for abdominal pain, negative for nausea,negative for vomiting, negative for diarrhea, and negative for constipation    All other systems were reviewed with the patient and are negative unless otherwise stated in HPI. OBJECTIVE:     VITAL SIGNS:  Patient Vitals for the past 8 hrs:   BP Temp Temp src Pulse Resp SpO2 Weight   21 0715 115/61 97.3 °F (36.3 °C) Temporal 83 18     21 0547      98 %    21 0545       165 lb (74.8 kg)   21 0020 132/64 97 °F (36.1 °C) Temporal 82 20 99 %          Temp: 97.3 °F (36.3 °C)  Temp range:    Temp  Av.4 °F (36.3 °C)  Min: 97 °F (36.1 °C)  Max: 97.9 °F (36.6 °C)    BP: 115/61  BP Range:      Systolic (49KZU), MOM:294 , Min:100 , OJX:921      Diastolic (56TFQ), SC, Min:57, Max:64    Pulse: 83  Pulse Range:    Pulse  Av.8  Min: 82  Max: 102    Resp: 18  Resp Range:   Resp  Av.3  Min: 18  Max: 20    SpO2: 98 % room air  SpO2 range:   SpO2  Av.6 %  Min: 96 %  Max: 100 %    Weight  Wt Readings from Last 3 Encounters:   21 165 lb (74.8 kg)   19 158 lb (71.7 kg)   18 166 lb 6.4 oz (75.5 kg)     Body mass index is 29.23 kg/m².     24HR INTAKE/OUTPUT:      Intake/Output Summary (Last 24 hours) at 2021 [address] filed at 11/24/2021 0542  Gross per 24 hour   Intake 602.39 ml   Output 1125 ml   Net -522.61 ml     Date 11/24/21 0000 - 11/24/21 2359   Shift 0859-0620 6022-5329 0699-0231 24 Hour Total   INTAKE   Shift Total(mL/kg)       OUTPUT   Urine(mL/kg/hr) 800   800   Shift Total(mL/kg) 800(10.7)   800(10.7)   Weight (kg) 74.8 74.8 74.8 74.8         PHYSICAL EXAM:  GEN:    Awake and following commands:     [] No   [x] Yes  MENTAL STATUS: alert and oriented x3. DISTRESS: Acute respiratory distress:       [x] No   [] Yes  EYES:  EOMI, pupils equal   NECK: Supple. No lymphadenopathy. No carotid bruit  CVS: Irregular rhythm, no audible murmur  PULM: Wheeze throughout no acute respiratory distress  ABD:    Bowels sounds normal.  Abdomen is soft. No distention. no tenderness to palpation. EXT:   no edema bilaterally . No calf tenderness. NEURO: Moves all extremities. Motor and sensory are grossly intact  SKIN:  No rashes. No skin lesions.           MEDICATIONS:  Scheduled Meds:   insulin glargine  15 Units SubCUTAneous BID    metoprolol succinate  50 mg Oral Daily    dilTIAZem  60 mg Oral 4 times per day    insulin lispro  0-12 Units SubCUTAneous TID WC    insulin lispro  0-6 Units SubCUTAneous Nightly    ipratropium-albuterol  1 ampule Inhalation 4x daily    sodium bicarbonate  650 mg Oral BID    famotidine  10 mg Oral Nightly    tamsulosin  0.4 mg Oral Daily    atorvastatin  40 mg Oral Nightly    clopidogrel  75 mg Oral Daily    glipiZIDE  5 mg Oral BID AC    sodium chloride flush  5-40 mL IntraVENous 2 times per day    vitamin D  20,000 Units Oral Weekly    ascorbic acid  2,000 mg Oral BID    zinc sulfate  100 mg Oral Daily    apixaban  2.5 mg Oral BID    amiodarone  200 mg Oral BID     Continuous Infusions:   sodium chloride      dextrose       PRN Meds:   albuterol, 2.5 mg, Q4H PRN  sodium chloride flush, 5-40 mL, PRN  sodium chloride, 25 mL, PRN  ondansetron, 4 mg, Q8H PRN Or  ondansetron, 4 mg, Q6H PRN  polyethylene glycol, 17 g, Daily PRN  acetaminophen, 650 mg, Q6H PRN   Or  acetaminophen, 650 mg, Q6H PRN  glucose, 15 g, PRN  dextrose, 12.5 g, PRN  glucagon (rDNA), 1 mg, PRN  dextrose, 100 mL/hr, PRN        DATA:  Complete Blood Count:   Recent Labs     11/22/21  0526 11/23/21  0540 11/24/21  0550   WBC 15.5* 15.5* 15.1*   RBC 2.91* 3.18* 2.89*   HGB 9.3* 9.8* 9.0*   HCT 28.1* 31.7* 27.6*   MCV 96.6 99.7 95.5   RDW 12.8 12.5 12.5    227 247   SEGS 79* 78* 83*   NEUTROABS 12.18* 12.06* 12.61*   LYMPHOPCT 10* 10* 7*   LYMPHSABS 1.51 1.51 1.05*   MONOPCT 7 7 5   EOSRELPCT 2 3 2   BASOPCT 0 0 0   IMMGRAN 2* 3* 3*        Recent Blood Glucose:   Recent Labs     11/22/21  0526 11/23/21  0540 11/24/21  0550   GLUCOSE 166* 179* 74        Comprehensive Metabolic Profile:   Recent Labs     11/22/21  0526 11/23/21  0540 11/24/21  0550   BUN 60* 62* 59*   CREATININE 2.59* 2.56* 2.37*   * 129* 134*   K 4.8 5.0 4.7   CL 98 97* 102   CALCIUM 7.9* 8.0* 7.6*   ANIONGAP 12 11 13   CO2 21 21 19*   PROT 5.3* 5.3* 5.1*   LABALBU 2.1* 2.1* 2.1*   BILITOT 0.40 0.40 0.37   ALKPHOS 160* 147* 121   AST 34 42* 45*   ALT 30 37 40        Urinalysis:   Lab Results   Component Value Date    NITRU NEGATIVE 02/11/2021    COLORU YELLOW 02/11/2021    PHUR 5.5 02/11/2021    WBCUA 0 TO 2 02/11/2021    RBCUA None 02/11/2021    MUCUS NOT REPORTED 02/11/2021    TRICHOMONAS NOT REPORTED 02/11/2021    YEAST NOT REPORTED 02/11/2021    BACTERIA TRACE 02/11/2021    SPECGRAV 1.020 02/11/2021    LEUKOCYTESUR NEGATIVE 02/11/2021    UROBILINOGEN Normal 02/11/2021    BILIRUBINUR NEGATIVE 02/11/2021    GLUCOSEU 3+ 02/11/2021    KETUA NEGATIVE 02/11/2021    AMORPHOUS NOT REPORTED 02/11/2021       HgBA1c:    Lab Results   Component Value Date    LABA1C 9.5 08/27/2021       TSH:    Lab Results   Component Value Date    TSH 1.12 11/17/2021       Lactic Acid:   Lab Results   Component Value Date    LACTA 2.2 11/15/2021

## 2021-11-24 NOTE — PROGRESS NOTES
Spoke with Patient's son, Luli Cabrera, via telephone conversation, re:  Patient discharge planning. Options presented for rehab as son states that Patient is aware that he cannot come straight home from the hospital and must be stronger. Son wishes for referral to be made to [facility] at this time. Reviewed ACP note with son Luli Cabrera who is also Patient's DPOAHC. Son wishes for Patient to remain a 'Full Code' at this time. Info shared with CNP,Lillie Garcia. [facility] Will await their review. Requested Advanced Directive paperwork from son and he says he will bring in for hospital to copy.     Deoda. [address]  11/24/2021

## 2021-11-24 NOTE — PROGRESS NOTES
Physical Therapy  Facility/Department: [facility] AT THE [facility] MED SURG  Daily Treatment Note  NAME: Irineo Zamarripa  : 10/31/1930  MRN: [id number]    Date of Service: 2021    Discharge Recommendations:  Continue to assess pending progress, Patient would benefit from continued therapy after discharge        Assessment   Treatment Diagnosis: Difficutly walking  Prognosis: Good  Decision Making: Medium Complexity  PT Education: General Safety; Gait Training; Transfer Training; Goals  Patient Education: Educated pt on the above with fair understanding noted. REQUIRES PT FOLLOW UP: Yes  Activity Tolerance  Activity Tolerance: Patient Tolerated treatment well; Patient limited by endurance  Activity Tolerance: Pt reported SOB and had some wheezing with activity. Patient Diagnosis(es): The primary encounter diagnosis was Pneumonia due to COVID-19 virus. A diagnosis of Elevated troponin was also pertinent to this visit. has a past medical history of Acute and chronic respiratory failure with hypoxia ([address] CAD (coronary artery disease), Cataracts, bilateral, Glaucoma, Hyperlipidemia, Hypertension, TIA (transient ischemic attack), and Type II or unspecified type diabetes mellitus without mention of complication, not stated as uncontrolled. has a past surgical history that includes Coronary artery bypass graft (); Cataract removal with implant (Bilateral,  and 12/3/2013); and Inguinal hernia repair (Bilateral, 12). Restrictions  Restrictions/Precautions  Restrictions/Precautions: General Precautions, Fall Risk  Subjective   General  Chart Reviewed: Yes  Response To Previous Treatment: Patient with no complaints from previous session. Family / Caregiver Present: No  Referring Practitioner: FRANKLIN Neil CNP  Subjective  Subjective: Pt reports he only has pain when he is coughing in abdomen and rates it 4/10. States he isn't coughing as much as before.   Pain Screening  Patient Currently in Pain: Denies  Vital Signs  Patient Currently in Pain: Denies       Orientation  Orientation  Overall Orientation Status: Within Functional Limits  Cognition      Objective      Transfers  Sit to Stand: Contact guard assistance; Minimal Assistance  Stand to sit: Contact guard assistance; Minimal Assistance  Comment: V/c for proper hand placement  Ambulation  Ambulation?: Yes  WB Status: unrestricted  Ambulation 1  Surface: level tile  Device: Rolling Walker  Other Apparatus: O2  Assistance: Contact guard assistance  Gait Deviations: Slow Nayeli; Shuffles  Distance: 20 ft x1  Comments: V/c for proper use of AD and for upright posture. Stairs/Curb  Stairs?: No     Balance  Posture: Fair  Sitting - Static: Good  Sitting - Dynamic: Good  Standing - Static: Fair  Standing - Dynamic: Fair; -  Exercises  Comments: Reclined B LE exercises x20 reps. Frequent rest breaks d/t fatigue and SOB/ wheezing. SpO2 92- 100%. Goals  Short term goals  Time Frame for Short term goals: 20 days  Short term goal 1: Pt will be initiated on strong ex program to address deficits in endurance and balance. Short term goal 2: Pt will be independent with bed mobility without use of handrails for discharge home and negotiation of bed. Short term goal 3: Pt will transfer independently with least restrictive device and good balance to reduce fall risk  Short term goal 4: Pt will be able to ambulate up to 350 feet with supervision to independent with least restrictive device to allow discharge home with his wife.   Short term goal 5: Pt will be able to negoitiate 4 steps with chani handrails and SBA +1  Patient Goals   Patient goals : go home and hopes for Thursday    Plan    Plan  Times per week: 7  Times per day: Twice a day  Plan weeks: 2x/daily ecept weekends 1x/daily  Current Treatment Recommendations: Strengthening, Neuromuscular Re-education, Home Exercise Program, ROM, Safety Education & Training, Balance Training, Endurance Training, Patient/Caregiver Education & Training, Functional Mobility Training, Transfer Training, Gait Training, Stair training  Plan Comment: Initiate POC  Safety Devices  Type of devices:  All fall risk precautions in place, Left in chair, Call light within reach, Chair alarm in place, Nurse notified, Gait belt, Patient at risk for falls     Therapy Time   Individual Concurrent Group Co-treatment   Time In 1105         Time Out 1135         Minutes [address] [place], [place]

## 2021-11-24 NOTE — PROGRESS NOTES
[facility] - Telemedicine Progress Note   COVID 19 Patient-Tele-visit  Today's Date and Time: 11/24/2021, 1:58 PM    Impression :     · COVID 19 Confirmed Infection  · Covid tests:  · 11/15/21 Positive  · Urinary retention  · Renal calculi  · Cholelithiasis  · DM II  · Essential HTN  · Atrial Flutter  · CHF  · CKD IV  · CVA 2018  · Received COVID vaccines        Patient evaluated by Telemedicine. Requesting Institution: [facility]  Provider Institution: [address] [place] [address], [address] [place] [address] Person at [address] [place]: Ms Almonte Chivo, APRN- IM    Recommendations:   · Antibiotic treatment:  · No  · Covid Rx:  · Remdesivir-contraindicated with renal insufficiency  · Solu-medrol initiated 11/16/21  · Actemra-Not indicated at this time  · Monitor CRP      Medical Decision Making/Summary/Discussion:11/24/2021     · Patient admitted with suspected COVID 19 infection  · Covid test confirmed positive. · On Rx with Solumedrol  · Diuresis has improved his overall picture. · On room air at this point    Infection Control Recommendations   · Universal Precautions  · Airborne isolation  · Droplet Isolation    Antimicrobial Stewardship Recommendations     · Discontinuation of therapy  Coordination of Outpatient Care:   · Estimated Length of IV antimicrobials:TBD  · Patient will need Midline Catheter Insertion: TBD  · Patient will need PICC line Insertion: No  · Patient will need: Home IV , [place],  SNF,  LTAC:TBD  · Patient will need outpatient wound care:No    Chief complaint/reason for consultation:   · Concern for COVID infection      History of Present Illness:   Best Frazier is a 80y.o.-year-old male who was initially admitted on 11/15/2021. Patient seen at the request of Dr. Terrazas People:    Patient presented through ER with complaints of increased shortness of breath worsening over the past six weeks. He received his Covid vaccines.  He was treated with antibiotics without relief. The patient's HR was noted to be in the 140's and an EKG revealed acute onset atrial flutter. Cardiology was consulted and he was started on an amiodarone gtt. Covid swab was positive. The patient was also found to have acute urinary retention, 6 mm left pole renal calculi and cholelithiasis. Echocardiogram revealed a possible acute small to moderate sized inter-atrial communication. WBC is increasing      Impression CXR 11/15/21   Significant peripheral and basal predominant pulmonary opacities diffusely   throughout both lungs.  This pattern can be seen in the setting of COVID-19   pneumonia, though this requires clinical correlation.  Sequelae of underlying   interstitial lung disease is also possible, though less favored.  Stability   is indeterminate in the absence of prior comparison imaging.       Small right pleural effusion.       Age-indeterminate fracture of the left anterolateral 9th rib.                 Increased risk 3-dose inadvertent series    Dose 1  01/29/2021 (COVID-19, Moderna, Primary or Immunocompromised, PF, 100mcg/0.5mL)      Dose 2  02/18/2021 (COVID-19, Moderna, Primary or Immunocompromised, PF, 100mcg/0.5mL) - Given too close to previous dose     02/26/2021 (COVID-19, Verle Shira, Primary or Immunocompromised, PF, 100mcg/0.5mL) - Given too close to previous dose     11/02/2021 (COVID-19, Pfizer, PF, 30mcg/0.3mL)      Booster dose  05/02/2022 - Dose Forecast         Patient admitted because of concerns with COVID 19.    CURRENT EVALUATION : 11/24/2021      Afebrile  VS stable     The patient is on 2-->1 L NC today    CXR shows some improvement in pleural effusions    Discharge planning underway. No acute issues noted at this time. Patient exhibiting respiratory distress.  No  Respiratory secretions: No    Patient receiving supplemental oxygen: 2-->1 L NC  RR: 24-->20-->18  02 sat: 96-->98-->93        NEWS Score: 0-4 Low risk group; 5-6: Medium risk group; 7 or above: High risk group  Parameters 3 2 1 0 1 2 3   Age    < 72   ? 65   RR ? 8  9-11 12-20  21-24 ? 25   O2 Sats ? 91 92-93 94-95 ? 96      Suppl O2  Yes  No      SBP ? 90  101-110 111-219   ? 220   HR ? 40  41-50 51-90  111-130 ? 131   Consciousness    Alert   Drowsiness, lethargy, or confusion   Temperature ? 35.0 C (95.0 F)  35.1-36.0 C 95.1-96.9 F 36.1-38.0 C 97.0-100.4 F 38.1-39.0 C 100.5-102.3 F ? 39.1 C ? 102.4 F      NEWS Score:   11/17/21: 3 Low risk    Overall Daily Picture:    Improved    Presence of secondary bacterial Infection:    No  Additional antibiotics: No    Labs, X rays reviewed: 11/24/2021    BUN:60-->62-->59  Cr:2.59-->2.56-->2.37    Pro BNP: 05634-->10,948    WBC:16.2-->13.2-->11.1-->17-->15.5-->15.5-->15.1  Hb:10-->10.6-->9.3-->9.8-->9.0  Plat: 215-->227-->247    Absolute Neutrophils:14.28  Absolute Lymphocytes:1.03  Neutrophil/Lymphocyte Ratio: 13.8 high risk    CRP:101.4-->63.2-->108  Ferritin:866  LDH: 218    Pro Calcitonin:  Troponin 145    Cultures:  Urine:  · 11/17: No growth  Blood:  · 11/17: No growth  Sputum :  ·   Wound:       CXR:       11/19/21 11/16/21      CAT:      Discussed with patient, RN, CC, IM. I have personally reviewed the past medical history, past surgical history, medications, social history, and family history, and I have updated the database accordingly.   Past Medical History:     Past Medical History:   Diagnosis Date    Acute and chronic respiratory failure with hypoxia ([address] 11/16/2021    CAD (coronary artery disease)     Cataracts, bilateral     Glaucoma     Hyperlipidemia     Hypertension     TIA (transient ischemic attack)     Type II or unspecified type diabetes mellitus without mention of complication, not stated as uncontrolled        Past Surgical  History:     Past Surgical History:   Procedure Laterality Date    CATARACT REMOVAL WITH IMPLANT Bilateral 11/219/2013 and 12/3/2013    CORONARY ARTERY BYPASS GRAFT  2000    INGUINAL HERNIA REPAIR Bilateral 06/19/12       Medications:      insulin glargine  15 Units SubCUTAneous BID    metoprolol succinate  50 mg Oral Daily    dilTIAZem  60 mg Oral 4 times per day    insulin lispro  0-12 Units SubCUTAneous TID WC    insulin lispro  0-6 Units SubCUTAneous Nightly    ipratropium-albuterol  1 ampule Inhalation 4x daily    sodium bicarbonate  650 mg Oral BID    famotidine  10 mg Oral Nightly    tamsulosin  0.4 mg Oral Daily    atorvastatin  40 mg Oral Nightly    clopidogrel  75 mg Oral Daily    glipiZIDE  5 mg Oral BID AC    sodium chloride flush  5-40 mL IntraVENous 2 times per day    vitamin D  20,000 Units Oral Weekly    ascorbic acid  2,000 mg Oral BID    zinc sulfate  100 mg Oral Daily    apixaban  2.5 mg Oral BID    amiodarone  200 mg Oral BID       Social History:     Social History     Socioeconomic History    Marital status:      Spouse name: Not on file    Number of children: Not on file    Years of education: Not on file    Highest education level: Not on file   Occupational History    Not on file   Tobacco Use    Smoking status: Never Smoker    Smokeless tobacco: Never Used   Substance and Sexual Activity    Alcohol use: Yes     Comment: very very very seldom    Drug use: No    Sexual activity: Not on file   Other Topics Concern    Not on file   Social History Narrative    Not on file     Social Determinants of Health     Financial Resource Strain:     Difficulty of Paying Living Expenses: Not on file   Food Insecurity:     Worried About Running Out of Food in the Last Year: Not on file    Mera of Food in the Last Year: Not on file   Transportation Needs:     Lack of Transportation (Medical): Not on file    Lack of Transportation (Non-Medical):  Not on file   Physical Activity:     Days of Exercise per Week: Not on file    Minutes of Exercise per Session: Not on file   Stress:     Feeling of Stress : Not on file   Social Connections:     Frequency of Communication with Friends and Family: Not on file    Frequency of Social Gatherings with Friends and Family: Not on file    Attends Spiritism Services: Not on file    Active Member of Clubs or Organizations: Not on file    Attends Club or Organization Meetings: Not on file    Marital Status: Not on file   Intimate Partner Violence:     Fear of Current or Ex-Partner: Not on file    Emotionally Abused: Not on file    Physically Abused: Not on file    Sexually Abused: Not on file   Housing Stability:     Unable to Pay for Housing in the Last Year: Not on file    Number of [place] in the Last Year: Not on file    Unstable Housing in the Last Year: Not on file       Family History:     Family History   Problem Relation Age of Onset    Heart Disease Father     Heart Disease Brother         Allergies:   Patient has no known allergies. Review of Systems:       Constitutional: No fevers or chills. No systemic complaints  Head: No headaches  Eyes: No double vision or blurry vision. No conjunctival inflammation. ENT: No sore throat or runny nose. . No hearing loss, tinnitus or vertigo. Cardiovascular: No chest pain or palpitations. No Shortness of breath. No SPEARS  Lung: No Shortness of breath or cough. No sputum production  Abdomen: No nausea, vomiting, diarrhea, or abdominal pain. [place] No cramps. Genitourinary: No increased urinary frequency, or dysuria. No hematuria. No suprapubic or CVA pain  Musculoskeletal: No muscle aches or pains. No joint effusions, swelling or deformities  Hematologic: No bleeding or bruising. Neurologic: No headache, weakness, numbness, or tingling. Integument: No rash, no ulcers. Psychiatric: No depression. Endocrine: No polyuria, no polydipsia, no polyphagia.     Physical Examination :     Patient Vitals for the past 8 hrs:   BP Temp Temp src Pulse Resp SpO2   11/24/21 1009    80 18 93 %   11/24/21 0715 115/61 97.3 °F (36.3 °C) Temporal 83 18      General Appearance: Awake, alert, and in no apparent distress  Head:  Normocephalic, no trauma  Eyes: Pupils equal, round, reactive to light; sclera anicteric; conjunctivae pink. No embolic phenomena. ENT: Oropharynx clear, without erythema, exudate, or thrush. No tenderness of sinuses. Mouth/throat: mucosa pink and moist. No lesions. Dentition in good repair. Neck:Supple, without lymphadenopathy. Thyroid normal, No bruits. Pulmonary/Chest: Clear to auscultation, without wheezes, rales, or rhonchi. No dullness to percussion. Cardiovascular: Regular rate and rhythm without murmurs, rubs, or gallops. Abdomen: Soft, non tender. Bowel sounds normal. No organomegaly  All four Extremities: No cyanosis, clubbing, edema, or effusions. Neurologic: No gross sensory or motor deficits. Skin: Warm and dry with good turgor. No signs of peripheral arterial or venous insufficiency. No ulcerations. No open wounds. Medical Decision Making -Laboratory:   I have independently reviewed/ordered the following labs:    CBC with Differential:   Recent Labs     11/23/21  0540 11/24/21  0550   WBC 15.5* 15.1*   HGB 9.8* 9.0*   HCT 31.7* 27.6*    247   LYMPHOPCT 10* 7*   MONOPCT 7 5     BMP:   Recent Labs     11/23/21  0540 11/24/21  0550   * 134*   K 5.0 4.7   CL 97* 102   CO2 21 19*   BUN 62* 59*   CREATININE 2.56* 2.37*     Hepatic Function Panel:   Recent Labs     11/23/21  0540 11/24/21  0550   PROT 5.3* 5.1*   LABALBU 2.1* 2.1*   BILITOT 0.40 0.37   ALKPHOS 147* 121   ALT 37 40   AST 42* 45*     No results for input(s): RPR in the last 72 hours. No results for input(s): HIV in the last 72 hours. No results for input(s): BC in the last 72 hours.   Lab Results   Component Value Date    MUCUS NOT REPORTED 02/11/2021    RBC 2.89 11/24/2021    TRICHOMONAS NOT REPORTED 02/11/2021    WBC 15.1 11/24/2021    YEAST NOT REPORTED 02/11/2021    TURBIDITY CLEAR 02/11/2021     Lab Results   Component Value Color flow projects over the bilateral renal parenchyma.       No hydronephrosis.  No perinephric fluid.       Gross preservation of the bilateral corticomedullary differentiation.       Echogenic focus with posterior shadowing measuring 6 mm at the midpole left   kidney consistent with a renal calculus.       Gallstones incidentally noted within the gallbladder with posterior acoustic   shadowing and image 56.           Bladder:       Urinary bladder measures 7.5 by 6.0 x 7.2 cm for a bladder volume of 229 mL. Urinary bladder wall thickness measures 3 mm.       Postvoid urinary bladder measures 5.1 x 6.2 x 6.3 cm for a postvoid urinary   bladder volume of 105 mL.       Left ureteral jet is visualized.  Nonvisualization of the right ureteral jet.           Impression   1. Limited exam.  Large postvoid residual.  Nonvisualization of the right   ureteral jet. 2. No hydronephrosis. 3. 6 mm calculus at the midpole left kidney. 4. Cholelithiasis.         Summary  Global left ventricular systolic function is difficult to assess due to the  patients rate in the 120's but appears preserved with an estimated ejection  fraction of 55%. Mildly increased left ventricular wall thickness with a normal left  ventricular cavity size. Left atrium is normal in size. Evidence of a small to moderate inter-atrial communication is seen by color  Doppler imaging with an intermittent left to right shunt. This appears most  consistent with a patent foramen ovale (PFO) vs a small ASD. Bowing intra-atrial septal motion consistent with an atrial septal aneurysm  is seen. The clinical significant of this finding is unknown, but has been  associated with an increased risk of TIA's and strokes. Mild to moderate aortic leaflets calcification without restriction of  motion. Mild aortic insufficiency was seen. Mild pulmonary hypertension with an estimated right ventricular systolic  pressure of 30 mmHg.   Mild to moderate tricuspid regurgitation.     Compared to the previous study of 3/27/18, the patient now has evidence of a  small to moderate sized inter-atrial communication. Consider additional  testing by transesophageal echocardiography (PATT) if clinically indicated,  especially if this might . Clinical correlation required.       Medical Decision Vpertc-Irqqshmd-Zkudm:       Medical Decision Making-Other:     Note:  · Labs, medications, radiologic studies were reviewed with personal review of films  · Large amounts of data were reviewed  · Discussed with nursing Staff, Discharge planner  · Infection Control and Prevention measures reviewed  · All prior entries were reviewed  · Administer medications as ordered  · Prognosis: Guarded  · Discharge planning reviewed      Thank you for allowing us to participate in the care of this patient. Please call with questions. FRANKLIN Wheeler - CNP     ATTESTATION:    I have discussed the case, including pertinent history and exam findings with the APRN. I have evaluated the  History, physical findings and pictures of the patient and the key elements of the encounter have been performed by me. I have reviewed the laboratory data, other diagnostic studies and discussed them with the APRN. I have updated the medical record where necessary. I agree with the assessment, plan and orders as documented by the APRN.     Mariana Castañeda MD.          Pager: [phone] - Office: [phone]

## 2021-11-25 LAB
ABSOLUTE EOS #: 0.13 K/UL (ref 0–0.44)
ABSOLUTE IMMATURE GRANULOCYTE: 0.29 K/UL (ref 0–0.3)
ABSOLUTE LYMPH #: 1.05 K/UL (ref 1.1–3.7)
ABSOLUTE MONO #: 0.73 K/UL (ref 0.1–1.2)
ALBUMIN SERPL-MCNC: 2.2 G/DL (ref 3.5–5.2)
ALBUMIN/GLOBULIN RATIO: 0.6 (ref 1–2.5)
ALP BLD-CCNC: 133 U/L (ref 40–129)
ALT SERPL-CCNC: 51 U/L (ref 5–41)
ANION GAP SERPL CALCULATED.3IONS-SCNC: 10 MMOL/L (ref 9–17)
AST SERPL-CCNC: 53 U/L
BASOPHILS # BLD: 0 % (ref 0–2)
BASOPHILS ABSOLUTE: <0.03 K/UL (ref 0–0.2)
BILIRUB SERPL-MCNC: 0.56 MG/DL (ref 0.3–1.2)
BUN BLDV-MCNC: 49 MG/DL (ref 8–23)
BUN/CREAT BLD: 23 (ref 9–20)
CALCIUM SERPL-MCNC: 8 MG/DL (ref 8.6–10.4)
CHLORIDE BLD-SCNC: 96 MMOL/L (ref 98–107)
CO2: 22 MMOL/L (ref 20–31)
CREAT SERPL-MCNC: 2.11 MG/DL (ref 0.7–1.2)
DIFFERENTIAL TYPE: ABNORMAL
EOSINOPHILS RELATIVE PERCENT: 1 % (ref 1–4)
GFR AFRICAN AMERICAN: 36 ML/MIN
GFR NON-AFRICAN AMERICAN: 30 ML/MIN
GFR SERPL CREATININE-BSD FRML MDRD: ABNORMAL ML/MIN/{1.73_M2}
GFR SERPL CREATININE-BSD FRML MDRD: ABNORMAL ML/MIN/{1.73_M2}
GLUCOSE BLD-MCNC: 165 MG/DL (ref 74–100)
GLUCOSE BLD-MCNC: 198 MG/DL (ref 74–100)
GLUCOSE BLD-MCNC: 302 MG/DL (ref 74–100)
GLUCOSE BLD-MCNC: 78 MG/DL (ref 74–100)
GLUCOSE BLD-MCNC: 80 MG/DL (ref 70–99)
HCT VFR BLD CALC: 31.1 % (ref 40.7–50.3)
HEMOGLOBIN: 9.9 G/DL (ref 13–17)
IMMATURE GRANULOCYTES: 2 %
LYMPHOCYTES # BLD: 6 % (ref 24–43)
MCH RBC QN AUTO: 31.7 PG (ref 25.2–33.5)
MCHC RBC AUTO-ENTMCNC: 31.8 G/DL (ref 28.4–34.8)
MCV RBC AUTO: 99.7 FL (ref 82.6–102.9)
MONOCYTES # BLD: 4 % (ref 3–12)
NRBC AUTOMATED: 0 PER 100 WBC
PDW BLD-RTO: 12.6 % (ref 11.8–14.4)
PLATELET # BLD: 262 K/UL (ref 138–453)
PLATELET ESTIMATE: ABNORMAL
PMV BLD AUTO: 10.6 FL (ref 8.1–13.5)
POTASSIUM SERPL-SCNC: 5.1 MMOL/L (ref 3.7–5.3)
RBC # BLD: 3.12 M/UL (ref 4.21–5.77)
RBC # BLD: ABNORMAL 10*6/UL
SEG NEUTROPHILS: 87 % (ref 36–65)
SEGMENTED NEUTROPHILS ABSOLUTE COUNT: 14.52 K/UL (ref 1.5–8.1)
SODIUM BLD-SCNC: 128 MMOL/L (ref 135–144)
TOTAL PROTEIN: 5.7 G/DL (ref 6.4–8.3)
WBC # BLD: 16.7 K/UL (ref 3.5–11.3)
WBC # BLD: ABNORMAL 10*3/UL

## 2021-11-25 PROCEDURE — 80053 COMPREHEN METABOLIC PANEL: CPT

## 2021-11-25 PROCEDURE — 97535 SELF CARE MNGMENT TRAINING: CPT

## 2021-11-25 PROCEDURE — 99232 SBSQ HOSP IP/OBS MODERATE 35: CPT | Performed by: INTERNAL MEDICINE

## 2021-11-25 PROCEDURE — 6370000000 HC RX 637 (ALT 250 FOR IP): Performed by: NURSE PRACTITIONER

## 2021-11-25 PROCEDURE — 6370000000 HC RX 637 (ALT 250 FOR IP): Performed by: INTERNAL MEDICINE

## 2021-11-25 PROCEDURE — 82947 ASSAY GLUCOSE BLOOD QUANT: CPT

## 2021-11-25 PROCEDURE — 1200000000 HC SEMI PRIVATE

## 2021-11-25 PROCEDURE — 97116 GAIT TRAINING THERAPY: CPT

## 2021-11-25 PROCEDURE — 85025 COMPLETE CBC W/AUTO DIFF WBC: CPT

## 2021-11-25 PROCEDURE — 36415 COLL VENOUS BLD VENIPUNCTURE: CPT

## 2021-11-25 PROCEDURE — 94761 N-INVAS EAR/PLS OXIMETRY MLT: CPT

## 2021-11-25 PROCEDURE — 2580000003 HC RX 258: Performed by: NURSE PRACTITIONER

## 2021-11-25 PROCEDURE — 97110 THERAPEUTIC EXERCISES: CPT

## 2021-11-25 PROCEDURE — 94640 AIRWAY INHALATION TREATMENT: CPT

## 2021-11-25 RX ORDER — SODIUM CHLORIDE 1000 MG
2 TABLET, SOLUBLE MISCELLANEOUS ONCE
Status: COMPLETED | OUTPATIENT
Start: 2021-11-25 | End: 2021-11-25

## 2021-11-25 RX ORDER — FUROSEMIDE 40 MG/1
40 TABLET ORAL ONCE
Status: COMPLETED | OUTPATIENT
Start: 2021-11-25 | End: 2021-11-25

## 2021-11-25 RX ADMIN — OXYCODONE HYDROCHLORIDE AND ACETAMINOPHEN 2000 MG: 500 TABLET ORAL at 20:13

## 2021-11-25 RX ADMIN — IPRATROPIUM BROMIDE AND ALBUTEROL SULFATE 1 AMPULE: 2.5; .5 SOLUTION RESPIRATORY (INHALATION) at 13:19

## 2021-11-25 RX ADMIN — ZINC SULFATE 220 MG (50 MG) CAPSULE 100 MG: CAPSULE at 09:36

## 2021-11-25 RX ADMIN — AMIODARONE HYDROCHLORIDE 200 MG: 200 TABLET ORAL at 09:36

## 2021-11-25 RX ADMIN — IPRATROPIUM BROMIDE AND ALBUTEROL SULFATE 1 AMPULE: 2.5; .5 SOLUTION RESPIRATORY (INHALATION) at 05:31

## 2021-11-25 RX ADMIN — INSULIN GLARGINE 15 UNITS: 100 INJECTION, SOLUTION SUBCUTANEOUS at 22:05

## 2021-11-25 RX ADMIN — DILTIAZEM HYDROCHLORIDE 60 MG: 60 TABLET, FILM COATED ORAL at 05:47

## 2021-11-25 RX ADMIN — AMIODARONE HYDROCHLORIDE 200 MG: 200 TABLET ORAL at 20:13

## 2021-11-25 RX ADMIN — SODIUM BICARBONATE TAB 650 MG 650 MG: 650 TAB at 20:13

## 2021-11-25 RX ADMIN — CLOPIDOGREL BISULFATE 75 MG: 75 TABLET ORAL at 09:36

## 2021-11-25 RX ADMIN — SODIUM CHLORIDE, PRESERVATIVE FREE 10 ML: 5 INJECTION INTRAVENOUS at 09:37

## 2021-11-25 RX ADMIN — APIXABAN 2.5 MG: 2.5 TABLET, FILM COATED ORAL at 20:13

## 2021-11-25 RX ADMIN — SODIUM BICARBONATE TAB 650 MG 650 MG: 650 TAB at 09:36

## 2021-11-25 RX ADMIN — APIXABAN 2.5 MG: 2.5 TABLET, FILM COATED ORAL at 09:36

## 2021-11-25 RX ADMIN — METOPROLOL SUCCINATE 50 MG: 50 TABLET, EXTENDED RELEASE ORAL at 09:36

## 2021-11-25 RX ADMIN — TAMSULOSIN HYDROCHLORIDE 0.4 MG: 0.4 CAPSULE ORAL at 09:36

## 2021-11-25 RX ADMIN — IPRATROPIUM BROMIDE AND ALBUTEROL SULFATE 1 AMPULE: 2.5; .5 SOLUTION RESPIRATORY (INHALATION) at 17:22

## 2021-11-25 RX ADMIN — SODIUM CHLORIDE, PRESERVATIVE FREE 10 ML: 5 INJECTION INTRAVENOUS at 20:14

## 2021-11-25 RX ADMIN — FAMOTIDINE 10 MG: 10 TABLET ORAL at 20:13

## 2021-11-25 RX ADMIN — GLIPIZIDE 5 MG: 5 TABLET ORAL at 07:29

## 2021-11-25 RX ADMIN — Medication 2 G: at 09:36

## 2021-11-25 RX ADMIN — DILTIAZEM HYDROCHLORIDE 60 MG: 60 TABLET, FILM COATED ORAL at 00:24

## 2021-11-25 RX ADMIN — GLIPIZIDE 5 MG: 5 TABLET ORAL at 16:43

## 2021-11-25 RX ADMIN — ATORVASTATIN CALCIUM 40 MG: 40 TABLET, FILM COATED ORAL at 20:13

## 2021-11-25 RX ADMIN — IPRATROPIUM BROMIDE AND ALBUTEROL SULFATE 1 AMPULE: 2.5; .5 SOLUTION RESPIRATORY (INHALATION) at 20:40

## 2021-11-25 RX ADMIN — FUROSEMIDE 40 MG: 40 TABLET ORAL at 09:38

## 2021-11-25 RX ADMIN — OXYCODONE HYDROCHLORIDE AND ACETAMINOPHEN 2000 MG: 500 TABLET ORAL at 09:36

## 2021-11-25 ASSESSMENT — PAIN SCALES - GENERAL
PAINLEVEL_OUTOF10: 0

## 2021-11-25 NOTE — PROGRESS NOTES
Physical Therapy  Facility/Department: [facility] MED SURG  Daily Treatment Note  NAME: Maritza Brito  : 10/31/1930  MRN: [id number]    Date of Service: 2021    Discharge Recommendations:  Continue to assess pending progress, Patient would benefit from continued therapy after discharge        Assessment   Treatment Diagnosis: Difficutly walking  Prognosis: Good  Decision Making: Medium Complexity  Patient Education: educated patient on importance of daily exercise and safety ambulation, fair understanding  REQUIRES PT FOLLOW UP: Yes  Activity Tolerance  Activity Tolerance: Patient Tolerated treatment well; Patient limited by endurance     Patient Diagnosis(es): The primary encounter diagnosis was Pneumonia due to COVID-19 virus. A diagnosis of Elevated troponin was also pertinent to this visit. has a past medical history of Acute and chronic respiratory failure with hypoxia ([address] CAD (coronary artery disease), Cataracts, bilateral, Glaucoma, Hyperlipidemia, Hypertension, TIA (transient ischemic attack), and Type II or unspecified type diabetes mellitus without mention of complication, not stated as uncontrolled. has a past surgical history that includes Coronary artery bypass graft (); Cataract removal with implant (Bilateral,  and 12/3/2013); and Inguinal hernia repair (Bilateral, 12). Restrictions  Restrictions/Precautions  Restrictions/Precautions: General Precautions, Fall Risk  Subjective   General  Chart Reviewed: Yes  Response To Previous Treatment: Patient with no complaints from previous session. Family / Caregiver Present: No  Referring Practitioner: FRANKLIN Morel CNP  Subjective  Subjective: Patient reports pain in abdomen only with coughing.           Orientation  Orientation  Overall Orientation Status: Within Functional Limits  Cognition      Objective      Transfers  Sit to Stand: Contact guard assistance  Stand to sit: Contact guard assistance  Ambulation  Ambulation?: Yes  WB Status: unrestricted  Ambulation 1  Surface: level tile  Device: Rolling Walker  Assistance: Contact guard assistance  Quality of Gait: Slow shuffling abiel with FF posture, short step lengtn, no LOB  Gait Deviations: Slow Abiel; Shuffles  Distance: 25 ft x2  Comments: on room air                                 G-Code     OutComes Score                                                     AM-PAC Score             Goals  Short term goals  Time Frame for Short term goals: 20 days  Short term goal 1: Pt will be initiated on strong ex program to address deficits in endurance and balance. Short term goal 2: Pt will be independent with bed mobility without use of handrails for discharge home and negotiation of bed. Short term goal 3: Pt will transfer independently with least restrictive device and good balance to reduce fall risk  Short term goal 4: Pt will be able to ambulate up to 350 feet with supervision to independent with least restrictive device to allow discharge home with his wife. Short term goal 5: Pt will be able to negoitiate 4 steps with chani handrails and SBA +1  Patient Goals   Patient goals : go home and hopes for Thursday    Plan    Plan  Times per week: 7  Times per day: Twice a day  Plan weeks: 2x/daily ecept weekends 1x/daily  Current Treatment Recommendations: Strengthening, Neuromuscular Re-education, Home Exercise Program, ROM, Safety Education & Training, Balance Training, Endurance Training, Patient/Caregiver Education & Training, Functional Mobility Training, Transfer Training, Gait Training, Stair training  Plan Comment: Initiate POC  Safety Devices  Type of devices:  All fall risk precautions in place, Left in chair, Call light within reach, Chair alarm in place, Nurse notified, Gait belt, Patient at risk for falls     Therapy Time   Individual Concurrent Group Co-treatment   Time In 0840         Time Out 0907         Minutes 27

## 2021-11-25 NOTE — PROGRESS NOTES
Occupational Therapy  Facility/Department: [facility] AT THE [facility] MED SURG  Daily Treatment Note  NAME: Katy Underwood  : 10/31/1930  MRN: [id number]    Date of Service: 2021    Discharge Recommendations:  Continue to assess pending progress       Assessment      Activity Tolerance  Activity Tolerance: Patient limited by fatigue  Safety Devices  Type of devices: All fall risk precautions in place; Left in chair; Call light within reach; Chair alarm in place         Patient Diagnosis(es): The primary encounter diagnosis was Pneumonia due to COVID-19 virus. A diagnosis of Elevated troponin was also pertinent to this visit. has a past medical history of Acute and chronic respiratory failure with hypoxia ([address] CAD (coronary artery disease), Cataracts, bilateral, Glaucoma, Hyperlipidemia, Hypertension, TIA (transient ischemic attack), and Type II or unspecified type diabetes mellitus without mention of complication, not stated as uncontrolled. has a past surgical history that includes Coronary artery bypass graft (); Cataract removal with implant (Bilateral,  and 12/3/2013); and Inguinal hernia repair (Bilateral, 12). Restrictions  Restrictions/Precautions  Restrictions/Precautions: General Precautions, Fall Risk  Subjective   General  Chart Reviewed: Yes  Patient assessed for rehabilitation services?: Yes  Response to previous treatment: Patient with no complaints from previous session  Family / Caregiver Present: No  Referring Practitioner: Oumar Dey  Diagnosis: Covid-19  Subjective  Subjective: Pt denies pain at time of session. General Comment  Comments: In bedside chair upon MADELYN arrival. Requested to complete ADLs.   Vital Signs  Patient Currently in Pain: Denies   Orientation     Objective    ADL  Grooming: Stand by assistance  UE Bathing: Contact guard assistance; Minimal assistance  LE Bathing: Contact guard assistance; Minimal assistance  UE Dressing: Contact guard assistance; Minimal assistance  LE Dressing: Contact guard assistance; Minimal assistance  Toileting: Contact guard assistance  Additional Comments: Completed morning sink side bathing and grooming with good tolerance and completion. Educated throughout on safety and EC/WS with good understanding. Functional Mobility  Functional - Mobility Device: Rolling Walker     Transfers  Stand Step Transfers: Contact guard assistance  Sit to stand: Contact guard assistance  Stand to sit: Contact guard assistance                                                                 Plan   Plan  Times per week: 7x  Times per day: Daily  Current Treatment Recommendations: Strengthening, ROM, Safety Education & Training, Patient/Caregiver Education & Training, Self-Care / ADL, Endurance Training, Functional Mobility Training  G-Code     OutComes Score                                                  AM-PAC Score             Goals  Short term goals  Time Frame for Short term goals: 21  Short term goal 1: Patient to complete self care routine c SUP to ensure safe return home. Short term goal 2: Patient to engage in 15 minutes of ther ex/ther act s significant shortness of breath to improve strength as well as activity tolerance for self care tasks. Short term goal 3: Patient to tolerate standing >7' while participating in functional task of choice to improve standing tolerane, balance and safety during ADL, mobility and transfers. Short term goal 4: Patient to be educated on d/c folder, AE/DME, and general safety to ensure safe return home.        Therapy Time   Individual Concurrent Group Co-treatment   Time In 1100         Time Out 1145         Minutes 45         Timed Code Treatment Minutes: [address] [facility], [facility]

## 2021-11-25 NOTE — PROGRESS NOTES
[facility]   APRN - Progress Note    Patient - Fernando Galeas  Date of Admission -  11/15/2021  8:38 PM  Date of Evaluation -  2021  Hospital Day - 10      SUBJECTIVE:     The Fernando Galeas is a 80 y.o. male who is seen for follow up for atrial fibrillation with RVR and COVID-19. Per nursing report and notes, overnight events include: no significant events. He resting in bed alert oriented no acute distress. Talkative. ROS:   Constitutional: negative  for fevers, and negative for chills. Respiratory: positive for shortness of breath, negative for cough, and negative for wheezing  Cardiovascular: negative for chest pain, and negative for palpitations  Gastrointestinal: negative for abdominal pain, negative for nausea,negative for vomiting, negative for diarrhea, and negative for constipation    All other systems were reviewed with the patient and are negative unless otherwise stated in HPI. OBJECTIVE:     VITAL SIGNS:  Patient Vitals for the past 8 hrs:   BP Temp Temp src Pulse Resp SpO2 Weight   21 0551       172 lb (78 kg)   21 0547 (!) 115/59         21 0531     20 98 %    21 0024 120/60         21 2300 120/60 97.6 °F (36.4 °C) Temporal 90 18 97 %          Temp: 97.6 °F (36.4 °C)  Temp range:    Temp  Av.3 °F (36.3 °C)  Min: 96 °F (35.6 °C)  Max: 98.4 °F (36.9 °C)    BP: (!) 115/59  BP Range:      Systolic (07ZZM), UQK:324 , Min:115 , DELFINA:133      Diastolic (32CXS), TQY:50, Min:59, Max:72    Pulse: 90  Pulse Range:    Pulse  Av.8  Min: 80  Max: 93    Resp: 20  Resp Range:   Resp  Av.5  Min: 16  Max: 20    SpO2: 98 % room air  SpO2 range:   SpO2  Av.9 %  Min: 93 %  Max: 100 %    Weight  Wt Readings from Last 3 Encounters:   21 172 lb (78 kg)   19 158 lb (71.7 kg)   18 166 lb 6.4 oz (75.5 kg)     Body mass index is 30.47 kg/m².     24HR INTAKE/OUTPUT:      Intake/Output Summary (Last 24 hours) at 2021 0676  Last data filed at 11/25/2021 0326  Gross per 24 hour   Intake 360 ml   Output 1070 ml   Net -710 ml     Date 11/25/21 0000 - 11/25/21 2359   Shift 8471-3169 3650-9017 2584-4552 24 Hour Total   INTAKE   Shift Total(mL/kg)       OUTPUT   Urine(mL/kg/hr) 150   150   Shift Total(mL/kg) 150(1.9)   150(1.9)   Weight (kg) 78 78 78 78         PHYSICAL EXAM:  GEN:    Awake and following commands:     [] No   [x] Yes  MENTAL STATUS: alert and oriented x3. DISTRESS: Acute respiratory distress:       [x] No   [] Yes  EYES:  EOMI, pupils equal   NECK: Supple. No lymphadenopathy. No carotid bruit  CVS: Regular rhythm, no audible murmur  PULM: Wheeze throughout no acute respiratory distress  ABD:    Bowels sounds normal.  Abdomen is soft. No distention. no tenderness to palpation. EXT:   no edema bilaterally . No calf tenderness. NEURO: Moves all extremities. Motor and sensory are grossly intact  SKIN:  No rashes. No skin lesions.           MEDICATIONS:  Scheduled Meds:   insulin glargine  15 Units SubCUTAneous BID    metoprolol succinate  50 mg Oral Daily    dilTIAZem  60 mg Oral 4 times per day    insulin lispro  0-12 Units SubCUTAneous TID WC    insulin lispro  0-6 Units SubCUTAneous Nightly    ipratropium-albuterol  1 ampule Inhalation 4x daily    sodium bicarbonate  650 mg Oral BID    famotidine  10 mg Oral Nightly    tamsulosin  0.4 mg Oral Daily    atorvastatin  40 mg Oral Nightly    clopidogrel  75 mg Oral Daily    glipiZIDE  5 mg Oral BID AC    sodium chloride flush  5-40 mL IntraVENous 2 times per day    vitamin D  20,000 Units Oral Weekly    ascorbic acid  2,000 mg Oral BID    zinc sulfate  100 mg Oral Daily    apixaban  2.5 mg Oral BID    amiodarone  200 mg Oral BID     Continuous Infusions:   sodium chloride      dextrose       PRN Meds:   albuterol, 2.5 mg, Q4H PRN  sodium chloride flush, 5-40 mL, PRN  sodium chloride, 25 mL, PRN  ondansetron, 4 mg, Q8H PRN   Or  ondansetron, 4 mg, Q6H PRN  polyethylene glycol, 17 g, Daily PRN  acetaminophen, 650 mg, Q6H PRN   Or  acetaminophen, 650 mg, Q6H PRN  glucose, 15 g, PRN  dextrose, 12.5 g, PRN  glucagon (rDNA), 1 mg, PRN  dextrose, 100 mL/hr, PRN        DATA:  Complete Blood Count:   Recent Labs     11/23/21  0540 11/24/21  0550   WBC 15.5* 15.1*   RBC 3.18* 2.89*   HGB 9.8* 9.0*   HCT 31.7* 27.6*   MCV 99.7 95.5   RDW 12.5 12.5    247   SEGS 78* 83*   NEUTROABS 12.06* 12.61*   LYMPHOPCT 10* 7*   LYMPHSABS 1.51 1.05*   MONOPCT 7 5   EOSRELPCT 3 2   BASOPCT 0 0   IMMGRAN 3* 3*        Recent Blood Glucose:   Recent Labs     11/23/21  0540 11/24/21  0550   GLUCOSE 179* 74        Comprehensive Metabolic Profile:   Recent Labs     11/23/21  0540 11/24/21  0550   BUN 62* 59*   CREATININE 2.56* 2.37*   * 134*   K 5.0 4.7   CL 97* 102   CALCIUM 8.0* 7.6*   ANIONGAP 11 13   CO2 21 19*   PROT 5.3* 5.1*   LABALBU 2.1* 2.1*   BILITOT 0.40 0.37   ALKPHOS 147* 121   AST 42* 45*   ALT 37 40        Urinalysis:   Lab Results   Component Value Date    NITRU NEGATIVE 02/11/2021    COLORU YELLOW 02/11/2021    PHUR 5.5 02/11/2021    WBCUA 0 TO 2 02/11/2021    RBCUA None 02/11/2021    MUCUS NOT REPORTED 02/11/2021    TRICHOMONAS NOT REPORTED 02/11/2021    YEAST NOT REPORTED 02/11/2021    BACTERIA TRACE 02/11/2021    SPECGRAV 1.020 02/11/2021    LEUKOCYTESUR NEGATIVE 02/11/2021    UROBILINOGEN Normal 02/11/2021    BILIRUBINUR NEGATIVE 02/11/2021    GLUCOSEU 3+ 02/11/2021    KETUA NEGATIVE 02/11/2021    AMORPHOUS NOT REPORTED 02/11/2021       HgBA1c:    Lab Results   Component Value Date    LABA1C 9.5 08/27/2021       TSH:    Lab Results   Component Value Date    TSH 1.12 11/17/2021       Lactic Acid:   Lab Results   Component Value Date    LACTA 2.2 11/15/2021        High Sensitivity Troponin:  Recent Labs     11/23/21  0540   TROPHS 120*     Pro-BNP:  Lab Results   Component Value Date    PROBNP 11,281 (H) 11/23/2021     D-Dimer:  Lab Results   Component Value Date    DDIMER 1.31 (H) 11/19/2021     PT/INR:    Lab Results   Component Value Date    PROTIME 16.3 11/17/2021    INR 1.3 11/17/2021     PTT:    Lab Results   Component Value Date    APTT 37.3 11/17/2021       CRP:   No results for input(s): CRP in the last 72 hours. ABGs:   No results found for: PHART, PH, RVN9LFC, PCO2, PO2ART, PO2, QDV1VYK, HCO3, BEART, BE, THGBART, THB, LET3QGL, G5AZRMRW, O2SAT, FIO2      Radiology/Imaging:  XR CHEST PORTABLE   Final Result   Unchanged bilateral peripheral opacities. Improvement in pleural effusions. XR CHEST PORTABLE   Final Result   Bilateral airspace disease and small pleural effusions appear improved. XR CHEST PORTABLE   Final Result   No significant change in bilateral airspace disease. Unchanged appearance of   small pleural effusions versus pleuroparenchymal reaction. Some of these opacities likely represent underlying chronic lung disease and   scarring. A superimposed acute inflammatory process or infection should be   considered in the appropriate clinical setting. US RETROPERITONEAL COMPLETE   Final Result   1. Limited exam.  Large postvoid residual.  Nonvisualization of the right   ureteral jet. 2. No hydronephrosis. 3. 6 mm calculus at the midpole left kidney. 4. Cholelithiasis. XR CHEST PORTABLE   Final Result   Significant peripheral and basal predominant pulmonary opacities diffusely   throughout both lungs. This pattern can be seen in the setting of COVID-19   pneumonia, though this requires clinical correlation. Sequelae of underlying   interstitial lung disease is also possible, though less favored. Stability   is indeterminate in the absence of prior comparison imaging. Small right pleural effusion. Age-indeterminate fracture of the left anterolateral 9th rib.                ASSESSMENT / PLAN:     COVID-19 virus infection  · Remove isolation  · Continue current therapy  · Consults:  Ahmad  · Remdesivir not indicated  · Appreciate infectious disease   · Continue vitamin C, D and zinc  · Trend labs  · IV steroidsstoppedelevated creatinine and no hypoxia  · VaccinatedJanuary, February booster in November 2021  · Acute respiratory failure with hypoxia  · Supplemental oxygen maintain SPO2 greater than 92%  · Nebs  · Acapella  · PT OT  · Nebs  · Stop IV fluids  · CAD  · Continue Lipitor, Plavix, Lasix  · New onset atrial flutter  · Appreciate cardiology  · EKG2-1 conduction  · continueToprol-XL to 50 mg daily for rate control  · Amiodarone drip completed twice  · Continue amiodarone 200 mg twice daily  · Continue Eliquis  · Type 2 diabetes  · POCT before meals and at bedtime  · Med-dose sliding scale  · Hold Glucophage due to CKD  · Continue glipizide  · Continue Lantus 15 units twice daily  · Hypertension  · Continue Altace  · CKD  · Trend labs  · No remdesivir or Lovenox  · Stop steroidspossible cause of elevated creatinine  · Continue Flomaxurinary retention  · Appreciate nephrology  · Nutrition status:   · Well developed, well nourished with no malnutrition  · Dietician consult initiated  · [] NPO [] TPN      [] Tube feed [] Clear liquid        [] Full liquid [] regular diet         [] Fluid restriction   [x] Diabetic diet   · Prophylaxis:   · DVT: Eliquis   · Stress Ulcer: H2 Blocker   · High risk medications: none   · Disposition:    · Discharge plan is pending -- TR[facility] when approved  · Ready for DC awaiting SNF approval  · PT/OT      FRANKLIN Valdez CNP , FRANKLIN-NP-C  11/25/2021  6:57 AM

## 2021-11-25 NOTE — PROGRESS NOTES
[facility] - Telemedicine Progress Note   COVID 19 Patient-Tele-visit  Today's Date and Time: 11/25/2021, 8:34 AM    Impression :     · COVID 19 Confirmed Infection  · Covid tests:  · 11/15/21 Positive  · Urinary retention  · Renal calculi  · Cholelithiasis  · DM II  · Essential HTN  · Atrial Flutter  · CHF  · CKD IV  · CVA 2018  · Received COVID vaccines        Patient evaluated by Telemedicine. Requesting Institution: [facility]  Provider Institution: [address] [place] [address],[facility] [address], [address] [facility] Ms Saturnino Palomo, APRN- IM    Recommendations:   · Antibiotic treatment:  · Monitor off antibiotics  · Covid Rx:  · Remdesivir-contraindicated with renal insufficiency  · Solu-medrol initiated 11/16/21. Completed Rx  · Actemra-Not indicated at this time  · Monitor CRP    · Patient doing well ID wise. · ID will sign off at this stage. Medical Decision Making/Summary/Discussion:11/25/2021     · Patient admitted with suspected COVID 19 infection  · Covid test confirmed positive. · On Rx with Solumedrol  · Diuresis has improved his overall picture. · On room air at this point  · Doing very well ID wise    Infection Control Recommendations   · Universal Precautions  · Airborne isolation  · Droplet Isolation    Antimicrobial Stewardship Recommendations     · Discontinuation of therapy  Coordination of Outpatient Care:   · Estimated Length of IV antimicrobials:TBD  · Patient will need Midline Catheter Insertion: TBD  · Patient will need PICC line Insertion: No  · Patient will need: Home IV , [place],  SNF,  LTAC:TBD  · Patient will need outpatient wound care:No    Chief complaint/reason for consultation:   · Concern for COVID infection      History of Present Illness:   Ernie Peralta is a 80y.o.-year-old male who was initially admitted on 11/15/2021.  Patient seen at the request of Dr. Fifi Izaguirre:    Patient presented through ER with complaints of increased shortness of breath worsening over the past six weeks. He received his Covid vaccines. He was treated with antibiotics without relief. The patient's HR was noted to be in the 140's and an EKG revealed acute onset atrial flutter. Cardiology was consulted and he was started on an amiodarone gtt. Covid swab was positive. The patient was also found to have acute urinary retention, 6 mm left pole renal calculi and cholelithiasis. Echocardiogram revealed a possible acute small to moderate sized inter-atrial communication. WBC is increasing      Impression CXR 11/15/21   Significant peripheral and basal predominant pulmonary opacities diffusely   throughout both lungs.  This pattern can be seen in the setting of COVID-19   pneumonia, though this requires clinical correlation.  Sequelae of underlying   interstitial lung disease is also possible, though less favored.  Stability   is indeterminate in the absence of prior comparison imaging.       Small right pleural effusion.       Age-indeterminate fracture of the left anterolateral 9th rib.                 Increased risk 3-dose inadvertent series    Dose 1  01/29/2021 (COVID-19, Moderna, Primary or Immunocompromised, PF, 100mcg/0.5mL)      Dose 2  02/18/2021 (COVID-19, Moderna, Primary or Immunocompromised, PF, 100mcg/0.5mL) - Given too close to previous dose     02/26/2021 (COVID-19, Marie Hanson, Primary or Immunocompromised, PF, 100mcg/0.5mL) - Given too close to previous dose     11/02/2021 (COVID-19, Pfizer, PF, 30mcg/0.3mL)      Booster dose  05/02/2022 - Dose Forecast         Patient admitted because of concerns with COVID 19.    CURRENT EVALUATION : 11/25/2021    Afebrile  VS stable    The patient is on room air    CXR shows some improvement in pleural effusions    Discharge planning underway. No acute issues noted at this time. Patient exhibiting respiratory distress.  No  Respiratory secretions: No    Patient receiving supplemental oxygen: 2-->1 L NC-->Room air  RR: 24-->20-->19  02 sat: 96-->98-->93-->100      NEWS Score: 0-4 Low risk group; 5-6: Medium risk group; 7 or above: High risk group  Parameters 3 2 1 0 1 2 3   Age    < 65   ? 65   RR ? 8  9-11 12-20  21-24 ? 25   O2 Sats ? 91 92-93 94-95 ? 96      Suppl O2  Yes  No      SBP ? 90  101-110 111-219   ? 220   HR ? 40  41-50 51-90  111-130 ? 131   Consciousness    Alert   Drowsiness, lethargy, or confusion   Temperature ? 35.0 C (95.0 F)  35.1-36.0 C 95.1-96.9 F 36.1-38.0 C 97.0-100.4 F 38.1-39.0 C 100.5-102.3 F ? 39.1 C ? 102.4 F      NEWS Score:   11/17/21: 3 Low risk    Overall Daily Picture:    Improved    Presence of secondary bacterial Infection:    No  Additional antibiotics: No    Labs, X rays reviewed: 11/25/2021    BUN:60-->62-->59-->49  Cr:2.59-->2.56-->2.37-->2.11    Pro BNP: 96940-->10,948    WBC:17-->15.5-->15.5-->15.1-->16.7  Hb:10-->10.6-->9.3-->9.8-->9.9  Plat: 215-->227-->247-->262    Absolute Neutrophils:14.28  Absolute Lymphocytes:1.03  Neutrophil/Lymphocyte Ratio: 13.8 high risk    CRP:101.4-->63.2-->108  Ferritin:866  LDH: 218    Pro Calcitonin:  Troponin 145    Cultures:  Urine:  · 11/17: No growth  Blood:  · 11/17: No growth  Sputum :  ·   Wound:       CXR:       11/19/21 11/16/21      CAT:      Discussed with patient, RN, CC, IM. I have personally reviewed the past medical history, past surgical history, medications, social history, and family history, and I have updated the database accordingly.   Past Medical History:     Past Medical History:   Diagnosis Date    Acute and chronic respiratory failure with hypoxia ([facility] [address] 11/16/2021    CAD (coronary artery disease)     Cataracts, bilateral     Glaucoma     Hyperlipidemia     Hypertension     TIA (transient ischemic attack)     Type II or unspecified type diabetes mellitus without mention of complication, not stated as uncontrolled        Past Surgical  History: polyuria, no polydipsia, no polyphagia. Physical Examination :     Patient Vitals for the past 8 hrs:   BP Temp Temp src Pulse Resp SpO2 Weight   11/25/21 0721 (!) 128/58 97.9 °F (36.6 °C) Oral 79 19 100 %    11/25/21 0551       172 lb (78 kg)   11/25/21 0547 (!) 115/59         11/25/21 0531     20 98 %      General Appearance: Awake, alert, and in no apparent distress  Head:  Normocephalic, no trauma  Eyes: Pupils equal, round, reactive to light; sclera anicteric; conjunctivae pink. No embolic phenomena. ENT: Oropharynx clear, without erythema, exudate, or thrush. No tenderness of sinuses. Mouth/throat: mucosa pink and moist. No lesions. Dentition in good repair. Neck:Supple, without lymphadenopathy. Thyroid normal, No bruits. Pulmonary/Chest: Clear to auscultation, without wheezes, rales, or rhonchi. No dullness to percussion. Cardiovascular: Regular rate and rhythm without murmurs, rubs, or gallops. Abdomen: Soft, non tender. Bowel sounds normal. No organomegaly  All four Extremities: No cyanosis, clubbing, edema, or effusions. Neurologic: No gross sensory or motor deficits. Skin: Warm and dry with good turgor. No signs of peripheral arterial or venous insufficiency. No ulcerations. No open wounds. Medical Decision Making -Laboratory:   I have independently reviewed/ordered the following labs:    CBC with Differential:   Recent Labs     11/24/21  0550 11/25/21  0702   WBC 15.1* 16.7*   HGB 9.0* 9.9*   HCT 27.6* 31.1*    262   LYMPHOPCT 7* 6*   MONOPCT 5 4     BMP:   Recent Labs     11/24/21  0550 11/25/21  0702   * 128*   K 4.7 5.1    96*   CO2 19* 22   BUN 59* 49*   CREATININE 2.37* 2.11*     Hepatic Function Panel:   Recent Labs     11/24/21  0550 11/25/21  0702   PROT 5.1* 5.7*   LABALBU 2.1* 2.2*   BILITOT 0.37 0.56   ALKPHOS 121 133*   ALT 40 51*   AST 45* 53*     No results for input(s): RPR in the last 72 hours.   No results for input(s): HIV in the last 72 hours. No results for input(s): BC in the last 72 hours. Lab Results   Component Value Date    MUCUS NOT REPORTED 02/11/2021    RBC 3.12 11/25/2021    TRICHOMONAS NOT REPORTED 02/11/2021    WBC 16.7 11/25/2021    YEAST NOT REPORTED 02/11/2021    TURBIDITY CLEAR 02/11/2021     Lab Results   Component Value Date    CREATININE 2.11 11/25/2021    GLUCOSE 80 11/25/2021    GLUCOSE 124 05/17/2012       Medical Decision Making-Imaging:     Narrative   EXAMINATION:   ONE XRAY VIEW OF THE CHEST       11/15/2021 6:06 pm       COMPARISON:   None.       HISTORY:   ORDERING SYSTEM PROVIDED HISTORY: short of breath   TECHNOLOGIST PROVIDED HISTORY:   short of breath       FINDINGS:   There are diffuse peripheral and basal pulmonary opacities bilaterally of   indeterminate stability in the absence of prior comparison imaging.  Small   right pleural effusion.  No pneumothorax.  Cardiomediastinal contours are   within normal limits for size with prior CABG and a tortuous, atherosclerotic   aorta.  Degenerative changes of the spine and shoulders.  Age-indeterminate   fracture of the anterolateral left 9th rib.           Impression   Significant peripheral and basal predominant pulmonary opacities diffusely   throughout both lungs.  This pattern can be seen in the setting of COVID-19   pneumonia, though this requires clinical correlation.  Sequelae of underlying   interstitial lung disease is also possible, though less favored.  Stability   is indeterminate in the absence of prior comparison imaging.       Small right pleural effusion.       Age-indeterminate fracture of the left anterolateral 9th rib.             Narrative   EXAMINATION:   RETROPERITONEAL ULTRASOUND OF THE KIDNEYS AND URINARY BLADDER       11/17/2021       COMPARISON:   None       HISTORY:   ORDERING SYSTEM PROVIDED HISTORY: Rule out obstructive uropathy.    TECHNOLOGIST PROVIDED HISTORY:       Rule out obstructive uropathy.       70-year-old male; rule out obstructive uropathy       FINDINGS:       Kidneys:       Exam limited due to rib shadowing and difficulty breathing.       Right kidney measures 9.1 x 4.9 x 4.9 cm.  Right renal cortical thickness   measures 1.1 cm.       Left kidney measures 9.6 x 4.4 x 5.0 cm.  Left renal cortical thickness   measures 8 mm.       Color flow projects over the bilateral renal parenchyma.       No hydronephrosis.  No perinephric fluid.       Gross preservation of the bilateral corticomedullary differentiation.       Echogenic focus with posterior shadowing measuring 6 mm at the midpole left   kidney consistent with a renal calculus.       Gallstones incidentally noted within the gallbladder with posterior acoustic   shadowing and image 56.           Bladder:       Urinary bladder measures 7.5 by 6.0 x 7.2 cm for a bladder volume of 229 mL. Urinary bladder wall thickness measures 3 mm.       Postvoid urinary bladder measures 5.1 x 6.2 x 6.3 cm for a postvoid urinary   bladder volume of 105 mL.       Left ureteral jet is visualized.  Nonvisualization of the right ureteral jet.           Impression   1. Limited exam.  Large postvoid residual.  Nonvisualization of the right   ureteral jet. 2. No hydronephrosis. 3. 6 mm calculus at the midpole left kidney. 4. Cholelithiasis.         Summary  Global left ventricular systolic function is difficult to assess due to the  patients rate in the 120's but appears preserved with an estimated ejection  fraction of 55%. Mildly increased left ventricular wall thickness with a normal left  ventricular cavity size. Left atrium is normal in size. Evidence of a small to moderate inter-atrial communication is seen by color  Doppler imaging with an intermittent left to right shunt. This appears most  consistent with a patent foramen ovale (PFO) vs a small ASD. Bowing intra-atrial septal motion consistent with an atrial septal aneurysm  is seen.  The clinical significant of this finding is unknown, but has been  associated with an increased risk of TIA's and strokes. Mild to moderate aortic leaflets calcification without restriction of  motion. Mild aortic insufficiency was seen. Mild pulmonary hypertension with an estimated right ventricular systolic  pressure of 30 mmHg. Mild to moderate tricuspid regurgitation.     Compared to the previous study of 3/27/18, the patient now has evidence of a  small to moderate sized inter-atrial communication. Consider additional  testing by transesophageal echocardiography (PATT) if clinically indicated,  especially if this might . Clinical correlation required.       Medical Decision Niqvsz-Oowzufic-Haszl:       Medical Decision Making-Other:     Note:  · Labs, medications, radiologic studies were reviewed with personal review of films  · Large amounts of data were reviewed  · Discussed with nursing Staff, Discharge planner  · Infection Control and Prevention measures reviewed  · All prior entries were reviewed  · Administer medications as ordered  · Prognosis: Guarded  · Discharge planning reviewed      Thank you for allowing us to participate in the care of this patient. Please call with questions.     Rakesh Perez MD               Pager: [phone] - Office: [phone]

## 2021-11-25 NOTE — PROGRESS NOTES
Renal Progress Note  [facility]      TELEHEALTH EVALUATION -- Audio/Visual (During NDLDE-18 public health emergency)     Telehealth service was provided with the patient at his room in [facility] and myself the physician in my office in [place], [place] and [facility] , who has initiated the visit. Pursuant to the emergency declaration under the [facility] [place] [address] [facility] authority and the [employer] Act, this Virtual  Visit was conducted, with patient's consent, to reduce the patient's risk of exposure to COVID-19 and provide continuity of care for an established patient. Services were provided through a video synchronous discussion virtually to substitute for in-person clinic visit. Patient :  Katy Underwood; 80 y.o. MRN# [id number]  Location:  8853/8236-17  Attending:  Melissa Reese MD  Admit Date:  11/15/2021   Hospital Day: 10      Subjective:     Nephrology is following the patient for JANINA/CKD. Patient seen. States he is feeling better. On room air. + cough. Poor appetite. Weight up 7 pounds.    Outpatient Medications:     Medications Prior to Admission: furosemide (LASIX) 40 MG tablet, Take 40 mg by mouth daily  glipiZIDE (GLUCOTROL) 5 MG tablet, Take 5 mg by mouth 2 times daily (before meals)  metFORMIN (GLUCOPHAGE) 500 MG tablet, Take 1,000 mg by mouth 2 times daily (with meals)   atorvastatin (LIPITOR) 40 MG tablet, Take 1 tablet by mouth nightly  clopidogrel (PLAVIX) 75 MG tablet, Take 1 tablet by mouth daily  ramipril (ALTACE) 2.5 MG capsule, Take 2.5 mg by mouth every morning   aspirin 81 MG EC tablet, Take 81 mg by mouth nightly     Current Medications:     Scheduled Meds:    sodium chloride  2 g Oral Once    furosemide  40 mg Oral Once    insulin glargine  15 Units SubCUTAneous BID    metoprolol succinate  50 mg Oral Daily    dilTIAZem  60 mg Oral 4 times per day    insulin lispro  0-12 Units SubCUTAneous TID     insulin lispro  0-6 Units SubCUTAneous Nightly    ipratropium-albuterol  1 ampule Inhalation 4x daily    sodium bicarbonate  650 mg Oral BID    famotidine  10 mg Oral Nightly    tamsulosin  0.4 mg Oral Daily    atorvastatin  40 mg Oral Nightly    clopidogrel  75 mg Oral Daily    glipiZIDE  5 mg Oral BID AC    sodium chloride flush  5-40 mL IntraVENous 2 times per day    vitamin D  20,000 Units Oral Weekly    ascorbic acid  2,000 mg Oral BID    zinc sulfate  100 mg Oral Daily    apixaban  2.5 mg Oral BID    amiodarone  200 mg Oral BID     Continuous Infusions:    sodium chloride      dextrose       PRN Meds:  albuterol, sodium chloride flush, sodium chloride, ondansetron **OR** ondansetron, polyethylene glycol, acetaminophen **OR** acetaminophen, glucose, dextrose, glucagon (rDNA), dextrose    Input/Output:       I/O last 3 completed shifts: In: 360 [P.O.:360]  Out: 1070 [Urine:1070]. Patient Vitals for the past 96 hrs (Last 3 readings):   Weight   21 0551 172 lb (78 kg)   21 0545 165 lb (74.8 kg)   21 0527 164 lb 3.2 oz (74.5 kg)       Vital Signs:   Temperature:  Temp: 97.9 °F (36.6 °C)  TMax:   Temp (24hrs), Av.5 °F (36.4 °C), Min:96 °F (35.6 °C), Max:98.4 °F (36.9 °C)    Respirations:  Resp: 19  Pulse:   Pulse: 79  BP:    BP: (!) 128/58  BP Range: Systolic (93OOW), XPA:094 , Min:115 , AHE:363       Diastolic (53NTK), FGZ:77, Min:58, Max:72      Physical Examination:     General -- no distress  Oral Mucosa -- moist  Neck --  JVD - no  Extremities -- trace edema, moves all extremeties  CNS - awake and alert   Pschy - not agitated, mood and memory normal    Due to this being a TeleHealth encounter, evaluation of the following organ systems is limited: Vitals/EENT/Resp/CV/GI//MS/Neuro/Skin/Heme-Lymph-Imm.     Labs:       Recent Labs     21  0540 21  0550 21  0702   WBC 15.5* 15.1* 16.7*   RBC 3.18* 2.89* 3.12* HGB 9.8* 9.0* 9.9*   HCT 31.7* 27.6* 31.1*   MCV 99.7 95.5 99.7   MCH 30.8 31.1 31.7   MCHC 30.9 32.6 31.8   RDW 12.5 12.5 12.6    247 262   MPV 10.7 10.7 10.6      BMP:   Recent Labs     11/23/21  0540 11/24/21  0550 11/25/21  0702   * 134* 128*   K 5.0 4.7 5.1   CL 97* 102 96*   CO2 21 19* 22   BUN 62* 59* 49*   CREATININE 2.56* 2.37* 2.11*   GLUCOSE 179* 74 80   CALCIUM 8.0* 7.6* 8.0*      Phosphorus:   No results for input(s): PHOS in the last 72 hours. Magnesium:  No results for input(s): MG in the last 72 hours.   Albumin:    Recent Labs     11/23/21  0540 11/24/21  0550 11/25/21  0702   LABALBU 2.1* 2.1* 2.2*     BNP:    No results found for: BNP  HÉCTOR:    No results found for: HÉCTOR  SPEP:  Lab Results   Component Value Date    PROT 5.7 11/25/2021     UPEP:   No results found for: LABPE  C3:   No results found for: C3  C4:   No results found for: C4  MPO ANCA:   No results found for: MPO  PR3 ANCA:   No results found for: PR3  Anti-GBM:   No results found for: GBMABIGG  Hep BsAg:       No results found for: HEPBSAG  Hep C AB:        No results found for: HEPCAB    Urinalysis/Chemistries:      Lab Results   Component Value Date    NITRU NEGATIVE 02/11/2021    COLORU YELLOW 02/11/2021    PHUR 5.5 02/11/2021    WBCUA 0 TO 2 02/11/2021    RBCUA None 02/11/2021    MUCUS NOT REPORTED 02/11/2021    TRICHOMONAS NOT REPORTED 02/11/2021    YEAST NOT REPORTED 02/11/2021    BACTERIA TRACE 02/11/2021    SPECGRAV 1.020 02/11/2021    LEUKOCYTESUR NEGATIVE 02/11/2021    UROBILINOGEN Normal 02/11/2021    BILIRUBINUR NEGATIVE 02/11/2021    GLUCOSEU 3+ 02/11/2021    KETUA NEGATIVE 02/11/2021    AMORPHOUS NOT REPORTED 02/11/2021     Urine Sodium:   No results found for: SOHAM  Urine Potassium:  No results found for: KUR  Urine Chloride:  No results found for: CLUR  Urine Osmolarity: No results found for: OSMOU  Urine Protein:   No components found for: TOTALPROTEIN, URINE   Urine Creatinine:     Lab Results Component Value Date    LABCREA 51.4 02/11/2021     Urine Eosinophils:  No components found for: UEOS        Impression and Plan:  1. JANINA on CKD IV: multifactorial. Overall improved and at his baseline. Weight is up 7 pounds. Agree with stopping fluids. Will give dose of lasix 40 mg x 1  2. Metabolic acidosis from JANINA/CKD: continue with po bicarb  3. Hyponatremia from decreased water excretion from JANINA/CKD:worse today. ? Hypervolemia compounded by poor po intake. Give dose of lasix and will also give salt tab today  4. Urinary retention: del rio removed today, has been voiding ok  5. Diastolic CHF. Diuretics as needed. 6. A flutter  7. COVID-19 +   8. DM  9. Anemia          Please don't hesitate to call with any questions.   Electronically signed by Krissy Ruiz DO on 11/25/2021 at 8:39 AM

## 2021-11-25 NOTE — PROGRESS NOTES
Writer assisted Dr. Kristy Gonzalez with tele-visit at this time. Primary nurse Olimpia WASHINGTON notified afterwards.

## 2021-11-26 ENCOUNTER — APPOINTMENT (OUTPATIENT)
Dept: CT IMAGING | Age: 86
DRG: 177 | End: 2021-11-26
Payer: MEDICARE

## 2021-11-26 LAB
ABSOLUTE EOS #: 0.14 K/UL (ref 0–0.44)
ABSOLUTE IMMATURE GRANULOCYTE: 0.18 K/UL (ref 0–0.3)
ABSOLUTE LYMPH #: 1.06 K/UL (ref 1.1–3.7)
ABSOLUTE MONO #: 0.71 K/UL (ref 0.1–1.2)
ALBUMIN SERPL-MCNC: 2.1 G/DL (ref 3.5–5.2)
ALBUMIN/GLOBULIN RATIO: 0.6 (ref 1–2.5)
ALP BLD-CCNC: 152 U/L (ref 40–129)
ALT SERPL-CCNC: 43 U/L (ref 5–41)
ANION GAP SERPL CALCULATED.3IONS-SCNC: 12 MMOL/L (ref 9–17)
AST SERPL-CCNC: 39 U/L
BASOPHILS # BLD: 0 % (ref 0–2)
BASOPHILS ABSOLUTE: <0.03 K/UL (ref 0–0.2)
BILIRUB SERPL-MCNC: 0.52 MG/DL (ref 0.3–1.2)
BUN BLDV-MCNC: 49 MG/DL (ref 8–23)
BUN/CREAT BLD: 21 (ref 9–20)
CALCIUM SERPL-MCNC: 8 MG/DL (ref 8.6–10.4)
CHLORIDE BLD-SCNC: 100 MMOL/L (ref 98–107)
CO2: 20 MMOL/L (ref 20–31)
CREAT SERPL-MCNC: 2.29 MG/DL (ref 0.7–1.2)
DIFFERENTIAL TYPE: ABNORMAL
EOSINOPHILS RELATIVE PERCENT: 1 % (ref 1–4)
GFR AFRICAN AMERICAN: 33 ML/MIN
GFR NON-AFRICAN AMERICAN: 27 ML/MIN
GFR SERPL CREATININE-BSD FRML MDRD: ABNORMAL ML/MIN/{1.73_M2}
GFR SERPL CREATININE-BSD FRML MDRD: ABNORMAL ML/MIN/{1.73_M2}
GLUCOSE BLD-MCNC: 128 MG/DL (ref 74–100)
GLUCOSE BLD-MCNC: 157 MG/DL (ref 74–100)
GLUCOSE BLD-MCNC: 208 MG/DL (ref 74–100)
GLUCOSE BLD-MCNC: 246 MG/DL (ref 74–100)
GLUCOSE BLD-MCNC: 59 MG/DL (ref 74–100)
GLUCOSE BLD-MCNC: 64 MG/DL (ref 70–99)
GLUCOSE BLD-MCNC: 69 MG/DL (ref 74–100)
HCT VFR BLD CALC: 29.3 % (ref 40.7–50.3)
HEMOGLOBIN: 9.6 G/DL (ref 13–17)
IMMATURE GRANULOCYTES: 1 %
LYMPHOCYTES # BLD: 7 % (ref 24–43)
MCH RBC QN AUTO: 31.9 PG (ref 25.2–33.5)
MCHC RBC AUTO-ENTMCNC: 32.8 G/DL (ref 28.4–34.8)
MCV RBC AUTO: 97.3 FL (ref 82.6–102.9)
MONOCYTES # BLD: 4 % (ref 3–12)
NRBC AUTOMATED: 0 PER 100 WBC
PDW BLD-RTO: 12.6 % (ref 11.8–14.4)
PLATELET # BLD: 254 K/UL (ref 138–453)
PLATELET ESTIMATE: ABNORMAL
PMV BLD AUTO: 10.4 FL (ref 8.1–13.5)
POTASSIUM SERPL-SCNC: 4.7 MMOL/L (ref 3.7–5.3)
RBC # BLD: 3.01 M/UL (ref 4.21–5.77)
RBC # BLD: ABNORMAL 10*6/UL
SEG NEUTROPHILS: 87 % (ref 36–65)
SEGMENTED NEUTROPHILS ABSOLUTE COUNT: 14.1 K/UL (ref 1.5–8.1)
SODIUM BLD-SCNC: 132 MMOL/L (ref 135–144)
TOTAL PROTEIN: 5.4 G/DL (ref 6.4–8.3)
WBC # BLD: 16.2 K/UL (ref 3.5–11.3)
WBC # BLD: ABNORMAL 10*3/UL

## 2021-11-26 PROCEDURE — 94761 N-INVAS EAR/PLS OXIMETRY MLT: CPT

## 2021-11-26 PROCEDURE — 85025 COMPLETE CBC W/AUTO DIFF WBC: CPT

## 2021-11-26 PROCEDURE — 97116 GAIT TRAINING THERAPY: CPT

## 2021-11-26 PROCEDURE — 6370000000 HC RX 637 (ALT 250 FOR IP): Performed by: INTERNAL MEDICINE

## 2021-11-26 PROCEDURE — 71250 CT THORAX DX C-: CPT

## 2021-11-26 PROCEDURE — 1200000000 HC SEMI PRIVATE

## 2021-11-26 PROCEDURE — 82947 ASSAY GLUCOSE BLOOD QUANT: CPT

## 2021-11-26 PROCEDURE — 36415 COLL VENOUS BLD VENIPUNCTURE: CPT

## 2021-11-26 PROCEDURE — 94640 AIRWAY INHALATION TREATMENT: CPT

## 2021-11-26 PROCEDURE — 6370000000 HC RX 637 (ALT 250 FOR IP): Performed by: NURSE PRACTITIONER

## 2021-11-26 PROCEDURE — 97535 SELF CARE MNGMENT TRAINING: CPT

## 2021-11-26 PROCEDURE — 97110 THERAPEUTIC EXERCISES: CPT

## 2021-11-26 PROCEDURE — 2580000003 HC RX 258: Performed by: NURSE PRACTITIONER

## 2021-11-26 PROCEDURE — 6360000002 HC RX W HCPCS: Performed by: NURSE PRACTITIONER

## 2021-11-26 PROCEDURE — 80053 COMPREHEN METABOLIC PANEL: CPT

## 2021-11-26 PROCEDURE — 51702 INSERT TEMP BLADDER CATH: CPT

## 2021-11-26 PROCEDURE — 94669 MECHANICAL CHEST WALL OSCILL: CPT

## 2021-11-26 RX ORDER — FUROSEMIDE 10 MG/ML
40 INJECTION INTRAMUSCULAR; INTRAVENOUS ONCE
Status: COMPLETED | OUTPATIENT
Start: 2021-11-26 | End: 2021-11-26

## 2021-11-26 RX ORDER — FUROSEMIDE 10 MG/ML
40 INJECTION INTRAMUSCULAR; INTRAVENOUS 2 TIMES DAILY
Status: DISCONTINUED | OUTPATIENT
Start: 2021-11-26 | End: 2021-11-27 | Stop reason: HOSPADM

## 2021-11-26 RX ORDER — BUDESONIDE 0.5 MG/2ML
0.5 INHALANT ORAL 2 TIMES DAILY
Status: DISCONTINUED | OUTPATIENT
Start: 2021-11-26 | End: 2021-11-27 | Stop reason: HOSPADM

## 2021-11-26 RX ADMIN — IPRATROPIUM BROMIDE AND ALBUTEROL SULFATE 1 AMPULE: 2.5; .5 SOLUTION RESPIRATORY (INHALATION) at 09:20

## 2021-11-26 RX ADMIN — AMIODARONE HYDROCHLORIDE 200 MG: 200 TABLET ORAL at 09:06

## 2021-11-26 RX ADMIN — FUROSEMIDE 40 MG: 10 INJECTION, SOLUTION INTRAMUSCULAR; INTRAVENOUS at 17:46

## 2021-11-26 RX ADMIN — SODIUM CHLORIDE, PRESERVATIVE FREE 10 ML: 5 INJECTION INTRAVENOUS at 09:06

## 2021-11-26 RX ADMIN — FUROSEMIDE 40 MG: 10 INJECTION, SOLUTION INTRAMUSCULAR; INTRAVENOUS at 09:07

## 2021-11-26 RX ADMIN — APIXABAN 2.5 MG: 2.5 TABLET, FILM COATED ORAL at 20:17

## 2021-11-26 RX ADMIN — METOPROLOL SUCCINATE 50 MG: 50 TABLET, EXTENDED RELEASE ORAL at 09:06

## 2021-11-26 RX ADMIN — FAMOTIDINE 10 MG: 10 TABLET ORAL at 20:17

## 2021-11-26 RX ADMIN — OXYCODONE HYDROCHLORIDE AND ACETAMINOPHEN 2000 MG: 500 TABLET ORAL at 20:17

## 2021-11-26 RX ADMIN — ZINC SULFATE 220 MG (50 MG) CAPSULE 100 MG: CAPSULE at 09:06

## 2021-11-26 RX ADMIN — SODIUM BICARBONATE TAB 650 MG 650 MG: 650 TAB at 20:17

## 2021-11-26 RX ADMIN — BUDESONIDE 500 MCG: 0.5 SUSPENSION RESPIRATORY (INHALATION) at 09:20

## 2021-11-26 RX ADMIN — INSULIN GLARGINE 15 UNITS: 100 INJECTION, SOLUTION SUBCUTANEOUS at 20:16

## 2021-11-26 RX ADMIN — APIXABAN 2.5 MG: 2.5 TABLET, FILM COATED ORAL at 09:06

## 2021-11-26 RX ADMIN — SODIUM CHLORIDE, PRESERVATIVE FREE 10 ML: 5 INJECTION INTRAVENOUS at 21:13

## 2021-11-26 RX ADMIN — IPRATROPIUM BROMIDE AND ALBUTEROL SULFATE 1 AMPULE: 2.5; .5 SOLUTION RESPIRATORY (INHALATION) at 14:27

## 2021-11-26 RX ADMIN — SODIUM BICARBONATE TAB 650 MG 650 MG: 650 TAB at 09:06

## 2021-11-26 RX ADMIN — OXYCODONE HYDROCHLORIDE AND ACETAMINOPHEN 2000 MG: 500 TABLET ORAL at 09:05

## 2021-11-26 RX ADMIN — ATORVASTATIN CALCIUM 40 MG: 40 TABLET, FILM COATED ORAL at 20:17

## 2021-11-26 RX ADMIN — GLIPIZIDE 5 MG: 5 TABLET ORAL at 16:56

## 2021-11-26 RX ADMIN — IPRATROPIUM BROMIDE AND ALBUTEROL SULFATE 1 AMPULE: 2.5; .5 SOLUTION RESPIRATORY (INHALATION) at 05:43

## 2021-11-26 RX ADMIN — DEXTROSE 15 G: 15 GEL ORAL at 07:01

## 2021-11-26 RX ADMIN — IPRATROPIUM BROMIDE AND ALBUTEROL SULFATE 1 AMPULE: 2.5; .5 SOLUTION RESPIRATORY (INHALATION) at 19:23

## 2021-11-26 RX ADMIN — TAMSULOSIN HYDROCHLORIDE 0.4 MG: 0.4 CAPSULE ORAL at 09:06

## 2021-11-26 RX ADMIN — GLIPIZIDE 5 MG: 5 TABLET ORAL at 09:06

## 2021-11-26 RX ADMIN — AMIODARONE HYDROCHLORIDE 200 MG: 200 TABLET ORAL at 20:17

## 2021-11-26 RX ADMIN — CLOPIDOGREL BISULFATE 75 MG: 75 TABLET ORAL at 09:06

## 2021-11-26 RX ADMIN — BUDESONIDE 500 MCG: 0.5 SUSPENSION RESPIRATORY (INHALATION) at 19:33

## 2021-11-26 ASSESSMENT — PAIN SCALES - GENERAL
PAINLEVEL_OUTOF10: 0

## 2021-11-26 NOTE — PROGRESS NOTES
Occupational Therapy  Facility/Department: [facility] AT THE [facility] MED SURG  Daily Treatment Note  NAME: Shadi Vazquez  : 10/31/1930  MRN: [id number]    Date of Service: 2021    Discharge Recommendations:  Continue to assess pending progress       Assessment      OT Education: OT Role; Plan of Care; Energy Conservation; Transfer Training; ADL Adaptive Strategies  Patient Education: pt with no questions aon d/c folder contenets or AE  Barriers to Learning: none  Activity Tolerance  Activity Tolerance: Patient Tolerated treatment well  Safety Devices  Safety Devices in place: Yes  Type of devices: All fall risk precautions in place; Left in chair; Call light within reach; Chair alarm in place         Patient Diagnosis(es): The primary encounter diagnosis was Pneumonia due to COVID-19 virus. A diagnosis of Elevated troponin was also pertinent to this visit. has a past medical history of Acute and chronic respiratory failure with hypoxia ([address] CAD (coronary artery disease), Cataracts, bilateral, Glaucoma, Hyperlipidemia, Hypertension, TIA (transient ischemic attack), and Type II or unspecified type diabetes mellitus without mention of complication, not stated as uncontrolled. has a past surgical history that includes Coronary artery bypass graft (); Cataract removal with implant (Bilateral,  and 12/3/2013); and Inguinal hernia repair (Bilateral, 12). Restrictions  Restrictions/Precautions  Restrictions/Precautions: General Precautions, Fall Risk  Subjective   General  Chart Reviewed: Yes  Patient assessed for rehabilitation services?: Yes  Response to previous treatment: Patient with no complaints from previous session  Family / Caregiver Present: No  Referring Practitioner: Candelario Chavez  Diagnosis: Covid-19  Subjective  Subjective: \"i think I need to go to the bathroom. \"  General Comment  Comments: In bedside chair upon MADELYN arrival. Requested to complete ADLs.   Vital Signs  Patient Currently in Pain: Denies   Orientation     Objective    ADL  Grooming: Setup; Supervision (seated at sink)  UE Bathing: Minimal assistance  LE Bathing: Minimal assistance  UE Dressing: Minimal assistance (gown only)  LE Dressing: Minimal assistance (brief only)  Toileting: Stand by assistance (with exception of posterior hygiene, pt requested clinician perform and apply barrier cream while he stood unilaterally supported with 0 LOB)  Additional Comments: Completed morning sink side bathing and grooming with good tolerance and completion. Educated throughout on safety and EC/WS with good understanding. Balance  Sitting Balance: Supervision  Standing Balance: Contact guard assistance  Standing Balance  Time: ~ 2 min x 2 and ~ 3-4 min  Activity: fxl mob to/from restroom, dynamic standing at sink  Comment: slow pace, pt denied pain however groaned all the way  Functional Mobility  Functional - Mobility Device: Rolling Walker  Activity: To/from bathroom  Assist Level: Contact guard assistance  Functional Mobility Comments: Pt demo slow pace and fair postural control. Transfers  Sit to stand: Stand by assistance  Stand to sit: Stand by assistance  Transfer Comments: from recliner and toilet using grab bar                                                                 Plan   Plan  Times per week: 7x  Times per day: Daily  Current Treatment Recommendations: Strengthening, ROM, Safety Education & Training, Patient/Caregiver Education & Training, Self-Care / ADL, Endurance Training, Functional Mobility Training  G-Code     OutComes Score                                                  AM-PAC Score             Goals  Short term goals  Time Frame for Short term goals: 21  Short term goal 1: Patient to complete self care routine c SUP to ensure safe return home.   Short term goal 2: Patient to engage in 15 minutes of ther ex/ther act s significant shortness of breath to improve strength as well as activity tolerance for self care tasks.  Short term goal 3: Patient to tolerate standing >7' while participating in functional task of choice to improve standing tolerane, balance and safety during ADL, mobility and transfers. Short term goal 4: Patient to be educated on d/c folder, AE/DME, and general safety to ensure safe return home.        Therapy Time   Individual Concurrent Group Co-treatment   Time In 0930         Time Out 0957         Minutes [address]

## 2021-11-26 NOTE — PROGRESS NOTES
Physical Therapy  Facility/Department: [facility] AT THE [facility] MED SURG  Daily Treatment Note  NAME: Yanique Haynes  : 10/31/1930  MRN: [id number]    Date of Service: 2021    Discharge Recommendations:  Continue to assess pending progress, Patient would benefit from continued therapy after discharge        Assessment   Treatment Diagnosis: Difficutly walking  Prognosis: Good  Decision Making: Medium Complexity  PT Education: General Safety; Gait Training; Transfer Training; Goals  Patient Education: educated patient on importance of daily exercise and safety ambulation, fair understanding  REQUIRES PT FOLLOW UP: Yes  Activity Tolerance  Activity Tolerance: Patient Tolerated treatment well; Patient limited by endurance  Activity Tolerance: Pt reported SOB and had some wheezing with activity. Patient Diagnosis(es): The primary encounter diagnosis was Pneumonia due to COVID-19 virus. A diagnosis of Elevated troponin was also pertinent to this visit. has a past medical history of Acute and chronic respiratory failure with hypoxia ([address] CAD (coronary artery disease), Cataracts, bilateral, Glaucoma, Hyperlipidemia, Hypertension, TIA (transient ischemic attack), and Type II or unspecified type diabetes mellitus without mention of complication, not stated as uncontrolled. has a past surgical history that includes Coronary artery bypass graft (); Cataract removal with implant (Bilateral,  and 12/3/2013); and Inguinal hernia repair (Bilateral, 12). Restrictions  Restrictions/Precautions  Restrictions/Precautions: General Precautions, Fall Risk  Subjective   General  Chart Reviewed: Yes  Response To Previous Treatment: Patient with no complaints from previous session. Family / Caregiver Present: No  Referring Practitioner: FRANKLIN Basilio CNP  Subjective  Subjective: Pt reporting no pain.           Orientation  Orientation  Overall Orientation Status: Within Functional Limits  Cognition Objective   Bed mobility  Supine to Sit: Minimal assistance  Scooting: Contact guard assistance; Minimal assistance  Transfers  Sit to Stand: Contact guard assistance  Stand to sit: Contact guard assistance  Comment: V/c for proper hand placement  Ambulation  Ambulation?: Yes  WB Status: unrestricted  Ambulation 1  Surface: level tile  Device: Rolling Walker  Assistance: Contact guard assistance  Quality of Gait: Slow shuffling abiel with FF posture, short step lengtn, no LOB  Gait Deviations: Slow Abiel; Shuffles  Distance: 15 feet x 2  Comments: on room air     Balance  Posture: Fair  Sitting - Static: Good  Sitting - Dynamic: Good  Standing - Static: Fair  Standing - Dynamic: Fair; -  Exercises  Hip Flexion: x15  Hip Abduction: 15x  Knee Long Arc Quad: x15  Ankle Pumps: 20x  Comments: Pt easily SOB with ex, multiple rest breaks needed      Goals  Short term goals  Time Frame for Short term goals: 20 days  Short term goal 1: Pt will be initiated on strong ex program to address deficits in endurance and balance. Short term goal 2: Pt will be independent with bed mobility without use of handrails for discharge home and negotiation of bed. Short term goal 3: Pt will transfer independently with least restrictive device and good balance to reduce fall risk  Short term goal 4: Pt will be able to ambulate up to 350 feet with supervision to independent with least restrictive device to allow discharge home with his wife.   Short term goal 5: Pt will be able to negoitiate 4 steps with chani handrails and SBA +1  Patient Goals   Patient goals : go home and hopes for Thursday    Plan    Plan  Times per week: 7  Times per day: Twice a day  Plan weeks: 2x/daily ecept weekends 1x/daily  Current Treatment Recommendations: Strengthening, Neuromuscular Re-education, Home Exercise Program, ROM, Safety Education & Training, Balance Training, Endurance Training, Patient/Caregiver Education & Training, Functional Mobility Training, Transfer Training, Gait Training, Stair training  Plan Comment: Initiate POC  Safety Devices  Type of devices:  All fall risk precautions in place, Left in chair, Call light within reach, Chair alarm in place, Nurse notified, Gait belt, Patient at risk for falls     Therapy Time   Individual Concurrent Group Co-treatment   Time In 0728         Time Out 0753         Minutes 25                 Rohit Fontanez, PTA

## 2021-11-26 NOTE — PROGRESS NOTES
Nutrition Assessment    Type and Reason for Visit:  Reassess      Nutrition Assessment:  Dietary  reports Pt not eating well and would like an 8 oz Glucerna. He is on a low K+ diet, < 3,000 mg per day. Increase in Glucerna would provide 1,128 mg K+, would avise reduction in Glucerna when oral intakes improve. Current Nutrition Therapies:    ADULT ORAL NUTRITION SUPPLEMENT; Breakfast, Lunch, Dinner; Diabetic Oral Supplement  ADULT DIET; Regular; 3 carb choices (45 gm/meal);  Low Potassium (Less than 3000 mg/day)      Electronically signed by Catarino Marshall RD, LD on 11/26/21 at 3:40 PM EST    Contact: [phone]

## 2021-11-26 NOTE — PROGRESS NOTES
Physical Therapy  Facility/Department: [facility] AT THE [facility] MED SURG  Daily Treatment Note  NAME: Annie Marie  : 10/31/1930  MRN: [id number]    Date of Service: 2021    Discharge Recommendations:  Continue to assess pending progress, Patient would benefit from continued therapy after discharge        Assessment   Treatment Diagnosis: Difficutly walking  Prognosis: Good  Decision Making: Medium Complexity  PT Education: General Safety; Gait Training; Transfer Training  Patient Education: educated patient on importance of daily exercise and safety ambulation, fair understanding  REQUIRES PT FOLLOW UP: Yes  Activity Tolerance  Activity Tolerance: Patient Tolerated treatment well; Patient limited by endurance  Activity Tolerance: Pt reported SOB. Patient Diagnosis(es): The primary encounter diagnosis was Pneumonia due to COVID-19 virus. A diagnosis of Elevated troponin was also pertinent to this visit. has a past medical history of Acute and chronic respiratory failure with hypoxia ([address] CAD (coronary artery disease), Cataracts, bilateral, Glaucoma, Hyperlipidemia, Hypertension, TIA (transient ischemic attack), and Type II or unspecified type diabetes mellitus without mention of complication, not stated as uncontrolled. has a past surgical history that includes Coronary artery bypass graft (); Cataract removal with implant (Bilateral,  and 12/3/2013); and Inguinal hernia repair (Bilateral, 12). Restrictions  Restrictions/Precautions  Restrictions/Precautions: General Precautions, Fall Risk  Subjective   General  Chart Reviewed: Yes  Response To Previous Treatment: Patient with no complaints from previous session. Family / Caregiver Present: No  Referring Practitioner: Terrea Meckel, APRN - CNP  Subjective  Subjective: Pt reporting no pain.           Orientation  Orientation  Overall Orientation Status: Within Functional Limits  Cognition      Objective   Bed mobility  Supine to Sit: Minimal assistance  Scooting: Contact guard assistance; Minimal assistance  Comment: Pt up in bathroom when PT arrived  Transfers  Sit to Stand: Contact guard assistance  Stand to sit: Contact guard assistance  Comment: V/c for proper hand placement  Ambulation  Ambulation?: Yes  WB Status: unrestricted  Ambulation 1  Surface: level tile  Device: Rolling Walker  Assistance: Contact guard assistance  Quality of Gait: Slow shuffling abiel with FF posture, short step lengtn, no LOB  Gait Deviations: Slow Abiel; Shuffles  Distance: 35 feet  Comments: on room air     Balance  Posture: Fair  Sitting - Static: Good  Sitting - Dynamic: Good  Standing - Static: Fair  Standing - Dynamic: Fair; -  Exercises  Straight Leg Raise: 10x  Quad Sets: 10x  Heelslides: 10x  Gluteal Sets: declined  Hip Flexion: 10x  Hip Abduction: 10x2  Knee Long Arc Quad: 10x  Ankle Pumps: 10x2  Comments: Pt easily SOB with ex, multiple rest breaks needed- done in seated and reclined         Goals  Short term goals  Time Frame for Short term goals: 20 days  Short term goal 1: Pt will be initiated on strong ex program to address deficits in endurance and balance. Short term goal 2: Pt will be independent with bed mobility without use of handrails for discharge home and negotiation of bed. Short term goal 3: Pt will transfer independently with least restrictive device and good balance to reduce fall risk  Short term goal 4: Pt will be able to ambulate up to 350 feet with supervision to independent with least restrictive device to allow discharge home with his wife.   Short term goal 5: Pt will be able to negoitiate 4 steps with chani handrails and SBA +1  Patient Goals   Patient goals : go home and hopes for Thursday    Plan    Plan  Times per week: 7  Times per day: Twice a day  Plan weeks: 2x/daily ecept weekends 1x/daily  Current Treatment Recommendations: Strengthening, Neuromuscular Re-education, Home Exercise Program, ROM, Safety Education & Training, Balance Training, Endurance Training, Patient/Caregiver Education & Training, Functional Mobility Training, Transfer Training, Gait Training, Stair training  Plan Comment: Initiate POC  Safety Devices  Type of devices:  All fall risk precautions in place, Left in chair, Call light within reach, Chair alarm in place, Nurse notified, Gait belt, Patient at risk for falls     Therapy Time   Individual Concurrent Group Co-treatment   Time In 1102         Time Out 1134         Minutes 28                 Ioana Phelan, PTA 2

## 2021-11-26 NOTE — PROGRESS NOTES
Writer in for reassessment. Pt. Complaining of pressure in his bladder. Pt. Did have a wet brief this AM at 7, pt. States sense then he has been dribbling but has not felt any relief. Pt bladder scanned at this time and shows 988mL of urine. Writer spoke with Elver Mak regarding bladder scan and pressure, no orders received for del rio catheter. [place] catheter placed with no complications, pt. Tolerated well. 700mL of clear yellow urine drained immediately. Pt. States he feels so much better already.

## 2021-11-26 NOTE — PROGRESS NOTES
Spoke with Dr. Smith Brochure regarding new consult. He will not be able to see patient until Monday, recall consult on Monday if patient is still here. If not patient can follow up as outpatient.

## 2021-11-26 NOTE — PROGRESS NOTES
Spoke with Abbie Willis and Mariluz at [facility] in reference to referral made to [facility] on Wednesday. Neither Sanjuana or Mariluz have any information about referral or Patient. Re-sent referral information for their review and informed them that he is ready for discharge when approved.     Zeus. Raissa Sheth 69, [facility]  11/26/2021

## 2021-11-26 NOTE — PROGRESS NOTES
Pt. Resting comfortably in bed. Vitals and assessment completed at this time, see flowsheet. Pt. Blood sugar 59 at this time. Glucose Gel and orange given at this time. Will continue monitor blood sugar. Call light in reach, safety maintained. Will continue to monitor.

## 2021-11-26 NOTE — PROGRESS NOTES
[facility]   APRN - Progress Note    Patient - Merry Schaefer  Date of Admission -  11/15/2021  8:38 PM  Date of Evaluation -  2021  Hospital Day - 11      SUBJECTIVE:     The Merry Schaefer is a 80 y.o. male who is seen for follow up for atrial fibrillation with RVR and COVID-19. Per nursing report and notes, overnight events include: no significant events. He resting in chair alert oriented and has audible wheezes and slightly labored respirations but denies SOB or difficulty. Cough noted. ROS:   Constitutional: negative  for fevers, and negative for chills. Respiratory: denies for shortness of breath, positive for cough, and negative for wheezing  Cardiovascular: negative for chest pain, and negative for palpitations  Gastrointestinal: negative for abdominal pain, negative for nausea,negative for vomiting, negative for diarrhea, and negative for constipation    All other systems were reviewed with the patient and are negative unless otherwise stated in HPI. OBJECTIVE:     VITAL SIGNS:  Patient Vitals for the past 8 hrs:   BP Temp Temp src Pulse Resp SpO2   21 0645 116/87 98.1 °F (36.7 °C) Temporal 98 18 96 %   21 0600 (!) 111/51 97.7 °F (36.5 °C) Oral 97 18    21 0543     22 96 %         Temp: 98.1 °F (36.7 °C)  Temp range:    Temp  Av.5 °F (36.4 °C)  Min: 97.2 °F (36.2 °C)  Max: 98.1 °F (36.7 °C)    BP: 116/87  BP Range:      Systolic (92ICB), CLB:017 , Min:99 , VYD:841      Diastolic (25BGJ), JUJ:37, Min:51, Max:87    Pulse: 98  Pulse Range:    Pulse  Av.4  Min: 73  Max: 98    Resp: 18  Resp Range:   Resp  Av.8  Min: 18  Max: 22    SpO2: 96 % room air  SpO2 range:   SpO2  Av %  Min: 95 %  Max: 99 %    Weight  Wt Readings from Last 3 Encounters:   21 172 lb (78 kg)   19 158 lb (71.7 kg)   18 166 lb 6.4 oz (75.5 kg)     Body mass index is 30.47 kg/m².     24HR INTAKE/OUTPUT:      Intake/Output Summary (Last 24 hours) at 2021 08  Last data filed at 11/25/2021 1999  Gross per 24 hour   Intake    Output 75 ml   Net -75 ml           PHYSICAL EXAM:  GEN:    Awake and following commands:     [] No   [x] Yes  MENTAL STATUS: alert and oriented x3. DISTRESS: Acute respiratory distress:       [] No   [x] Yes  EYES:  EOMI, pupils equal   NECK: Supple. No lymphadenopathy. No carotid bruit  CVS: Regular rhythm, no audible murmur  PULM: Diminished Wheeze throughout with mild respiratory distress and wheeze is audible, labored  ABD:    Bowels sounds normal.  Abdomen is soft. No distention. no tenderness to palpation. EXT:   no edema bilaterally . No calf tenderness. NEURO: Moves all extremities. Motor and sensory are grossly intact  SKIN:  No rashes. No skin lesions.           MEDICATIONS:  Scheduled Meds:   insulin glargine  15 Units SubCUTAneous BID    metoprolol succinate  50 mg Oral Daily    dilTIAZem  60 mg Oral 4 times per day    insulin lispro  0-12 Units SubCUTAneous TID WC    insulin lispro  0-6 Units SubCUTAneous Nightly    ipratropium-albuterol  1 ampule Inhalation 4x daily    sodium bicarbonate  650 mg Oral BID    famotidine  10 mg Oral Nightly    tamsulosin  0.4 mg Oral Daily    atorvastatin  40 mg Oral Nightly    clopidogrel  75 mg Oral Daily    glipiZIDE  5 mg Oral BID AC    sodium chloride flush  5-40 mL IntraVENous 2 times per day    vitamin D  20,000 Units Oral Weekly    ascorbic acid  2,000 mg Oral BID    zinc sulfate  100 mg Oral Daily    apixaban  2.5 mg Oral BID    amiodarone  200 mg Oral BID     Continuous Infusions:   sodium chloride      dextrose       PRN Meds:   albuterol, 2.5 mg, Q4H PRN  sodium chloride flush, 5-40 mL, PRN  sodium chloride, 25 mL, PRN  ondansetron, 4 mg, Q8H PRN   Or  ondansetron, 4 mg, Q6H PRN  polyethylene glycol, 17 g, Daily PRN  acetaminophen, 650 mg, Q6H PRN   Or  acetaminophen, 650 mg, Q6H PRN  glucose, 15 g, PRN  dextrose, 12.5 g, PRN  glucagon (rDNA), 1 mg, PRN  dextrose, 100 mL/hr, PRN        DATA:  Complete Blood Count:   Recent Labs     11/24/21  0550 11/25/21  0702 11/26/21  0545   WBC 15.1* 16.7* 16.2*   RBC 2.89* 3.12* 3.01*   HGB 9.0* 9.9* 9.6*   HCT 27.6* 31.1* 29.3*   MCV 95.5 99.7 97.3   RDW 12.5 12.6 12.6    262 254   SEGS 83* 87* 87*   NEUTROABS 12.61* 14.52* 14.10*   LYMPHOPCT 7* 6* 7*   LYMPHSABS 1.05* 1.05* 1.06*   MONOPCT 5 4 4   EOSRELPCT 2 1 1   BASOPCT 0 0 0   IMMGRAN 3* 2* 1*        Recent Blood Glucose:   Recent Labs     11/24/21  0550 11/25/21  0702 11/26/21  0545   GLUCOSE 74 80 64*        Comprehensive Metabolic Profile:   Recent Labs     11/24/21  0550 11/25/21  0702 11/26/21  0545   BUN 59* 49* 49*   CREATININE 2.37* 2.11* 2.29*   * 128* 132*   K 4.7 5.1 4.7    96* 100   CALCIUM 7.6* 8.0* 8.0*   ANIONGAP 13 10 12   CO2 19* 22 20   PROT 5.1* 5.7* 5.4*   LABALBU 2.1* 2.2* 2.1*   BILITOT 0.37 0.56 0.52   ALKPHOS 121 133* 152*   AST 45* 53* 39   ALT 40 51* 43*        Urinalysis:   Lab Results   Component Value Date    NITRU NEGATIVE 02/11/2021    COLORU YELLOW 02/11/2021    PHUR 5.5 02/11/2021    WBCUA 0 TO 2 02/11/2021    RBCUA None 02/11/2021    MUCUS NOT REPORTED 02/11/2021    TRICHOMONAS NOT REPORTED 02/11/2021    YEAST NOT REPORTED 02/11/2021    BACTERIA TRACE 02/11/2021    SPECGRAV 1.020 02/11/2021    LEUKOCYTESUR NEGATIVE 02/11/2021    UROBILINOGEN Normal 02/11/2021    BILIRUBINUR NEGATIVE 02/11/2021    GLUCOSEU 3+ 02/11/2021    KETUA NEGATIVE 02/11/2021    AMORPHOUS NOT REPORTED 02/11/2021       HgBA1c:    Lab Results   Component Value Date    LABA1C 9.5 08/27/2021       TSH:    Lab Results   Component Value Date    TSH 1.12 11/17/2021       Lactic Acid:   Lab Results   Component Value Date    LACTA 2.2 11/15/2021        High Sensitivity Troponin:  No results for input(s): TROPHS in the last 72 hours.   Pro-BNP:  Lab Results   Component Value Date    PROBNP 11,281 (H) 11/23/2021     D-Dimer:  Lab Results   Component Value Date    DDIMER 1.31 (H) 11/19/2021     PT/INR:    Lab Results   Component Value Date    PROTIME 16.3 11/17/2021    INR 1.3 11/17/2021     PTT:    Lab Results   Component Value Date    APTT 37.3 11/17/2021       CRP:   No results for input(s): CRP in the last 72 hours. ABGs:   No results found for: PHART, PH, DJE5YPC, PCO2, PO2ART, PO2, JIY0VPG, HCO3, BEART, BE, THGBART, THB, VYQ6JUQ, F7OBHHBC, O2SAT, FIO2      Radiology/Imaging:  XR CHEST PORTABLE   Final Result   Unchanged bilateral peripheral opacities. Improvement in pleural effusions. XR CHEST PORTABLE   Final Result   Bilateral airspace disease and small pleural effusions appear improved. XR CHEST PORTABLE   Final Result   No significant change in bilateral airspace disease. Unchanged appearance of   small pleural effusions versus pleuroparenchymal reaction. Some of these opacities likely represent underlying chronic lung disease and   scarring. A superimposed acute inflammatory process or infection should be   considered in the appropriate clinical setting. US RETROPERITONEAL COMPLETE   Final Result   1. Limited exam.  Large postvoid residual.  Nonvisualization of the right   ureteral jet. 2. No hydronephrosis. 3. 6 mm calculus at the midpole left kidney. 4. Cholelithiasis. XR CHEST PORTABLE   Final Result   Significant peripheral and basal predominant pulmonary opacities diffusely   throughout both lungs. This pattern can be seen in the setting of COVID-19   pneumonia, though this requires clinical correlation. Sequelae of underlying   interstitial lung disease is also possible, though less favored. Stability   is indeterminate in the absence of prior comparison imaging. Small right pleural effusion. Age-indeterminate fracture of the left anterolateral 9th rib.          CT CHEST WO CONTRAST    (Results Pending)         ASSESSMENT / PLAN:     COVID-19 virus infection  · Remove isolation  · Continue current therapy  · Consults: Dr. Padmini Leon  · Remdesivir not indicated  · Appreciate infectious disease   · Continue vitamin C, D and zinc  · Trend labs  · Renal function Stable  · IV steroidsstoppedelevated creatinine and no hypoxia  · VaccinatedJanuary, February booster in November 2021  · Acute respiratory failure with hypoxia  · Supplemental oxygen maintain SPO2 greater than 92%  · Start Pulmicort  · Weight up  · Repeat lasix today  · CT chest WO contrast todaybilateral pleural effusions  · IV Lasix 40 mg twice daily x4 doses  · Nebs  · Acapella  · PT OT  · Nebs  · Stop IV fluids  · CAD  · Continue Lipitor, Plavix, Lasix  · New onset atrial flutter  · Appreciate cardiology  · EKG2-1 conduction  · continueToprol-XL to 50 mg daily for rate control  · Amiodarone drip completed twice  · Continue amiodarone 200 mg twice daily  · Continue Eliquis  · Type 2 diabetes  · POCT before meals and at bedtime  · Med-dose sliding scale  · Hold Glucophage due to CKD  · Continue glipizide  · Continue Lantus 15 units twice daily  · Hypertension  · Continue Altace  · CKD  · Trend labs  · No remdesivir or Lovenox  · Stop steroidspossible cause of elevated creatinine  · Continue Flomaxurinary retention  · Appreciate nephrology  · Nutrition status:   · Well developed, well nourished with no malnutrition  · Dietician consult initiated  · [] NPO [] TPN      [] Tube feed [] Clear liquid        [] Full liquid [] regular diet         [] Fluid restriction   [x] Diabetic diet   · Prophylaxis:   · DVT: Eliquis   · Stress Ulcer: H2 Blocker   · High risk medications: none   · Disposition:    · Discharge plan is pending -- [facility] when approved  · Ready for DC awaiting SNF approval  · PT/OT      Kelly Quintanilla, APRN - CNP , APRN-NP-C  11/26/2021  8:24 AM

## 2021-11-26 NOTE — PROGRESS NOTES
Patient accepted at [facility], per staff nurse at [place], [place]. Tentative discharge will be tomorrow, 11/27/2021 and Patient prefers after the [facility] game, around 4 pm.  Family will provide the transport.     Zeus. Raissa Sheth [address] [place] [address]  11/26/2021

## 2021-11-27 VITALS
HEART RATE: 84 BPM | OXYGEN SATURATION: 97 % | HEIGHT: 63 IN | WEIGHT: 170 LBS | RESPIRATION RATE: 18 BRPM | TEMPERATURE: 96.7 F | BODY MASS INDEX: 30.12 KG/M2 | SYSTOLIC BLOOD PRESSURE: 127 MMHG | DIASTOLIC BLOOD PRESSURE: 45 MMHG

## 2021-11-27 PROBLEM — R33.9 URINARY RETENTION: Status: ACTIVE | Noted: 2021-11-27

## 2021-11-27 LAB
ABSOLUTE EOS #: 0.04 K/UL (ref 0–0.44)
ABSOLUTE IMMATURE GRANULOCYTE: 0.18 K/UL (ref 0–0.3)
ABSOLUTE LYMPH #: 0.98 K/UL (ref 1.1–3.7)
ABSOLUTE MONO #: 0.72 K/UL (ref 0.1–1.2)
ALBUMIN SERPL-MCNC: 2.1 G/DL (ref 3.5–5.2)
ALBUMIN/GLOBULIN RATIO: 0.7 (ref 1–2.5)
ALP BLD-CCNC: 123 U/L (ref 40–129)
ALT SERPL-CCNC: 33 U/L (ref 5–41)
ANION GAP SERPL CALCULATED.3IONS-SCNC: 10 MMOL/L (ref 9–17)
AST SERPL-CCNC: 31 U/L
BASOPHILS # BLD: 0 % (ref 0–2)
BASOPHILS ABSOLUTE: 0.03 K/UL (ref 0–0.2)
BILIRUB SERPL-MCNC: 0.54 MG/DL (ref 0.3–1.2)
BUN BLDV-MCNC: 47 MG/DL (ref 8–23)
BUN/CREAT BLD: 18 (ref 9–20)
CALCIUM SERPL-MCNC: 8 MG/DL (ref 8.6–10.4)
CHLORIDE BLD-SCNC: 96 MMOL/L (ref 98–107)
CO2: 24 MMOL/L (ref 20–31)
CREAT SERPL-MCNC: 2.56 MG/DL (ref 0.7–1.2)
DIFFERENTIAL TYPE: ABNORMAL
EOSINOPHILS RELATIVE PERCENT: 0 % (ref 1–4)
GFR AFRICAN AMERICAN: 29 ML/MIN
GFR NON-AFRICAN AMERICAN: 24 ML/MIN
GFR SERPL CREATININE-BSD FRML MDRD: ABNORMAL ML/MIN/{1.73_M2}
GFR SERPL CREATININE-BSD FRML MDRD: ABNORMAL ML/MIN/{1.73_M2}
GLUCOSE BLD-MCNC: 158 MG/DL (ref 74–100)
GLUCOSE BLD-MCNC: 312 MG/DL (ref 74–100)
GLUCOSE BLD-MCNC: 43 MG/DL (ref 70–99)
GLUCOSE BLD-MCNC: 43 MG/DL (ref 74–100)
GLUCOSE BLD-MCNC: 46 MG/DL (ref 74–100)
GLUCOSE BLD-MCNC: 53 MG/DL (ref 74–100)
GLUCOSE BLD-MCNC: 60 MG/DL (ref 74–100)
GLUCOSE BLD-MCNC: 71 MG/DL (ref 74–100)
HCT VFR BLD CALC: 27.5 % (ref 40.7–50.3)
HEMOGLOBIN: 9 G/DL (ref 13–17)
IMMATURE GRANULOCYTES: 1 %
LYMPHOCYTES # BLD: 6 % (ref 24–43)
MCH RBC QN AUTO: 32 PG (ref 25.2–33.5)
MCHC RBC AUTO-ENTMCNC: 32.7 G/DL (ref 28.4–34.8)
MCV RBC AUTO: 97.9 FL (ref 82.6–102.9)
MONOCYTES # BLD: 4 % (ref 3–12)
NRBC AUTOMATED: 0 PER 100 WBC
PDW BLD-RTO: 12.7 % (ref 11.8–14.4)
PLATELET # BLD: 230 K/UL (ref 138–453)
PLATELET ESTIMATE: ABNORMAL
PMV BLD AUTO: 10.5 FL (ref 8.1–13.5)
POTASSIUM SERPL-SCNC: 4.8 MMOL/L (ref 3.7–5.3)
RBC # BLD: 2.81 M/UL (ref 4.21–5.77)
RBC # BLD: ABNORMAL 10*6/UL
SEG NEUTROPHILS: 89 % (ref 36–65)
SEGMENTED NEUTROPHILS ABSOLUTE COUNT: 15.09 K/UL (ref 1.5–8.1)
SODIUM BLD-SCNC: 130 MMOL/L (ref 135–144)
TOTAL PROTEIN: 5.1 G/DL (ref 6.4–8.3)
WBC # BLD: 17 K/UL (ref 3.5–11.3)
WBC # BLD: ABNORMAL 10*3/UL

## 2021-11-27 PROCEDURE — 94640 AIRWAY INHALATION TREATMENT: CPT

## 2021-11-27 PROCEDURE — 36415 COLL VENOUS BLD VENIPUNCTURE: CPT

## 2021-11-27 PROCEDURE — 80053 COMPREHEN METABOLIC PANEL: CPT

## 2021-11-27 PROCEDURE — 6370000000 HC RX 637 (ALT 250 FOR IP): Performed by: INTERNAL MEDICINE

## 2021-11-27 PROCEDURE — 6360000002 HC RX W HCPCS: Performed by: NURSE PRACTITIONER

## 2021-11-27 PROCEDURE — 94761 N-INVAS EAR/PLS OXIMETRY MLT: CPT

## 2021-11-27 PROCEDURE — 2580000003 HC RX 258: Performed by: NURSE PRACTITIONER

## 2021-11-27 PROCEDURE — 97530 THERAPEUTIC ACTIVITIES: CPT

## 2021-11-27 PROCEDURE — 97110 THERAPEUTIC EXERCISES: CPT

## 2021-11-27 PROCEDURE — 97116 GAIT TRAINING THERAPY: CPT

## 2021-11-27 PROCEDURE — 6370000000 HC RX 637 (ALT 250 FOR IP): Performed by: NURSE PRACTITIONER

## 2021-11-27 PROCEDURE — 85025 COMPLETE CBC W/AUTO DIFF WBC: CPT

## 2021-11-27 PROCEDURE — 94669 MECHANICAL CHEST WALL OSCILL: CPT

## 2021-11-27 RX ORDER — BUDESONIDE 0.5 MG/2ML
0.5 INHALANT ORAL 2 TIMES DAILY
Qty: 60 EACH | Refills: 3 | DISCHARGE
Start: 2021-11-27

## 2021-11-27 RX ORDER — DILTIAZEM HYDROCHLORIDE 60 MG/1
60 TABLET, FILM COATED ORAL 4 TIMES DAILY
Qty: 120 TABLET | Refills: 3 | Status: ON HOLD | DISCHARGE
Start: 2021-11-27 | End: 2021-12-10 | Stop reason: HOSPADM

## 2021-11-27 RX ORDER — FAMOTIDINE 10 MG
10 TABLET ORAL NIGHTLY
Qty: 60 TABLET | Refills: 3 | DISCHARGE
Start: 2021-11-27

## 2021-11-27 RX ORDER — TAMSULOSIN HYDROCHLORIDE 0.4 MG/1
0.4 CAPSULE ORAL DAILY
Qty: 30 CAPSULE | Refills: 3 | Status: ON HOLD | DISCHARGE
Start: 2021-11-28 | End: 2021-12-21 | Stop reason: HOSPADM

## 2021-11-27 RX ORDER — IPRATROPIUM BROMIDE AND ALBUTEROL SULFATE 2.5; .5 MG/3ML; MG/3ML
3 SOLUTION RESPIRATORY (INHALATION) 4 TIMES DAILY
Qty: 360 ML | DISCHARGE
Start: 2021-11-27

## 2021-11-27 RX ORDER — ZINC SULFATE 50(220)MG
100 CAPSULE ORAL DAILY
Qty: 30 CAPSULE | Refills: 3 | DISCHARGE
Start: 2021-11-28

## 2021-11-27 RX ORDER — AMIODARONE HYDROCHLORIDE 200 MG/1
200 TABLET ORAL 2 TIMES DAILY
DISCHARGE
Start: 2021-11-27

## 2021-11-27 RX ORDER — POLYETHYLENE GLYCOL 3350 17 G/17G
17 POWDER, FOR SOLUTION ORAL DAILY PRN
Qty: 527 G | Refills: 1 | Status: ON HOLD | DISCHARGE
Start: 2021-11-27 | End: 2021-12-29

## 2021-11-27 RX ORDER — METOPROLOL SUCCINATE 50 MG/1
50 TABLET, EXTENDED RELEASE ORAL DAILY
Qty: 30 TABLET | Refills: 3 | Status: ON HOLD | DISCHARGE
Start: 2021-11-28 | End: 2021-12-10 | Stop reason: HOSPADM

## 2021-11-27 RX ORDER — MELATONIN
1000 DAILY
Qty: 30 TABLET | Refills: 0 | DISCHARGE
Start: 2021-11-27

## 2021-11-27 RX ORDER — SODIUM BICARBONATE 650 MG/1
650 TABLET ORAL 2 TIMES DAILY
DISCHARGE
Start: 2021-11-27

## 2021-11-27 RX ORDER — ALBUTEROL SULFATE 2.5 MG/3ML
2.5 SOLUTION RESPIRATORY (INHALATION) EVERY 4 HOURS PRN
Qty: 120 EACH | Refills: 3 | DISCHARGE
Start: 2021-11-27

## 2021-11-27 RX ADMIN — ZINC SULFATE 220 MG (50 MG) CAPSULE 100 MG: CAPSULE at 08:47

## 2021-11-27 RX ADMIN — DEXTROSE 15 G: 15 GEL ORAL at 07:14

## 2021-11-27 RX ADMIN — SODIUM BICARBONATE TAB 650 MG 650 MG: 650 TAB at 08:47

## 2021-11-27 RX ADMIN — AMIODARONE HYDROCHLORIDE 200 MG: 200 TABLET ORAL at 08:48

## 2021-11-27 RX ADMIN — CLOPIDOGREL BISULFATE 75 MG: 75 TABLET ORAL at 08:47

## 2021-11-27 RX ADMIN — DEXTROSE 15 G: 15 GEL ORAL at 07:12

## 2021-11-27 RX ADMIN — SODIUM CHLORIDE, PRESERVATIVE FREE 10 ML: 5 INJECTION INTRAVENOUS at 09:00

## 2021-11-27 RX ADMIN — GLIPIZIDE 5 MG: 5 TABLET ORAL at 08:47

## 2021-11-27 RX ADMIN — METOPROLOL SUCCINATE 50 MG: 50 TABLET, EXTENDED RELEASE ORAL at 08:47

## 2021-11-27 RX ADMIN — FUROSEMIDE 40 MG: 10 INJECTION, SOLUTION INTRAMUSCULAR; INTRAVENOUS at 08:48

## 2021-11-27 RX ADMIN — IPRATROPIUM BROMIDE AND ALBUTEROL SULFATE 1 AMPULE: 2.5; .5 SOLUTION RESPIRATORY (INHALATION) at 07:25

## 2021-11-27 RX ADMIN — APIXABAN 2.5 MG: 2.5 TABLET, FILM COATED ORAL at 08:47

## 2021-11-27 RX ADMIN — OXYCODONE HYDROCHLORIDE AND ACETAMINOPHEN 2000 MG: 500 TABLET ORAL at 08:47

## 2021-11-27 RX ADMIN — TAMSULOSIN HYDROCHLORIDE 0.4 MG: 0.4 CAPSULE ORAL at 08:47

## 2021-11-27 RX ADMIN — ACETAMINOPHEN 650 MG: 325 TABLET ORAL at 01:49

## 2021-11-27 ASSESSMENT — PAIN SCALES - GENERAL: PAINLEVEL_OUTOF10: 0

## 2021-11-27 NOTE — PROGRESS NOTES
[facility]   APRN - Progress Note    Patient - Na Diego  Date of Admission -  11/15/2021  8:38 PM  Date of Evaluation -  2021  Hospital Day - 12      SUBJECTIVE:     The Na Diego is a 80 y.o. male who is seen for follow up for atrial fibrillation with RVR and COVID-19. Per nursing report and notes, overnight events include: no significant events. He resting in bed in no distress. Breathing easier. Weight down today. Feels better with del rio. ROS:   Constitutional: negative  for fevers, and negative for chills. Respiratory: denies for shortness of breath, positive for cough, and negative for wheezing  Cardiovascular: negative for chest pain, and negative for palpitations  Gastrointestinal: negative for abdominal pain, negative for nausea,negative for vomiting, negative for diarrhea, and negative for constipation    All other systems were reviewed with the patient and are negative unless otherwise stated in HPI. OBJECTIVE:     VITAL SIGNS:  Patient Vitals for the past 8 hrs:   BP Temp Temp src Pulse Resp SpO2 Weight   21 0520       170 lb (77.1 kg)   21 0158      96 %    21 2300 (!) 100/55 98.3 °F (36.8 °C) Temporal 91 18 96 %          Temp: 98.3 °F (36.8 °C)  Temp range:    Temp  Av.2 °F (36.8 °C)  Min: 97.6 °F (36.4 °C)  Max: 98.8 °F (37.1 °C)    BP: (!) 100/55  BP Range:      Systolic (92ZTA), JPX:505 , Min:100 , MKR:533      Diastolic (57SUU), IXB:40, Min:55, Max:87    Pulse: 91  Pulse Range:    Pulse  Av  Min: 91  Max: 102    Resp: 18  Resp Range:   Resp  Av  Min: 18  Max: 18    SpO2: 96 % room air  SpO2 range:   SpO2  Av %  Min: 96 %  Max: 100 %    Weight  Wt Readings from Last 3 Encounters:   21 170 lb (77.1 kg)   19 158 lb (71.7 kg)   18 166 lb 6.4 oz (75.5 kg)     Body mass index is 30.11 kg/m².     24HR INTAKE/OUTPUT:      Intake/Output Summary (Last 24 hours) at 2021 8707  Last data filed at 2021 2300  Gross per 24 hour   Intake    Output 2050 ml   Net -2050 ml           PHYSICAL EXAM:  GEN:    Awake and following commands:     [] No   [x] Yes  MENTAL STATUS: alert and oriented x3. DISTRESS: Acute respiratory distress:       [x] No   [] Yes  EYES:  EOMI, pupils equal   NECK: Supple. No lymphadenopathy. No carotid bruit  CVS: iregular rhythm, no audible murmur  PULM: Diminished Wheeze throughout with no respiratory distress    ABD:    Bowels sounds normal.  Abdomen is soft. No distention. no tenderness to palpation. EXT:   no edema bilaterally . No calf tenderness. NEURO: Moves all extremities. Motor and sensory are grossly intact  SKIN:  No rashes. No skin lesions.           MEDICATIONS:  Scheduled Meds:   budesonide  0.5 mg Nebulization BID    furosemide  40 mg IntraVENous BID    insulin glargine  15 Units SubCUTAneous BID    metoprolol succinate  50 mg Oral Daily    dilTIAZem  60 mg Oral 4 times per day    insulin lispro  0-12 Units SubCUTAneous TID WC    insulin lispro  0-6 Units SubCUTAneous Nightly    ipratropium-albuterol  1 ampule Inhalation 4x daily    sodium bicarbonate  650 mg Oral BID    famotidine  10 mg Oral Nightly    tamsulosin  0.4 mg Oral Daily    atorvastatin  40 mg Oral Nightly    clopidogrel  75 mg Oral Daily    glipiZIDE  5 mg Oral BID AC    sodium chloride flush  5-40 mL IntraVENous 2 times per day    vitamin D  20,000 Units Oral Weekly    ascorbic acid  2,000 mg Oral BID    zinc sulfate  100 mg Oral Daily    apixaban  2.5 mg Oral BID    amiodarone  200 mg Oral BID     Continuous Infusions:   sodium chloride      dextrose       PRN Meds:   albuterol, 2.5 mg, Q4H PRN  sodium chloride flush, 5-40 mL, PRN  sodium chloride, 25 mL, PRN  ondansetron, 4 mg, Q8H PRN   Or  ondansetron, 4 mg, Q6H PRN  polyethylene glycol, 17 g, Daily PRN  acetaminophen, 650 mg, Q6H PRN   Or  acetaminophen, 650 mg, Q6H PRN  glucose, 15 g, PRN  dextrose, 12.5 g, PRN  glucagon (rDNA), 1 mg, PRN  dextrose, 100 mL/hr, PRN        DATA:  Complete Blood Count:   Recent Labs     11/25/21  0702 11/26/21  0545 11/27/21  0525   WBC 16.7* 16.2* 17.0*   RBC 3.12* 3.01* 2.81*   HGB 9.9* 9.6* 9.0*   HCT 31.1* 29.3* 27.5*   MCV 99.7 97.3 97.9   RDW 12.6 12.6 12.7    254 230   SEGS 87* 87* 89*   NEUTROABS 14.52* 14.10* 15.09*   LYMPHOPCT 6* 7* 6*   LYMPHSABS 1.05* 1.06* 0.98*   MONOPCT 4 4 4   EOSRELPCT 1 1 0*   BASOPCT 0 0 0   IMMGRAN 2* 1* 1*        Recent Blood Glucose:   Recent Labs     11/25/21  0702 11/26/21  0545   GLUCOSE 80 64*        Comprehensive Metabolic Profile:   Recent Labs     11/25/21  0702 11/26/21  0545   BUN 49* 49*   CREATININE 2.11* 2.29*   * 132*   K 5.1 4.7   CL 96* 100   CALCIUM 8.0* 8.0*   ANIONGAP 10 12   CO2 22 20   PROT 5.7* 5.4*   LABALBU 2.2* 2.1*   BILITOT 0.56 0.52   ALKPHOS 133* 152*   AST 53* 39   ALT 51* 43*        Urinalysis:   Lab Results   Component Value Date    NITRU NEGATIVE 02/11/2021    COLORU YELLOW 02/11/2021    PHUR 5.5 02/11/2021    WBCUA 0 TO 2 02/11/2021    RBCUA None 02/11/2021    MUCUS NOT REPORTED 02/11/2021    TRICHOMONAS NOT REPORTED 02/11/2021    YEAST NOT REPORTED 02/11/2021    BACTERIA TRACE 02/11/2021    SPECGRAV 1.020 02/11/2021    LEUKOCYTESUR NEGATIVE 02/11/2021    UROBILINOGEN Normal 02/11/2021    BILIRUBINUR NEGATIVE 02/11/2021    GLUCOSEU 3+ 02/11/2021    KETUA NEGATIVE 02/11/2021    AMORPHOUS NOT REPORTED 02/11/2021       HgBA1c:    Lab Results   Component Value Date    LABA1C 9.5 08/27/2021       TSH:    Lab Results   Component Value Date    TSH 1.12 11/17/2021       Lactic Acid:   Lab Results   Component Value Date    LACTA 2.2 11/15/2021        High Sensitivity Troponin:  No results for input(s): TROPHS in the last 72 hours.   Pro-BNP:  Lab Results   Component Value Date    PROBNP 11,281 (H) 11/23/2021     D-Dimer:  Lab Results   Component Value Date    DDIMER 1.31 (H) 11/19/2021     PT/INR:    Lab Results   Component Value Date PROTIME 16.3 11/17/2021    INR 1.3 11/17/2021     PTT:    Lab Results   Component Value Date    APTT 37.3 11/17/2021       CRP:   No results for input(s): CRP in the last 72 hours. ABGs:   No results found for: PHART, PH, PPN8TBH, PCO2, PO2ART, PO2, SYT6MDS, HCO3, BEART, BE, THGBART, THB, AOH9ORW, P8ESIUJK, O2SAT, FIO2      Radiology/Imaging:  CT CHEST WO CONTRAST   Final Result   Motion limited exam.  Small bilateral pleural effusions, right greater than   left. Consolidation at the lung bases appears greater than prior   examination, which can reflect atelectasis or pneumonia superimposed upon   fibrosis. Additional peripheral reticular opacity throughout the lungs,   suggestive of fibrosis. Atherosclerosis, including coronary artery calcification. Age-indeterminate posterior left 7th rib fracture. Cholelithiasis. XR CHEST PORTABLE   Final Result   Unchanged bilateral peripheral opacities. Improvement in pleural effusions. XR CHEST PORTABLE   Final Result   Bilateral airspace disease and small pleural effusions appear improved. XR CHEST PORTABLE   Final Result   No significant change in bilateral airspace disease. Unchanged appearance of   small pleural effusions versus pleuroparenchymal reaction. Some of these opacities likely represent underlying chronic lung disease and   scarring. A superimposed acute inflammatory process or infection should be   considered in the appropriate clinical setting. US RETROPERITONEAL COMPLETE   Final Result   1. Limited exam.  Large postvoid residual.  Nonvisualization of the right   ureteral jet. 2. No hydronephrosis. 3. 6 mm calculus at the midpole left kidney. 4. Cholelithiasis. XR CHEST PORTABLE   Final Result   Significant peripheral and basal predominant pulmonary opacities diffusely   throughout both lungs.   This pattern can be seen in the setting of COVID-19   pneumonia, though this requires clinical correlation. Sequelae of underlying   interstitial lung disease is also possible, though less favored. Stability   is indeterminate in the absence of prior comparison imaging. Small right pleural effusion. Age-indeterminate fracture of the left anterolateral 9th rib.                ASSESSMENT / PLAN:     COVID-19 virus infection  · Remove isolation  · Continue current therapy  · Consults: Dr. Zaid Watkins  · Remdesivir not indicated  · Appreciate infectious disease   · Continue vitamin C, D and zinc  · Trend labs  · Renal function Stable  · IV steroidsstoppedelevated creatinine and no hypoxia  · VaccinatedJanuary, February booster in November 2021  · Acute respiratory failure with hypoxia  · Supplemental oxygen maintain SPO2 greater than 92%  · Start Pulmicort  · Weight decreasing  · Lasix for 4 doses - last dose today  · CT chest WO contrast todaybilateral pleural effusions  · IV Lasix 40 mg twice daily x4 doses  · Improved breathing  · Nebs  · Acapella  · PT OT  · Nebs  · Stop IV fluids  · CAD  · Continue Lipitor, Plavix, Lasix  · New onset atrial flutter  · Appreciate cardiology  · EKG2-1 conduction  · continueToprol-XL to 50 mg daily for rate control  · Amiodarone drip completed twice  · Continue amiodarone 200 mg twice daily  · Continue Eliquis  · Type 2 diabetes  · POCT before meals and at bedtime  · Med-dose sliding scale  · Hold Glucophage due to CKD  · Continue glipizide  · Continue Lantus 15 units twice daily  · Hypertension  · Continue Altace  · CKD  · Trend labs  · No remdesivir or Lovenox  · Stop steroidspossible cause of elevated creatinine  · Continue Flomaxurinary retention  · Appreciate nephrology  · Nutrition status:   · Well developed, well nourished with no malnutrition  · Dietician consult initiated  · [] NPO [] TPN      [] Tube feed [] Clear liquid        [] Full liquid [] regular diet         [] Fluid restriction   [x] Diabetic diet   · Prophylaxis:   · DVT: Eliquis   · Stress Ulcer: H2 Blocker   · High risk medications: none   · Disposition:    · Discharge plan is [facility] today  · PT/OT      FRANKLIN Dawkins CNP , RYANNP-C  11/27/2021  6:35 AM

## 2021-11-27 NOTE — PROGRESS NOTES
Pt currently resting with eyes closed, respirations even and unlabored, no signs of distress noted, will continue to monitor.

## 2021-11-27 NOTE — PROGRESS NOTES
Pt sitting in recliner chair, requested to go back to bed, ambulated 1 assist with walker to bed, patient noted to have SOB with exertion, audible wheezing, able to recover within 1 min, spo2 100% on room air, vitals and assessment complete, noted to have edema bilateral lower extremities, 1+ pitting, denies any pain, will continue to monitor.

## 2021-11-27 NOTE — PROGRESS NOTES
Occupational Therapy  Facility/Department: [facility] AT THE [facility] MED SURG  Daily Treatment Note  NAME: Geovani Huynh  : 10/31/1930  MRN: [id number]    Date of Service: 2021    Discharge Recommendations:  Continue to assess pending progress       Assessment                Patient Diagnosis(es): The primary encounter diagnosis was Pneumonia due to COVID-19 virus. A diagnosis of Elevated troponin was also pertinent to this visit. has a past medical history of Acute and chronic respiratory failure with hypoxia ([address] CAD (coronary artery disease), Cataracts, bilateral, Glaucoma, Hyperlipidemia, Hypertension, TIA (transient ischemic attack), and Type II or unspecified type diabetes mellitus without mention of complication, not stated as uncontrolled. has a past surgical history that includes Coronary artery bypass graft (); Cataract removal with implant (Bilateral,  and 12/3/2013); and Inguinal hernia repair (Bilateral, 12). Restrictions  Restrictions/Precautions  Restrictions/Precautions: General Precautions, Fall Risk  Subjective   General  Chart Reviewed: Yes  Patient assessed for rehabilitation services?: Yes  Response to previous treatment: Patient with no complaints from previous session  Family / Caregiver Present: No  Referring Practitioner: Hellen Meehan  Diagnosis: Covid-19  Subjective  Subjective: Pt stated that he gets to leave today to go next door for therapy. General Comment  Comments: In bedisde chair. All education completed with pt this date. Vital Signs  Patient Currently in Pain: Denies   Orientation     Objective    ADL  Additional Comments: Educated on AE and DME with no concerns at this time for any equipment at home. Additional Activities Comment  Additional Activities: Educated on safety, DME, AE and Good understanding and pt stating he is good and just ready to go home.                           Plan   Plan  Times per week:

## 2021-11-27 NOTE — PROGRESS NOTES
Report called to [facility], updated on Pt. Care and all questions answered. Pt. Discharged in stable condition. Pt. Taken down to main entrance via wheelchair, Pt. Wife to transport pt. To [facility]. Paper work sent with them.

## 2021-11-27 NOTE — PROGRESS NOTES
Pt. Blood sugar check is 43, pt. Given 2 doses of glucose gel and peanut butter crackers. Pt. Blood sugar slowly trending upward. Blood sugar at 0753 is 71. Pt. Breakfast set up at bedside for patient to begin eating. Will continue to monitor.

## 2021-11-27 NOTE — PROGRESS NOTES
assistance  Stand to sit: Contact guard assistance  Ambulation  Ambulation?: Yes  WB Status: unrestricted  Ambulation 1  Surface: level tile  Device: Rolling Walker  Assistance: Contact guard assistance  Quality of Gait: Slow shuffling abiel with FF posture, short step lengtn, no LOB  Distance: 25 ft x1     Balance  Posture: Fair  Sitting - Static: Good  Sitting - Dynamic: Good  Standing - Static: Fair  Standing - Dynamic: Fair  Exercises  Straight Leg Raise: x15  Quad Sets: x15  Heelslides: x15  Hip Abduction: x15  Knee Short Arc Quad: x15  Ankle Pumps: 15x2  Comments: Completed LE ther ex supine with 3-4 short rest breaks during and 1 after completion                        G-Code     OutComes Score                                                     AM-PAC Score             Goals  Short term goals  Time Frame for Short term goals: 20 days  Short term goal 1: Pt will be initiated on strong ex program to address deficits in endurance and balance. Short term goal 2: Pt will be independent with bed mobility without use of handrails for discharge home and negotiation of bed. Short term goal 3: Pt will transfer independently with least restrictive device and good balance to reduce fall risk  Short term goal 4: Pt will be able to ambulate up to 350 feet with supervision to independent with least restrictive device to allow discharge home with his wife.   Short term goal 5: Pt will be able to negoitiate 4 steps with chani handrails and SBA +1  Patient Goals   Patient goals : go home and hopes for Thursday    Plan    Plan  Times per week: 7  Times per day: Twice a day  Plan weeks: 2x/daily ecept weekends 1x/daily  Current Treatment Recommendations: Strengthening, Neuromuscular Re-education, Home Exercise Program, ROM, Safety Education & Training, Balance Training, Endurance Training, Patient/Caregiver Education & Training, Functional Mobility Training, Transfer Training, Gait Training, Stair training  Plan Comment: Initiate POC  Safety Devices  Type of devices:  All fall risk precautions in place, Left in chair, Call light within reach, Chair alarm in place, Nurse notified, Gait belt, Patient at risk for falls     Therapy Time   Individual Concurrent Group Co-treatment   [facility]         Time Out 0841         Minutes [facility], [id number]

## 2021-11-27 NOTE — DISCHARGE SUMMARY
Discharge Summary    Sheila Reed  :  10/31/1930  MRN:  [id number]    Admit date:  11/15/2021      Discharge date: 2021     Admitting Physician:  Yumiko Armstrong MD    Discharge Diagnoses:     Principal Problem:    COVID-19 virus infection / [address] [place] Vaccine  Active Problems:    Hypertension    Type 2 diabetes mellitus without complication, without long-term current use of insulin (HCC)    Acute and chronic respiratory failure with hypoxia (HCC)    ASHD (arteriosclerotic heart disease)    Atrial flutter (HCC)    Stage 4 chronic kidney disease ([address]    S/P CABG (coronary artery bypass graft)    Mild malnutrition ([facility]    Urinary retention  Resolved Problems:    * No resolved hospital problems. *      Hospital Course:   Sheila Reed is a 80 y.o. male admitted with pneumonia due to COVID-19. He presented to the emergency room on November 15 with complaints of shortness of breath. Symptoms ongoing approximately 6 weeks cording to the ER notes. Patient reported being on antibiotics at that time without relief. Patient symptoms began approximately 3 days ago. Patient stated he is Covid vaccinated as well as received his boosters. He received all 3 in  which was January, February and November. Patient denied chest pain or palpitations. He is able to speak in full sentences. He does get short of breath with exertion. During his evaluation in the emergency room patient was found to be tachypneic and using accessory muscles to breathe. Initial EKG showed sinus tachycardia with a heart rate of 113. Chest x-ray showed small pleural effusion as well as basilar opacities consistent with Covid pneumonia. Patient was Covid positive. Patient's glucose was 423 creatinine of 2.60 with a BUN of 55. Patient's troponin was elevated to 151. His proBNP was 3736. Patient does have history of skin cancer and follows with dermatologist.  Patient was boarded in the emergency room until bed was available.   Upon iregular rhythm, no audible murmur  PULM: Diminished Wheeze throughout with no respiratory distress    ABD:     Bowels sounds normal.  Abdomen is soft. No distention. no tenderness to palpation. EXT:     no edema bilaterally . No calf tenderness. NEURO: Moves all extremities. Motor and sensory are grossly intact  SKIN:    No rashes. No skin lesions.         Significant Diagnostic Studies:   Lab Results   Component Value Date    WBC 17.0 (H) 11/27/2021    HGB 9.0 (L) 11/27/2021     11/27/2021       Lab Results   Component Value Date    BUN 47 (H) 11/27/2021    CREATININE 2.56 (H) 11/27/2021     (L) 11/27/2021    K 4.8 11/27/2021    CALCIUM 8.0 (L) 11/27/2021    CL 96 (L) 11/27/2021    CO2 24 11/27/2021    LABGLOM 24 (L) 11/27/2021       Lab Results   Component Value Date    WBCUA 0 TO 2 02/11/2021    RBCUA None 02/11/2021    EPITHUA None 02/11/2021    LEUKOCYTESUR NEGATIVE 02/11/2021    SPECGRAV 1.020 02/11/2021    GLUCOSEU 3+ (A) 02/11/2021    KETUA NEGATIVE 02/11/2021    PROTEINU NEGATIVE 02/11/2021    HGBUR NEGATIVE 02/11/2021    CASTUA NOT REPORTED 02/11/2021    CRYSTUA NOT REPORTED 02/11/2021    BACTERIA TRACE (A) 02/11/2021    YEAST NOT REPORTED 02/11/2021       Echocardiogram complete 2D with doppler with color    Result Date: 11/16/2021  [facility] Transthoracic Echocardiography Report (TTE)  Patient Name Apryl Fleeting  Date of Study               11/16/2021               H   Date of      10/31/1930  Gender                      Male  Birth   Age          80 year(s)  Race                           Room Number  I307        Height:                     63 inch, 160.02 cm   Corporate ID [id number]    Weight:                     145 pounds, 65.8 kg  #   Patient Acct [de-identified]   BSA:          1.69 m^2      BMI:      25.69  #                                                              kg/m^2   MR #         [id number]      Sonographer                 [employer]   Accession # [id number]  Interpreting Physician      Meghan Zhang   Fellow                   Referring Nurse             Raya Carballo, RANDAL                           Practitioner   Interpreting             Referring Physician  Fellow  Type of Study   TTE procedure:2D Echocardiogram, M-Mode, Doppler, Color Doppler. Procedure Date Date: 11/16/2021 Start: 05:21 PM Study Location: [facility] Indications:Irregular Heart Rate. History / Tech. Comments: Dx: irregular heart rate Hx: CABG (1970s), HTN, hyperlipidemia, DM, TIA Patient Status: Inpatient Height: 63 inches Weight: 145 pounds BSA: 1.69 m^2 BMI: 25.69 kg/m^2 BP: 119/72 mmHg CONCLUSIONS Summary Global left ventricular systolic function is difficult to assess due to the patients rate in the 120's but appears preserved with an estimated ejection fraction of 55%. Mildly increased left ventricular wall thickness with a normal left ventricular cavity size. Left atrium is normal in size. Evidence of a small to moderate inter-atrial communication is seen by color Doppler imaging with an intermittent left to right shunt. This appears most consistent with a patent foramen ovale (PFO) vs a small ASD. 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 The clinical significant of this finding is unknown, but has been associated with an increased risk of TIA's and strokes. Mild to moderate aortic leaflets calcification without restriction of motion. Mild aortic insufficiency was seen. Mild pulmonary hypertension with an estimated right ventricular systolic pressure of 30 mmHg. Mild to moderate tricuspid regurgitation. Compared to the previous study of 3/27/18, the patient now has evidence of a small to moderate sized inter-atrial communication. Consider additional testing by transesophageal echocardiography (PATT) if clinically indicated, especially if this might . Clinical correlation required.  Signature ----------------------------------------------------------------------------  Electronically signed by Joelle Massey(Sonographer) on 11/16/2021 05:56 PM ---------------------------------------------------------------------------- ----------------------------------------------------------------------------  Electronically signed by Aung Smith(Interpreting physician) on  11/16/2021 06:54 PM ---------------------------------------------------------------------------- FINDINGS Left Atrium Left atrium is normal in size. Evidence of a small to moderate inter-atrial communication is seen by color Doppler imaging with an intermittent left to right shunt. This appears most consistent with a patent foramen ovale (PFO) vs a small ASD. 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 The clinical significant of this finding is unknown, but has been associated with an increased risk of TIA's and strokes. Left Ventricle Global left ventricular systolic function is difficult to assess due to the patients rate in the 120's but appears preserved with an estimated ejection fraction of 55%. Mildly increased left ventricular wall thickness with a normal left ventricular cavity size. Right Atrium Right atrium is normal in size. Right Ventricle Normal right ventricular size and function. Mitral Valve Normal mitral valve structure and function. Aortic Valve Mild to moderate aortic leaflets calcification without restriction of motion. Mild aortic insufficiency was seen. Tricuspid Valve Mild pulmonary hypertension with an estimated right ventricular systolic pressure of 30 mmHg. Mild to moderate tricuspid regurgitation. Pulmonic Valve The pulmonic valve is normal in structure. Pericardial Effusion No significant pericardial effusion is seen. Miscellaneous Diastology cannot be properly assessed due to the patients rhythm. Normal aortic root dimension.  M-mode / 2D Measurements & Calculations:   LVIDd:3.29 cm(3.7 - 5.6 cm)      Diastolic VXQQBD:75.93 ml  LVIDs:2.83 cm(2.2 - 4.0 cm)      Systolic PMMSOC:06.87 ml  IVSd:1.08 cm(0.6 - 1.1 cm)       Aortic Root:3.3 cm(2.0 - 3.7 cm)  LVPWd:1.11 cm(0.6 - 1.1 cm)      LA Dimension: 3.73 cm(1.9 - 4.0 cm)  Fractional Shortenin.98 %    LA volume/Index: 32.1 ml /19m^2  Calculated LVEF (%): 64.43 %     AV Cusp Separation: 1.87 cm   Mitral:                                Aortic   Valve Area (P1/2-Time): 4.73 cm^2      Peak Velocity: 1.17 m/s  Peak E-Wave: 1.02 m/s                  Mean Velocity: 0.99 m/s                                         Peak Gradient: 5.51 mmHg  Peak Gradient: 4.14 mmHg               Mean Gradient: 4.09 mmHg   P1/2t: 46.47 msec                      Acceleration Time: 111.88 msec                                          AV VTI: 22.14 cm   Tricuspid:                             Pulmonic:   Estimated RVSP: 30.46 mmHg  Peak TR Velocity: 2.62 m/s  Peak TR Gradient: 27.4576 mmHg  Estimated RA Pressure: 3 mmHg                                         Estimated PASP: 30.46 mmHg  Diastology / Tissue Doppler Lateral Wall E' velocity:0.13 m/s Lateral Wall E/E':7.82    US RETROPERITONEAL COMPLETE    Result Date: 2021  EXAMINATION: RETROPERITONEAL ULTRASOUND OF THE KIDNEYS AND URINARY BLADDER 2021 COMPARISON: None HISTORY: ORDERING SYSTEM PROVIDED HISTORY: Rule out obstructive uropathy. TECHNOLOGIST PROVIDED HISTORY: Rule out obstructive uropathy. 57-year-old male; rule out obstructive uropathy FINDINGS: Kidneys: Exam limited due to rib shadowing and difficulty breathing. Right kidney measures 9.1 x 4.9 x 4.9 cm. Right renal cortical thickness measures 1.1 cm. Left kidney measures 9.6 x 4.4 x 5.0 cm. Left renal cortical thickness measures 8 mm. Color flow projects over the bilateral renal parenchyma. No hydronephrosis. No perinephric fluid. Gross preservation of the bilateral corticomedullary differentiation.  Echogenic focus with posterior shadowing measuring 6 mm at the midpole left kidney consistent with a renal calculus. Gallstones incidentally noted within the gallbladder with posterior acoustic shadowing and image 56. Bladder: Urinary bladder measures 7.5 by 6.0 x 7.2 cm for a bladder volume of 229 mL. Urinary bladder wall thickness measures 3 mm. Postvoid urinary bladder measures 5.1 x 6.2 x 6.3 cm for a postvoid urinary bladder volume of 105 mL. Left ureteral jet is visualized. Nonvisualization of the right ureteral jet. 1. Limited exam.  Large postvoid residual.  Nonvisualization of the right ureteral jet. 2. No hydronephrosis. 3. 6 mm calculus at the midpole left kidney. 4. Cholelithiasis.        Assessment and Plan:  Patient Active Problem List    Diagnosis Date Noted    Urinary retention 11/27/2021    Mild malnutrition ([address] 11/17/2021    Acute and chronic respiratory failure with hypoxia ([address] 11/16/2021    Atrial flutter ([address] 11/16/2021    Stage 4 chronic kidney disease ([address] 11/16/2021    ASHD (arteriosclerotic heart disease)     S/P CABG (coronary artery bypass graft)     COVID-19 virus infection / [place] Vaccine 11/15/2021    Cerebral infarction due to embolism of right middle cerebral artery ([address] 03/28/2018    Left leg weakness 03/28/2018    Falling 03/28/2018    Stroke, lacunar ([address] 03/28/2018    Hypertension 03/27/2018    Type 2 diabetes mellitus without complication, without long-term current use of insulin ([address] 03/27/2018    Cerebrovascular accident (CVA) ([address] 03/26/2018    Right inguinal hernia 04/02/2014        Discharge Medications:         Medication List      START taking these medications    albuterol (2.5 MG/3ML) 0.083% nebulizer solution  Commonly known as: PROVENTIL  Take 3 mLs by nebulization every 4 hours as needed for Wheezing     amiodarone 200 MG tablet  Commonly known as: CORDARONE  Take 1 tablet by mouth 2 times daily     apixaban 2.5 MG Tabs tablet  Commonly known as: ELIQUIS  Take 1 tablet by mouth 2 times daily     ascorbic acid 1000 MG tablet  Commonly known as: VITAMIN C  Take 2 tablets by mouth 2 times daily     budesonide 0.5 MG/2ML nebulizer suspension  Commonly known as: PULMICORT  Take 2 mLs by nebulization 2 times daily     dilTIAZem 60 MG tablet  Commonly known as: CARDIZEM  Take 1 tablet by mouth 4 times daily Please hold if systolic blood pressure less than 100 mmHg or if heart rate less than 100 bpm.     famotidine 10 MG tablet  Commonly known as: PEPCID  Take 1 tablet by mouth nightly     ipratropium-albuterol 0.5-2.5 (3) MG/3ML Soln nebulizer solution  Commonly known as: DUONEB  Inhale 3 mLs into the lungs 4 times daily     metoprolol succinate 50 MG extended release tablet  Commonly known as: TOPROL XL  Take 1 tablet by mouth daily  Start taking on: November 28, 2021     polyethylene glycol 17 g packet  Commonly known as: GLYCOLAX  Take 17 g by mouth daily as needed for Constipation     sodium bicarbonate 650 MG tablet  Take 1 tablet by mouth 2 times daily     tamsulosin 0.4 MG capsule  Commonly known as: FLOMAX  Take 1 capsule by mouth daily  Start taking on: November 28, 2021     vitamin D3 25 MCG (1000 UT) Tabs tablet  Commonly known as: CHOLECALCIFEROL  Take 1 tablet by mouth daily     zinc sulfate 220 (50 Zn) MG capsule  Commonly known as: ZINCATE  Take 2 capsules by mouth daily  Start taking on: November 28, 2021        CONTINUE taking these medications    aspirin 81 MG EC tablet     atorvastatin 40 MG tablet  Commonly known as: LIPITOR  Take 1 tablet by mouth nightly     clopidogrel 75 MG tablet  Commonly known as: PLAVIX  Take 1 tablet by mouth daily     furosemide 40 MG tablet  Commonly known as: LASIX     glipiZIDE 5 MG tablet  Commonly known as: GLUCOTROL        STOP taking these medications    metFORMIN 500 MG tablet  Commonly known as: GLUCOPHAGE     ramipril 2.5 MG capsule  Commonly known as: ALTACE           Where to Get Your Medications      Information about where to get these medications is not yet available    Ask your nurse or doctor about these medications  · albuterol (2.5 MG/3ML) 0.083% nebulizer solution  · amiodarone 200 MG tablet  · apixaban 2.5 MG Tabs tablet  · ascorbic acid 1000 MG tablet  · budesonide 0.5 MG/2ML nebulizer suspension  · dilTIAZem 60 MG tablet  · famotidine 10 MG tablet  · ipratropium-albuterol 0.5-2.5 (3) MG/3ML Soln nebulizer solution  · metoprolol succinate 50 MG extended release tablet  · polyethylene glycol 17 g packet  · sodium bicarbonate 650 MG tablet  · tamsulosin 0.4 MG capsule  · vitamin D3 25 MCG (1000 UT) Tabs tablet  · zinc sulfate 220 (50 Zn) MG capsule         Patient Instructions:    Activity: activity as tolerated  Diet: cardiac diet  Wound Care: none needed  Other: BMP on Tuesday, November 30, continue with Robertson catheter    Disposition:   IN to [facility]    Follow up:  Patient will be followed by Cynthia Wilson DO in 1-2 weeks; Dr. Paola Gillew 1 month; DR. Ioana Falk 1 week    CORE MEASURES on Discharge (if applicable)  ACE/ARB in CHF: Yes  Statin in MI: NA  ASA in MI: NA  Statin in CVA: NA  Antiplatelet in CVA: NA    Total time spent on discharge services: 45 minutes    Including the following activities:  Evaluation and Management of patient  Discussion with patient and/or surrogate about current care plan  Coordination with Case Management and/or   Coordination of care with Consultants (if applicable)   Coordination of care with Receiving Facility Physician (if applicable)  Completion of DME forms (if applicable)  Preparation of Discharge Summary  Preparation of Medication Reconciliation  Preparation of Discharge Prescriptions    Signed:  FRANKLIN Elkins - CNP, FRANKLIN, NP-C  11/27/2021, 8:52 AM

## 2021-11-27 NOTE — DISCHARGE INSTR - COC
Continuity of Care Form    Patient Name: Lois Spatz   :  10/31/1930  MRN:  [id number]    Admit date:  11/15/2021  Discharge date:  2021    Code Status Order: Full Code   Advance Directives:      Admitting Physician:  Joseph Quiros MD  PCP: Rosy Alatorre DO    Discharging Nurse: [facility] Unit/Room#: 9920/1996-97  Discharging Unit Phone Number: [phone]    Emergency Contact:   Extended Emergency Contact Information  Primary Emergency Contact: Anila Loya  Address: [address] [place] [address] [address] [place] [address] [address] Phone: [phone]  Relation: Spouse  Other needs: None  Preferred language: English   needed? No  Secondary Emergency Contact: [address] [place] [address] Phone: [phone]  Relation: Child   needed?  No    Past Surgical History:  Past Surgical History:   Procedure Laterality Date    CATARACT REMOVAL WITH IMPLANT Bilateral  and 12/3/2013    CORONARY ARTERY BYPASS GRAFT  2000    INGUINAL HERNIA REPAIR Bilateral 12       Immunization History:   Immunization History   Administered Date(s) Administered    COVID-19, Valente Zamora, Primary or Immunocompromised, PF, 100mcg/0.5mL 2021, 2021, 2021    COVID-19, Russ Wahl, PF, 30mcg/0.3mL 2021       Active Problems:  Patient Active Problem List   Diagnosis Code    Right inguinal hernia K40.90    Cerebrovascular accident (CVA) ([address] I63.9    Hypertension I10    Type 2 diabetes mellitus without complication, without long-term current use of insulin ([address] E11.9    Cerebral infarction due to embolism of right middle cerebral artery ([address] I63.411    Left leg weakness R29.898    Falling R29.6    Stroke, lacunar (HCC) I63.81    COVID-19 virus infection / [place] Vaccine U07.1    Acute and chronic respiratory failure with hypoxia (HCC) J96.21    ASHD (arteriosclerotic heart disease) I25.10    Atrial flutter (HCC) I48.92    Stage 4 chronic kidney disease ([facility] N18.4    S/P CABG (coronary artery bypass graft) Z95.1    Mild malnutrition (HCC) E44.1    Urinary retention R33.9       Isolation/Infection:   Isolation            No Isolation          Patient Infection Status       Infection Onset Added Last Indicated Last Indicated By Review Planned Expiration Resolved Resolved By    None active    Resolved    COVID-19 11/12/21 11/15/21 11/15/21 COVID-19, Rapid   11/24/21 Danielle Casillas RN    S/S 11/12. Resolved per provider with system guidance. Nurse Assessment:  Last Vital Signs: BP (!) 127/45   Pulse 84   Temp 96.7 °F (35.9 °C) (Temporal)   Resp 18   Ht 5' 3\" (1.6 m)   Wt 170 lb (77.1 kg)   SpO2 97%   BMI 30.11 kg/m²     Last documented pain score (0-10 scale): Pain Level: 0 (restless)  Last Weight:   Wt Readings from Last 1 Encounters:   11/27/21 170 lb (77.1 kg)     Mental Status:  oriented and alert    IV Access:  - None    Nursing Mobility/ADLs:  Walking   Assisted  Transfer  Assisted  Bathing  Assisted  Dressing  Assisted  Toileting  Assisted  Feeding  Independent  Med Admin  Assisted  Med Delivery   whole    Wound Care Documentation and Therapy:        Elimination:  Continence: Bowel: Yes  Bladder: Robertson Catheter  Urinary Catheter: Insertion Date: 11/26/2021    Colostomy/Ileostomy/Ileal Conduit: No       Date of Last BM: 11/27/2021    Intake/Output Summary (Last 24 hours) at 11/27/2021 0851  Last data filed at 11/26/2021 2300  Gross per 24 hour   Intake    Output 2050 ml   Net -2050 ml     I/O last 3 completed shifts:  In: -   Out: 2050 [Urine:2050]    Safety Concerns:      At Risk for Falls    Impairments/Disabilities:      Hearing    Nutrition Therapy:  Current Nutrition Therapy:   - Oral Diet:  Carb Control 3 carbs/meal (1500kcals/day)    Routes of Feeding: Oral  Liquids: No Restrictions  Daily Fluid Restriction: no  Last Modified Barium Swallow with Video (Video Swallowing Test): not done    Treatments at the Time of Hospital Discharge:   Respiratory Treatments: See MAR  Oxygen Therapy:  is not on home oxygen therapy. Ventilator:    - No ventilator support    Rehab Therapies: Physical Therapy and Occupational Therapy  Weight Bearing Status/Restrictions: No weight bearing restirctions  Other Medical Equipment (for information only, NOT a DME order):  walker  Other Treatments:     Patient's personal belongings (please select all that are sent with patient):  Glasses    RN SIGNATURE:  Electronically signed by Sukhwinder Bran RN on 11/27/21 at 10:39 AM EST    CASE MANAGEMENT/SOCIAL WORK SECTION    Inpatient Status Date: ***    Readmission Risk Assessment Score:  Readmission Risk              Risk of Unplanned Readmission:  22           Discharging to Facility/ Agency   Name:   Address:  Phone:  Fax:    Dialysis Facility (if applicable)   Name:  Address:  Dialysis Schedule:  Phone:  Fax:    / signature: {Esignature:[id number]}    PHYSICIAN SECTION    Prognosis: Fair    Condition at Discharge: Stable    Rehab Potential (if transferring to Rehab): Fair    Recommended Labs or Other Treatments After Discharge: BMP on 11/30/2021. Keep del rio for retention. Needs to see Dr. Olivia Bhatti in 1 week. Physician Certification: I certify the above information and transfer of Tyler Osborne  is necessary for the continuing treatment of the diagnosis listed and that he requires Travis Kris for less 30 days.      Update Admission H&P: No change in H&P    PHYSICIAN SIGNATURE:  Electronically signed by FRANKLIN Luis CNP on 11/27/21 at 8:52 AM EST

## 2021-11-29 ENCOUNTER — HOSPITAL ENCOUNTER (OUTPATIENT)
Age: 86
Setting detail: SPECIMEN
Discharge: HOME | End: 2021-11-29

## 2021-11-29 LAB
ABSOLUTE EOS #: 0.13 K/UL (ref 0–0.44)
ABSOLUTE IMMATURE GRANULOCYTE: 0.06 K/UL (ref 0–0.3)
ABSOLUTE LYMPH #: 1.14 K/UL (ref 1.1–3.7)
ABSOLUTE MONO #: 0.52 K/UL (ref 0.1–1.2)
ALBUMIN SERPL-MCNC: 2.1 G/DL (ref 3.5–5.2)
ALBUMIN/GLOBULIN RATIO: 0.6 (ref 1–2.5)
ALP BLD-CCNC: 112 U/L (ref 40–129)
ALT SERPL-CCNC: 31 U/L (ref 5–41)
ANION GAP SERPL CALCULATED.3IONS-SCNC: 7 MMOL/L (ref 9–17)
AST SERPL-CCNC: 32 U/L
BASOPHILS # BLD: 0 % (ref 0–2)
BASOPHILS ABSOLUTE: <0.03 K/UL (ref 0–0.2)
BILIRUB SERPL-MCNC: 0.45 MG/DL (ref 0.3–1.2)
BUN BLDV-MCNC: 52 MG/DL (ref 8–23)
BUN/CREAT BLD: 18 (ref 9–20)
CALCIUM SERPL-MCNC: 7.6 MG/DL (ref 8.6–10.4)
CHLORIDE BLD-SCNC: 101 MMOL/L (ref 98–107)
CO2: 26 MMOL/L (ref 20–31)
CREAT SERPL-MCNC: 2.91 MG/DL (ref 0.7–1.2)
DIFFERENTIAL TYPE: ABNORMAL
EOSINOPHILS RELATIVE PERCENT: 1 % (ref 1–4)
GFR AFRICAN AMERICAN: 25 ML/MIN
GFR NON-AFRICAN AMERICAN: 20 ML/MIN
GFR SERPL CREATININE-BSD FRML MDRD: ABNORMAL ML/MIN/{1.73_M2}
GFR SERPL CREATININE-BSD FRML MDRD: ABNORMAL ML/MIN/{1.73_M2}
GLUCOSE BLD-MCNC: 160 MG/DL (ref 70–99)
HCT VFR BLD CALC: 24.6 % (ref 40.7–50.3)
HEMOGLOBIN: 7.9 G/DL (ref 13–17)
IMMATURE GRANULOCYTES: 1 %
LYMPHOCYTES # BLD: 11 % (ref 24–43)
MCH RBC QN AUTO: 31 PG (ref 25.2–33.5)
MCHC RBC AUTO-ENTMCNC: 32.1 G/DL (ref 28.4–34.8)
MCV RBC AUTO: 96.5 FL (ref 82.6–102.9)
MONOCYTES # BLD: 5 % (ref 3–12)
NRBC AUTOMATED: 0 PER 100 WBC
PDW BLD-RTO: 13 % (ref 11.8–14.4)
PLATELET # BLD: 255 K/UL (ref 138–453)
PLATELET ESTIMATE: ABNORMAL
PMV BLD AUTO: 10.3 FL (ref 8.1–13.5)
POTASSIUM SERPL-SCNC: 4.3 MMOL/L (ref 3.7–5.3)
RBC # BLD: 2.55 M/UL (ref 4.21–5.77)
RBC # BLD: ABNORMAL 10*6/UL
SEG NEUTROPHILS: 81 % (ref 36–65)
SEGMENTED NEUTROPHILS ABSOLUTE COUNT: 8.19 K/UL (ref 1.5–8.1)
SODIUM BLD-SCNC: 134 MMOL/L (ref 135–144)
TOTAL PROTEIN: 5.5 G/DL (ref 6.4–8.3)
WBC # BLD: 10.1 K/UL (ref 3.5–11.3)
WBC # BLD: ABNORMAL 10*3/UL

## 2021-11-29 PROCEDURE — 80053 COMPREHEN METABOLIC PANEL: CPT

## 2021-11-29 PROCEDURE — 36415 COLL VENOUS BLD VENIPUNCTURE: CPT

## 2021-11-29 PROCEDURE — 85025 COMPLETE CBC W/AUTO DIFF WBC: CPT

## 2021-11-29 PROCEDURE — P9603 ONE-WAY ALLOW PRORATED MILES: HCPCS

## 2021-11-30 ENCOUNTER — HOSPITAL ENCOUNTER (OUTPATIENT)
Age: 86
Setting detail: SPECIMEN
Discharge: HOME | End: 2021-11-30

## 2021-12-02 ENCOUNTER — OFFICE VISIT (OUTPATIENT)
Dept: UROLOGY | Age: 86
End: 2021-12-02
Payer: MEDICARE

## 2021-12-02 ENCOUNTER — TELEPHONE (OUTPATIENT)
Dept: UROLOGY | Age: 86
End: 2021-12-02

## 2021-12-02 VITALS
SYSTOLIC BLOOD PRESSURE: 103 MMHG | HEART RATE: 74 BPM | BODY MASS INDEX: 28.7 KG/M2 | WEIGHT: 162 LBS | DIASTOLIC BLOOD PRESSURE: 55 MMHG

## 2021-12-02 DIAGNOSIS — N18.4 STAGE 4 CHRONIC KIDNEY DISEASE (HCC): ICD-10-CM

## 2021-12-02 DIAGNOSIS — K59.00 CONSTIPATION, UNSPECIFIED CONSTIPATION TYPE: ICD-10-CM

## 2021-12-02 DIAGNOSIS — R33.9 URINARY RETENTION: Primary | ICD-10-CM

## 2021-12-02 DIAGNOSIS — N40.1 BPH WITH OBSTRUCTION/LOWER URINARY TRACT SYMPTOMS: ICD-10-CM

## 2021-12-02 DIAGNOSIS — N13.8 BPH WITH OBSTRUCTION/LOWER URINARY TRACT SYMPTOMS: ICD-10-CM

## 2021-12-02 DIAGNOSIS — N20.0 RENAL STONE: ICD-10-CM

## 2021-12-02 PROCEDURE — G8417 CALC BMI ABV UP PARAM F/U: HCPCS | Performed by: NURSE PRACTITIONER

## 2021-12-02 PROCEDURE — G8427 DOCREV CUR MEDS BY ELIG CLIN: HCPCS | Performed by: NURSE PRACTITIONER

## 2021-12-02 PROCEDURE — 1036F TOBACCO NON-USER: CPT | Performed by: NURSE PRACTITIONER

## 2021-12-02 PROCEDURE — 99204 OFFICE O/P NEW MOD 45 MIN: CPT | Performed by: NURSE PRACTITIONER

## 2021-12-02 PROCEDURE — 4040F PNEUMOC VAC/ADMIN/RCVD: CPT | Performed by: NURSE PRACTITIONER

## 2021-12-02 PROCEDURE — 1111F DSCHRG MED/CURRENT MED MERGE: CPT | Performed by: NURSE PRACTITIONER

## 2021-12-02 PROCEDURE — 99211 OFF/OP EST MAY X REQ PHY/QHP: CPT

## 2021-12-02 PROCEDURE — G8484 FLU IMMUNIZE NO ADMIN: HCPCS | Performed by: NURSE PRACTITIONER

## 2021-12-02 PROCEDURE — 1123F ACP DISCUSS/DSCN MKR DOCD: CPT | Performed by: NURSE PRACTITIONER

## 2021-12-02 RX ORDER — POLYETHYLENE GLYCOL 3350 17 G/17G
17 POWDER, FOR SOLUTION ORAL DAILY
Qty: 1530 G | Refills: 3 | DISCHARGE
Start: 2021-12-02 | End: 2022-01-01

## 2021-12-02 ASSESSMENT — ENCOUNTER SYMPTOMS
NAUSEA: 0
CONSTIPATION: 1
SHORTNESS OF BREATH: 0
VOMITING: 0
BACK PAIN: 0
EYE REDNESS: 0
COUGH: 0
COLOR CHANGE: 0
WHEEZING: 0
ABDOMINAL PAIN: 0

## 2021-12-02 NOTE — PROGRESS NOTES
HPI:          Patient is a 80 y.o. male in no acute distress. He is alert and oriented to person, place. New patient referral for urinary retention. He is here with his daughter today. He was recently admitted to the hospital with respiratory failure secondary to Covid. While he was hospitalized he did develop urinary retention. They did place a Robertson catheter for an unknown amount. Prior to discharge they did attempt to remove the Robertson catheter, but continued to have urinary retention so catheter was replaced. He was started on Flomax on 11/28/2021. Prior to hospitalization he did have nocturia every 2 hours and incontinence. He does not recall if he was on medications for his urinary symptoms. We did call his local pharmacy and he has not been on Flomax since 2015. He did see urology in the past, Dr. Margarita Villafuerte, for kidney stones. He is currently at a rehabilitation facility. He is not having daily bowel movements. He is a diabetic. He does have a history of TIA. He is tolerating Robertson catheter with minimal complaints. He does have chronic kidney disease. Creatinine on 11/29/2021 was 2.9, GFR 20. His creatinine did reach 3.0 while admitted. He has also seen nephrology in the past, but does not follow with anyone currently.     Past Medical History:   Diagnosis Date    Acute and chronic respiratory failure with hypoxia ([facility] [address] 11/16/2021    CAD (coronary artery disease)     Cataracts, bilateral     Glaucoma     Hyperlipidemia     Hypertension     TIA (transient ischemic attack)     Type II or unspecified type diabetes mellitus without mention of complication, not stated as uncontrolled      Past Surgical History:   Procedure Laterality Date    CATARACT REMOVAL WITH IMPLANT Bilateral 11/219/2013 and 12/3/2013    CORONARY ARTERY BYPASS GRAFT  2000    INGUINAL HERNIA REPAIR Bilateral 06/19/12     Outpatient Encounter Medications as of 12/2/2021   Medication Sig Dispense Refill    sodium bicarbonate 650 MG tablet Take 1 tablet by mouth 2 times daily      amiodarone (CORDARONE) 200 MG tablet Take 1 tablet by mouth 2 times daily      albuterol (PROVENTIL) (2.5 MG/3ML) 0.083% nebulizer solution Take 3 mLs by nebulization every 4 hours as needed for Wheezing 120 each 3    budesonide (PULMICORT) 0.5 MG/2ML nebulizer suspension Take 2 mLs by nebulization 2 times daily 60 each 3    ipratropium-albuterol (DUONEB) 0.5-2.5 (3) MG/3ML SOLN nebulizer solution Inhale 3 mLs into the lungs 4 times daily 360 mL     apixaban (ELIQUIS) 2.5 MG TABS tablet Take 1 tablet by mouth 2 times daily 60 tablet     metoprolol succinate (TOPROL XL) 50 MG extended release tablet Take 1 tablet by mouth daily 30 tablet 3    dilTIAZem (CARDIZEM) 60 MG tablet Take 1 tablet by mouth 4 times daily Please hold if systolic blood pressure less than 100 mmHg or if heart rate less than 100 bpm. 120 tablet 3    polyethylene glycol (GLYCOLAX) 17 g packet Take 17 g by mouth daily as needed for Constipation 527 g 1    tamsulosin (FLOMAX) 0.4 MG capsule Take 1 capsule by mouth daily 30 capsule 3    zinc sulfate (ZINCATE) 220 (50 Zn) MG capsule Take 2 capsules by mouth daily 30 capsule 3    ascorbic acid (VITAMIN C) 1000 MG tablet Take 2 tablets by mouth 2 times daily 30 tablet 3    famotidine (PEPCID) 10 MG tablet Take 1 tablet by mouth nightly 60 tablet 3    vitamin D3 (CHOLECALCIFEROL) 25 MCG (1000 UT) TABS tablet Take 1 tablet by mouth daily 30 tablet 0    furosemide (LASIX) 40 MG tablet Take 40 mg by mouth daily      glipiZIDE (GLUCOTROL) 5 MG tablet Take 5 mg by mouth 2 times daily (before meals)      atorvastatin (LIPITOR) 40 MG tablet Take 1 tablet by mouth nightly 30 tablet 3    clopidogrel (PLAVIX) 75 MG tablet Take 1 tablet by mouth daily 30 tablet 3    aspirin 81 MG EC tablet Take 81 mg by mouth nightly        No facility-administered encounter medications on file as of 12/2/2021.       Current Outpatient Medications on File Prior to Visit   Medication Sig Dispense Refill    sodium bicarbonate 650 MG tablet Take 1 tablet by mouth 2 times daily      amiodarone (CORDARONE) 200 MG tablet Take 1 tablet by mouth 2 times daily      albuterol (PROVENTIL) (2.5 MG/3ML) 0.083% nebulizer solution Take 3 mLs by nebulization every 4 hours as needed for Wheezing 120 each 3    budesonide (PULMICORT) 0.5 MG/2ML nebulizer suspension Take 2 mLs by nebulization 2 times daily 60 each 3    ipratropium-albuterol (DUONEB) 0.5-2.5 (3) MG/3ML SOLN nebulizer solution Inhale 3 mLs into the lungs 4 times daily 360 mL     apixaban (ELIQUIS) 2.5 MG TABS tablet Take 1 tablet by mouth 2 times daily 60 tablet     metoprolol succinate (TOPROL XL) 50 MG extended release tablet Take 1 tablet by mouth daily 30 tablet 3    dilTIAZem (CARDIZEM) 60 MG tablet Take 1 tablet by mouth 4 times daily Please hold if systolic blood pressure less than 100 mmHg or if heart rate less than 100 bpm. 120 tablet 3    polyethylene glycol (GLYCOLAX) 17 g packet Take 17 g by mouth daily as needed for Constipation 527 g 1    tamsulosin (FLOMAX) 0.4 MG capsule Take 1 capsule by mouth daily 30 capsule 3    zinc sulfate (ZINCATE) 220 (50 Zn) MG capsule Take 2 capsules by mouth daily 30 capsule 3    ascorbic acid (VITAMIN C) 1000 MG tablet Take 2 tablets by mouth 2 times daily 30 tablet 3    famotidine (PEPCID) 10 MG tablet Take 1 tablet by mouth nightly 60 tablet 3    vitamin D3 (CHOLECALCIFEROL) 25 MCG (1000 UT) TABS tablet Take 1 tablet by mouth daily 30 tablet 0    furosemide (LASIX) 40 MG tablet Take 40 mg by mouth daily      glipiZIDE (GLUCOTROL) 5 MG tablet Take 5 mg by mouth 2 times daily (before meals)      atorvastatin (LIPITOR) 40 MG tablet Take 1 tablet by mouth nightly 30 tablet 3    clopidogrel (PLAVIX) 75 MG tablet Take 1 tablet by mouth daily 30 tablet 3    aspirin 81 MG EC tablet Take 81 mg by mouth nightly        No current facility-administered medications on file prior to visit. Patient has no known allergies. Family History   Problem Relation Age of Onset    Heart Disease Father     Heart Disease Brother      Social History     Tobacco Use   Smoking Status Never Smoker   Smokeless Tobacco Never Used       Social History     Substance and Sexual Activity   Alcohol Use Yes    Comment: very very very seldom       Review of Systems   Constitutional: Negative for appetite change, chills and fever. Eyes: Negative for redness and visual disturbance. Respiratory: Negative for cough, shortness of breath and wheezing. Cardiovascular: Negative for chest pain and leg swelling. Gastrointestinal: Positive for constipation. Negative for abdominal pain, nausea and vomiting. Genitourinary: Positive for decreased urine volume and difficulty urinating. Negative for dysuria, enuresis, flank pain, frequency, hematuria, penile discharge, penile pain, scrotal swelling, testicular pain and urgency. Musculoskeletal: Negative for back pain, joint swelling and myalgias. Skin: Negative for color change, rash and wound. Neurological: Negative for dizziness, tremors and numbness. Hematological: Negative for adenopathy. Does not bruise/bleed easily. BP (!) 103/55 (Site: Right Upper Arm, Position: Sitting, Cuff Size: Medium Adult)   Pulse 74   Wt 162 lb (73.5 kg)   BMI 28.70 kg/m²       PHYSICAL EXAM:  Constitutional: Patient in no acute distress; Neuro: alert and oriented to person place  Psych: Mood and affect normal.  Skin: Normal  Lungs: Respiratory effort normal  Cardiovascular:  Normal peripheral pulses  Abdomen: Soft, non-tender, non-distended with no CVA, flank pain  Bladder non-tender and not distended.       Lab Results   Component Value Date    BUN 52 (H) 11/29/2021     Lab Results   Component Value Date    CREATININE 2.91 (H) 11/29/2021     Lab Results   Component Value Date    PSA 1.37 11/18/2021    PSA 0.74 02/21/2018    PSA 0.50 11/17/2016       ASSESSMENT:   Diagnosis Orders   1. Urinary retention     2. BPH with obstruction/lower urinary tract symptoms     3. Renal stone  XR ABDOMEN (KUB) (SINGLE AP VIEW)   4. Stage 4 chronic kidney disease ([facility] [address]  Kalyn Hudson DO, Nephrology, [place]   5. Constipation, unspecified constipation type           PLAN:  We will continue Flomax daily    We will change MiraLAX to daily    We will remove the Robertson catheter in 2 weeks.   We will have the facility do this early in the morning and we will see patient in the afternoon    We will have him follow-up with nephrology, referral placed    Prior to his follow-up we will obtain a KUB    We will attempt to obtain prior urology records as well

## 2021-12-02 NOTE — TELEPHONE ENCOUNTER
Please call patient daughter and let her know that patient has not been on flomax since 2015    We will continue Flomax daily    We will change MiraLAX to daily to improve bowel function    We will remove the Robertson catheter in 2 weeks.   We will have the facility do this early in the morning and we will see patient in the afternoon    We will have him follow-up with nephrology, referral placed    Prior to his follow-up we will obtain a KUB due to his history of stones    Fax orders to [facility]

## 2021-12-02 NOTE — TELEPHONE ENCOUNTER
Informed daughter,Kranthi, of the message. I called [facility] and faxed the orders to [phone] Attn: Abdulaziz Tesfaye.

## 2021-12-06 ENCOUNTER — APPOINTMENT (OUTPATIENT)
Dept: GENERAL RADIOLOGY | Age: 86
DRG: 640 | End: 2021-12-06
Payer: MEDICARE

## 2021-12-06 ENCOUNTER — HOSPITAL ENCOUNTER (OUTPATIENT)
Age: 86
Setting detail: SPECIMEN
Discharge: HOME | End: 2021-12-06

## 2021-12-06 ENCOUNTER — HOSPITAL ENCOUNTER (INPATIENT)
Age: 86
LOS: 4 days | Discharge: NURSING, REHAB OR LONG-TERM CARE | DRG: 640 | End: 2021-12-10
Attending: EMERGENCY MEDICINE | Admitting: INTERNAL MEDICINE
Payer: MEDICARE

## 2021-12-06 DIAGNOSIS — E87.5 HYPERKALEMIA: ICD-10-CM

## 2021-12-06 DIAGNOSIS — R73.9 HYPERGLYCEMIA: ICD-10-CM

## 2021-12-06 DIAGNOSIS — N19 RENAL FAILURE, UNSPECIFIED CHRONICITY: Primary | ICD-10-CM

## 2021-12-06 LAB
-: ABNORMAL
ABSOLUTE EOS #: 0.18 K/UL (ref 0–0.44)
ABSOLUTE EOS #: 0.27 K/UL (ref 0–0.44)
ABSOLUTE IMMATURE GRANULOCYTE: 0.09 K/UL (ref 0–0.3)
ABSOLUTE IMMATURE GRANULOCYTE: 0.1 K/UL (ref 0–0.3)
ABSOLUTE LYMPH #: 1.31 K/UL (ref 1.1–3.7)
ABSOLUTE LYMPH #: 1.85 K/UL (ref 1.1–3.7)
ABSOLUTE MONO #: 0.62 K/UL (ref 0.1–1.2)
ABSOLUTE MONO #: 0.73 K/UL (ref 0.1–1.2)
ALBUMIN SERPL-MCNC: 2.6 G/DL (ref 3.5–5.2)
ALBUMIN/GLOBULIN RATIO: 0.8 (ref 1–2.5)
ALP BLD-CCNC: 165 U/L (ref 40–129)
ALT SERPL-CCNC: 25 U/L (ref 5–41)
AMORPHOUS: ABNORMAL
ANION GAP SERPL CALCULATED.3IONS-SCNC: 10 MMOL/L (ref 9–17)
ANION GAP SERPL CALCULATED.3IONS-SCNC: 11 MMOL/L (ref 9–17)
ANION GAP SERPL CALCULATED.3IONS-SCNC: 9 MMOL/L (ref 9–17)
ANION GAP SERPL CALCULATED.3IONS-SCNC: 9 MMOL/L (ref 9–17)
AST SERPL-CCNC: 13 U/L
BACTERIA: ABNORMAL
BASOPHILS # BLD: 0 % (ref 0–2)
BASOPHILS # BLD: 0 % (ref 0–2)
BASOPHILS ABSOLUTE: <0.03 K/UL (ref 0–0.2)
BASOPHILS ABSOLUTE: <0.03 K/UL (ref 0–0.2)
BILIRUB SERPL-MCNC: 0.36 MG/DL (ref 0.3–1.2)
BILIRUBIN URINE: NEGATIVE
BNP INTERPRETATION: ABNORMAL
BUN BLDV-MCNC: 65 MG/DL (ref 8–23)
BUN BLDV-MCNC: 66 MG/DL (ref 8–23)
BUN BLDV-MCNC: 66 MG/DL (ref 8–23)
BUN BLDV-MCNC: 69 MG/DL (ref 8–23)
BUN/CREAT BLD: 20 (ref 9–20)
BUN/CREAT BLD: 21 (ref 9–20)
BUN/CREAT BLD: 21 (ref 9–20)
BUN/CREAT BLD: 22 (ref 9–20)
CALCIUM SERPL-MCNC: 8.2 MG/DL (ref 8.6–10.4)
CALCIUM SERPL-MCNC: 8.3 MG/DL (ref 8.6–10.4)
CALCIUM SERPL-MCNC: 8.4 MG/DL (ref 8.6–10.4)
CALCIUM SERPL-MCNC: 8.4 MG/DL (ref 8.6–10.4)
CASTS UA: ABNORMAL /LPF
CHLORIDE BLD-SCNC: 94 MMOL/L (ref 98–107)
CHLORIDE BLD-SCNC: 95 MMOL/L (ref 98–107)
CHLORIDE BLD-SCNC: 97 MMOL/L (ref 98–107)
CHLORIDE BLD-SCNC: 98 MMOL/L (ref 98–107)
CHP ED QC CHECK: YES
CO2: 25 MMOL/L (ref 20–31)
CO2: 26 MMOL/L (ref 20–31)
CO2: 26 MMOL/L (ref 20–31)
CO2: 27 MMOL/L (ref 20–31)
COLOR: YELLOW
COMMENT UA: ABNORMAL
CREAT SERPL-MCNC: 3.03 MG/DL (ref 0.7–1.2)
CREAT SERPL-MCNC: 3.03 MG/DL (ref 0.7–1.2)
CREAT SERPL-MCNC: 3.11 MG/DL (ref 0.7–1.2)
CREAT SERPL-MCNC: 3.44 MG/DL (ref 0.7–1.2)
CRYSTALS, UA: ABNORMAL /HPF
DIFFERENTIAL TYPE: ABNORMAL
DIFFERENTIAL TYPE: ABNORMAL
EOSINOPHILS RELATIVE PERCENT: 2 % (ref 1–4)
EOSINOPHILS RELATIVE PERCENT: 3 % (ref 1–4)
EPITHELIAL CELLS UA: ABNORMAL /HPF (ref 0–5)
GFR AFRICAN AMERICAN: 20 ML/MIN
GFR AFRICAN AMERICAN: 23 ML/MIN
GFR AFRICAN AMERICAN: 24 ML/MIN
GFR AFRICAN AMERICAN: 24 ML/MIN
GFR NON-AFRICAN AMERICAN: 17 ML/MIN
GFR NON-AFRICAN AMERICAN: 19 ML/MIN
GFR SERPL CREATININE-BSD FRML MDRD: ABNORMAL ML/MIN/{1.73_M2}
GLUCOSE BLD-MCNC: 262 MG/DL
GLUCOSE BLD-MCNC: 262 MG/DL (ref 74–100)
GLUCOSE BLD-MCNC: 289 MG/DL (ref 70–99)
GLUCOSE BLD-MCNC: 295 MG/DL (ref 74–100)
GLUCOSE BLD-MCNC: 450 MG/DL (ref 70–99)
GLUCOSE BLD-MCNC: 468 MG/DL (ref 70–99)
GLUCOSE BLD-MCNC: 513 MG/DL (ref 70–99)
GLUCOSE URINE: ABNORMAL
HCT VFR BLD CALC: 25.3 % (ref 40.7–50.3)
HCT VFR BLD CALC: 26.5 % (ref 40.7–50.3)
HEMOGLOBIN: 7.9 G/DL (ref 13–17)
HEMOGLOBIN: 8.3 G/DL (ref 13–17)
IMMATURE GRANULOCYTES: 1 %
IMMATURE GRANULOCYTES: 1 %
KETONES, URINE: NEGATIVE
LEUKOCYTE ESTERASE, URINE: ABNORMAL
LYMPHOCYTES # BLD: 16 % (ref 24–43)
LYMPHOCYTES # BLD: 21 % (ref 24–43)
MCH RBC QN AUTO: 30.5 PG (ref 25.2–33.5)
MCH RBC QN AUTO: 31.2 PG (ref 25.2–33.5)
MCHC RBC AUTO-ENTMCNC: 31.2 G/DL (ref 28.4–34.8)
MCHC RBC AUTO-ENTMCNC: 31.3 G/DL (ref 28.4–34.8)
MCV RBC AUTO: 100 FL (ref 82.6–102.9)
MCV RBC AUTO: 97.4 FL (ref 82.6–102.9)
MONOCYTES # BLD: 8 % (ref 3–12)
MONOCYTES # BLD: 8 % (ref 3–12)
MUCUS: ABNORMAL
NITRITE, URINE: POSITIVE
NRBC AUTOMATED: 0 PER 100 WBC
NRBC AUTOMATED: 0 PER 100 WBC
OTHER OBSERVATIONS UA: ABNORMAL
PDW BLD-RTO: 13.2 % (ref 11.8–14.4)
PDW BLD-RTO: 13.2 % (ref 11.8–14.4)
PH UA: 7 (ref 5–9)
PLATELET # BLD: 298 K/UL (ref 138–453)
PLATELET # BLD: 327 K/UL (ref 138–453)
PLATELET ESTIMATE: ABNORMAL
PLATELET ESTIMATE: ABNORMAL
PMV BLD AUTO: 10.6 FL (ref 8.1–13.5)
PMV BLD AUTO: 10.8 FL (ref 8.1–13.5)
POTASSIUM SERPL-SCNC: 5.8 MMOL/L (ref 3.7–5.3)
POTASSIUM SERPL-SCNC: 5.9 MMOL/L (ref 3.7–5.3)
POTASSIUM SERPL-SCNC: 6.1 MMOL/L (ref 3.7–5.3)
POTASSIUM SERPL-SCNC: 6.1 MMOL/L (ref 3.7–5.3)
PRO-BNP: 8571 PG/ML
PROTEIN UA: ABNORMAL
RBC # BLD: 2.53 M/UL (ref 4.21–5.77)
RBC # BLD: 2.72 M/UL (ref 4.21–5.77)
RBC # BLD: ABNORMAL 10*6/UL
RBC # BLD: ABNORMAL 10*6/UL
RBC UA: ABNORMAL /HPF (ref 0–2)
RENAL EPITHELIAL, UA: ABNORMAL /HPF
SEG NEUTROPHILS: 67 % (ref 36–65)
SEG NEUTROPHILS: 73 % (ref 36–65)
SEGMENTED NEUTROPHILS ABSOLUTE COUNT: 5.86 K/UL (ref 1.5–8.1)
SEGMENTED NEUTROPHILS ABSOLUTE COUNT: 5.89 K/UL (ref 1.5–8.1)
SODIUM BLD-SCNC: 129 MMOL/L (ref 135–144)
SODIUM BLD-SCNC: 130 MMOL/L (ref 135–144)
SODIUM BLD-SCNC: 133 MMOL/L (ref 135–144)
SODIUM BLD-SCNC: 135 MMOL/L (ref 135–144)
SPECIFIC GRAVITY UA: 1.01 (ref 1.01–1.02)
TOTAL PROTEIN: 6 G/DL (ref 6.4–8.3)
TRICHOMONAS: ABNORMAL
TROPONIN INTERP: ABNORMAL
TROPONIN INTERP: ABNORMAL
TROPONIN T: ABNORMAL NG/ML
TROPONIN T: ABNORMAL NG/ML
TROPONIN, HIGH SENSITIVITY: 122 NG/L (ref 0–22)
TROPONIN, HIGH SENSITIVITY: 133 NG/L (ref 0–22)
TURBIDITY: ABNORMAL
URINE HGB: ABNORMAL
UROBILINOGEN, URINE: ABNORMAL
WBC # BLD: 8.1 K/UL (ref 3.5–11.3)
WBC # BLD: 8.8 K/UL (ref 3.5–11.3)
WBC # BLD: ABNORMAL 10*3/UL
WBC # BLD: ABNORMAL 10*3/UL
WBC UA: ABNORMAL /HPF (ref 0–5)
YEAST: ABNORMAL

## 2021-12-06 PROCEDURE — 6370000000 HC RX 637 (ALT 250 FOR IP): Performed by: INTERNAL MEDICINE

## 2021-12-06 PROCEDURE — 84484 ASSAY OF TROPONIN QUANT: CPT

## 2021-12-06 PROCEDURE — 87186 SC STD MICRODIL/AGAR DIL: CPT

## 2021-12-06 PROCEDURE — 6360000002 HC RX W HCPCS: Performed by: EMERGENCY MEDICINE

## 2021-12-06 PROCEDURE — 82947 ASSAY GLUCOSE BLOOD QUANT: CPT

## 2021-12-06 PROCEDURE — 2700000000 HC OXYGEN THERAPY PER DAY

## 2021-12-06 PROCEDURE — 94664 DEMO&/EVAL PT USE INHALER: CPT

## 2021-12-06 PROCEDURE — 99285 EMERGENCY DEPT VISIT HI MDM: CPT

## 2021-12-06 PROCEDURE — 2580000003 HC RX 258: Performed by: INTERNAL MEDICINE

## 2021-12-06 PROCEDURE — 6370000000 HC RX 637 (ALT 250 FOR IP): Performed by: EMERGENCY MEDICINE

## 2021-12-06 PROCEDURE — 93005 ELECTROCARDIOGRAM TRACING: CPT | Performed by: EMERGENCY MEDICINE

## 2021-12-06 PROCEDURE — 80053 COMPREHEN METABOLIC PANEL: CPT

## 2021-12-06 PROCEDURE — 94640 AIRWAY INHALATION TREATMENT: CPT

## 2021-12-06 PROCEDURE — P9604 ONE-WAY ALLOW PRORATED TRIP: HCPCS

## 2021-12-06 PROCEDURE — 83880 ASSAY OF NATRIURETIC PEPTIDE: CPT

## 2021-12-06 PROCEDURE — 85025 COMPLETE CBC W/AUTO DIFF WBC: CPT

## 2021-12-06 PROCEDURE — 94761 N-INVAS EAR/PLS OXIMETRY MLT: CPT

## 2021-12-06 PROCEDURE — 71045 X-RAY EXAM CHEST 1 VIEW: CPT

## 2021-12-06 PROCEDURE — 94669 MECHANICAL CHEST WALL OSCILL: CPT

## 2021-12-06 PROCEDURE — 87086 URINE CULTURE/COLONY COUNT: CPT

## 2021-12-06 PROCEDURE — 81001 URINALYSIS AUTO W/SCOPE: CPT

## 2021-12-06 PROCEDURE — 1200000000 HC SEMI PRIVATE

## 2021-12-06 PROCEDURE — 6360000002 HC RX W HCPCS: Performed by: INTERNAL MEDICINE

## 2021-12-06 PROCEDURE — 2580000003 HC RX 258: Performed by: EMERGENCY MEDICINE

## 2021-12-06 PROCEDURE — 80048 BASIC METABOLIC PNL TOTAL CA: CPT

## 2021-12-06 PROCEDURE — 36415 COLL VENOUS BLD VENIPUNCTURE: CPT

## 2021-12-06 PROCEDURE — 87077 CULTURE AEROBIC IDENTIFY: CPT

## 2021-12-06 PROCEDURE — 96374 THER/PROPH/DIAG INJ IV PUSH: CPT

## 2021-12-06 RX ORDER — POLYETHYLENE GLYCOL 3350 17 G/17G
17 POWDER, FOR SOLUTION ORAL DAILY PRN
Status: DISCONTINUED | OUTPATIENT
Start: 2021-12-06 | End: 2021-12-06 | Stop reason: SDUPTHER

## 2021-12-06 RX ORDER — SODIUM POLYSTYRENE SULFONATE 15 G/60ML
30 SUSPENSION ORAL; RECTAL ONCE
Status: COMPLETED | OUTPATIENT
Start: 2021-12-06 | End: 2021-12-06

## 2021-12-06 RX ORDER — ACETAMINOPHEN 650 MG/1
650 SUPPOSITORY RECTAL EVERY 6 HOURS PRN
Status: DISCONTINUED | OUTPATIENT
Start: 2021-12-06 | End: 2021-12-10 | Stop reason: HOSPADM

## 2021-12-06 RX ORDER — SULFAMETHOXAZOLE AND TRIMETHOPRIM 800; 160 MG/1; MG/1
1 TABLET ORAL 2 TIMES DAILY
Status: ON HOLD | COMMUNITY
End: 2021-12-06

## 2021-12-06 RX ORDER — ACETAMINOPHEN 325 MG/1
650 TABLET ORAL EVERY 6 HOURS PRN
Status: DISCONTINUED | OUTPATIENT
Start: 2021-12-06 | End: 2021-12-10 | Stop reason: HOSPADM

## 2021-12-06 RX ORDER — POLYETHYLENE GLYCOL 3350 17 G/17G
17 POWDER, FOR SOLUTION ORAL DAILY PRN
Status: DISCONTINUED | OUTPATIENT
Start: 2021-12-06 | End: 2021-12-10 | Stop reason: HOSPADM

## 2021-12-06 RX ORDER — 0.9 % SODIUM CHLORIDE 0.9 %
250 INTRAVENOUS SOLUTION INTRAVENOUS ONCE
Status: COMPLETED | OUTPATIENT
Start: 2021-12-06 | End: 2021-12-06

## 2021-12-06 RX ORDER — ONDANSETRON 4 MG/1
4 TABLET, ORALLY DISINTEGRATING ORAL EVERY 8 HOURS PRN
Status: DISCONTINUED | OUTPATIENT
Start: 2021-12-06 | End: 2021-12-10 | Stop reason: HOSPADM

## 2021-12-06 RX ORDER — AMIODARONE HYDROCHLORIDE 200 MG/1
200 TABLET ORAL 2 TIMES DAILY
Status: DISCONTINUED | OUTPATIENT
Start: 2021-12-06 | End: 2021-12-10 | Stop reason: HOSPADM

## 2021-12-06 RX ORDER — ASCORBIC ACID 500 MG
2000 TABLET ORAL 2 TIMES DAILY
Status: DISCONTINUED | OUTPATIENT
Start: 2021-12-06 | End: 2021-12-10 | Stop reason: HOSPADM

## 2021-12-06 RX ORDER — DILTIAZEM HYDROCHLORIDE 60 MG/1
60 TABLET, FILM COATED ORAL 4 TIMES DAILY
Status: DISCONTINUED | OUTPATIENT
Start: 2021-12-06 | End: 2021-12-09

## 2021-12-06 RX ORDER — CLOPIDOGREL BISULFATE 75 MG/1
75 TABLET ORAL DAILY
Status: DISCONTINUED | OUTPATIENT
Start: 2021-12-07 | End: 2021-12-10 | Stop reason: HOSPADM

## 2021-12-06 RX ORDER — SODIUM CHLORIDE 9 MG/ML
25 INJECTION, SOLUTION INTRAVENOUS PRN
Status: DISCONTINUED | OUTPATIENT
Start: 2021-12-06 | End: 2021-12-10 | Stop reason: HOSPADM

## 2021-12-06 RX ORDER — SODIUM CHLORIDE 0.9 % (FLUSH) 0.9 %
5-40 SYRINGE (ML) INJECTION EVERY 12 HOURS SCHEDULED
Status: DISCONTINUED | OUTPATIENT
Start: 2021-12-06 | End: 2021-12-10 | Stop reason: HOSPADM

## 2021-12-06 RX ORDER — FUROSEMIDE 40 MG/1
40 TABLET ORAL DAILY
Status: DISCONTINUED | OUTPATIENT
Start: 2021-12-07 | End: 2021-12-10 | Stop reason: HOSPADM

## 2021-12-06 RX ORDER — ONDANSETRON 2 MG/ML
4 INJECTION INTRAMUSCULAR; INTRAVENOUS EVERY 6 HOURS PRN
Status: DISCONTINUED | OUTPATIENT
Start: 2021-12-06 | End: 2021-12-10 | Stop reason: HOSPADM

## 2021-12-06 RX ORDER — SODIUM CHLORIDE 9 MG/ML
1000 INJECTION, SOLUTION INTRAVENOUS CONTINUOUS
Status: DISCONTINUED | OUTPATIENT
Start: 2021-12-06 | End: 2021-12-10 | Stop reason: HOSPADM

## 2021-12-06 RX ORDER — ATORVASTATIN CALCIUM 40 MG/1
40 TABLET, FILM COATED ORAL NIGHTLY
Status: DISCONTINUED | OUTPATIENT
Start: 2021-12-06 | End: 2021-12-10 | Stop reason: HOSPADM

## 2021-12-06 RX ORDER — BUDESONIDE 0.5 MG/2ML
0.5 INHALANT ORAL 2 TIMES DAILY
Status: DISCONTINUED | OUTPATIENT
Start: 2021-12-06 | End: 2021-12-10 | Stop reason: HOSPADM

## 2021-12-06 RX ORDER — ASPIRIN 81 MG/1
81 TABLET ORAL NIGHTLY
Status: DISCONTINUED | OUTPATIENT
Start: 2021-12-06 | End: 2021-12-10 | Stop reason: HOSPADM

## 2021-12-06 RX ORDER — BENZONATATE 100 MG/1
100 CAPSULE ORAL 3 TIMES DAILY PRN
COMMUNITY

## 2021-12-06 RX ORDER — METOPROLOL SUCCINATE 50 MG/1
50 TABLET, EXTENDED RELEASE ORAL DAILY
Status: DISCONTINUED | OUTPATIENT
Start: 2021-12-07 | End: 2021-12-09

## 2021-12-06 RX ORDER — ZINC SULFATE 50(220)MG
100 CAPSULE ORAL DAILY
Status: DISCONTINUED | OUTPATIENT
Start: 2021-12-07 | End: 2021-12-10 | Stop reason: HOSPADM

## 2021-12-06 RX ORDER — ZOLPIDEM TARTRATE 5 MG/1
5 TABLET ORAL NIGHTLY PRN
COMMUNITY

## 2021-12-06 RX ORDER — TRAZODONE HYDROCHLORIDE 50 MG/1
50 TABLET ORAL NIGHTLY
COMMUNITY

## 2021-12-06 RX ORDER — SODIUM CHLORIDE 0.9 % (FLUSH) 0.9 %
10 SYRINGE (ML) INJECTION PRN
Status: DISCONTINUED | OUTPATIENT
Start: 2021-12-06 | End: 2021-12-10 | Stop reason: HOSPADM

## 2021-12-06 RX ORDER — ALBUTEROL SULFATE 2.5 MG/3ML
2.5 SOLUTION RESPIRATORY (INHALATION) EVERY 4 HOURS PRN
Status: DISCONTINUED | OUTPATIENT
Start: 2021-12-06 | End: 2021-12-10 | Stop reason: HOSPADM

## 2021-12-06 RX ORDER — ALBUTEROL SULFATE 2.5 MG/3ML
2.5 SOLUTION RESPIRATORY (INHALATION) ONCE
Status: COMPLETED | OUTPATIENT
Start: 2021-12-06 | End: 2021-12-06

## 2021-12-06 RX ORDER — TAMSULOSIN HYDROCHLORIDE 0.4 MG/1
0.4 CAPSULE ORAL DAILY
Status: DISCONTINUED | OUTPATIENT
Start: 2021-12-07 | End: 2021-12-10 | Stop reason: HOSPADM

## 2021-12-06 RX ORDER — BENZONATATE 100 MG/1
100 CAPSULE ORAL 3 TIMES DAILY PRN
Status: DISCONTINUED | OUTPATIENT
Start: 2021-12-06 | End: 2021-12-10 | Stop reason: HOSPADM

## 2021-12-06 RX ORDER — VITAMIN B COMPLEX
1000 TABLET ORAL DAILY
Status: DISCONTINUED | OUTPATIENT
Start: 2021-12-07 | End: 2021-12-10 | Stop reason: HOSPADM

## 2021-12-06 RX ORDER — FAMOTIDINE 10 MG
10 TABLET ORAL NIGHTLY
Status: DISCONTINUED | OUTPATIENT
Start: 2021-12-06 | End: 2021-12-10 | Stop reason: HOSPADM

## 2021-12-06 RX ORDER — IPRATROPIUM BROMIDE AND ALBUTEROL SULFATE 2.5; .5 MG/3ML; MG/3ML
3 SOLUTION RESPIRATORY (INHALATION) 4 TIMES DAILY
Status: DISCONTINUED | OUTPATIENT
Start: 2021-12-06 | End: 2021-12-10 | Stop reason: HOSPADM

## 2021-12-06 RX ORDER — GLIPIZIDE 5 MG/1
5 TABLET ORAL
Status: DISCONTINUED | OUTPATIENT
Start: 2021-12-07 | End: 2021-12-10 | Stop reason: HOSPADM

## 2021-12-06 RX ORDER — SODIUM BICARBONATE 650 MG/1
650 TABLET ORAL 2 TIMES DAILY
Status: DISCONTINUED | OUTPATIENT
Start: 2021-12-06 | End: 2021-12-10 | Stop reason: HOSPADM

## 2021-12-06 RX ORDER — TRAZODONE HYDROCHLORIDE 50 MG/1
50 TABLET ORAL NIGHTLY
Status: DISCONTINUED | OUTPATIENT
Start: 2021-12-06 | End: 2021-12-10 | Stop reason: HOSPADM

## 2021-12-06 RX ADMIN — SODIUM CHLORIDE 250 ML: 9 INJECTION, SOLUTION INTRAVENOUS at 19:53

## 2021-12-06 RX ADMIN — SODIUM CHLORIDE, PRESERVATIVE FREE 10 ML: 5 INJECTION INTRAVENOUS at 22:53

## 2021-12-06 RX ADMIN — SODIUM CHLORIDE 250 ML: 9 INJECTION, SOLUTION INTRAVENOUS at 17:51

## 2021-12-06 RX ADMIN — IPRATROPIUM BROMIDE AND ALBUTEROL SULFATE 3 ML: .5; 2.5 SOLUTION RESPIRATORY (INHALATION) at 22:45

## 2021-12-06 RX ADMIN — APIXABAN 2.5 MG: 2.5 TABLET, FILM COATED ORAL at 22:53

## 2021-12-06 RX ADMIN — ALBUTEROL SULFATE 2.5 MG: 2.5 SOLUTION RESPIRATORY (INHALATION) at 17:52

## 2021-12-06 RX ADMIN — TRAZODONE HYDROCHLORIDE 50 MG: 50 TABLET ORAL at 22:53

## 2021-12-06 RX ADMIN — SODIUM POLYSTYRENE SULFONATE 30 G: 15 SUSPENSION ORAL; RECTAL at 22:57

## 2021-12-06 RX ADMIN — BUDESONIDE 500 MCG: 0.5 SUSPENSION RESPIRATORY (INHALATION) at 22:45

## 2021-12-06 RX ADMIN — FAMOTIDINE 10 MG: 10 TABLET ORAL at 22:52

## 2021-12-06 RX ADMIN — SODIUM BICARBONATE TAB 650 MG 650 MG: 650 TAB at 22:53

## 2021-12-06 RX ADMIN — SODIUM POLYSTYRENE SULFONATE 30 G: 15 SUSPENSION ORAL; RECTAL at 18:56

## 2021-12-06 RX ADMIN — ASPIRIN 81 MG: 81 TABLET, COATED ORAL at 22:52

## 2021-12-06 RX ADMIN — AMIODARONE HYDROCHLORIDE 200 MG: 200 TABLET ORAL at 22:52

## 2021-12-06 RX ADMIN — ATORVASTATIN CALCIUM 40 MG: 40 TABLET, FILM COATED ORAL at 22:52

## 2021-12-06 RX ADMIN — SODIUM CHLORIDE 250 ML: 9 INJECTION, SOLUTION INTRAVENOUS at 17:25

## 2021-12-06 RX ADMIN — DILTIAZEM HYDROCHLORIDE 60 MG: 60 TABLET, FILM COATED ORAL at 22:52

## 2021-12-06 RX ADMIN — OXYCODONE HYDROCHLORIDE AND ACETAMINOPHEN 2000 MG: 500 TABLET ORAL at 22:52

## 2021-12-06 RX ADMIN — INSULIN HUMAN 5 UNITS: 100 INJECTION, SOLUTION PARENTERAL at 17:55

## 2021-12-06 ASSESSMENT — PAIN SCALES - GENERAL: PAINLEVEL_OUTOF10: 0

## 2021-12-06 NOTE — ED NOTES
Bed: 07  Expected date:   Expected time:   Means of arrival:   Comments:  EMS       Homer Patrick RN  12/06/21 [id number]

## 2021-12-06 NOTE — ED PROVIDER NOTES
[facility] Emergency Department ED  EMERGENCY DEPARTMENT ENCOUNTER      Pt Name: Tyler Osborne  MRN: [id number]  [place] 10/31/1930  Date of evaluation: 12/6/2021  Provider: Jacinto Abbasi MD    CHIEF COMPLAINT       Chief Complaint   Patient presents with    Shortness of Breath     from [facility] for post covid rehab; pt diagnosed with covid pneumonia, SOB worsening    Other     pt has abnormal labs drawn this AM         HISTORY OF PRESENT ILLNESS   (Location/Symptom, Timing/Onset, Context/Setting, Quality, Duration, Modifying Factors, Severity)  Note limiting factors. Tyler Osborne is a 80 y.o. male who presents to the emergency department      60-year-old male with a known history of [facility] pneumonia [facility] he suffering from his pneumonia was sent to the emergency department for evaluation after having abnormal labs drawn earlier this morning. Patient currently on nasal cannula requires 2 to 3 L at baseline since having Covid. He is denying any chest pain. He does have shortness of breath which is unchanged. Denies any nausea vomiting back pain. Does have lower extremity weakness which is not new. No acute focal neurological deficits. Nursing Notes were reviewed. REVIEW OF SYSTEMS    (2-9 systems for level 4, 10 or more for level 5)     Review of Systems   All other systems reviewed and are negative. Except as noted above the remainder of the review of systems was reviewed and negative.        PAST MEDICAL HISTORY     Past Medical History:   Diagnosis Date    Acute and chronic respiratory failure with hypoxia ([address] 11/16/2021    CAD (coronary artery disease)     Cataracts, bilateral     Glaucoma     Hyperlipidemia     Hypertension     TIA (transient ischemic attack)     Type II or unspecified type diabetes mellitus without mention of complication, not stated as uncontrolled          SURGICAL HISTORY       Past Surgical History:   Procedure Laterality Date    CATARACT REMOVAL WITH IMPLANT Bilateral 11/219/2013 and 12/3/2013    CORONARY ARTERY BYPASS GRAFT  2000    INGUINAL HERNIA REPAIR Bilateral 06/19/12         CURRENT MEDICATIONS       Previous Medications    ALBUTEROL (PROVENTIL) (2.5 MG/3ML) 0.083% NEBULIZER SOLUTION    Take 3 mLs by nebulization every 4 hours as needed for Wheezing    AMIODARONE (CORDARONE) 200 MG TABLET    Take 1 tablet by mouth 2 times daily    APIXABAN (ELIQUIS) 2.5 MG TABS TABLET    Take 1 tablet by mouth 2 times daily    ASCORBIC ACID (VITAMIN C) 1000 MG TABLET    Take 2 tablets by mouth 2 times daily    ASPIRIN 81 MG EC TABLET    Take 81 mg by mouth nightly     ATORVASTATIN (LIPITOR) 40 MG TABLET    Take 1 tablet by mouth nightly    BENZONATATE (TESSALON) 100 MG CAPSULE    Take 100 mg by mouth 3 times daily as needed for Cough    BUDESONIDE (PULMICORT) 0.5 MG/2ML NEBULIZER SUSPENSION    Take 2 mLs by nebulization 2 times daily    CLOPIDOGREL (PLAVIX) 75 MG TABLET    Take 1 tablet by mouth daily    DILTIAZEM (CARDIZEM) 60 MG TABLET    Take 1 tablet by mouth 4 times daily Please hold if systolic blood pressure less than 100 mmHg or if heart rate less than 100 bpm.    FAMOTIDINE (PEPCID) 10 MG TABLET    Take 1 tablet by mouth nightly    FUROSEMIDE (LASIX) 40 MG TABLET    Take 40 mg by mouth daily    GLIPIZIDE (GLUCOTROL) 5 MG TABLET    Take 5 mg by mouth 2 times daily (before meals)    IPRATROPIUM-ALBUTEROL (DUONEB) 0.5-2.5 (3) MG/3ML SOLN NEBULIZER SOLUTION    Inhale 3 mLs into the lungs 4 times daily    METOPROLOL SUCCINATE (TOPROL XL) 50 MG EXTENDED RELEASE TABLET    Take 1 tablet by mouth daily    POLYETHYLENE GLYCOL (GLYCOLAX) 17 G PACKET    Take 17 g by mouth daily as needed for Constipation    POLYETHYLENE GLYCOL (GLYCOLAX) 17 GM/SCOOP POWDER    Take 17 g by mouth daily    SODIUM BICARBONATE 650 MG TABLET    Take 1 tablet by mouth 2 times daily    SULFAMETHOXAZOLE-TRIMETHOPRIM (BACTRIM DS;SEPTRA DS) 800-160 MG PER TABLET    Take 1 tablet by mouth 2 times daily    TAMSULOSIN (FLOMAX) 0.4 MG CAPSULE    Take 1 capsule by mouth daily    TRAZODONE (DESYREL) 50 MG TABLET    Take 50 mg by mouth nightly    VITAMIN D3 (CHOLECALCIFEROL) 25 MCG (1000 UT) TABS TABLET    Take 1 tablet by mouth daily    ZINC SULFATE (ZINCATE) 220 (50 ZN) MG CAPSULE    Take 2 capsules by mouth daily    ZOLPIDEM (AMBIEN) 5 MG TABLET    Take 5 mg by mouth nightly as needed for Sleep. ALLERGIES     Patient has no known allergies. FAMILY HISTORY       Family History   Problem Relation Age of Onset    Heart Disease Father     Heart Disease Brother           SOCIAL HISTORY       Social History     Socioeconomic History    Marital status:      Spouse name: None    Number of children: None    Years of education: None    Highest education level: None   Occupational History    None   Tobacco Use    Smoking status: Never Smoker    Smokeless tobacco: Never Used   Substance and Sexual Activity    Alcohol use: Yes     Comment: very very very seldom    Drug use: No    Sexual activity: None   Other Topics Concern    None   Social History Narrative    None     Social Determinants of Health     Financial Resource Strain:     Difficulty of Paying Living Expenses: Not on file   Food Insecurity:     Worried About Running Out of Food in the Last Year: Not on file    Mera of Food in the Last Year: Not on file   Transportation Needs:     Lack of Transportation (Medical): Not on file    Lack of Transportation (Non-Medical):  Not on file   Physical Activity:     Days of Exercise per Week: Not on file    Minutes of Exercise per Session: Not on file   Stress:     Feeling of Stress : Not on file   Social Connections:     Frequency of Communication with Friends and Family: Not on file    Frequency of Social Gatherings with Friends and Family: Not on file    Attends Quaker Services: Not on file    Active Member of Clubs or Organizations: Not on file    Attends [employer] or Organization Meetings: Not on file    Marital Status: Not on file   Intimate Partner Violence:     Fear of Current or Ex-Partner: Not on file    Emotionally Abused: Not on file    Physically Abused: Not on file    Sexually Abused: Not on file   Housing Stability:     Unable to Pay for Housing in the Last Year: Not on file    Number of [place] in the Last Year: Not on file    Unstable Housing in the Last Year: Not on file       SCREENINGS        [place] Coma Scale  Eye Opening: Spontaneous  Best Verbal Response: Oriented  Best Motor Response: Obeys commands  [place] Coma Scale Score: 15               PHYSICAL EXAM    (up to 7 for level 4, 8 or more for level 5)     ED Triage Vitals   BP Temp Temp Source Pulse Resp SpO2 Height Weight   12/06/21 1444 12/06/21 1445 12/06/21 1445 12/06/21 1444 12/06/21 1444 12/06/21 1444 -- 12/06/21 1444   (!) 110/49 97.8 °F (36.6 °C) Oral 70 26 100 %  168 lb (76.2 kg)       Physical Exam  Vitals and nursing note reviewed. Constitutional:       Comments: Elderly male. 3 L nasal cannula. SaO2 is low to mid 90s. Patient is resting comfortably and is in no acute distress   Cardiovascular:      Rate and Rhythm: Normal rate and regular rhythm. Pulmonary:      Comments: Wheezing bilaterally. No increased respiratory effort. Patient is in no acute distress  Musculoskeletal:         General: Normal range of motion. Skin:     General: Skin is warm and dry. Neurological:      General: No focal deficit present. Mental Status: He is oriented to person, place, and time.          DIAGNOSTIC RESULTS     EKG: All EKG's are interpreted by the Emergency Department Physician who either signs or Co-signs this chart in the absence of a cardiologist.        RADIOLOGY:   Non-plain film images such as CT, Ultrasound and MRI are read by the radiologist. Plain radiographic images are visualized and preliminarily interpreted by the emergency physician with the below findings:        Interpretation per the Radiologist below, if available at the time of this note:    XR CHEST PORTABLE   Final Result   Interval development of small bilateral pleural effusions with persistent   multifocal bilateral airspace disease.                ED BEDSIDE ULTRASOUND:   Performed by ED Physician - none    LABS:  Labs Reviewed   BASIC METABOLIC PANEL - Abnormal; Notable for the following components:       Result Value    Glucose 513 (*)     BUN 66 (*)     CREATININE 3.11 (*)     Bun/Cre Ratio 21 (*)     Calcium 8.4 (*)     Sodium 129 (*)     Potassium 6.1 (*)     Chloride 94 (*)     GFR Non- 19 (*)     GFR  23 (*)     All other components within normal limits   BRAIN NATRIURETIC PEPTIDE - Abnormal; Notable for the following components:    Pro-BNP 8,571 (*)     All other components within normal limits   CBC WITH AUTO DIFFERENTIAL - Abnormal; Notable for the following components:    RBC 2.72 (*)     Hemoglobin 8.3 (*)     Hematocrit 26.5 (*)     Seg Neutrophils 73 (*)     Lymphocytes 16 (*)     Immature Granulocytes 1 (*)     All other components within normal limits   TROPONIN - Abnormal; Notable for the following components:    Troponin, High Sensitivity 133 (*)     All other components within normal limits   TROPONIN - Abnormal; Notable for the following components:    Troponin, High Sensitivity 122 (*)     All other components within normal limits   BASIC METABOLIC PANEL - Abnormal; Notable for the following components:    Glucose 450 (*)     BUN 65 (*)     CREATININE 3.03 (*)     Bun/Cre Ratio 21 (*)     Calcium 8.2 (*)     Sodium 130 (*)     Potassium 6.1 (*)     Chloride 95 (*)     GFR Non- 19 (*)     GFR  24 (*)     All other components within normal limits   GLUCOSE, WHOLE BLOOD - Abnormal; Notable for the following components:    POC Glucose 262 (*)     All other components within normal limits   POCT GLUCOSE - Normal   BASIC METABOLIC PANEL W/ REFLEX TO MG FOR LOW K       All other labs were within normal range or not returned as of this dictation. EMERGENCY DEPARTMENT COURSE and DIFFERENTIAL DIAGNOSIS/MDM:   Vitals:    Vitals:    12/06/21 1700 12/06/21 1715 12/06/21 1730 12/06/21 1745   BP: (!) 128/54 (!) 107/46 133/60 (!) 132/56   Pulse: 63 64 67 66   Resp: 17 16 23 20   Temp:       TempSrc:       SpO2: 100% 100% 94% 94%   Weight:               MDM  Number of Diagnoses or Management Options  Hyperglycemia  Hyperkalemia  Renal failure, unspecified chronicity  Diagnosis management comments: 70-year-old male chronic renal failure. Hyperkalemia. There are no EKG changes with hyperkalemia. IV fluids ordered. Initial blood glucose 513. Normal anion gap. Normal CO2.  5 units IV insulin ordered. Total of 2-  250 mL boluses of normal saline given. Patient blood glucose down to 262. Basic metabolic panel reordered. Patient also received albuterol aerosol for wheezing as well as hyperkalemia. Grams of Kayexalate was also ordered. Patient discussed via telephone Dr. Fe Choudhury is accepted for admission. [facility]       REASSESSMENT          CRITICAL CARE TIME   Total Critical Care time was 30 minutes, excluding separately reportable procedures. There was a high probability of clinically significant/life threatening deterioration in the patient's condition which required my urgent intervention. CONSULTS:  None    PROCEDURES:  Unless otherwise noted below, none     Procedures        FINAL IMPRESSION      1. Renal failure, unspecified chronicity    2. Hyperkalemia    3. Hyperglycemia          DISPOSITION/PLAN   DISPOSITION        PATIENT REFERRED TO:  No follow-up provider specified. DISCHARGE MEDICATIONS:  New Prescriptions    No medications on file     Controlled Substances Monitoring:     No flowsheet data found.     (Please note that portions of this note were completed with a voice recognition program.  Efforts were made to edit the dictations but occasionally words are mis-transcribed.)    Melissa Velez MD (electronically signed)  Attending Emergency Physician             Melissa Velez MD  12/13/21 0085

## 2021-12-07 PROBLEM — N30.00 ACUTE CYSTITIS WITHOUT HEMATURIA: Status: ACTIVE | Noted: 2021-12-07

## 2021-12-07 PROBLEM — N19 RENAL FAILURE: Status: ACTIVE | Noted: 2021-12-07

## 2021-12-07 PROBLEM — R77.8 ELEVATED TROPONIN: Status: ACTIVE | Noted: 2021-12-07

## 2021-12-07 PROBLEM — R73.9 HYPERGLYCEMIA: Status: ACTIVE | Noted: 2021-12-07

## 2021-12-07 LAB
ABSOLUTE EOS #: 0.33 K/UL (ref 0–0.44)
ABSOLUTE IMMATURE GRANULOCYTE: 0.1 K/UL (ref 0–0.3)
ABSOLUTE LYMPH #: 1.62 K/UL (ref 1.1–3.7)
ABSOLUTE MONO #: 0.62 K/UL (ref 0.1–1.2)
ALBUMIN SERPL-MCNC: 2.3 G/DL (ref 3.5–5.2)
ALBUMIN/GLOBULIN RATIO: 0.7 (ref 1–2.5)
ALP BLD-CCNC: 119 U/L (ref 40–129)
ALT SERPL-CCNC: 21 U/L (ref 5–41)
ANION GAP SERPL CALCULATED.3IONS-SCNC: 9 MMOL/L (ref 9–17)
AST SERPL-CCNC: 15 U/L
BASOPHILS # BLD: 0 % (ref 0–2)
BASOPHILS ABSOLUTE: <0.03 K/UL (ref 0–0.2)
BILIRUB SERPL-MCNC: 0.33 MG/DL (ref 0.3–1.2)
BUN BLDV-MCNC: 63 MG/DL (ref 8–23)
BUN/CREAT BLD: 21 (ref 9–20)
CALCIUM SERPL-MCNC: 8.1 MG/DL (ref 8.6–10.4)
CHLORIDE BLD-SCNC: 97 MMOL/L (ref 98–107)
CO2: 26 MMOL/L (ref 20–31)
CREAT SERPL-MCNC: 2.97 MG/DL (ref 0.7–1.2)
DIFFERENTIAL TYPE: ABNORMAL
EKG ATRIAL RATE: 70 BPM
EKG P AXIS: 19 DEGREES
EKG P-R INTERVAL: 188 MS
EKG Q-T INTERVAL: 438 MS
EKG QRS DURATION: 130 MS
EKG QTC CALCULATION (BAZETT): 473 MS
EKG R AXIS: -31 DEGREES
EKG VENTRICULAR RATE: 70 BPM
EOSINOPHILS RELATIVE PERCENT: 4 % (ref 1–4)
GFR AFRICAN AMERICAN: 24 ML/MIN
GFR NON-AFRICAN AMERICAN: 20 ML/MIN
GFR SERPL CREATININE-BSD FRML MDRD: ABNORMAL ML/MIN/{1.73_M2}
GFR SERPL CREATININE-BSD FRML MDRD: ABNORMAL ML/MIN/{1.73_M2}
GLUCOSE BLD-MCNC: 138 MG/DL (ref 74–100)
GLUCOSE BLD-MCNC: 293 MG/DL (ref 74–100)
GLUCOSE BLD-MCNC: 293 MG/DL (ref 74–100)
GLUCOSE BLD-MCNC: 327 MG/DL (ref 70–99)
GLUCOSE BLD-MCNC: 340 MG/DL (ref 74–100)
GLUCOSE BLD-MCNC: 350 MG/DL (ref 74–100)
HCT VFR BLD CALC: 23.4 % (ref 40.7–50.3)
HEMOGLOBIN: 7.2 G/DL (ref 13–17)
IMMATURE GRANULOCYTES: 1 %
LYMPHOCYTES # BLD: 20 % (ref 24–43)
MCH RBC QN AUTO: 30.6 PG (ref 25.2–33.5)
MCHC RBC AUTO-ENTMCNC: 30.8 G/DL (ref 28.4–34.8)
MCV RBC AUTO: 99.6 FL (ref 82.6–102.9)
MONOCYTES # BLD: 8 % (ref 3–12)
NRBC AUTOMATED: 0 PER 100 WBC
PDW BLD-RTO: 13.1 % (ref 11.8–14.4)
PLATELET # BLD: 286 K/UL (ref 138–453)
PLATELET ESTIMATE: ABNORMAL
PMV BLD AUTO: 10.6 FL (ref 8.1–13.5)
POTASSIUM SERPL-SCNC: 5.1 MMOL/L (ref 3.7–5.3)
RBC # BLD: 2.35 M/UL (ref 4.21–5.77)
RBC # BLD: ABNORMAL 10*6/UL
SEG NEUTROPHILS: 67 % (ref 36–65)
SEGMENTED NEUTROPHILS ABSOLUTE COUNT: 5.33 K/UL (ref 1.5–8.1)
SODIUM BLD-SCNC: 132 MMOL/L (ref 135–144)
TOTAL PROTEIN: 5.4 G/DL (ref 6.4–8.3)
WBC # BLD: 8 K/UL (ref 3.5–11.3)
WBC # BLD: ABNORMAL 10*3/UL

## 2021-12-07 PROCEDURE — 6370000000 HC RX 637 (ALT 250 FOR IP): Performed by: INTERNAL MEDICINE

## 2021-12-07 PROCEDURE — 36415 COLL VENOUS BLD VENIPUNCTURE: CPT

## 2021-12-07 PROCEDURE — 94640 AIRWAY INHALATION TREATMENT: CPT

## 2021-12-07 PROCEDURE — 87040 BLOOD CULTURE FOR BACTERIA: CPT

## 2021-12-07 PROCEDURE — 2580000003 HC RX 258: Performed by: NURSE PRACTITIONER

## 2021-12-07 PROCEDURE — 2700000000 HC OXYGEN THERAPY PER DAY

## 2021-12-07 PROCEDURE — 93010 ELECTROCARDIOGRAM REPORT: CPT | Performed by: INTERNAL MEDICINE

## 2021-12-07 PROCEDURE — 80053 COMPREHEN METABOLIC PANEL: CPT

## 2021-12-07 PROCEDURE — 6360000002 HC RX W HCPCS: Performed by: INTERNAL MEDICINE

## 2021-12-07 PROCEDURE — 99223 1ST HOSP IP/OBS HIGH 75: CPT | Performed by: INTERNAL MEDICINE

## 2021-12-07 PROCEDURE — 94761 N-INVAS EAR/PLS OXIMETRY MLT: CPT

## 2021-12-07 PROCEDURE — 92610 EVALUATE SWALLOWING FUNCTION: CPT

## 2021-12-07 PROCEDURE — 85025 COMPLETE CBC W/AUTO DIFF WBC: CPT

## 2021-12-07 PROCEDURE — 6360000002 HC RX W HCPCS: Performed by: NURSE PRACTITIONER

## 2021-12-07 PROCEDURE — 83036 HEMOGLOBIN GLYCOSYLATED A1C: CPT

## 2021-12-07 PROCEDURE — 2580000003 HC RX 258: Performed by: INTERNAL MEDICINE

## 2021-12-07 PROCEDURE — 94669 MECHANICAL CHEST WALL OSCILL: CPT

## 2021-12-07 PROCEDURE — 82947 ASSAY GLUCOSE BLOOD QUANT: CPT

## 2021-12-07 PROCEDURE — 6370000000 HC RX 637 (ALT 250 FOR IP): Performed by: NURSE PRACTITIONER

## 2021-12-07 PROCEDURE — 1200000000 HC SEMI PRIVATE

## 2021-12-07 RX ORDER — DEXTROSE MONOHYDRATE 50 MG/ML
100 INJECTION, SOLUTION INTRAVENOUS PRN
Status: DISCONTINUED | OUTPATIENT
Start: 2021-12-07 | End: 2021-12-10 | Stop reason: HOSPADM

## 2021-12-07 RX ORDER — DEXTROSE MONOHYDRATE 25 G/50ML
12.5 INJECTION, SOLUTION INTRAVENOUS PRN
Status: DISCONTINUED | OUTPATIENT
Start: 2021-12-07 | End: 2021-12-10 | Stop reason: HOSPADM

## 2021-12-07 RX ORDER — NICOTINE POLACRILEX 4 MG
15 LOZENGE BUCCAL PRN
Status: DISCONTINUED | OUTPATIENT
Start: 2021-12-07 | End: 2021-12-10 | Stop reason: HOSPADM

## 2021-12-07 RX ADMIN — BUDESONIDE 500 MCG: 0.5 SUSPENSION RESPIRATORY (INHALATION) at 20:07

## 2021-12-07 RX ADMIN — FUROSEMIDE 40 MG: 40 TABLET ORAL at 07:23

## 2021-12-07 RX ADMIN — METOPROLOL SUCCINATE 50 MG: 50 TABLET, EXTENDED RELEASE ORAL at 07:21

## 2021-12-07 RX ADMIN — FAMOTIDINE 10 MG: 10 TABLET ORAL at 21:29

## 2021-12-07 RX ADMIN — TRAZODONE HYDROCHLORIDE 50 MG: 50 TABLET ORAL at 21:29

## 2021-12-07 RX ADMIN — AMIODARONE HYDROCHLORIDE 200 MG: 200 TABLET ORAL at 07:22

## 2021-12-07 RX ADMIN — DILTIAZEM HYDROCHLORIDE 60 MG: 60 TABLET, FILM COATED ORAL at 07:21

## 2021-12-07 RX ADMIN — SODIUM BICARBONATE TAB 650 MG 650 MG: 650 TAB at 21:30

## 2021-12-07 RX ADMIN — OXYCODONE HYDROCHLORIDE AND ACETAMINOPHEN 2000 MG: 500 TABLET ORAL at 07:22

## 2021-12-07 RX ADMIN — ASPIRIN 81 MG: 81 TABLET, COATED ORAL at 21:29

## 2021-12-07 RX ADMIN — DILTIAZEM HYDROCHLORIDE 60 MG: 60 TABLET, FILM COATED ORAL at 21:29

## 2021-12-07 RX ADMIN — BENZONATATE 100 MG: 100 CAPSULE ORAL at 23:33

## 2021-12-07 RX ADMIN — Medication 1000 UNITS: at 07:22

## 2021-12-07 RX ADMIN — ATORVASTATIN CALCIUM 40 MG: 40 TABLET, FILM COATED ORAL at 21:29

## 2021-12-07 RX ADMIN — GLIPIZIDE 5 MG: 5 TABLET ORAL at 07:22

## 2021-12-07 RX ADMIN — OXYCODONE HYDROCHLORIDE AND ACETAMINOPHEN 2000 MG: 500 TABLET ORAL at 21:30

## 2021-12-07 RX ADMIN — IPRATROPIUM BROMIDE AND ALBUTEROL SULFATE 3 ML: .5; 2.5 SOLUTION RESPIRATORY (INHALATION) at 05:46

## 2021-12-07 RX ADMIN — SODIUM CHLORIDE, PRESERVATIVE FREE 10 ML: 5 INJECTION INTRAVENOUS at 21:40

## 2021-12-07 RX ADMIN — INSULIN LISPRO 15 UNITS: 100 INJECTION, SOLUTION INTRAVENOUS; SUBCUTANEOUS at 17:11

## 2021-12-07 RX ADMIN — ACETAMINOPHEN 650 MG: 325 TABLET ORAL at 23:33

## 2021-12-07 RX ADMIN — DILTIAZEM HYDROCHLORIDE 60 MG: 60 TABLET, FILM COATED ORAL at 17:11

## 2021-12-07 RX ADMIN — DILTIAZEM HYDROCHLORIDE 60 MG: 60 TABLET, FILM COATED ORAL at 12:14

## 2021-12-07 RX ADMIN — CLOPIDOGREL BISULFATE 75 MG: 75 TABLET ORAL at 07:23

## 2021-12-07 RX ADMIN — GLIPIZIDE 5 MG: 5 TABLET ORAL at 17:11

## 2021-12-07 RX ADMIN — IPRATROPIUM BROMIDE AND ALBUTEROL SULFATE 3 ML: .5; 2.5 SOLUTION RESPIRATORY (INHALATION) at 20:07

## 2021-12-07 RX ADMIN — SODIUM CHLORIDE, PRESERVATIVE FREE 10 ML: 5 INJECTION INTRAVENOUS at 07:29

## 2021-12-07 RX ADMIN — IPRATROPIUM BROMIDE AND ALBUTEROL SULFATE 3 ML: .5; 2.5 SOLUTION RESPIRATORY (INHALATION) at 16:05

## 2021-12-07 RX ADMIN — SODIUM BICARBONATE TAB 650 MG 650 MG: 650 TAB at 07:28

## 2021-12-07 RX ADMIN — TAMSULOSIN HYDROCHLORIDE 0.4 MG: 0.4 CAPSULE ORAL at 07:21

## 2021-12-07 RX ADMIN — CEFTRIAXONE 1000 MG: 1 INJECTION, POWDER, FOR SOLUTION INTRAMUSCULAR; INTRAVENOUS at 08:58

## 2021-12-07 RX ADMIN — AMIODARONE HYDROCHLORIDE 200 MG: 200 TABLET ORAL at 21:29

## 2021-12-07 RX ADMIN — APIXABAN 2.5 MG: 2.5 TABLET, FILM COATED ORAL at 07:22

## 2021-12-07 RX ADMIN — ZINC SULFATE 220 MG (50 MG) CAPSULE 100 MG: CAPSULE at 07:21

## 2021-12-07 ASSESSMENT — PAIN SCALES - GENERAL
PAINLEVEL_OUTOF10: 0

## 2021-12-07 ASSESSMENT — PAIN - FUNCTIONAL ASSESSMENT: PAIN_FUNCTIONAL_ASSESSMENT: 0-10

## 2021-12-07 NOTE — PROGRESS NOTES
Comprehensive Nutrition Assessment    Type and Reason for Visit:  Initial (missing malnutrition screen )    Nutrition Recommendations/Plan:   1. Modify diet to include low K+. 2. Add Jair BID for wound healing needs at breakfast and dinner, 4 oz Glucerna TID with meals   3. D/c zinc due to renal function. 4. F/u SLP. Nutrition Assessment:  1. increased nutrient needs r/t acute injury/trauma aeb healing needs from wound. 2. Altered nutrition related labs r/t renal dysfunction aeb K+ 5.8. Pt is on zinc, vitamin C, and vitamin D. Vitamin D 11/7/21 26.5. Recommend to d/c zinc due to renal function: BUN 66, Cr 3.03, GFR 19. Glucose went up to 513, but is down to 293 currently. He was here last month with COVID-19. Has a coccyx wound. BNP 8,571, Hgb 7.2. Pt admitted with hyperglycemia, hyperkalemia, and renal failure. Has stage II pressure area, add Jair BID at breafast and dinner and per Pt request, per , Pt would like 4 oz chocolate Glucerna TID with meals. Phone call to room unanswered. SLP to see Pt. Malnutrition Assessment:  Malnutrition Status:  Insufficient data    Context:  Acute Illness     Findings of the 6 clinical characteristics of malnutrition:  Energy Intake:  Unable to assess  Weight Loss:  No significant weight loss     Body Fat Loss:  Unable to assess     Muscle Mass Loss:  Unable to assess    Fluid Accumulation:  1 - Mild Extremities (+ 1 pitting R/L LE)   Strength:  Not Performed    Estimated Daily Nutrient Needs:  Energy (kcal):  4377-7066 (25-28/kg); Weight Used for Energy Requirements:  Current     Protein (g):  83-94g (1.4-1.6g/kg); Weight Used for Protein Requirements:  Ideal        Fluid (ml/day):  1875 ml; Method Used for Fluid Requirements:  ml/Kg      Nutrition Related Findings:  unable to assess      Wounds:  Stage II (coccyx)       Current Nutrition Therapies:    ADULT DIET;  Regular; 4 carb choices (60 gm/meal)    Anthropometric Measures:  · Height: 5' 4\" (162.6 cm)  · Current Body Weight: 165 lb 8 oz (75.1 kg)   · Admission Body Weight: 165 lb 8 oz (75.1 kg)    · Usual Body Weight: 170 lb (77.1 kg) (11/27/21)     · Ideal Body Weight: 130 lbs; % Ideal Body Weight 127.3 %   · BMI: 28.4  · BMI Categories: Overweight (BMI 25.0-29. 9)       Nutrition Diagnosis:   · Increased nutrient needs related to acute injury/trauma as evidenced by wounds   · Altered nutrition related labs r/t renal dysfunction aeb labs      Nutrition Interventions:   Food and/or Nutrient Delivery:  Modify Current Diet, Start Oral Nutrition Supplement (add low K+, d/c zinc, add Jair BID, chocolate Glucerna TID)  Nutrition Education/Counseling:  No recommendation at this time   Coordination of Nutrition Care:  Continue to monitor while inpatient    Goals:  PO > 75% of meals and supplements        Recent Labs     12/06/21  0730 12/06/21  1510 12/06/21  1742 12/06/21  1742 12/06/21  1943 12/06/21  1951 12/07/21  0626   *   < > 130*  --   --  135 132*   K 5.9*   < > 6.1*  --   --  5.8* 5.1   CL 97*   < > 95*  --   --  98 97*   CO2 25   < > 26  --   --  27 26   BUN 69*   < > 65*  --   --  66* 63*   CREATININE 3.44*   < > 3.03*  --   --  3.03* 2.97*   GLUCOSE 468*   < > 450*   < > 262 289* 327*   ALT 25  --   --   --   --   --  21   ALKPHOS 165*  --   --   --   --   --  119   GFR              < >              < >  --                           < > = values in this interval not displayed.       Lab Results   Component Value Date    LABALBU 2.3 12/07/2021    LABALBU 4.2 05/17/2012      Recent Labs     12/06/21  1939 12/06/21  2234 12/07/21  0700   POCGLU 262* 295* 293*  293*     Lab Results   Component Value Date    TRIG 63 11/17/2021    HDL 27 11/17/2021     Hemoglobin/Hematocrit:    Lab Results   Component Value Date    HGB 7.2 12/07/2021    HCT 23.4 12/07/2021     Nutrition Monitoring and Evaluation:   Behavioral-Environmental Outcomes:  None Identified   Food/Nutrient Intake Outcomes:  Food and Nutrient Intake, Supplement Intake  Physical Signs/Symptoms Outcomes:  Biochemical Data, Weight     Discharge Planning:     Too soon to determine     Electronically signed by Sid Worthington RD, MANUELA on 12/7/21 at 8:29 AM EST    Contact: [phone]

## 2021-12-07 NOTE — PROGRESS NOTES
Patient stated he needed to have a bowel movement. RN and Herbert Underwood got patient up with a walker to the bathroom. Patient had a large bowel movement. Patient very shaky and had a hard time standing up and returning back to bed. Patient still talking with staff in the room. Got patient laying in bed. Patient stopped talking to staff and his head turned to the right side. Patient could not move head back. Patient not blinking. Rapid called at 2230. Blood sugar taken, 295. Patient hooked up to the vitals machine. See chart for vital signs history. Supervisor called at [phone]. Patient started to talk with staff after a few minutes and was A&O by the time the supervisor got up to the room. Patients neuros assessed at this time. Nothing significant noted Americo Chaudhary was notified of incident at 6050.

## 2021-12-07 NOTE — PROGRESS NOTES
Speech Language Pathology  Facility/Department: [facility]   CLINICAL BEDSIDE SWALLOW EVALUATION    NAME: Sally Cook  : 10/31/1930  MRN: [id number]    ADMISSION DATE: 2021  ADMITTING DIAGNOSIS: has Right inguinal hernia; Cerebrovascular accident (CVA) ([address] Hypertension; Type 2 diabetes mellitus without complication, without long-term current use of insulin ([address] Cerebral infarction due to embolism of right middle cerebral artery ([address] Left leg weakness; Falling; Stroke, lacunar ([address] COVID-19 virus infection / [place] Vaccine; Acute and chronic respiratory failure with hypoxia ([address] ASHD (arteriosclerotic heart disease); Atrial flutter ([address] Stage 4 chronic kidney disease ([address] S/P CABG (coronary artery bypass graft); Mild malnutrition ([address] Urinary retention; and Hyperkalemia on their problem list.  ONSET DATE: 21    Recent Tisha  Impression   Interval development of small bilateral pleural effusions with persistent   multifocal bilateral airspace disease.         st Xray/CT of Chest: (21)    Date of Eval: 2021  Evaluating Therapist: LAILA Medina    Current Diet level:  Current Diet : Regular  Current Liquid Diet : Thin      Primary Complaint  Patient Complaint: Patient reports no concerns with swallowing. He states he typically avoids meats due to taste and preference. Patient    Pain:  Pain Assessment  Pain Assessment: 0-10  Pain Level: 0    Reason for Referral  Sally Cook was referred for a bedside swallow evaluation to assess the efficiency of his swallow function, identify signs and symptoms of aspiration and make recommendations regarding safe dietary consistencies, effective compensatory strategies, and safe eating environment. Impression  Dysphagia Diagnosis: Mild oral stage dysphagia  Dysphagia Outcome Severity Scale: Level 5: Mild dysphagia- Distant supervision.  May need one diet consistency restricted     Treatment Plan  Requires SLP Intervention: Yes  Duration/Frequency of Treatment: x1 follow up. Recommended Diet and Intervention  Diet Solids Recommendation: Regular  Liquid Consistency Recommendation: Thin  Recommended Form of Meds: Whole with puree     Therapeutic Interventions: Diet tolerance monitoring; Therapeutic PO trials with SLP    Compensatory Swallowing Strategies  Compensatory Swallowing Strategies: Alternate solids and liquids; Eat/Feed slowly; Upright as possible for all oral intake; Small bites/sips; Lingual sweep    Treatment/Goals  Short-term Goals  Timeframe for Short-term Goals: 5 days  Goal 1: Patient will consume regular solids and thin liquids without overt s/sx of aspiration/penetration in 80% of opportunities. Goal 2: Patient will utilize compensatory strategies during a meal or snack with minimal verbal cues in 80% of opportunities. Long-term Goals  Timeframe for Long-term Goals: 7 days  Goal 1: Patient will tolerate safest, least restrictive diet level without overt s/sx of aspiration/penetration in 90% of opportunities while utilizing swallowing strategies independently  Goal 2: .    General  Chart Reviewed: Yes  Behavior/Cognition: Alert; Cooperative; Pleasant mood  Respiratory Status: O2 via nasual cannula  O2 Device: Nasal cannula  Communication Observation: Functional  Follows Directions: Simple  Dentition: Adequate  Patient Positioning: Upright in bed  Baseline Vocal Quality: Normal  Prior Dysphagia History: No dysphaiga history in patient chart  Consistencies Administered: Reg solid; Dysphagia Soft and Bite-Sized (Dysphagia III);  Thin - straw      Pain Level: 0    Vision/Hearing       Oral Motor Deficits  Oral/Motor  Oral Motor: Exceptions to [facility]  Labial Coordination: Reduced  Lingual Coordination: Reduced    Oral Phase Dysfunction  Oral Phase  Oral Phase: Exceptions  Oral Phase Dysfunction  Lingual/Palatal Residue: Reg solid  Oral Phase  Oral Phase - Comment: Patient presents with minimal oral phase dysphagia characterized by slightly reduced coordination of labial and lingual musculature and trace lingual residues remaining after regular solid trials. Patient independently identified that he takes his time while eating. Indicators of Pharyngeal Phase Dysfunction   Pharyngeal Phase  Pharyngeal Phase: WFL  Pharyngeal Phase   Pharyngeal: Patient's pharyngeal phase of swallowing is suspected to be [facility] [place]. Pharyngeal phase was characterized by prompt swallow initiation, adequate laryngeal elevation upon palpation, and no overt coughing/throat clearing, or wet vocal quality noted with any consistencies trialed. Patient also presents with strong vocal quality. Prognosis  Prognosis  Prognosis for safe diet advancement: fair  Barriers to reach goals: age  Individuals consulted  Consulted and agree with results and recommendations: Patient    Education  Patient Education: ST educated patient re: results of evaluation,diet recommendations, and plan of care. Patient Education Response: Verbalizes understanding  Safety Devices in place: Yes  Type of devices: Nurse notified; Left in bed; Call light within reach       Therapy Time  SLP Individual Minutes  Time In: 0902  Time Out: 4107  Minutes: 36           Patient seen in room for bedside swallow evaluation this date. Patient alert and oriented and able to follow simple and complex directions. Patient recently admitted for Hyperkalemia. Patient previously admitted in November 2021 with pneumonia due to COVID-19 virus. Patient noted to have bilateral pleural effusions on chest x-ray 12/6/21. RN reports patient had some difficulty with coordinating swallow during administration of medication this date. Patient took pills one at a time in a puree consistency. Patient denies difficulty swallowing, but notes he does not often choose to eat meats due to taste/preference.      Patient presents with minimal oral phase dysphagia characterized by slightly reduced coordination of labial and lingual musculature and trace lingual residues remaining after regular solid trials. Patient independently identified that he takes his time while eating. Patient's pharyngeal phase of swallowing is suspected to be [facility]. Pharyngeal phase was characterized by prompt swallow initiation, adequate laryngeal elevation upon palpation, and no overt coughing/throat clearing, or wet vocal quality noted with any consistencies trialed. Patient also presents with strong vocal quality. Patient at times demonstrated difficulty coordinating breathing and swallowing. ST provided patient with strategies to utilize to improve breathing and swallowing coordination. ST recommends continued diet of regular solids and thin liquids. Compensatory strategies should include: small bites/sips, pacing/slow rate, alternate bites/sips, upright with all PO intake. ST will follow up x 1 to monitor diet tolerance.               Cleve Stallings M.S., CCC-SLP  12/7/2021 10:07 AM

## 2021-12-07 NOTE — H&P
History and Physical    Patient:  Shadi Vazquez  MRN: [id number]    Chief Complaint: Shortness of breath    History Obtained From:  patient, electronic medical record    PCP: Jj Garcia DO    History of Present Illness: The patient is a 80 y.o. male who presented to the emergency room for evaluation of complaints of shortness of breath and abnormal lab work. Patient is currently a resident at [facility]. Patient was sent to [facility] after an extensive hospitalization with Covid with a discharge date of November 27, 2021 after a 12-day stay. Patient requires 2 to 3 L of oxygen which is baseline for him since having Covid. He denied chest pain or palpitations. He does complain of shortness of breath but is unchanged from his baseline since hospitalization. He denied nausea vomiting or diarrhea. He denied hematuria dysuria. He denied back pain. He also complained of lower extremity weakness which is also not new for him. Upon arrival patient was on 3 L of oxygen with SPO2 in the low to mid 90s. Patient was in no acute distress. Chest x-ray showed some interval development of small bilateral pleural effusions with persistent multifocal bilateral airspace disease. Patient's initial glucose was 513 with an elevated BUN and creatinine of 66 and 3.11. Patient was hyponatremic at 129 and hyperkalemic at 6.1. proBNP was 8571. Troponin was elevated at 133 and 122. Patient's EKG showed no changes. Patient was provided IV fluids. Patient had a normal anion gap. Normal CO2. Patient was given 5 units of IV insulin. Patient had a total of 500 mL fluid bolus as well. Patient's blood sugar decreased to 62 and patient was provided nebulizer treatment for wheezing. Patient was also given Kayexalate as well for hyperkalemia.     Past Medical History:        Diagnosis Date    Acute and chronic respiratory failure with hypoxia ([address] 11/16/2021    CAD (coronary artery disease)     Cataracts, bilateral     Elevated troponin 12/7/2021    Glaucoma     Hyperlipidemia     Hypertension     TIA (transient ischemic attack)     Type II or unspecified type diabetes mellitus without mention of complication, not stated as uncontrolled        Past Surgical History:        Procedure Laterality Date    CATARACT REMOVAL WITH IMPLANT Bilateral 11/219/2013 and 12/3/2013    CORONARY ARTERY BYPASS GRAFT  2000    INGUINAL HERNIA REPAIR Bilateral 06/19/12       Medications Prior to Admission:    Prior to Admission medications    Medication Sig Start Date End Date Taking?  Authorizing Provider   benzonatate (TESSALON) 100 MG capsule Take 100 mg by mouth 3 times daily as needed for Cough   Yes Historical Provider, MD   traZODone (DESYREL) 50 MG tablet Take 50 mg by mouth nightly   Yes Historical Provider, MD   polyethylene glycol (GLYCOLAX) 17 GM/SCOOP powder Take 17 g by mouth daily 12/2/21 1/1/22 Yes FRANKLIN Simmons CNP   sodium bicarbonate 650 MG tablet Take 1 tablet by mouth 2 times daily 11/27/21  Yes FRANKLIN Xie CNP   amiodarone (CORDARONE) 200 MG tablet Take 1 tablet by mouth 2 times daily 11/27/21  Yes FRANKLIN Xie CNP   albuterol (PROVENTIL) (2.5 MG/3ML) 0.083% nebulizer solution Take 3 mLs by nebulization every 4 hours as needed for Wheezing 11/27/21  Yes FRANKLIN Xie CNP   budesonide (PULMICORT) 0.5 MG/2ML nebulizer suspension Take 2 mLs by nebulization 2 times daily 11/27/21  Yes FRANKLIN Xie CNP   ipratropium-albuterol (DUONEB) 0.5-2.5 (3) MG/3ML SOLN nebulizer solution Inhale 3 mLs into the lungs 4 times daily 11/27/21  Yes FRANKLIN Xie CNP   apixaban (ELIQUIS) 2.5 MG TABS tablet Take 1 tablet by mouth 2 times daily 11/27/21  Yes FRANKLIN Xie CNP   metoprolol succinate (TOPROL XL) 50 MG extended release tablet Take 1 tablet by mouth daily 11/28/21  Yes FRANKLIN Xie CNP   dilTIAZem (CARDIZEM) 60 MG tablet Take 1 tablet by mouth 4 times daily Please hold if systolic blood pressure less than 100 mmHg or if heart rate less than 100 bpm. 11/27/21  Yes FRANKLIN Ann CNP   polyethylene glycol (GLYCOLAX) 17 g packet Take 17 g by mouth daily as needed for Constipation 11/27/21 12/27/21 Yes FRANKLIN Ann CNP   tamsulosin [facility]) 0.4 MG capsule Take 1 capsule by mouth daily 11/28/21  Yes FRANKLIN Ann CNP   zinc sulfate (ZINCATE) 220 (50 Zn) MG capsule Take 2 capsules by mouth daily 11/28/21  Yes FRANKLIN Ann CNP   ascorbic acid (VITAMIN C) 1000 MG tablet Take 2 tablets by mouth 2 times daily  Patient taking differently: Take 500 mg by mouth 2 times daily  11/27/21  Yes FRANKLIN Ann CNP   famotidine (PEPCID) 10 MG tablet Take 1 tablet by mouth nightly 11/27/21  Yes FRANKLIN Ann CNP   vitamin D3 (CHOLECALCIFEROL) 25 MCG (1000 UT) TABS tablet Take 1 tablet by mouth daily 11/27/21  Yes FRANKLIN Ann CNP   furosemide (LASIX) 40 MG tablet Take 40 mg by mouth daily   Yes Historical Provider, MD   glipiZIDE (GLUCOTROL) 5 MG tablet Take 5 mg by mouth 2 times daily (before meals)   Yes Historical Provider, MD   atorvastatin (LIPITOR) 40 MG tablet Take 1 tablet by mouth nightly 3/28/18  Yes Kade Farris MD   clopidogrel (PLAVIX) 75 MG tablet Take 1 tablet by mouth daily 3/29/18  Yes Kade Farris MD   aspirin 81 MG EC tablet Take 81 mg by mouth nightly    Yes Historical Provider, MD   zolpidem (AMBIEN) 5 MG tablet Take 5 mg by mouth nightly as needed for Sleep. Historical Provider, MD       Allergies:  Patient has no known allergies. Social History:   TOBACCO:   reports that he has never smoked. He has never used smokeless tobacco.  ETOH:   reports current alcohol use. Family History:       Problem Relation Age of Onset    Heart Disease Father     Heart Disease Brother        Allergies:  Patient has no known allergies.     Medications Prior to Admission:    Prior to Admission medications    Medication Sig Start Date End Date Taking?  Authorizing Provider   benzonatate (TESSALON) 100 MG capsule Take 100 mg by mouth 3 times daily as needed for Cough   Yes Historical Provider, MD   traZODone (DESYREL) 50 MG tablet Take 50 mg by mouth nightly   Yes Historical Provider, MD   polyethylene glycol (GLYCOLAX) 17 GM/SCOOP powder Take 17 g by mouth daily 12/2/21 1/1/22 Yes FRANKLIN Brar CNP   sodium bicarbonate 650 MG tablet Take 1 tablet by mouth 2 times daily 11/27/21  Yes FRANKLIN Glasgow CNP   amiodarone (CORDARONE) 200 MG tablet Take 1 tablet by mouth 2 times daily 11/27/21  Yes FRANKLIN Glasgow CNP   albuterol (PROVENTIL) (2.5 MG/3ML) 0.083% nebulizer solution Take 3 mLs by nebulization every 4 hours as needed for Wheezing 11/27/21  Yes FRANKLIN Glasgow CNP   budesonide (PULMICORT) 0.5 MG/2ML nebulizer suspension Take 2 mLs by nebulization 2 times daily 11/27/21  Yes FRANKLIN Glasgow CNP   ipratropium-albuterol (DUONEB) 0.5-2.5 (3) MG/3ML SOLN nebulizer solution Inhale 3 mLs into the lungs 4 times daily 11/27/21  Yes FRANKLIN Glasgow CNP   apixaban (ELIQUIS) 2.5 MG TABS tablet Take 1 tablet by mouth 2 times daily 11/27/21  Yes FRANKLIN Glasgow CNP   metoprolol succinate (TOPROL XL) 50 MG extended release tablet Take 1 tablet by mouth daily 11/28/21  Yes FRANKLIN Glasgow CNP   dilTIAZem (CARDIZEM) 60 MG tablet Take 1 tablet by mouth 4 times daily Please hold if systolic blood pressure less than 100 mmHg or if heart rate less than 100 bpm. 11/27/21  Yes FRANKLIN Glasgow CNP   polyethylene glycol (GLYCOLAX) 17 g packet Take 17 g by mouth daily as needed for Constipation 11/27/21 12/27/21 Yes FRANKLIN Glasgow CNP   tamsulosin (FLOMAX) 0.4 MG capsule Take 1 capsule by mouth daily 11/28/21  Yes Sonya Wynne APRN - CNP   zinc sulfate (ZINCATE) 220 (50 Zn) MG capsule Take 2 capsules by mouth daily 11/28/21  Yes FRANKLIN Neil CNP   ascorbic acid (VITAMIN C) 1000 MG tablet Take 2 tablets by mouth 2 times daily  Patient taking differently: Take 500 mg by mouth 2 times daily  11/27/21  Yes FRANKLIN Neil CNP   famotidine (PEPCID) 10 MG tablet Take 1 tablet by mouth nightly 11/27/21  Yes FRANKLIN Neil CNP   vitamin D3 (CHOLECALCIFEROL) 25 MCG (1000 UT) TABS tablet Take 1 tablet by mouth daily 11/27/21  Yes FRANKLIN Neil CNP   furosemide (LASIX) 40 MG tablet Take 40 mg by mouth daily   Yes Historical Provider, MD   glipiZIDE (GLUCOTROL) 5 MG tablet Take 5 mg by mouth 2 times daily (before meals)   Yes Historical Provider, MD   atorvastatin (LIPITOR) 40 MG tablet Take 1 tablet by mouth nightly 3/28/18  Yes Sandy Garrett MD   clopidogrel (PLAVIX) 75 MG tablet Take 1 tablet by mouth daily 3/29/18  Yes Sandy Garrett MD   aspirin 81 MG EC tablet Take 81 mg by mouth nightly    Yes Historical Provider, MD   zolpidem (AMBIEN) 5 MG tablet Take 5 mg by mouth nightly as needed for Sleep. Historical Provider, MD       Review of Systems:  Constitutional:negative  for fevers, and negative for chills. Eyes: negative for visual disturbance   ENT: negative for sore throat, negative nasal congestion, and negative for earache  Respiratory: positive for shortness of breath, negative for cough, and negative for wheezing  Cardiovascular: negative for chest pain, negative for palpitations, and negative for syncope  Gastrointestinal: negative for abdominal pain, negative for nausea,negative for vomiting, negative for diarrhea, negative for constipation, and negative for hematochezia or melena  Genitourinary: negative for dysuria, negative for urinary urgency, negative for urinary frequency, and negative for hematuria  Skin: negative for skin rash, and negative for skin lesions  Neurological: negative for unilateral weakness, numbness or tingling.     Physical Exam: Vitals:   Temp: 98 °F (36.7 °C)  BP: 110/78  Resp: 18  Pulse: 78  SpO2: 95 %  24HR INTAKE/OUTPUT:      Intake/Output Summary (Last 24 hours) at 12/7/2021 1455  Last data filed at 12/7/2021 0431  Gross per 24 hour   Intake    Output 1600 ml   Net -1600 ml       Weight    Body mass index is 28.41 kg/m². Exam:  GEN:    Awake, alert and oriented x3. EYES:  EOMI, pupils equal   NECK: Supple. No lymphadenopathy. No carotid bruit  CVS:    regular rate and rhythm, no audible murmur  PULM:  Diminished with fine basilar rales and scattered wheeze, no acute respiratory distress  ABD:    Bowels sounds normal.  Abdomen is soft. No distention. no tenderness to palpation. EXT:   no edema bilaterally . No calf tenderness. NEURO: Moves all extremities. Motor and sensory are grossly intact  SKIN:  No rashes.   No skin lesions.    -----------------------------------------------------------------  Diagnostic Data:     DATA:    CBC:   Lab Results   Component Value Date    WBC 8.0 12/07/2021    RBC 2.35 (L) 12/07/2021    HGB 7.2 (L) 12/07/2021    HCT 23.4 (L) 12/07/2021    MCV 99.6 12/07/2021     12/07/2021        CMP:   Lab Results   Component Value Date    GLUCOSE 327 (H) 12/07/2021    BUN 63 (H) 12/07/2021    CREATININE 2.97 (H) 12/07/2021     (L) 12/07/2021    K 5.1 12/07/2021    CALCIUM 8.1 (L) 12/07/2021    CL 97 (L) 12/07/2021    CO2 26 12/07/2021    PROT 5.4 (L) 12/07/2021    LABALBU 2.3 (L) 12/07/2021    BILITOT 0.33 12/07/2021    ALKPHOS 119 12/07/2021    ALT 21 12/07/2021    AST 15 12/07/2021       UA:   Lab Results   Component Value Date    COLORU Yellow 12/06/2021    SPECGRAV 1.010 12/06/2021    WBCUA 20 TO 50 12/06/2021    RBCUA GREATER THAN 100 12/06/2021    EPITHUA 0 TO 2 12/06/2021    LEUKOCYTESUR LARGE (A) 12/06/2021    GLUCOSEU 1+ (A) 12/06/2021    KETUA NEGATIVE 12/06/2021    PROTEINU TRACE (A) 12/06/2021    HGBUR 3+ (A) 12/06/2021    CASTUA NOT REPORTED 12/06/2021    CRYSTUA NOT REPORTED 12/06/2021    BACTERIA 2+ (A) 12/06/2021    YEAST NOT REPORTED 12/06/2021       Lactic Acid:   Lab Results   Component Value Date    LACTA 2.2 11/15/2021       D-Dimer:  Lab Results   Component Value Date    DDIMER 1.31 (H) 11/19/2021       PT/INR:  Lab Results   Component Value Date    PROTIME 16.3 11/17/2021    INR 1.3 11/17/2021       High Sensitivity Troponin:  Recent Labs     12/06/21  1510 12/06/21  1742   TROPHS 133* 122*       ABGs:   No results found for: PHART, PH, ZGU7VDH, PCO2, PO2ART, PO2, XOT6PAX, HCO3, BEART, BE, THGBART, THB, [id number], L0OKINVY, O2SAT, FIO2        XR CHEST PORTABLE   Final Result   Interval development of small bilateral pleural effusions with persistent   multifocal bilateral airspace disease. EKG reviewed:         Assessment:    Principal Problem:    Hyperkalemia  Active Problems:    Type 2 diabetes mellitus without complication, without long-term current use of insulin (HCC)    CAD (coronary artery disease)    Stage 4 chronic kidney disease (HCC)    Hyperglycemia    Renal failure    Acute cystitis without hematuria    Elevated troponin  Resolved Problems:    * No resolved hospital problems.  *      Patient Active Problem List    Diagnosis Date Noted    Hyperglycemia 12/07/2021    Renal failure 12/07/2021    Acute cystitis without hematuria 12/07/2021    Elevated troponin 12/07/2021    Hyperkalemia 12/06/2021    Urinary retention 11/27/2021    Mild malnutrition ([address] 11/17/2021    Acute and chronic respiratory failure with hypoxia (HCC) 11/16/2021    Atrial flutter ([address] 11/16/2021    Stage 4 chronic kidney disease ([address] 11/16/2021    CAD (coronary artery disease)     S/P CABG (coronary artery bypass graft)     COVID-19 virus infection / Shonna Most Vaccine 11/15/2021    Cerebral infarction due to embolism of right middle cerebral artery ([address] 03/28/2018    Left leg weakness 03/28/2018    Falling 03/28/2018    Stroke, lacunar ([address] 03/28/2018    Hypertension 03/27/2018    Type 2 diabetes mellitus without complication, without long-term current use of insulin [facility] 03/27/2018    Cerebrovascular accident (CVA) ([facility] 03/26/2018    Right inguinal hernia 04/02/2014       Plan:     · This patient requires inpatient admission because of hyperkalemia  · Factors affecting the medical complexity of this patient include stage IV CKD, type 2 diabetes CAD, elevated troponin  · Estimated length of stay is 3 days  · Hyperkalemia  · Resolved  · Trend labs  · Stage IV CKD  · Improving  · Continue IV fluids  · Continue Flomax  · Type 2 diabetes  · POCT before meals and at bedtime  · Increase sliding scale to high dose  · Hypoglycemia protocol  · UTI  · IV Rocephin  · Urine culture pending  · Blood culture x2  · CAD  · Appreciate cardiology  · Trend troponintrending down  · Chronically elevated per recent history  · Possibly due to chronic ischemic demand from CKD  · Continue amiodarone, Eliquis, aspirin, Lipitor, Plavix, Cardizem CD, Lasix, Toprol-XL  · DVT prophylaxis: already on chronic anticoagulation  · Peptic ulcer prophylaxis: Pepcid  · High risk medications: none  · Social Service and Case Management consults for DC planning  · Dietician consult initiated    CORE MEASURES  DVT prophylaxis: already on chronic anticoagulation  Decubitus ulcer present on admission: No  CODE STATUS: FULL CODE  Nutrition Status: fair   Physical therapy: Yes   Old Charts reviewed: Yes  EKG Reviewed:  Yes  Advance Directive Addressed: Yes    FRANKLIN Morel CNP, FRANKLIN, NP-C  12/7/2021, 2:55 PM

## 2021-12-07 NOTE — PROGRESS NOTES
Patient arrived to the floor via cart room 327. Able to get patient transferred to bed with staff assistance. Patient A&O x4. Son at bedside. Patient from [address] [place] [address] Road following a stay here from Covid. Patient wearing oxygen over at [facility] her son and patient. Patients lungs are wheezy and patient is SOB. Denies pain. Plan of care gone over with patient and family member at this time. Snack provided. Will continue to monitor.

## 2021-12-07 NOTE — CONSULTS
Patient: Clinton Abrams  : 10/31/1930  Date of Admission: 2021  Primary Care Physician: Rosebud Schlatter  Today's Date: 2021    REASON FOR CONSULTATION: Shortness of Breath (from [facility] for post covid rehab; pt diagnosed with covid pneumonia, SOB worsening) and Other (pt has abnormal labs drawn this AM)      HPI: Mr. Yamila Spain is a 80 y.o. male who was admitted for weakness/shakiness. He was found to have an elevated troponin. Triple bypass in  at [facility].     Echocardiogram done on 11/15/2021: EF of 55% mildly increased LV wall thickness. Mild to moderate aortic leaflets calcification. Mild aortic insufficiency was seen. Mild pulmonary hypertension. Mild to moderate tricuspid regurgitation. Labs reviewed, poor kidney function - electrolytes improved. He reports having dull chest pressure that lasts for minutes at a time. It has not changed. He does have some shortness of breath but it has improved since discharged. He is still on 2-3 L of oxygen. When he came to the emergency room he was having O2 sat in the low 90s and found to have acute on chronic renal failure as well as hyperkalemia    Mr. Yamila Spain also denied any current or recent chest pain, abdominal pain, bleeding problems, problems with his medications or any other concerns at this time. Telemetry reviewed, maintaining sinus rhythm. On prior admission with Covid 19 pneumonia he had paroxysmal atrial flutter was 2-1 conduction. He was treated with amiodarone therapy. When I saw him this afternoon he said he is feeling much better. Able to sleep and feeling a little stronger. He was complaining of extreme fatigue probably secondary to acute renal failure and hyperkalemia. He has a Robertson catheter in place and urine is clear.     Past Medical History:   Diagnosis Date    Acute and chronic respiratory failure with hypoxia ([address] 2021    CAD (coronary artery disease)     Cataracts, bilateral     Elevated troponin 12/7/2021    Glaucoma     Hyperlipidemia     Hypertension     TIA (transient ischemic attack)     Type II or unspecified type diabetes mellitus without mention of complication, not stated as uncontrolled        CURRENT ALLERGIES: Patient has no known allergies. REVIEW OF SYSTEMS: 14 systems were reviewed. Pertinent positives and negatives as above, all else negative.      Past Surgical History:   Procedure Laterality Date    CATARACT REMOVAL WITH IMPLANT Bilateral 11/219/2013 and 12/3/2013    CORONARY ARTERY BYPASS GRAFT  2000    INGUINAL HERNIA REPAIR Bilateral 06/19/12    Social History:  Social History     Tobacco Use    Smoking status: Never Smoker    Smokeless tobacco: Never Used   Substance Use Topics    Alcohol use: Yes     Comment: very very very seldom    Drug use: No        CURRENT MEDICATIONS:  Outpatient Medications Marked as Taking for the 12/6/21 encounter [facility] Encounter)   Medication Sig Dispense Refill    benzonatate (TESSALON) 100 MG capsule Take 100 mg by mouth 3 times daily as needed for Cough      traZODone (DESYREL) 50 MG tablet Take 50 mg by mouth nightly      polyethylene glycol (GLYCOLAX) 17 GM/SCOOP powder Take 17 g by mouth daily 1530 g 3    sodium bicarbonate 650 MG tablet Take 1 tablet by mouth 2 times daily      amiodarone (CORDARONE) 200 MG tablet Take 1 tablet by mouth 2 times daily      albuterol (PROVENTIL) (2.5 MG/3ML) 0.083% nebulizer solution Take 3 mLs by nebulization every 4 hours as needed for Wheezing 120 each 3    budesonide (PULMICORT) 0.5 MG/2ML nebulizer suspension Take 2 mLs by nebulization 2 times daily 60 each 3    ipratropium-albuterol (DUONEB) 0.5-2.5 (3) MG/3ML SOLN nebulizer solution Inhale 3 mLs into the lungs 4 times daily 360 mL     apixaban (ELIQUIS) 2.5 MG TABS tablet Take 1 tablet by mouth 2 times daily 60 tablet     metoprolol succinate (TOPROL XL) 50 MG extended release tablet Take 1 tablet by mouth daily 30 tablet 3    dilTIAZem (CARDIZEM) 60 MG tablet Take 1 tablet by mouth 4 times daily Please hold if systolic blood pressure less than 100 mmHg or if heart rate less than 100 bpm. 120 tablet 3    polyethylene glycol (GLYCOLAX) 17 g packet Take 17 g by mouth daily as needed for Constipation 527 g 1    tamsulosin (FLOMAX) 0.4 MG capsule Take 1 capsule by mouth daily 30 capsule 3    zinc sulfate (ZINCATE) 220 (50 Zn) MG capsule Take 2 capsules by mouth daily 30 capsule 3    ascorbic acid (VITAMIN C) 1000 MG tablet Take 2 tablets by mouth 2 times daily (Patient taking differently: Take 500 mg by mouth 2 times daily ) 30 tablet 3    famotidine (PEPCID) 10 MG tablet Take 1 tablet by mouth nightly 60 tablet 3    vitamin D3 (CHOLECALCIFEROL) 25 MCG (1000 UT) TABS tablet Take 1 tablet by mouth daily 30 tablet 0    furosemide (LASIX) 40 MG tablet Take 40 mg by mouth daily      glipiZIDE (GLUCOTROL) 5 MG tablet Take 5 mg by mouth 2 times daily (before meals)      atorvastatin (LIPITOR) 40 MG tablet Take 1 tablet by mouth nightly 30 tablet 3    clopidogrel (PLAVIX) 75 MG tablet Take 1 tablet by mouth daily 30 tablet 3    aspirin 81 MG EC tablet Take 81 mg by mouth nightly          cefTRIAXone (ROCEPHIN) 1000 mg IVPB in 50 mL D5W minibag, Q24H  glucose (GLUTOSE) 40 % oral gel 15 g, PRN  dextrose 50 % IV solution, PRN  glucagon (rDNA) injection 1 mg, PRN  dextrose 5 % solution, PRN  insulin lispro (HUMALOG) injection vial 0-18 Units, TID WC  insulin lispro (HUMALOG) injection vial 0-9 Units, Nightly  0.9 % sodium chloride infusion, Continuous  albuterol (PROVENTIL) nebulizer solution 2.5 mg, Q4H PRN  amiodarone (CORDARONE) tablet 200 mg, BID  apixaban (ELIQUIS) tablet 2.5 mg, BID  ascorbic acid (VITAMIN C) tablet 2,000 mg, BID  aspirin EC tablet 81 mg, Nightly  atorvastatin (LIPITOR) tablet 40 mg, Nightly  benzonatate (TESSALON) capsule 100 mg, TID PRN  budesonide (PULMICORT) nebulizer suspension 500 mcg, BID  clopidogrel (PLAVIX) tablet 75 mg, Daily  dilTIAZem (CARDIZEM) tablet 60 mg, 4x Daily  famotidine (PEPCID) tablet 10 mg, Nightly  furosemide (LASIX) tablet 40 mg, Daily  glipiZIDE (GLUCOTROL) tablet 5 mg, BID AC  ipratropium-albuterol (DUONEB) nebulizer solution 3 mL, 4x daily  metoprolol succinate (TOPROL XL) extended release tablet 50 mg, Daily  sodium bicarbonate tablet 650 mg, BID  tamsulosin (FLOMAX) capsule 0.4 mg, Daily  traZODone (DESYREL) tablet 50 mg, Nightly  Vitamin D (CHOLECALCIFEROL) tablet 1,000 Units, Daily  zinc sulfate (ZINCATE) capsule 100 mg, Daily  sodium chloride flush 0.9 % injection 5-40 mL, 2 times per day  sodium chloride flush 0.9 % injection 10 mL, PRN  0.9 % sodium chloride infusion, PRN  ondansetron (ZOFRAN-ODT) disintegrating tablet 4 mg, Q8H PRN   Or  ondansetron (ZOFRAN) injection 4 mg, Q6H PRN  polyethylene glycol (GLYCOLAX) packet 17 g, Daily PRN  acetaminophen (TYLENOL) tablet 650 mg, Q6H PRN   Or  acetaminophen (TYLENOL) suppository 650 mg, Q6H PRN         FAMILY HISTORY: family history includes Heart Disease in his brother and father. Physical Examination:     /78   Pulse 78   Temp 98 °F (36.7 °C) (Temporal)   Resp 18   Ht 5' 4\" (1.626 m)   Wt 165 lb 8 oz (75.1 kg)   SpO2 95%   BMI 28.41 kg/m²  Body mass index is 28.41 kg/m². Constitutional: He appeared oriented to person and place. He appears well-developed and well-nourished. In no acute distress. HEENT: Normocephalic and atraumatic. No JVD present. Carotid bruit is not present. No mass and no thyromegaly present. No lymphadenopathy noted. Cardiovascular: Normal rate, regular rhythm, normal heart sounds. Exam reveals no gallop and no friction rubs. 2/6 systolic murmur, 5th intercostal space on the LEFT in the mid-clavicular line (cardiac apex)  Pulmonary/Chest: Effort normal and breath sounds normal. No respiratory distress. He has no wheezes, rhonchi or rales. Abdominal: Soft, non-tender.  He exhibits no organomegaly, mass or bruit. Extremities: 1+ at the ankles bilaterally. No cyanosis or clubbing. 2+ radial and carotid pulses. Distal extremity pulses: 2+ bilaterally. Neurological: Alertness and orientation as per Constitutional exam. No evidence of gross cranial nerve deficit. Coordination appeared normal.   Skin: Skin is warm and dry. There is no rash or diaphoresis. Psychiatric: He has a normal mood and affect.  His speech is normal and behavior is normal.        MOST RECENT LABS ON RECORD:   Lab Results   Component Value Date    WBC 8.0 12/07/2021    HGB 7.2 (L) 12/07/2021    HCT 23.4 (L) 12/07/2021     12/07/2021    CHOL 87 11/17/2021    TRIG 63 11/17/2021    HDL 27 (L) 11/17/2021    LDLCHOLESTEROL 47 11/17/2021    ALT 21 12/07/2021    AST 15 12/07/2021     (L) 12/07/2021    K 5.1 12/07/2021    CL 97 (L) 12/07/2021    CREATININE 2.97 (H) 12/07/2021    BUN 63 (H) 12/07/2021    CO2 26 12/07/2021    TSH 1.12 11/17/2021    PSA 1.37 11/18/2021    INR 1.3 11/17/2021    LABA1C 9.5 (H) 08/27/2021        ASSESSMENT:  Patient Active Problem List    Diagnosis Date Noted    Hyperglycemia 12/07/2021    Renal failure 12/07/2021    Acute cystitis without hematuria 12/07/2021    Elevated troponin 12/07/2021    Hyperkalemia 12/06/2021    Urinary retention 11/27/2021    Mild malnutrition ([address] 11/17/2021    Acute and chronic respiratory failure with hypoxia (HCC) 11/16/2021    Atrial flutter ([address] 11/16/2021    Stage 4 chronic kidney disease ([address] 11/16/2021    CAD (coronary artery disease)     S/P CABG (coronary artery bypass graft)     COVID-19 virus infection / Radha Estimable Vaccine 11/15/2021    Cerebral infarction due to embolism of right middle cerebral artery ([address] 03/28/2018    Left leg weakness 03/28/2018    Falling 03/28/2018    Stroke, lacunar ([facility] 03/28/2018    Hypertension 03/27/2018    Type 2 diabetes mellitus without complication, without long-term current use of insulin ([facility] 03/27/2018    Cerebrovascular accident (CVA) ([facility] 03/26/2018    Right inguinal hernia 04/02/2014       PLAN:    Nonspecific troponin elevation can be secondary to acute renal failure versus type 2 Myocardial Infarction:   Mr. Glynda Moritz meets criteria for a Type 2 MI defined by a rise and fall of troponin with at least one value above the 99th percentile and evidence of an imbalance between myocardial oxygen supply and demand unrelated to coronary thrombosis, evidenced by symptoms of acute myocardial ischemia which has manifested as the development of heart failure and shortness of breath at rest.  Currently denies any chest pain. Follow-up repeat ECG and troponins. No ischemic work-up indicated except after improvement of his acute renal failure. Atherosclerotic Heart Disease:  Antiplatelet Agent: Continue clopidogrel (Plavix): Plavix 75 mg daily. Beta Blocker: Continue Metoprolol succinate (Toprol XL) 50 mg daily. Anti-anginal medications: Continue diltiazem. Heart rate is controlled. Cholesterol Reduction Therapy: Continue Atorvastatin (Lipitor) 40 mg daily. · Paroxysmal Atrial Flutter: Rhythm Control Symptomatic   Beta Blocker: Continue Metoprolol tartrate (Lopressor) 50 mg bid.  Calcium Channel Blocker: Continue 60 mg every 6 hours. Please hold if heart rate below 60 bpm.    Anti-Arrhythmic: Decrease amiodarone to 200 mg daily.  Follow-up repeat liver function test and thyroid function.   [id number] Score for Atrial Fibrillation Stroke Risk   Risk   Factors  Component Value   C CHF No 0   H HTN Yes 1   A2 Age >= 76 Yes,  (80 y.o.) 2   D DM Yes 1   S2 Prior Stroke/TIA No 0   V Vascular Disease No 0   A Age 74-69 No,  (80 y.o.) 0   Sc Sex male 0    [id number]  Score  4   Score last updated 97/3/90 0:69 PM EST  Click here for a link to the UpToDate guideline \"Atrial Fibrillation: Anticoagulation therapy to prevent embolization    Disclaimer: Risk Score calculation is dependent on accuracy of patient problem list and past encounter diagnosis.  Stroke Risk: CHADS2-VASc Score: greater than 2 (2.2% stroke risk)   Anticoagulation: Hold Eliquis because of dropping hemoglobin. Patient maintaining sinus rhythm for now. · Acute renal failure, hyperkalemia and anemia  · Continue management as per primary team.  · Consider nephrology consultation. Once again, thank you for allowing me to participate in this patients care. Please do not hesitate to contact me if I could be of any further assistance. Sincerely,  Maximo Rouse MD, MS, F.A.C.C. [facility]) [facility], [address] [facility], [address] [place] [address]  Phone: [phone], Fax: [phone]      I believe that the risk of significant morbidity and mortality related to the patient's current medical conditions are: high.

## 2021-12-07 NOTE — PROGRESS NOTES
Writer called Dr. Marquis Chaudhary office (Cardio) about needing consult d/t elevated Troponin levels in ER.

## 2021-12-08 LAB
ANION GAP SERPL CALCULATED.3IONS-SCNC: 11 MMOL/L (ref 9–17)
BUN BLDV-MCNC: 65 MG/DL (ref 8–23)
BUN/CREAT BLD: 20 (ref 9–20)
CALCIUM SERPL-MCNC: 8.1 MG/DL (ref 8.6–10.4)
CHLORIDE BLD-SCNC: 97 MMOL/L (ref 98–107)
CO2: 29 MMOL/L (ref 20–31)
CREAT SERPL-MCNC: 3.19 MG/DL (ref 0.7–1.2)
CULTURE: ABNORMAL
EKG ATRIAL RATE: 67 BPM
EKG P AXIS: 14 DEGREES
EKG P-R INTERVAL: 186 MS
EKG Q-T INTERVAL: 458 MS
EKG QRS DURATION: 114 MS
EKG QTC CALCULATION (BAZETT): 483 MS
EKG R AXIS: -28 DEGREES
EKG T AXIS: 26 DEGREES
EKG VENTRICULAR RATE: 67 BPM
ESTIMATED AVERAGE GLUCOSE: 275 MG/DL
GFR AFRICAN AMERICAN: 22 ML/MIN
GFR NON-AFRICAN AMERICAN: 18 ML/MIN
GFR SERPL CREATININE-BSD FRML MDRD: ABNORMAL ML/MIN/{1.73_M2}
GFR SERPL CREATININE-BSD FRML MDRD: ABNORMAL ML/MIN/{1.73_M2}
GLUCOSE BLD-MCNC: 146 MG/DL (ref 74–100)
GLUCOSE BLD-MCNC: 176 MG/DL (ref 70–99)
GLUCOSE BLD-MCNC: 197 MG/DL (ref 74–100)
GLUCOSE BLD-MCNC: 217 MG/DL (ref 74–100)
GLUCOSE BLD-MCNC: 219 MG/DL (ref 74–100)
GLUCOSE BLD-MCNC: 232 MG/DL (ref 74–100)
HBA1C MFR BLD: 11.2 % (ref 4–6)
Lab: ABNORMAL
POTASSIUM SERPL-SCNC: 5 MMOL/L (ref 3.7–5.3)
SODIUM BLD-SCNC: 137 MMOL/L (ref 135–144)
SPECIMEN DESCRIPTION: ABNORMAL
TROPONIN INTERP: ABNORMAL
TROPONIN T: ABNORMAL NG/ML
TROPONIN, HIGH SENSITIVITY: 140 NG/L (ref 0–22)

## 2021-12-08 PROCEDURE — 93005 ELECTROCARDIOGRAM TRACING: CPT | Performed by: PHYSICIAN ASSISTANT

## 2021-12-08 PROCEDURE — 6360000002 HC RX W HCPCS: Performed by: NURSE PRACTITIONER

## 2021-12-08 PROCEDURE — 93010 ELECTROCARDIOGRAM REPORT: CPT | Performed by: INTERNAL MEDICINE

## 2021-12-08 PROCEDURE — 6360000002 HC RX W HCPCS: Performed by: INTERNAL MEDICINE

## 2021-12-08 PROCEDURE — 94640 AIRWAY INHALATION TREATMENT: CPT

## 2021-12-08 PROCEDURE — 92526 ORAL FUNCTION THERAPY: CPT

## 2021-12-08 PROCEDURE — 6370000000 HC RX 637 (ALT 250 FOR IP): Performed by: INTERNAL MEDICINE

## 2021-12-08 PROCEDURE — 1200000000 HC SEMI PRIVATE

## 2021-12-08 PROCEDURE — 94761 N-INVAS EAR/PLS OXIMETRY MLT: CPT

## 2021-12-08 PROCEDURE — 80048 BASIC METABOLIC PNL TOTAL CA: CPT

## 2021-12-08 PROCEDURE — 99232 SBSQ HOSP IP/OBS MODERATE 35: CPT | Performed by: INTERNAL MEDICINE

## 2021-12-08 PROCEDURE — 84484 ASSAY OF TROPONIN QUANT: CPT

## 2021-12-08 PROCEDURE — 2580000003 HC RX 258: Performed by: NURSE PRACTITIONER

## 2021-12-08 PROCEDURE — 36415 COLL VENOUS BLD VENIPUNCTURE: CPT

## 2021-12-08 PROCEDURE — 94669 MECHANICAL CHEST WALL OSCILL: CPT

## 2021-12-08 PROCEDURE — 2700000000 HC OXYGEN THERAPY PER DAY

## 2021-12-08 PROCEDURE — 2580000003 HC RX 258: Performed by: INTERNAL MEDICINE

## 2021-12-08 PROCEDURE — 82947 ASSAY GLUCOSE BLOOD QUANT: CPT

## 2021-12-08 RX ORDER — DIPHENHYDRAMINE HCL 25 MG
50 CAPSULE ORAL ONCE
Status: COMPLETED | OUTPATIENT
Start: 2021-12-08 | End: 2021-12-08

## 2021-12-08 RX ORDER — ACETAMINOPHEN 500 MG
1000 TABLET ORAL ONCE
Status: COMPLETED | OUTPATIENT
Start: 2021-12-08 | End: 2021-12-08

## 2021-12-08 RX ADMIN — OXYCODONE HYDROCHLORIDE AND ACETAMINOPHEN 2000 MG: 500 TABLET ORAL at 09:00

## 2021-12-08 RX ADMIN — METOPROLOL SUCCINATE 50 MG: 50 TABLET, EXTENDED RELEASE ORAL at 09:00

## 2021-12-08 RX ADMIN — IPRATROPIUM BROMIDE AND ALBUTEROL SULFATE 3 ML: .5; 2.5 SOLUTION RESPIRATORY (INHALATION) at 19:58

## 2021-12-08 RX ADMIN — SODIUM BICARBONATE TAB 650 MG 650 MG: 650 TAB at 08:58

## 2021-12-08 RX ADMIN — INSULIN LISPRO 6 UNITS: 100 INJECTION, SOLUTION INTRAVENOUS; SUBCUTANEOUS at 16:57

## 2021-12-08 RX ADMIN — FAMOTIDINE 10 MG: 10 TABLET ORAL at 20:31

## 2021-12-08 RX ADMIN — BUDESONIDE 500 MCG: 0.5 SUSPENSION RESPIRATORY (INHALATION) at 09:09

## 2021-12-08 RX ADMIN — ACETAMINOPHEN 1000 MG: 500 TABLET, FILM COATED ORAL at 02:57

## 2021-12-08 RX ADMIN — IPRATROPIUM BROMIDE AND ALBUTEROL SULFATE 3 ML: .5; 2.5 SOLUTION RESPIRATORY (INHALATION) at 16:00

## 2021-12-08 RX ADMIN — INSULIN LISPRO 3 UNITS: 100 INJECTION, SOLUTION INTRAVENOUS; SUBCUTANEOUS at 12:13

## 2021-12-08 RX ADMIN — DILTIAZEM HYDROCHLORIDE 60 MG: 60 TABLET, FILM COATED ORAL at 14:25

## 2021-12-08 RX ADMIN — SODIUM CHLORIDE, PRESERVATIVE FREE 10 ML: 5 INJECTION INTRAVENOUS at 09:08

## 2021-12-08 RX ADMIN — ASPIRIN 81 MG: 81 TABLET, COATED ORAL at 20:31

## 2021-12-08 RX ADMIN — SODIUM BICARBONATE TAB 650 MG 650 MG: 650 TAB at 20:31

## 2021-12-08 RX ADMIN — TRAZODONE HYDROCHLORIDE 50 MG: 50 TABLET ORAL at 20:31

## 2021-12-08 RX ADMIN — DIPHENHYDRAMINE HYDROCHLORIDE 50 MG: 25 CAPSULE ORAL at 02:57

## 2021-12-08 RX ADMIN — Medication 1000 UNITS: at 08:58

## 2021-12-08 RX ADMIN — FUROSEMIDE 40 MG: 40 TABLET ORAL at 08:58

## 2021-12-08 RX ADMIN — AMIODARONE HYDROCHLORIDE 200 MG: 200 TABLET ORAL at 08:58

## 2021-12-08 RX ADMIN — ATORVASTATIN CALCIUM 40 MG: 40 TABLET, FILM COATED ORAL at 20:32

## 2021-12-08 RX ADMIN — ZINC SULFATE 220 MG (50 MG) CAPSULE 100 MG: CAPSULE at 08:58

## 2021-12-08 RX ADMIN — GLIPIZIDE 5 MG: 5 TABLET ORAL at 16:00

## 2021-12-08 RX ADMIN — IPRATROPIUM BROMIDE AND ALBUTEROL SULFATE 3 ML: .5; 2.5 SOLUTION RESPIRATORY (INHALATION) at 11:33

## 2021-12-08 RX ADMIN — TAMSULOSIN HYDROCHLORIDE 0.4 MG: 0.4 CAPSULE ORAL at 09:00

## 2021-12-08 RX ADMIN — BUDESONIDE 500 MCG: 0.5 SUSPENSION RESPIRATORY (INHALATION) at 19:58

## 2021-12-08 RX ADMIN — AMIODARONE HYDROCHLORIDE 200 MG: 200 TABLET ORAL at 20:31

## 2021-12-08 RX ADMIN — CLOPIDOGREL BISULFATE 75 MG: 75 TABLET ORAL at 09:00

## 2021-12-08 RX ADMIN — CEFTRIAXONE 1000 MG: 1 INJECTION, POWDER, FOR SOLUTION INTRAMUSCULAR; INTRAVENOUS at 06:57

## 2021-12-08 RX ADMIN — SODIUM CHLORIDE, PRESERVATIVE FREE 10 ML: 5 INJECTION INTRAVENOUS at 20:32

## 2021-12-08 RX ADMIN — GLIPIZIDE 5 MG: 5 TABLET ORAL at 06:58

## 2021-12-08 RX ADMIN — IPRATROPIUM BROMIDE AND ALBUTEROL SULFATE 3 ML: .5; 2.5 SOLUTION RESPIRATORY (INHALATION) at 05:10

## 2021-12-08 RX ADMIN — OXYCODONE HYDROCHLORIDE AND ACETAMINOPHEN 2000 MG: 500 TABLET ORAL at 20:32

## 2021-12-08 ASSESSMENT — PAIN SCALES - GENERAL
PAINLEVEL_OUTOF10: 0

## 2021-12-08 ASSESSMENT — PAIN - FUNCTIONAL ASSESSMENT: PAIN_FUNCTIONAL_ASSESSMENT: 0-10

## 2021-12-08 NOTE — PROGRESS NOTES
Patient awake in bed watching TV. Patient assessment and vitals completed. Patient lungs with exp. Wheezing, occasional cough, SOB with exertion. Patient oxygen had to be increased to 3 liters per nasal cannula. Patient states he was wearing 2 to 3 back at rehab. Patient denies pain at this time. He has call light in reach.

## 2021-12-08 NOTE — PROGRESS NOTES
Patient calling out frequently to state he cant sleep and to ask for a blanket to be moved or to be repositioned.  Patient states \" I cant sleep and I dont know why\" Nurse asked Dr Yahaira Carson for something to help patient, order for tylenol PM.

## 2021-12-08 NOTE — PROGRESS NOTES
Speech Language Pathology  Facility/Department: [facility] AT THE [facility] MED SURG   CLINICAL BEDSIDE SWALLOW TREATMENT    NAME: Yanique Haynes  : 10/31/1930  MRN: [id number]    ADMISSION DATE: 2021  ADMITTING DIAGNOSIS: has Right inguinal hernia; Cerebrovascular accident (CVA) ([address] Hypertension; Type 2 diabetes mellitus without complication, without long-term current use of insulin ([address] Cerebral infarction due to embolism of right middle cerebral artery ([address] Left leg weakness; Falling; Stroke, lacunar ([address] COVID-19 virus infection / Conesus Estefania Vaccine; Acute and chronic respiratory failure with hypoxia ([address] ASHD (arteriosclerotic heart disease); Paroxysmal atrial flutter ([address] Stage 4 chronic kidney disease ([address] S/P CABG (coronary artery bypass graft); Mild malnutrition ([address] Urinary retention; Hyperkalemia; Hyperglycemia; Renal failure; Acute cystitis without hematuria; Elevated troponin; On amiodarone therapy; and Microcytic hypochromic anemia on their problem list.    Date of Eval: 2021  Evaluating Therapist: LAILA Sim    Current Diet level:  Current Diet : Regular  Current Liquid Diet : Thin      Primary Complaint  Patient Complaint: Patient reports no  concerns with swallowing. Pain:  Pain Assessment  Pain Assessment: 0-10  Pain Level: 0  Response to Pain Intervention: Patient Satisfied    Reason for Referral  Yanique Haynes was referred for a bedside swallow evaluation to assess the efficiency of his swallow function, identify signs and symptoms of aspiration and make recommendations regarding safe dietary consistencies, effective compensatory strategies, and safe eating environment. Impression  Dysphagia Diagnosis: Mild oral stage dysphagia  Dysphagia Outcome Severity Scale: Level 5: Mild dysphagia- Distant supervision. May need one diet consistency restricted     Treatment Plan  Requires SLP Intervention: Yes  Duration/Frequency of Treatment: x1 follow up.           Recommended Diet and Intervention  Diet Solids Recommendation: Regular  Liquid Consistency Recommendation: Thin  Recommended Form of Meds: Whole with puree     Therapeutic Interventions: Diet tolerance monitoring; Therapeutic PO trials with SLP    Compensatory Swallowing Strategies  Compensatory Swallowing Strategies: Alternate solids and liquids; Eat/Feed slowly; Upright as possible for all oral intake; Small bites/sips; Lingual sweep    Treatment/Goals  Short-term Goals  Timeframe for Short-term Goals: 5 days  Goal 1: Patient will consume regular solids and thin liquids without overt s/sx of aspiration/penetration in 80% of opportunities. Goal 2: Patient will utilize compensatory strategies during a meal or snack with minimal verbal cues in 80% of opportunities. Long-term Goals  Timeframe for Long-term Goals: 7 days  Goal 1: Patient will tolerate safest, least restrictive diet level without overt s/sx of aspiration/penetration in 90% of opportunities while utilizing swallowing strategies independently    General  Chart Reviewed: Yes  Behavior/Cognition: Alert; Cooperative; Pleasant mood  Respiratory Status: O2 via nasual cannula  O2 Device: Nasal cannula  Communication Observation: Functional  Follows Directions: Simple  Dentition: Adequate  Patient Positioning: Upright in bed  Baseline Vocal Quality: Normal  Prior Dysphagia History: No dysphaiga history in patient chart  Consistencies Administered: Dysphagia Pureed (Dysphagia I); Thin      Pain Level: 0    Oral Phase Dysfunction  Oral Phase  Oral Phase: WFL (Pt presented with functional oral phase on all puree and thin liquid trials. No lingual residuals observed. Pt observed to have coordinated swallowing and breathing pattern during meal.)     Indicators of Pharyngeal Phase Dysfunction   Pharyngeal Phase  Pharyngeal Phase: WFL  Pharyngeal Phase   Pharyngeal: Patient's pharyngeal phase of swallow is suspected to be [facility].  He had a prompt swallow initiation, no overt coughing/throat clearing, or wet vocal quality noted with puree and thin liquid trials. Pt maintained baseline in vocal quality. Prognosis  Prognosis  Prognosis for safe diet advancement: fair  Barriers to reach goals: age  Individuals consulted  Consulted and agree with results and recommendations: Patient; RN    Education  Patient Education: ST provided education to patient re: therapeutic PO trials with SLP and swallow strategies. Patient Education Response: Verbalizes understanding  Safety Devices in place: Yes  Type of devices: Nurse notified; Left in bed; Call light within reach       Therapy Time  SLP Individual Minutes  Time In: 1155  Time Out: 1210  Minutes: 15     SUMMARY: Pt resting in bed upon SLP arrival to room. Pt recently received breathing treatments per pt's RN. RN also noted that pt demonstrated gagging when taking one of his medications this morning (Vitamin C) whole. SLP advised RN to crush and place in puree for future administration. Pt seen during lunch for therapy. Pt's ordered meal contained puree solids and thin liquid consistencies. SLP brought minced moist solids, as well as suggested crackers to trial for tx; yet, pt refused. Pt required cue to take a drink prior to meal as he reported having a dry mouth. Pt's oral phase noted to be functional on puree solids and thin liquids. No lingual residuals observed and pt demonstrated a coordinated swallow and breathing pattern during meal consumption. Pt's pharyngeal phase was within functional limits given consistencies administered. No overt signs and symptoms of aspiration observed. SLP discussed pt's performance and swallow strategies with pt and pt's RN. SLP will continue to monitor and provide follow-up therapy to assess pt's oral/pharyngeal phases of the swallow with regular/thin P.O. trials.        LAILA Rene  12/8/2021 12:26 PM    Jose Anguiano M.A., CF-SLP

## 2021-12-08 NOTE — PROGRESS NOTES
Patient called out stating he was having a hard time sleeping and getting comfortable. He was turned onto his right side. Patient was given some tylenol and tessalon perle to see if it helps. Patient reassessment and vitals completed, see charting on all, no changes from previous assessment.

## 2021-12-08 NOTE — PROGRESS NOTES
Covid - [facility] -Progress Note    SUBJECTIVE:    Patient seen for f/u of Hyperkalemia. He resting in bed alert and oriented. No distress. On NC oxygen. No complaitns     ROS:   Constitutional: negative  for fevers, and negative for chills. Respiratory: negative for shortness of breath, negative for cough, and negative for wheezing  Cardiovascular: negative for chest pain, and negative for palpitations  Gastrointestinal: negative for abdominal pain, negative for nausea,negative for vomiting, negative for diarrhea, and negative for constipation     All other systems were reviewed with the patient and are negative unless otherwise stated in HPI      OBJECTIVE:        VITAL SIGNS:  Patient Vitals for the past 8 hrs:   BP Temp Temp src Pulse Resp SpO2   21 0645 (!) 91/51 97.2 °F (36.2 °C) Temporal 78 18 93 %         Temp: 97.2 °F (36.2 °C)  Temp range:    Temp  Av.5 °F (36.4 °C)  Min: 97.2 °F (36.2 °C)  Max: 97.9 °F (36.6 °C)    BP: (!) 91/51  BP Range:      Systolic (98MGA), JHY:051 , Min:91 , NXJ:126      Diastolic (79DJK), QZP:55, Min:38, Max:67    Pulse: 78  Pulse Range:    Pulse  Av.5  Min: 58  Max: 78    Resp: 18  Resp Range:   Resp  Av.3  Min: 18  Max: 20    SpO2: 93 % on supplemental O2  SpO2 range:   SpO2  Av.7 %  Min: 89 %  Max: 93 %      PaO2/FiO2 RATIO:  No results for input(s): POCPO2 in the last 72 hours. Exam:    GEN:    Awake, alert and oriented x3. EYES:   EOMI, pupils equal   NECK: Supple. No lymphadenopathy. No carotid bruit  CVS:     regular rate and rhythm, no audible murmur  PULM:  Diminished with fine basilar rales and scattered wheeze, no acute respiratory distress  ABD:     Bowels sounds normal.  Abdomen is soft. No distention. no tenderness to palpation. EXT:     no edema bilaterally . No calf tenderness. NEURO: Moves all extremities. Motor and sensory are grossly intact  SKIN:    No rashes. No skin lesions.      Diagnostic Data:      Complete Blood Count:   Recent Labs     12/06/21  0730 12/06/21 1510 12/07/21 0626   WBC 8.8 8.1 8.0   RBC 2.53* 2.72* 2.35*   HGB 7.9* 8.3* 7.2*   HCT 25.3* 26.5* 23.4*   .0 97.4 99.6   MCH 31.2 30.5 30.6   MCHC 31.2 31.3 30.8   RDW 13.2 13.2 13.1    298 286   MPV 10.8 10.6 10.6        Last 3 Blood Glucose:   Recent Labs     12/06/21  0730 12/06/21  1510 12/06/21  1742 12/06/21  1943 12/06/21  1951 12/07/21  0626 12/08/21  0820   GLUCOSE 468* 513* 450* 262 289* 327* 176*        Comprehensive Metabolic Profile:   Recent Labs     12/06/21 0730 12/06/21 1510 12/06/21 1951 12/07/21 0626 12/08/21  0820   *   < > 135 132* 137   K 5.9*   < > 5.8* 5.1 5.0   CL 97*   < > 98 97* 97*   CO2 25   < > 27 26 29   BUN 69*   < > 66* 63* 65*   CREATININE 3.44*   < > 3.03* 2.97* 3.19*   GLUCOSE 468*   < > 289* 327* 176*   CALCIUM 8.4*   < > 8.3* 8.1* 8.1*   PROT 6.0*  --   --  5.4*  --    LABALBU 2.6*  --   --  2.3*  --    BILITOT 0.36  --   --  0.33  --    ALKPHOS 165*  --   --  119  --    AST 13  --   --  15  --    ALT 25  --   --  21  --     < > = values in this interval not displayed. Urinalysis:   Lab Results   Component Value Date    NITRU POSITIVE 12/06/2021    COLORU Yellow 12/06/2021    PHUR 7.0 12/06/2021    WBCUA 20 TO 50 12/06/2021    RBCUA GREATER THAN 100 12/06/2021    MUCUS NOT REPORTED 12/06/2021    TRICHOMONAS NOT REPORTED 12/06/2021    YEAST NOT REPORTED 12/06/2021    BACTERIA 2+ 12/06/2021    SPECGRAV 1.010 12/06/2021    LEUKOCYTESUR LARGE 12/06/2021    UROBILINOGEN ELEVATED 12/06/2021    BILIRUBINUR NEGATIVE 12/06/2021    GLUCOSEU 1+ 12/06/2021    KETUA NEGATIVE 12/06/2021    AMORPHOUS NOT REPORTED 12/06/2021       HgBA1c:    Lab Results   Component Value Date    LABA1C 11.2 12/07/2021       Lactic Acid:   Lab Results   Component Value Date    LACTA 2.2 11/15/2021        Troponin: No results for input(s): TROPONINI in the last 72 hours.     CRP:  No results for input(s): CRP in the last 72 hours. Radiology/Imaging:  XR CHEST PORTABLE   Final Result   Interval development of small bilateral pleural effusions with persistent   multifocal bilateral airspace disease.                ASSESSMENT / PLAN:  Hyperkalemia  · Continue current therapy   · Resolved  · Stage IV CKD  · Improving  · Continue IV fluids  · Continue Flomax  · Type 2 diabetes  · POCT before meals and at bedtime  · Continue high-dose sliding scale  · Hypoglycemia protocol  · UTI  · Continue IV Rocephin  · Urine culturepending  · Blood culturenegative  · CAD  · Appreciate cardiology  · Trend troponin  · Possibly due to demand ischemia from chronic CKD  · Chronically elevated per recent history  · Amiodarone decreased to daily  · Hold Eliquis due to decreased hemoglobin per cardiology  · Nutrition status:   · Well developed, well nourished with no malnutrition  · Dietician consult initiated  · Hospital Prophylaxis:   · DVT: Eliquis   · Stress Ulcer: H2 Blocker   · High risk medications: none   · Disposition:    · Discharge plan is pending      FRANKLIN Morel CNP , FRANKLIN, NP-C  Hospitalist Medicine        12/8/2021, 1:55 PM complains of pain/discomfort

## 2021-12-08 NOTE — PROGRESS NOTES
Met with Patient this p.m. to discuss discharge planning. Patient is a 80year old , white male, admitted with a diagnosis of Hyperkalemia. He has recently been hospitalized with Covid 19. He is alert and oriented, pleasant and cooperative with this assessment. States that he will return to [employer] [facility] with the eventual goal to be for Patient to return home when rehab is completed. Patient resides in [facility] with his wife and an adult son. He uses a cane, walker and a shower chair at home for assistance. Was not utilizing any outside resources or services prior to his recent hospitalization. PCP is Dr. Sneha Sutherland. Patient has medical insurance and denies needing further financial assistance with the cost of his medications. Discharge plan is back to [facility] at discharge. This plan is confirmed with his son Keyur Lindquist as well. Patient remains a 'Full Code' status and does not currently have medical directives on file. Wife is his decision maker. LSW to assist with discharge placement and with any other need as it presents.     Deoda. Raissa [address]  12/8/2021

## 2021-12-08 NOTE — PROGRESS NOTES
Chen Nichole am scribing for and in the presence of Charity Saldana PA-C. Patient: Jose Swift  : 10/31/1930  Date of Admission: 2021  Primary Care Physician: Florencio Johnson  Today's Date: 2021    REASON FOR CONSULTATION: Shortness of Breath (from [facility] for post covid rehab; pt diagnosed with covid pneumonia, SOB worsening) and Other (pt has abnormal labs drawn this AM)      HPI: Mr. Everton Cruz is a 80 y.o. male who was admitted for weakness/shakiness. He was found to have an elevated troponin. Triple bypass in  at [facility].     Echocardiogram done on 11/15/2021: EF of 55% mildly increased LV wall thickness. Mild to moderate aortic leaflets calcification. Mild aortic insufficiency was seen. Mild pulmonary hypertension. Mild to moderate tricuspid regurgitation. He reports having dull chest pressure that lasts for minutes at a time. It has not changed. He does have some shortness of breath but it has improved since discharged. He is still on 2-3 L of oxygen. When he came to the emergency room he was having O2 sat in the low 90s and found to have acute on chronic renal failure as well as hyperkalemia    Mr. Everton Cruz also denied any current or recent chest pain, abdominal pain, bleeding problems, problems with his medications or any other concerns at this time. Telemetry reviewed, maintaining sinus rhythm. On prior admission with Covid 19 pneumonia he had paroxysmal atrial flutter was 2-1 conduction. He was treated with amiodarone therapy. When I saw him this afternoon he said he is feeling much better. Able to sleep and feeling a little stronger. He was complaining of extreme fatigue probably secondary to acute renal failure and hyperkalemia. He has a Robertson catheter in place and urine is clear. Mr. Raj Levin he is better today. He has more energy. Nephrology consult appreciated. ARF is probably a combination of pre-renal and post-renal causes.  Urine output is good. No chest pain, pressure or tightness. He still feeling weak. No significant shortness of breath but he still not doing much of a physical activities. High sensitivity troponin is stable. Past Medical History:   Diagnosis Date    Acute and chronic respiratory failure with hypoxia ([address] 11/16/2021    CAD (coronary artery disease)     Cataracts, bilateral     Elevated troponin 12/7/2021    Glaucoma     Hyperlipidemia     Hypertension     TIA (transient ischemic attack)     Type II or unspecified type diabetes mellitus without mention of complication, not stated as uncontrolled        CURRENT ALLERGIES: Patient has no known allergies. REVIEW OF SYSTEMS: 14 systems were reviewed. Pertinent positives and negatives as above, all else negative.      Past Surgical History:   Procedure Laterality Date    CATARACT REMOVAL WITH IMPLANT Bilateral 11/219/2013 and 12/3/2013    CORONARY ARTERY BYPASS GRAFT  2000    INGUINAL HERNIA REPAIR Bilateral 06/19/12    Social History:  Social History     Tobacco Use    Smoking status: Never Smoker    Smokeless tobacco: Never Used   Substance Use Topics    Alcohol use: Yes     Comment: very very very seldom    Drug use: No        CURRENT MEDICATIONS:  Outpatient Medications Marked as Taking for the 12/6/21 encounter [facility] Encounter)   Medication Sig Dispense Refill    benzonatate (TESSALON) 100 MG capsule Take 100 mg by mouth 3 times daily as needed for Cough      traZODone (DESYREL) 50 MG tablet Take 50 mg by mouth nightly      polyethylene glycol (GLYCOLAX) 17 GM/SCOOP powder Take 17 g by mouth daily 1530 g 3    sodium bicarbonate 650 MG tablet Take 1 tablet by mouth 2 times daily      amiodarone (CORDARONE) 200 MG tablet Take 1 tablet by mouth 2 times daily      albuterol (PROVENTIL) (2.5 MG/3ML) 0.083% nebulizer solution Take 3 mLs by nebulization every 4 hours as needed for Wheezing 120 each 3    budesonide (PULMICORT) 0.5 MG/2ML nebulizer suspension Take 2 mLs by nebulization 2 times daily 60 each 3    ipratropium-albuterol (DUONEB) 0.5-2.5 (3) MG/3ML SOLN nebulizer solution Inhale 3 mLs into the lungs 4 times daily 360 mL     apixaban (ELIQUIS) 2.5 MG TABS tablet Take 1 tablet by mouth 2 times daily 60 tablet     metoprolol succinate (TOPROL XL) 50 MG extended release tablet Take 1 tablet by mouth daily 30 tablet 3    dilTIAZem (CARDIZEM) 60 MG tablet Take 1 tablet by mouth 4 times daily Please hold if systolic blood pressure less than 100 mmHg or if heart rate less than 100 bpm. 120 tablet 3    polyethylene glycol (GLYCOLAX) 17 g packet Take 17 g by mouth daily as needed for Constipation 527 g 1    tamsulosin (FLOMAX) 0.4 MG capsule Take 1 capsule by mouth daily 30 capsule 3    zinc sulfate (ZINCATE) 220 (50 Zn) MG capsule Take 2 capsules by mouth daily 30 capsule 3    ascorbic acid (VITAMIN C) 1000 MG tablet Take 2 tablets by mouth 2 times daily (Patient taking differently: Take 500 mg by mouth 2 times daily ) 30 tablet 3    famotidine (PEPCID) 10 MG tablet Take 1 tablet by mouth nightly 60 tablet 3    vitamin D3 (CHOLECALCIFEROL) 25 MCG (1000 UT) TABS tablet Take 1 tablet by mouth daily 30 tablet 0    furosemide (LASIX) 40 MG tablet Take 40 mg by mouth daily      glipiZIDE (GLUCOTROL) 5 MG tablet Take 5 mg by mouth 2 times daily (before meals)      atorvastatin (LIPITOR) 40 MG tablet Take 1 tablet by mouth nightly 30 tablet 3    clopidogrel (PLAVIX) 75 MG tablet Take 1 tablet by mouth daily 30 tablet 3    aspirin 81 MG EC tablet Take 81 mg by mouth nightly          cefTRIAXone (ROCEPHIN) 1000 mg IVPB in 50 mL D5W minibag, Q24H  glucose (GLUTOSE) 40 % oral gel 15 g, PRN  dextrose 50 % IV solution, PRN  glucagon (rDNA) injection 1 mg, PRN  dextrose 5 % solution, PRN  insulin lispro (HUMALOG) injection vial 0-18 Units, TID WC  insulin lispro (HUMALOG) injection vial 0-9 Units, Nightly  0.9 % sodium chloride infusion, Continuous  albuterol (PROVENTIL) nebulizer solution 2.5 mg, Q4H PRN  amiodarone (CORDARONE) tablet 200 mg, BID  [Held by provider] apixaban (ELIQUIS) tablet 2.5 mg, BID  ascorbic acid (VITAMIN C) tablet 2,000 mg, BID  aspirin EC tablet 81 mg, Nightly  atorvastatin (LIPITOR) tablet 40 mg, Nightly  benzonatate (TESSALON) capsule 100 mg, TID PRN  budesonide (PULMICORT) nebulizer suspension 500 mcg, BID  clopidogrel (PLAVIX) tablet 75 mg, Daily  dilTIAZem (CARDIZEM) tablet 60 mg, 4x Daily  famotidine (PEPCID) tablet 10 mg, Nightly  furosemide (LASIX) tablet 40 mg, Daily  glipiZIDE (GLUCOTROL) tablet 5 mg, BID AC  ipratropium-albuterol (DUONEB) nebulizer solution 3 mL, 4x daily  metoprolol succinate (TOPROL XL) extended release tablet 50 mg, Daily  sodium bicarbonate tablet 650 mg, BID  tamsulosin (FLOMAX) capsule 0.4 mg, Daily  traZODone (DESYREL) tablet 50 mg, Nightly  Vitamin D (CHOLECALCIFEROL) tablet 1,000 Units, Daily  zinc sulfate (ZINCATE) capsule 100 mg, Daily  sodium chloride flush 0.9 % injection 5-40 mL, 2 times per day  sodium chloride flush 0.9 % injection 10 mL, PRN  0.9 % sodium chloride infusion, PRN  ondansetron (ZOFRAN-ODT) disintegrating tablet 4 mg, Q8H PRN   Or  ondansetron (ZOFRAN) injection 4 mg, Q6H PRN  polyethylene glycol (GLYCOLAX) packet 17 g, Daily PRN  acetaminophen (TYLENOL) tablet 650 mg, Q6H PRN   Or  acetaminophen (TYLENOL) suppository 650 mg, Q6H PRN         FAMILY HISTORY: family history includes Heart Disease in his brother and father. Physical Examination:     BP (!) 91/51   Pulse 78   Temp 97.2 °F (36.2 °C) (Temporal)   Resp 18   Ht 5' 4\" (1.626 m)   Wt 170 lb 6.4 oz (77.3 kg)   SpO2 93%   BMI 29.25 kg/m²  Body mass index is 29.25 kg/m². Constitutional: He appeared oriented to person and place. He appears well-developed and well-nourished. In no acute distress. HEENT: Normocephalic and atraumatic. No JVD present. Carotid bruit is not present.  No mass and no thyromegaly present. No lymphadenopathy noted. Cardiovascular: Normal rate, regular rhythm, normal heart sounds. Exam reveals no gallop and no friction rubs. 2/6 systolic murmur, 5th intercostal space on the LEFT in the mid-clavicular line (cardiac apex)  Pulmonary/Chest: Effort normal and breath sounds normal. No respiratory distress. He has no wheezes, rhonchi or rales. Increased respiratory wheezes. diminished lung sounds. Abdominal: Soft, non-tender. He exhibits no organomegaly, mass or bruit. Extremities: 1+ at the ankles bilaterally. No cyanosis or clubbing. 2+ radial and carotid pulses. Distal extremity pulses: 2+ bilaterally. Neurological: Alertness and orientation as per Constitutional exam. No evidence of gross cranial nerve deficit. Coordination appeared normal.   Skin: Skin is warm and dry. There is no rash or diaphoresis. Psychiatric: He has a normal mood and affect.  His speech is normal and behavior is normal.        MOST RECENT LABS ON RECORD:   Lab Results   Component Value Date    WBC 8.0 12/07/2021    HGB 7.2 (L) 12/07/2021    HCT 23.4 (L) 12/07/2021     12/07/2021    CHOL 87 11/17/2021    TRIG 63 11/17/2021    HDL 27 (L) 11/17/2021    LDLCHOLESTEROL 47 11/17/2021    ALT 21 12/07/2021    AST 15 12/07/2021     12/08/2021    K 5.0 12/08/2021    CL 97 (L) 12/08/2021    CREATININE 3.19 (H) 12/08/2021    BUN 65 (H) 12/08/2021    CO2 29 12/08/2021    TSH 1.12 11/17/2021    PSA 1.37 11/18/2021    INR 1.3 11/17/2021    LABA1C 9.5 (H) 08/27/2021        ASSESSMENT:  Patient Active Problem List    Diagnosis Date Noted    Hyperglycemia 12/07/2021    Renal failure 12/07/2021    Acute cystitis without hematuria 12/07/2021    Elevated troponin 12/07/2021    On amiodarone therapy     Microcytic hypochromic anemia     Hyperkalemia 12/06/2021    Urinary retention 11/27/2021    Mild malnutrition ([address] 11/17/2021    Acute and chronic respiratory failure with hypoxia ([facility] 11/16/2021    [facility]ox[facility]mal [facility] flutter [facility] 11/16/2021    Stage 4 chronic kidney disease ([facility] 11/16/2021    ASHD (arteriosclerotic heart disease)     S/P CABG (coronary artery bypass graft)     COVID-19 virus infection / Johnice Minks Vaccine 11/15/2021    Cerebral infarction due to embolism of right middle cerebral artery ([facility] 03/28/2018    Left leg weakness 03/28/2018    Falling 03/28/2018    Stroke, lacunar ([facility] 03/28/2018    Hypertension 03/27/2018    Type 2 diabetes mellitus without complication, without long-term current use of insulin ([facility] 03/27/2018    Cerebrovascular accident (CVA) ([facility] 03/26/2018    Right inguinal hernia 04/02/2014       PLAN:    Nonspecific troponin elevation can be secondary to acute renal failure versus type 2 Myocardial Infarction:   Mr. Marsha Pritchett meets criteria for a Type 2 MI defined by a rise and fall of troponin with at least one value above the 99th percentile and evidence of an imbalance between myocardial oxygen supply and demand unrelated to coronary thrombosis, evidenced by symptoms of acute myocardial ischemia which has manifested as the development of heart failure and shortness of breath at rest.  Currently denies any chest pain. Current troponin 140 up from 122 on 12/6/2021  Follow-up repeat ECG and troponin, ordered for tomorrow morning. No ischemic work-up indicated except after improvement of his acute renal failure. Atherosclerotic Heart Disease:  Antiplatelet Agent: Continue clopidogrel (Plavix): Plavix 75 mg daily. Beta Blocker: Continue Metoprolol succinate (Toprol XL) 50 mg daily. Anti-anginal medications: Discontinue Diltiazem due to low BP  Cholesterol Reduction Therapy: Continue Atorvastatin (Lipitor) 40 mg daily. · Paroxysmal Atrial Flutter: Rhythm Control Symptomatic   Beta Blocker: Continue Metoprolol tartrate (Lopressor) 50 mg bid.  Anti-Arrhythmic: Continue amiodarone to 200 mg daily.   [id number] Score for Atrial Fibrillation Stroke Risk Risk   Factors  Component Value   C CHF No 0   H HTN Yes 1   A2 Age >= 76 Yes,  (80 y.o.) 2   D DM Yes 1   S2 Prior Stroke/TIA No 0   V Vascular Disease No 0   A Age 74-69 No,  (80 y.o.) 0   Sc Sex male 0    [id number]  Score  4   Score last updated 24/1/53 6:64 PM EST  Click here for a link to the UpToDate guideline \"Atrial Fibrillation: Anticoagulation therapy to prevent embolization    Disclaimer: Risk Score calculation is dependent on accuracy of patient problem list and past encounter diagnosis.  Stroke Risk: CHADS2-VASc Score: 4/9 (4% stroke risk)   Anticoagulation: Hold Eliquis because of dropping hemoglobin. Patient maintaining sinus rhythm for now.  Follow CBC    · Acute renal failure, hyperkalemia and anemia  · Continue management as per primary team and nephrology     Once again, thank you for allowing me to participate in this patients care. Please do not hesitate to contact me if I could be of any further assistance. Sincerely,  Buzz Crabtree [facility], [address] [facility]  Phone: [phone], Fax: [phone]      The documentation recorded by the scribe, accurately and completely reflects the services I personally performed and the decisions made by me. Buzz Crabtree PA-C    December 8, 2021     ATTESTATION:     I have discussed the case, including pertinent history and exam findings with the Mathew RUFFIN. I have evaluated the  History, physical findings and pictures of the patient and the key elements of the encounter have been performed by me. I have reviewed the laboratory data, other diagnostic studies and discussed them with Mathew RUFFIN. I have updated the medical record where necessary. I agree with the assessment, plan and orders as documented by Mathew RUFFIN. Sincerely,  Víctor Low MD, F.A.C.C.   [facility]    [address], [address] [place] [address]  Phone: [phone], Fax:

## 2021-12-09 ENCOUNTER — APPOINTMENT (OUTPATIENT)
Dept: CT IMAGING | Age: 86
DRG: 640 | End: 2021-12-09
Payer: MEDICARE

## 2021-12-09 LAB
% CKMB: 4 % (ref 0–3.5)
ABSOLUTE EOS #: 0.41 K/UL (ref 0–0.44)
ABSOLUTE IMMATURE GRANULOCYTE: 0.21 K/UL (ref 0–0.3)
ABSOLUTE LYMPH #: 2.29 K/UL (ref 1.1–3.7)
ABSOLUTE MONO #: 0.69 K/UL (ref 0.1–1.2)
ANION GAP SERPL CALCULATED.3IONS-SCNC: 10 MMOL/L (ref 9–17)
BASOPHILS # BLD: 0 % (ref 0–2)
BASOPHILS ABSOLUTE: 0.03 K/UL (ref 0–0.2)
BUN BLDV-MCNC: 69 MG/DL (ref 8–23)
BUN/CREAT BLD: 23 (ref 9–20)
CALCIUM SERPL-MCNC: 7.9 MG/DL (ref 8.6–10.4)
CHLORIDE BLD-SCNC: 99 MMOL/L (ref 98–107)
CK MB: 1.7 NG/ML
CKMB INTERPRETATION: ABNORMAL
CO2: 30 MMOL/L (ref 20–31)
CREAT SERPL-MCNC: 3.02 MG/DL (ref 0.7–1.2)
DIFFERENTIAL TYPE: ABNORMAL
EKG ATRIAL RATE: 66 BPM
EKG P AXIS: 22 DEGREES
EKG P-R INTERVAL: 194 MS
EKG Q-T INTERVAL: 452 MS
EKG QRS DURATION: 114 MS
EKG QTC CALCULATION (BAZETT): 473 MS
EKG R AXIS: -19 DEGREES
EKG T AXIS: 9 DEGREES
EKG VENTRICULAR RATE: 66 BPM
EOSINOPHILS RELATIVE PERCENT: 4 % (ref 1–4)
GFR AFRICAN AMERICAN: 24 ML/MIN
GFR NON-AFRICAN AMERICAN: 20 ML/MIN
GFR SERPL CREATININE-BSD FRML MDRD: ABNORMAL ML/MIN/{1.73_M2}
GFR SERPL CREATININE-BSD FRML MDRD: ABNORMAL ML/MIN/{1.73_M2}
GLUCOSE BLD-MCNC: 162 MG/DL (ref 74–100)
GLUCOSE BLD-MCNC: 171 MG/DL (ref 70–99)
GLUCOSE BLD-MCNC: 191 MG/DL (ref 74–100)
GLUCOSE BLD-MCNC: 201 MG/DL (ref 74–100)
GLUCOSE BLD-MCNC: 262 MG/DL (ref 74–100)
HCT VFR BLD CALC: 25.6 % (ref 40.7–50.3)
HEMOGLOBIN: 7.8 G/DL (ref 13–17)
IMMATURE GRANULOCYTES: 2 %
LYMPHOCYTES # BLD: 25 % (ref 24–43)
MCH RBC QN AUTO: 30.2 PG (ref 25.2–33.5)
MCHC RBC AUTO-ENTMCNC: 30.5 G/DL (ref 28.4–34.8)
MCV RBC AUTO: 99.2 FL (ref 82.6–102.9)
MONOCYTES # BLD: 7 % (ref 3–12)
NRBC AUTOMATED: 0 PER 100 WBC
PDW BLD-RTO: 13.9 % (ref 11.8–14.4)
PLATELET # BLD: 288 K/UL (ref 138–453)
PLATELET ESTIMATE: ABNORMAL
PMV BLD AUTO: 10.6 FL (ref 8.1–13.5)
POTASSIUM SERPL-SCNC: 5.1 MMOL/L (ref 3.7–5.3)
RBC # BLD: 2.58 M/UL (ref 4.21–5.77)
RBC # BLD: ABNORMAL 10*6/UL
SEG NEUTROPHILS: 62 % (ref 36–65)
SEGMENTED NEUTROPHILS ABSOLUTE COUNT: 5.72 K/UL (ref 1.5–8.1)
SODIUM BLD-SCNC: 139 MMOL/L (ref 135–144)
TOTAL CK: 42 U/L (ref 39–308)
TROPONIN INTERP: ABNORMAL
TROPONIN T: ABNORMAL NG/ML
TROPONIN, HIGH SENSITIVITY: 139 NG/L (ref 0–22)
WBC # BLD: 9.4 K/UL (ref 3.5–11.3)
WBC # BLD: ABNORMAL 10*3/UL

## 2021-12-09 PROCEDURE — 2700000000 HC OXYGEN THERAPY PER DAY

## 2021-12-09 PROCEDURE — 6360000002 HC RX W HCPCS: Performed by: NURSE PRACTITIONER

## 2021-12-09 PROCEDURE — 82947 ASSAY GLUCOSE BLOOD QUANT: CPT

## 2021-12-09 PROCEDURE — 83615 LACTATE (LD) (LDH) ENZYME: CPT

## 2021-12-09 PROCEDURE — 6370000000 HC RX 637 (ALT 250 FOR IP): Performed by: INTERNAL MEDICINE

## 2021-12-09 PROCEDURE — 93005 ELECTROCARDIOGRAM TRACING: CPT | Performed by: INTERNAL MEDICINE

## 2021-12-09 PROCEDURE — 97110 THERAPEUTIC EXERCISES: CPT

## 2021-12-09 PROCEDURE — 36415 COLL VENOUS BLD VENIPUNCTURE: CPT

## 2021-12-09 PROCEDURE — 89051 BODY FLUID CELL COUNT: CPT

## 2021-12-09 PROCEDURE — 80048 BASIC METABOLIC PNL TOTAL CA: CPT

## 2021-12-09 PROCEDURE — 2580000003 HC RX 258: Performed by: INTERNAL MEDICINE

## 2021-12-09 PROCEDURE — 0W993ZZ DRAINAGE OF RIGHT PLEURAL CAVITY, PERCUTANEOUS APPROACH: ICD-10-PCS | Performed by: INTERNAL MEDICINE

## 2021-12-09 PROCEDURE — 82550 ASSAY OF CK (CPK): CPT

## 2021-12-09 PROCEDURE — 6360000002 HC RX W HCPCS: Performed by: INTERNAL MEDICINE

## 2021-12-09 PROCEDURE — 97166 OT EVAL MOD COMPLEX 45 MIN: CPT

## 2021-12-09 PROCEDURE — 71250 CT THORAX DX C-: CPT

## 2021-12-09 PROCEDURE — APPSS30 APP SPLIT SHARED TIME 16-30 MINUTES: Performed by: PHYSICIAN ASSISTANT

## 2021-12-09 PROCEDURE — 93010 ELECTROCARDIOGRAM REPORT: CPT | Performed by: INTERNAL MEDICINE

## 2021-12-09 PROCEDURE — 97162 PT EVAL MOD COMPLEX 30 MIN: CPT

## 2021-12-09 PROCEDURE — 94640 AIRWAY INHALATION TREATMENT: CPT

## 2021-12-09 PROCEDURE — 99232 SBSQ HOSP IP/OBS MODERATE 35: CPT | Performed by: INTERNAL MEDICINE

## 2021-12-09 PROCEDURE — 94669 MECHANICAL CHEST WALL OSCILL: CPT

## 2021-12-09 PROCEDURE — 82042 OTHER SOURCE ALBUMIN QUAN EA: CPT

## 2021-12-09 PROCEDURE — 1200000000 HC SEMI PRIVATE

## 2021-12-09 PROCEDURE — 87075 CULTR BACTERIA EXCEPT BLOOD: CPT

## 2021-12-09 PROCEDURE — 94761 N-INVAS EAR/PLS OXIMETRY MLT: CPT

## 2021-12-09 PROCEDURE — 87205 SMEAR GRAM STAIN: CPT

## 2021-12-09 PROCEDURE — 82553 CREATINE MB FRACTION: CPT

## 2021-12-09 PROCEDURE — 6370000000 HC RX 637 (ALT 250 FOR IP): Performed by: NURSE PRACTITIONER

## 2021-12-09 PROCEDURE — 87070 CULTURE OTHR SPECIMN AEROBIC: CPT

## 2021-12-09 PROCEDURE — 84484 ASSAY OF TROPONIN QUANT: CPT

## 2021-12-09 PROCEDURE — 85025 COMPLETE CBC W/AUTO DIFF WBC: CPT

## 2021-12-09 RX ORDER — CIPROFLOXACIN 2 MG/ML
400 INJECTION, SOLUTION INTRAVENOUS EVERY 24 HOURS
Status: DISCONTINUED | OUTPATIENT
Start: 2021-12-09 | End: 2021-12-10 | Stop reason: HOSPADM

## 2021-12-09 RX ORDER — METOPROLOL SUCCINATE 25 MG/1
25 TABLET, EXTENDED RELEASE ORAL DAILY
Status: DISCONTINUED | OUTPATIENT
Start: 2021-12-10 | End: 2021-12-10 | Stop reason: HOSPADM

## 2021-12-09 RX ADMIN — FAMOTIDINE 10 MG: 10 TABLET ORAL at 20:17

## 2021-12-09 RX ADMIN — OXYCODONE HYDROCHLORIDE AND ACETAMINOPHEN 2000 MG: 500 TABLET ORAL at 20:17

## 2021-12-09 RX ADMIN — AMIODARONE HYDROCHLORIDE 200 MG: 200 TABLET ORAL at 20:17

## 2021-12-09 RX ADMIN — INSULIN LISPRO 6 UNITS: 100 INJECTION, SOLUTION INTRAVENOUS; SUBCUTANEOUS at 11:31

## 2021-12-09 RX ADMIN — CIPROFLOXACIN 400 MG: 2 INJECTION, SOLUTION INTRAVENOUS at 08:56

## 2021-12-09 RX ADMIN — ATORVASTATIN CALCIUM 40 MG: 40 TABLET, FILM COATED ORAL at 20:17

## 2021-12-09 RX ADMIN — IPRATROPIUM BROMIDE AND ALBUTEROL SULFATE 3 ML: .5; 2.5 SOLUTION RESPIRATORY (INHALATION) at 05:04

## 2021-12-09 RX ADMIN — METOPROLOL SUCCINATE 50 MG: 50 TABLET, EXTENDED RELEASE ORAL at 08:58

## 2021-12-09 RX ADMIN — GLIPIZIDE 5 MG: 5 TABLET ORAL at 08:58

## 2021-12-09 RX ADMIN — IPRATROPIUM BROMIDE AND ALBUTEROL SULFATE 3 ML: .5; 2.5 SOLUTION RESPIRATORY (INHALATION) at 20:42

## 2021-12-09 RX ADMIN — BUDESONIDE 500 MCG: 0.5 SUSPENSION RESPIRATORY (INHALATION) at 10:59

## 2021-12-09 RX ADMIN — BENZOCAINE AND MENTHOL 1 LOZENGE: 15; 3.6 LOZENGE ORAL at 16:38

## 2021-12-09 RX ADMIN — IPRATROPIUM BROMIDE AND ALBUTEROL SULFATE 3 ML: .5; 2.5 SOLUTION RESPIRATORY (INHALATION) at 10:58

## 2021-12-09 RX ADMIN — FUROSEMIDE 40 MG: 40 TABLET ORAL at 08:58

## 2021-12-09 RX ADMIN — ACETAMINOPHEN 650 MG: 325 TABLET ORAL at 20:17

## 2021-12-09 RX ADMIN — Medication 1000 UNITS: at 08:59

## 2021-12-09 RX ADMIN — GLIPIZIDE 5 MG: 5 TABLET ORAL at 16:38

## 2021-12-09 RX ADMIN — DILTIAZEM HYDROCHLORIDE 60 MG: 60 TABLET, FILM COATED ORAL at 08:58

## 2021-12-09 RX ADMIN — BUDESONIDE 500 MCG: 0.5 SUSPENSION RESPIRATORY (INHALATION) at 20:42

## 2021-12-09 RX ADMIN — SODIUM BICARBONATE TAB 650 MG 650 MG: 650 TAB at 08:58

## 2021-12-09 RX ADMIN — INSULIN LISPRO 3 UNITS: 100 INJECTION, SOLUTION INTRAVENOUS; SUBCUTANEOUS at 09:00

## 2021-12-09 RX ADMIN — ZINC SULFATE 220 MG (50 MG) CAPSULE 100 MG: CAPSULE at 08:58

## 2021-12-09 RX ADMIN — TRAZODONE HYDROCHLORIDE 50 MG: 50 TABLET ORAL at 20:17

## 2021-12-09 RX ADMIN — BENZONATATE 100 MG: 100 CAPSULE ORAL at 20:17

## 2021-12-09 RX ADMIN — TAMSULOSIN HYDROCHLORIDE 0.4 MG: 0.4 CAPSULE ORAL at 08:58

## 2021-12-09 RX ADMIN — SODIUM BICARBONATE TAB 650 MG 650 MG: 650 TAB at 20:17

## 2021-12-09 RX ADMIN — CLOPIDOGREL BISULFATE 75 MG: 75 TABLET ORAL at 08:58

## 2021-12-09 RX ADMIN — OXYCODONE HYDROCHLORIDE AND ACETAMINOPHEN 2000 MG: 500 TABLET ORAL at 08:58

## 2021-12-09 RX ADMIN — AMIODARONE HYDROCHLORIDE 200 MG: 200 TABLET ORAL at 08:58

## 2021-12-09 RX ADMIN — INSULIN LISPRO 3 UNITS: 100 INJECTION, SOLUTION INTRAVENOUS; SUBCUTANEOUS at 16:40

## 2021-12-09 RX ADMIN — IPRATROPIUM BROMIDE AND ALBUTEROL SULFATE 3 ML: .5; 2.5 SOLUTION RESPIRATORY (INHALATION) at 15:00

## 2021-12-09 RX ADMIN — BENZOCAINE AND MENTHOL 1 LOZENGE: 15; 3.6 LOZENGE ORAL at 20:18

## 2021-12-09 RX ADMIN — SODIUM CHLORIDE, PRESERVATIVE FREE 10 ML: 5 INJECTION INTRAVENOUS at 08:58

## 2021-12-09 ASSESSMENT — PAIN SCALES - GENERAL
PAINLEVEL_OUTOF10: 3
PAINLEVEL_OUTOF10: 0

## 2021-12-09 NOTE — FLOWSHEET NOTE
Resting in bed with eyes closed. Awake for vitals and assessment. Denies pain this morning. No needs at present time. Call light within reach, bed alarm on for safety.  Continue to monitor

## 2021-12-09 NOTE — DISCHARGE INSTR - COC
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    Mild malnutrition (HCC) E44.1    Urinary retention R33.9    Hyperkalemia E87.5    Hyperglycemia R73.9    Renal failure N19    Acute cystitis without hematuria N30.00    Elevated troponin R77.8    On amiodarone therapy Z79.899    Microcytic hypochromic anemia D50.9       Isolation/Infection:   Isolation            No Isolation          Patient Infection Status       Infection Onset Added Last Indicated Last Indicated By Review Planned Expiration Resolved Resolved By    None active    Resolved    COVID-19 11/12/21 11/15/21 11/15/21 COVID-19, Rapid   11/24/21 Rebekah Rojas RN    S/S 11/12. Resolved per provider with system guidance. Nurse Assessment:  Last Vital Signs: BP (!) 107/53   Pulse 73   Temp 97.9 °F (36.6 °C) (Temporal)   Resp 20   Ht 5' 4\" (1.626 m)   Wt 170 lb 9.6 oz (77.4 kg)   SpO2 95%   BMI 29.28 kg/m²     Last documented pain score (0-10 scale): Pain Level: 0  Last Weight:   Wt Readings from Last 1 Encounters:   12/09/21 170 lb 9.6 oz (77.4 kg)     Mental Status:  oriented, alert, coherent, logical, and thought processes intact    IV Access:  - None    Nursing Mobility/ADLs:  Walking   Assisted  Transfer  Assisted  Bathing  Assisted  Dressing  Assisted  Toileting  Assisted  Feeding  Assisted  Med Admin  Assisted  Med Delivery   crushed    Wound Care Documentation and Therapy:  Wound 12/06/21 Coccyx Medial (Active)   Wound Etiology Pressure Stage  2 12/09/21 0649   Dressing Status Clean; Dry; Intact;  New dressing applied 12/09/21 0649   Wound Cleansed Soap and water 12/06/21 2225   Dressing/Treatment Other (comment) 12/09/21 0649   Wound Assessment Dry; Pink/red 12/09/21 0649   Drainage Amount None 12/09/21 0649   Drainage Description Sanguinous 12/06/21 2225   Odor None 12/09/21 0649   Kiya-wound Assessment Intact 12/09/21 0649   Number of days: 2        Elimination:  Continence:

## 2021-12-09 NOTE — FLOWSHEET NOTE
Up in chair at bedside. Vitals checked and reassessed. No c/o at present time. Call light within reach.  Continue to monitor

## 2021-12-09 NOTE — PROGRESS NOTES
[facility] - [facility] -Progress Note    SUBJECTIVE:    Patient seen for f/u of Hyperkalemia. He resting in bed alert and oriented. No distress. On NC oxygen 3 L. No complaints     ROS:   Constitutional: negative  for fevers, and negative for chills. Respiratory: negative for shortness of breath, negative for cough, and negative for wheezing  Cardiovascular: negative for chest pain, and negative for palpitations  Gastrointestinal: negative for abdominal pain, negative for nausea,negative for vomiting, negative for diarrhea, and negative for constipation     All other systems were reviewed with the patient and are negative unless otherwise stated in HPI      OBJECTIVE:        VITAL SIGNS:  Patient Vitals for the past 8 hrs:   BP Temp Temp src Pulse Resp SpO2 Weight   21 0648 (!) 107/53 97.9 °F (36.6 °C) Temporal 73 20 95 %    21 0533       170 lb 9.6 oz (77.4 kg)   21 0505      93 %    21 0206 (!) 126/54 98.2 °F (36.8 °C) Temporal 66 18 91 %          Temp: 97.9 °F (36.6 °C)  Temp range:    Temp  Av °F (36.7 °C)  Min: 97.6 °F (36.4 °C)  Max: 98.2 °F (36.8 °C)    BP: (!) 107/53  BP Range:      Systolic (63JJG), KKU:715 , Min:91 , UVA:774      Diastolic (09DFF), XXW:11, Min:49, Max:86    Pulse: 73  Pulse Range:    Pulse  Av  Min: 57  Max: 78    Resp: 20  Resp Range:   Resp  Av  Min: 18  Max: 20    SpO2: 95 % on supplemental O2  SpO2 range:   SpO2  Av.3 %  Min: 91 %  Max: 98 %      PaO2/FiO2 RATIO:  No results for input(s): POCPO2 in the last 72 hours. Exam:    GEN:    Awake, alert and oriented x3. EYES:   EOMI, pupils equal   NECK: Supple. No lymphadenopathy. No carotid bruit  CVS:     regular rate and rhythm, no audible murmur  PULM:  Diminished with fine basilar rales and scattered wheeze, no acute respiratory distress  ABD:     Bowels sounds normal.  Abdomen is soft. No distention. no tenderness to palpation. EXT:     no edema bilaterally .   No calf tenderness. NEURO: Moves all extremities. Motor and sensory are grossly intact  SKIN:    No rashes. No skin lesions. Diagnostic Data:      Complete Blood Count:   Recent Labs     12/06/21  1510 12/07/21 0626 12/09/21 0620   WBC 8.1 8.0 9.4   RBC 2.72* 2.35* 2.58*   HGB 8.3* 7.2* 7.8*   HCT 26.5* 23.4* 25.6*   MCV 97.4 99.6 99.2   MCH 30.5 30.6 30.2   MCHC 31.3 30.8 30.5   RDW 13.2 13.1 13.9    286 288   MPV 10.6 10.6 10.6        Last 3 Blood Glucose:   Recent Labs     12/06/21  1510 12/06/21  1742 12/06/21  1943 12/06/21 1951 12/07/21 0626 12/08/21  0820 12/09/21 0620   GLUCOSE 513* 450* 262 289* 327* 176* 171*        Comprehensive Metabolic Profile:   Recent Labs     12/07/21 0626 12/08/21  0820 12/09/21 0620   * 137 139   K 5.1 5.0 5.1   CL 97* 97* 99   CO2 26 29 30   BUN 63* 65* 69*   CREATININE 2.97* 3.19* 3.02*   GLUCOSE 327* 176* 171*   CALCIUM 8.1* 8.1* 7.9*   PROT 5.4*  --   --    LABALBU 2.3*  --   --    BILITOT 0.33  --   --    ALKPHOS 119  --   --    AST 15  --   --    ALT 21  --   --         Urinalysis:   Lab Results   Component Value Date    NITRU POSITIVE 12/06/2021    COLORU Yellow 12/06/2021    PHUR 7.0 12/06/2021    WBCUA 20 TO 50 12/06/2021    RBCUA GREATER THAN 100 12/06/2021    MUCUS NOT REPORTED 12/06/2021    TRICHOMONAS NOT REPORTED 12/06/2021    YEAST NOT REPORTED 12/06/2021    BACTERIA 2+ 12/06/2021    SPECGRAV 1.010 12/06/2021    LEUKOCYTESUR LARGE 12/06/2021    UROBILINOGEN ELEVATED 12/06/2021    BILIRUBINUR NEGATIVE 12/06/2021    GLUCOSEU 1+ 12/06/2021    KETUA NEGATIVE 12/06/2021    AMORPHOUS NOT REPORTED 12/06/2021       HgBA1c:    Lab Results   Component Value Date    LABA1C 11.2 12/07/2021       Lactic Acid:   Lab Results   Component Value Date    LACTA 2.2 11/15/2021        Troponin: No results for input(s): TROPONINI in the last 72 hours. CRP:  No results for input(s): CRP in the last 72 hours.       Radiology/Imaging:  XR CHEST PORTABLE   Final Result   Interval development of small bilateral pleural effusions with persistent   multifocal bilateral airspace disease.          CT CHEST WO CONTRAST    (Results Pending)         ASSESSMENT / PLAN:  Hyperkalemia  · Continue current therapy   · Resolved  · Stage IV CKD  · Improving  · Continue IV fluids  · Continue Flomax  · Type 2 diabetes  · POCT before meals and at bedtime  · Continue high-dose sliding scale  · Hypoglycemia protocol  · UTI  · Stop IV Rocephin  · Start Cipro  · Urine cultureKleb. :PN  · Blood culturenegative  · CAD  · Appreciate cardiology  · Trend troponin  · Possibly due to demand ischemia from chronic CKD  · Chronically elevated per recent history  · Amiodarone decreased to daily  · Hold Eliquis due to decreased hemoglobin per cardiology  · Nutrition status:   · Well developed, well nourished with no malnutrition  · Dietician consult initiated  · Hospital Prophylaxis:   · DVT: Eliquis   · Stress Ulcer: H2 Blocker   · High risk medications: none   · Disposition:    · Discharge plan is [facility] today      FRANKLIN Gonzalez CNP , FRANKLIN, NP-C  Hospitalist Medicine        12/9/2021, 9:29 AM

## 2021-12-09 NOTE — PROGRESS NOTES
The patient is awake and talking to his family at the bedside. Vitals have been checked and are within the normal. Physical assessment is also completed as of now. Patient is A&O x4 at this time. Patient is denying of any pain or discomfort as of now. Patient's O2 saturation is at 92% on 3 L/min of O2 via nasal cannula. Robertson is intact and draining the urine well. Further needs are assessed. Safety precautions are in place. Will continue to monitor.

## 2021-12-09 NOTE — PROGRESS NOTES
Second set of vitals have been checked and are within the normal. Patient has been repositioned and his duoderm dressing on his coccyx has been changed at this time.

## 2021-12-09 NOTE — PROGRESS NOTES
Occupational Therapy   Occupational Therapy Initial Assessment  Date: 2021   Patient Name: Maritza Brito  MRN: [id number]     : 10/31/1930    Date of Service: 2021    Discharge Recommendations:  Continue to assess pending progress, Subacute/Skilled Nursing Facility       Assessment   Performance deficits / Impairments: Decreased functional mobility ; Decreased ADL status; Decreased strength; Decreased balance; Decreased posture; Decreased endurance  Assessment: 81 y/o M admitted to [facility] for renal failure. Patient recently admitted for [facility] and was completing rehabilitation at [facility]. Patient presents with generalized weakness and deconditioning, limiting ADL independence and safety. OT to address to improve patient ADL independence and participation. Treatment Diagnosis: weakness  Prognosis: Good  Decision Making: Medium Complexity  OT Education: OT Role; Plan of Care; Transfer Training  REQUIRES OT FOLLOW UP: Yes  Safety Devices  Safety Devices in place: Yes  Type of devices: Call light within reach; Left in chair           Patient Diagnosis(es): The primary encounter diagnosis was Renal failure, unspecified chronicity. Diagnoses of Hyperkalemia and Hyperglycemia were also pertinent to this visit. has a past medical history of Acute and chronic respiratory failure with hypoxia ([address] CAD (coronary artery disease), Cataracts, bilateral, Elevated troponin, Glaucoma, Hyperlipidemia, Hypertension, TIA (transient ischemic attack), and Type II or unspecified type diabetes mellitus without mention of complication, not stated as uncontrolled. has a past surgical history that includes Coronary artery bypass graft (); Cataract removal with implant (Bilateral,  and 12/3/2013); and Inguinal hernia repair (Bilateral, 12).     Treatment Diagnosis: weakness      Restrictions  Restrictions/Precautions  Restrictions/Precautions: Fall Risk, General Precautions    Subjective   General  Patient assessed for rehabilitation services?: Yes  Subjective  Subjective: Patient reports pain 6/10 on bottom. General Comment  Comments: Patient agreeable to OT evaluation. Patient supine in bed upon arrival.  Vital Signs  Resp: 20  Oxygen Therapy  Pulse Oximeter Device Mode: Intermittent  Pulse Oximeter Device Location: Finger  O2 Device: Nasal cannula  Skin Assessment: Clean, dry, & intact  O2 Flow Rate (L/min): 3 L/min  Patient Observation  Observations: mouth rinsed  Social/Functional History  Social/Functional History  Lives With: Spouse, Son  Type of Home: House  Home Layout: Two level, Work area in basement, Able to Live on Main level with bedroom/bathroom  Home Access: Stairs to enter with rails  Entrance Stairs - Number of Steps: 5  Bathroom Shower/Tub: Walk-in shower  Bathroom Toilet: Standard  Bathroom Equipment: Shower chair, Toilet raiser  Bathroom Accessibility: Accessible  Home Equipment: Rolling walker, Cane  ADL Assistance: Independent  Homemaking Assistance: Independent  Ambulation Assistance: Independent  Transfer Assistance: Independent  Active : Yes  Occupation: Retired  Additional Comments: Family present for evaluation. Patient ambulated c [facility] AND [facility] during the day and FWW at night. Indep c self care and simple household tasks. Objective   Vision: Impaired  Vision Exceptions: Wears glasses at all times  Hearing: Exceptions to [facility]  Hearing Exceptions: Bilateral hearing aid (currently does not have)          Balance  Sitting Balance: Independent  Standing Balance: Contact guard assistance  Functional Mobility  Functional - Mobility Device: Rolling Walker  Assist Level: Contact guard assistance  Toilet Transfers  Toilet Transfers Comments: cueing for safety and hand placement  ADL  Feeding: Setup  Grooming: Stand by assistance (seated)  UE Bathing: Minimal assistance  LE Bathing: Minimal assistance; Moderate assistance  UE Dressing: Stand by assistance  LE Dressing: Minimal assistance;  Moderate assistance  Toileting: Minimal assistance (currently on cath)        Bed mobility  Supine to Sit: Minimal assistance  Transfers  Sit to stand: Minimal assistance  Stand to sit: Minimal assistance                       LUE AROM (degrees)  LUE AROM : WFL  Left Hand AROM (degrees)  Left Hand AROM: WFL  RUE AROM (degrees)  RUE AROM : WFL  Right Hand AROM (degrees)  Right Hand AROM: WFL                      Plan   Plan  Times per week: 7  Times per day: Daily  Current Treatment Recommendations: Strengthening, Balance Training, Functional Mobility Training, Safety Education & Training, Patient/Caregiver Education & Training, Equipment Evaluation, Education, & procurement, Self-Care / ADL    AM-PAC Score        AM-PAC Inpatient Daily Activity Raw Score: 19 (12/09/21 1246)  AM-PAC Inpatient ADL T-Scale Score : 40.22 (12/09/21 1246)  ADL Inpatient CMS 0-100% Score: 42.8 (12/09/21 1246)  ADL Inpatient CMS G-Code Modifier : CK (12/09/21 1246)    Goals  Short term goals  Time Frame for Short term goals: 21 visits  Short term goal 1: Patient to engage in 15 minutes of ther ex/ther act to improve BUE strength and activity tolerance with no more than 2 RB to improve strength and activity tolerance. Short term goal 2: Patient to complete ADL routine c SBA to improve ADL independence. Short term goal 3: Patient to tolerate standing >5' s LOB or shortness of breath to improve standing tolerance, balance and safety during ADL and functional mobility. Short term goal 4: Patient to be educated on d/c folder, home safety and AE/DME to improve independence and safety upon return home.        Therapy Time   Individual Concurrent Group Co-treatment   Time In 2456         Time Out 1205         Minutes 5777 Song Gast[address]um, OTR/L

## 2021-12-09 NOTE — PROGRESS NOTES
SW spoke with [facility] and made referral for pt's return there. [facility] will accept when pt is ready for transfer.  [place] BECERRILW 12/9/2021

## 2021-12-09 NOTE — PROGRESS NOTES
Physical Therapy    Facility/Department: [facility] AT THE [facility] MED SURG  Initial Assessment    NAME: Jose Swift  : 10/31/1930  MRN: [id number]    Date of Service: 2021    Discharge Recommendations:  Continue to assess pending progress, Subacute/Skilled Nursing Facility   PT Equipment Recommendations  Equipment Needed: No    Assessment   Body structures, Functions, Activity limitations: Decreased functional mobility ; Decreased balance; Decreased ADL status; Decreased endurance; Decreased high-level IADLs; Decreased strength; Decreased posture  Assessment: The patient is a 80 y.o. male who was admitted due to hyperkalemia. He demonstrates LE weakness, decreased activity endurance, and impaired balance. He would benefit from skilled PT to address his deficits to improve functional mobility. Treatment Diagnosis: Generalized weakness  Prognosis: Good  Decision Making: Medium Complexity  REQUIRES PT FOLLOW UP: Yes  Activity Tolerance  Activity Tolerance: Patient limited by fatigue; Patient limited by endurance       Patient Diagnosis(es): The primary encounter diagnosis was Renal failure, unspecified chronicity. Diagnoses of Hyperkalemia and Hyperglycemia were also pertinent to this visit. has a past medical history of Acute and chronic respiratory failure with hypoxia ([address] CAD (coronary artery disease), Cataracts, bilateral, Elevated troponin, Glaucoma, Hyperlipidemia, Hypertension, TIA (transient ischemic attack), and Type II or unspecified type diabetes mellitus without mention of complication, not stated as uncontrolled. has a past surgical history that includes Coronary artery bypass graft (); Cataract removal with implant (Bilateral,  and 12/3/2013); and Inguinal hernia repair (Bilateral, 12).     Restrictions  Restrictions/Precautions  Restrictions/Precautions: Fall Risk, General Precautions  Vision/Hearing  Vision: Impaired  Vision Exceptions: Wears glasses at all times  Hearing: Exceptions to [facility]  Hearing Exceptions: Bilateral hearing aid     Subjective  General  Chart Reviewed: Yes  Patient assessed for rehabilitation services?: Yes  Family / Caregiver Present: No  Referring Practitioner: FRANKLIN Glasgow CNP  Referral Date : 12/09/21  Diagnosis: Hyperkalemia, E87.5  Follows Commands: Within Functional Limits  Subjective  Subjective: Patient reports 9/10 buttock pain due to a bedsore. Pain Screening  Patient Currently in Pain: Denies    Orientation  Orientation  Overall Orientation Status: Within Functional Limits  Social/Functional History  Social/Functional History  Lives With: Spouse, Son  Type of Home: House  Home Layout: Two level, Work area in basement, Able to [employer] on Main level with bedroom/bathroom  Home Access: Stairs to enter with rails  Entrance Stairs - Number of Steps: 5  Bathroom Shower/Tub: Walk-in shower  Bathroom Toilet: Standard  Bathroom Equipment: Shower chair, Toilet raiser  Bathroom Accessibility: Accessible  Home Equipment: Rolling walker, Cane  ADL Assistance: Independent  Homemaking Assistance: Independent  Ambulation Assistance: Independent  Transfer Assistance: Independent  Active : Yes  Occupation: Retired  Additional Comments: Family present for evaluation. Patient ambulated c [facility] AND [place] during the day and FWW at night. Indep c self care and simple household tasks. Cognition   Cognition  Overall Cognitive Status: WFL    Objective  AROM RLE (degrees)  RLE AROM: WFL  AROM LLE (degrees)  LLE AROM : WFL  Strength RLE  Comment: Grossly 4-/5  Strength LLE  Comment: Grossly 4-/5     Sensation  Overall Sensation Status: WFL     Transfers  Sit to Stand: Moderate Assistance  Stand to sit: Minimal Assistance  Ambulation  Ambulation?: Yes  WB Status: FWB  Ambulation 1  Surface: level tile  Device: Rolling Walker  Other Apparatus: O2  Assistance: Contact guard assistance  Quality of Gait: Pt ambulates with forward flexed posture, decreased step height.   Distance: 2' forward and backward  Stairs/Curb  Stairs?: No     Balance  Posture: Fair  Sitting - Static: Good  Sitting - Dynamic: Good  Standing - Static: Poor; +  Standing - Dynamic: Poor  Exercises  Hip Flexion: x10 ea  Knee Long Arc Quad: x10 ea  Ankle Pumps: x20     Plan   Plan  Times per week: 7  Times per day: Twice a day  Plan Comment: 1x/day on weekends  Safety Devices  Type of devices: All fall risk precautions in place    AM-PAC Score  AM-PAC Inpatient Mobility without Stair Climbing Raw Score : 11 (12/09/21 1348)  AM-PAC Inpatient without Stair Climbing T-Scale Score : 35.66 (12/09/21 1348)  Mobility Inpatient CMS 0-100% Score: 67.36 (12/09/21 1348)  Mobility Inpatient without Stair CMS G-Code Modifier : CL (12/09/21 1348)    Goals  Short term goals  Time Frame for Short term goals: 10 days  Short term goal 1: Patient will ambulate 22' with FWW, CGA, without LOB  Short term goal 2: Patient will perform bed mobility with supervision  Short term goal 3: Patient will perform transfers with CGA  Short term goal 4: Patient will tolerate 20-30 minutes of therex/act to improve endurance for ADLs.   Patient Goals   Patient goals : Improve strength and mobility       Therapy Time   Individual Concurrent Group Co-treatment   Time In 2028         Time Out 1330         Minutes 25         Timed Code Treatment Minutes: 25 Minutes       Chris Cardoso PT, DPT

## 2021-12-09 NOTE — CONSULTS
Palliative Care Notes    Reason for Consult:  Chronic Disease Support    Patient Active Problem List   Diagnosis    Right inguinal hernia    [facility] ([facility]) [facility]    Hypertension    Type 2 diabetes mellitus without complication, without long-term current use of insulin ([facility]    Cerebral infarction due to embolism of right middle cerebral artery ([facility]    Left leg weakness    Falling    Stroke, lacunar ([facility]    COVID-19 virus infection / Moderna Vaccine    Acute and chronic respiratory failure with hypoxia (HCC)    ASHD (arteriosclerotic heart disease)    Paroxysmal atrial flutter (HCC)    Stage 4 chronic kidney disease (HCC)    S/P CABG (coronary artery bypass graft)    Mild malnutrition (HCC)    Urinary retention    Hyperkalemia    Hyperglycemia    Renal failure    Acute cystitis without hematuria    Elevated troponin    On amiodarone therapy    Microcytic hypochromic anemia       Advance Directives:  Code status: Full Code  Patient has capacity for medical decisions: not asked  Health Care Power of : yes  Living Will: yes        Pain Management:  The patient is not having any pain. Symptom Management:  Are there any other symptoms that are distressing to the patient or family that needs addressed? Anxiety:  none                          Dyspnea:  acute dyspnea and oxygen by NC                          Fatigue:  exercise intolerance                          Bladder function:  Robertson catheter in place                          Bowel function:  none    Other:  Stage 2 pressure sore to coccyx     Spiritual history/needs:   notified:  rounded with patient 12/8/2021    Palliative Performance Scale:  ___70%  Ambulation reduced;  Some disease; Can't do normal job or work; intake normal or reduced; can do full self care; LOC full  ___60%  Ambulation reduced; Significant disease; Can't do hobbies/housework; intake normal or reduced; occasional assist; LOC full/confusion  ___50%  Mainly sit/lie; Extensive disease; Can't do any work; Considerable assist; intake normal or reduced; LOC full/confusion  __x_40%  Mainly in bed; Extensive disease; Mainly assist; intake normal or reduced; LOC full/confusion   ___30%  Bed Bound; Extensive disease; Total care; intake reduced; LOC full/confusion  ___20%  Bed Bound; Extensive disease; Total care; intake minimal; Drowsy/coma  ___10%  Bed Bound; Extensive disease; Total care; Mouth care only; Drowsy/coma  ___0       Death    Readmission Risk:  25%    Notes:   Cathie Medina is resting in bed for our discussion. He is alert but seems confused about his current medical condition at times. His wife and son are also at the bedside. Cathie Medina presented to the emergency room from [facility] with increased SOB. He has a history of COVID, pneumonia, CKD, CAD, diabetes and TIA. Cathie Medina has been at [facility] from 11/27/21-12/6/21. Prior to that he was home with his wife. His son Nomandarian Lindquist states that his bedroom is upstairs and Cathie Medina frequently went downstairs to the basement to put wood into the fire. Cathie Medina has a cane, walker and shower chair at home. Keyur Lindquist confirms that Cathie Medina was independent with his ADL's prior to an admission in mid November. \"Since he has gotten COVID it has all been down hill. He is just so weak. \" Support provided. Discharge plan is to return to [facility] for therapy and hopefully be strong enough to return home with his wife. Discussed Shaheed's medical conditions. Cathie Medina states that he has seen a cardiologist in [place] but is changed to a doctor here as it is closer to him. Also reports that he saw a kidney doctor in [place] but \"I didn't feel I needed him. He wasn't doing much for me so I stopped seeing him last February. \" Follow up appointment has been made with Dr Brandon Anderson next week. Despite his chronic medical conditions and age Cathie Medina feels that he is in good health. Discussed advanced directives.  Keyur Gregoryak states that they have a living will and [facility] at home. Copies requested at their earliest convenience. Discussed code status. Jimbo Hayden states that he feels that he still has grandchildren to live for, some he has not seen yet and wishes to remain a full code. Discussed complications and co morbidities that could lessen his chance for survival. Despite talking about his serious medical conditions a few moments before Jimbo Hayden states \"I don't have any serious health problems and I think I would do ok if they had to do CPR. \" Keith Iba the chronic health issues that we just discussed. At this time Jimbo Hayden is taken for testing. Yuan Jewell asks questions about upcoming appointments. Information provided. We discuss home care options if rehab is unable to return him to his previous level of activity. Provided with information on OP PC and hospice. Yuan Jewell denies further needs and states that he realizes his fathers age and other issues and knows that he has a long road to recovery ahead if him, if he is even able to recover. Support provided.      Palliative Care Plan:  Education/support to family  Education/support to patient  Discharge planning/helping to coordinate care  Providing support for coping/adaptation/distress of family  Caregiver support/education  Continue with current plan of care  Palliative Care Goals:  provide comfort care/support/palliation/relieve suffering, remain at home, strengthening relationships, preserve independence/autonomy/control and support for family/caregiver  Visit focus:  Routine meeting  Functional decline  Treatment options/plans  Listen to patient/family concerns  Assess family understanding, concerns, and coping  Discuss chronic critical illness  Discuss discharge planning  Interdiscplinary collaboration  Address patient/family distress  Build trust  Provide emotional support to the family  Elicit patient's goals and values, and use these to establish or modify goals of care     [employer] RN  [address] Nurse Coordinator  12/9/2021 11:38 AM

## 2021-12-09 NOTE — PROGRESS NOTES
Sienna Payne am scribing for and in the presence of Charity Hillman PA-C. Patient: Emiliano Page  : 10/31/1930  Date of Admission: 2021  Primary Care Physician: Urban Ahumada  Today's Date: 2021    REASON FOR CONSULTATION: Shortness of Breath (from [facility] for post covid rehab; pt diagnosed with covid pneumonia, SOB worsening) and Other (pt has abnormal labs drawn this AM)      HPI: Mr. Catrina Srivastava is a 80 y.o. male who was admitted for weakness/shakiness. He was found to have an elevated troponin. Triple bypass in  at [facility].     Echocardiogram done on 11/15/2021: EF of 55% mildly increased LV wall thickness. Mild to moderate aortic leaflets calcification. Mild aortic insufficiency was seen. Mild pulmonary hypertension. Mild to moderate tricuspid regurgitation. He reports having dull chest pressure that lasts for minutes at a time. It has not changed. He does have some shortness of breath but it has improved since discharged. He is still on 2-3 L of oxygen. When he came to the emergency room he was having O2 sat in the low 90s and found to have acute on chronic renal failure as well as hyperkalemia    Mr. Catrina Srivastava also denied any current or recent chest pain, abdominal pain, bleeding problems, problems with his medications or any other concerns at this time. Telemetry reviewed, maintaining sinus rhythm. On prior admission with Covid 19 pneumonia he had paroxysmal atrial flutter was 2-1 conduction. He was treated with amiodarone therapy. When I saw him this afternoon he said he is feeling much better. Able to sleep and feeling a little stronger. He was complaining of extreme fatigue probably secondary to acute renal failure and hyperkalemia. He has a Robertson catheter in place and urine is clear. Mr. Catrina Srivastava reports he is feeling okay this morning. He did get more sleep last night and that seemed to help him.  He ate breakfast this morning without any issues. He denies having any chest pain. He still has some shortness of breath with activity. He still has some hand shakiness while reading the morning paper. He has a cough and did cough up dark phlegm while we were present in the room. The nurse reports he was recently in the hospital with Sol. High sensitivity troponin today was 139  Lab work reviewed, staying stable. Past Medical History:   Diagnosis Date    Acute and chronic respiratory failure with hypoxia ([address] 11/16/2021    CAD (coronary artery disease)     Cataracts, bilateral     Elevated troponin 12/7/2021    Glaucoma     Hyperlipidemia     Hypertension     TIA (transient ischemic attack)     Type II or unspecified type diabetes mellitus without mention of complication, not stated as uncontrolled        CURRENT ALLERGIES: Patient has no known allergies. REVIEW OF SYSTEMS: 14 systems were reviewed. Pertinent positives and negatives as above, all else negative.      Past Surgical History:   Procedure Laterality Date    CATARACT REMOVAL WITH IMPLANT Bilateral 11/219/2013 and 12/3/2013    CORONARY ARTERY BYPASS GRAFT  2000    INGUINAL HERNIA REPAIR Bilateral 06/19/12    Social History:  Social History     Tobacco Use    Smoking status: Never Smoker    Smokeless tobacco: Never Used   Substance Use Topics    Alcohol use: Yes     Comment: very very very seldom    Drug use: No        CURRENT MEDICATIONS:  Outpatient Medications Marked as Taking for the 12/6/21 encounter [facility] Encounter)   Medication Sig Dispense Refill    benzonatate (TESSALON) 100 MG capsule Take 100 mg by mouth 3 times daily as needed for Cough      traZODone (DESYREL) 50 MG tablet Take 50 mg by mouth nightly      polyethylene glycol (GLYCOLAX) 17 GM/SCOOP powder Take 17 g by mouth daily 1530 g 3    sodium bicarbonate 650 MG tablet Take 1 tablet by mouth 2 times daily      amiodarone (CORDARONE) 200 MG tablet Take 1 tablet by mouth 2 times daily      albuterol (PROVENTIL) (2.5 MG/3ML) 0.083% nebulizer solution Take 3 mLs by nebulization every 4 hours as needed for Wheezing 120 each 3    budesonide (PULMICORT) 0.5 MG/2ML nebulizer suspension Take 2 mLs by nebulization 2 times daily 60 each 3    ipratropium-albuterol (DUONEB) 0.5-2.5 (3) MG/3ML SOLN nebulizer solution Inhale 3 mLs into the lungs 4 times daily 360 mL     apixaban (ELIQUIS) 2.5 MG TABS tablet Take 1 tablet by mouth 2 times daily 60 tablet     metoprolol succinate (TOPROL XL) 50 MG extended release tablet Take 1 tablet by mouth daily 30 tablet 3    dilTIAZem (CARDIZEM) 60 MG tablet Take 1 tablet by mouth 4 times daily Please hold if systolic blood pressure less than 100 mmHg or if heart rate less than 100 bpm. 120 tablet 3    polyethylene glycol (GLYCOLAX) 17 g packet Take 17 g by mouth daily as needed for Constipation 527 g 1    tamsulosin (FLOMAX) 0.4 MG capsule Take 1 capsule by mouth daily 30 capsule 3    zinc sulfate (ZINCATE) 220 (50 Zn) MG capsule Take 2 capsules by mouth daily 30 capsule 3    ascorbic acid (VITAMIN C) 1000 MG tablet Take 2 tablets by mouth 2 times daily (Patient taking differently: Take 500 mg by mouth 2 times daily ) 30 tablet 3    famotidine (PEPCID) 10 MG tablet Take 1 tablet by mouth nightly 60 tablet 3    vitamin D3 (CHOLECALCIFEROL) 25 MCG (1000 UT) TABS tablet Take 1 tablet by mouth daily 30 tablet 0    furosemide (LASIX) 40 MG tablet Take 40 mg by mouth daily      glipiZIDE (GLUCOTROL) 5 MG tablet Take 5 mg by mouth 2 times daily (before meals)      atorvastatin (LIPITOR) 40 MG tablet Take 1 tablet by mouth nightly 30 tablet 3    clopidogrel (PLAVIX) 75 MG tablet Take 1 tablet by mouth daily 30 tablet 3    aspirin 81 MG EC tablet Take 81 mg by mouth nightly          ciprofloxacin (CIPRO) IVPB 400 mg, Q24H  glucose (GLUTOSE) 40 % oral gel 15 g, PRN  dextrose 50 % IV solution, PRN  glucagon (rDNA) injection 1 mg, PRN  dextrose 5 % solution, PRN  insulin lispro retention 11/27/2021    Mild malnutrition ([facility] 11/17/2021    Acute and chronic respiratory failure with hypoxia ([facility] 11/16/2021    Paroxysmal atrial flutter ([facility] 11/16/2021    Stage 4 chronic kidney disease ([facility] 11/16/2021    ASHD (arteriosclerotic heart disease)     S/P CABG (coronary artery bypass graft)     COVID-19 virus infection / Harle Distel Vaccine 11/15/2021    Cerebral infarction due to embolism of right middle cerebral artery ([facility] 03/28/2018    Left leg weakness 03/28/2018    Falling 03/28/2018    Stroke, lacunar ([facility] 03/28/2018    Hypertension 03/27/2018    Type 2 diabetes mellitus without complication, without long-term current use of insulin ([facility] 03/27/2018    Cerebrovascular accident (CVA) ([facility] 03/26/2018    Right inguinal hernia 04/02/2014       PLAN:  Nonspecific troponin elevation can be secondary to acute renal failure versus type 2 Myocardial Infarction:   Mr. Kalia Nelson meets criteria for a Type 2 MI defined by a rise and fall of troponin with at least one value above the 99th percentile and evidence of an imbalance between myocardial oxygen supply and demand unrelated to coronary thrombosis, evidenced by symptoms of acute myocardial ischemia which has manifested as the development of heart failure and shortness of breath at rest.  Currently denies any chest pain. Continue to monitor troponin. No ischemic work-up indicated except after improvement of his acute renal failure. Atherosclerotic Heart Disease:  Antiplatelet Agent: Continue clopidogrel (Plavix): Plavix 75 mg daily. Beta Blocker: DECREASE to Metoprolol succinate (Toprol XL) 25 mg daily. Anti-anginal medications: Discontinue Diltiazem due to low BP  Cholesterol Reduction Therapy: Continue Atorvastatin (Lipitor) 40 mg daily. · Paroxysmal Atrial Flutter: Rhythm Control Symptomatic   Beta Blocker: DECREASE to Metoprolol succinate (Toprol XL) 25 mg daily.      Anti-Arrhythmic: Continue amiodarone to 200 mg updated the medical record where necessary. I agree with the assessment, plan and orders as documented by Anoop RUFFIN. Sincerely,  Ángela Sheehan MD, F.A.C.C.   [facility] Specialist    [address] [facility] [address] [place] [address]  Phone: [phone], Fax: [phone]

## 2021-12-10 ENCOUNTER — APPOINTMENT (OUTPATIENT)
Dept: GENERAL RADIOLOGY | Age: 86
DRG: 640 | End: 2021-12-10
Payer: MEDICARE

## 2021-12-10 ENCOUNTER — APPOINTMENT (OUTPATIENT)
Dept: ULTRASOUND IMAGING | Age: 86
DRG: 640 | End: 2021-12-10
Payer: MEDICARE

## 2021-12-10 VITALS
HEIGHT: 64 IN | WEIGHT: 171.2 LBS | TEMPERATURE: 96.8 F | RESPIRATION RATE: 18 BRPM | SYSTOLIC BLOOD PRESSURE: 108 MMHG | BODY MASS INDEX: 29.23 KG/M2 | DIASTOLIC BLOOD PRESSURE: 78 MMHG | HEART RATE: 67 BPM | OXYGEN SATURATION: 98 %

## 2021-12-10 LAB
ABSOLUTE EOS #: 0.36 K/UL (ref 0–0.44)
ABSOLUTE IMMATURE GRANULOCYTE: 0.23 K/UL (ref 0–0.3)
ABSOLUTE LYMPH #: 2.22 K/UL (ref 1.1–3.7)
ABSOLUTE MONO #: 0.64 K/UL (ref 0.1–1.2)
ANION GAP SERPL CALCULATED.3IONS-SCNC: 9 MMOL/L (ref 9–17)
APPEARANCE FLUID: ABNORMAL
BASO FLUID: ABNORMAL %
BASOPHILS # BLD: 0 % (ref 0–2)
BASOPHILS ABSOLUTE: 0.03 K/UL (ref 0–0.2)
BUN BLDV-MCNC: 66 MG/DL (ref 8–23)
BUN/CREAT BLD: 24 (ref 9–20)
CALCIUM SERPL-MCNC: 8.1 MG/DL (ref 8.6–10.4)
CELLS COUNTED: 50
CHLORIDE BLD-SCNC: 97 MMOL/L (ref 98–107)
CO2: 28 MMOL/L (ref 20–31)
COLOR FLUID: YELLOW
CREAT SERPL-MCNC: 2.74 MG/DL (ref 0.7–1.2)
DIFFERENTIAL TYPE: ABNORMAL
EOSINOPHIL FLUID: ABNORMAL %
EOSINOPHILS RELATIVE PERCENT: 4 % (ref 1–4)
FLUID DIFF COMMENT: ABNORMAL
GFR AFRICAN AMERICAN: 27 ML/MIN
GFR NON-AFRICAN AMERICAN: 22 ML/MIN
GFR SERPL CREATININE-BSD FRML MDRD: ABNORMAL ML/MIN/{1.73_M2}
GFR SERPL CREATININE-BSD FRML MDRD: ABNORMAL ML/MIN/{1.73_M2}
GLUCOSE BLD-MCNC: 202 MG/DL (ref 74–100)
GLUCOSE BLD-MCNC: 224 MG/DL (ref 70–99)
GLUCOSE BLD-MCNC: 225 MG/DL (ref 74–100)
HCT VFR BLD CALC: 25.6 % (ref 40.7–50.3)
HEMOGLOBIN: 8 G/DL (ref 13–17)
IMMATURE GRANULOCYTES: 3 %
LYMPHOCYTES # BLD: 24 % (ref 24–43)
LYMPHOCYTES, BODY FLUID: 88 %
MCH RBC QN AUTO: 31 PG (ref 25.2–33.5)
MCHC RBC AUTO-ENTMCNC: 31.3 G/DL (ref 28.4–34.8)
MCV RBC AUTO: 99.2 FL (ref 82.6–102.9)
MONOCYTE, FLUID: 4 %
MONOCYTES # BLD: 7 % (ref 3–12)
NEUTROPHIL, FLUID: 6 %
NRBC AUTOMATED: 0 PER 100 WBC
OTHER CELLS FLUID: 2 %
PDW BLD-RTO: 14.3 % (ref 11.8–14.4)
PLATELET # BLD: 282 K/UL (ref 138–453)
PLATELET ESTIMATE: ABNORMAL
PMV BLD AUTO: 10.8 FL (ref 8.1–13.5)
POTASSIUM SERPL-SCNC: 4.6 MMOL/L (ref 3.7–5.3)
RBC # BLD: 2.58 M/UL (ref 4.21–5.77)
RBC # BLD: ABNORMAL 10*6/UL
RBC FLUID: <3000 /MM3
SEG NEUTROPHILS: 62 % (ref 36–65)
SEGMENTED NEUTROPHILS ABSOLUTE COUNT: 5.62 K/UL (ref 1.5–8.1)
SODIUM BLD-SCNC: 134 MMOL/L (ref 135–144)
SPECIMEN TYPE: ABNORMAL
WBC # BLD: 9.1 K/UL (ref 3.5–11.3)
WBC # BLD: ABNORMAL 10*3/UL
WBC FLUID: 153 /MM3

## 2021-12-10 PROCEDURE — 36415 COLL VENOUS BLD VENIPUNCTURE: CPT

## 2021-12-10 PROCEDURE — 97110 THERAPEUTIC EXERCISES: CPT

## 2021-12-10 PROCEDURE — 6370000000 HC RX 637 (ALT 250 FOR IP): Performed by: INTERNAL MEDICINE

## 2021-12-10 PROCEDURE — 2700000000 HC OXYGEN THERAPY PER DAY

## 2021-12-10 PROCEDURE — 6360000002 HC RX W HCPCS: Performed by: NURSE PRACTITIONER

## 2021-12-10 PROCEDURE — 99233 SBSQ HOSP IP/OBS HIGH 50: CPT | Performed by: INTERNAL MEDICINE

## 2021-12-10 PROCEDURE — 94669 MECHANICAL CHEST WALL OSCILL: CPT

## 2021-12-10 PROCEDURE — 97530 THERAPEUTIC ACTIVITIES: CPT

## 2021-12-10 PROCEDURE — 82947 ASSAY GLUCOSE BLOOD QUANT: CPT

## 2021-12-10 PROCEDURE — 85025 COMPLETE CBC W/AUTO DIFF WBC: CPT

## 2021-12-10 PROCEDURE — 88112 CYTOPATH CELL ENHANCE TECH: CPT

## 2021-12-10 PROCEDURE — C1729 CATH, DRAINAGE: HCPCS

## 2021-12-10 PROCEDURE — 94761 N-INVAS EAR/PLS OXIMETRY MLT: CPT

## 2021-12-10 PROCEDURE — 94640 AIRWAY INHALATION TREATMENT: CPT

## 2021-12-10 PROCEDURE — 88305 TISSUE EXAM BY PATHOLOGIST: CPT

## 2021-12-10 PROCEDURE — 71045 X-RAY EXAM CHEST 1 VIEW: CPT

## 2021-12-10 PROCEDURE — 92526 ORAL FUNCTION THERAPY: CPT

## 2021-12-10 PROCEDURE — 2580000003 HC RX 258: Performed by: INTERNAL MEDICINE

## 2021-12-10 PROCEDURE — 6370000000 HC RX 637 (ALT 250 FOR IP): Performed by: NURSE PRACTITIONER

## 2021-12-10 PROCEDURE — 80048 BASIC METABOLIC PNL TOTAL CA: CPT

## 2021-12-10 RX ORDER — METOPROLOL SUCCINATE 25 MG/1
25 TABLET, EXTENDED RELEASE ORAL DAILY
Qty: 30 TABLET | Refills: 3 | DISCHARGE
Start: 2021-12-11

## 2021-12-10 RX ORDER — CIPROFLOXACIN 500 MG/1
500 TABLET, FILM COATED ORAL 2 TIMES DAILY
Qty: 20 TABLET | Refills: 0 | Status: ON HOLD | DISCHARGE
Start: 2021-12-10 | End: 2021-12-21 | Stop reason: HOSPADM

## 2021-12-10 RX ADMIN — IPRATROPIUM BROMIDE AND ALBUTEROL SULFATE 3 ML: .5; 2.5 SOLUTION RESPIRATORY (INHALATION) at 05:17

## 2021-12-10 RX ADMIN — ZINC SULFATE 220 MG (50 MG) CAPSULE 100 MG: CAPSULE at 08:17

## 2021-12-10 RX ADMIN — OXYCODONE HYDROCHLORIDE AND ACETAMINOPHEN 2000 MG: 500 TABLET ORAL at 08:17

## 2021-12-10 RX ADMIN — AMIODARONE HYDROCHLORIDE 200 MG: 200 TABLET ORAL at 08:16

## 2021-12-10 RX ADMIN — METOPROLOL SUCCINATE 25 MG: 25 TABLET, EXTENDED RELEASE ORAL at 08:20

## 2021-12-10 RX ADMIN — CIPROFLOXACIN 400 MG: 2 INJECTION, SOLUTION INTRAVENOUS at 08:30

## 2021-12-10 RX ADMIN — POLYETHYLENE GLYCOL (3350) 17 G: 17 POWDER, FOR SOLUTION ORAL at 09:37

## 2021-12-10 RX ADMIN — BENZOCAINE AND MENTHOL 1 LOZENGE: 15; 3.6 LOZENGE ORAL at 04:39

## 2021-12-10 RX ADMIN — INSULIN LISPRO 6 UNITS: 100 INJECTION, SOLUTION INTRAVENOUS; SUBCUTANEOUS at 08:22

## 2021-12-10 RX ADMIN — INSULIN LISPRO 6 UNITS: 100 INJECTION, SOLUTION INTRAVENOUS; SUBCUTANEOUS at 12:05

## 2021-12-10 RX ADMIN — SODIUM CHLORIDE, PRESERVATIVE FREE 10 ML: 5 INJECTION INTRAVENOUS at 08:27

## 2021-12-10 RX ADMIN — SODIUM BICARBONATE TAB 650 MG 650 MG: 650 TAB at 08:16

## 2021-12-10 RX ADMIN — BENZONATATE 100 MG: 100 CAPSULE ORAL at 08:19

## 2021-12-10 RX ADMIN — BENZOCAINE AND MENTHOL 1 LOZENGE: 15; 3.6 LOZENGE ORAL at 08:17

## 2021-12-10 RX ADMIN — TAMSULOSIN HYDROCHLORIDE 0.4 MG: 0.4 CAPSULE ORAL at 08:16

## 2021-12-10 RX ADMIN — Medication 1000 UNITS: at 08:16

## 2021-12-10 RX ADMIN — GLIPIZIDE 5 MG: 5 TABLET ORAL at 08:19

## 2021-12-10 ASSESSMENT — PAIN SCALES - GENERAL
PAINLEVEL_OUTOF10: 0

## 2021-12-10 ASSESSMENT — PAIN - FUNCTIONAL ASSESSMENT: PAIN_FUNCTIONAL_ASSESSMENT: 0-10

## 2021-12-10 NOTE — DISCHARGE SUMMARY
Discharge Summary    Andre Mccarthy  :  10/31/1930  MRN:  [id number]    Admit date:  2021      Discharge date: 12/10/2021     Admitting Physician:  George Burgess MD    Discharge Diagnoses:    Principal Problem:    Hyperkalemia  Active Problems:    Type 2 diabetes mellitus without complication, without long-term current use of insulin (HCC)    ASHD (arteriosclerotic heart disease)    Paroxysmal atrial flutter (HCC)    Stage 4 chronic kidney disease (HCC)    Hyperglycemia    Renal failure    Acute cystitis without hematuria    Elevated troponin    On amiodarone therapy    Microcytic hypochromic anemia  Resolved Problems:    * No resolved hospital problems. *      Hospital Course:   Andre Mccarthy is a 80 y.o. male admitted with kalemia. He presented to the emergency room for evaluation of complaints of shortness of breath and abnormal lab work. Patient is currently a resident at [facility]. Patient was sent to [facility] after an extensive hospitalization with Covid with a discharge date of 2021 after a 12-day stay. Patient requires 2 to 3 L of oxygen which is baseline for him since having Covid. He denied chest pain or palpitations. He does complain of shortness of breath but is unchanged from his baseline since hospitalization. He denied nausea vomiting or diarrhea. He denied hematuria dysuria. He denied back pain. He also complained of lower extremity weakness which is also not new for him. Upon arrival patient was on 3 L of oxygen with SPO2 in the low to mid 90s. Patient was in no acute distress. Chest x-ray showed some interval development of small bilateral pleural effusions with persistent multifocal bilateral airspace disease. Patient's initial glucose was 513 with an elevated BUN and creatinine of 66 and 3.11. Patient was hyponatremic at 129 and hyperkalemic at 6.1. proBNP was 8571. Troponin was elevated at 133 and 122. Patient's EKG showed no changes.   Patient was provided IV fluids. Patient had a normal anion gap. Normal CO2. Patient was given 5 units of IV insulin. Patient had a total of 500 mL fluid bolus as well. Patient's blood sugar decreased to 62 and patient was provided nebulizer treatment for wheezing. Patient was also given Kayexalate as well for hyperkalemia. Patient was admitted cardiology was consulted. Patient was given gentle hydration and labs were watched. His renal function is improving and returning back to his baseline. Urine culture grew Pseudomonas and patient was started on Cipro. Patient will continue with oral Cipro upon discharge. Patient had thoracentesis with 850 mL of fluid removed. Patient will be discharged today back to [facility]. He will continue with Robertson cath any and follow-up with nephrology as well as urology as previously scheduled. They will make the determination of when the Robertson catheter will be removed. Family also requested that he follow-up with Dr. Susy Okeefe until he is able to make travels back to [facility] to see Dr. Lois Powell his cardiologist.  All appointments will be set up prior to discharge. Consultants:  Dr. Susy Okeefe, cardiology    Procedures: Thoracentesis:850 mL of fluid    Complications: none    Discharge Condition: fair    Exam:  GEN:    Awake, alert and oriented x3. EYES:   EOMI, pupils equal   NECK: Supple. No lymphadenopathy.  No carotid bruit  CVS:     regular rate and rhythm, no audible murmur  PULM:  Diminished with fine basilar rales no wheeze, no acute respiratory distress  ABD:     Bowels sounds normal.  Abdomen is soft.  No distention.  no tenderness to palpation. EXT:     trace edema bilaterally .  No calf tenderness. NEURO: Moves all extremities.  Motor and sensory are grossly intact  SKIN:    No rashes.  No skin lesions.      Significant Diagnostic Studies:   Lab Results   Component Value Date    WBC 9.1 12/10/2021    HGB 8.0 (L) 12/10/2021     12/10/2021       Lab Results Component Value Date    BUN 66 (H) 12/10/2021    CREATININE 2.74 (H) 12/10/2021     (L) 12/10/2021    K 4.6 12/10/2021    CALCIUM 8.1 (L) 12/10/2021    CL 97 (L) 12/10/2021    CO2 28 12/10/2021    LABGLOM 22 (L) 12/10/2021       Lab Results   Component Value Date    WBCUA 20 TO 50 12/06/2021    RBCUA GREATER THAN 100 12/06/2021    EPITHUA 0 TO 2 12/06/2021    LEUKOCYTESUR LARGE (A) 12/06/2021    SPECGRAV 1.010 12/06/2021    GLUCOSEU 1+ (A) 12/06/2021    KETUA NEGATIVE 12/06/2021    PROTEINU TRACE (A) 12/06/2021    HGBUR 3+ (A) 12/06/2021    CASTUA NOT REPORTED 12/06/2021    CRYSTUA NOT REPORTED 12/06/2021    BACTERIA 2+ (A) 12/06/2021    YEAST NOT REPORTED 12/06/2021       XR CHEST PORTABLE    Result Date: 12/6/2021  EXAMINATION: ONE XRAY VIEW OF THE CHEST 12/6/2021 2:43 pm COMPARISON: 11/23/2021 HISTORY: ORDERING SYSTEM PROVIDED HISTORY: Dyspnea TECHNOLOGIST PROVIDED HISTORY: Dyspnea FINDINGS: Patchy bilateral basilar predominant airspace disease is overall similar in appearance to the previous exam.  There has been interval development of small bilateral pleural effusions. The mediastinum and cardiac silhouette are unchanged. Interval development of small bilateral pleural effusions with persistent multifocal bilateral airspace disease.        Assessment and Plan:  Patient Active Problem List    Diagnosis Date Noted    Hyperglycemia 12/07/2021    Renal failure 12/07/2021    Acute cystitis without hematuria 12/07/2021    Elevated troponin 12/07/2021    On amiodarone therapy     Microcytic hypochromic anemia     Hyperkalemia 12/06/2021    Urinary retention 11/27/2021    Mild malnutrition ([address] 11/17/2021    Acute and chronic respiratory failure with hypoxia (HCC) 11/16/2021    Paroxysmal atrial flutter ([address] 11/16/2021    Stage 4 chronic kidney disease ([address] 11/16/2021    ASHD (arteriosclerotic heart disease)     S/P CABG (coronary artery bypass graft)     COVID-19 virus infection / Moderna Vaccine 11/15/2021    Cerebral infarction due to embolism of right middle cerebral artery ([facility] 03/28/2018    Left leg weakness 03/28/2018    Falling 03/28/2018    Stroke, lacunar ([facility] 03/28/2018    Hypertension 03/27/2018    Type 2 diabetes mellitus without complication, without long-term current use of insulin ([facility] 03/27/2018    Cerebrovascular accident (CVA) ([facility] 03/26/2018    Right inguinal hernia 04/02/2014        Discharge Medications:         Medication List      START taking these medications    ciprofloxacin 500 MG tablet  Commonly known as: CIPRO  Take 1 tablet by mouth 2 times daily for 14 days        CHANGE how you take these medications    metoprolol succinate 25 MG extended release tablet  Commonly known as: TOPROL XL  Take 1 tablet by mouth daily  Start taking on: December 11, 2021  What changed:   · medication strength  · how much to take        CONTINUE taking these medications    albuterol (2.5 MG/3ML) 0.083% nebulizer solution  Commonly known as: PROVENTIL  Take 3 mLs by nebulization every 4 hours as needed for Wheezing     amiodarone 200 MG tablet  Commonly known as: CORDARONE  Take 1 tablet by mouth 2 times daily     apixaban 2.5 MG Tabs tablet  Commonly known as: ELIQUIS  Take 1 tablet by mouth 2 times daily     ascorbic acid 1000 MG tablet  Commonly known as: VITAMIN C  Take 0.5 tablets by mouth 2 times daily     aspirin 81 MG EC tablet     atorvastatin 40 MG tablet  Commonly known as: LIPITOR  Take 1 tablet by mouth nightly     benzonatate 100 MG capsule  Commonly known as: TESSALON     budesonide 0.5 MG/2ML nebulizer suspension  Commonly known as: PULMICORT  Take 2 mLs by nebulization 2 times daily     clopidogrel 75 MG tablet  Commonly known as: PLAVIX  Take 1 tablet by mouth daily     famotidine 10 MG tablet  Commonly known as: PEPCID  Take 1 tablet by mouth nightly     furosemide 40 MG tablet  Commonly known as: LASIX     glipiZIDE 5 MG tablet  Commonly known as: GLUCOTROL     ipratropium-albuterol 0.5-2.5 (3) MG/3ML Soln nebulizer solution  Commonly known as: DUONEB  Inhale 3 mLs into the lungs 4 times daily     * polyethylene glycol 17 g packet  Commonly known as: GLYCOLAX  Take 17 g by mouth daily as needed for Constipation     * polyethylene glycol 17 GM/SCOOP powder  Commonly known as: GLYCOLAX  Take 17 g by mouth daily     sodium bicarbonate 650 MG tablet  Take 1 tablet by mouth 2 times daily     tamsulosin 0.4 MG capsule  Commonly known as: FLOMAX  Take 1 capsule by mouth daily     traZODone 50 MG tablet  Commonly known as: DESYREL     vitamin D3 25 MCG (1000 UT) Tabs tablet  Commonly known as: CHOLECALCIFEROL  Take 1 tablet by mouth daily     zinc sulfate 220 (50 Zn) MG capsule  Commonly known as: ZINCATE  Take 2 capsules by mouth daily     zolpidem 5 MG tablet  Commonly known as: AMBIEN         * This list has 2 medication(s) that are the same as other medications prescribed for you. Read the directions carefully, and ask your doctor or other care provider to review them with you. STOP taking these medications    dilTIAZem 60 MG tablet  Commonly known as: CARDIZEM           Where to Get Your Medications      Information about where to get these medications is not yet available    Ask your nurse or doctor about these medications  · ascorbic acid 1000 MG tablet  · ciprofloxacin 500 MG tablet  · metoprolol succinate 25 MG extended release tablet         Patient Instructions:    Activity: activity as tolerated  Diet: cardiac diet  Wound Care: none needed  Other: BMP 1 week    Disposition:   AR to [facility]    Follow up:  Patient will be followed by Florencio Johnson DO in 1-2 weeks    CORE MEASURES on Discharge (if applicable)  ACE/ARB in CHF: Yes  Statin in MI: NA  ASA in MI: NA  Statin in CVA: NA  Antiplatelet in CVA: NA    Total time spent on discharge services: 40 minutes    Including the following activities:  Evaluation and Management of patient  Discussion with patient and/or surrogate about current care plan  Coordination with Case Management and/or   Coordination of care with Consultants (if applicable)   Coordination of care with Receiving Facility Physician (if applicable)  Completion of DME forms (if applicable)  Preparation of Discharge Summary  Preparation of Medication Reconciliation  Preparation of Discharge Prescriptions    Signed:  FRANKLIN Ann CNP, FRANKLIN, NP-C  12/10/2021, 1:30 PM

## 2021-12-10 NOTE — PROGRESS NOTES
C[facility] - [facility] -Progress Note    SUBJECTIVE:    Patient seen for f/u of Hyperkalemia. He sitting up in chair with family at side. alert and oriented. No distress. On NC oxygen 3.5 L. No complaints     ROS:   Constitutional: negative  for fevers, and negative for chills. Respiratory: negative for shortness of breath, negative for cough, and negative for wheezing  Cardiovascular: negative for chest pain, and negative for palpitations  Gastrointestinal: negative for abdominal pain, negative for nausea,negative for vomiting, negative for diarrhea, and negative for constipation     All other systems were reviewed with the patient and are negative unless otherwise stated in HPI      OBJECTIVE:        VITAL SIGNS:  Patient Vitals for the past 8 hrs:   BP Temp Temp src Pulse Resp SpO2 Weight   12/10/21 1131 108/78   67 18 98 %    12/10/21 1124 (!) 102/38   62 20 98 %    12/10/21 0630 (!) 112/57 96.8 °F (36 °C) Temporal 71 16 90 %    12/10/21 0520      90 %    12/10/21 0444       171 lb 3.2 oz (77.7 kg)         Temp: 96.8 °F (36 °C)  Temp range:    Temp  Av.8 °F (36.6 °C)  Min: 96.8 °F (36 °C)  Max: 98.3 °F (36.8 °C)    BP: 108/78  BP Range:      Systolic (30GAV), WWV:302 , Min:102 , QVQ:086      Diastolic (12PWJ), BZV:41, Min:38, Max:78    Pulse: 67  Pulse Range:    Pulse  Av.5  Min: 62  Max: 74    Resp: 18  Resp Range:   Resp  Av  Min: 16  Max: 20    SpO2: 98 % on supplemental O2  SpO2 range:   SpO2  Av.4 %  Min: 90 %  Max: 98 %      PaO2/FiO2 RATIO:  No results for input(s): POCPO2 in the last 72 hours. Exam:    GEN:    Awake, alert and oriented x3. EYES:   EOMI, pupils equal   NECK: Supple. No lymphadenopathy. No carotid bruit  CVS:     regular rate and rhythm, no audible murmur  PULM:  Diminished with fine basilar rales no wheeze, no acute respiratory distress  ABD:     Bowels sounds normal.  Abdomen is soft. No distention. no tenderness to palpation.    EXT: trace edema bilaterally . No calf tenderness. NEURO: Moves all extremities. Motor and sensory are grossly intact  SKIN:    No rashes. No skin lesions. Diagnostic Data:      Complete Blood Count:   Recent Labs     12/09/21  0620 12/10/21  0640   WBC 9.4 9.1   RBC 2.58* 2.58*   HGB 7.8* 8.0*   HCT 25.6* 25.6*   MCV 99.2 99.2   MCH 30.2 31.0   MCHC 30.5 31.3   RDW 13.9 14.3    282   MPV 10.6 10.8        Last 3 Blood Glucose:   Recent Labs     12/08/21  0820 12/09/21  0620 12/10/21  0640   GLUCOSE 176* 171* 224*        Comprehensive Metabolic Profile:   Recent Labs     12/08/21  0820 12/09/21  0620 12/10/21  0640    139 134*   K 5.0 5.1 4.6   CL 97* 99 97*   CO2 29 30 28   BUN 65* 69* 66*   CREATININE 3.19* 3.02* 2.74*   GLUCOSE 176* 171* 224*   CALCIUM 8.1* 7.9* 8.1*        Urinalysis:   Lab Results   Component Value Date    NITRU POSITIVE 12/06/2021    COLORU Yellow 12/06/2021    PHUR 7.0 12/06/2021    WBCUA 20 TO 50 12/06/2021    RBCUA GREATER THAN 100 12/06/2021    MUCUS NOT REPORTED 12/06/2021    TRICHOMONAS NOT REPORTED 12/06/2021    YEAST NOT REPORTED 12/06/2021    BACTERIA 2+ 12/06/2021    SPECGRAV 1.010 12/06/2021    LEUKOCYTESUR LARGE 12/06/2021    UROBILINOGEN ELEVATED 12/06/2021    BILIRUBINUR NEGATIVE 12/06/2021    GLUCOSEU 1+ 12/06/2021    KETUA NEGATIVE 12/06/2021    AMORPHOUS NOT REPORTED 12/06/2021       HgBA1c:    Lab Results   Component Value Date    LABA1C 11.2 12/07/2021       Lactic Acid:   Lab Results   Component Value Date    LACTA 2.2 11/15/2021        Troponin: No results for input(s): TROPONINI in the last 72 hours. CRP:  No results for input(s): CRP in the last 72 hours. Radiology/Imaging:  XR CHEST (SINGLE VIEW FRONTAL)   Final Result   Decreased right-sided pleural effusion status post thoracentesis. No   pneumothorax. Persistent infiltrates at the lung bases and trace effusions   slightly greater on the left. Recommend continued follow-up.          CT CHEST WO CONTRAST   Final Result   Motion degraded exam.      Moderate size right pleural effusion and small left effusion, increasing   since CT exam 11/26/2021. otherwise similar appearance of bilateral airspace   disease and associated atelectasis. XR CHEST PORTABLE   Final Result   Interval development of small bilateral pleural effusions with persistent   multifocal bilateral airspace disease. US THORACENTESIS Which side should the procedure be performed?  Right    (Results Pending)         ASSESSMENT / PLAN:  Hyperkalemia  · Resolved  · Stage IV CKD  · Improving-near baseline  · Continue IV fluids  · Continue Flomax  · Type 2 diabetes  · POCT before meals and at bedtime  · Continue high-dose sliding scale  · Hypoglycemia protocol  · UTI  · Stop IV Rocephin  · Start Cipro  · Urine cultureKleb. :PN  · Blood culturenegative  · CAD  · Appreciate cardiology  · Thoracentesis today - 850 ml removed  · Trend troponin  · Possibly due to demand ischemia from chronic CKD  · Chronically elevated per recent history  · Amiodarone decreased to daily  · Hold Eliquis due to decreased hemoglobin per cardiology  · Nutrition status:   · Well developed, well nourished with no malnutrition  · Dietician consult initiated  · Hospital Prophylaxis:   · DVT: Eliquis   · Stress Ulcer: H2 Blocker   · High risk medications: none   · Disposition:    · Discharge plan is [facility] pending      FRANKLIN Escobar - CNP , FRANKLIN, NP-C  Hospitalist Medicine        12/10/2021, 11:49 AM

## 2021-12-10 NOTE — PROGRESS NOTES
Patient lying in bed. Robertson is draining good. Vital signs and assessment completed. Nightly medications given. Patient given pills crushed in applesauce. Patient given prn tylenol for 3/10 left heel pain. Heel examined, no breakdown noted. Leg placed on a pillow with heel floating. Patient given prn, tessalon, whole in applesauce. Along with cough drop as well. Patient denies any other needs at this time. Call light, bedside table and personal belongings within reach. Will continue to monitor.

## 2021-12-10 NOTE — PROGRESS NOTES
Manasa Rebolledo am scribing for and in the presence of Charity Nesbitt PA-C. Patient: Fernando Galeas  : 10/31/1930  Date of Admission: 2021  Primary Care Physician: Rufina Liang  Today's Date: 12/10/2021    REASON FOR CONSULTATION: Shortness of Breath (from [facility] for post covid rehab; pt diagnosed with covid pneumonia, SOB worsening) and Other (pt has abnormal labs drawn this AM)      HPI: Mr. Petr Barragan is a 80 y.o. male who was admitted for weakness/shakiness. He was found to have an elevated troponin. Triple bypass in  at [facility].     Echocardiogram done on 11/15/2021: EF of 55% mildly increased LV wall thickness. Mild to moderate aortic leaflets calcification. Mild aortic insufficiency was seen. Mild pulmonary hypertension. Mild to moderate tricuspid regurgitation. He reports having dull chest pressure that lasts for minutes at a time. It has not changed. He does have some shortness of breath but it has improved since discharged. He is still on 2-3 L of oxygen. When he came to the emergency room he was having O2 sat in the low 90s and found to have acute on chronic renal failure as well as hyperkalemia    Mr. Petr Barragan also denied any current or recent chest pain, abdominal pain, bleeding problems, problems with his medications or any other concerns at this time. Telemetry reviewed, maintaining sinus rhythm. On prior admission with Covid 19 pneumonia he had paroxysmal atrial flutter was 2-1 conduction. He was treated with amiodarone therapy. When I saw him this afternoon he said he is feeling much better. Able to sleep and feeling a little stronger. He was complaining of extreme fatigue probably secondary to acute renal failure and hyperkalemia. He has a Robertson catheter in place and urine is clear. Mr. Petr Barragan reports he is doing okay this morning, therapy is at bedside.  He did report not sleeping well overnight, he was unable to fall asleep until 3 or 4am. He also reports he has not had a bowel movement since admission. He ate breakfast this morning without any issues and therapy is currently at bedside. He denies having any chest pain. He still has some shortness of breath, worse with activity. He still has some hand shakiness while reading the morning paper. He continues to have a cough and is producing dark blood colored sputum. He also has complaints of left heel pain. His compression sock was removed and he states it felt better. Lab work reviewed, staying stable. Hgb improved from 7.8 to 8, Eliquis was held due to dropping hemoglobin. He is S/P right-sided thoracentesis done yesterday with removal of 850 mL of cloudy yellow fluids. Past Medical History:   Diagnosis Date    Acute and chronic respiratory failure with hypoxia ([address] 11/16/2021    CAD (coronary artery disease)     Cataracts, bilateral     Elevated troponin 12/7/2021    Glaucoma     Hyperlipidemia     Hypertension     TIA (transient ischemic attack)     Type II or unspecified type diabetes mellitus without mention of complication, not stated as uncontrolled        CURRENT ALLERGIES: Patient has no known allergies. REVIEW OF SYSTEMS: 14 systems were reviewed. Pertinent positives and negatives as above, all else negative.      Past Surgical History:   Procedure Laterality Date    CATARACT REMOVAL WITH IMPLANT Bilateral 11/219/2013 and 12/3/2013    CORONARY ARTERY BYPASS GRAFT  2000    INGUINAL HERNIA REPAIR Bilateral 06/19/12    Social History:  Social History     Tobacco Use    Smoking status: Never Smoker    Smokeless tobacco: Never Used   Substance Use Topics    Alcohol use: Yes     Comment: very very very seldom    Drug use: No        CURRENT MEDICATIONS:  Outpatient Medications Marked as Taking for the 12/6/21 encounter [facility] Encounter)   Medication Sig Dispense Refill    benzonatate (TESSALON) 100 MG capsule Take 100 mg by mouth 3 times daily as needed for Cough      traZODone (DESYREL) 50 MG tablet Take 50 mg by mouth nightly      polyethylene glycol (GLYCOLAX) 17 GM/SCOOP powder Take 17 g by mouth daily 1530 g 3    sodium bicarbonate 650 MG tablet Take 1 tablet by mouth 2 times daily      amiodarone (CORDARONE) 200 MG tablet Take 1 tablet by mouth 2 times daily      albuterol (PROVENTIL) (2.5 MG/3ML) 0.083% nebulizer solution Take 3 mLs by nebulization every 4 hours as needed for Wheezing 120 each 3    budesonide (PULMICORT) 0.5 MG/2ML nebulizer suspension Take 2 mLs by nebulization 2 times daily 60 each 3    ipratropium-albuterol (DUONEB) 0.5-2.5 (3) MG/3ML SOLN nebulizer solution Inhale 3 mLs into the lungs 4 times daily 360 mL     apixaban (ELIQUIS) 2.5 MG TABS tablet Take 1 tablet by mouth 2 times daily 60 tablet     metoprolol succinate (TOPROL XL) 50 MG extended release tablet Take 1 tablet by mouth daily 30 tablet 3    dilTIAZem (CARDIZEM) 60 MG tablet Take 1 tablet by mouth 4 times daily Please hold if systolic blood pressure less than 100 mmHg or if heart rate less than 100 bpm. 120 tablet 3    polyethylene glycol (GLYCOLAX) 17 g packet Take 17 g by mouth daily as needed for Constipation 527 g 1    tamsulosin (FLOMAX) 0.4 MG capsule Take 1 capsule by mouth daily 30 capsule 3    zinc sulfate (ZINCATE) 220 (50 Zn) MG capsule Take 2 capsules by mouth daily 30 capsule 3    ascorbic acid (VITAMIN C) 1000 MG tablet Take 2 tablets by mouth 2 times daily (Patient taking differently: Take 500 mg by mouth 2 times daily ) 30 tablet 3    famotidine (PEPCID) 10 MG tablet Take 1 tablet by mouth nightly 60 tablet 3    vitamin D3 (CHOLECALCIFEROL) 25 MCG (1000 UT) TABS tablet Take 1 tablet by mouth daily 30 tablet 0    furosemide (LASIX) 40 MG tablet Take 40 mg by mouth daily      glipiZIDE (GLUCOTROL) 5 MG tablet Take 5 mg by mouth 2 times daily (before meals)      atorvastatin (LIPITOR) 40 MG tablet Take 1 tablet by mouth nightly 30 tablet 3    clopidogrel (PLAVIX) 75 MG tablet Take 1 tablet by mouth daily 30 tablet 3    aspirin 81 MG EC tablet Take 81 mg by mouth nightly          ciprofloxacin (CIPRO) IVPB 400 mg, Q24H  metoprolol succinate (TOPROL XL) extended release tablet 25 mg, Daily  benzocaine-menthol (CEPACOL SORE THROAT) lozenge 1 lozenge, Q2H PRN  glucose (GLUTOSE) 40 % oral gel 15 g, PRN  dextrose 50 % IV solution, PRN  glucagon (rDNA) injection 1 mg, PRN  dextrose 5 % solution, PRN  insulin lispro (HUMALOG) injection vial 0-18 Units, TID WC  insulin lispro (HUMALOG) injection vial 0-9 Units, Nightly  0.9 % sodium chloride infusion, Continuous  albuterol (PROVENTIL) nebulizer solution 2.5 mg, Q4H PRN  amiodarone (CORDARONE) tablet 200 mg, BID  [Held by provider] apixaban (ELIQUIS) tablet 2.5 mg, BID  ascorbic acid (VITAMIN C) tablet 2,000 mg, BID  [Held by provider] aspirin EC tablet 81 mg, Nightly  atorvastatin (LIPITOR) tablet 40 mg, Nightly  benzonatate (TESSALON) capsule 100 mg, TID PRN  budesonide (PULMICORT) nebulizer suspension 500 mcg, BID  [Held by provider] clopidogrel (PLAVIX) tablet 75 mg, Daily  famotidine (PEPCID) tablet 10 mg, Nightly  [Held by provider] furosemide (LASIX) tablet 40 mg, Daily  glipiZIDE (GLUCOTROL) tablet 5 mg, BID AC  ipratropium-albuterol (DUONEB) nebulizer solution 3 mL, 4x daily  sodium bicarbonate tablet 650 mg, BID  tamsulosin (FLOMAX) capsule 0.4 mg, Daily  traZODone (DESYREL) tablet 50 mg, Nightly  Vitamin D (CHOLECALCIFEROL) tablet 1,000 Units, Daily  zinc sulfate (ZINCATE) capsule 100 mg, Daily  sodium chloride flush 0.9 % injection 5-40 mL, 2 times per day  sodium chloride flush 0.9 % injection 10 mL, PRN  0.9 % sodium chloride infusion, PRN  ondansetron (ZOFRAN-ODT) disintegrating tablet 4 mg, Q8H PRN   Or  ondansetron (ZOFRAN) injection 4 mg, Q6H PRN  polyethylene glycol (GLYCOLAX) packet 17 g, Daily PRN  acetaminophen (TYLENOL) tablet 650 mg, Q6H PRN   Or  acetaminophen (TYLENOL) suppository 650 mg, Q6H PRN       FAMILY HISTORY: family history includes Heart Disease in his brother and father. Physical Examination:     BP (!) 112/57   Pulse 71   Temp 96.8 °F (36 °C) (Temporal)   Resp 16   Ht 5' 4\" (1.626 m)   Wt 171 lb 3.2 oz (77.7 kg)   SpO2 90%   BMI 29.39 kg/m²  Body mass index is 29.39 kg/m². Constitutional: He appeared oriented to person and place. He appears well-developed and well-nourished. In no acute distress. HEENT: Normocephalic and atraumatic. No JVD present. Carotid bruit is not present. No mass and no thyromegaly present. No lymphadenopathy noted. Cardiovascular: Normal rate, regular rhythm, normal heart sounds. Exam reveals no gallop and no friction rubs. 2/6 systolic murmur, 5th intercostal space on the LEFT in the mid-clavicular line (cardiac apex)  Pulmonary/Chest: Effort normal and breath sounds normal. No respiratory distress. He has no wheezes, rhonchi or rales. Increased respiratory wheezes. diminished lung sounds. Abdominal: Soft, non-tender. He exhibits no organomegaly, mass or bruit. Extremities: 1+ at the ankles bilaterally. No cyanosis or clubbing. 2+ radial and carotid pulses. Distal extremity pulses: 2+ bilaterally. Neurological: Alertness and orientation as per Constitutional exam. No evidence of gross cranial nerve deficit. Coordination appeared normal.   Skin: Skin is warm and dry. There is no rash or diaphoresis. Psychiatric: He has a normal mood and affect.  His speech is normal and behavior is normal.        MOST RECENT LABS ON RECORD:   Lab Results   Component Value Date    WBC 9.1 12/10/2021    HGB 8.0 (L) 12/10/2021    HCT 25.6 (L) 12/10/2021     12/10/2021    CHOL 87 11/17/2021    TRIG 63 11/17/2021    HDL 27 (L) 11/17/2021    LDLCHOLESTEROL 47 11/17/2021    ALT 21 12/07/2021    AST 15 12/07/2021     (L) 12/10/2021    K 4.6 12/10/2021    CL 97 (L) 12/10/2021    CREATININE 2.74 (H) 12/10/2021    BUN 66 (H) 12/10/2021    CO2 28 12/10/2021    TSH 1.12 11/17/2021    PSA 1.37 11/18/2021    INR 1.3 11/17/2021    LABA1C 11.2 (H) 12/07/2021        ASSESSMENT:  Patient Active Problem List    Diagnosis Date Noted    Hyperglycemia 12/07/2021    Renal failure 12/07/2021    Acute cystitis without hematuria 12/07/2021    Elevated troponin 12/07/2021    On amiodarone therapy     Microcytic hypochromic anemia     Hyperkalemia 12/06/2021    Urinary retention 11/27/2021    Mild malnutrition ([address] 11/17/2021    Acute and chronic respiratory failure with hypoxia (HCC) 11/16/2021    Paroxysmal atrial flutter ([address] 11/16/2021    Stage 4 chronic kidney disease ([address] 11/16/2021    ASHD (arteriosclerotic heart disease)     S/P CABG (coronary artery bypass graft)     COVID-19 virus infection / Shelby Isabel Vaccine 11/15/2021    Cerebral infarction due to embolism of right middle cerebral artery ([address] 03/28/2018    Left leg weakness 03/28/2018    Falling 03/28/2018    Stroke, lacunar ([address] 03/28/2018    Hypertension 03/27/2018    Type 2 diabetes mellitus without complication, without long-term current use of insulin ([address] 03/27/2018    Cerebrovascular accident (CVA) ([address] 03/26/2018    Right inguinal hernia 04/02/2014       PLAN:  Nonspecific troponin elevation can be secondary to acute renal failure versus type 2 Myocardial Infarction:   Mr. Melissa White meets criteria for a Type 2 MI defined by a rise and fall of troponin with at least one value above the 99th percentile and evidence of an imbalance between myocardial oxygen supply and demand unrelated to coronary thrombosis, evidenced by symptoms of acute myocardial ischemia which has manifested as the development of heart failure and shortness of breath at rest.  Currently denies any chest pain. Continue to monitor troponin. No ischemic work-up indicated except after improvement of his acute renal failure. Atherosclerotic Heart Disease:  Antiplatelet Agent: Continue clopidogrel (Plavix): Plavix 75 mg daily. We will call definitely have to discontinue aspirin. Beta Blocker: Continue Metoprolol succinate (Toprol XL) 25 mg daily. Anti-anginal medications: Discontinue Diltiazem due to low BP  Cholesterol Reduction Therapy: Continue Atorvastatin (Lipitor) 40 mg daily. · Paroxysmal Atrial Flutter: Rhythm Control Symptomatic   Beta Blocker: Continue Metoprolol succinate (Toprol XL) 25 mg daily.  Anti-Arrhythmic: Continue amiodarone to 200 mg daily. [id number] Score for Atrial Fibrillation Stroke Risk   Risk   Factors  Component Value   C CHF No 0   H HTN Yes 1   A2 Age >= 76 Yes,  (80 y.o.) 2   D DM Yes 1   S2 Prior Stroke/TIA No 0   V Vascular Disease No 0   A Age 74-69 No,  (80 y.o.) 0   Sc Sex male 0    [id number]  Score  4   Score last updated 24/6/53 4:41 PM EST  Click here for a link to the UpToDate guideline \"Atrial Fibrillation: Anticoagulation therapy to prevent embolization    Disclaimer: Risk Score calculation is dependent on accuracy of patient problem list and past encounter diagnosis.  Stroke Risk: CHADS2-VASc Score: 4/9 (4% stroke risk)   Anticoagulation: Eliquis continues to be on hold because of low hemoglobin and hemoptysis. His hemoglobin has improved to 8 today. Patient maintaining sinus rhythm for now.  CT 12/9/2021: Moderate sized right pleural effusion and small left effusion increasing since last CT done 11/26/2021. S/P right-sided thoracentesis with removal of 800 mL of cloudy yellow fluids. · Acute renal failure, hyperkalemia and anemia  · Continue management as per primary team and nephrology   · Hold Lasix    Once again, thank you for allowing me to participate in this patients care. Please do not hesitate to contact me if I could be of any further assistance. Plan was discussed with Dr. Sam, he is in agreement with the treatment plan.     Sincerely,  [facility], [address] [facility], [facility], [address] [place] [address]  Phone: [phone], Fax: [phone]    ATTESTATION:     I have discussed the case, including pertinent history and exam findings with the Anna RUFFIN. I have evaluated the  History, physical findings and pictures of the patient and the key elements of the encounter have been performed by me. I have reviewed the laboratory data, other diagnostic studies and discussed them with Anna RUFFIN. I have updated the medical record where necessary. I agree with the assessment, plan and orders as documented by Anna RUFFIN. Sincerely,  Gabe Maza MD, F.A.C.C.   [facility]    [address] [facility], [address] [place] [address]  Phone: [phone], Fax: [phone]

## 2021-12-10 NOTE — PROGRESS NOTES
Physical Therapy  Facility/Department: [address] [place] [address] MED SURG  Daily Treatment Note  NAME: Ernie Peralta  : 10/31/1930  MRN: [id number]    Date of Service: 12/10/2021    Discharge Recommendations:  Continue to assess pending progress, Subacute/Skilled Nursing Facility        Assessment   Body structures, Functions, Activity limitations: Decreased functional mobility ; Decreased balance; Decreased ADL status; Decreased endurance; Decreased high-level IADLs; Decreased strength; Decreased posture  Treatment Diagnosis: Generalized weakness  Prognosis: Good  Decision Making: Medium Complexity  PT Education: Goals; General Safety; Transfer Training  Patient Education: Pt educated on the above with fair understanding noted. REQUIRES PT FOLLOW UP: Yes  Activity Tolerance  Activity Tolerance: Patient limited by fatigue; Patient limited by endurance  Activity Tolerance: Frequent rest breaks required d/t SOB and fatigue. Patient Diagnosis(es): The primary encounter diagnosis was Renal failure, unspecified chronicity. Diagnoses of Hyperkalemia and Hyperglycemia were also pertinent to this visit. has a past medical history of Acute and chronic respiratory failure with hypoxia ([address] CAD (coronary artery disease), Cataracts, bilateral, Elevated troponin, Glaucoma, Hyperlipidemia, Hypertension, TIA (transient ischemic attack), and Type II or unspecified type diabetes mellitus without mention of complication, not stated as uncontrolled. has a past surgical history that includes Coronary artery bypass graft (); Cataract removal with implant (Bilateral,  and 12/3/2013); and Inguinal hernia repair (Bilateral, 12). Restrictions  Restrictions/Precautions  Restrictions/Precautions: Fall Risk, General Precautions  Subjective   General  Chart Reviewed: Yes  Family / Caregiver Present: No  Referring Practitioner: FRANKLIN Marcial CNP  Subjective  Subjective: Pt denies pain.  Pt states he didn't sleep well last night and it took him a long time to fall asleep. Agreeable to treatment. Pain Screening  Patient Currently in Pain: Denies  Vital Signs  Patient Currently in Pain: Denies       Orientation  Orientation  Overall Orientation Status: Within Functional Limits  Cognition      Objective   Bed mobility  Rolling to Left: Minimal assistance  Supine to Sit: Minimal assistance  Sit to Supine: Minimal assistance  Comment: V/c for proper hand placement and form  Transfers  Sit to Stand: Moderate Assistance  Stand to sit: Minimal Assistance  Comment: V/c for proper hand placement. Ambulation  Ambulation?: No     Balance  Posture: Fair  Sitting - Static: Good  Sitting - Dynamic: Good  Standing - Static: Poor; +  Standing - Dynamic: Poor  Exercises  Comments: Seated EOB B LE exercises x20 reps. Comment: Stood EOB with RW for 1 min 30 sec and weight shifting while standing EOB with RW for 1 min. Goals  Short term goals  Time Frame for Short term goals: 10 days  Short term goal 1: Patient will ambulate 22' with FWW, CGA, without LOB  Short term goal 2: Patient will perform bed mobility with supervision  Short term goal 3: Patient will perform transfers with CGA  Short term goal 4: Patient will tolerate 20-30 minutes of therex/act to improve endurance for ADLs. Patient Goals   Patient goals : Improve strength and mobility    Plan    Plan  Times per week: 7  Times per day: Twice a day  Plan Comment: 1x/day on weekends  Safety Devices  Type of devices:  All fall risk precautions in place, Bed alarm in place, Gait belt, Patient at risk for falls, Nurse notified, Call light within reach, Left in bed     Therapy Time   Individual Concurrent Group Co-treatment   Time In 0845         Time Out 0920         Minutes [address], [place]

## 2021-12-10 NOTE — PROGRESS NOTES
Patient alert and oriented x 4. Vital signs and head to toe assessment performed. Fine crackles present in bases of lung. Breathing is unlabored at rest. Oxygen saturation at 90% with 3 liters of oxygen via nasal canulla. Telemetry on with a normal sinus rhythm. Mr. Karlee Sales reports feeling good today. He has some complaints of constipation, miralax prn order will be given to help with this. Bed in lowest position with bed alarm on and bed wheels locked. Call light and patient belongings in reach. Instructed to use call light for assistance.

## 2021-12-10 NOTE — PROGRESS NOTES
Second assessment completed at this time. Assessment unchanged from previous shift assessment. Vital signs completed.

## 2021-12-10 NOTE — PROGRESS NOTES
[facility] for [facility] transport. Family, [facility], and nursing notified of time. AVS faxed to [facility].  Melania ALBRECHT LSW 12/10/2021

## 2021-12-10 NOTE — SEDATION DOCUMENTATION
850 ml drained. Catheter pulled from site and manual pressure held and tip stop dressing applied. Labels applied to fluid specimen.

## 2021-12-10 NOTE — PROGRESS NOTES
Occupational Therapy  Facility/Department: [facility] AT THE [facility] MED SURG  Daily Treatment Note  NAME: Andre Mccarthy  : 10/31/1930  MRN: [id number]    Date of Service: 12/10/2021    Discharge Recommendations:  Continue to assess pending progress, Subacute/Skilled Nursing Facility       Assessment      OT Education: OT Role; Plan of Care; Transfer Training  Patient Education: Educated on transfer techniques from supine to EOB  Barriers to Learning: none at this time  [facility] in place: Yes  Type of devices: Call light within reach; Bed alarm in place; Left in bed (caregivers present)  Restraints  Initially in place: No         Patient Diagnosis(es): The primary encounter diagnosis was Renal failure, unspecified chronicity. Diagnoses of Hyperkalemia and Hyperglycemia were also pertinent to this visit. has a past medical history of Acute and chronic respiratory failure with hypoxia ([facility] [address] CAD (coronary artery disease), Cataracts, bilateral, Elevated troponin, Glaucoma, Hyperlipidemia, Hypertension, TIA (transient ischemic attack), and Type II or unspecified type diabetes mellitus without mention of complication, not stated as uncontrolled. has a past surgical history that includes Coronary artery bypass graft (); Cataract removal with implant (Bilateral,  and 12/3/2013); and Inguinal hernia repair (Bilateral, 12). Restrictions  Restrictions/Precautions  Restrictions/Precautions: Fall Risk, General Precautions  Subjective   General  Chart Reviewed: Yes  Patient assessed for rehabilitation services?: Yes  Family / Caregiver Present: Yes  Subjective  Subjective: pt reports no pain at this time. General Comment  Comments: supine in bed upon arrival. reports poor night of sleep, and previously got washed up. Agreeable to ther ex EOB.   Vital Signs  Patient Currently in Pain: Denies   Orientation     Objective                Bed mobility  Rolling to Right: Minimal assistance  Supine to

## 2021-12-10 NOTE — SEDATION DOCUMENTATION
Skin cleansed and draped per Dr. Simba Reid. Catheter placed to right posterior chest draining cloudy yellow fluid. Patient tolerating well.

## 2021-12-10 NOTE — PROGRESS NOTES
Speech Language Pathology  Facility/Department: [facility] AT THE [facility] MED SURG   CLINICAL BEDSIDE SWALLOW EVALUATION    NAME: Jose Swift  : 10/31/1930  MRN: [id number]    ADMISSION DATE: 2021  ADMITTING DIAGNOSIS: has Right inguinal hernia; Cerebrovascular accident (CVA) ([address] Hypertension; Type 2 diabetes mellitus without complication, without long-term current use of insulin ([address] Cerebral infarction due to embolism of right middle cerebral artery ([address] Left leg weakness; Falling; Stroke, lacunar ([address] COVID-19 virus infection / [employer] Vaccine; Acute and chronic respiratory failure with hypoxia ([address] ASHD (arteriosclerotic heart disease); Paroxysmal atrial flutter ([address] Stage 4 chronic kidney disease ([address] S/P CABG (coronary artery bypass graft); Mild malnutrition ([address] Urinary retention; Hyperkalemia; Hyperglycemia; Renal failure; Acute cystitis without hematuria; Elevated troponin; On amiodarone therapy; and Microcytic hypochromic anemia on their problem list.  ONSET DATE: 21    Recent Chest Xray/CT of Chest: (12/10/21)  Impression   Motion degraded exam.       Moderate size right pleural effusion and small left effusion, increasing   since CT exam 2021. otherwise similar appearance of bilateral airspace   disease and associated atelectasis. Date of Eval: 12/10/2021  Evaluating Therapist: Joyice Libman, SLP    Current Diet level:  Current Diet : Regular  Current Liquid Diet : Thin      Primary Complaint  Patient Complaint: Patient reports no  concerns with swallowing.     Pain:  Pain Assessment  Pain Assessment: 0-10  Pain Level: 0  Patient's Stated Pain Goal: No pain  Response to Pain Intervention: Patient Satisfied    Reason for Referral  Jose Swift was referred for a bedside swallow evaluation to assess the efficiency of his swallow function, identify signs and symptoms of aspiration and make recommendations regarding safe dietary consistencies, effective compensatory strategies, and safe eating environment. Impression  Dysphagia Diagnosis: Swallow function appears grossly intact  Dysphagia Outcome Severity Scale: Level 6: Within functional limits/Modified independence     Treatment Plan  Requires SLP Intervention: No  Duration/Frequency of Treatment: N/A          Recommended Diet and Intervention  Diet Solids Recommendation: Regular  Liquid Consistency Recommendation: Thin  Recommended Form of Meds: Whole with puree          Compensatory Swallowing Strategies  Compensatory Swallowing Strategies: Alternate solids and liquids; Eat/Feed slowly; Upright as possible for all oral intake; Small bites/sips; Lingual sweep    Treatment/Goals       General  Chart Reviewed: Yes  Behavior/Cognition: Alert; Cooperative; Pleasant mood  Respiratory Status: O2 via nasual cannula  O2 Device: Nasal cannula  Liters of Oxygen: 3 L  Communication Observation: Functional  Follows Directions: Simple  Dentition: Adequate  Patient Positioning: Upright in bed  Baseline Vocal Quality: Normal  Prior Dysphagia History: No dysphaiga history in patient chart  Consistencies Administered: Thin; Reg solid      Pain Level: 0    Vision/Hearing  Vision  Vision: Impaired  Vision Exceptions: Wears glasses at all times  Hearing  Hearing: Exceptions to [facility]  Hearing Exceptions: Bilateral hearing aid    Oral Motor Deficits  Oral/Motor  Oral Motor: Exceptions to [facility]    Oral Phase Dysfunction  Oral Phase  Oral Phase: WFL  Oral Phase  Oral Phase - Comment: Patient demonstrated WFL oral phase of swallowing characterized by adequate strength and ROM of labial and lingual musculature, adequate bolus preparation/propulsion, and no oral residues post-swallow. Patient also independently describing compensatory swallowing strategies he uses including small bites and sips and slow rate.      Indicators of Pharyngeal Phase Dysfunction   Pharyngeal Phase  Pharyngeal Phase: WFL  Pharyngeal Phase   Pharyngeal: Patient's pharyngeal phase of swallow is suspected to be [facility]. He had a prompt swallow initiation, no overt coughing/throat clearing, or wet vocal quality noted with regular and thin liquid trials. Pt maintained baseline in vocal quality. Prognosis  Prognosis  Prognosis for safe diet advancement: good  Barriers to reach goals: age  Individuals consulted  Consulted and agree with results and recommendations: Patient    Education  Patient Education: ST provided education to patient re: therapeutic PO trials with SLP and swallow strategies. Patient Education Response: Verbalizes understanding  Safety Devices in place: Yes  Type of devices: Left in bed; Call light within reach       Therapy Time  SLP Individual Minutes  Time In: 0920  Time Out: 2098  Minutes: 17      Patient seen in room for dysphagia therapy this date. Patient pleasant and reports no new concerns with swallowing at this time. Patient reports tolerating breakfast and medication well and is taking medication whole with applesauce. Patient is demonstrating WFL oral phase of swallowing characterized by adequate strength and ROM of labial and lingual musculature, adequate bolus preparation/propulsion, and no oral residues post-swallow. Patient also independently describing compensatory swallowing strategies he uses including small bites and sips and slow rate. Patient's pharyngeal phase of swallow is suspected to be [facility]. He had a prompt swallow initiation, no overt coughing/throat clearing, or wet vocal quality noted with regular and thin liquid trials. Pt maintained baseline in vocal quality. ST recommends continued diet of regular solids and thin liquids at this time. Compensatory swallowing strategies should include: small bites/sips, pacing/slow rate, alternate bites/sips, upright during PO intake. Further dysphagia therapy is not warranted at this time.      Lester Okeefe M.S. [id number] [facility]    12/10/2021 9:40 AM

## 2021-12-10 NOTE — PROGRESS NOTES
Comprehensive Nutrition Assessment    Type and Reason for Visit:  Initial (missing malnutrition screen )    Nutrition Recommendations/Plan:   1. Modify diet to 2 gm sodium CC, 4 carbs per meal diet. 2. Continue Jair and Glucerna supplements. Nutrition Assessment:  Continued increased nutrient needs and altered nutrition related labs aeb increased needs from wound, A1c 11.2. Pt with thoracentesis plan today. Has coccyx wound with healing needs. Seen by SLP, recommended regular thin with small sips/bites, alternate food and fluids, slow eating pace, and upright for meals. Renal indices elevated. Recommend addition of 2 gm soduim diet. Hgb 8.0. Pt leaving room for thoracentesis at this time. Malnutrition Assessment:  Malnutrition Status: At risk for malnutrition (Comment)    Context:  Acute Illness     Findings of the 6 clinical characteristics of malnutrition:  Energy Intake:  Unable to assess  Weight Loss:  No significant weight loss     Body Fat Loss:  No significant body fat loss     Muscle Mass Loss:  No significant muscle mass loss    Fluid Accumulation:  1 - Mild Extremities (+ 1 non pitting R/L UE, + 1 pitting R/L LE)   Strength:  Not Performed    Estimated Daily Nutrient Needs:  Energy (kcal):  3553-2371 (25-28/kg); Weight Used for Energy Requirements:  Current     Protein (g):  83-94g (1.4-1.6g/kg); Weight Used for Protein Requirements:  Ideal        Fluid (ml/day):  1875 ml; Method Used for Fluid Requirements:  ml/Kg      Nutrition Related Findings:  + large loose BM 12/7. + 1 non pitting R/L UE, +1 pitting R/L LE. Wounds:  Stage II (coccyx)       Current Nutrition Therapies:    ADULT DIET; Regular; 4 carb choices (60 gm/meal)  ADULT ORAL NUTRITION SUPPLEMENT; Breakfast, Dinner;  Wound Healing Oral Supplement  ADULT ORAL NUTRITION SUPPLEMENT; Breakfast, Lunch, Dinner; Diabetic Oral Supplement    Anthropometric Measures:  · Height: 5' 4\" (162.6 cm)  · Current Body Weight: 171 lb 3.2 oz (77.7 kg)   · Admission Body Weight: 165 lb 8 oz (75.1 kg)    · Usual Body Weight: 170 lb (77.1 kg) (11/27/21)     · Ideal Body Weight: 130 lbs; % Ideal Body Weight 127.3 %   · BMI: 29.4  · BMI Categories: Overweight (BMI 25.0-29. 9)       Nutrition Diagnosis:   · Increased nutrient needs related to acute injury/trauma as evidenced by wounds      Nutrition Interventions:   Food and/or Nutrient Delivery:  Modify Current Diet (add 2 gm sodium )  Nutrition Education/Counseling:  Education needed   Coordination of Nutrition Care:  Continue to monitor while inpatient    Goals:  PO > 75% of meals and supplements        Lab Results   Component Value Date    LABA1C 11.2 12/07/2021     Recent Labs     12/08/21  0820 12/08/21  0820 12/09/21  0620 12/10/21  0640     --  139 134*   K 5.0  --  5.1 4.6   CL 97*  --  99 97*   CO2 29  --  30 28   BUN 65*  --  69* 66*   CREATININE 3.19*  --  3.02* 2.74*   GLUCOSE 176*  --  171* 224*   GFR              < >                          < > = values in this interval not displayed. Lab Results   Component Value Date    LABALBU 2.3 12/07/2021    LABALBU 4.2 05/17/2012      Recent Labs     12/08/21  1609 12/08/21  2008 12/08/21  2052 12/09/21  0647 12/09/21  1056 12/09/21  1641 12/09/21 2023 12/10/21  0720   POCGLU 232* 219* 217* 162* 201* 191* 262* 202*     Lab Results   Component Value Date    TRIG 63 11/17/2021    HDL 27 11/17/2021     Nutrition Monitoring and Evaluation:   Behavioral-Environmental Outcomes:  None Identified   Food/Nutrient Intake Outcomes:  Food and Nutrient Intake, Supplement Intake  Physical Signs/Symptoms Outcomes:  Biochemical Data, Weight     Discharge Planning:     Too soon to determine     Electronically signed by Phil Cowden, RD, LD on 12/10/21 at 10:37 AM EST    Contact: [phone]

## 2021-12-11 LAB
ALBUMIN FLUID: 1.2 G/DL
LACTATE DEHYDROGENASE, FLUID: 101 U/L
SPECIMEN TYPE: NORMAL
SPECIMEN TYPE: NORMAL

## 2021-12-12 LAB
CULTURE: NORMAL
CULTURE: NORMAL
Lab: NORMAL
Lab: NORMAL
SPECIMEN DESCRIPTION: NORMAL
SPECIMEN DESCRIPTION: NORMAL

## 2021-12-13 ENCOUNTER — HOSPITAL ENCOUNTER (OUTPATIENT)
Age: 86
Setting detail: SPECIMEN
Discharge: HOME | End: 2021-12-13

## 2021-12-13 LAB
ABSOLUTE EOS #: 0.24 K/UL (ref 0–0.44)
ABSOLUTE IMMATURE GRANULOCYTE: 0.12 K/UL (ref 0–0.3)
ABSOLUTE LYMPH #: 1.81 K/UL (ref 1.1–3.7)
ABSOLUTE MONO #: 1.01 K/UL (ref 0.1–1.2)
ALBUMIN SERPL-MCNC: 2.4 G/DL (ref 3.5–5.2)
ALBUMIN/GLOBULIN RATIO: 0.8 (ref 1–2.5)
ALP BLD-CCNC: 123 U/L (ref 40–129)
ALT SERPL-CCNC: 16 U/L (ref 5–41)
ANION GAP SERPL CALCULATED.3IONS-SCNC: 6 MMOL/L (ref 9–17)
AST SERPL-CCNC: 19 U/L
BASOPHILS # BLD: 0 % (ref 0–2)
BASOPHILS ABSOLUTE: <0.03 K/UL (ref 0–0.2)
BILIRUB SERPL-MCNC: 0.36 MG/DL (ref 0.3–1.2)
BUN BLDV-MCNC: 55 MG/DL (ref 8–23)
BUN/CREAT BLD: 21 (ref 9–20)
CALCIUM SERPL-MCNC: 8.4 MG/DL (ref 8.6–10.4)
CASE NUMBER:: NORMAL
CHLORIDE BLD-SCNC: 102 MMOL/L (ref 98–107)
CO2: 30 MMOL/L (ref 20–31)
CREAT SERPL-MCNC: 2.57 MG/DL (ref 0.7–1.2)
DIFFERENTIAL TYPE: ABNORMAL
EOSINOPHILS RELATIVE PERCENT: 2 % (ref 1–4)
GFR AFRICAN AMERICAN: 29 ML/MIN
GFR NON-AFRICAN AMERICAN: 24 ML/MIN
GFR SERPL CREATININE-BSD FRML MDRD: ABNORMAL ML/MIN/{1.73_M2}
GFR SERPL CREATININE-BSD FRML MDRD: ABNORMAL ML/MIN/{1.73_M2}
GLUCOSE BLD-MCNC: 234 MG/DL (ref 70–99)
HCT VFR BLD CALC: 25.6 % (ref 40.7–50.3)
HEMOGLOBIN: 7.8 G/DL (ref 13–17)
IMMATURE GRANULOCYTES: 1 %
LYMPHOCYTES # BLD: 17 % (ref 24–43)
MCH RBC QN AUTO: 30.7 PG (ref 25.2–33.5)
MCHC RBC AUTO-ENTMCNC: 30.5 G/DL (ref 28.4–34.8)
MCV RBC AUTO: 100.8 FL (ref 82.6–102.9)
MONOCYTES # BLD: 9 % (ref 3–12)
NRBC AUTOMATED: 0 PER 100 WBC
PDW BLD-RTO: 15.1 % (ref 11.8–14.4)
PLATELET # BLD: 234 K/UL (ref 138–453)
PLATELET ESTIMATE: ABNORMAL
PMV BLD AUTO: 11 FL (ref 8.1–13.5)
POTASSIUM SERPL-SCNC: 5.6 MMOL/L (ref 3.7–5.3)
RBC # BLD: 2.54 M/UL (ref 4.21–5.77)
RBC # BLD: ABNORMAL 10*6/UL
SEG NEUTROPHILS: 71 % (ref 36–65)
SEGMENTED NEUTROPHILS ABSOLUTE COUNT: 7.59 K/UL (ref 1.5–8.1)
SODIUM BLD-SCNC: 138 MMOL/L (ref 135–144)
SPECIMEN DESCRIPTION: NORMAL
TOTAL PROTEIN: 5.4 G/DL (ref 6.4–8.3)
WBC # BLD: 10.8 K/UL (ref 3.5–11.3)
WBC # BLD: ABNORMAL 10*3/UL

## 2021-12-13 PROCEDURE — 36415 COLL VENOUS BLD VENIPUNCTURE: CPT

## 2021-12-13 PROCEDURE — P9603 ONE-WAY ALLOW PRORATED MILES: HCPCS

## 2021-12-13 PROCEDURE — 85025 COMPLETE CBC W/AUTO DIFF WBC: CPT

## 2021-12-13 PROCEDURE — 80053 COMPREHEN METABOLIC PANEL: CPT

## 2021-12-14 LAB — SURGICAL PATHOLOGY REPORT: NORMAL

## 2021-12-16 ENCOUNTER — APPOINTMENT (OUTPATIENT)
Dept: GENERAL RADIOLOGY | Age: 86
DRG: 193 | End: 2021-12-16
Payer: MEDICARE

## 2021-12-16 ENCOUNTER — APPOINTMENT (OUTPATIENT)
Dept: VASCULAR LAB | Age: 86
DRG: 193 | End: 2021-12-16
Payer: MEDICARE

## 2021-12-16 ENCOUNTER — HOSPITAL ENCOUNTER (INPATIENT)
Age: 86
LOS: 5 days | Discharge: NURSING, REHAB OR LONG-TERM CARE | DRG: 193 | End: 2021-12-21
Attending: EMERGENCY MEDICINE | Admitting: INTERNAL MEDICINE
Payer: MEDICARE

## 2021-12-16 DIAGNOSIS — J18.9 PNEUMONIA DUE TO INFECTIOUS ORGANISM, UNSPECIFIED LATERALITY, UNSPECIFIED PART OF LUNG: Primary | ICD-10-CM

## 2021-12-16 DIAGNOSIS — E87.5 HYPERKALEMIA: ICD-10-CM

## 2021-12-16 DIAGNOSIS — R77.8 ELEVATED TROPONIN: ICD-10-CM

## 2021-12-16 PROBLEM — J15.9 COMMUNITY ACQUIRED BACTERIAL PNEUMONIA: Status: ACTIVE | Noted: 2021-12-16

## 2021-12-16 LAB
-: ABNORMAL
ABSOLUTE EOS #: 0.24 K/UL (ref 0–0.44)
ABSOLUTE IMMATURE GRANULOCYTE: 0.09 K/UL (ref 0–0.3)
ABSOLUTE LYMPH #: 2.21 K/UL (ref 1.1–3.7)
ABSOLUTE MONO #: 0.97 K/UL (ref 0.1–1.2)
ALBUMIN SERPL-MCNC: 2.6 G/DL (ref 3.5–5.2)
ALBUMIN/GLOBULIN RATIO: 0.8 (ref 1–2.5)
ALP BLD-CCNC: 109 U/L (ref 40–129)
ALT SERPL-CCNC: 14 U/L (ref 5–41)
AMORPHOUS: ABNORMAL
ANION GAP SERPL CALCULATED.3IONS-SCNC: 9 MMOL/L (ref 9–17)
AST SERPL-CCNC: 17 U/L
BACTERIA: ABNORMAL
BASOPHILS # BLD: 0 % (ref 0–2)
BASOPHILS ABSOLUTE: 0.04 K/UL (ref 0–0.2)
BILIRUB SERPL-MCNC: 0.55 MG/DL (ref 0.3–1.2)
BILIRUBIN URINE: NEGATIVE
BNP INTERPRETATION: ABNORMAL
BUN BLDV-MCNC: 51 MG/DL (ref 8–23)
BUN/CREAT BLD: 20 (ref 9–20)
CALCIUM SERPL-MCNC: 8.6 MG/DL (ref 8.6–10.4)
CASTS UA: ABNORMAL /LPF
CHLORIDE BLD-SCNC: 100 MMOL/L (ref 98–107)
CO2: 30 MMOL/L (ref 20–31)
COLOR: YELLOW
COMMENT UA: ABNORMAL
CREAT SERPL-MCNC: 2.51 MG/DL (ref 0.7–1.2)
CRYSTALS, UA: ABNORMAL /HPF
CULTURE: ABNORMAL
DIFFERENTIAL TYPE: ABNORMAL
DIRECT EXAM: ABNORMAL
EKG ATRIAL RATE: 75 BPM
EKG P AXIS: 52 DEGREES
EKG P-R INTERVAL: 188 MS
EKG Q-T INTERVAL: 432 MS
EKG QRS DURATION: 112 MS
EKG QTC CALCULATION (BAZETT): 482 MS
EKG R AXIS: -28 DEGREES
EKG T AXIS: 19 DEGREES
EKG VENTRICULAR RATE: 75 BPM
EOSINOPHILS RELATIVE PERCENT: 2 % (ref 1–4)
EPITHELIAL CELLS UA: ABNORMAL /HPF (ref 0–5)
GFR AFRICAN AMERICAN: 29 ML/MIN
GFR NON-AFRICAN AMERICAN: 24 ML/MIN
GFR SERPL CREATININE-BSD FRML MDRD: ABNORMAL ML/MIN/{1.73_M2}
GFR SERPL CREATININE-BSD FRML MDRD: ABNORMAL ML/MIN/{1.73_M2}
GLUCOSE BLD-MCNC: 244 MG/DL (ref 74–100)
GLUCOSE BLD-MCNC: 247 MG/DL (ref 74–100)
GLUCOSE BLD-MCNC: 308 MG/DL (ref 70–99)
GLUCOSE URINE: ABNORMAL
HCT VFR BLD CALC: 27.5 % (ref 40.7–50.3)
HEMOGLOBIN: 8.2 G/DL (ref 13–17)
IMMATURE GRANULOCYTES: 1 %
KETONES, URINE: NEGATIVE
LACTIC ACID, SEPSIS WHOLE BLOOD: NORMAL MMOL/L (ref 0.5–1.9)
LACTIC ACID, SEPSIS WHOLE BLOOD: NORMAL MMOL/L (ref 0.5–1.9)
LACTIC ACID, SEPSIS: 1.4 MMOL/L (ref 0.5–1.9)
LACTIC ACID, SEPSIS: 1.5 MMOL/L (ref 0.5–1.9)
LEUKOCYTE ESTERASE, URINE: NEGATIVE
LYMPHOCYTES # BLD: 21 % (ref 24–43)
Lab: ABNORMAL
MCH RBC QN AUTO: 29.9 PG (ref 25.2–33.5)
MCHC RBC AUTO-ENTMCNC: 29.8 G/DL (ref 28.4–34.8)
MCV RBC AUTO: 100.4 FL (ref 82.6–102.9)
MONOCYTES # BLD: 9 % (ref 3–12)
MUCUS: ABNORMAL
NITRITE, URINE: NEGATIVE
NRBC AUTOMATED: 0 PER 100 WBC
OTHER OBSERVATIONS UA: ABNORMAL
PDW BLD-RTO: 15.5 % (ref 11.8–14.4)
PH UA: 7.5 (ref 5–9)
PLATELET # BLD: 230 K/UL (ref 138–453)
PLATELET ESTIMATE: ABNORMAL
PMV BLD AUTO: 10.7 FL (ref 8.1–13.5)
POTASSIUM SERPL-SCNC: 5.5 MMOL/L (ref 3.7–5.3)
PRO-BNP: ABNORMAL PG/ML
PROTEIN UA: NEGATIVE
RBC # BLD: 2.74 M/UL (ref 4.21–5.77)
RBC # BLD: ABNORMAL 10*6/UL
RBC UA: ABNORMAL /HPF (ref 0–2)
RENAL EPITHELIAL, UA: ABNORMAL /HPF
SARS-COV-2, RAPID: NOT DETECTED
SEG NEUTROPHILS: 67 % (ref 36–65)
SEGMENTED NEUTROPHILS ABSOLUTE COUNT: 6.89 K/UL (ref 1.5–8.1)
SODIUM BLD-SCNC: 139 MMOL/L (ref 135–144)
SPECIFIC GRAVITY UA: 1.01 (ref 1.01–1.02)
SPECIMEN DESCRIPTION: ABNORMAL
SPECIMEN DESCRIPTION: NORMAL
TOTAL PROTEIN: 6 G/DL (ref 6.4–8.3)
TRICHOMONAS: ABNORMAL
TROPONIN INTERP: ABNORMAL
TROPONIN INTERP: ABNORMAL
TROPONIN T: ABNORMAL NG/ML
TROPONIN T: ABNORMAL NG/ML
TROPONIN, HIGH SENSITIVITY: 170 NG/L (ref 0–22)
TROPONIN, HIGH SENSITIVITY: 184 NG/L (ref 0–22)
TURBIDITY: CLEAR
URINE HGB: ABNORMAL
UROBILINOGEN, URINE: NORMAL
WBC # BLD: 10.4 K/UL (ref 3.5–11.3)
WBC # BLD: ABNORMAL 10*3/UL
WBC UA: ABNORMAL /HPF (ref 0–5)
YEAST: ABNORMAL

## 2021-12-16 PROCEDURE — 0202U NFCT DS 22 TRGT SARS-COV-2: CPT

## 2021-12-16 PROCEDURE — 87086 URINE CULTURE/COLONY COUNT: CPT

## 2021-12-16 PROCEDURE — C9803 HOPD COVID-19 SPEC COLLECT: HCPCS

## 2021-12-16 PROCEDURE — 6370000000 HC RX 637 (ALT 250 FOR IP): Performed by: INTERNAL MEDICINE

## 2021-12-16 PROCEDURE — 51702 INSERT TEMP BLADDER CATH: CPT

## 2021-12-16 PROCEDURE — 99223 1ST HOSP IP/OBS HIGH 75: CPT | Performed by: INTERNAL MEDICINE

## 2021-12-16 PROCEDURE — 6360000002 HC RX W HCPCS: Performed by: INTERNAL MEDICINE

## 2021-12-16 PROCEDURE — 2580000003 HC RX 258: Performed by: EMERGENCY MEDICINE

## 2021-12-16 PROCEDURE — 93010 ELECTROCARDIOGRAM REPORT: CPT | Performed by: INTERNAL MEDICINE

## 2021-12-16 PROCEDURE — 94664 DEMO&/EVAL PT USE INHALER: CPT

## 2021-12-16 PROCEDURE — 99284 EMERGENCY DEPT VISIT MOD MDM: CPT

## 2021-12-16 PROCEDURE — 94761 N-INVAS EAR/PLS OXIMETRY MLT: CPT

## 2021-12-16 PROCEDURE — 94640 AIRWAY INHALATION TREATMENT: CPT

## 2021-12-16 PROCEDURE — 83036 HEMOGLOBIN GLYCOSYLATED A1C: CPT

## 2021-12-16 PROCEDURE — 82947 ASSAY GLUCOSE BLOOD QUANT: CPT

## 2021-12-16 PROCEDURE — 6360000002 HC RX W HCPCS: Performed by: EMERGENCY MEDICINE

## 2021-12-16 PROCEDURE — 93971 EXTREMITY STUDY: CPT

## 2021-12-16 PROCEDURE — 71045 X-RAY EXAM CHEST 1 VIEW: CPT

## 2021-12-16 PROCEDURE — 87635 SARS-COV-2 COVID-19 AMP PRB: CPT

## 2021-12-16 PROCEDURE — 96374 THER/PROPH/DIAG INJ IV PUSH: CPT

## 2021-12-16 PROCEDURE — 86140 C-REACTIVE PROTEIN: CPT

## 2021-12-16 PROCEDURE — 83880 ASSAY OF NATRIURETIC PEPTIDE: CPT

## 2021-12-16 PROCEDURE — 2700000000 HC OXYGEN THERAPY PER DAY

## 2021-12-16 PROCEDURE — 2580000003 HC RX 258: Performed by: INTERNAL MEDICINE

## 2021-12-16 PROCEDURE — 2580000003 HC RX 258: Performed by: NURSE PRACTITIONER

## 2021-12-16 PROCEDURE — 83605 ASSAY OF LACTIC ACID: CPT

## 2021-12-16 PROCEDURE — 2500000003 HC RX 250 WO HCPCS: Performed by: NURSE PRACTITIONER

## 2021-12-16 PROCEDURE — 84484 ASSAY OF TROPONIN QUANT: CPT

## 2021-12-16 PROCEDURE — 36415 COLL VENOUS BLD VENIPUNCTURE: CPT

## 2021-12-16 PROCEDURE — 81001 URINALYSIS AUTO W/SCOPE: CPT

## 2021-12-16 PROCEDURE — 87040 BLOOD CULTURE FOR BACTERIA: CPT

## 2021-12-16 PROCEDURE — 85025 COMPLETE CBC W/AUTO DIFF WBC: CPT

## 2021-12-16 PROCEDURE — 1200000000 HC SEMI PRIVATE

## 2021-12-16 PROCEDURE — 6370000000 HC RX 637 (ALT 250 FOR IP)

## 2021-12-16 PROCEDURE — 80053 COMPREHEN METABOLIC PANEL: CPT

## 2021-12-16 PROCEDURE — 93005 ELECTROCARDIOGRAM TRACING: CPT | Performed by: EMERGENCY MEDICINE

## 2021-12-16 RX ORDER — IPRATROPIUM BROMIDE AND ALBUTEROL SULFATE 2.5; .5 MG/3ML; MG/3ML
3 SOLUTION RESPIRATORY (INHALATION) 4 TIMES DAILY
Status: DISCONTINUED | OUTPATIENT
Start: 2021-12-16 | End: 2021-12-21 | Stop reason: HOSPADM

## 2021-12-16 RX ORDER — SODIUM CHLORIDE 9 MG/ML
INJECTION, SOLUTION INTRAVENOUS CONTINUOUS
Status: DISCONTINUED | OUTPATIENT
Start: 2021-12-16 | End: 2021-12-16

## 2021-12-16 RX ORDER — POLYETHYLENE GLYCOL 3350 17 G/17G
17 POWDER, FOR SOLUTION ORAL DAILY PRN
Status: DISCONTINUED | OUTPATIENT
Start: 2021-12-16 | End: 2021-12-21 | Stop reason: HOSPADM

## 2021-12-16 RX ORDER — SODIUM CHLORIDE 9 MG/ML
25 INJECTION, SOLUTION INTRAVENOUS PRN
Status: DISCONTINUED | OUTPATIENT
Start: 2021-12-16 | End: 2021-12-21 | Stop reason: HOSPADM

## 2021-12-16 RX ORDER — ONDANSETRON 4 MG/1
4 TABLET, ORALLY DISINTEGRATING ORAL EVERY 8 HOURS PRN
Status: DISCONTINUED | OUTPATIENT
Start: 2021-12-16 | End: 2021-12-21 | Stop reason: HOSPADM

## 2021-12-16 RX ORDER — FUROSEMIDE 10 MG/ML
40 INJECTION INTRAMUSCULAR; INTRAVENOUS DAILY
Status: DISCONTINUED | OUTPATIENT
Start: 2021-12-17 | End: 2021-12-18

## 2021-12-16 RX ORDER — SODIUM CHLORIDE 0.9 % (FLUSH) 0.9 %
5-40 SYRINGE (ML) INJECTION EVERY 12 HOURS SCHEDULED
Status: DISCONTINUED | OUTPATIENT
Start: 2021-12-16 | End: 2021-12-21 | Stop reason: HOSPADM

## 2021-12-16 RX ORDER — FUROSEMIDE 40 MG/1
40 TABLET ORAL DAILY
Status: DISCONTINUED | OUTPATIENT
Start: 2021-12-16 | End: 2021-12-16

## 2021-12-16 RX ORDER — FUROSEMIDE 10 MG/ML
40 INJECTION INTRAMUSCULAR; INTRAVENOUS ONCE
Status: COMPLETED | OUTPATIENT
Start: 2021-12-16 | End: 2021-12-16

## 2021-12-16 RX ORDER — ACETAMINOPHEN 650 MG/1
650 SUPPOSITORY RECTAL EVERY 8 HOURS PRN
Status: DISCONTINUED | OUTPATIENT
Start: 2021-12-16 | End: 2021-12-21 | Stop reason: HOSPADM

## 2021-12-16 RX ORDER — ACETAMINOPHEN 650 MG/1
650 SUPPOSITORY RECTAL EVERY 8 HOURS PRN
COMMUNITY

## 2021-12-16 RX ORDER — BENZONATATE 100 MG/1
100 CAPSULE ORAL 3 TIMES DAILY PRN
Status: DISCONTINUED | OUTPATIENT
Start: 2021-12-16 | End: 2021-12-21 | Stop reason: HOSPADM

## 2021-12-16 RX ORDER — TAMSULOSIN HYDROCHLORIDE 0.4 MG/1
0.4 CAPSULE ORAL DAILY
Status: DISCONTINUED | OUTPATIENT
Start: 2021-12-16 | End: 2021-12-17

## 2021-12-16 RX ORDER — DEXTROSE MONOHYDRATE 50 MG/ML
100 INJECTION, SOLUTION INTRAVENOUS PRN
Status: DISCONTINUED | OUTPATIENT
Start: 2021-12-16 | End: 2021-12-21 | Stop reason: HOSPADM

## 2021-12-16 RX ORDER — ALBUTEROL SULFATE 2.5 MG/3ML
2.5 SOLUTION RESPIRATORY (INHALATION)
Status: DISCONTINUED | OUTPATIENT
Start: 2021-12-16 | End: 2021-12-17

## 2021-12-16 RX ORDER — AZITHROMYCIN 250 MG/1
500 TABLET, FILM COATED ORAL EVERY 24 HOURS
Status: DISCONTINUED | OUTPATIENT
Start: 2021-12-16 | End: 2021-12-16

## 2021-12-16 RX ORDER — NICOTINE POLACRILEX 4 MG
15 LOZENGE BUCCAL PRN
Status: DISCONTINUED | OUTPATIENT
Start: 2021-12-16 | End: 2021-12-21 | Stop reason: HOSPADM

## 2021-12-16 RX ORDER — POLYETHYLENE GLYCOL 3350 17 G/17G
17 POWDER, FOR SOLUTION ORAL DAILY PRN
Status: DISCONTINUED | OUTPATIENT
Start: 2021-12-16 | End: 2021-12-16

## 2021-12-16 RX ORDER — TRAZODONE HYDROCHLORIDE 50 MG/1
50 TABLET ORAL NIGHTLY
Status: DISCONTINUED | OUTPATIENT
Start: 2021-12-16 | End: 2021-12-17

## 2021-12-16 RX ORDER — SODIUM CHLORIDE 0.9 % (FLUSH) 0.9 %
5-40 SYRINGE (ML) INJECTION PRN
Status: DISCONTINUED | OUTPATIENT
Start: 2021-12-16 | End: 2021-12-21 | Stop reason: HOSPADM

## 2021-12-16 RX ORDER — DEXTROSE MONOHYDRATE 25 G/50ML
12.5 INJECTION, SOLUTION INTRAVENOUS PRN
Status: DISCONTINUED | OUTPATIENT
Start: 2021-12-16 | End: 2021-12-21 | Stop reason: HOSPADM

## 2021-12-16 RX ORDER — GLIPIZIDE 5 MG/1
5 TABLET ORAL
Status: DISCONTINUED | OUTPATIENT
Start: 2021-12-16 | End: 2021-12-21 | Stop reason: HOSPADM

## 2021-12-16 RX ORDER — ACETAMINOPHEN 325 MG/1
650 TABLET ORAL EVERY 6 HOURS PRN
Status: DISCONTINUED | OUTPATIENT
Start: 2021-12-16 | End: 2021-12-21 | Stop reason: HOSPADM

## 2021-12-16 RX ORDER — BUDESONIDE 0.5 MG/2ML
0.5 INHALANT ORAL 2 TIMES DAILY
Status: DISCONTINUED | OUTPATIENT
Start: 2021-12-16 | End: 2021-12-21 | Stop reason: HOSPADM

## 2021-12-16 RX ORDER — ALBUTEROL SULFATE 2.5 MG/3ML
2.5 SOLUTION RESPIRATORY (INHALATION) EVERY 4 HOURS PRN
Status: DISCONTINUED | OUTPATIENT
Start: 2021-12-16 | End: 2021-12-21 | Stop reason: HOSPADM

## 2021-12-16 RX ORDER — ACETAMINOPHEN 650 MG/1
650 SUPPOSITORY RECTAL EVERY 6 HOURS PRN
Status: DISCONTINUED | OUTPATIENT
Start: 2021-12-16 | End: 2021-12-21 | Stop reason: HOSPADM

## 2021-12-16 RX ORDER — ONDANSETRON 2 MG/ML
4 INJECTION INTRAMUSCULAR; INTRAVENOUS EVERY 6 HOURS PRN
Status: DISCONTINUED | OUTPATIENT
Start: 2021-12-16 | End: 2021-12-21 | Stop reason: HOSPADM

## 2021-12-16 RX ORDER — ASPIRIN 81 MG/1
81 TABLET ORAL NIGHTLY
Status: DISCONTINUED | OUTPATIENT
Start: 2021-12-16 | End: 2021-12-21 | Stop reason: HOSPADM

## 2021-12-16 RX ORDER — ZINC SULFATE 50(220)MG
100 CAPSULE ORAL DAILY
Status: DISCONTINUED | OUTPATIENT
Start: 2021-12-16 | End: 2021-12-21 | Stop reason: HOSPADM

## 2021-12-16 RX ORDER — VITAMIN B COMPLEX
1000 TABLET ORAL DAILY
Status: DISCONTINUED | OUTPATIENT
Start: 2021-12-16 | End: 2021-12-21 | Stop reason: HOSPADM

## 2021-12-16 RX ORDER — AMIODARONE HYDROCHLORIDE 200 MG/1
200 TABLET ORAL 2 TIMES DAILY
Status: DISCONTINUED | OUTPATIENT
Start: 2021-12-16 | End: 2021-12-21 | Stop reason: HOSPADM

## 2021-12-16 RX ORDER — ATORVASTATIN CALCIUM 40 MG/1
40 TABLET, FILM COATED ORAL NIGHTLY
Status: DISCONTINUED | OUTPATIENT
Start: 2021-12-16 | End: 2021-12-21 | Stop reason: HOSPADM

## 2021-12-16 RX ORDER — FUROSEMIDE 10 MG/ML
20 INJECTION INTRAMUSCULAR; INTRAVENOUS ONCE
Status: COMPLETED | OUTPATIENT
Start: 2021-12-16 | End: 2021-12-16

## 2021-12-16 RX ORDER — METOPROLOL SUCCINATE 25 MG/1
25 TABLET, EXTENDED RELEASE ORAL DAILY
Status: DISCONTINUED | OUTPATIENT
Start: 2021-12-16 | End: 2021-12-21 | Stop reason: HOSPADM

## 2021-12-16 RX ORDER — IPRATROPIUM BROMIDE AND ALBUTEROL SULFATE 2.5; .5 MG/3ML; MG/3ML
SOLUTION RESPIRATORY (INHALATION)
Status: COMPLETED
Start: 2021-12-16 | End: 2021-12-16

## 2021-12-16 RX ORDER — ZOLPIDEM TARTRATE 5 MG/1
5 TABLET ORAL NIGHTLY PRN
Status: DISCONTINUED | OUTPATIENT
Start: 2021-12-16 | End: 2021-12-21 | Stop reason: HOSPADM

## 2021-12-16 RX ORDER — FAMOTIDINE 10 MG
10 TABLET ORAL NIGHTLY
Status: DISCONTINUED | OUTPATIENT
Start: 2021-12-16 | End: 2021-12-21 | Stop reason: HOSPADM

## 2021-12-16 RX ORDER — 0.9 % SODIUM CHLORIDE 0.9 %
500 INTRAVENOUS SOLUTION INTRAVENOUS ONCE
Status: COMPLETED | OUTPATIENT
Start: 2021-12-16 | End: 2021-12-16

## 2021-12-16 RX ORDER — SODIUM BICARBONATE 650 MG/1
650 TABLET ORAL 2 TIMES DAILY
Status: DISCONTINUED | OUTPATIENT
Start: 2021-12-16 | End: 2021-12-19

## 2021-12-16 RX ORDER — CLOPIDOGREL BISULFATE 75 MG/1
75 TABLET ORAL DAILY
Status: DISCONTINUED | OUTPATIENT
Start: 2021-12-16 | End: 2021-12-21 | Stop reason: HOSPADM

## 2021-12-16 RX ADMIN — DOXYCYCLINE 100 MG: 100 INJECTION, POWDER, LYOPHILIZED, FOR SOLUTION INTRAVENOUS at 17:31

## 2021-12-16 RX ADMIN — GLIPIZIDE 5 MG: 5 TABLET ORAL at 17:22

## 2021-12-16 RX ADMIN — AZITHROMYCIN MONOHYDRATE 500 MG: 500 INJECTION, POWDER, LYOPHILIZED, FOR SOLUTION INTRAVENOUS at 13:03

## 2021-12-16 RX ADMIN — FAMOTIDINE 10 MG: 10 TABLET ORAL at 21:58

## 2021-12-16 RX ADMIN — FUROSEMIDE 40 MG: 10 INJECTION, SOLUTION INTRAMUSCULAR; INTRAVENOUS at 17:22

## 2021-12-16 RX ADMIN — AMIODARONE HYDROCHLORIDE 200 MG: 200 TABLET ORAL at 21:58

## 2021-12-16 RX ADMIN — TAMSULOSIN HYDROCHLORIDE 0.4 MG: 0.4 CAPSULE ORAL at 17:21

## 2021-12-16 RX ADMIN — CEFTRIAXONE 1000 MG: 1 INJECTION, POWDER, FOR SOLUTION INTRAMUSCULAR; INTRAVENOUS at 11:50

## 2021-12-16 RX ADMIN — TRAZODONE HYDROCHLORIDE 50 MG: 50 TABLET ORAL at 21:58

## 2021-12-16 RX ADMIN — ASPIRIN 81 MG: 81 TABLET, COATED ORAL at 21:58

## 2021-12-16 RX ADMIN — ZINC SULFATE 220 MG (50 MG) CAPSULE 100 MG: CAPSULE at 17:22

## 2021-12-16 RX ADMIN — SODIUM CHLORIDE, PRESERVATIVE FREE 10 ML: 5 INJECTION INTRAVENOUS at 22:01

## 2021-12-16 RX ADMIN — IPRATROPIUM BROMIDE AND ALBUTEROL SULFATE 3 ML: 2.5; .5 SOLUTION RESPIRATORY (INHALATION) at 16:38

## 2021-12-16 RX ADMIN — BUDESONIDE 500 MCG: 0.5 SUSPENSION RESPIRATORY (INHALATION) at 20:37

## 2021-12-16 RX ADMIN — SODIUM CHLORIDE 250 ML: 9 INJECTION, SOLUTION INTRAVENOUS at 11:41

## 2021-12-16 RX ADMIN — ENOXAPARIN SODIUM 30 MG: 100 INJECTION SUBCUTANEOUS at 17:28

## 2021-12-16 RX ADMIN — ATORVASTATIN CALCIUM 40 MG: 40 TABLET, FILM COATED ORAL at 21:58

## 2021-12-16 RX ADMIN — INSULIN LISPRO 4 UNITS: 100 INJECTION, SOLUTION INTRAVENOUS; SUBCUTANEOUS at 17:27

## 2021-12-16 RX ADMIN — SODIUM BICARBONATE TAB 650 MG 650 MG: 650 TAB at 21:59

## 2021-12-16 RX ADMIN — FUROSEMIDE 20 MG: 10 INJECTION, SOLUTION INTRAVENOUS at 11:17

## 2021-12-16 RX ADMIN — IPRATROPIUM BROMIDE AND ALBUTEROL SULFATE 3 ML: 2.5; .5 SOLUTION RESPIRATORY (INHALATION) at 20:37

## 2021-12-16 RX ADMIN — METOPROLOL SUCCINATE 25 MG: 25 TABLET, EXTENDED RELEASE ORAL at 17:22

## 2021-12-16 RX ADMIN — APIXABAN 2.5 MG: 2.5 TABLET, FILM COATED ORAL at 21:58

## 2021-12-16 RX ADMIN — Medication 1000 UNITS: at 17:22

## 2021-12-16 RX ADMIN — CLOPIDOGREL BISULFATE 75 MG: 75 TABLET ORAL at 17:22

## 2021-12-16 ASSESSMENT — ENCOUNTER SYMPTOMS
COUGH: 0
TROUBLE SWALLOWING: 0
SHORTNESS OF BREATH: 1
ABDOMINAL PAIN: 0
BACK PAIN: 0
CHEST TIGHTNESS: 0
VOICE CHANGE: 0
BLOOD IN STOOL: 0
DIARRHEA: 0
VOMITING: 0
NAUSEA: 0

## 2021-12-16 ASSESSMENT — PAIN SCALES - GENERAL
PAINLEVEL_OUTOF10: 0
PAINLEVEL_OUTOF10: 0

## 2021-12-16 NOTE — ED NOTES
Dr Michael Osorio returned call. Currently speaking with Dr. Radha Grider.      Oneal MCCOY Reser  12/16/21 1494

## 2021-12-16 NOTE — ED NOTES
Fly Baumann from lab called with critical troponin of 184--Dr. Ralph Arriaza made aware     Tristian Hines, RN  12/16/21 0471

## 2021-12-16 NOTE — ED NOTES
Left message for Dr. Bueno Filter per Dr. Martinez Rolando request.     Camila Healy Reser  12/16/21 1051

## 2021-12-16 NOTE — PROGRESS NOTES
Writer attempted to bladder scan patient as he is having a lot of discomfort in his lower abdomen and isn't able to urinate, only constantly trickle. Patient stated that bladder scan was too painful for writer to complete. Robertson catheter placed at [phone] and return of 630 ml of clear, yellow urine. Sample sent to the lab for urinalysis with reflex to culture.

## 2021-12-16 NOTE — ED NOTES
Family stepped out, lights dimmed to promote rest.  VS as charted. Respirations unlabored.       Yumiko Sheehan RN  12/16/21 [id number]

## 2021-12-16 NOTE — ED PROVIDER NOTES
[facility] ED    EMERGENCY MEDICINE     Pt Name: Romario Zambrano  MRN: [id number]  [place] 10/31/1930  Date of evaluation: 12/16/2021  Provider: Allie Cespedes DO, [address]       Chief Complaint   Patient presents with    Shortness of Breath     worsening this AM; with recent history of covid pneumonia    Leg Swelling     left leg and arm noted by nursing staff this AM       HISTORY OF PRESENT ILLNESS    Romario Zambrano is a pleasant 80 y.o. male   Presents to the emergency department from [facility]  Apparently low oxygen saturations and fluctuating level of conscioussness noted today  Pt without new complaints, states he is always short of breath  Concerns of low oxygen levels at facility  Pt is recently post-covid pneumonia, hospitalized here a week ago for hyperkalemia and JANINA  EMS noted pt wheezing and started continuous albuterol on him. On arrival baseline sats are in mid80's        Triage notes and Nursing notes were reviewed by myself. Any discrepancies are addressed above.     PAST MEDICAL HISTORY     Past Medical History:   Diagnosis Date    Acute and chronic respiratory failure with hypoxia ([address] 11/16/2021    CAD (coronary artery disease)     Cataracts, bilateral     Elevated troponin 12/7/2021    Glaucoma     Hyperlipidemia     Hypertension     TIA (transient ischemic attack)     Type II or unspecified type diabetes mellitus without mention of complication, not stated as uncontrolled        SURGICAL HISTORY       Past Surgical History:   Procedure Laterality Date    CATARACT REMOVAL WITH IMPLANT Bilateral 11/219/2013 and 12/3/2013    CORONARY ARTERY BYPASS GRAFT  2000    INGUINAL HERNIA REPAIR Bilateral 06/19/12       CURRENT MEDICATIONS       Previous Medications    ALBUTEROL (PROVENTIL) (2.5 MG/3ML) 0.083% NEBULIZER SOLUTION    Take 3 mLs by nebulization every 4 hours as needed for Wheezing    AMIODARONE (CORDARONE) 200 MG TABLET    Take 1 tablet by mouth 2 times daily    APIXABAN (ELIQUIS) 2.5 MG TABS TABLET    Take 1 tablet by mouth 2 times daily    ASCORBIC ACID (VITAMIN C) 1000 MG TABLET    Take 0.5 tablets by mouth 2 times daily    ASPIRIN 81 MG EC TABLET    Take 81 mg by mouth nightly     ATORVASTATIN (LIPITOR) 40 MG TABLET    Take 1 tablet by mouth nightly    BENZONATATE (TESSALON) 100 MG CAPSULE    Take 100 mg by mouth 3 times daily as needed for Cough    BUDESONIDE (PULMICORT) 0.5 MG/2ML NEBULIZER SUSPENSION    Take 2 mLs by nebulization 2 times daily    CIPROFLOXACIN (CIPRO) 500 MG TABLET    Take 1 tablet by mouth 2 times daily for 14 days    CLOPIDOGREL (PLAVIX) 75 MG TABLET    Take 1 tablet by mouth daily    FAMOTIDINE (PEPCID) 10 MG TABLET    Take 1 tablet by mouth nightly    FUROSEMIDE (LASIX) 40 MG TABLET    Take 40 mg by mouth daily    GLIPIZIDE (GLUCOTROL) 5 MG TABLET    Take 5 mg by mouth 2 times daily (before meals)    IPRATROPIUM-ALBUTEROL (DUONEB) 0.5-2.5 (3) MG/3ML SOLN NEBULIZER SOLUTION    Inhale 3 mLs into the lungs 4 times daily    METOPROLOL SUCCINATE (TOPROL XL) 25 MG EXTENDED RELEASE TABLET    Take 1 tablet by mouth daily    POLYETHYLENE GLYCOL (GLYCOLAX) 17 G PACKET    Take 17 g by mouth daily as needed for Constipation    POLYETHYLENE GLYCOL (GLYCOLAX) 17 GM/SCOOP POWDER    Take 17 g by mouth daily    SODIUM BICARBONATE 650 MG TABLET    Take 1 tablet by mouth 2 times daily    TAMSULOSIN (FLOMAX) 0.4 MG CAPSULE    Take 1 capsule by mouth daily    TRAZODONE (DESYREL) 50 MG TABLET    Take 50 mg by mouth nightly    VITAMIN D3 (CHOLECALCIFEROL) 25 MCG (1000 UT) TABS TABLET    Take 1 tablet by mouth daily    ZINC SULFATE (ZINCATE) 220 (50 ZN) MG CAPSULE    Take 2 capsules by mouth daily    ZOLPIDEM (AMBIEN) 5 MG TABLET    Take 5 mg by mouth nightly as needed for Sleep. ALLERGIES     Patient has no known allergies.     FAMILY HISTORY       Family History   Problem Relation Age of Onset    Heart Disease Father     Heart Disease Brother SOCIAL HISTORY       Social History     Socioeconomic History    Marital status:      Spouse name: Not on file    Number of children: Not on file    Years of education: Not on file    Highest education level: Not on file   Occupational History    Not on file   Tobacco Use    Smoking status: Never Smoker    Smokeless tobacco: Never Used   Substance and Sexual Activity    Alcohol use: Yes     Comment: very very very seldom    Drug use: No    Sexual activity: Not on file   Other Topics Concern    Not on file   Social History Narrative    Not on file     Social Determinants of Health     Financial Resource Strain:     Difficulty of Paying Living Expenses: Not on file   Food Insecurity:     Worried About [address] in the Last Year: Not on file    Mera of Food in the Last Year: Not on file   Transportation Needs:     Lack of Transportation (Medical): Not on file    Lack of Transportation (Non-Medical): Not on file   Physical Activity:     Days of Exercise per Week: Not on file    Minutes of Exercise per Session: Not on file   Stress:     Feeling of Stress : Not on file   Social Connections:     Frequency of Communication with Friends and Family: Not on file    Frequency of Social Gatherings with Friends and Family: Not on file    Attends Worship Services: Not on file    Active Member of [address] [place] [address] [employer] or Organizations: Not on file    Attends Club or Organization Meetings: Not on file    Marital Status: Not on file   Intimate Partner Violence:     Fear of Current or Ex-Partner: Not on file    Emotionally Abused: Not on file    Physically Abused: Not on file    Sexually Abused: Not on file   Housing Stability:     Unable to Pay for Housing in the Last Year: Not on file    Number of [place] in the Last Year: Not on file    Unstable Housing in the Last Year: Not on file       REVIEW OF SYSTEMS     Review of Systems   Constitutional: Positive for fatigue.  Negative for chills, diaphoresis and fever. HENT: Negative for trouble swallowing and voice change. Eyes: Negative for visual disturbance. Respiratory: Positive for shortness of breath. Negative for cough and chest tightness. Cardiovascular: Negative for chest pain and leg swelling. Gastrointestinal: Negative for abdominal pain, blood in stool, diarrhea, nausea and vomiting. Genitourinary: Negative for dysuria, frequency and hematuria. Musculoskeletal: Negative for back pain and neck pain. Skin: Negative for rash and wound. Neurological: Negative for speech difficulty, weakness, numbness and headaches. Psychiatric/Behavioral: Negative for confusion. Except as noted above the remainder of the review of systems was reviewed and is. PHYSICAL EXAM    (up to 7 for level 4, 8 or more for level 5)     ED Triage Vitals [12/16/21 0836]   BP Temp Temp Source Pulse Resp SpO2 Height Weight   (!) 156/72 97.4 °F (36.3 °C) Tympanic 76 (!) 32 92 % 5' 4\" (1.626 m) 168 lb (76.2 kg)       Physical Exam  Vitals and nursing note reviewed. Constitutional:       General: He is not in acute distress. Appearance: He is well-developed. He is ill-appearing. He is not toxic-appearing or diaphoretic. Comments: elderly   HENT:      Head: Normocephalic and atraumatic. Eyes:      General: No scleral icterus. Conjunctiva/sclera: Conjunctivae normal.      Right eye: Right conjunctiva is not injected. Left eye: Left conjunctiva is not injected. Pupils: Pupils are equal, round, and reactive to light. Neck:      Thyroid: No thyromegaly. Trachea: No tracheal deviation. Cardiovascular:      Rate and Rhythm: Normal rate and regular rhythm. Heart sounds: Normal heart sounds. No murmur heard. No friction rub. No gallop. Pulmonary:      Effort: Pulmonary effort is normal. Tachypnea present. No respiratory distress. Breath sounds: No stridor. Wheezing present. No rales.       Comments: Hypoxic on arrival  Abdominal:      General: Bowel sounds are normal. There is no distension. Palpations: Abdomen is soft. There is no mass. Tenderness: There is no abdominal tenderness. There is no guarding or rebound. Comments: Negative Tomas's sign  Nontender McBurney's Point  Negative Rovsig's sign  No bruising or echymosis of abdomen   Musculoskeletal:         General: No tenderness. Cervical back: Normal range of motion and neck supple. Left lower leg: Edema present. Comments: Negative Miguel Angel's Sign bilaterally   Lymphadenopathy:      Cervical: No cervical adenopathy. Skin:     General: Skin is warm and dry. Coloration: Skin is not pale. Findings: No erythema or rash. Neurological:      Mental Status: He is alert and oriented to person, place, and time. Cranial Nerves: No cranial nerve deficit. Motor: No abnormal muscle tone. Coordination: Coordination normal.      Comments: No nystagmus   Psychiatric:         Behavior: Behavior normal.         Thought Content: Thought content normal.         DIAGNOSTIC RESULTS     EKG:(none if blank)  All EKG's are interpreted by [facility] who either signs or Co-signs this chart in the absence of a cardiologist.    NSR no ischemia isolated TW inversion lead 3    RADIOLOGY: (none if blank)   Interpretation per the Radiologistbelow, if available at the time of this note:    XR CHEST PORTABLE   Final Result   Stable multifocal infiltrates suggesting pneumonia probable small effusions   greater on the left unchanged.          VL DUP LOWER EXTREMITY VENOUS LEFT    (Results Pending)       LABS:  Labs Reviewed   CBC WITH AUTO DIFFERENTIAL - Abnormal; Notable for the following components:       Result Value    RBC 2.74 (*)     Hemoglobin 8.2 (*)     Hematocrit 27.5 (*)     RDW 15.5 (*)     Seg Neutrophils 67 (*)     Lymphocytes 21 (*)     Immature Granulocytes 1 (*)     All other components within normal limits TROPONIN - Abnormal; Notable for the following components:    Troponin, High Sensitivity 184 (*)     All other components within normal limits   COMPREHENSIVE METABOLIC PANEL W/ REFLEX TO MG FOR LOW K - Abnormal; Notable for the following components:    Glucose 308 (*)     BUN 51 (*)     CREATININE 2.51 (*)     Potassium 5.5 (*)     Total Protein 6.0 (*)     Albumin 2.6 (*)     Albumin/Globulin Ratio 0.8 (*)     GFR Non- 24 (*)     GFR  29 (*)     All other components within normal limits   COVID-19, RAPID   CULTURE, BLOOD 1   CULTURE, BLOOD 1   LACTATE, SEPSIS   BRAIN NATRIURETIC PEPTIDE   LACTATE, SEPSIS   C-REACTIVE PROTEIN   TROPONIN       All other labs were within normal range or not returned as of this dictation. Please note, any cultures that may have been sent were not resulted at the time of this patient visit. EMERGENCY DEPARTMENT COURSE andMedical Decision Making:     MDM/     Patient presents hypoxic, recent Covid pneumonia brings up the possibility of a superimposed bacterial pneumonia. Given his progressive increase of oxygen demand along with the rest of the work-up, will start him on Rocephin, azithromycin. He is mildly hyperkalemic, is given Lasix and IV fluids. Troponin is elevated and will be rechecked, although EKG shows no evidence of acute ischemia and the symptoms are not consistent with ACS. Does have isolated lower extremity edema, DVT study is negative. He will be admitted for further evaluation care. The patient was seen at a time when COVID-19 was severely surging in the region, placing a significant strain on available resources and personnel.           ED Medications administered this visit:    Medications   cefTRIAXone (ROCEPHIN) 1000 mg IVPB in 50 mL D5W minibag (has no administration in time range)   azithromycin (ZITHROMAX) 500 mg in D5W 250ml addavial (has no administration in time range)   furosemide (LASIX) injection 20 mg (has no administration in time range)   0.9 % sodium chloride bolus (has no administration in time range)         Procedures: (None if blank)       CLINICAL       1. Pneumonia due to infectious organism, unspecified laterality, unspecified part of lung    2. Hyperkalemia    3. Elevated troponin          DISPOSITION/PLAN   DISPOSITION Decision To Admit 12/16/2021 10:52:26 AM      PATIENT REFERRED TO:  No follow-up provider specified.     DISCHARGE MEDICATIONS:  New Prescriptions    No medications on file     @[facility]([id number]:LAST:1)      (Please note that portions of this note were completed with a voice recognition program.  Efforts were made to edit the dictations but occasionallywords are mis-transcribed.)      Marquise Little DO,FACEP (electronically signed)  Attending Physician, Emergency [address], DO  12/16/21 8125

## 2021-12-16 NOTE — PROGRESS NOTES
Writer called and spoke with Dr. Tova Rose at this time about patient at this time. New orders placed. Patient had del rio catheter removed that was in place from patients previous stay. Patient was to have Nephrology and Urology appointment today that he was unable to go to due to coming to the ED. Patient voided very little in the ED. If patient is retaining urine, per Dr. Tova Rose it is okay to place indwelling del rio catheter.

## 2021-12-17 PROBLEM — E44.0 MODERATE MALNUTRITION (HCC): Status: ACTIVE | Noted: 2021-12-17

## 2021-12-17 LAB
ABSOLUTE EOS #: 0.3 K/UL (ref 0–0.44)
ABSOLUTE IMMATURE GRANULOCYTE: 0.07 K/UL (ref 0–0.3)
ABSOLUTE LYMPH #: 1.83 K/UL (ref 1.1–3.7)
ABSOLUTE MONO #: 0.73 K/UL (ref 0.1–1.2)
ADENOVIRUS PCR: NOT DETECTED
ALBUMIN SERPL-MCNC: 2.3 G/DL (ref 3.5–5.2)
ALBUMIN/GLOBULIN RATIO: 0.8 (ref 1–2.5)
ALP BLD-CCNC: 90 U/L (ref 40–129)
ALT SERPL-CCNC: 13 U/L (ref 5–41)
ANION GAP SERPL CALCULATED.3IONS-SCNC: 8 MMOL/L (ref 9–17)
AST SERPL-CCNC: 14 U/L
BASOPHILS # BLD: 0 % (ref 0–2)
BASOPHILS ABSOLUTE: <0.03 K/UL (ref 0–0.2)
BILIRUB SERPL-MCNC: 0.43 MG/DL (ref 0.3–1.2)
BORDETELLA PARAPERTUSSIS: NOT DETECTED
BORDETELLA PERTUSSIS PCR: NOT DETECTED
BUN BLDV-MCNC: 52 MG/DL (ref 8–23)
BUN/CREAT BLD: 20 (ref 9–20)
C-REACTIVE PROTEIN: 53.8 MG/L (ref 0–5)
CALCIUM SERPL-MCNC: 8.4 MG/DL (ref 8.6–10.4)
CHLAMYDIA PNEUMONIAE BY PCR: NOT DETECTED
CHLORIDE BLD-SCNC: 100 MMOL/L (ref 98–107)
CO2: 29 MMOL/L (ref 20–31)
CORONAVIRUS 229E PCR: NOT DETECTED
CORONAVIRUS HKU1 PCR: NOT DETECTED
CORONAVIRUS NL63 PCR: NOT DETECTED
CORONAVIRUS OC43 PCR: NOT DETECTED
CREAT SERPL-MCNC: 2.58 MG/DL (ref 0.7–1.2)
DIFFERENTIAL TYPE: ABNORMAL
EOSINOPHILS RELATIVE PERCENT: 4 % (ref 1–4)
ESTIMATED AVERAGE GLUCOSE: 249 MG/DL
GFR AFRICAN AMERICAN: 28 ML/MIN
GFR NON-AFRICAN AMERICAN: 23 ML/MIN
GFR SERPL CREATININE-BSD FRML MDRD: ABNORMAL ML/MIN/{1.73_M2}
GFR SERPL CREATININE-BSD FRML MDRD: ABNORMAL ML/MIN/{1.73_M2}
GLUCOSE BLD-MCNC: 198 MG/DL (ref 74–100)
GLUCOSE BLD-MCNC: 207 MG/DL (ref 70–99)
GLUCOSE BLD-MCNC: 252 MG/DL (ref 74–100)
GLUCOSE BLD-MCNC: 321 MG/DL (ref 74–100)
GLUCOSE BLD-MCNC: 332 MG/DL (ref 74–100)
GLUCOSE BLD-MCNC: 337 MG/DL (ref 74–100)
HBA1C MFR BLD: 10.3 % (ref 4–6)
HCT VFR BLD CALC: 25 % (ref 40.7–50.3)
HEMOGLOBIN: 7.6 G/DL (ref 13–17)
HUMAN METAPNEUMOVIRUS PCR: NOT DETECTED
IMMATURE GRANULOCYTES: 1 %
INFLUENZA A BY PCR: NOT DETECTED
INFLUENZA A H1 (2009) PCR: NORMAL
INFLUENZA A H1 PCR: NORMAL
INFLUENZA A H3 PCR: NORMAL
INFLUENZA B BY PCR: NOT DETECTED
LYMPHOCYTES # BLD: 22 % (ref 24–43)
MCH RBC QN AUTO: 30.4 PG (ref 25.2–33.5)
MCHC RBC AUTO-ENTMCNC: 30.4 G/DL (ref 28.4–34.8)
MCV RBC AUTO: 100 FL (ref 82.6–102.9)
MONOCYTES # BLD: 9 % (ref 3–12)
MYCOPLASMA PNEUMONIAE PCR: NOT DETECTED
NRBC AUTOMATED: 0 PER 100 WBC
PARAINFLUENZA 1 PCR: NOT DETECTED
PARAINFLUENZA 2 PCR: NOT DETECTED
PARAINFLUENZA 3 PCR: NOT DETECTED
PARAINFLUENZA 4 PCR: NOT DETECTED
PDW BLD-RTO: 15.6 % (ref 11.8–14.4)
PLATELET # BLD: 205 K/UL (ref 138–453)
PLATELET ESTIMATE: ABNORMAL
PMV BLD AUTO: 10.9 FL (ref 8.1–13.5)
POTASSIUM SERPL-SCNC: 4.9 MMOL/L (ref 3.7–5.3)
RBC # BLD: 2.5 M/UL (ref 4.21–5.77)
RBC # BLD: ABNORMAL 10*6/UL
RESP SYNCYTIAL VIRUS PCR: NOT DETECTED
RHINO/ENTEROVIRUS PCR: NOT DETECTED
SARS-COV-2, PCR: NOT DETECTED
SEG NEUTROPHILS: 64 % (ref 36–65)
SEGMENTED NEUTROPHILS ABSOLUTE COUNT: 5.53 K/UL (ref 1.5–8.1)
SODIUM BLD-SCNC: 137 MMOL/L (ref 135–144)
SPECIMEN DESCRIPTION: NORMAL
TOTAL PROTEIN: 5.3 G/DL (ref 6.4–8.3)
WBC # BLD: 8.5 K/UL (ref 3.5–11.3)
WBC # BLD: ABNORMAL 10*3/UL

## 2021-12-17 PROCEDURE — 2580000003 HC RX 258: Performed by: INTERNAL MEDICINE

## 2021-12-17 PROCEDURE — 2700000000 HC OXYGEN THERAPY PER DAY

## 2021-12-17 PROCEDURE — 6370000000 HC RX 637 (ALT 250 FOR IP): Performed by: INTERNAL MEDICINE

## 2021-12-17 PROCEDURE — 1200000000 HC SEMI PRIVATE

## 2021-12-17 PROCEDURE — 2500000003 HC RX 250 WO HCPCS: Performed by: NURSE PRACTITIONER

## 2021-12-17 PROCEDURE — 92610 EVALUATE SWALLOWING FUNCTION: CPT

## 2021-12-17 PROCEDURE — 99232 SBSQ HOSP IP/OBS MODERATE 35: CPT | Performed by: INTERNAL MEDICINE

## 2021-12-17 PROCEDURE — 6360000002 HC RX W HCPCS: Performed by: INTERNAL MEDICINE

## 2021-12-17 PROCEDURE — 6370000000 HC RX 637 (ALT 250 FOR IP): Performed by: NURSE PRACTITIONER

## 2021-12-17 PROCEDURE — 97530 THERAPEUTIC ACTIVITIES: CPT

## 2021-12-17 PROCEDURE — 2580000003 HC RX 258: Performed by: NURSE PRACTITIONER

## 2021-12-17 PROCEDURE — 94640 AIRWAY INHALATION TREATMENT: CPT

## 2021-12-17 PROCEDURE — 82947 ASSAY GLUCOSE BLOOD QUANT: CPT

## 2021-12-17 PROCEDURE — 85025 COMPLETE CBC W/AUTO DIFF WBC: CPT

## 2021-12-17 PROCEDURE — 36415 COLL VENOUS BLD VENIPUNCTURE: CPT

## 2021-12-17 PROCEDURE — 80053 COMPREHEN METABOLIC PANEL: CPT

## 2021-12-17 PROCEDURE — 94761 N-INVAS EAR/PLS OXIMETRY MLT: CPT

## 2021-12-17 PROCEDURE — 97167 OT EVAL HIGH COMPLEX 60 MIN: CPT

## 2021-12-17 PROCEDURE — 97162 PT EVAL MOD COMPLEX 30 MIN: CPT

## 2021-12-17 RX ORDER — MIDODRINE HYDROCHLORIDE 5 MG/1
5 TABLET ORAL
Status: DISCONTINUED | OUTPATIENT
Start: 2021-12-17 | End: 2021-12-20

## 2021-12-17 RX ORDER — TAMSULOSIN HYDROCHLORIDE 0.4 MG/1
0.4 CAPSULE ORAL 2 TIMES DAILY
Status: DISCONTINUED | OUTPATIENT
Start: 2021-12-17 | End: 2021-12-21 | Stop reason: HOSPADM

## 2021-12-17 RX ORDER — FUROSEMIDE 10 MG/ML
60 INJECTION INTRAMUSCULAR; INTRAVENOUS ONCE
Status: COMPLETED | OUTPATIENT
Start: 2021-12-17 | End: 2021-12-17

## 2021-12-17 RX ORDER — TRAZODONE HYDROCHLORIDE 50 MG/1
50 TABLET ORAL NIGHTLY
Status: DISCONTINUED | OUTPATIENT
Start: 2021-12-17 | End: 2021-12-21 | Stop reason: HOSPADM

## 2021-12-17 RX ADMIN — AMIODARONE HYDROCHLORIDE 200 MG: 200 TABLET ORAL at 08:47

## 2021-12-17 RX ADMIN — FAMOTIDINE 10 MG: 10 TABLET ORAL at 22:25

## 2021-12-17 RX ADMIN — ASPIRIN 81 MG: 81 TABLET, COATED ORAL at 22:25

## 2021-12-17 RX ADMIN — AMIODARONE HYDROCHLORIDE 200 MG: 200 TABLET ORAL at 22:26

## 2021-12-17 RX ADMIN — FUROSEMIDE 40 MG: 10 INJECTION, SOLUTION INTRAMUSCULAR; INTRAVENOUS at 08:49

## 2021-12-17 RX ADMIN — INSULIN LISPRO 8 UNITS: 100 INJECTION, SOLUTION INTRAVENOUS; SUBCUTANEOUS at 13:48

## 2021-12-17 RX ADMIN — SODIUM CHLORIDE, PRESERVATIVE FREE 10 ML: 5 INJECTION INTRAVENOUS at 09:03

## 2021-12-17 RX ADMIN — ZINC SULFATE 220 MG (50 MG) CAPSULE 100 MG: CAPSULE at 08:47

## 2021-12-17 RX ADMIN — CLOPIDOGREL BISULFATE 75 MG: 75 TABLET ORAL at 08:47

## 2021-12-17 RX ADMIN — MIDODRINE HYDROCHLORIDE 5 MG: 5 TABLET ORAL at 18:18

## 2021-12-17 RX ADMIN — APIXABAN 2.5 MG: 2.5 TABLET, FILM COATED ORAL at 22:26

## 2021-12-17 RX ADMIN — APIXABAN 2.5 MG: 2.5 TABLET, FILM COATED ORAL at 08:47

## 2021-12-17 RX ADMIN — FUROSEMIDE 60 MG: 10 INJECTION, SOLUTION INTRAMUSCULAR; INTRAVENOUS at 19:53

## 2021-12-17 RX ADMIN — DOXYCYCLINE 100 MG: 100 INJECTION, POWDER, LYOPHILIZED, FOR SOLUTION INTRAVENOUS at 05:59

## 2021-12-17 RX ADMIN — SODIUM CHLORIDE 25 ML: 9 INJECTION, SOLUTION INTRAVENOUS at 19:58

## 2021-12-17 RX ADMIN — BUDESONIDE 500 MCG: 0.5 SUSPENSION RESPIRATORY (INHALATION) at 20:08

## 2021-12-17 RX ADMIN — ZOLPIDEM TARTRATE 5 MG: 5 TABLET ORAL at 22:26

## 2021-12-17 RX ADMIN — SODIUM CHLORIDE, PRESERVATIVE FREE 10 ML: 5 INJECTION INTRAVENOUS at 22:28

## 2021-12-17 RX ADMIN — SODIUM BICARBONATE TAB 650 MG 650 MG: 650 TAB at 08:47

## 2021-12-17 RX ADMIN — IPRATROPIUM BROMIDE AND ALBUTEROL SULFATE 3 ML: 2.5; .5 SOLUTION RESPIRATORY (INHALATION) at 13:46

## 2021-12-17 RX ADMIN — TRAZODONE HYDROCHLORIDE 50 MG: 50 TABLET ORAL at 22:26

## 2021-12-17 RX ADMIN — Medication 1000 UNITS: at 08:47

## 2021-12-17 RX ADMIN — GLIPIZIDE 5 MG: 5 TABLET ORAL at 18:18

## 2021-12-17 RX ADMIN — INSULIN LISPRO 4 UNITS: 100 INJECTION, SOLUTION INTRAVENOUS; SUBCUTANEOUS at 18:18

## 2021-12-17 RX ADMIN — ENOXAPARIN SODIUM 30 MG: 100 INJECTION SUBCUTANEOUS at 08:48

## 2021-12-17 RX ADMIN — ATORVASTATIN CALCIUM 40 MG: 40 TABLET, FILM COATED ORAL at 22:28

## 2021-12-17 RX ADMIN — TAMSULOSIN HYDROCHLORIDE 0.4 MG: 0.4 CAPSULE ORAL at 22:26

## 2021-12-17 RX ADMIN — DOXYCYCLINE 100 MG: 100 INJECTION, POWDER, LYOPHILIZED, FOR SOLUTION INTRAVENOUS at 19:59

## 2021-12-17 RX ADMIN — TAMSULOSIN HYDROCHLORIDE 0.4 MG: 0.4 CAPSULE ORAL at 08:47

## 2021-12-17 RX ADMIN — INSULIN LISPRO 2 UNITS: 100 INJECTION, SOLUTION INTRAVENOUS; SUBCUTANEOUS at 09:04

## 2021-12-17 RX ADMIN — GLIPIZIDE 5 MG: 5 TABLET ORAL at 08:47

## 2021-12-17 RX ADMIN — METOPROLOL SUCCINATE 25 MG: 25 TABLET, EXTENDED RELEASE ORAL at 08:47

## 2021-12-17 RX ADMIN — IPRATROPIUM BROMIDE AND ALBUTEROL SULFATE 3 ML: 2.5; .5 SOLUTION RESPIRATORY (INHALATION) at 05:07

## 2021-12-17 RX ADMIN — IPRATROPIUM BROMIDE AND ALBUTEROL SULFATE 3 ML: 2.5; .5 SOLUTION RESPIRATORY (INHALATION) at 20:08

## 2021-12-17 RX ADMIN — CEFTRIAXONE 1000 MG: 1 INJECTION, POWDER, FOR SOLUTION INTRAMUSCULAR; INTRAVENOUS at 09:02

## 2021-12-17 ASSESSMENT — PAIN SCALES - GENERAL
PAINLEVEL_OUTOF10: 0

## 2021-12-17 NOTE — PROGRESS NOTES
Dr. Rush Puri spoke with writer and with patient at this time. Dr. Rush Puri visually examined patient over video chat and then spoke with patient on the phone. Writer awaiting orders from Dr. Rsuh Puri.

## 2021-12-17 NOTE — CONSULTS
Patient:  Yamilet Fernandez  MRN: [id number]    Chief Complaint: urinary retention    HISTORY OF PRESENT ILLNESS:   The patient is a 80 y.o. male who presented to the ER with worsening SOB. He did have his del rio catheter removed yesterday morning and was to follow-up in our office after the catheter was removed. When he was admitted to the floor he was complaining of suprapubic pain and was unable to urinate. Del Rio catheter placed for 630ml. He is currently admitted due to pneumonia. There was no leukocytosis on admission. He does have acute on chronic kidney disease. Creatinine was 2.51 on admission. He is taking flomax daily. He is tolerating del rio catheter. He was recently hospitalized at the end of November for respiratory failure secondary to Covid. While he was hospitalized he did develop urinary retention. They did place a [place] catheter for an unknown amount. Prior to discharge they did attempt to remove the [place] catheter, but continued to have urinary retention so catheter was replaced. He was started on Flomax on 11/28/2021. Prior to hospitalization he did have nocturia every 2 hours and incontinence. He does not recall if he was on medications for his urinary symptoms. We did call his local pharmacy and he has not been on Flomax since 2015. He did see urology in the past, Dr. New sherin, for kidney stones.      Past Medical History:    Past Medical History:   Diagnosis Date    Acute and chronic respiratory failure with hypoxia ([facility] [address] 11/16/2021    CAD (coronary artery disease)     Cataracts, bilateral     Elevated troponin 12/7/2021    Glaucoma     Hyperlipidemia     Hypertension     TIA (transient ischemic attack)     Type II or unspecified type diabetes mellitus without mention of complication, not stated as uncontrolled        Past Surgical History:    Past Surgical History:   Procedure Laterality Date    CATARACT REMOVAL WITH IMPLANT Bilateral 11/219/2013 and 12/3/2013    CORONARY ARTERY BYPASS GRAFT  2000    INGUINAL HERNIA REPAIR Bilateral 06/19/12        Medications:    Scheduled Meds:   traZODone  50 mg Oral Nightly    amiodarone  200 mg Oral BID    apixaban  2.5 mg Oral BID    aspirin  81 mg Oral Nightly    atorvastatin  40 mg Oral Nightly    budesonide  0.5 mg Nebulization BID    clopidogrel  75 mg Oral Daily    famotidine  10 mg Oral Nightly    glipiZIDE  5 mg Oral BID AC    ipratropium-albuterol  3 mL Inhalation 4x daily    metoprolol succinate  25 mg Oral Daily    sodium bicarbonate  650 mg Oral BID    tamsulosin  0.4 mg Oral Daily    Vitamin D  1,000 Units Oral Daily    zinc sulfate  100 mg Oral Daily    sodium chloride flush  5-40 mL IntraVENous 2 times per day    enoxaparin  30 mg SubCUTAneous Daily    cefTRIAXone (ROCEPHIN) IV  1,000 mg IntraVENous Q24H    doxycycline (VIBRAMYCIN) IV  100 mg IntraVENous Q12H    insulin lispro  0-12 Units SubCUTAneous TID WC    insulin lispro  0-6 Units SubCUTAneous Nightly    furosemide  40 mg IntraVENous Daily     Continuous Infusions:   sodium chloride      dextrose       PRN Meds:.acetaminophen, albuterol, benzonatate, polyethylene glycol, zolpidem, sodium chloride flush, sodium chloride, ondansetron **OR** ondansetron, acetaminophen **OR** acetaminophen, glucose, dextrose, glucagon (rDNA), dextrose    Allergies:    Patient has no known allergies.     Social History:    Social History     Socioeconomic History    Marital status:      Spouse name: Not on file    Number of children: Not on file    Years of education: Not on file    Highest education level: Not on file   Occupational History    Not on file   Tobacco Use    Smoking status: Never Smoker    Smokeless tobacco: Never Used   Substance and Sexual Activity    Alcohol use: Yes     Comment: very very very seldom    Drug use: No    Sexual activity: Not on file   Other Topics Concern    Not on file   Social History Narrative    Not on file     Social Determinants of Health     Financial Resource Strain:     Difficulty of Paying Living Expenses: Not on file   Food Insecurity:     Worried About Running Out of Food in the Last Year: Not on file    Mera of Food in the Last Year: Not on file   Transportation Needs:     Lack of Transportation (Medical): Not on file    Lack of Transportation (Non-Medical): Not on file   Physical Activity:     Days of Exercise per Week: Not on file    Minutes of Exercise per Session: Not on file   Stress:     Feeling of Stress : Not on file   Social Connections:     Frequency of Communication with Friends and Family: Not on file    Frequency of Social Gatherings with Friends and Family: Not on file    Attends [employer] Services: Not on file    Active Member of [address] [place] [address] [employer] or Organizations: Not on file    Attends Club or Organization Meetings: Not on file    Marital Status: Not on file   Intimate Partner Violence:     Fear of Current or Ex-Partner: Not on file    Emotionally Abused: Not on file    Physically Abused: Not on file    Sexually Abused: Not on file   Housing Stability:     Unable to Pay for Housing in the Last Year: Not on file    Number of [place] in the Last Year: Not on file    Unstable Housing in the Last Year: Not on file       Family History:    Family History   Problem Relation Age of Onset    Heart Disease Father     Heart Disease Brother        REVIEW OF SYSTEMS:  Constitutional: Negative for fever, chills and unexpected weight change. Respiratory: Positive for shortness of breath    Cardiovascular: Negative for chest pain and palpitations. Gastrointestinal: Negative for nausea or vomiting  Endocrine: Negative for polydipsia and polyuria. Genitourinary: Positive for urinary retention   Musculoskeletal: Negative for myalgias and joint swelling. Skin: Negative for rash and wound. Neurological: Negative for dizziness and headaches. Hematological: Negative for adenopathy.  Does not bruise/bleed easily. PHYSICAL EXAM:  Patient Vitals for the past 24 hrs:   BP Temp Temp src Pulse Resp SpO2 Height Weight   12/17/21 0644 (!) 100/47 97.9 °F (36.6 °C) Temporal 62 22 90 %     12/17/21 0510      98 %     12/17/21 0450        165 lb 14.4 oz (75.3 kg)   12/17/21 0224 (!) 103/58 97.5 °F (36.4 °C) Temporal 67 24 99 %     12/16/21 2345      98 %     12/16/21 2236      92 %     12/16/21 2235      (!) 84 %     12/16/21 2158 (!) 100/49   64  92 %     12/16/21 2037     20      12/16/21 1946 (!) 104/53 97.8 °F (36.6 °C) Temporal 68 22 96 %     12/16/21 1640      98 %     12/16/21 1545 130/68 97.3 °F (36.3 °C) Temporal 72 22 97 % 5' 4\" (1.626 m) 165 lb (74.8 kg)   12/16/21 1500 (!) 105/49   65 26 97 %     12/16/21 1445 (!) 106/55   67 18 94 %     12/16/21 1431      95 %     12/16/21 1430 (!) 99/51   65 20      12/16/21 1415 (!) 99/48          12/16/21 1414    68 22 95 %     12/16/21 1400    70 24 96 %     12/16/21 1345    66 15 98 %     12/16/21 1330 (!) 96/46   69 24 100 %     12/16/21 1318 (!) 101/54          12/16/21 1316    65 16 99 %     12/16/21 1304 (!) 128/54   73 17 95 %     12/16/21 1245 125/80          12/16/21 1244    71 25 96 %     12/16/21 1230 123/63   68 22 95 %       Constitutional: Patient resting comfortably, in no acute distress. Neuro: Alert and oriented to person place and time. Cranial nerves grossly intact. Psych: Mood and affect normal.  Skin: Warm, dry, non-diaphoretic  HEENT: normocephalic, atraumatic  Lymphatics: No palpable lymphadenopathy  Lungs: Respiratory effort normal, unlabored  Cardiovascular:  Normal peripheral pulses  Abdomen: Soft, non-tender, non-distended with no organomegaly or palpable masses. : No CVA tenderness. Bladder non-tender and not distended.   Genital/Rectal: del rio draining   Extremities: Calves are non-tender, equal in circumference bilat without swelling, warmth, or erythema. LABS:  Recent Labs     12/16/21  0908 12/17/21  0550   WBC 10.4 8.5   HGB 8.2* 7.6*   HCT 27.5* 25.0*   .4 100.0    205     Recent Labs     12/16/21  0908 12/17/21  0550    137   K 5.5* 4.9    100   CO2 30 29   BUN 51* 52*   CREATININE 2.51* 2.58*     Lab Results   Component Value Date    PSA 1.37 11/18/2021    PSA 0.74 02/21/2018    PSA 0.50 11/17/2016       Urinalysis:   Recent Labs     12/16/21  1800   COLORU Yellow   PHUR 7.5   WBCUA 0 TO 2   RBCUA 0 TO 2   MUCUS NOT REPORTED   TRICHOMONAS NOT REPORTED   YEAST NOT REPORTED   BACTERIA TRACE*   SPECGRAV 1.015   LEUKOCYTESUR NEGATIVE   UROBILINOGEN Normal   BILIRUBINUR NEGATIVE        Additional Lab/culture results:  none    Imaging Results:  none    ASSESSMENT AND PLAN:  Impression:    Patient Active Problem List   Diagnosis    Right inguinal hernia    Cerebrovascular accident (CVA) ([address]    Hypertension    Type 2 diabetes mellitus without complication, without long-term current use of insulin ([address]    Cerebral infarction due to embolism of right middle cerebral artery (HCC)    Left leg weakness    Falling    Stroke, lacunar ([address]    COVID-19 virus infection / Moderna Vaccine    Acute and chronic respiratory failure with hypoxia (HCC)    ASHD (arteriosclerotic heart disease)    Paroxysmal atrial flutter (HCC)    Stage 4 chronic kidney disease (HCC)    S/P CABG (coronary artery bypass graft)    Mild malnutrition (HCC)    Urinary retention    Hyperkalemia    Hyperglycemia    Renal failure    Acute cystitis without hematuria    Elevated troponin    On amiodarone therapy    Microcytic hypochromic anemia    Community acquired bacterial pneumonia    Moderate malnutrition ([address]       Plan:   Await urine culture. Continue empiric antibiotics. Increase Flomax to twice per day. Maintain Robertson catheter through discharge.   We will discuss when to remove the catheter as an outpatient. I do think that bladder rest is most important at this time due to acute on chronic kidney disease and respiratory issues.     ------------------------------------------------  I have discussed the care of this patient including pertinent history and exam findings, lab and imaging results, assessment, orders and plan as documented above with Osmany Cisneros MD.    Electronically signed by FRANKLIN Ball Se, CNP on 12/17/2021 at 8:45 AM

## 2021-12-17 NOTE — PROGRESS NOTES
Patient placed on heated high flow nasal cannula at this time, as pulse ox dropped to 77%. RN at bedside. Patient in no distress at this time. Will continue to monitor.

## 2021-12-17 NOTE — PROGRESS NOTES
Comprehensive Nutrition Assessment    Type and Reason for Visit:  Initial    Nutrition Recommendations/Plan:  Encourage protein foods and salt avoidance    Nutrition Assessment:  Moderate malnutrition r/t inadequate nutrient intakes, AEB low PO intakes, unintentional weight declines and mild to moderate muscle losses. Weight is also skewed upward with copious edema in not only BLE, but in UE as well. Is a salt user at home, has poorly controlled diabetes. Stage II wound on coccyx as well. Recovering still from covid. Poorer hearing noted during visit. Difficult to explain avoidance of salt to him. Malnutrition Assessment:  Malnutrition Status: Moderate malnutrition    Context:  Acute Illness     Findings of the 6 clinical characteristics of malnutrition:  Energy Intake:  Mild decrease in energy intake (Comment) (at least acutely)  Weight Loss:  1 - 1% to 2% over 1 week     Body Fat Loss:  No significant body fat loss     Muscle Mass Loss:  1 - Mild muscle mass loss Temples (temporalis), Clavicles (pectoralis & deltoids)  Fluid Accumulation:  7 - Moderate to Severe Extremities (upper and lower)   Strength:  Not Performed    Estimated Daily Nutrient Needs:  Energy (kcal):  2660-9820 (18-22); Weight Used for Energy Requirements:  Current     Protein (g):  77-83 (1.2-1.3); Weight Used for Protein Requirements:  Ideal        Fluid (ml/day):  1700; Method Used for Fluid Requirements:  1 ml/kcal      Nutrition Related Findings:  mild to moderate muscle losses, 3+ BLE edema      Wounds:  Stage II (coccyx)       Current Nutrition Therapies:    ADULT ORAL NUTRITION SUPPLEMENT; Breakfast, Lunch, Dinner; Renal Oral Supplement  ADULT DIET; Regular; 4 carb choices (60 gm/meal); No Added Salt (3-4 gm);  Low Potassium (Less than 3000 mg/day)    Anthropometric Measures:  · Height: 5' 4\" (162.6 cm)  · Current Body Weight: 165 lb 14.4 oz (75.3 kg)   · Admission Body Weight: 168 lb (76.2 kg)    · Usual Body Weight: 170 lb (77.1 kg)     · Ideal Body Weight: 130 lbs; % Ideal Body Weight 127.6 %   · BMI: 28.5  · Adjusted Body Weight:  ; No Adjustment   · BMI Categories: Overweight (BMI 25.0-29. 9)       Nutrition Diagnosis:   · Moderate malnutrition related to inadequate protein-energy intake as evidenced by weight loss, mild muscle loss    · Altered nutrition-related lab values related to endocrine dysfuntion as evidenced by lab values (A1C)    Lab Results   Component Value Date     12/17/2021    K 4.9 12/17/2021     12/17/2021    CO2 29 12/17/2021    BUN 52 (H) 12/17/2021    CREATININE 2.58 (H) 12/17/2021    GLUCOSE 207 (H) 12/17/2021    CALCIUM 8.4 (L) 12/17/2021    PROT 5.3 (L) 12/17/2021    LABALBU 2.3 (L) 12/17/2021    BILITOT 0.43 12/17/2021    ALKPHOS 90 12/17/2021    AST 14 12/17/2021    ALT 13 12/17/2021    LABGLOM 23 (L) 12/17/2021    GFRAA 28 (L) 12/17/2021     Lab Results   Component Value Date    LABA1C 11.2 (H) 12/07/2021     Lab Results   Component Value Date     12/07/2021     Lab Results   Component Value Date    VITD25 26.5 (L) 11/17/2021     Nutrition Interventions:   Food and/or Nutrient Delivery:  Start Oral Nutrition Supplement, Modify Current Diet  Nutrition Education/Counseling:  Education initiated   Coordination of Nutrition Care:  Continue to monitor while inpatient    Goals:  PO > 75% meals and supplements       Nutrition Monitoring and Evaluation:   Behavioral-Environmental Outcomes:  Knowledge or Skill   Food/Nutrient Intake Outcomes:  Supplement Intake, Food and Nutrient Intake  Physical Signs/Symptoms Outcomes:  Biochemical Data, Fluid Status or Edema, Weight     Discharge Planning:     Too soon to determine     Electronically signed by Dandy Carvalho RD, LD on 12/17/21 at 8:48 AM EST    Contact: [phone]

## 2021-12-17 NOTE — PROGRESS NOTES
Occupational Therapy   Occupational Therapy Initial Assessment  Date: 2021   Patient Name: Na Diego  MRN: [id number]     : 10/31/1930    Date of Service: 2021    Discharge Recommendations:  ECF with OT, Subacute/Skilled Nursing Facility       Assessment   Performance deficits / Impairments: Decreased functional mobility ; Decreased ADL status; Decreased strength; Decreased endurance; Decreased high-level IADLs; Decreased balance  Assessment: Patient is a 79 y/o male admitted due to pneumonia/respiratory failure, post COVID-19 patient. He presents with deconditioning which is limiting his safety and independence with ADLs. Patient to benefit from OT services in order to address these deficits. Treatment Diagnosis: M62.81-Generalized Weakness  Prognosis: Fair  Decision Making: High Complexity  OT Education: OT Role; Plan of Care  Barriers to Learning: none  REQUIRES OT FOLLOW UP: Yes  Activity Tolerance  Activity Tolerance: Patient limited by fatigue  Safety Devices  Safety Devices in place: Yes  Type of devices: Call light within reach; Left in bed; Patient at risk for falls; Gait belt           Patient Diagnosis(es): The primary encounter diagnosis was Pneumonia due to infectious organism, unspecified laterality, unspecified part of lung. Diagnoses of Hyperkalemia and Elevated troponin were also pertinent to this visit. has a past medical history of Acute and chronic respiratory failure with hypoxia ([address] CAD (coronary artery disease), Cataracts, bilateral, Elevated troponin, Glaucoma, Hyperlipidemia, Hypertension, TIA (transient ischemic attack), and Type II or unspecified type diabetes mellitus without mention of complication, not stated as uncontrolled. has a past surgical history that includes Coronary artery bypass graft (); Cataract removal with implant (Bilateral,  and 12/3/2013); and Inguinal hernia repair (Bilateral, 12).     Treatment Diagnosis: M62.81-Generalized Weakness      Restrictions  Restrictions/Precautions  Restrictions/Precautions: General Precautions, Fall Risk  Required Braces or Orthoses?: No    Subjective   General  Chart Reviewed: Yes  Patient assessed for rehabilitation services?: Yes  Family / Caregiver Present: No  Referring Practitioner: Dr. Tova Rose  Diagnosis: Shortness of Breath  Subjective  Subjective: Patient denies pain at time of evaluation. Patient Currently in Pain: No  Pain Assessment  Pain Assessment: 0-10  Pain Level: 0  Vital Signs  Temp: 98.2 °F (36.8 °C)  Temp Source: Temporal  Pulse: 66  Heart Rate Source: Monitor; Apical  Resp: 22  BP: (!) 98/47  BP Location: Right upper arm  MAP (mmHg): (!) 62  Patient Position: Semi fowlers  Level of Consciousness: Alert (0)  MEWS Score: 3  Patient Currently in Pain: No  Oxygen Therapy  SpO2: 96 %  Pulse Oximeter Device Mode: Continuous  Pulse Oximeter Device Location: Finger  O2 Device: Heated high flow cannula  FiO2 : 44 %  O2 Flow Rate (L/min): 30 L/min  Social/Functional History  Social/Functional History  Lives With: Spouse, Son  Type of Home: House  Home Layout: Two level  Home Access: Stairs to enter with rails  Entrance Stairs - Number of Steps: patient reports 20+5 steps  Entrance Stairs - Rails: Both  Bathroom Shower/Tub: Tub/Shower unit, Walk-in shower  Bathroom Toilet: Handicap height  Bathroom Equipment: Shower chair  Home Equipment: Cane  ADL Assistance: Independent  Homemaking Assistance: Needs assistance  Ambulation Assistance: Independent  Transfer Assistance: Independent  Additional Comments: Patient reported independence with ADLS prior to admission.        Objective   Vision: Impaired  Vision Exceptions: Wears glasses at all times  Hearing: Within functional limits    Orientation  Overall Orientation Status: Within Functional Limits     Balance  Sitting Balance: Contact guard assistance  Standing Balance: Maximum assistance  Functional Mobility  Functional - Mobility Device: No device  Assist Level: Moderate assistance  Functional Mobility Comments: mod-max X2 for functional transfer from bedside chair to bed (completed by PTAs). Monitored O2 at prior to transfer O2 stat read in high 60's low 70's. After finger O2 reader changed still reading in 75-77% range, recovery after ~5 minutes to high 88-90% range  ADL  Feeding: Modified independent ; Setup  Grooming: Minimal assistance  UE Bathing: Minimal assistance  LE Bathing: Maximum assistance  UE Dressing: Minimal assistance  LE Dressing: Dependent/Total  Toileting: Maximum assistance        Bed mobility  Bridging: Maximum assistance  Rolling to Left: Maximum assistance  Rolling to Right: Maximum assistance  Supine to Sit: Maximum assistance  Sit to Supine: Moderate assistance; 2 Person assistance; Maximum assistance  Scooting: Minimal assistance; Moderate assistance; 2 Person assistance                          LUE AROM (degrees)  LUE AROM : WFL  Left Hand AROM (degrees)  Left Hand AROM: WFL  RUE AROM (degrees)  RUE AROM : WFL  Right Hand AROM (degrees)  Right Hand AROM: WFL  LUE Strength  L Hand General: 3+/5  RUE Strength  R Hand General: 3+/5                   Plan   Plan  Times per week: 7  Times per day: Daily  Current Treatment Recommendations: Strengthening, Endurance Training, Safety Education & Training, Self-Care / ADL, Functional Mobility Training      AM-PAC Score        AM-[facility] Inpatient Daily Activity Raw Score: 15 (12/17/21 1538)  AM-PAC Inpatient ADL T-Scale Score : 34.69 (12/17/21 1538)  ADL Inpatient CMS 0-100% Score: 56.46 (12/17/21 1538)  ADL Inpatient CMS G-Code Modifier : CK (12/17/21 1538)    Goals  Short term goals  Time Frame for Short term goals: 21 visits  Short term goal 1: Patient to engage in functional transfers with min A. Short term goal 2: Patient to complete UB grooming/dressing with SBA  Short term goal 3: Patient to demo LB dressing with mod A.        Therapy Time   Individual Concurrent Group Co-treatment   Time In 1322         Time Out 1352         Minutes [address], OTR/L

## 2021-12-17 NOTE — PROGRESS NOTES
Writer called Dr. Magalie Schneider answer service at this time and spoke to answer . Writer gave patient's name and information and need for general consult. Per representative, she will forward this information to Dr. Chetan Gonzalez.

## 2021-12-17 NOTE — PROGRESS NOTES
Nightly medications given. Medications given whole in applesauce with sips of water. Robertson still flowing good at this time. Patient assisted to repositioned in bed and got comfortable. Call light is on lap. Patient denies any other needs at this time. Bedside table and personal belongings within reach.

## 2021-12-17 NOTE — PROGRESS NOTES
Vital signs and assessment completed. Blankets given to patient per request. Patient repositioned in bed. Patient denies any other needs at this time. Call light, bedside table and personal belongings within reach.

## 2021-12-17 NOTE — PROGRESS NOTES
Second assessment completed at this time. Assessment unchanged from previous shift assessment. Vital signs completed. Patient repositioned in bed. Patient denies any other needs at this time. Call light, bedside table and personal belongings within reach. Will continue to monitor.

## 2021-12-17 NOTE — PROGRESS NOTES
Renal Progress Note  [facility]      TELEHEALTH EVALUATION -- Audio/Visual (During ERZZH-17 public health emergency)     Telehealth service was provided with the patient at his room in [address] [place] [address] and myself the physician in my office in [place], [place] and the patient's RN Lidia who has initiated the visit. Pursuant to the emergency declaration under the [facility] [address] authority and the [employer] Act, this Virtual  Visit was conducted, with patient's consent, to reduce the patient's risk of exposure to COVID-19 and provide continuity of care for an established patient. Services were provided through a video synchronous discussion virtually to substitute for in-person clinic visit. Patient :  Andria Enciso; 80 y.o. MRN# [id number]  Location:  0314/0314-01  Attending:  Marbella Bermeo MD  Admit Date:  12/16/2021   Hospital Day: 1      Subjective:     Nephrology is following the patient for CKD IV, hyperkalemia. Patient was seen via video visit. He is on high flow nasal canula. He states he does feel winded at times. His legs were swollen on admission but he states they are improving. He was started on IV lasix this morning. Urine output documented 400 mL from morning shift. He has de lrio cath. Bps are borderline low.     Outpatient Medications:     Medications Prior to Admission: acetaminophen (TYLENOL) 650 MG suppository, Place 650 mg rectally every 8 hours as needed for Fever  metoprolol succinate (TOPROL XL) 25 MG extended release tablet, Take 1 tablet by mouth daily  ascorbic acid (VITAMIN C) 1000 MG tablet, Take 0.5 tablets by mouth 2 times daily  ciprofloxacin (CIPRO) 500 MG tablet, Take 1 tablet by mouth 2 times daily for 14 days  zolpidem (AMBIEN) 5 MG tablet, Take 5 mg by mouth nightly as needed for Sleep.  benzonatate (TESSALON) 100 MG capsule, Take 100 mg by mouth 3 times daily as needed for Cough  traZODone (DESYREL) 50 MG tablet, Take 50 mg by mouth nightly  polyethylene glycol (GLYCOLAX) 17 GM/SCOOP powder, Take 17 g by mouth daily  sodium bicarbonate 650 MG tablet, Take 1 tablet by mouth 2 times daily  amiodarone (CORDARONE) 200 MG tablet, Take 1 tablet by mouth 2 times daily  albuterol (PROVENTIL) (2.5 MG/3ML) 0.083% nebulizer solution, Take 3 mLs by nebulization every 4 hours as needed for Wheezing  budesonide (PULMICORT) 0.5 MG/2ML nebulizer suspension, Take 2 mLs by nebulization 2 times daily  ipratropium-albuterol (DUONEB) 0.5-2.5 (3) MG/3ML SOLN nebulizer solution, Inhale 3 mLs into the lungs 4 times daily  apixaban (ELIQUIS) 2.5 MG TABS tablet, Take 1 tablet by mouth 2 times daily  polyethylene glycol (GLYCOLAX) 17 g packet, Take 17 g by mouth daily as needed for Constipation  tamsulosin (FLOMAX) 0.4 MG capsule, Take 1 capsule by mouth daily  zinc sulfate (ZINCATE) 220 (50 Zn) MG capsule, Take 2 capsules by mouth daily  famotidine (PEPCID) 10 MG tablet, Take 1 tablet by mouth nightly  vitamin D3 (CHOLECALCIFEROL) 25 MCG (1000 UT) TABS tablet, Take 1 tablet by mouth daily  furosemide (LASIX) 40 MG tablet, Take 40 mg by mouth daily  glipiZIDE (GLUCOTROL) 5 MG tablet, Take 5 mg by mouth 2 times daily (before meals)  atorvastatin (LIPITOR) 40 MG tablet, Take 1 tablet by mouth nightly  clopidogrel (PLAVIX) 75 MG tablet, Take 1 tablet by mouth daily  aspirin 81 MG EC tablet, Take 81 mg by mouth nightly     Current Medications:     Scheduled Meds:    traZODone  50 mg Oral Nightly    tamsulosin  0.4 mg Oral BID    midodrine  5 mg Oral TID WC    amiodarone  200 mg Oral BID    apixaban  2.5 mg Oral BID    aspirin  81 mg Oral Nightly    atorvastatin  40 mg Oral Nightly    budesonide  0.5 mg Nebulization BID    clopidogrel  75 mg Oral Daily    famotidine  10 mg Oral Nightly    glipiZIDE  5 mg Oral BID AC    ipratropium-albuterol  3 mL Inhalation 4x daily    metoprolol succinate  25 mg Oral Daily    sodium bicarbonate  650 mg Oral BID    Vitamin D  1,000 Units Oral Daily    zinc sulfate  100 mg Oral Daily    sodium chloride flush  5-40 mL IntraVENous 2 times per day    enoxaparin  30 mg SubCUTAneous Daily    cefTRIAXone (ROCEPHIN) IV  1,000 mg IntraVENous Q24H    doxycycline (VIBRAMYCIN) IV  100 mg IntraVENous Q12H    insulin lispro  0-12 Units SubCUTAneous TID WC    insulin lispro  0-6 Units SubCUTAneous Nightly    furosemide  40 mg IntraVENous Daily     Continuous Infusions:    sodium chloride      dextrose       PRN Meds:  acetaminophen, albuterol, benzonatate, polyethylene glycol, zolpidem, sodium chloride flush, sodium chloride, ondansetron **OR** ondansetron, acetaminophen **OR** acetaminophen, glucose, dextrose, glucagon (rDNA), dextrose    Input/Output:       I/O last 3 completed shifts: In: 1005 [P.O.:1005]  Out: 6236 [Urine:1705]. Patient Vitals for the past 96 hrs (Last 3 readings):   Weight   21 0450 165 lb 14.4 oz (75.3 kg)   21 1545 165 lb (74.8 kg)   21 0836 168 lb (76.2 kg)       Vital Signs:   Temperature:  Temp: 98.2 °F (36.8 °C)  TMax:   Temp (24hrs), Av.9 °F (36.6 °C), Min:97.5 °F (36.4 °C), Max:98.2 °F (36.8 °C)    Respirations:  Resp: 20  Pulse:   Pulse: 66  BP:    BP: (!) 98/47  BP Range: Systolic (26NRT), HDW:802 , Min:98 , PYP:119       Diastolic (40MZJ), OIT:05, Min:47, Max:58      Physical Examination:     General -- no distress  Oral Mucosa -- moist  Neck --  JVD - no  Extremities -- 2+ edema noted  CNS - awake and alert   Pschy - not agitated, mood and memory normal    Due to this being a TeleHealth encounter, evaluation of the following organ systems is limited: Vitals/EENT/Resp/CV/GI//MS/Neuro/Skin/Heme-Lymph-Imm.     Labs:       Recent Labs     21  0908 21  0550   WBC 10.4 8.5   RBC 2.74* 2.50*   HGB 8.2* 7.6*   HCT 27.5* 25.0*   .4 100.0   MCH 29.9 30.4   MCHC 29.8 30.4   RDW 15.5* 15.6*    205   MPV 10.7 10.9      BMP:   Recent Labs     12/16/21  0908 12/17/21  0550    137   K 5.5* 4.9    100   CO2 30 29   BUN 51* 52*   CREATININE 2.51* 2.58*   GLUCOSE 308* 207*   CALCIUM 8.6 8.4*      Phosphorus:   No results for input(s): PHOS in the last 72 hours. Magnesium:  No results for input(s): MG in the last 72 hours. Albumin:    Recent Labs     12/16/21  0908 12/17/21  0550   LABALBU 2.6* 2.3*     BNP:    No results found for: BNP  HÉCTOR:    No results found for: HÉCTOR  SPEP:  Lab Results   Component Value Date    PROT 5.3 12/17/2021     UPEP:   No results found for: LABPE  C3:   No results found for: C3  C4:   No results found for: C4  MPO ANCA:   No results found for: MPO  PR3 ANCA:   No results found for: PR3  Anti-GBM:   No results found for: GBMABIGG  Hep BsAg:       No results found for: HEPBSAG  Hep C AB:        No results found for: HEPCAB    Urinalysis/Chemistries:      Lab Results   Component Value Date    NITRU NEGATIVE 12/16/2021    COLORU Yellow 12/16/2021    PHUR 7.5 12/16/2021    WBCUA 0 TO 2 12/16/2021    RBCUA 0 TO 2 12/16/2021    MUCUS NOT REPORTED 12/16/2021    TRICHOMONAS NOT REPORTED 12/16/2021    YEAST NOT REPORTED 12/16/2021    BACTERIA TRACE 12/16/2021    SPECGRAV 1.015 12/16/2021    LEUKOCYTESUR NEGATIVE 12/16/2021    UROBILINOGEN Normal 12/16/2021    BILIRUBINUR NEGATIVE 12/16/2021    GLUCOSEU 1+ 12/16/2021    KETUA NEGATIVE 12/16/2021    AMORPHOUS NOT REPORTED 12/16/2021     Urine Sodium:   No results found for: SOHAM  Urine Potassium:  No results found for: KUR  Urine Chloride:  No results found for: CLUR  Urine Osmolarity: No results found for: OSMOU  Urine Protein:   No components found for: TOTALPROTEIN, URINE   Urine Creatinine:     Lab Results   Component Value Date    LABCREA 51.4 02/11/2021     Urine Eosinophils:  No components found for: UEOS        Impression and Plan:  1.  CKD IV: renal function is overall stable, appears to be near his

## 2021-12-17 NOTE — CONSULTS
[facility]          [facility]        [address]        [place], Beatrice [place]ley [facility]        [phone]           Inpatient Initial consult note         12/16/2021 7:34 PM    Patient Name:   Lucy Butterfield:    10/31/1930  Primary Care Physician:  George Ace DO     History Obtained From:  patient, electronic medical record     Consultation requested by : Jena Montgomery MD       TELEHEALTH EVALUATION -- Audio/Visual (During MRFTN-10 public health emergency)     Telehealth service was provided with the patient in his room-hospital room 314 and myself the physician at [facility] and the nurse Jillian Melgoza who has initiated the visit/assisted during the visit. Pursuant to the emergency declaration under the [address] [place] [address], [facility] waiver authority and the [employer] Act, this Virtual  Visit was conducted, with patient's consent, to reduce the patient's risk of exposure to COVID-19 and provide continuity of care for an established patient. Services were provided through a video synchronous discussion virtually to substitute for in-person clinic visit. requested for  : Evaluation of worsening renal function     History of presentingillness   Percy Parker is a 80 y.o.   male with Past Medical History as stated below presented with a chief complaint of Shortness of Breath (worsening this AM; with recent history of covid pneumonia) and Leg Swelling (left leg and arm noted by nursing staff this AM)   on 12/16/2021 . Patient is a poor historian accurate history and review of systems cannot be obtained    However as per the electronic medical record patient presented with chief complaints of shortness of breath which has been worsening since this morning associated with some leg swelling and saturations were around 80s when he arrived to the ER.   Apparently there was also some fluctuating level of consciousness as per the patient he is always short of breath    Patient recently had post Covid pneumonia, hospitalized a week ago for hyperkalemia and acute kidney injury when EMS arrived he was also found to have some wheezing and he was started on albuterol. Past History      Past Medical History:   Diagnosis Date    Acute and chronic respiratory failure with hypoxia ([facility] [address] 11/16/2021    CAD (coronary artery disease)     Cataracts, bilateral     Elevated troponin 12/7/2021    Glaucoma     Hyperlipidemia     Hypertension     TIA (transient ischemic attack)     Type II or unspecified type diabetes mellitus without mention of complication, not stated as uncontrolled      Past Surgical History:   Procedure Laterality Date    CATARACT REMOVAL WITH IMPLANT Bilateral 11/219/2013 and 12/3/2013    CORONARY ARTERY BYPASS GRAFT  2000    INGUINAL HERNIA REPAIR Bilateral 06/19/12     Social History     Socioeconomic History    Marital status:      Spouse name: Not on file    Number of children: Not on file    Years of education: Not on file    Highest education level: Not on file   Occupational History    Not on file   Tobacco Use    Smoking status: Never Smoker    Smokeless tobacco: Never Used   Substance and Sexual Activity    Alcohol use: Yes     Comment: very very very seldom    Drug use: No    Sexual activity: Not on file   Other Topics Concern    Not on file   Social History Narrative    Not on file     Social Determinants of Health     Financial Resource Strain:     Difficulty of Paying Living Expenses: Not on file   Food Insecurity:     Worried About Running Out of Food in the Last Year: Not on file    Mera of Food in the Last Year: Not on file   Transportation Needs:     Lack of Transportation (Medical): Not on file    Lack of Transportation (Non-Medical):  Not on file   Physical Activity:     Days of Exercise per Week: Not on file    Minutes of Exercise per Session: Not on file   Stress:     Feeling of Stress : Not on file   Social Connections:     Frequency of Communication with Friends and Family: Not on file    Frequency of Social Gatherings with Friends and Family: Not on file    Attends [employer] Services: Not on file    Active Member of [address] [place] [address] or Organizations: Not on file    Attends Club or Organization Meetings: Not on file    Marital Status: Not on file   Intimate Partner Violence:     Fear of Current or Ex-Partner: Not on file    Emotionally Abused: Not on file    Physically Abused: Not on file    Sexually Abused: Not on file   Housing Stability:     Unable to Pay for Housing in the Last Year: Not on file    Number of [place] in the Last Year: Not on file    Unstable Housing in the Last Year: Not on file     Family History   Problem Relation Age of Onset    Heart Disease Father     Heart Disease Brother      Medications & Allergies      Prior to Admission medications    Medication Sig Start Date End Date Taking? Authorizing Provider   acetaminophen (TYLENOL) 650 MG suppository Place 650 mg rectally every 8 hours as needed for Fever    Historical Provider, MD   metoprolol succinate (TOPROL XL) 25 MG extended release tablet Take 1 tablet by mouth daily 12/11/21   FRANKLIN Christensen CNP   ascorbic acid (VITAMIN C) 1000 MG tablet Take 0.5 tablets by mouth 2 times daily 12/10/21   FRANKLIN Christensen CNP   ciprofloxacin (CIPRO) 500 MG tablet Take 1 tablet by mouth 2 times daily for 14 days 12/10/21 12/24/21  FRANKLIN Christensen CNP   zolpidem (AMBIEN) 5 MG tablet Take 5 mg by mouth nightly as needed for Sleep.     Historical Provider, MD   benzonatate (TESSALON) 100 MG capsule Take 100 mg by mouth 3 times daily as needed for Cough    Historical Provider, MD   traZODone (DESYREL) 50 MG tablet Take 50 mg by mouth nightly    Historical Provider, MD   polyethylene glycol (GLYCOLAX) 17 GM/SCOOP powder Take 17 g by mouth daily 12/2/21 1/1/22  FRANKLIN Garrison CNP   sodium bicarbonate 650 MG tablet Take 1 tablet by mouth 2 times daily 11/27/21   FRANKLIN Elizabeth CNP   amiodarone (CORDARONE) 200 MG tablet Take 1 tablet by mouth 2 times daily 11/27/21   FRANKLIN Elizabeth CNP   albuterol (PROVENTIL) (2.5 MG/3ML) 0.083% nebulizer solution Take 3 mLs by nebulization every 4 hours as needed for Wheezing 11/27/21   FRANKLIN Elizabeth CNP   budesonide (PULMICORT) 0.5 MG/2ML nebulizer suspension Take 2 mLs by nebulization 2 times daily 11/27/21   FRANKLIN Elizabeth CNP   ipratropium-albuterol (DUONEB) 0.5-2.5 (3) MG/3ML SOLN nebulizer solution Inhale 3 mLs into the lungs 4 times daily 11/27/21   FRANKLIN Elizabeth CNP   apixaban (ELIQUIS) 2.5 MG TABS tablet Take 1 tablet by mouth 2 times daily 11/27/21   FRANKLIN Elizabeth CNP   polyethylene glycol (GLYCOLAX) 17 g packet Take 17 g by mouth daily as needed for Constipation 11/27/21 12/27/21  FRANKLIN Elizabeth CNP   tamsulosin [facility]) 0.4 MG capsule Take 1 capsule by mouth daily 11/28/21   FRANKLIN Elizabeth CNP   zinc sulfate (ZINCATE) 220 (50 Zn) MG capsule Take 2 capsules by mouth daily 11/28/21   FRANKLIN Elizabeth CNP   famotidine (PEPCID) 10 MG tablet Take 1 tablet by mouth nightly 11/27/21   FRANKLIN Elizabeth CNP   vitamin D3 (CHOLECALCIFEROL) 25 MCG (1000 UT) TABS tablet Take 1 tablet by mouth daily 11/27/21   FRANKLIN Elizabeth CNP   furosemide (LASIX) 40 MG tablet Take 40 mg by mouth daily    Historical Provider, MD   glipiZIDE (GLUCOTROL) 5 MG tablet Take 5 mg by mouth 2 times daily (before meals)    Historical Provider, MD   atorvastatin (LIPITOR) 40 MG tablet Take 1 tablet by mouth nightly 3/28/18   Indira Jurado MD   clopidogrel (PLAVIX) 75 MG tablet Take 1 tablet by mouth daily 3/29/18   Indira Jurado MD   aspirin 81 MG EC tablet Take 81 mg by mouth nightly     Historical Provider, MD Allergies: Patient has no known allergies. IP meds : Scheduled Meds:   amiodarone  200 mg Oral BID    apixaban  2.5 mg Oral BID    aspirin  81 mg Oral Nightly    atorvastatin  40 mg Oral Nightly    budesonide  0.5 mg Nebulization BID    clopidogrel  75 mg Oral Daily    famotidine  10 mg Oral Nightly    glipiZIDE  5 mg Oral BID AC    ipratropium-albuterol  3 mL Inhalation 4x daily    metoprolol succinate  25 mg Oral Daily    sodium bicarbonate  650 mg Oral BID    tamsulosin  0.4 mg Oral Daily    traZODone  50 mg Oral Nightly    Vitamin D  1,000 Units Oral Daily    zinc sulfate  100 mg Oral Daily    sodium chloride flush  5-40 mL IntraVENous 2 times per day    enoxaparin  30 mg SubCUTAneous Daily    [START ON 12/17/2021] cefTRIAXone (ROCEPHIN) IV  1,000 mg IntraVENous Q24H    doxycycline (VIBRAMYCIN) IV  100 mg IntraVENous Q12H    insulin lispro  0-12 Units SubCUTAneous TID WC    insulin lispro  0-6 Units SubCUTAneous Nightly    [START ON 12/17/2021] furosemide  40 mg IntraVENous Daily     Continuous Infusions:   sodium chloride      dextrose       Review of Systems Physical Exam   Review of Systems   Unable to perform ROS: Other   Hematological: Bruises/bleeds easily: hard of hearing and poor historian. Physical Exam  Vitals reviewed. Constitutional:       General: He is not in acute distress. Appearance: Normal appearance. He is not diaphoretic. HENT:      Head: Normocephalic. Right Ear: External ear normal.      Left Ear: External ear normal.      Nose: Nose normal.      Mouth/Throat:      Mouth: Mucous membranes are dry. Eyes:      General: No scleral icterus. Right eye: No discharge. Left eye: No discharge. Conjunctiva/sclera: Conjunctivae normal.   Neck:      Thyroid: No thyromegaly. Vascular: No JVD. Cardiovascular:      Heart sounds: Normal heart sounds. No murmur heard.       Pulmonary:      Effort: Pulmonary effort is normal. Breath sounds: Normal breath sounds. No stridor. Comments: Diminished per nursing staff  Chest:      Chest wall: No tenderness. Abdominal:      General: There is no distension. Palpations: Abdomen is soft. Tenderness: There is no abdominal tenderness. Comments: RN examined in front of me   Musculoskeletal:         General: No swelling or tenderness. Cervical back: Normal range of motion. Right lower leg: No edema. Left lower leg: No edema. Skin:     General: Skin is warm and dry. Findings: No erythema or rash. Neurological:      General: No focal deficit present. Mental Status: He is alert and oriented to person, place, and time. Comments: Slight disorientation   Psychiatric:         Mood and Affect: Mood normal.         Behavior: Behavior normal.           Vitals:    12/16/21 1640   BP:    Pulse:    Resp:    Temp:    SpO2: 98%     Labs, Radiology and Tests       Recent Labs     12/16/21  0908   WBC 10.4   RBC 2.74*   HGB 8.2*   HCT 27.5*   .4   MCH 29.9   MCHC 29.8   RDW 15.5*        Recent Labs     12/16/21  0908      K 5.5*      CO2 30   BUN 51*   CREATININE 2.51*   CALCIUM 8.6   PROT 6.0*   LABALBU 2.6*   BILITOT 0.55   ALKPHOS 109   AST 17   ALT 14       Radiology : Chest x-ray-reviewed by me shows B/L fluffy infiltrates does not appear to be having any congestion. Other : Old lab data have been reviewed and noted patient's baseline creatinine may be running around 2.2. Assessment    1 Renal -chronic kidney disease stage IV, renal function still appears reasonably close to baseline. ? Volume status also appears to be stable does not appear to be overtly congested as well  ? Did get some diuretics okay to continue for now  ? Follow lab work in the morning.   Patient did have a Robertson catheter placed as well he was apparently in slight retention when he arrived    2 Electrolytes -hyperkalemia probably some component of obstruction. Place him on a low potassium diet will give a dose of Lokelma  3 Acid-base status appears to be well he is currently on sodium bicarbonate tablets  4 Possible pneumonia on antibiotics  5 Hx of urinary retention Robertson in place today urology has been consulted as well  6 Hx of diabetes mellitus  7 Meds reviewed and discussed with patient and nursing staff  8 Ayo Piedad will follow in AM      **This report has been created using voice recognition software. It maycontain minor  errors which are inherent in voice recognition technology. **    Brandon Lopez MD,M.D  [facility].

## 2021-12-17 NOTE — PROGRESS NOTES
Imaging evidence of new loss of viable myocardium or new regional wall   motion abnormality in a pattern consistent with an ischemic etiology (Types 1   - 5 MI)  Options provided:  -- MI type II due to Hyperkalemia and acute renal failure present as evidenced   by, Please document evidence. -- MI Type II ruled out, and determined to be nonspecific troponin elevation  -- MI type II ruled out after study and demand ischemia due to Hyperkalemia   and JANINA confirmed  -- MI type II ruled out after study and non-ischemic myocardial injury due to   Hyperkalemia and JANINA confirmed  -- Other - I will add my own diagnosis  -- Disagree - Not applicable / Not valid  -- Disagree - Clinically unable to determine / Unknown  -- Refer to Clinical Documentation Reviewer    PROVIDER RESPONSE TEXT:    Type II MI secondary to hypotension and acute on chronic renal failure. Query created by:  Hoda Kovacs on 12/16/2021 11:21 AM      Electronically signed by:  Claudell Starr MD 12/17/2021 8:33 AM

## 2021-12-17 NOTE — PROGRESS NOTES
Physical Therapy  Facility/Department: [facility] AT THE [facility] MED SURG  Daily Treatment Note  NAME: Shadi Vazquez  : 10/31/1930  MRN: [id number]    Date of Service: 2021    Discharge Recommendations:  ECF with PT        Assessment   Treatment Diagnosis: GENERALIZED WEAKNESS. Prognosis: Good  Decision Making: Medium Complexity  PT Education: Transfer Training; Functional Mobility Training; General Safety  Patient Education: Educated pt on above with good understanding  REQUIRES PT FOLLOW UP: Yes  Activity Tolerance  Activity Tolerance: Patient Tolerated treatment well; Patient limited by endurance     Patient Diagnosis(es): The primary encounter diagnosis was Pneumonia due to infectious organism, unspecified laterality, unspecified part of lung. Diagnoses of Hyperkalemia and Elevated troponin were also pertinent to this visit. has a past medical history of Acute and chronic respiratory failure with hypoxia ([address] CAD (coronary artery disease), Cataracts, bilateral, Elevated troponin, Glaucoma, Hyperlipidemia, Hypertension, TIA (transient ischemic attack), and Type II or unspecified type diabetes mellitus without mention of complication, not stated as uncontrolled. has a past surgical history that includes Coronary artery bypass graft (); Cataract removal with implant (Bilateral,  and 12/3/2013); and Inguinal hernia repair (Bilateral, 12). Restrictions  Restrictions/Precautions  Restrictions/Precautions: General Precautions  Required Braces or Orthoses?: No  Subjective   General  Chart Reviewed: Yes  Additional Pertinent Hx: PNEUMONIA,POST COVID  Response To Previous Treatment: Patient with no complaints from previous session. Family / Caregiver Present: No  Subjective  Subjective: Pt very pleasant and cooperative          Orientation  Orientation  Overall Orientation Status: Within Normal Limits  Cognition      Objective   Bed mobility  Sit to Supine:  Moderate assistance; 2 Person assistance; Maximum assistance  Scooting: Minimal assistance; Moderate assistance; 2 Person assistance  Transfers  Sit to Stand: Moderate Assistance; 2 Person Assistance  Stand to sit: Moderate Assistance; 2 Person Assistance  Bed to Chair: Moderate assistance; 2 Person Assistance  Stand Pivot Transfers: Moderate Assistance; 2 Person Assistance  Comment: SPO2= in 70s, but changed sensor and increased to 89-94% thoughout treatment. Ambulation  Ambulation?: No     Balance  Posture: Fair  Sitting - Static: Fair; +  Sitting - Dynamic: Fair  Standing - Static: Fair  Standing - Dynamic: Fair; Poor; +; -         Goals  Short term goals  Time Frame for Short term goals: 3 DAYS  Short term goal 1: MIN ASSIST TRANSFERS. Short term goal 2: MIN ASSIST BED MOBILITY  Short term goal 3: MIN ASSIST GAIT FWW 10 FT. Long term goals  Time Frame for Long term goals : 4 WEEKS  Long term goal 1: IND TRANSFERS. Long term goal 2: IND GAIT 100 FT  FWW.   Patient Goals   Patient goals : RETURN HOME WITH ASSISTANCE    Plan    Plan  Times per day: Twice a day  Current Treatment Recommendations: Strengthening, Transfer Training, Endurance Training, Neuromuscular Re-education, Patient/Caregiver Education & Training, ADL/Self-care Training, Gait Training, Balance Training, Functional Mobility Training  Safety Devices  Type of devices: Call light within reach, Nurse notified, All fall risk precautions in place, Bed alarm in place, Gait belt, Patient at risk for falls, Left in bed     Therapy Time   Individual Concurrent Group Co-treatment   Time In 1309         Time Out 1352         Minutes PHUONG Hendricks 21, PTA

## 2021-12-17 NOTE — PROGRESS NOTES
Speech Language Pathology  Facility/Department: [facility] AT THE [facility] MED SURG   CLINICAL BEDSIDE SWALLOW EVALUATION    NAME: Annie Marie  : 10/31/1930  MRN: [id number]    ADMISSION DATE: 2021  ADMITTING DIAGNOSIS: has Right inguinal hernia; Cerebrovascular accident (CVA) ([address] Hypertension; Type 2 diabetes mellitus without complication, without long-term current use of insulin ([address] Cerebral infarction due to embolism of right middle cerebral artery ([address] Left leg weakness; Falling; Stroke, lacunar ([address] COVID-19 virus infection / [place] Vaccine; Acute and chronic respiratory failure with hypoxia ([address] ASHD (arteriosclerotic heart disease); Paroxysmal atrial flutter ([address] Stage 4 chronic kidney disease ([address] S/P CABG (coronary artery bypass graft); Mild malnutrition ([address] Urinary retention; Hyperkalemia; Hyperglycemia; Renal failure; Acute cystitis without hematuria; Elevated troponin; On amiodarone therapy; Microcytic hypochromic anemia; Community acquired bacterial pneumonia; and Moderate malnutrition ([address] on their problem list.  ONSET DATE: BSE completed 2021    Recent Chest Xray/CT of Chest: (2021)  Stable multifocal infiltrates suggesting pneumonia probable small effusions   greater on the left unchanged.           Date of Eval: 2021  Evaluating Therapist: LAILA Harris    Current Diet level:  Current Diet : Regular  Current Liquid Diet : Thin      Primary Complaint  Patient Complaint: patient reports no concerns or difficulty swallowing    Pain:  Pain Assessment  Pain Assessment: 0-10  Pain Level: 0    Reason for Referral  Annie Marie was referred for a bedside swallow evaluation to assess the efficiency of his swallow function, identify signs and symptoms of aspiration and make recommendations regarding safe dietary consistencies, effective compensatory strategies, and safe eating environment.     Impression  Dysphagia Diagnosis: Swallow function appears grossly intact  Dysphagia Outcome Severity Scale: Level 6: Within functional limits/Modified independence   Treatment Plan  Requires SLP Intervention: No  Duration/Frequency of Treatment: no skilled dysphagia therapy recommended at this time  D/C Recommendations: To be determined     Recommended Diet and Intervention  Diet Solids Recommendation: Regular  Liquid Consistency Recommendation: Thin  Recommended Form of Meds: PO    Compensatory Swallowing Strategies  Compensatory Swallowing Strategies: Alternate solids and liquids; Eat/Feed slowly; Upright as possible for all oral intake; Remain upright for 30-45 minutes after meals; Small bites/sips    General  Chart Reviewed: Yes  Behavior/Cognition: Alert; Cooperative; Pleasant mood  Respiratory Status: O2 via nasual cannula  Communication Observation: Functional  Follows Directions: Simple  Dentition: Adequate  Patient Positioning: Upright in chair  Baseline Vocal Quality: Normal  Volitional Cough: Strong  Prior Dysphagia History: patient reports no concerns or difficulty swallowing  Consistencies Administered: Reg solid; Dysphagia Soft and Bite-Sized (Dysphagia III); Dysphagia Pureed (Dysphagia I); Thin - straw    Vision/Hearing  Vision  Vision: Impaired  Vision Exceptions: Wears glasses at all times  Hearing  Hearing: Within functional limits    Oral Motor Deficits  Oral/Motor  Oral Motor: Within functional limits    Oral Phase Dysfunction  Oral Phase  Oral Phase: WFL  Oral Phase  Oral Phase - Comment: slightly prolonged time for fully manipulation of bolus. Noted to be impacted by patient's attempts to talk during his meals     Indicators of Pharyngeal Phase Dysfunction   Pharyngeal Phase  Pharyngeal Phase: Exceptions  Indicators of Pharyngeal Phase Dysfunction  Throat Clearing - Delayed: Mixed Consistencies  Pharyngeal Phase   Pharyngeal: x1 instance of throat clear on mixed consistency; however, this was noted to occur when patient was talking while chewing his food. Patient completed all other trials without clinically overt s/sx of pen/asp appreciated. Education provided on safe swallow strategies/precautions    Prognosis  Prognosis  Prognosis for safe diet advancement: good  Barriers to reach goals: age  Individuals consulted  Consulted and agree with results and recommendations: Patient    Education  Patient Education: reviewed standard swallow precautions  Patient Education Response: Verbalizes understanding  Safety Devices in place: Yes  Type of devices: Other (comment) (patient with PT and OT at end of session)       Therapy Time  SLP Individual Minutes  Time In: 3560  Time Out: [place] 61  Minutes: 18        Patient alert and cooperative during evaluation. Patient stated he had already had lunch and did not wish to eat much more but was agreeable to completing a few trials for SLP to assess. x1 instance of throat clear on mixed consistency; however, this was noted to occur when patient was talking while chewing his food. Patient completed all other trials without clinically overt s/sx of pen/asp appreciated. SLP provided education on safe swallow strategies/recommendations including: Alternate solids and liquids; Eat/Feed slowly; Upright as possible for all oral intake; Remain upright for 30-45 minutes after meals; Small bites/sips. Patient verbalized understanding of information presented. Patient very social and engaged in conversation throughout trials. Reports no prior concerns or difficulty with meals. SLP reviewed s/sx of pen/asp with patient as well this date. SLP recommends patient continue with regular solids and thin liquids. No skilled dysphagia therapy is recommended at this time. SLP available for consult with changes.        Dann Richard M.A., CCC-SLP   12/17/2021 1:48 PM

## 2021-12-17 NOTE — H&P
History and Physical    Patient:  Gigi Talley  MRN: [id number]    Chief Complaint: Shortness of breath    History Obtained From:  patient, electronic medical record    PCP: Shannon Monroe DO    History of Present Illness: The patient is a 80 y.o. male who scented to the emergency room with complaints of worsening shortness of breath and leg swelling. Patient recently was discharged after hospitalization for Covid pneumonia. At that time he was admitted from 11/15/2021 through 11/27/2021. During that hospitalization patient was treated with vitamin C, D and zinc.  Steroids were stopped due to elevated creatinine and no hypoxia. Remdesivir was not indicated. Patient was vaccinated in January, February and November 2021. At that time patient developed new onset of atrial flutter was placed on amiodarone as well as Eliquis. Patient was discharged at that time to SNF. Patient was readmitted then on 12/6/2021-12/10/2021 with hyperkalemia. During that hospitalization patient was found to have pleural effusions and underwent thoracentesis 850 mL of fluid removed. Nephrology as well as urology were consulted at that time due to renal failure. Patient's hypokalemia was corrected. Patient's culture grew Pseudomonas and patient was placed on Cipro and was continued on discharge back to [facility]. During his assessment in the emergency room on this evaluation patient was found to be tachypneic with respirations in the 30s. Patient's SPO2 was 92 and he was afebrile. Patient's troponin was elevated at 184. His BUN and creatinine were elevated at 51 and 2.51. Patient was slightly hyperkalemic at 5.5. During his evaluation there was concern of possibly superimposed bacterial pneumonia due to his progressive hypoxia. Patient was started on IV Rocephin and Zithromax in the emergency room. Patient was also given Lasix as well as IV fluids. EKG showed no evidence of acute ischemia. DVT study was negative.     Past albuterol (PROVENTIL) (2.5 MG/3ML) 0.083% nebulizer solution Take 3 mLs by nebulization every 4 hours as needed for Wheezing 11/27/21   Pryor Kawasaki, APRN - CNP   budesonide (PULMICORT) 0.5 MG/2ML nebulizer suspension Take 2 mLs by nebulization 2 times daily 11/27/21   Pryor Kawasaki, APRN - CNP   ipratropium-albuterol (DUONEB) 0.5-2.5 (3) MG/3ML SOLN nebulizer solution Inhale 3 mLs into the lungs 4 times daily 11/27/21   Pryor Kawasaki, APRN - CNP   apixaban (ELIQUIS) 2.5 MG TABS tablet Take 1 tablet by mouth 2 times daily 11/27/21   Pryor Kawasaki, APRN - CNP   polyethylene glycol (GLYCOLAX) 17 g packet Take 17 g by mouth daily as needed for Constipation 11/27/21 12/27/21  Pryor Kawasaki, APRN - CNP   tamsulosin [facility]) 0.4 MG capsule Take 1 capsule by mouth daily 11/28/21   Pryor Kawasaki, APRN - CNP   zinc sulfate (ZINCATE) 220 (50 Zn) MG capsule Take 2 capsules by mouth daily 11/28/21   Pryor Kawasaki, APRN - CNP   famotidine (PEPCID) 10 MG tablet Take 1 tablet by mouth nightly 11/27/21   Pryor Kawasaki, APRN - CNP   vitamin D3 (CHOLECALCIFEROL) 25 MCG (1000 UT) TABS tablet Take 1 tablet by mouth daily 11/27/21   Pryor Kawasaki, APRN - CNP   furosemide (LASIX) 40 MG tablet Take 40 mg by mouth daily    Historical Provider, MD   glipiZIDE (GLUCOTROL) 5 MG tablet Take 5 mg by mouth 2 times daily (before meals)    Historical Provider, MD   atorvastatin (LIPITOR) 40 MG tablet Take 1 tablet by mouth nightly 3/28/18   Marina Witt MD   clopidogrel (PLAVIX) 75 MG tablet Take 1 tablet by mouth daily 3/29/18   Marina Witt MD   aspirin 81 MG EC tablet Take 81 mg by mouth nightly     Historical Provider, MD       Allergies:  Patient has no known allergies. Social History:   TOBACCO:   reports that he has never smoked. He has never used smokeless tobacco.  ETOH:   reports current alcohol use.     Family History:       Problem Relation Age of Onset    Heart Disease Father     Heart Disease Brother        Allergies:  Patient has no known allergies. Medications Prior to Admission:    Prior to Admission medications    Medication Sig Start Date End Date Taking? Authorizing Provider   acetaminophen (TYLENOL) 650 MG suppository Place 650 mg rectally every 8 hours as needed for Fever    Historical Provider, MD   metoprolol succinate (TOPROL XL) 25 MG extended release tablet Take 1 tablet by mouth daily 12/11/21   FRANKLIN Cuadra CNP   ascorbic acid (VITAMIN C) 1000 MG tablet Take 0.5 tablets by mouth 2 times daily 12/10/21   FRANKLIN Cuadra CNP   ciprofloxacin (CIPRO) 500 MG tablet Take 1 tablet by mouth 2 times daily for 14 days 12/10/21 12/24/21  FRANKLIN Cuadra CNP   zolpidem (AMBIEN) 5 MG tablet Take 5 mg by mouth nightly as needed for Sleep.     Historical Provider, MD   benzonatate (TESSALON) 100 MG capsule Take 100 mg by mouth 3 times daily as needed for Cough    Historical Provider, MD   traZODone (DESYREL) 50 MG tablet Take 50 mg by mouth nightly    Historical Provider, MD   polyethylene glycol (GLYCOLAX) 17 GM/SCOOP powder Take 17 g by mouth daily 12/2/21 1/1/22  FRANKLIN Waggoner CNP   sodium bicarbonate 650 MG tablet Take 1 tablet by mouth 2 times daily 11/27/21   FRANKLIN Cuadra CNP   amiodarone (CORDARONE) 200 MG tablet Take 1 tablet by mouth 2 times daily 11/27/21   FRANKLIN Cuadra CNP   albuterol (PROVENTIL) (2.5 MG/3ML) 0.083% nebulizer solution Take 3 mLs by nebulization every 4 hours as needed for Wheezing 11/27/21   FRANKLIN Cuadra CNP   budesonide (PULMICORT) 0.5 MG/2ML nebulizer suspension Take 2 mLs by nebulization 2 times daily 11/27/21   FRANKLIN Cuadra CNP   ipratropium-albuterol (DUONEB) 0.5-2.5 (3) MG/3ML SOLN nebulizer solution Inhale 3 mLs into the lungs 4 times daily 11/27/21   FRANKLIN Cuadra CNP   apixaban (ELIQUIS) 2.5 MG TABS tablet Take 1 tablet by mouth 2 times daily 11/27/21   Bety Galvan Yolanda LynnFRANKLIN CNP   polyethylene glycol (GLYCOLAX) 17 g packet Take 17 g by mouth daily as needed for Constipation 11/27/21 12/27/21  FRANKLIN Nieto CNP   tamsulosin [facility]) 0.4 MG capsule Take 1 capsule by mouth daily 11/28/21   FRANKLIN Nieto CNP   zinc sulfate (ZINCATE) 220 (50 Zn) MG capsule Take 2 capsules by mouth daily 11/28/21   FRANKLIN Nieto CNP   famotidine (PEPCID) 10 MG tablet Take 1 tablet by mouth nightly 11/27/21   FRANKLIN Nieto CNP   vitamin D3 (CHOLECALCIFEROL) 25 MCG (1000 UT) TABS tablet Take 1 tablet by mouth daily 11/27/21   FRANKLIN Nieto CNP   furosemide (LASIX) 40 MG tablet Take 40 mg by mouth daily    Historical Provider, MD   glipiZIDE (GLUCOTROL) 5 MG tablet Take 5 mg by mouth 2 times daily (before meals)    Historical Provider, MD   atorvastatin (LIPITOR) 40 MG tablet Take 1 tablet by mouth nightly 3/28/18   Yony Campos MD   clopidogrel (PLAVIX) 75 MG tablet Take 1 tablet by mouth daily 3/29/18   Yony Campos MD   aspirin 81 MG EC tablet Take 81 mg by mouth nightly     Historical Provider, MD       Review of Systems:  Constitutional:negative  for fevers, and negative for chills. Eyes: negative for visual disturbance   ENT: negative for sore throat, negative nasal congestion, and negative for earache  Respiratory: positive for shortness of breath, positive for cough, and negative for wheezing  Cardiovascular: negative for chest pain, negative for palpitations, and negative for syncope  Gastrointestinal: negative for abdominal pain, negative for nausea,negative for vomiting, negative for diarrhea, negative for constipation, and negative for hematochezia or melena  Genitourinary: negative for dysuria, negative for urinary urgency, negative for urinary frequency, and negative for hematuria  Skin: negative for skin rash, and negative for skin lesions  Neurological: negative for unilateral weakness, numbness or tingling.     Physical BACTERIA TRACE (A) 12/16/2021    YEAST NOT REPORTED 12/16/2021       Lactic Acid:   Lab Results   Component Value Date    LACTA 2.2 11/15/2021       D-Dimer:  Lab Results   Component Value Date    DDIMER 1.31 (H) 11/19/2021       PT/INR:  Lab Results   Component Value Date    PROTIME 16.3 11/17/2021    INR 1.3 11/17/2021       High Sensitivity Troponin:  Recent Labs     12/16/21  0908 12/16/21  1108   TROPHS 184* 170*       ABGs:   No results found for: PHART, PH, [id number], PCO2, PO2ART, PO2, EZB5TVV, HCO3, BEART, BE, THGBART, THB, AGG7BBA, X7CQFGZO, O2SAT, FIO2        VL DUP LOWER EXTREMITY VENOUS LEFT   Final Result      XR CHEST PORTABLE   Final Result   Stable multifocal infiltrates suggesting pneumonia probable small effusions   greater on the left unchanged. EKG reviewed:         Assessment:    Principal Problem:    Community acquired bacterial pneumonia  Active Problems:    ASHD (arteriosclerotic heart disease)    Stage 4 chronic kidney disease (HCC)    Urinary retention    Hyperkalemia    Elevated troponin    Moderate malnutrition (HCC)  Resolved Problems:    * No resolved hospital problems.  *      Patient Active Problem List    Diagnosis Date Noted    Moderate malnutrition ([address] 12/17/2021    Community acquired bacterial pneumonia 12/16/2021    Hyperglycemia 12/07/2021    Renal failure 12/07/2021    Acute cystitis without hematuria 12/07/2021    Elevated troponin 12/07/2021    On amiodarone therapy     Microcytic hypochromic anemia     Hyperkalemia 12/06/2021    Urinary retention 11/27/2021    Mild malnutrition ([address] 11/17/2021    Acute and chronic respiratory failure with hypoxia (HCC) 11/16/2021    Paroxysmal atrial flutter ([address] 11/16/2021    Stage 4 chronic kidney disease ([address] 11/16/2021    ASHD (arteriosclerotic heart disease)     S/P CABG (coronary artery bypass graft)     COVID-19 virus infection / Jonathan Divine Vaccine 11/15/2021    Cerebral infarction due to embolism of right middle cerebral artery ([facility] 03/28/2018    Left leg weakness 03/28/2018    Falling 03/28/2018    Stroke, lacunar ([facility] 03/28/2018    Hypertension 03/27/2018    Type 2 diabetes mellitus without complication, without long-term current use of insulin ([facility] 03/27/2018    Cerebrovascular accident (CVA) ([facility] 03/26/2018    Right inguinal hernia 04/02/2014       Plan:     · This patient requires inpatient admission because of community-acquired bacterial pneumonia  · Factors affecting the medical complexity of this patient include ASHD, stage IV CKD, urinary retention, type 2 diabetes  · Estimated length of stay is 3 days  · Community acquired bacterial pneumonia  · IV Rocephin/doxycycline  · Supplemental oxygen to maintain SPO2 greater than 90%currently high flow  · Acapella  · Continue Pulmicort, DuoNeb  · ASHD  · Continue aspirin, Eliquis, Lipitor, Plavix, amiodarone  · CKD stage IV  · Trend labs  · Urinary retention  · Appreciate urology  · Robertson catheter  · Urine culture pending  · Continue Flomax  · Type 2 diabetes  · Continue Glucotrol  · POCT before meals and at bedtime  · Medium dose sliding scale  · Hypoglycemia protocol  · DVT prophylaxis: already on chronic anticoagulation  · Peptic ulcer prophylaxis: Pepcid  · High risk medications: none  · Social Service and Case Management consults for DC planning  · Dietician consult initiated    CORE MEASURES  DVT prophylaxis: already on chronic anticoagulation  Decubitus ulcer present on admission: No  CODE STATUS: DNR-CCA  Nutrition Status: good   Physical therapy: Yes   Old Charts reviewed: Yes  EKG Reviewed:  Yes  Advance Directive Addressed: Yes    FRANKLIN Valdez - CNP, APRN, NP-C  12/17/2021, 11:19 AM

## 2021-12-17 NOTE — PROGRESS NOTES
Physical Therapy    Facility/Department: [facility] AT THE [facility] MED SURG  Initial Assessment    NAME: Awais Dye  : 10/31/1930  MRN: [id number]    Date of Service: 2021    Discharge Recommendations:  ECF with PT        Assessment   Body structures, Functions, Activity limitations: Decreased functional mobility ; Decreased balance; Decreased ADL status; Decreased endurance; Decreased high-level IADLs; Decreased strength  Assessment: PNEUMONIA,RESPIRATORY FAILURE. Treatment Diagnosis: GENERALIZED WEKNESS. Prognosis: Good  Decision Making: Medium Complexity  PT Education: Goals; Transfer Training; PT Role; Functional Mobility Training; Plan of Care; General Safety  REQUIRES PT FOLLOW UP: Yes  Activity Tolerance  Activity Tolerance: Patient Tolerated treatment well       Patient Diagnosis(es): The primary encounter diagnosis was Pneumonia due to infectious organism, unspecified laterality, unspecified part of lung. Diagnoses of Hyperkalemia and Elevated troponin were also pertinent to this visit. has a past medical history of Acute and chronic respiratory failure with hypoxia ([address] CAD (coronary artery disease), Cataracts, bilateral, Elevated troponin, Glaucoma, Hyperlipidemia, Hypertension, TIA (transient ischemic attack), and Type II or unspecified type diabetes mellitus without mention of complication, not stated as uncontrolled. has a past surgical history that includes Coronary artery bypass graft (); Cataract removal with implant (Bilateral,  and 12/3/2013); and Inguinal hernia repair (Bilateral, 12).     Restrictions  Restrictions/Precautions  Restrictions/Precautions: General Precautions (HIGH FLOW O2)  Required Braces or Orthoses?: No  Vision/Hearing  Vision: Within Functional Limits  Hearing: Within functional limits     Subjective  General  Chart Reviewed: Yes  Patient assessed for rehabilitation services?: Yes  Additional Pertinent Hx: PNEUMONIA,POST COVID  Family / Caregiver Present: No  Diagnosis: PNEUMONIA,RESPITRATORY FAILUIRE. Follows Commands: Within Functional Limits  Pain Screening  Patient Currently in Pain: No  Vital Signs  Patient Currently in Pain: No       Orientation  Orientation  Overall Orientation Status: Within Normal Limits  Social/Functional History     Cognition   Cognition  Overall Cognitive Status: [facility]    Objective     Observation/Palpation  Posture: Fair    PROM RLE (degrees)  RLE PROM: WFL  AROM RLE (degrees)  RLE AROM: WFL  AROM LLE (degrees)  LLE AROM : WFL  AROM RUE (degrees)  RUE AROM : WFL  AROM LUE (degrees)  LUE AROM : WFL  Strength RLE  Strength RLE: Exception  Comment: 3/5  Strength LLE  Strength LLE: Exception  Comment: 3/5  Strength RUE  Strength RUE: Exception  Comment: 3/5  Strength LUE  Strength LUE: Exception  Comment: 3/5  Tone RLE  RLE Tone: Normotonic  Sensation  Overall Sensation Status: WFL  Bed mobility  Bridging: Maximum assistance  Rolling to Left: Maximum assistance  Rolling to Right: Maximum assistance  Supine to Sit: Maximum assistance  Sit to Supine: Maximum assistance  Scooting: Maximal assistance  Transfers  Sit to Stand: Maximum Assistance  Stand to sit: Maximum Assistance  Bed to Chair: Maximum assistance  Stand Pivot Transfers: Maximum Assistance  Ambulation  Ambulation?: No (MAX + 2 TO STAND UNABLE TO AMBULATE.)  WB Status: NO RESTRICTIONS. More Ambulation?: No     Balance  Posture: Fair  Exercises  Comments: THER EX SITTING.      Plan   Plan  Times per day: Twice a day  Current Treatment Recommendations: Strengthening, Transfer Training, Endurance Training, Neuromuscular Re-education, Patient/Caregiver Education & Training, ADL/Self-care Training, Gait Training, Balance Training, Functional Mobility Training  Safety Devices  Type of devices: Call light within reach, Left in chair, Nurse notified    G-Code       OutComes Score                                                  AM-PAC Score             Goals  Short term goals  Time Frame for Short term goals: 3 DAYS  Short term goal 1: MIN ASSIST TRANSFERS. Short term goal 2: MIN ASSIST BED MOBILITY  Short term goal 3: MIN ASSIST GAIT FWW 10 FT. Long term goals  Time Frame for Long term goals : 4 WEEKS  Long term goal 1: IND TRANSFERS. Long term goal 2: IND GAIT 100 FT  FWW.   Patient Goals   Patient goals : RETURN HOME WITH ASSISTANCE       Therapy Time   Individual Concurrent Group Co-treatment   Time In           Time Out           Minutes                   Sirena Scott PT

## 2021-12-18 LAB
ABSOLUTE EOS #: 0.26 K/UL (ref 0–0.44)
ABSOLUTE IMMATURE GRANULOCYTE: 0.06 K/UL (ref 0–0.3)
ABSOLUTE LYMPH #: 1.97 K/UL (ref 1.1–3.7)
ABSOLUTE MONO #: 0.72 K/UL (ref 0.1–1.2)
ALBUMIN SERPL-MCNC: 2.3 G/DL (ref 3.5–5.2)
ALBUMIN/GLOBULIN RATIO: 0.8 (ref 1–2.5)
ALP BLD-CCNC: 118 U/L (ref 40–129)
ALT SERPL-CCNC: 13 U/L (ref 5–41)
ANION GAP SERPL CALCULATED.3IONS-SCNC: 10 MMOL/L (ref 9–17)
AST SERPL-CCNC: 16 U/L
BASOPHILS # BLD: 0 % (ref 0–2)
BASOPHILS ABSOLUTE: 0.03 K/UL (ref 0–0.2)
BILIRUB SERPL-MCNC: 0.37 MG/DL (ref 0.3–1.2)
BUN BLDV-MCNC: 54 MG/DL (ref 8–23)
BUN/CREAT BLD: 22 (ref 9–20)
CALCIUM SERPL-MCNC: 8.1 MG/DL (ref 8.6–10.4)
CHLORIDE BLD-SCNC: 101 MMOL/L (ref 98–107)
CO2: 30 MMOL/L (ref 20–31)
CREAT SERPL-MCNC: 2.48 MG/DL (ref 0.7–1.2)
CULTURE: NO GROWTH
DIFFERENTIAL TYPE: ABNORMAL
EOSINOPHILS RELATIVE PERCENT: 3 % (ref 1–4)
GFR AFRICAN AMERICAN: 30 ML/MIN
GFR NON-AFRICAN AMERICAN: 25 ML/MIN
GFR SERPL CREATININE-BSD FRML MDRD: ABNORMAL ML/MIN/{1.73_M2}
GFR SERPL CREATININE-BSD FRML MDRD: ABNORMAL ML/MIN/{1.73_M2}
GLUCOSE BLD-MCNC: 146 MG/DL (ref 74–100)
GLUCOSE BLD-MCNC: 148 MG/DL (ref 70–99)
GLUCOSE BLD-MCNC: 212 MG/DL (ref 74–100)
GLUCOSE BLD-MCNC: 240 MG/DL (ref 74–100)
GLUCOSE BLD-MCNC: 264 MG/DL (ref 74–100)
HCT VFR BLD CALC: 25.3 % (ref 40.7–50.3)
HEMOGLOBIN: 7.7 G/DL (ref 13–17)
IMMATURE GRANULOCYTES: 1 %
LYMPHOCYTES # BLD: 22 % (ref 24–43)
Lab: NORMAL
MCH RBC QN AUTO: 30.8 PG (ref 25.2–33.5)
MCHC RBC AUTO-ENTMCNC: 30.4 G/DL (ref 28.4–34.8)
MCV RBC AUTO: 101.2 FL (ref 82.6–102.9)
MONOCYTES # BLD: 8 % (ref 3–12)
NRBC AUTOMATED: 0 PER 100 WBC
PDW BLD-RTO: 15.5 % (ref 11.8–14.4)
PLATELET # BLD: 203 K/UL (ref 138–453)
PLATELET ESTIMATE: ABNORMAL
PMV BLD AUTO: 10.9 FL (ref 8.1–13.5)
POTASSIUM SERPL-SCNC: 5 MMOL/L (ref 3.7–5.3)
RBC # BLD: 2.5 M/UL (ref 4.21–5.77)
RBC # BLD: ABNORMAL 10*6/UL
SEG NEUTROPHILS: 66 % (ref 36–65)
SEGMENTED NEUTROPHILS ABSOLUTE COUNT: 5.92 K/UL (ref 1.5–8.1)
SODIUM BLD-SCNC: 141 MMOL/L (ref 135–144)
SPECIMEN DESCRIPTION: NORMAL
TOTAL PROTEIN: 5.3 G/DL (ref 6.4–8.3)
WBC # BLD: 9 K/UL (ref 3.5–11.3)
WBC # BLD: ABNORMAL 10*3/UL

## 2021-12-18 PROCEDURE — 82947 ASSAY GLUCOSE BLOOD QUANT: CPT

## 2021-12-18 PROCEDURE — 1200000000 HC SEMI PRIVATE

## 2021-12-18 PROCEDURE — 2500000003 HC RX 250 WO HCPCS: Performed by: INTERNAL MEDICINE

## 2021-12-18 PROCEDURE — 2580000003 HC RX 258: Performed by: INTERNAL MEDICINE

## 2021-12-18 PROCEDURE — 2700000000 HC OXYGEN THERAPY PER DAY

## 2021-12-18 PROCEDURE — 97110 THERAPEUTIC EXERCISES: CPT

## 2021-12-18 PROCEDURE — 6360000002 HC RX W HCPCS: Performed by: INTERNAL MEDICINE

## 2021-12-18 PROCEDURE — 6370000000 HC RX 637 (ALT 250 FOR IP): Performed by: INTERNAL MEDICINE

## 2021-12-18 PROCEDURE — 2500000003 HC RX 250 WO HCPCS: Performed by: NURSE PRACTITIONER

## 2021-12-18 PROCEDURE — 99232 SBSQ HOSP IP/OBS MODERATE 35: CPT | Performed by: INTERNAL MEDICINE

## 2021-12-18 PROCEDURE — 85025 COMPLETE CBC W/AUTO DIFF WBC: CPT

## 2021-12-18 PROCEDURE — 94640 AIRWAY INHALATION TREATMENT: CPT

## 2021-12-18 PROCEDURE — 6370000000 HC RX 637 (ALT 250 FOR IP): Performed by: NURSE PRACTITIONER

## 2021-12-18 PROCEDURE — 94761 N-INVAS EAR/PLS OXIMETRY MLT: CPT

## 2021-12-18 PROCEDURE — 36415 COLL VENOUS BLD VENIPUNCTURE: CPT

## 2021-12-18 PROCEDURE — 80053 COMPREHEN METABOLIC PANEL: CPT

## 2021-12-18 PROCEDURE — 2580000003 HC RX 258: Performed by: NURSE PRACTITIONER

## 2021-12-18 RX ORDER — BUMETANIDE 0.25 MG/ML
1 INJECTION, SOLUTION INTRAMUSCULAR; INTRAVENOUS EVERY 12 HOURS
Status: DISCONTINUED | OUTPATIENT
Start: 2021-12-18 | End: 2021-12-20

## 2021-12-18 RX ADMIN — INSULIN LISPRO 4 UNITS: 100 INJECTION, SOLUTION INTRAVENOUS; SUBCUTANEOUS at 11:47

## 2021-12-18 RX ADMIN — MIDODRINE HYDROCHLORIDE 5 MG: 5 TABLET ORAL at 16:46

## 2021-12-18 RX ADMIN — CLOPIDOGREL BISULFATE 75 MG: 75 TABLET ORAL at 07:55

## 2021-12-18 RX ADMIN — BUDESONIDE 500 MCG: 0.5 SUSPENSION RESPIRATORY (INHALATION) at 20:49

## 2021-12-18 RX ADMIN — TRAZODONE HYDROCHLORIDE 50 MG: 50 TABLET ORAL at 22:10

## 2021-12-18 RX ADMIN — DOXYCYCLINE 100 MG: 100 INJECTION, POWDER, LYOPHILIZED, FOR SOLUTION INTRAVENOUS at 05:51

## 2021-12-18 RX ADMIN — IPRATROPIUM BROMIDE AND ALBUTEROL SULFATE 3 ML: 2.5; .5 SOLUTION RESPIRATORY (INHALATION) at 05:01

## 2021-12-18 RX ADMIN — BUMETANIDE 1 MG: 0.25 INJECTION INTRAMUSCULAR; INTRAVENOUS at 16:46

## 2021-12-18 RX ADMIN — TAMSULOSIN HYDROCHLORIDE 0.4 MG: 0.4 CAPSULE ORAL at 07:55

## 2021-12-18 RX ADMIN — ASPIRIN 81 MG: 81 TABLET, COATED ORAL at 22:10

## 2021-12-18 RX ADMIN — SODIUM CHLORIDE, PRESERVATIVE FREE 10 ML: 5 INJECTION INTRAVENOUS at 23:54

## 2021-12-18 RX ADMIN — AMIODARONE HYDROCHLORIDE 200 MG: 200 TABLET ORAL at 22:10

## 2021-12-18 RX ADMIN — ATORVASTATIN CALCIUM 40 MG: 40 TABLET, FILM COATED ORAL at 22:10

## 2021-12-18 RX ADMIN — IPRATROPIUM BROMIDE AND ALBUTEROL SULFATE 3 ML: 2.5; .5 SOLUTION RESPIRATORY (INHALATION) at 16:05

## 2021-12-18 RX ADMIN — ZOLPIDEM TARTRATE 5 MG: 5 TABLET ORAL at 22:10

## 2021-12-18 RX ADMIN — MIDODRINE HYDROCHLORIDE 5 MG: 5 TABLET ORAL at 12:00

## 2021-12-18 RX ADMIN — FAMOTIDINE 10 MG: 10 TABLET ORAL at 22:10

## 2021-12-18 RX ADMIN — Medication 1000 UNITS: at 07:55

## 2021-12-18 RX ADMIN — IPRATROPIUM BROMIDE AND ALBUTEROL SULFATE 3 ML: 2.5; .5 SOLUTION RESPIRATORY (INHALATION) at 11:05

## 2021-12-18 RX ADMIN — TAMSULOSIN HYDROCHLORIDE 0.4 MG: 0.4 CAPSULE ORAL at 16:46

## 2021-12-18 RX ADMIN — INSULIN LISPRO 6 UNITS: 100 INJECTION, SOLUTION INTRAVENOUS; SUBCUTANEOUS at 16:57

## 2021-12-18 RX ADMIN — APIXABAN 2.5 MG: 2.5 TABLET, FILM COATED ORAL at 07:55

## 2021-12-18 RX ADMIN — MIDODRINE HYDROCHLORIDE 5 MG: 5 TABLET ORAL at 07:55

## 2021-12-18 RX ADMIN — METOPROLOL SUCCINATE 25 MG: 25 TABLET, EXTENDED RELEASE ORAL at 07:55

## 2021-12-18 RX ADMIN — IPRATROPIUM BROMIDE AND ALBUTEROL SULFATE 3 ML: 2.5; .5 SOLUTION RESPIRATORY (INHALATION) at 20:49

## 2021-12-18 RX ADMIN — AMIODARONE HYDROCHLORIDE 200 MG: 200 TABLET ORAL at 07:55

## 2021-12-18 RX ADMIN — GLIPIZIDE 5 MG: 5 TABLET ORAL at 07:55

## 2021-12-18 RX ADMIN — ZINC SULFATE 220 MG (50 MG) CAPSULE 100 MG: CAPSULE at 07:55

## 2021-12-18 RX ADMIN — BUDESONIDE 500 MCG: 0.5 SUSPENSION RESPIRATORY (INHALATION) at 11:05

## 2021-12-18 RX ADMIN — APIXABAN 2.5 MG: 2.5 TABLET, FILM COATED ORAL at 22:10

## 2021-12-18 RX ADMIN — SODIUM CHLORIDE, PRESERVATIVE FREE 10 ML: 5 INJECTION INTRAVENOUS at 07:55

## 2021-12-18 RX ADMIN — GLIPIZIDE 5 MG: 5 TABLET ORAL at 16:46

## 2021-12-18 RX ADMIN — CEFTRIAXONE 1000 MG: 1 INJECTION, POWDER, FOR SOLUTION INTRAMUSCULAR; INTRAVENOUS at 08:02

## 2021-12-18 RX ADMIN — FUROSEMIDE 40 MG: 10 INJECTION, SOLUTION INTRAMUSCULAR; INTRAVENOUS at 07:55

## 2021-12-18 RX ADMIN — DOXYCYCLINE 100 MG: 100 INJECTION, POWDER, LYOPHILIZED, FOR SOLUTION INTRAVENOUS at 16:51

## 2021-12-18 RX ADMIN — INSULIN LISPRO 2 UNITS: 100 INJECTION, SOLUTION INTRAVENOUS; SUBCUTANEOUS at 09:56

## 2021-12-18 ASSESSMENT — PAIN SCALES - GENERAL
PAINLEVEL_OUTOF10: 0
PAINLEVEL_OUTOF10: 0

## 2021-12-18 NOTE — PROGRESS NOTES
Covid - [facility] -Progress Note    SUBJECTIVE:    Patient seen for f/u of Community acquired bacterial pneumonia. He seen in bed alert oriented no acute distress. Patient does have audible wheezing when moving. He continues on high flow. He denies complaints. ROS:   Constitutional: negative  for fevers, and negative for chills. Respiratory: positive for shortness of breath, positive for cough, and negative for wheezing  Cardiovascular: negative for chest pain, and negative for palpitations  Gastrointestinal: negative for abdominal pain, negative for nausea,negative for vomiting, negative for diarrhea, and negative for constipation     All other systems were reviewed with the patient and are negative unless otherwise stated in HPI      OBJECTIVE:        VITAL SIGNS:  Patient Vitals for the past 8 hrs:   BP Temp Temp src Pulse Resp SpO2 Weight   21 0550 (!) 95/56 97.3 °F (36.3 °C) Temporal 66 18 93 % 165 lb 9.6 oz (75.1 kg)   21 0502      93 %    21 2325 (!) 99/48 98.5 °F (36.9 °C) Oral 67 20 94 %          Temp: 97.3 °F (36.3 °C)  Temp range:    Temp  Av °F (36.7 °C)  Min: 97.3 °F (36.3 °C)  Max: 98.5 °F (36.9 °C)    BP: (!) 95/56  BP Range:      Systolic (28HKS), BLW:27 , Min:95 , WFI:053      Diastolic (39QKD), MFP:32, Min:47, Max:85    Pulse: 66  Pulse Range:    Pulse  Av.8  Min: 66  Max: 76    Resp: 18  Resp Range:   Resp  Av.8  Min: 18  Max: 24    SpO2: 93 % on heated high flow O2 cannula  SpO2 range:   SpO2  Av.6 %  Min: 93 %  Max: 97 %      PaO2/FiO2 RATIO:  No results for input(s): POCPO2 in the last 72 hours. FiO2 : 43 %         Exam:    GEN:    Awake, alert and oriented x3. EYES:   EOMI, pupils equal   NECK: Supple. No lymphadenopathy. No carotid bruit  CVS:     regular rate and rhythm, no audible murmur  PULM:  diminished with bilateral expiratory wheezing, no acute respiratory distress  ABD:     Bowels sounds normal.  Abdomen is soft. No distention. no tenderness to palpation. EXT:     2+ edema in the LLE . No calf tenderness. NEURO: Moves all extremities. Motor and sensory are grossly intact  SKIN:    No rashes. No skin lesions    Diagnostic Data:      Complete Blood Count:   Recent Labs     12/16/21  0908 12/17/21  0550 12/18/21  0545   WBC 10.4 8.5 9.0   RBC 2.74* 2.50* 2.50*   HGB 8.2* 7.6* 7.7*   HCT 27.5* 25.0* 25.3*   .4 100.0 101.2   MCH 29.9 30.4 30.8   MCHC 29.8 30.4 30.4   RDW 15.5* 15.6* 15.5*    205 203   MPV 10.7 10.9 10.9        Last 3 Blood Glucose:   Recent Labs     12/16/21  0908 12/17/21  0550   GLUCOSE 308* 207*        Comprehensive Metabolic Profile:   Recent Labs     12/16/21  0908 12/17/21  0550    137   K 5.5* 4.9    100   CO2 30 29   BUN 51* 52*   CREATININE 2.51* 2.58*   GLUCOSE 308* 207*   CALCIUM 8.6 8.4*   PROT 6.0* 5.3*   LABALBU 2.6* 2.3*   BILITOT 0.55 0.43   ALKPHOS 109 90   AST 17 14   ALT 14 13        Urinalysis:   Lab Results   Component Value Date    NITRU NEGATIVE 12/16/2021    COLORU Yellow 12/16/2021    PHUR 7.5 12/16/2021    WBCUA 0 TO 2 12/16/2021    RBCUA 0 TO 2 12/16/2021    MUCUS NOT REPORTED 12/16/2021    TRICHOMONAS NOT REPORTED 12/16/2021    YEAST NOT REPORTED 12/16/2021    BACTERIA TRACE 12/16/2021    SPECGRAV 1.015 12/16/2021    LEUKOCYTESUR NEGATIVE 12/16/2021    UROBILINOGEN Normal 12/16/2021    BILIRUBINUR NEGATIVE 12/16/2021    GLUCOSEU 1+ 12/16/2021    KETUA NEGATIVE 12/16/2021    AMORPHOUS NOT REPORTED 12/16/2021       HgBA1c:    Lab Results   Component Value Date    LABA1C 10.3 12/16/2021       Lactic Acid:   Lab Results   Component Value Date    LACTA 2.2 11/15/2021        Troponin: No results for input(s): TROPONINI in the last 72 hours.     CRP:    Recent Labs     12/16/21  0908   CRP 53.8*         Radiology/Imaging:  VL DUP LOWER EXTREMITY VENOUS LEFT   Final Result      XR CHEST PORTABLE   Final Result   Stable multifocal infiltrates suggesting pneumonia probable small effusions   greater on the left unchanged.                ASSESSMENT / PLAN:  Community acquired bacterial pneumonia  · Continue current therapy   · IV Rocephin/doxycycline  · Continue supplemental oxygen to maintain SPO2 greater than 90%  · Acapella  · Continue Pulmicort, duo nebs  · ASHD  · Continue aspirin, Eliquis, Lipitor, Plavix, amiodarone  · CKD stage IV  · Trend labs  · Urinary retention  · Appreciate urology  · Continue Robertson catheter  · Urine culture pending  · Continue Flomax  · Type 2 diabetes  · Continue Glucotrol  · POCT before meals and at bedtime  · Insulin sliding scale  · Hypoglycemia protocol  · Microcytic hypochromic anemia  · Hemoglobin stable 7.7  · Nutrition status:   · at risk for malnutrition  · Dietician consult initiated  · Hospital Prophylaxis:   · DVT: Eliquis   · Stress Ulcer: H2 Blocker   · High risk medications: none   · Disposition:    · Discharge plan is pending      FRANKLIN Basilio - CNP , FRANKLIN, NP-C  Hospitalist Medicine        12/18/2021, 6:45 AM

## 2021-12-18 NOTE — PROGRESS NOTES
Patient on bed pan with small amount of formed brown stool noted. Patient removed from bedpan with millicent care completed.

## 2021-12-18 NOTE — PROGRESS NOTES
Patient 100% on heated high flow. Patient switched to 4 liters nasal cannula. Pulse oximetry 98%. Will continue to monitor.

## 2021-12-18 NOTE — PROGRESS NOTES
[facility]  Inpatient/Observation/Outpatient Rehabilitation    Date: 2021  Patient Name: Manual Cure       [x] Inpatient Acute/Observation       []  Outpatient  : 10/31/1930       Attempted pt see pt but refused d/t fatigue. Stated \"I just need to rest and don't feel up to any exercises. \" Educated pt at length on importance of participating in physical therapy treatment but pt continued to refuse.         Bipinmonjoanna [place] Date: 2021

## 2021-12-18 NOTE — PROGRESS NOTES
Pt 44% 30L hfnc, no pain VS WNL, got pt on and off bed pan, pt had bm, millicent care and fom dressing on coccyx removed and zinc cream applied, new chucks pad under pt and pt boosted up in bed and repositioned. One time dose lasix given and ATB hung, and RT forarm iv removed because it would not flush even after attempts to redress it.  Bed alarm on call light within reach

## 2021-12-18 NOTE — PROGRESS NOTES
Occupational Therapy  Facility/Department: [facility] AT THE [facility] MED SURG  Daily Treatment Note  NAME: Maggi Morton  : 10/31/1930  MRN: [id number]    Date of Service: 2021    Discharge Recommendations:  ECF with OT,Subacute/Skilled Nursing Facility       Assessment      Activity Tolerance  Activity Tolerance: Patient limited by fatigue;Treatment limited secondary to medical complications (free text)  Safety Devices  Safety Devices in place: Yes  Type of devices: Call light within reach; Left in bed         Patient Diagnosis(es): The primary encounter diagnosis was Pneumonia due to infectious organism, unspecified laterality, unspecified part of lung. Diagnoses of Hyperkalemia and Elevated troponin were also pertinent to this visit. has a past medical history of Acute and chronic respiratory failure with hypoxia ([address] CAD (coronary artery disease), Cataracts, bilateral, Elevated troponin, Glaucoma, Hyperlipidemia, Hypertension, TIA (transient ischemic attack), and Type II or unspecified type diabetes mellitus without mention of complication, not stated as uncontrolled. has a past surgical history that includes Coronary artery bypass graft (); Cataract removal with implant (Bilateral,  and 12/3/2013); and Inguinal hernia repair (Bilateral, 12). Restrictions  Restrictions/Precautions  Restrictions/Precautions: General Precautions,Fall Risk  Required Braces or Orthoses?: No  Subjective   General  Chart Reviewed: Yes  Patient assessed for rehabilitation services?: Yes  Response to previous treatment: Patient with no complaints from previous session  Family / Caregiver Present: No  Referring Practitioner: Dr. Brooke Harper  Diagnosis: Shortness of Breath  Subjective  Subjective: Pt had no complaints of pain this date. General Comment  Comments: Pt sitting up in bed upon arrival. Pt agreed to participate in therapy session this date.       Orientation     Objective Type of ROM/Therapeutic Exercise  Type of ROM/Therapeutic Exercise: AROM  Comment: Pt tolerated BUE ther ex with 1# dumbbell x  planes x 15 reps x 1 set to increase UE strength and endurance in order to increase Ind with ADL tasks. Pt required RBs as needed secondary to fatigue and SOB. Pt O2 dropped to 65% on 2 occasions and increased to 92% after ~3-4 mins. Plan   Plan  Times per week: 7  Times per day: Daily  Current Treatment Recommendations: Brien Pritchett Education & Training,Self-Care / ADL,Functional Mobility Training  G-Code     OutComes Score                                                  AM-PAC Score             Goals  Short term goals  Time Frame for Short term goals: 21 visits  Short term goal 1: Patient to engage in functional transfers with min A. Short term goal 2: Patient to complete UB grooming/dressing with SBA  Short term goal 3: Patient to demo LB dressing with mod A.        Therapy Time   Individual Concurrent Group Co-treatment   Time In 1240         Time Out 1303         Minutes 23                 RICARDO Dewitt/JANET

## 2021-12-18 NOTE — PROGRESS NOTES
[facility]   [phone]   Nephrology progress note  12/18/2021, 11:50 AM      Pt Name:    Cindy Castano  MRN:     [id number]     YOB: 1930  Admit Date:    12/16/2021  8:32 AM  Primary Care Physician:  Johana Encarnacion DO   Room number  1759/2962-15    TELEHEALTH EVALUATION -- Audio/Visual (During SUQEJ-50 public health emergency)    Patient's Location: [facility]  Physician's (myself) Location: [address], [address] [facility], [address]  Patient's nurse: Present    Chief Complaint: Nephrology following for JANINA/CKD    Subjective:  Patient seen and examined  Patient is on high flow oxygen  No acute distress  Awake and alert  Following commands    Objective:  24HR INTAKE/OUTPUT:    Intake/Output Summary (Last 24 hours) at 12/18/2021 1150  Last data filed at 12/18/2021 0842  Gross per 24 hour   Intake 1107.37 ml   Output 850 ml   Net 257.37 ml     I/O last 3 completed shifts: In: 1107.4 [P.O.:720; I.V.:27.9; IV Piggyback:359.5]  Out: 850 [Urine:850]  I/O this shift:  In: 240 [P.O.:240]  Out: -   Admission weight: 168 lb (76.2 kg)  Wt Readings from Last 3 Encounters:   12/18/21 165 lb 9.6 oz (75.1 kg)   12/10/21 171 lb 3.2 oz (77.7 kg)   12/02/21 162 lb (73.5 kg)     Body mass index is 28.43 kg/m².     Physical examination  VITALS:     Vitals:    12/17/21 2325 12/18/21 0502 12/18/21 0550 12/18/21 1111   BP: (!) 99/48  (!) 95/56    Pulse: 67  66    Resp: 20  18    Temp: 98.5 °F (36.9 °C)  97.3 °F (36.3 °C)    TempSrc: Oral  Temporal    SpO2: 94% 93% 93% 93%   Weight:   165 lb 9.6 oz (75.1 kg)    Height:         Constitutional and General Appearance: alert and cooperative, no apparent distress  Lungs: no use of accessory muscles or labored breathing noted, patient on high flow oxygen  Extremities: Bilateral lower extremity 2-3+ pitting edema, exam assisted by RN  Neuro: no slurred speech, no facial drooping  Psychiatric: Normal mood and affect, Not agitated      Due to this being a TeleHealth encounter, evaluation of the following organ systems is limited: EENT/Resp/CV/GI//MS/Neuro/Skin/Lymph    Lab Data  CBC:   Recent Labs     12/16/21  0908 12/17/21  0550 12/18/21  0545   WBC 10.4 8.5 9.0   HGB 8.2* 7.6* 7.7*   HCT 27.5* 25.0* 25.3*    205 203     BMP:  Recent Labs     12/16/21  0908 12/17/21  0550 12/18/21  0545    137 141   K 5.5* 4.9 5.0    100 101   CO2 30 29 30   BUN 51* 52* 54*   CREATININE 2.51* 2.58* 2.48*   GLUCOSE 308* 207* 148*   CALCIUM 8.6 8.4* 8.1*     Hepatic:   Recent Labs     12/16/21  0908 12/17/21  0550 12/18/21  0545   LABALBU 2.6* 2.3* 2.3*   AST 17 14 16   ALT 14 13 13   BILITOT 0.55 0.43 0.37   ALKPHOS 109 90 118         Meds:  Infusion:    sodium chloride Stopped (12/17/21 4344)    dextrose       Meds:    traZODone  50 mg Oral Nightly    tamsulosin  0.4 mg Oral BID    midodrine  5 mg Oral TID WC    amiodarone  200 mg Oral BID    apixaban  2.5 mg Oral BID    aspirin  81 mg Oral Nightly    atorvastatin  40 mg Oral Nightly    budesonide  0.5 mg Nebulization BID    clopidogrel  75 mg Oral Daily    famotidine  10 mg Oral Nightly    glipiZIDE  5 mg Oral BID AC    ipratropium-albuterol  3 mL Inhalation 4x daily    metoprolol succinate  25 mg Oral Daily    [Held by provider] sodium bicarbonate  650 mg Oral BID    Vitamin D  1,000 Units Oral Daily    zinc sulfate  100 mg Oral Daily    sodium chloride flush  5-40 mL IntraVENous 2 times per day    cefTRIAXone (ROCEPHIN) IV  1,000 mg IntraVENous Q24H    doxycycline (VIBRAMYCIN) IV  100 mg IntraVENous Q12H    insulin lispro  0-12 Units SubCUTAneous TID WC    insulin lispro  0-6 Units SubCUTAneous Nightly    furosemide  40 mg IntraVENous Daily     Meds prn: acetaminophen, albuterol, benzonatate, polyethylene glycol, zolpidem, sodium chloride flush, sodium chloride, ondansetron **OR** ondansetron, acetaminophen **OR** acetaminophen, glucose, dextrose, glucagon (rDNA), dextrose       Impression and

## 2021-12-18 NOTE — PROGRESS NOTES
Patient battery in telemetry unit changed at this time, patient asked writer where he was and why he was here. Re-oriented patient. No further needs, patient is now resting comfortably with eyes closed. Will continue to monitor.

## 2021-12-19 LAB
ABSOLUTE EOS #: 0.16 K/UL (ref 0–0.44)
ABSOLUTE IMMATURE GRANULOCYTE: 0.05 K/UL (ref 0–0.3)
ABSOLUTE LYMPH #: 2.2 K/UL (ref 1.1–3.7)
ABSOLUTE MONO #: 0.81 K/UL (ref 0.1–1.2)
ALBUMIN SERPL-MCNC: 2.4 G/DL (ref 3.5–5.2)
ALBUMIN/GLOBULIN RATIO: 0.8 (ref 1–2.5)
ALP BLD-CCNC: 137 U/L (ref 40–129)
ALT SERPL-CCNC: 12 U/L (ref 5–41)
ANION GAP SERPL CALCULATED.3IONS-SCNC: 12 MMOL/L (ref 9–17)
AST SERPL-CCNC: 15 U/L
BASOPHILS # BLD: 0 % (ref 0–2)
BASOPHILS ABSOLUTE: 0.03 K/UL (ref 0–0.2)
BILIRUB SERPL-MCNC: 0.32 MG/DL (ref 0.3–1.2)
BUN BLDV-MCNC: 59 MG/DL (ref 8–23)
BUN/CREAT BLD: 23 (ref 9–20)
CALCIUM SERPL-MCNC: 8.3 MG/DL (ref 8.6–10.4)
CHLORIDE BLD-SCNC: 102 MMOL/L (ref 98–107)
CO2: 29 MMOL/L (ref 20–31)
CREAT SERPL-MCNC: 2.62 MG/DL (ref 0.7–1.2)
DIFFERENTIAL TYPE: ABNORMAL
EOSINOPHILS RELATIVE PERCENT: 2 % (ref 1–4)
GFR AFRICAN AMERICAN: 28 ML/MIN
GFR NON-AFRICAN AMERICAN: 23 ML/MIN
GFR SERPL CREATININE-BSD FRML MDRD: ABNORMAL ML/MIN/{1.73_M2}
GFR SERPL CREATININE-BSD FRML MDRD: ABNORMAL ML/MIN/{1.73_M2}
GLUCOSE BLD-MCNC: 220 MG/DL (ref 70–99)
GLUCOSE BLD-MCNC: 229 MG/DL (ref 74–100)
GLUCOSE BLD-MCNC: 241 MG/DL (ref 74–100)
GLUCOSE BLD-MCNC: 268 MG/DL (ref 74–100)
GLUCOSE BLD-MCNC: 287 MG/DL (ref 74–100)
HCT VFR BLD CALC: 26.3 % (ref 40.7–50.3)
HEMOGLOBIN: 8 G/DL (ref 13–17)
IMMATURE GRANULOCYTES: 1 %
LYMPHOCYTES # BLD: 23 % (ref 24–43)
MCH RBC QN AUTO: 30.4 PG (ref 25.2–33.5)
MCHC RBC AUTO-ENTMCNC: 30.4 G/DL (ref 28.4–34.8)
MCV RBC AUTO: 100 FL (ref 82.6–102.9)
MONOCYTES # BLD: 9 % (ref 3–12)
NRBC AUTOMATED: 0 PER 100 WBC
PDW BLD-RTO: 15.6 % (ref 11.8–14.4)
PLATELET # BLD: 198 K/UL (ref 138–453)
PLATELET ESTIMATE: ABNORMAL
PMV BLD AUTO: 11.1 FL (ref 8.1–13.5)
POTASSIUM SERPL-SCNC: 4.7 MMOL/L (ref 3.7–5.3)
RBC # BLD: 2.63 M/UL (ref 4.21–5.77)
RBC # BLD: ABNORMAL 10*6/UL
SEG NEUTROPHILS: 65 % (ref 36–65)
SEGMENTED NEUTROPHILS ABSOLUTE COUNT: 6.33 K/UL (ref 1.5–8.1)
SODIUM BLD-SCNC: 143 MMOL/L (ref 135–144)
TOTAL PROTEIN: 5.6 G/DL (ref 6.4–8.3)
WBC # BLD: 9.6 K/UL (ref 3.5–11.3)
WBC # BLD: ABNORMAL 10*3/UL

## 2021-12-19 PROCEDURE — 94640 AIRWAY INHALATION TREATMENT: CPT

## 2021-12-19 PROCEDURE — 97535 SELF CARE MNGMENT TRAINING: CPT

## 2021-12-19 PROCEDURE — 6370000000 HC RX 637 (ALT 250 FOR IP): Performed by: INTERNAL MEDICINE

## 2021-12-19 PROCEDURE — 94761 N-INVAS EAR/PLS OXIMETRY MLT: CPT

## 2021-12-19 PROCEDURE — 36415 COLL VENOUS BLD VENIPUNCTURE: CPT

## 2021-12-19 PROCEDURE — 2580000003 HC RX 258: Performed by: INTERNAL MEDICINE

## 2021-12-19 PROCEDURE — 6370000000 HC RX 637 (ALT 250 FOR IP): Performed by: NURSE PRACTITIONER

## 2021-12-19 PROCEDURE — 82947 ASSAY GLUCOSE BLOOD QUANT: CPT

## 2021-12-19 PROCEDURE — 97530 THERAPEUTIC ACTIVITIES: CPT

## 2021-12-19 PROCEDURE — 2700000000 HC OXYGEN THERAPY PER DAY

## 2021-12-19 PROCEDURE — 85025 COMPLETE CBC W/AUTO DIFF WBC: CPT

## 2021-12-19 PROCEDURE — 6360000002 HC RX W HCPCS: Performed by: INTERNAL MEDICINE

## 2021-12-19 PROCEDURE — 2500000003 HC RX 250 WO HCPCS: Performed by: NURSE PRACTITIONER

## 2021-12-19 PROCEDURE — 99232 SBSQ HOSP IP/OBS MODERATE 35: CPT | Performed by: INTERNAL MEDICINE

## 2021-12-19 PROCEDURE — 80053 COMPREHEN METABOLIC PANEL: CPT

## 2021-12-19 PROCEDURE — 2500000003 HC RX 250 WO HCPCS: Performed by: INTERNAL MEDICINE

## 2021-12-19 PROCEDURE — 2580000003 HC RX 258: Performed by: NURSE PRACTITIONER

## 2021-12-19 PROCEDURE — 1200000000 HC SEMI PRIVATE

## 2021-12-19 RX ADMIN — GLIPIZIDE 5 MG: 5 TABLET ORAL at 07:30

## 2021-12-19 RX ADMIN — MIDODRINE HYDROCHLORIDE 5 MG: 5 TABLET ORAL at 12:53

## 2021-12-19 RX ADMIN — TAMSULOSIN HYDROCHLORIDE 0.4 MG: 0.4 CAPSULE ORAL at 09:47

## 2021-12-19 RX ADMIN — ASPIRIN 81 MG: 81 TABLET, COATED ORAL at 21:26

## 2021-12-19 RX ADMIN — MIDODRINE HYDROCHLORIDE 5 MG: 5 TABLET ORAL at 09:47

## 2021-12-19 RX ADMIN — AMIODARONE HYDROCHLORIDE 200 MG: 200 TABLET ORAL at 21:26

## 2021-12-19 RX ADMIN — DOXYCYCLINE 100 MG: 100 INJECTION, POWDER, LYOPHILIZED, FOR SOLUTION INTRAVENOUS at 16:38

## 2021-12-19 RX ADMIN — APIXABAN 2.5 MG: 2.5 TABLET, FILM COATED ORAL at 21:26

## 2021-12-19 RX ADMIN — BUDESONIDE 500 MCG: 0.5 SUSPENSION RESPIRATORY (INHALATION) at 21:36

## 2021-12-19 RX ADMIN — INSULIN LISPRO 6 UNITS: 100 INJECTION, SOLUTION INTRAVENOUS; SUBCUTANEOUS at 16:07

## 2021-12-19 RX ADMIN — ZINC SULFATE 220 MG (50 MG) CAPSULE 100 MG: CAPSULE at 09:47

## 2021-12-19 RX ADMIN — Medication 1000 UNITS: at 09:47

## 2021-12-19 RX ADMIN — FAMOTIDINE 10 MG: 10 TABLET ORAL at 21:26

## 2021-12-19 RX ADMIN — BUMETANIDE 1 MG: 0.25 INJECTION INTRAMUSCULAR; INTRAVENOUS at 05:37

## 2021-12-19 RX ADMIN — BUMETANIDE 1 MG: 0.25 INJECTION INTRAMUSCULAR; INTRAVENOUS at 16:34

## 2021-12-19 RX ADMIN — ACETAMINOPHEN 650 MG: 325 TABLET ORAL at 13:48

## 2021-12-19 RX ADMIN — AMIODARONE HYDROCHLORIDE 200 MG: 200 TABLET ORAL at 09:47

## 2021-12-19 RX ADMIN — GLIPIZIDE 5 MG: 5 TABLET ORAL at 16:34

## 2021-12-19 RX ADMIN — TAMSULOSIN HYDROCHLORIDE 0.4 MG: 0.4 CAPSULE ORAL at 16:34

## 2021-12-19 RX ADMIN — SODIUM CHLORIDE, PRESERVATIVE FREE 10 ML: 5 INJECTION INTRAVENOUS at 21:27

## 2021-12-19 RX ADMIN — CLOPIDOGREL BISULFATE 75 MG: 75 TABLET ORAL at 09:47

## 2021-12-19 RX ADMIN — ATORVASTATIN CALCIUM 40 MG: 40 TABLET, FILM COATED ORAL at 21:26

## 2021-12-19 RX ADMIN — ZOLPIDEM TARTRATE 5 MG: 5 TABLET ORAL at 23:02

## 2021-12-19 RX ADMIN — INSULIN LISPRO 4 UNITS: 100 INJECTION, SOLUTION INTRAVENOUS; SUBCUTANEOUS at 09:47

## 2021-12-19 RX ADMIN — APIXABAN 2.5 MG: 2.5 TABLET, FILM COATED ORAL at 09:47

## 2021-12-19 RX ADMIN — METOPROLOL SUCCINATE 25 MG: 25 TABLET, EXTENDED RELEASE ORAL at 09:47

## 2021-12-19 RX ADMIN — SODIUM CHLORIDE, PRESERVATIVE FREE 10 ML: 5 INJECTION INTRAVENOUS at 09:47

## 2021-12-19 RX ADMIN — TRAZODONE HYDROCHLORIDE 50 MG: 50 TABLET ORAL at 21:26

## 2021-12-19 RX ADMIN — MIDODRINE HYDROCHLORIDE 5 MG: 5 TABLET ORAL at 16:34

## 2021-12-19 RX ADMIN — INSULIN LISPRO 6 UNITS: 100 INJECTION, SOLUTION INTRAVENOUS; SUBCUTANEOUS at 12:53

## 2021-12-19 RX ADMIN — CEFTRIAXONE 1000 MG: 1 INJECTION, POWDER, FOR SOLUTION INTRAMUSCULAR; INTRAVENOUS at 09:51

## 2021-12-19 RX ADMIN — IPRATROPIUM BROMIDE AND ALBUTEROL SULFATE 3 ML: 2.5; .5 SOLUTION RESPIRATORY (INHALATION) at 21:36

## 2021-12-19 RX ADMIN — DOXYCYCLINE 100 MG: 100 INJECTION, POWDER, LYOPHILIZED, FOR SOLUTION INTRAVENOUS at 05:42

## 2021-12-19 RX ADMIN — IPRATROPIUM BROMIDE AND ALBUTEROL SULFATE 3 ML: 2.5; .5 SOLUTION RESPIRATORY (INHALATION) at 05:21

## 2021-12-19 ASSESSMENT — PAIN DESCRIPTION - LOCATION: LOCATION: BUTTOCKS

## 2021-12-19 ASSESSMENT — PAIN SCALES - GENERAL
PAINLEVEL_OUTOF10: 0
PAINLEVEL_OUTOF10: 0
PAINLEVEL_OUTOF10: 4

## 2021-12-19 NOTE — PROGRESS NOTES
Pt resting in bed watching t.v., assessment and vitals complete, denies any complaints, call light within reach, will continue to monitor.

## 2021-12-19 NOTE — PROGRESS NOTES
Occupational Therapy  Facility/Department: [facility] AT THE [facility] MED SURG  Daily Treatment Note  NAME: Maggi Morton  : 10/31/1930  MRN: [id number]    Date of Service: 2021    Discharge Recommendations:  ECF with OT,Subacute/Skilled Nursing Facility       Assessment      OT Education: OT Role;Plan of Care;Transfer Training  Barriers to Learning: none  Activity Tolerance  Activity Tolerance: Patient Tolerated treatment well  Safety Devices  Safety Devices in place: Yes  Type of devices: All fall risk precautions in place; Left in chair;Call light within reach         Patient Diagnosis(es): The primary encounter diagnosis was Pneumonia due to infectious organism, unspecified laterality, unspecified part of lung. Diagnoses of Hyperkalemia and Elevated troponin were also pertinent to this visit. has a past medical history of Acute and chronic respiratory failure with hypoxia ([address] CAD (coronary artery disease), Cataracts, bilateral, Elevated troponin, Glaucoma, Hyperlipidemia, Hypertension, TIA (transient ischemic attack), and Type II or unspecified type diabetes mellitus without mention of complication, not stated as uncontrolled. has a past surgical history that includes Coronary artery bypass graft (); Cataract removal with implant (Bilateral,  and 12/3/2013); and Inguinal hernia repair (Bilateral, 12). Restrictions  Restrictions/Precautions  Restrictions/Precautions: General Precautions,Fall Risk  Required Braces or Orthoses?: No  Subjective   General  Chart Reviewed: Yes  Patient assessed for rehabilitation services?: Yes  Response to previous treatment: Patient with no complaints from previous session  Family / Caregiver Present: No  Referring Practitioner: Dr. Brooke Harper  Diagnosis: Shortness of Breath  Subjective  Subjective: Pt in bed \"I just hope I walk again, I was feeling strong enough yesterday, lets try it. \"  General Comment  Comments: Pt sitting up in bed upon arrival. Pt agreed to participate in bed mob, static standing and fxl transfer to chair  Vital Signs  Patient Currently in Pain: Denies   Orientation     Objective             Balance  Sitting Balance: Supervision (EOB unsupported ~ 5 min 0 LOB)  Standing Balance: Minimal assistance  Standing Balance  Time: ~ 2 min  Activity: static supported standing  Comment: once up pt demo'd retrograde instability, stabilized self and stood CGA for ~ 1 min  Bed mobility  Supine to Sit: Moderate assistance  Sit to Supine: Moderate assistance  Scooting: Moderate assistance  Comment: use of bed rail, short RBs d/t fatigue, increased time  Transfers  Stand Step Transfers: Minimal assistance  Sit to stand: Minimal assistance  Stand to sit: Minimal assistance  Transfer Comments: from EOB, VC for tech, safety                                                                 Plan   Plan  Times per week: 7  Times per day: Daily  Current Treatment Recommendations: Strengthening,Endurance Training,Safety Education & Training,Self-Care / ADL,Functional Mobility Training  G-Code     OutComes Score                                                  AM-PAC Score             Goals  Short term goals  Time Frame for Short term goals: 21 visits  Short term goal 1: Patient to engage in functional transfers with min A. Short term goal 2: Patient to complete UB grooming/dressing with SBA  Short term goal 3: Patient to demo LB dressing with mod A.        Therapy Time   Individual Concurrent Group Co-treatment   Time In 0700         Time Out 0724         Minutes 24                 MADELYN Alcantara

## 2021-12-19 NOTE — PROGRESS NOTES
Physical Therapy  Facility/Department: [facility] AT THE [facility] MED SURG  Daily Treatment Note  NAME: Sheila Reed  : 10/31/1930  MRN: [id number]    Date of Service: 2021    Discharge Recommendations:  ECF with PT        Assessment   Treatment Diagnosis: GENERALIZED WEAKNESS. Prognosis: Good  PT Education: Transfer Training;Functional Mobility Training;General Safety;Goals  Patient Education: Educated pt on above with fair to  good understanding  REQUIRES PT FOLLOW UP: Yes  Activity Tolerance  Activity Tolerance: Patient Tolerated treatment well;Patient limited by endurance     Patient Diagnosis(es): The primary encounter diagnosis was Pneumonia due to infectious organism, unspecified laterality, unspecified part of lung. Diagnoses of Hyperkalemia and Elevated troponin were also pertinent to this visit. has a past medical history of Acute and chronic respiratory failure with hypoxia ([address] CAD (coronary artery disease), Cataracts, bilateral, Elevated troponin, Glaucoma, Hyperlipidemia, Hypertension, TIA (transient ischemic attack), and Type II or unspecified type diabetes mellitus without mention of complication, not stated as uncontrolled. has a past surgical history that includes Coronary artery bypass graft (); Cataract removal with implant (Bilateral,  and 12/3/2013); and Inguinal hernia repair (Bilateral, 12). Restrictions  Restrictions/Precautions  Restrictions/Precautions: General Precautions,Fall Risk  Required Braces or Orthoses?: No  Subjective   General  Chart Reviewed: Yes  Additional Pertinent Hx: PNEUMONIA,POST COVID  Response To Previous Treatment: Patient with no complaints from previous session. Family / Caregiver Present: No  Referring Practitioner: Dr. Alber Grigsby  Subjective  Subjective: Pt reported 4-5/10 bottom pain. General Comment  Comments: Nursing Harvinder Chapa) aware of pain.           Orientation  Orientation  Overall Orientation Status: Within Functional Limits  Cognition Objective   Bed mobility  Rolling to Left: Moderate assistance  Sit to Supine: Moderate assistance;Maximum assistance;2 Person assistance  Comment: Cues for technique with fair to good understanding noted. Transfers  Sit to Stand: Moderate Assistance;Maximum Assistance;2 Person Assistance  Stand to sit: Moderate Assistance;Maximum Assistance;2 Person Assistance  Stand Pivot Transfers: Moderate Assistance;Maximum Assistance;2 Person Assistance  Comment: Verbal cues needed for technique with fair understanding noted. Ambulation  Ambulation?: No     Balance  Posture: Fair  Sitting - Static: Fair;+  Sitting - Dynamic: Fair  Standing - Static: Poor;+  Standing - Dynamic: Poor     G-Code     OutComes Score    AM-PAC Score    Goals  Short term goals  Time Frame for Short term goals: 3 DAYS  Short term goal 1: MIN ASSIST TRANSFERS. Short term goal 2: MIN ASSIST BED MOBILITY  Short term goal 3: MIN ASSIST GAIT FWW 10 FT. Long term goals  Time Frame for Long term goals : 4 WEEKS  Long term goal 1: IND TRANSFERS. Long term goal 2: IND GAIT 100 FT  FWW. Patient Goals   Patient goals : RETURN HOME WITH ASSISTANCE    Plan    Plan  Times per day: Twice a day  Current Treatment Recommendations: Kuldeep Hansen Re-education,Patient/Caregiver Education & Training,ADL/Self-care Training,Gait Training,Balance Training,Functional Mobility Training  Safety Devices  Type of devices:  All fall risk precautions in place,Bed alarm in place,Call light within reach,Nurse notified,Gait belt,Patient at risk for falls,Left in bed     Therapy Time   Individual Concurrent Group Co-treatment   Time In 1324         Time Out 1350         Minutes [address]

## 2021-12-19 NOTE — PROGRESS NOTES
[facility] - [facility] -Progress Note    SUBJECTIVE:    Patient seen for f/u of Community acquired bacterial pneumonia. He sitting up in chair alert oriented no acute distress. He is currently on 4L per NC. Feels better. ROS:   Constitutional: negative  for fevers, and negative for chills. Respiratory: negative for shortness of breath, positive for cough, and negative for wheezing  Cardiovascular: negative for chest pain, and negative for palpitations  Gastrointestinal: negative for abdominal pain, negative for nausea,negative for vomiting, negative for diarrhea, and negative for constipation     All other systems were reviewed with the patient and are negative unless otherwise stated in HPI      OBJECTIVE:        VITAL SIGNS:  Patient Vitals for the past 8 hrs:   BP Temp Temp src Pulse Resp SpO2 Weight   21 0730 (!) 114/52 97.9 °F (36.6 °C) Temporal 78 18 93 %    21 0549       165 lb (74.8 kg)   21 0523      97 %          Temp: 97.9 °F (36.6 °C)  Temp range:    Temp  Av.2 °F (36.8 °C)  Min: 97.5 °F (36.4 °C)  Max: 99.1 °F (37.3 °C)    BP: (!) 114/52  BP Range:      Systolic (86LEP), XJI:255 , Min:96 , KYY:877      Diastolic (34PQO), ZON:13, Min:47, Max:63    Pulse: 78  Pulse Range:    Pulse  Av.8  Min: 69  Max: 80    Resp: 18  Resp Range:   Resp  Av  Min: 16  Max: 18    SpO2: 93 % on heated high flow O2 cannula  SpO2 range:   SpO2  Av.2 %  Min: 93 %  Max: 98 %      PaO2/FiO2 RATIO:  No results for input(s): POCPO2 in the last 72 hours. FiO2 : 50 %         Exam:    GEN:    Awake, alert and oriented x3. EYES:   EOMI, pupils equal   NECK: Supple. No lymphadenopathy. No carotid bruit  CVS:     regular rate and rhythm, no audible murmur  PULM:  diminished but clear without wheezes, rales or rhonch, no acute respiratory distress  ABD:     Bowels sounds normal.  Abdomen is soft. No distention. no tenderness to palpation. EXT:     trace edema in the LLE .   No calf tenderness. NEURO: Moves all extremities. Motor and sensory are grossly intact  SKIN:    No rashes. No skin lesions    Diagnostic Data:      Complete Blood Count:   Recent Labs     12/17/21  0550 12/18/21  0545 12/19/21  0550   WBC 8.5 9.0 9.6   RBC 2.50* 2.50* 2.63*   HGB 7.6* 7.7* 8.0*   HCT 25.0* 25.3* 26.3*   .0 101.2 100.0   MCH 30.4 30.8 30.4   MCHC 30.4 30.4 30.4   RDW 15.6* 15.5* 15.6*    203 198   MPV 10.9 10.9 11.1        Last 3 Blood Glucose:   Recent Labs     12/16/21  0908 12/17/21  0550 12/18/21  0545 12/19/21  0550   GLUCOSE 308* 207* 148* 220*        Comprehensive Metabolic Profile:   Recent Labs     12/17/21  0550 12/18/21  0545 12/19/21  0550    141 143   K 4.9 5.0 4.7    101 102   CO2 29 30 29   BUN 52* 54* 59*   CREATININE 2.58* 2.48* 2.62*   GLUCOSE 207* 148* 220*   CALCIUM 8.4* 8.1* 8.3*   PROT 5.3* 5.3* 5.6*   LABALBU 2.3* 2.3* 2.4*   BILITOT 0.43 0.37 0.32   ALKPHOS 90 118 137*   AST 14 16 15   ALT 13 13 12        Urinalysis:   Lab Results   Component Value Date    NITRU NEGATIVE 12/16/2021    COLORU Yellow 12/16/2021    PHUR 7.5 12/16/2021    WBCUA 0 TO 2 12/16/2021    RBCUA 0 TO 2 12/16/2021    MUCUS NOT REPORTED 12/16/2021    TRICHOMONAS NOT REPORTED 12/16/2021    YEAST NOT REPORTED 12/16/2021    BACTERIA TRACE 12/16/2021    SPECGRAV 1.015 12/16/2021    LEUKOCYTESUR NEGATIVE 12/16/2021    UROBILINOGEN Normal 12/16/2021    BILIRUBINUR NEGATIVE 12/16/2021    GLUCOSEU 1+ 12/16/2021    KETUA NEGATIVE 12/16/2021    AMORPHOUS NOT REPORTED 12/16/2021       HgBA1c:    Lab Results   Component Value Date    LABA1C 10.3 12/16/2021       Lactic Acid:   Lab Results   Component Value Date    LACTA 2.2 11/15/2021        Troponin: No results for input(s): TROPONINI in the last 72 hours.     CRP:    Recent Labs     12/16/21  0908   CRP 53.8*         Radiology/Imaging:  VL DUP LOWER EXTREMITY VENOUS LEFT   Final Result      XR CHEST PORTABLE   Final Result   Stable multifocal infiltrates suggesting pneumonia probable small effusions   greater on the left unchanged.                ASSESSMENT / PLAN:  Community acquired bacterial pneumonia  · Continue current therapy   · IV Rocephin/doxycycline  · Continue supplemental oxygen to maintain SPO2 greater than 90%  · Acapella  · Continue Pulmicort, duo nebs  · ASHD  · Continue aspirin, Eliquis, Lipitor, Plavix, amiodarone  · CKD stage IV  · Trend labs  · Urinary retention  · Appreciate urology  · Continue Robertson catheter  · Urine culture pending  · Continue Flomax  · Type 2 diabetes  · Continue Glucotrol  · POCT before meals and at bedtime  · Insulin sliding scale  · Hypoglycemia protocol  · Microcytic hypochromic anemia  · Hemoglobin stable   · Nutrition status:   · at risk for malnutrition  · Dietician consult initiated  · Hospital Prophylaxis:   · DVT: Eliquis   · Stress Ulcer: H2 Blocker   · High risk medications: none   · Disposition:    · Discharge plan is pending   · PT/OT      FRANKLIN Hernandez - CNP , APRN, NP-C  Hospitalist Medicine        12/19/2021, 8:17 AM

## 2021-12-19 NOTE — PROGRESS NOTES
[facility]   [phone]   Nephrology progress note  12/19/2021, 11:25 AM      Pt Name:    Duglas Guevara  MRN:     [id number]     YOB: 1930  Admit Date:    12/16/2021  8:32 AM  Primary Care Physician:  Chace Munoz DO   Room number  2199/9244-02    TELEHEALTH EVALUATION -- Audio/Visual (During RROFK-73 public health emergency)    Patient's Location: [facility]  Physician's (myself) Location: [place] [address] office, [address] [place] [address]  Patient's nurse: Present    Chief Complaint: Nephrology following for JANINA/CKD    Subjective:  Patient seen and examined  Patient is on NC 5L  No acute distress  Awake and alert  Following commands  Hard of hearing    Objective:  24HR INTAKE/OUTPUT:      Intake/Output Summary (Last 24 hours) at 12/19/2021 1125  Last data filed at 12/19/2021 0000  Gross per 24 hour   Intake 583 ml   Output 950 ml   Net -367 ml     I/O last 3 completed shifts: In: 823 [P.O.:440; I.V.:383]  Out: 950 [Urine:950]  No intake/output data recorded. Admission weight: 168 lb (76.2 kg)  Wt Readings from Last 3 Encounters:   12/19/21 165 lb (74.8 kg)   12/10/21 171 lb 3.2 oz (77.7 kg)   12/02/21 162 lb (73.5 kg)     Body mass index is 28.32 kg/m².     Physical examination  VITALS:     Vitals:    12/19/21 0000 12/19/21 0523 12/19/21 0549 12/19/21 0730   BP: 117/63   (!) 114/52   Pulse: 80   78   Resp: 18   18   Temp: 99.1 °F (37.3 °C)   97.9 °F (36.6 °C)   TempSrc: Temporal   Temporal   SpO2: 98% 97%  93%   Weight:   165 lb (74.8 kg)    Height:         Constitutional and General Appearance: alert and cooperative, no apparent distress  Lungs: no use of accessory muscles or labored breathing noted, patient on high flow oxygen  Extremities: Bilateral lower extremity 2+ pitting edema  Neuro: no slurred speech, no facial drooping  Psychiatric: Normal mood and affect, Not agitated      Due to this being a TeleHealth encounter, evaluation of the following organ systems is limited: EENT/Resp/CV/GI//MS/Neuro/Skin/Lymph    Lab Data  CBC:   Recent Labs     12/17/21  0550 12/18/21  0545 12/19/21  0550   WBC 8.5 9.0 9.6   HGB 7.6* 7.7* 8.0*   HCT 25.0* 25.3* 26.3*    203 198     BMP:  Recent Labs     12/17/21  0550 12/18/21  0545 12/19/21  0550    141 143   K 4.9 5.0 4.7    101 102   CO2 29 30 29   BUN 52* 54* 59*   CREATININE 2.58* 2.48* 2.62*   GLUCOSE 207* 148* 220*   CALCIUM 8.4* 8.1* 8.3*     Hepatic:   Recent Labs     12/17/21  0550 12/18/21  0545 12/19/21  0550   LABALBU 2.3* 2.3* 2.4*   AST 14 16 15   ALT 13 13 12   BILITOT 0.43 0.37 0.32   ALKPHOS 90 118 137*         Meds:  Infusion:    sodium chloride Stopped (12/17/21 2154)    dextrose       Meds:    bumetanide  1 mg IntraVENous Q12H    traZODone  50 mg Oral Nightly    tamsulosin  0.4 mg Oral BID    midodrine  5 mg Oral TID WC    amiodarone  200 mg Oral BID    apixaban  2.5 mg Oral BID    aspirin  81 mg Oral Nightly    atorvastatin  40 mg Oral Nightly    budesonide  0.5 mg Nebulization BID    clopidogrel  75 mg Oral Daily    famotidine  10 mg Oral Nightly    glipiZIDE  5 mg Oral BID AC    ipratropium-albuterol  3 mL Inhalation 4x daily    metoprolol succinate  25 mg Oral Daily    [Held by provider] sodium bicarbonate  650 mg Oral BID    Vitamin D  1,000 Units Oral Daily    zinc sulfate  100 mg Oral Daily    sodium chloride flush  5-40 mL IntraVENous 2 times per day    cefTRIAXone (ROCEPHIN) IV  1,000 mg IntraVENous Q24H    doxycycline (VIBRAMYCIN) IV  100 mg IntraVENous Q12H    insulin lispro  0-12 Units SubCUTAneous TID WC    insulin lispro  0-6 Units SubCUTAneous Nightly     Meds prn: acetaminophen, albuterol, benzonatate, polyethylene glycol, zolpidem, sodium chloride flush, sodium chloride, ondansetron **OR** ondansetron, acetaminophen **OR** acetaminophen, glucose, dextrose, glucagon (rDNA), dextrose       Impression and Plan:  1. CKD 4.   Largely at baseline  Electrolytes are overall stable  No acute need for renal replacement therapy  Serum creatinine is slightly higher but still acceptable  Continue with IV Bumex   Recheck labs in a.m. Metabolic acidosis resolved. Serum bicarbonate stopped    2. Hyperkalemia improved  3. Acute hypoxic respiratory failure secondary to pneumonia and possible CHF. Patient has 2-3+ extremity pitting edema. Urine output noted. Continue with IV Bumex for now  4. Hypotension. Blood pressure readings improved, will continue with midodrine  5. Pneumonia:  currently on IV Rocephin, on NC now  6. Atrial fibrillation  7. Diabetes mellitus  8. Mild pulmonary hypertension based on echo  9. Urinary retention    D/W patient and RN    Candelario Canela MD  [facility]    Pursuant to the emergency declaration under the [facility], [facility] waiver authority and the [employer] Act, this Virtual  Visit was conducted, with patient's consent, to reduce the patient's risk of exposure to COVID-19. Caregiver (Patient's RN) was present when appropriate. Virtual visit was done to address concerns as mentioned above. Due to this being a TeleHealth encounter ([facility]), evaluation of the following organ systems was limited: EENT/Resp/CV/GI//MS/Neuro/Skin/Lymph     Services were provided through a video synchronous discussion virtually to substitute for in-person visit.

## 2021-12-20 LAB
ABSOLUTE EOS #: 0.14 K/UL (ref 0–0.44)
ABSOLUTE IMMATURE GRANULOCYTE: 0.1 K/UL (ref 0–0.3)
ABSOLUTE LYMPH #: 2.22 K/UL (ref 1.1–3.7)
ABSOLUTE MONO #: 0.69 K/UL (ref 0.1–1.2)
ALBUMIN SERPL-MCNC: 2.5 G/DL (ref 3.5–5.2)
ALBUMIN/GLOBULIN RATIO: 0.8 (ref 1–2.5)
ALP BLD-CCNC: 139 U/L (ref 40–129)
ALT SERPL-CCNC: 14 U/L (ref 5–41)
ANION GAP SERPL CALCULATED.3IONS-SCNC: 12 MMOL/L (ref 9–17)
AST SERPL-CCNC: 17 U/L
BASOPHILS # BLD: 0 % (ref 0–2)
BASOPHILS ABSOLUTE: 0.03 K/UL (ref 0–0.2)
BILIRUB SERPL-MCNC: 0.35 MG/DL (ref 0.3–1.2)
BUN BLDV-MCNC: 70 MG/DL (ref 8–23)
BUN/CREAT BLD: 23 (ref 9–20)
CALCIUM SERPL-MCNC: 8.5 MG/DL (ref 8.6–10.4)
CHLORIDE BLD-SCNC: 97 MMOL/L (ref 98–107)
CO2: 28 MMOL/L (ref 20–31)
CREAT SERPL-MCNC: 3.02 MG/DL (ref 0.7–1.2)
DIFFERENTIAL TYPE: ABNORMAL
EOSINOPHILS RELATIVE PERCENT: 1 % (ref 1–4)
GFR AFRICAN AMERICAN: 24 ML/MIN
GFR NON-AFRICAN AMERICAN: 20 ML/MIN
GFR SERPL CREATININE-BSD FRML MDRD: ABNORMAL ML/MIN/{1.73_M2}
GFR SERPL CREATININE-BSD FRML MDRD: ABNORMAL ML/MIN/{1.73_M2}
GLUCOSE BLD-MCNC: 178 MG/DL (ref 74–100)
GLUCOSE BLD-MCNC: 201 MG/DL (ref 70–99)
GLUCOSE BLD-MCNC: 212 MG/DL (ref 74–100)
GLUCOSE BLD-MCNC: 269 MG/DL (ref 74–100)
GLUCOSE BLD-MCNC: 279 MG/DL (ref 74–100)
GLUCOSE BLD-MCNC: 281 MG/DL (ref 74–100)
HCT VFR BLD CALC: 25.3 % (ref 40.7–50.3)
HEMOGLOBIN: 7.7 G/DL (ref 13–17)
IMMATURE GRANULOCYTES: 1 %
LYMPHOCYTES # BLD: 22 % (ref 24–43)
MCH RBC QN AUTO: 30.3 PG (ref 25.2–33.5)
MCHC RBC AUTO-ENTMCNC: 30.4 G/DL (ref 28.4–34.8)
MCV RBC AUTO: 99.6 FL (ref 82.6–102.9)
MONOCYTES # BLD: 7 % (ref 3–12)
NRBC AUTOMATED: 0 PER 100 WBC
PDW BLD-RTO: 15.5 % (ref 11.8–14.4)
PLATELET # BLD: 195 K/UL (ref 138–453)
PLATELET ESTIMATE: ABNORMAL
PMV BLD AUTO: 11 FL (ref 8.1–13.5)
POTASSIUM SERPL-SCNC: 4.9 MMOL/L (ref 3.7–5.3)
RBC # BLD: 2.54 M/UL (ref 4.21–5.77)
RBC # BLD: ABNORMAL 10*6/UL
SEG NEUTROPHILS: 69 % (ref 36–65)
SEGMENTED NEUTROPHILS ABSOLUTE COUNT: 6.83 K/UL (ref 1.5–8.1)
SODIUM BLD-SCNC: 137 MMOL/L (ref 135–144)
TOTAL PROTEIN: 5.7 G/DL (ref 6.4–8.3)
WBC # BLD: 10 K/UL (ref 3.5–11.3)
WBC # BLD: ABNORMAL 10*3/UL

## 2021-12-20 PROCEDURE — 6370000000 HC RX 637 (ALT 250 FOR IP): Performed by: INTERNAL MEDICINE

## 2021-12-20 PROCEDURE — 94640 AIRWAY INHALATION TREATMENT: CPT

## 2021-12-20 PROCEDURE — 6360000002 HC RX W HCPCS: Performed by: INTERNAL MEDICINE

## 2021-12-20 PROCEDURE — 99232 SBSQ HOSP IP/OBS MODERATE 35: CPT | Performed by: INTERNAL MEDICINE

## 2021-12-20 PROCEDURE — 82947 ASSAY GLUCOSE BLOOD QUANT: CPT

## 2021-12-20 PROCEDURE — 97112 NEUROMUSCULAR REEDUCATION: CPT

## 2021-12-20 PROCEDURE — 2500000003 HC RX 250 WO HCPCS: Performed by: INTERNAL MEDICINE

## 2021-12-20 PROCEDURE — 97535 SELF CARE MNGMENT TRAINING: CPT

## 2021-12-20 PROCEDURE — 1200000000 HC SEMI PRIVATE

## 2021-12-20 PROCEDURE — 36415 COLL VENOUS BLD VENIPUNCTURE: CPT

## 2021-12-20 PROCEDURE — 97110 THERAPEUTIC EXERCISES: CPT

## 2021-12-20 PROCEDURE — 6370000000 HC RX 637 (ALT 250 FOR IP): Performed by: NURSE PRACTITIONER

## 2021-12-20 PROCEDURE — 80053 COMPREHEN METABOLIC PANEL: CPT

## 2021-12-20 PROCEDURE — 2580000003 HC RX 258: Performed by: NURSE PRACTITIONER

## 2021-12-20 PROCEDURE — 2500000003 HC RX 250 WO HCPCS: Performed by: NURSE PRACTITIONER

## 2021-12-20 PROCEDURE — 97530 THERAPEUTIC ACTIVITIES: CPT

## 2021-12-20 PROCEDURE — 2700000000 HC OXYGEN THERAPY PER DAY

## 2021-12-20 PROCEDURE — 94761 N-INVAS EAR/PLS OXIMETRY MLT: CPT

## 2021-12-20 PROCEDURE — 2580000003 HC RX 258: Performed by: INTERNAL MEDICINE

## 2021-12-20 PROCEDURE — 85025 COMPLETE CBC W/AUTO DIFF WBC: CPT

## 2021-12-20 RX ORDER — BUMETANIDE 0.25 MG/ML
1 INJECTION, SOLUTION INTRAMUSCULAR; INTRAVENOUS EVERY 12 HOURS
Status: DISCONTINUED | OUTPATIENT
Start: 2021-12-21 | End: 2021-12-21 | Stop reason: HOSPADM

## 2021-12-20 RX ORDER — MIDODRINE HYDROCHLORIDE 5 MG/1
10 TABLET ORAL
Status: DISCONTINUED | OUTPATIENT
Start: 2021-12-20 | End: 2021-12-21 | Stop reason: HOSPADM

## 2021-12-20 RX ADMIN — GLIPIZIDE 5 MG: 5 TABLET ORAL at 09:23

## 2021-12-20 RX ADMIN — AMIODARONE HYDROCHLORIDE 200 MG: 200 TABLET ORAL at 20:28

## 2021-12-20 RX ADMIN — DOXYCYCLINE 100 MG: 100 INJECTION, POWDER, LYOPHILIZED, FOR SOLUTION INTRAVENOUS at 05:04

## 2021-12-20 RX ADMIN — TRAZODONE HYDROCHLORIDE 50 MG: 50 TABLET ORAL at 20:28

## 2021-12-20 RX ADMIN — IPRATROPIUM BROMIDE AND ALBUTEROL SULFATE 3 ML: 2.5; .5 SOLUTION RESPIRATORY (INHALATION) at 20:34

## 2021-12-20 RX ADMIN — CLOPIDOGREL BISULFATE 75 MG: 75 TABLET ORAL at 09:14

## 2021-12-20 RX ADMIN — MIDODRINE HYDROCHLORIDE 5 MG: 5 TABLET ORAL at 11:54

## 2021-12-20 RX ADMIN — METOPROLOL SUCCINATE 25 MG: 25 TABLET, EXTENDED RELEASE ORAL at 09:14

## 2021-12-20 RX ADMIN — AMIODARONE HYDROCHLORIDE 200 MG: 200 TABLET ORAL at 09:14

## 2021-12-20 RX ADMIN — SODIUM CHLORIDE, PRESERVATIVE FREE 10 ML: 5 INJECTION INTRAVENOUS at 09:20

## 2021-12-20 RX ADMIN — CEFTRIAXONE 1000 MG: 1 INJECTION, POWDER, FOR SOLUTION INTRAMUSCULAR; INTRAVENOUS at 09:20

## 2021-12-20 RX ADMIN — IPRATROPIUM BROMIDE AND ALBUTEROL SULFATE 3 ML: 2.5; .5 SOLUTION RESPIRATORY (INHALATION) at 05:47

## 2021-12-20 RX ADMIN — APIXABAN 2.5 MG: 2.5 TABLET, FILM COATED ORAL at 09:14

## 2021-12-20 RX ADMIN — ZINC SULFATE 220 MG (50 MG) CAPSULE 100 MG: CAPSULE at 09:14

## 2021-12-20 RX ADMIN — FAMOTIDINE 10 MG: 10 TABLET ORAL at 20:28

## 2021-12-20 RX ADMIN — GLIPIZIDE 5 MG: 5 TABLET ORAL at 17:04

## 2021-12-20 RX ADMIN — INSULIN LISPRO 4 UNITS: 100 INJECTION, SOLUTION INTRAVENOUS; SUBCUTANEOUS at 11:54

## 2021-12-20 RX ADMIN — IPRATROPIUM BROMIDE AND ALBUTEROL SULFATE 3 ML: 2.5; .5 SOLUTION RESPIRATORY (INHALATION) at 11:23

## 2021-12-20 RX ADMIN — Medication 1000 UNITS: at 09:14

## 2021-12-20 RX ADMIN — MIDODRINE HYDROCHLORIDE 5 MG: 5 TABLET ORAL at 09:14

## 2021-12-20 RX ADMIN — INSULIN LISPRO 2 UNITS: 100 INJECTION, SOLUTION INTRAVENOUS; SUBCUTANEOUS at 09:16

## 2021-12-20 RX ADMIN — TAMSULOSIN HYDROCHLORIDE 0.4 MG: 0.4 CAPSULE ORAL at 09:14

## 2021-12-20 RX ADMIN — BUMETANIDE 1 MG: 0.25 INJECTION INTRAMUSCULAR; INTRAVENOUS at 05:04

## 2021-12-20 RX ADMIN — BUDESONIDE 500 MCG: 0.5 SUSPENSION RESPIRATORY (INHALATION) at 11:23

## 2021-12-20 RX ADMIN — IPRATROPIUM BROMIDE AND ALBUTEROL SULFATE 3 ML: 2.5; .5 SOLUTION RESPIRATORY (INHALATION) at 15:41

## 2021-12-20 RX ADMIN — TAMSULOSIN HYDROCHLORIDE 0.4 MG: 0.4 CAPSULE ORAL at 17:03

## 2021-12-20 RX ADMIN — SODIUM CHLORIDE, PRESERVATIVE FREE 10 ML: 5 INJECTION INTRAVENOUS at 20:28

## 2021-12-20 RX ADMIN — APIXABAN 2.5 MG: 2.5 TABLET, FILM COATED ORAL at 20:28

## 2021-12-20 RX ADMIN — BUDESONIDE 500 MCG: 0.5 SUSPENSION RESPIRATORY (INHALATION) at 20:42

## 2021-12-20 RX ADMIN — ATORVASTATIN CALCIUM 40 MG: 40 TABLET, FILM COATED ORAL at 20:28

## 2021-12-20 RX ADMIN — MIDODRINE HYDROCHLORIDE 10 MG: 5 TABLET ORAL at 17:03

## 2021-12-20 RX ADMIN — DOXYCYCLINE 100 MG: 100 INJECTION, POWDER, LYOPHILIZED, FOR SOLUTION INTRAVENOUS at 17:43

## 2021-12-20 RX ADMIN — INSULIN LISPRO 6 UNITS: 100 INJECTION, SOLUTION INTRAVENOUS; SUBCUTANEOUS at 17:04

## 2021-12-20 RX ADMIN — ASPIRIN 81 MG: 81 TABLET, COATED ORAL at 20:28

## 2021-12-20 ASSESSMENT — PAIN SCALES - GENERAL
PAINLEVEL_OUTOF10: 0

## 2021-12-20 NOTE — PROGRESS NOTES
Spoke with Patient son this a.m. via telephone conversation re: placement options. Patient admitted from [facility] but not progressing. Son states that they are aware and have been considering long term options but at present time Patient will return to [facility] upon discharge. Patient is a 80year old , white male, admitted with a diagnosis of Community Acquired Bacterial Pneumonia. He resides in [place] with his wife and adult son. Patient uses a walker, cane and a shower chair at home for assistance. Not using any outside services at this time. PCP is Dr. Rocio Chao. Patient has insurance and son relates that they have no difficulty with regards to affording his medications. Discharge plan is back to [facility] when stable. Patient has a '[facility]' order in place with no Advanced Directives on file. Decision makers are his wife and son.     Dee Ray[address]  12/20/2021

## 2021-12-20 NOTE — PROGRESS NOTES
Nightly medications given. Patient was able to take 1-2 pills whole at a time with water with writer's assistance and tolerated well. Patient request to have Maria Luisa Moody in the bit so he is able to sleep tonight.

## 2021-12-20 NOTE — PROGRESS NOTES
Physical Therapy  Facility/Department: [facility] AT THE [facility] MED SURG  Daily Treatment Note  NAME: Lois Spatz  : 10/31/1930  MRN: [id number]    Date of Service: 2021    Discharge Recommendations:  ECF with PT        Assessment   Treatment Diagnosis: GENERALIZED WEAKNESS. Prognosis: Good  Decision Making: Medium Complexity  PT Education: General Safety;PT Role  REQUIRES PT FOLLOW UP: Yes  Activity Tolerance  Activity Tolerance: Patient Tolerated treatment well;Patient limited by endurance     Patient Diagnosis(es): The primary encounter diagnosis was Pneumonia due to infectious organism, unspecified laterality, unspecified part of lung. Diagnoses of Hyperkalemia and Elevated troponin were also pertinent to this visit. has a past medical history of Acute and chronic respiratory failure with hypoxia ([address] CAD (coronary artery disease), Cataracts, bilateral, Elevated troponin, Glaucoma, Hyperlipidemia, Hypertension, TIA (transient ischemic attack), and Type II or unspecified type diabetes mellitus without mention of complication, not stated as uncontrolled. has a past surgical history that includes Coronary artery bypass graft (); Cataract removal with implant (Bilateral,  and 12/3/2013); and Inguinal hernia repair (Bilateral, 12). Restrictions  Restrictions/Precautions  Restrictions/Precautions: General Precautions,Fall Risk  Required Braces or Orthoses?: No  Subjective   General  Chart Reviewed: Yes  Additional Pertinent Hx: PNEUMONIA,POST COVID  Response To Previous Treatment: Patient with no complaints from previous session. Family / Caregiver Present: No  Referring Practitioner: Dr. Gonsales Sick: Pt denies pain, states he is feeling very weak today.           Orientation  Orientation  Overall Orientation Status: Within Functional Limits  Cognition      Objective      Transfers  Comment: Pt required maxA x2 to scoot in chair, declined transfers this visit. Ambulation  Ambulation?: No        Exercises  Straight Leg Raise: x10  Quad Sets: x15  Heelslides: x15  Hip Flexion: x15  Hip Abduction: x15  Knee Long Arc Quad: x15  Knee Short Arc Quad: x15  Ankle Pumps: x15  Comments: BLE ther ex completed in reclined. Pt required frequent RBs d/t fatigue/SOB. SPO2 93-94% throughout tx. Goals  Short term goals  Time Frame for Short term goals: 3 DAYS  Short term goal 1: MIN ASSIST TRANSFERS. Short term goal 2: MIN ASSIST BED MOBILITY  Short term goal 3: MIN ASSIST GAIT FWW 10 FT. Long term goals  Time Frame for Long term goals : 4 WEEKS  Long term goal 1: IND TRANSFERS. Long term goal 2: IND GAIT 100 FT  FWW. Patient Goals   Patient goals : RETURN HOME WITH ASSISTANCE    Plan    Plan  Times per day: Twice a day  Current Treatment Recommendations: Vanna Baumann Re-education,Patient/Caregiver Education & Training,ADL/Self-care Training,Gait Training,Balance Training,Functional Mobility Training  Safety Devices  Type of devices:  All fall risk precautions in place,Left in chair,Call light within reach,Chair alarm in place,Nurse notified,Patient at risk for falls     Therapy Time   Individual Concurrent Group Co-treatment   Time In 1050         Time Out 1116         Minutes [address] [place] [address]

## 2021-12-20 NOTE — PROGRESS NOTES
Renal Progress Note  [facility]      TELEHEALTH EVALUATION -- Audio/Visual (During FKEKS-50 public health emergency)     Telehealth service was provided with the patient at his room in [address] [place] [address] and myself the physician in my office in [place], [place] and the patient's RN, Osiel Meier,  who has initiated the visit. Pursuant to the emergency declaration under the [address] [place] [address] [facility] authority and the [employer] Act, this Virtual  Visit was conducted, with patient's consent, to reduce the patient's risk of exposure to COVID-19 and provide continuity of care for an established patient. Services were provided through a video synchronous discussion virtually to substitute for in-person clinic visit. Patient :  Awais Dye; 80 y.o. MRN# [id number]  Location:  0314/0314-01  Attending:  Singh Carmona MD  Admit Date:  12/16/2021   Hospital Day: 4      Subjective:     Nephrology is following the patient for CKD. Patient seen via video visit. Doesn't feel like he's improving. Feels very weak. Bps are soft. Breathing is improving, down to 3L NC. Still with some edema.     Outpatient Medications:     Medications Prior to Admission: acetaminophen (TYLENOL) 650 MG suppository, Place 650 mg rectally every 8 hours as needed for Fever  metoprolol succinate (TOPROL XL) 25 MG extended release tablet, Take 1 tablet by mouth daily  ascorbic acid (VITAMIN C) 1000 MG tablet, Take 0.5 tablets by mouth 2 times daily  ciprofloxacin (CIPRO) 500 MG tablet, Take 1 tablet by mouth 2 times daily for 14 days  zolpidem (AMBIEN) 5 MG tablet, Take 5 mg by mouth nightly as needed for Sleep.  benzonatate (TESSALON) 100 MG capsule, Take 100 mg by mouth 3 times daily as needed for Cough  traZODone (DESYREL) 50 MG tablet, Take 50 mg by mouth nightly  polyethylene glycol (GLYCOLAX) 17 GM/SCOOP powder, Take 17 g by mouth daily  sodium bicarbonate 650 MG tablet, Take 1 tablet by mouth 2 times daily  amiodarone (CORDARONE) 200 MG tablet, Take 1 tablet by mouth 2 times daily  albuterol (PROVENTIL) (2.5 MG/3ML) 0.083% nebulizer solution, Take 3 mLs by nebulization every 4 hours as needed for Wheezing  budesonide (PULMICORT) 0.5 MG/2ML nebulizer suspension, Take 2 mLs by nebulization 2 times daily  ipratropium-albuterol (DUONEB) 0.5-2.5 (3) MG/3ML SOLN nebulizer solution, Inhale 3 mLs into the lungs 4 times daily  apixaban (ELIQUIS) 2.5 MG TABS tablet, Take 1 tablet by mouth 2 times daily  polyethylene glycol (GLYCOLAX) 17 g packet, Take 17 g by mouth daily as needed for Constipation  tamsulosin (FLOMAX) 0.4 MG capsule, Take 1 capsule by mouth daily  zinc sulfate (ZINCATE) 220 (50 Zn) MG capsule, Take 2 capsules by mouth daily  famotidine (PEPCID) 10 MG tablet, Take 1 tablet by mouth nightly  vitamin D3 (CHOLECALCIFEROL) 25 MCG (1000 UT) TABS tablet, Take 1 tablet by mouth daily  furosemide (LASIX) 40 MG tablet, Take 40 mg by mouth daily  glipiZIDE (GLUCOTROL) 5 MG tablet, Take 5 mg by mouth 2 times daily (before meals)  atorvastatin (LIPITOR) 40 MG tablet, Take 1 tablet by mouth nightly  clopidogrel (PLAVIX) 75 MG tablet, Take 1 tablet by mouth daily  aspirin 81 MG EC tablet, Take 81 mg by mouth nightly     Current Medications:     Scheduled Meds:    bumetanide  1 mg IntraVENous Q12H    traZODone  50 mg Oral Nightly    tamsulosin  0.4 mg Oral BID    midodrine  5 mg Oral TID WC    amiodarone  200 mg Oral BID    apixaban  2.5 mg Oral BID    aspirin  81 mg Oral Nightly    atorvastatin  40 mg Oral Nightly    budesonide  0.5 mg Nebulization BID    clopidogrel  75 mg Oral Daily    famotidine  10 mg Oral Nightly    glipiZIDE  5 mg Oral BID AC    ipratropium-albuterol  3 mL Inhalation 4x daily    metoprolol succinate  25 mg Oral Daily    Vitamin D  1,000 Units Oral Daily    zinc sulfate  100 mg Oral Daily    sodium chloride flush  5-40 mL IntraVENous 2 times per day    cefTRIAXone (ROCEPHIN) IV  1,000 mg IntraVENous Q24H    doxycycline (VIBRAMYCIN) IV  100 mg IntraVENous Q12H    insulin lispro  0-12 Units SubCUTAneous TID     insulin lispro  0-6 Units SubCUTAneous Nightly     Continuous Infusions:    sodium chloride Stopped (214)    dextrose       PRN Meds:  acetaminophen, albuterol, benzonatate, polyethylene glycol, zolpidem, sodium chloride flush, sodium chloride, ondansetron **OR** ondansetron, acetaminophen **OR** acetaminophen, glucose, dextrose, glucagon (rDNA), dextrose    Input/Output:       I/O last 3 completed shifts: In: 400 [P.O.:400]  Out: 1325 [Urine:1325]. Patient Vitals for the past 96 hrs (Last 3 readings):   Weight   21 0347 167 lb 9.6 oz (76 kg)   21 0549 165 lb (74.8 kg)   21 0550 165 lb 9.6 oz (75.1 kg)       Vital Signs:   Temperature:  Temp: 96.9 °F (36.1 °C)  TMax:   Temp (24hrs), Av.3 °F (36.3 °C), Min:96.8 °F (36 °C), Max:98.1 °F (36.7 °C)    Respirations:  Resp: 20  Pulse:   Pulse: 75  BP:    BP: (!) 97/46  BP Range: Systolic (61FWL), ZKT:825 , Min:97 , OVS:083       Diastolic (77CGS), HFV:69, Min:46, Max:85      Physical Examination:     General -- no distress  Oral Mucosa -- moist  Neck --  JVD - no  Extremities -- 1+ edema noted  CNS - awake and alert   Pschy - not agitated, mood and memory normal    Due to this being a TeleHealth encounter, evaluation of the following organ systems is limited: Vitals/EENT/Resp/CV/GI//MS/Neuro/Skin/Heme-Lymph-Imm.     Labs:       Recent Labs     21  0545 21  0550 21  0545   WBC 9.0 9.6 10.0   RBC 2.50* 2.63* 2.54*   HGB 7.7* 8.0* 7.7*   HCT 25.3* 26.3* 25.3*   .2 100.0 99.6   MCH 30.8 30.4 30.3   MCHC 30.4 30.4 30.4   RDW 15.5* 15.6* 15.5*    198 195   MPV 10.9 11.1 11.0      BMP:   Recent Labs     21  0545 21  0550 21  0545    143 137   K 5.0 4.7 4.9    102 97* CO2 30 29 28   BUN 54* 59* 70*   CREATININE 2.48* 2.62* 3.02*   GLUCOSE 148* 220* 201*   CALCIUM 8.1* 8.3* 8.5*      Phosphorus:   No results for input(s): PHOS in the last 72 hours. Magnesium:  No results for input(s): MG in the last 72 hours. Albumin:    Recent Labs     12/18/21  0545 12/19/21  0550 12/20/21  0545   LABALBU 2.3* 2.4* 2.5*     BNP:    No results found for: BNP  HÉCTOR:    No results found for: HÉCTOR  SPEP:  Lab Results   Component Value Date    PROT 5.7 12/20/2021     UPEP:   No results found for: LABPE  C3:   No results found for: C3  C4:   No results found for: C4  MPO ANCA:   No results found for: MPO  PR3 ANCA:   No results found for: PR3  Anti-GBM:   No results found for: GBMABIGG  Hep BsAg:       No results found for: HEPBSAG  Hep C AB:        No results found for: HEPCAB    Urinalysis/Chemistries:      Lab Results   Component Value Date    NITRU NEGATIVE 12/16/2021    COLORU Yellow 12/16/2021    PHUR 7.5 12/16/2021    WBCUA 0 TO 2 12/16/2021    RBCUA 0 TO 2 12/16/2021    MUCUS NOT REPORTED 12/16/2021    TRICHOMONAS NOT REPORTED 12/16/2021    YEAST NOT REPORTED 12/16/2021    BACTERIA TRACE 12/16/2021    SPECGRAV 1.015 12/16/2021    LEUKOCYTESUR NEGATIVE 12/16/2021    UROBILINOGEN Normal 12/16/2021    BILIRUBINUR NEGATIVE 12/16/2021    GLUCOSEU 1+ 12/16/2021    KETUA NEGATIVE 12/16/2021    AMORPHOUS NOT REPORTED 12/16/2021     Urine Sodium:   No results found for: SOHAM  Urine Potassium:  No results found for: KUR  Urine Chloride:  No results found for: CLUR  Urine Osmolarity: No results found for: OSMOU  Urine Protein:   No components found for: TOTALPROTEIN, URINE   Urine Creatinine:     Lab Results   Component Value Date    LABCREA 51.4 02/11/2021     Urine Eosinophils:  No components found for: UEOS        Impression and Plan:  1. CKD IV: renal function slightly worse today likely due to lower Bps and diuretics. Increase Midodrine to 10 mg TID. Hold tonight's dose of Bumex  2.  Hyperkalemia resolved  3. Acute hypoxic respiratory failure, improving  4. Pneumonia  5. Diastolic CHF  6. Anemia, check iron stores  7. Urinary retention: has [place]  8. AFib        Please don't hesitate to call with any questions.   Electronically signed by Hoang Lira DO on 12/20/2021 at 2:34 PM

## 2021-12-20 NOTE — PROGRESS NOTES
Physical Therapy  Facility/Department: [facility] AT THE [facility] MED SURG  Daily Treatment Note  NAME: Best Frazier  : 10/31/1930  MRN: [id number]    Date of Service: 2021    Discharge Recommendations:  ECF with PT        Assessment   Treatment Diagnosis: GENERALIZED WEAKNESS. Prognosis: Good  Decision Making: Medium Complexity  Patient Education: Edcuated pt to bring opposite leg up and over leg on side  rolling to and  proper hand placement on siderails to better assist in rolling to either side. on third attempt pt able to assist more but still required max A of one. REQUIRES PT FOLLOW UP: Yes  Activity Tolerance  Activity Tolerance: Patient limited by endurance     Patient Diagnosis(es): The primary encounter diagnosis was Pneumonia due to infectious organism, unspecified laterality, unspecified part of lung. Diagnoses of Hyperkalemia and Elevated troponin were also pertinent to this visit. has a past medical history of Acute and chronic respiratory failure with hypoxia ([address] CAD (coronary artery disease), Cataracts, bilateral, Elevated troponin, Glaucoma, Hyperlipidemia, Hypertension, TIA (transient ischemic attack), and Type II or unspecified type diabetes mellitus without mention of complication, not stated as uncontrolled. has a past surgical history that includes Coronary artery bypass graft (); Cataract removal with implant (Bilateral,  and 12/3/2013); and Inguinal hernia repair (Bilateral, 12). Restrictions  Restrictions/Precautions  Restrictions/Precautions: General Precautions,Fall Risk  Required Braces or Orthoses?: No  Subjective   General  Chart Reviewed: Yes  Response To Previous Treatment: Patient with no complaints from previous session. Family / Caregiver Present: Yes  Referring Practitioner: Dr. Rome Ortiz: Pt denies pain, states he is feeling very weak today.           Orientation  Orientation  Overall Orientation Status: Within Functional Limits  Cognition      Objective   Bed mobility  Bridging: Independent  Rolling to Left: Maximum assistance  Rolling to Right: Maximum assistance  Supine to Sit: Maximum assistance  Sit to Supine: Maximum assistance;2 Person assistance  Comment: Use of bed rail, decreased strength     Ambulation  Ambulation?: No  More Ambulation?: No        Exercises  Knee Long Arc Quad: x10  Ankle Pumps: x15  Comments: BLE ther ex sitting on EOB. Pt required frequent RBs d/t fatigue/SOB. SPO2 93-94% throughout tx. G-Code  OutComes Score    AM-PAC Score    Goals  Short term goals  Time Frame for Short term goals: 3 DAYS  Short term goal 1: MIN ASSIST TRANSFERS. Short term goal 2: MIN ASSIST BED MOBILITY  Short term goal 3: MIN ASSIST GAIT FWW 10 FT. Long term goals  Time Frame for Long term goals : 4 WEEKS  Long term goal 1: IND TRANSFERS. Long term goal 2: IND GAIT 100 FT  FWW. Patient Goals   Patient goals : RETURN HOME WITH ASSISTANCE    Plan    Plan  Times per day: Twice a day  Current Treatment Recommendations: Kuldeep Hansen Re-education,Patient/Caregiver Education & Training,ADL/Self-care Training,Gait Training,Balance Training,Functional Mobility Training  Safety Devices  Type of devices:  All fall risk precautions in place,Bed alarm in place,Call light within reach,Left in bed,Nurse notified     Therapy Time   Individual Concurrent Group Co-treatment   Time In 1450         Time Out 1520         Minutes 30           Treated by: AMAURY Butcher PTA

## 2021-12-20 NOTE — PROGRESS NOTES
Ambien given per request. Patient hopes to get some sleep tonight. Patient got comfortable in bed. Denies any other needs.

## 2021-12-20 NOTE — PROGRESS NOTES
Occupational Therapy  Facility/Department: [facility] AT THE [facility] MED SURG  Daily Treatment Note  NAME: Maggi Morton  : 10/31/1930  MRN: [id number]    Date of Service: 2021    Discharge Recommendations:  ECF with OT,Subacute/Skilled Nursing Facility       Assessment      OT Education: OT Role;Plan of Care;Transfer Training  Barriers to Learning: none  Activity Tolerance  Activity Tolerance: Patient Tolerated treatment well;Patient limited by fatigue  Activity Tolerance: LOB sitting and standing  Safety Devices  Safety Devices in place: Yes  Type of devices: All fall risk precautions in place; Left in chair;Call light within reach; Chair alarm in place;Nurse notified         Patient Diagnosis(es): The primary encounter diagnosis was Pneumonia due to infectious organism, unspecified laterality, unspecified part of lung. Diagnoses of Hyperkalemia and Elevated troponin were also pertinent to this visit. has a past medical history of Acute and chronic respiratory failure with hypoxia ([address] CAD (coronary artery disease), Cataracts, bilateral, Elevated troponin, Glaucoma, Hyperlipidemia, Hypertension, TIA (transient ischemic attack), and Type II or unspecified type diabetes mellitus without mention of complication, not stated as uncontrolled. has a past surgical history that includes Coronary artery bypass graft (); Cataract removal with implant (Bilateral,  and 12/3/2013); and Inguinal hernia repair (Bilateral, 12).     Restrictions  Restrictions/Precautions  Restrictions/Precautions: General Precautions,Fall Risk  Required Braces or Orthoses?: No  Subjective   General  Chart Reviewed: Yes  Patient assessed for rehabilitation services?: Yes  Response to previous treatment: Patient with no complaints from previous session  Family / Caregiver Present: No  Referring Practitioner: Dr. Brooke Harper  Diagnosis: Shortness of Breath  Subjective  Subjective: Pt finishing breakfast with trouble leaning forward in bed,

## 2021-12-20 NOTE — PROGRESS NOTES
Patient called out to be repositioned. Second assessment completed at this time. Assessment unchanged from previous shift assessment. Vital signs completed. Patient weighed. Patient pulled up in bed and repositioned onto left side with pillow support. Water pitcher refilled. Robertson catheter emptied. Patient denies any other needs at this time. Call light, bedside table and personal belongings within reach.

## 2021-12-20 NOTE — PROGRESS NOTES
[facility] - [facility] -Progress Note    SUBJECTIVE:    Patient seen for f/u of Community acquired bacterial pneumonia. He sitting up in chair alert oriented no acute distress. He is currently on 3L per NC. Feels better. ROS:   Constitutional: negative  for fevers, and negative for chills. Respiratory: negative for shortness of breath, positive for cough, and negative for wheezing  Cardiovascular: negative for chest pain, and negative for palpitations  Gastrointestinal: negative for abdominal pain, negative for nausea,negative for vomiting, negative for diarrhea, and negative for constipation     All other systems were reviewed with the patient and are negative unless otherwise stated in HPI      OBJECTIVE:        VITAL SIGNS:  Patient Vitals for the past 8 hrs:   BP Temp Temp src Pulse Resp SpO2   21 1126     20 97 %   21 0908 129/85 96.8 °F (36 °C) Temporal 84 18 91 %   21 0547     18 91 %         Temp: 96.8 °F (36 °C)  Temp range:    Temp  Av.5 °F (36.4 °C)  Min: 96.8 °F (36 °C)  Max: 98.1 °F (36.7 °C)    BP: 129/85  BP Range:      Systolic (20DNB), DEJ:074 , Min:108 , XKJ:530      Diastolic (12UGO), MOF:02, Min:47, Max:85    Pulse: 84  Pulse Range:    Pulse  Av  Min: 74  Max: 84    Resp: 20  Resp Range:   Resp  Av.6  Min: 18  Max: 22    SpO2: 97 % nasal cannula  SpO2 range:   SpO2  Av.6 %  Min: 80 %  Max: 100 %      PaO2/FiO2 RATIO:  No results for input(s): POCPO2 in the last 72 hours. FiO2 : 50 %         Exam:    GEN:    Awake, alert and oriented x3. EYES:   EOMI, pupils equal   NECK: Supple. No lymphadenopathy. No carotid bruit  CVS:     regular rate and rhythm, no audible murmur  PULM:  diminished but clear without wheezes, rales or rhonch, no acute respiratory distress  ABD:     Bowels sounds normal.  Abdomen is soft. No distention. no tenderness to palpation. EXT:     trace edema in the LLE . No calf tenderness. NEURO: Moves all extremities.   Motor and sensory are grossly intact  SKIN:    No rashes. No skin lesions    Diagnostic Data:      Complete Blood Count:   Recent Labs     12/18/21  0545 12/19/21  0550 12/20/21  0545   WBC 9.0 9.6 10.0   RBC 2.50* 2.63* 2.54*   HGB 7.7* 8.0* 7.7*   HCT 25.3* 26.3* 25.3*   .2 100.0 99.6   MCH 30.8 30.4 30.3   MCHC 30.4 30.4 30.4   RDW 15.5* 15.6* 15.5*    198 195   MPV 10.9 11.1 11.0        Last 3 Blood Glucose:   Recent Labs     12/18/21  0545 12/19/21  0550 12/20/21  0545   GLUCOSE 148* 220* 201*        Comprehensive Metabolic Profile:   Recent Labs     12/18/21  0545 12/19/21  0550 12/20/21  0545    143 137   K 5.0 4.7 4.9    102 97*   CO2 30 29 28   BUN 54* 59* 70*   CREATININE 2.48* 2.62* 3.02*   GLUCOSE 148* 220* 201*   CALCIUM 8.1* 8.3* 8.5*   PROT 5.3* 5.6* 5.7*   LABALBU 2.3* 2.4* 2.5*   BILITOT 0.37 0.32 0.35   ALKPHOS 118 137* 139*   AST 16 15 17   ALT 13 12 14        Urinalysis:   Lab Results   Component Value Date    NITRU NEGATIVE 12/16/2021    COLORU Yellow 12/16/2021    PHUR 7.5 12/16/2021    WBCUA 0 TO 2 12/16/2021    RBCUA 0 TO 2 12/16/2021    MUCUS NOT REPORTED 12/16/2021    TRICHOMONAS NOT REPORTED 12/16/2021    YEAST NOT REPORTED 12/16/2021    BACTERIA TRACE 12/16/2021    SPECGRAV 1.015 12/16/2021    LEUKOCYTESUR NEGATIVE 12/16/2021    UROBILINOGEN Normal 12/16/2021    BILIRUBINUR NEGATIVE 12/16/2021    GLUCOSEU 1+ 12/16/2021    KETUA NEGATIVE 12/16/2021    AMORPHOUS NOT REPORTED 12/16/2021       HgBA1c:    Lab Results   Component Value Date    LABA1C 10.3 12/16/2021       Lactic Acid:   Lab Results   Component Value Date    LACTA 2.2 11/15/2021        Troponin: No results for input(s): TROPONINI in the last 72 hours. CRP:    No results for input(s): CRP in the last 72 hours.       Radiology/Imaging:  VL DUP LOWER EXTREMITY VENOUS LEFT   Final Result      XR CHEST PORTABLE   Final Result   Stable multifocal infiltrates suggesting pneumonia probable small effusions greater on the left unchanged.                ASSESSMENT / PLAN:  Community acquired bacterial pneumonia  · Continue current therapy   · IV Rocephin/doxycycline  · Continue supplemental oxygen to maintain SPO2 greater than 90%  · Acapella  · Continue Pulmicort, duo nebs  · ASHD  · Continue aspirin, Eliquis, Lipitor, Plavix, amiodarone  · CKD stage IV  · Trend labs worsening  · Appreciate nephrology  · Urinary retention  · Appreciate urology  · Continue Robertson catheter  · Urine culture no growth  · Continue Flomax  · Type 2 diabetes  · Continue Glucotrol  · POCT before meals and at bedtime  · Insulin sliding scale  · Hypoglycemia protocol  · Microcytic hypochromic anemia  · Hemoglobin stablestable  · Nutrition status:   · at risk for malnutrition  · Dietician consult initiated  · Hospital Prophylaxis:   · DVT: Eliquis   · Stress Ulcer: H2 Blocker   · High risk medications: none   · Disposition:    · Discharge plan is pending   · PT/OT      FRANKLIN Dawkins - CNP , APRN, NP-C  Hospitalist Medicine        12/20/2021, 12:58 PM

## 2021-12-21 VITALS
DIASTOLIC BLOOD PRESSURE: 69 MMHG | BODY MASS INDEX: 28.87 KG/M2 | HEIGHT: 64 IN | SYSTOLIC BLOOD PRESSURE: 94 MMHG | RESPIRATION RATE: 20 BRPM | WEIGHT: 169.1 LBS | HEART RATE: 84 BPM | TEMPERATURE: 97.7 F | OXYGEN SATURATION: 92 %

## 2021-12-21 LAB
ABSOLUTE EOS #: 0.16 K/UL (ref 0–0.44)
ABSOLUTE IMMATURE GRANULOCYTE: 0.08 K/UL (ref 0–0.3)
ABSOLUTE LYMPH #: 1.99 K/UL (ref 1.1–3.7)
ABSOLUTE MONO #: 0.77 K/UL (ref 0.1–1.2)
ALBUMIN SERPL-MCNC: 2.6 G/DL (ref 3.5–5.2)
ALBUMIN/GLOBULIN RATIO: 0.9 (ref 1–2.5)
ALP BLD-CCNC: 136 U/L (ref 40–129)
ALT SERPL-CCNC: 13 U/L (ref 5–41)
ANION GAP SERPL CALCULATED.3IONS-SCNC: 15 MMOL/L (ref 9–17)
AST SERPL-CCNC: 16 U/L
BASOPHILS # BLD: 0 % (ref 0–2)
BASOPHILS ABSOLUTE: <0.03 K/UL (ref 0–0.2)
BILIRUB SERPL-MCNC: 0.35 MG/DL (ref 0.3–1.2)
BUN BLDV-MCNC: 79 MG/DL (ref 8–23)
BUN/CREAT BLD: 24 (ref 9–20)
CALCIUM SERPL-MCNC: 8.4 MG/DL (ref 8.6–10.4)
CHLORIDE BLD-SCNC: 97 MMOL/L (ref 98–107)
CO2: 26 MMOL/L (ref 20–31)
CREAT SERPL-MCNC: 3.33 MG/DL (ref 0.7–1.2)
CULTURE: NORMAL
CULTURE: NORMAL
DIFFERENTIAL TYPE: ABNORMAL
EOSINOPHILS RELATIVE PERCENT: 2 % (ref 1–4)
FERRITIN: 361 UG/L (ref 30–400)
GFR AFRICAN AMERICAN: 21 ML/MIN
GFR NON-AFRICAN AMERICAN: 17 ML/MIN
GFR SERPL CREATININE-BSD FRML MDRD: ABNORMAL ML/MIN/{1.73_M2}
GFR SERPL CREATININE-BSD FRML MDRD: ABNORMAL ML/MIN/{1.73_M2}
GLUCOSE BLD-MCNC: 203 MG/DL (ref 70–99)
GLUCOSE BLD-MCNC: 210 MG/DL (ref 74–100)
GLUCOSE BLD-MCNC: 273 MG/DL (ref 74–100)
GLUCOSE BLD-MCNC: 276 MG/DL (ref 74–100)
HCT VFR BLD CALC: 24.7 % (ref 40.7–50.3)
HEMOGLOBIN: 7.7 G/DL (ref 13–17)
IMMATURE GRANULOCYTES: 1 %
LYMPHOCYTES # BLD: 19 % (ref 24–43)
Lab: NORMAL
Lab: NORMAL
MCH RBC QN AUTO: 30.7 PG (ref 25.2–33.5)
MCHC RBC AUTO-ENTMCNC: 31.2 G/DL (ref 28.4–34.8)
MCV RBC AUTO: 98.4 FL (ref 82.6–102.9)
MONOCYTES # BLD: 7 % (ref 3–12)
NRBC AUTOMATED: 0 PER 100 WBC
PDW BLD-RTO: 15.9 % (ref 11.8–14.4)
PLATELET # BLD: 205 K/UL (ref 138–453)
PLATELET ESTIMATE: ABNORMAL
PMV BLD AUTO: 11.1 FL (ref 8.1–13.5)
POTASSIUM SERPL-SCNC: 4.9 MMOL/L (ref 3.7–5.3)
RBC # BLD: 2.51 M/UL (ref 4.21–5.77)
RBC # BLD: ABNORMAL 10*6/UL
SEG NEUTROPHILS: 71 % (ref 36–65)
SEGMENTED NEUTROPHILS ABSOLUTE COUNT: 7.38 K/UL (ref 1.5–8.1)
SODIUM BLD-SCNC: 138 MMOL/L (ref 135–144)
SPECIMEN DESCRIPTION: NORMAL
SPECIMEN DESCRIPTION: NORMAL
TOTAL PROTEIN: 5.6 G/DL (ref 6.4–8.3)
WBC # BLD: 10.4 K/UL (ref 3.5–11.3)
WBC # BLD: ABNORMAL 10*3/UL

## 2021-12-21 PROCEDURE — 2700000000 HC OXYGEN THERAPY PER DAY

## 2021-12-21 PROCEDURE — 82947 ASSAY GLUCOSE BLOOD QUANT: CPT

## 2021-12-21 PROCEDURE — 6360000002 HC RX W HCPCS: Performed by: INTERNAL MEDICINE

## 2021-12-21 PROCEDURE — 97110 THERAPEUTIC EXERCISES: CPT

## 2021-12-21 PROCEDURE — 51702 INSERT TEMP BLADDER CATH: CPT

## 2021-12-21 PROCEDURE — 80053 COMPREHEN METABOLIC PANEL: CPT

## 2021-12-21 PROCEDURE — 85025 COMPLETE CBC W/AUTO DIFF WBC: CPT

## 2021-12-21 PROCEDURE — 6370000000 HC RX 637 (ALT 250 FOR IP): Performed by: INTERNAL MEDICINE

## 2021-12-21 PROCEDURE — 2500000003 HC RX 250 WO HCPCS: Performed by: INTERNAL MEDICINE

## 2021-12-21 PROCEDURE — 36415 COLL VENOUS BLD VENIPUNCTURE: CPT

## 2021-12-21 PROCEDURE — 2580000003 HC RX 258: Performed by: INTERNAL MEDICINE

## 2021-12-21 PROCEDURE — 94640 AIRWAY INHALATION TREATMENT: CPT

## 2021-12-21 PROCEDURE — 94761 N-INVAS EAR/PLS OXIMETRY MLT: CPT

## 2021-12-21 PROCEDURE — 82728 ASSAY OF FERRITIN: CPT

## 2021-12-21 PROCEDURE — 6370000000 HC RX 637 (ALT 250 FOR IP): Performed by: NURSE PRACTITIONER

## 2021-12-21 PROCEDURE — 2580000003 HC RX 258: Performed by: NURSE PRACTITIONER

## 2021-12-21 PROCEDURE — 97530 THERAPEUTIC ACTIVITIES: CPT

## 2021-12-21 PROCEDURE — 2500000003 HC RX 250 WO HCPCS: Performed by: NURSE PRACTITIONER

## 2021-12-21 RX ORDER — DOXYCYCLINE HYCLATE 100 MG
100 TABLET ORAL 2 TIMES DAILY
Qty: 20 TABLET | Refills: 0 | Status: ON HOLD | DISCHARGE
Start: 2021-12-21 | End: 2021-12-29 | Stop reason: HOSPADM

## 2021-12-21 RX ORDER — TAMSULOSIN HYDROCHLORIDE 0.4 MG/1
0.4 CAPSULE ORAL 2 TIMES DAILY
Qty: 30 CAPSULE | Refills: 3 | Status: ON HOLD | DISCHARGE
Start: 2021-12-21 | End: 2021-12-29 | Stop reason: SDUPTHER

## 2021-12-21 RX ORDER — CEPHALEXIN 500 MG/1
500 CAPSULE ORAL 3 TIMES DAILY
Qty: 30 CAPSULE | Refills: 0 | Status: ON HOLD | DISCHARGE
Start: 2021-12-21 | End: 2021-12-29 | Stop reason: HOSPADM

## 2021-12-21 RX ORDER — MIDODRINE HYDROCHLORIDE 10 MG/1
10 TABLET ORAL
Qty: 90 TABLET | Refills: 3 | DISCHARGE
Start: 2021-12-21

## 2021-12-21 RX ADMIN — APIXABAN 2.5 MG: 2.5 TABLET, FILM COATED ORAL at 08:24

## 2021-12-21 RX ADMIN — AMIODARONE HYDROCHLORIDE 200 MG: 200 TABLET ORAL at 08:24

## 2021-12-21 RX ADMIN — MIDODRINE HYDROCHLORIDE 10 MG: 5 TABLET ORAL at 11:36

## 2021-12-21 RX ADMIN — MIDODRINE HYDROCHLORIDE 10 MG: 5 TABLET ORAL at 17:14

## 2021-12-21 RX ADMIN — CLOPIDOGREL BISULFATE 75 MG: 75 TABLET ORAL at 08:24

## 2021-12-21 RX ADMIN — Medication 1000 UNITS: at 08:23

## 2021-12-21 RX ADMIN — BUMETANIDE 1 MG: 0.25 INJECTION INTRAMUSCULAR; INTRAVENOUS at 07:16

## 2021-12-21 RX ADMIN — TAMSULOSIN HYDROCHLORIDE 0.4 MG: 0.4 CAPSULE ORAL at 08:24

## 2021-12-21 RX ADMIN — BUDESONIDE 500 MCG: 0.5 SUSPENSION RESPIRATORY (INHALATION) at 11:11

## 2021-12-21 RX ADMIN — INSULIN LISPRO 4 UNITS: 100 INJECTION, SOLUTION INTRAVENOUS; SUBCUTANEOUS at 08:24

## 2021-12-21 RX ADMIN — MIDODRINE HYDROCHLORIDE 10 MG: 5 TABLET ORAL at 08:24

## 2021-12-21 RX ADMIN — INSULIN LISPRO 6 UNITS: 100 INJECTION, SOLUTION INTRAVENOUS; SUBCUTANEOUS at 17:14

## 2021-12-21 RX ADMIN — IPRATROPIUM BROMIDE AND ALBUTEROL SULFATE 3 ML: 2.5; .5 SOLUTION RESPIRATORY (INHALATION) at 05:43

## 2021-12-21 RX ADMIN — INSULIN LISPRO 6 UNITS: 100 INJECTION, SOLUTION INTRAVENOUS; SUBCUTANEOUS at 11:37

## 2021-12-21 RX ADMIN — ZINC SULFATE 220 MG (50 MG) CAPSULE 100 MG: CAPSULE at 08:24

## 2021-12-21 RX ADMIN — IPRATROPIUM BROMIDE AND ALBUTEROL SULFATE 3 ML: 2.5; .5 SOLUTION RESPIRATORY (INHALATION) at 11:11

## 2021-12-21 RX ADMIN — DOXYCYCLINE 100 MG: 100 INJECTION, POWDER, LYOPHILIZED, FOR SOLUTION INTRAVENOUS at 05:56

## 2021-12-21 RX ADMIN — IPRATROPIUM BROMIDE AND ALBUTEROL SULFATE 3 ML: 2.5; .5 SOLUTION RESPIRATORY (INHALATION) at 15:39

## 2021-12-21 RX ADMIN — SODIUM CHLORIDE, PRESERVATIVE FREE 10 ML: 5 INJECTION INTRAVENOUS at 08:24

## 2021-12-21 RX ADMIN — CEFTRIAXONE 1000 MG: 1 INJECTION, POWDER, FOR SOLUTION INTRAMUSCULAR; INTRAVENOUS at 08:30

## 2021-12-21 RX ADMIN — TAMSULOSIN HYDROCHLORIDE 0.4 MG: 0.4 CAPSULE ORAL at 17:14

## 2021-12-21 RX ADMIN — GLIPIZIDE 5 MG: 5 TABLET ORAL at 07:16

## 2021-12-21 RX ADMIN — GLIPIZIDE 5 MG: 5 TABLET ORAL at 17:14

## 2021-12-21 ASSESSMENT — PAIN SCALES - GENERAL
PAINLEVEL_OUTOF10: 0
PAINLEVEL_OUTOF10: 0

## 2021-12-21 NOTE — PROGRESS NOTES
Patient to discharge back to [facility] today. FAXED discharge paperwork to facility. [facility] to provide transportation. ETA of 6:30-7 pm.  Family notified and agreeable with this plan.     Will Flower [facility]  12/21/2021

## 2021-12-21 NOTE — PROGRESS NOTES
[facility] - [facility] -Progress Note    SUBJECTIVE:    Patient seen for f/u of Community acquired bacterial pneumonia. He sitting up in bed alert oriented no acute distress. He is currently on 4L per [place]. Feels better. Denied complaints except pill got stuck last evening but is resolved today    ROS:   Constitutional: negative  for fevers, and negative for chills. Respiratory: negative for shortness of breath, positive for cough, and negative for wheezing  Cardiovascular: negative for chest pain, and negative for palpitations  Gastrointestinal: negative for abdominal pain, negative for nausea,negative for vomiting, negative for diarrhea, and negative for constipation     All other systems were reviewed with the patient and are negative unless otherwise stated in HPI      OBJECTIVE:        VITAL SIGNS:  Patient Vitals for the past 8 hrs:   BP Temp Temp src Pulse Resp SpO2 Weight   21 0723 (!) 91/47 97.6 °F (36.4 °C) Oral 70 20 92 %    21 0543     20 91 %    21 0349       169 lb 1.6 oz (76.7 kg)   21 0039      96 %          Temp: 97.6 °F (36.4 °C)  Temp range:    Temp  Av.4 °F (36.3 °C)  Min: 96.8 °F (36 °C)  Max: 97.8 °F (36.6 °C)    BP: (!) 91/47  BP Range:      Systolic (48ZGK), KPO:566 , Min:91 , QOM:190      Diastolic (76LSO), GIX:92, Min:46, Max:85    Pulse: 70  Pulse Range:    Pulse  Av  Min: 70  Max: 84    Resp: 20  Resp Range:   Resp  Av.3  Min: 18  Max: 20    SpO2: 92 % nasal cannula  SpO2 range:   SpO2  Av.7 %  Min: 91 %  Max: 97 %      PaO2/FiO2 RATIO:  No results for input(s): POCPO2 in the last 72 hours. FiO2 : 50 %         Exam:    GEN:    Awake, alert and oriented x3. EYES:   EOMI, pupils equal   NECK: Supple. No lymphadenopathy. No carotid bruit  CVS:     regular rate and rhythm, no audible murmur  PULM:  diminished but clear without wheezes, rales or rhonch, no acute respiratory distress  ABD:     Bowels sounds normal.  Abdomen is soft.   No distention. no tenderness to palpation. EXT:     trace edema in the LLE . No calf tenderness. NEURO: Moves all extremities. Motor and sensory are grossly intact  SKIN:    No rashes. No skin lesions    Diagnostic Data:      Complete Blood Count:   Recent Labs     12/19/21  0550 12/20/21  0545 12/21/21  0545   WBC 9.6 10.0 10.4   RBC 2.63* 2.54* 2.51*   HGB 8.0* 7.7* 7.7*   HCT 26.3* 25.3* 24.7*   .0 99.6 98.4   MCH 30.4 30.3 30.7   MCHC 30.4 30.4 31.2   RDW 15.6* 15.5* 15.9*    195 205   MPV 11.1 11.0 11.1        Last 3 Blood Glucose:   Recent Labs     12/19/21  0550 12/20/21  0545 12/21/21  0545   GLUCOSE 220* 201* 203*        Comprehensive Metabolic Profile:   Recent Labs     12/19/21  0550 12/20/21  0545 12/21/21  0545    137 138   K 4.7 4.9 4.9    97* 97*   CO2 29 28 26   BUN 59* 70* 79*   CREATININE 2.62* 3.02* 3.33*   GLUCOSE 220* 201* 203*   CALCIUM 8.3* 8.5* 8.4*   PROT 5.6* 5.7* 5.6*   LABALBU 2.4* 2.5* 2.6*   BILITOT 0.32 0.35 0.35   ALKPHOS 137* 139* 136*   AST 15 17 16   ALT 12 14 13        Urinalysis:   Lab Results   Component Value Date    NITRU NEGATIVE 12/16/2021    COLORU Yellow 12/16/2021    PHUR 7.5 12/16/2021    WBCUA 0 TO 2 12/16/2021    RBCUA 0 TO 2 12/16/2021    MUCUS NOT REPORTED 12/16/2021    TRICHOMONAS NOT REPORTED 12/16/2021    YEAST NOT REPORTED 12/16/2021    BACTERIA TRACE 12/16/2021    SPECGRAV 1.015 12/16/2021    LEUKOCYTESUR NEGATIVE 12/16/2021    UROBILINOGEN Normal 12/16/2021    BILIRUBINUR NEGATIVE 12/16/2021    GLUCOSEU 1+ 12/16/2021    KETUA NEGATIVE 12/16/2021    AMORPHOUS NOT REPORTED 12/16/2021       HgBA1c:    Lab Results   Component Value Date    LABA1C 10.3 12/16/2021       Lactic Acid:   Lab Results   Component Value Date    LACTA 2.2 11/15/2021        Troponin: No results for input(s): TROPONINI in the last 72 hours. CRP:    No results for input(s): CRP in the last 72 hours.       Radiology/Imaging:  VL DUP LOWER EXTREMITY VENOUS LEFT Final Result      XR CHEST PORTABLE   Final Result   Stable multifocal infiltrates suggesting pneumonia probable small effusions   greater on the left unchanged.                ASSESSMENT / PLAN:  Community acquired bacterial pneumonia  · Continue current therapy   · IV Rocephin/doxycycline  · Continue supplemental oxygen to maintain SPO2 greater than 90%  · Acapella  · Continue Pulmicort, duo nebs  · ASHD  · Continue aspirin, Eliquis, Lipitor, Plavix, amiodarone  · CKD stage IV  · Trend labs worsening  · Appreciate nephrology  · Urinary retention  · Appreciate urology  · Continue Robertson catheter  · Urine culture no growth  · Continue Flomax  · Type 2 diabetes  · Continue Glucotrol  · POCT before meals and at bedtime  · Insulin sliding scale  · Hypoglycemia protocol  · Microcytic hypochromic anemia  · Hemoglobin stablestable  · Nutrition status:   · at risk for malnutrition  · Dietician consult initiated  · Hospital Prophylaxis:   · DVT: Eliquis   · Stress Ulcer: H2 Blocker   · High risk medications: none   · Disposition:    · Discharge plan is [facility] today  · PT/OT      FRANKLIN Herrmann - CNP , FRANKLIN, NP-C  Hospitalist Medicine        12/21/2021, 8:03 AM

## 2021-12-21 NOTE — DISCHARGE SUMMARY
Discharge Summary    Andria Enciso  :  10/31/1930  MRN:  [id number]    Admit date:  2021      Discharge date: 2021     Admitting Physician:  Marbella Bermeo MD    Discharge Diagnoses:    Principal Problem:    Community acquired bacterial pneumonia  Active Problems:    ASHD (arteriosclerotic heart disease)    Stage 4 chronic kidney disease ([address]    Urinary retention    Hyperkalemia    Elevated troponin    Microcytic hypochromic anemia    Moderate malnutrition ([address]  Resolved Problems:    * No resolved hospital problems. *      Hospital Course:   Andria Enciso is a 80 y.o. male admitted with acquired pneumonia. He presented to the emergency room with complaints of worsening shortness of breath and leg swelling. Patient recently was discharged after hospitalization for Covid pneumonia. At that time he was admitted from 11/15/2021 through 2021. During that hospitalization patient was treated with vitamin C, D and zinc.  Steroids were stopped due to elevated creatinine and no hypoxia. Remdesivir was not indicated. Patient was vaccinated in January, February and 2021. At that time patient developed new onset of atrial flutter was placed on amiodarone as well as Eliquis. Patient was discharged at that time to SNF. Patient was readmitted then on 2021-12/10/2021 with hyperkalemia. During that hospitalization patient was found to have pleural effusions and underwent thoracentesis 850 mL of fluid removed. Nephrology as well as urology were consulted at that time due to renal failure. Patient's hypokalemia was corrected. Patient's culture grew Pseudomonas and patient was placed on Cipro and was [facility] During his assessment in the emergency room on this evaluation patient was found to be tachypneic with respirations in the 30s. Patient's SPO2 was 92 and he was afebrile. Patient's troponin was elevated at 184.   His BUN and creatinine were elevated at 51 and 2.51. Patient was slightly hyperkalemic at 5.5. During his evaluation there was concern of possibly superimposed bacterial pneumonia due to his progressive hypoxia. Patient was started on IV Rocephin and Zithromax in the emergency room. Patient was also given Lasix as well as IV fluids. EKG showed no evidence of acute ischemia. DVT study was negative. Patient was admitted and nephrology as well as urology were consulted. Patient was placed on IV Rocephin as well as doxycycline for his pneumonia. Patient's labs are stable. Patient will continue with Robertson catheter due to urinary retention and will be removed when urology approves. Patient's urine culture showed no growth. He was continued on Flomax. Patient also has microcytic, chronic anemia and hemoglobin remained stable. Patient's oxygen needs have improved and will be discharged today to skilled therapy. Patient is currently tolerating activity well he continues to work with PT and OT. He is tolerating diet as well. Patient will be discharged today he will continue with Keflex and doxycycline for 10 more days. Consultants:  Dr. Chun Gant, Urology; Dr. Nanette Javier, Nephrology    Procedures: none    Complications: none    Discharge Condition: fair    Exam:  GEN:    Awake, alert and oriented x3. EYES:   EOMI, pupils equal   NECK: Supple. No lymphadenopathy.  No carotid bruit  CVS:     regular rate and rhythm, no audible murmur  PULM:  diminished but clear without wheezes, rales or rhonch, no acute respiratory distress  ABD:     Bowels sounds normal.  Abdomen is soft.  No distention.  no tenderness to palpation. EXT:     trace edema in the LLE .  No calf tenderness.    NEURO: Moves all extremities.  Motor and sensory are grossly intact  SKIN:    No rashes.  No skin lesions    Significant Diagnostic Studies:   Lab Results   Component Value Date    WBC 10.4 12/21/2021    HGB 7.7 (L) 12/21/2021     12/21/2021       Lab Results Component Value Date    BUN 79 (H) 12/21/2021    CREATININE 3.33 (H) 12/21/2021     12/21/2021    K 4.9 12/21/2021    CALCIUM 8.4 (L) 12/21/2021    CL 97 (L) 12/21/2021    CO2 26 12/21/2021    LABGLOM 17 (L) 12/21/2021       Lab Results   Component Value Date    WBCUA 0 TO 2 12/16/2021    RBCUA 0 TO 2 12/16/2021    EPITHUA 0 TO 2 12/16/2021    LEUKOCYTESUR NEGATIVE 12/16/2021    SPECGRAV 1.015 12/16/2021    GLUCOSEU 1+ (A) 12/16/2021    KETUA NEGATIVE 12/16/2021    PROTEINU NEGATIVE 12/16/2021    HGBUR TRACE (A) 12/16/2021    CASTUA NOT REPORTED 12/16/2021    CRYSTUA NOT REPORTED 12/16/2021    BACTERIA TRACE (A) 12/16/2021    YEAST NOT REPORTED 12/16/2021       XR CHEST PORTABLE    Result Date: 12/16/2021  EXAMINATION: ONE XRAY VIEW OF THE CHEST 12/16/2021 8:45 am COMPARISON: December 10, 2021 HISTORY: ORDERING SYSTEM PROVIDED HISTORY: dyspnea TECHNOLOGIST PROVIDED HISTORY: dyspnea FINDINGS: Similar multifocal infiltrates greatest at the lung bases. Probable small bilateral effusions similar greater on the left. Cardiac size stable. Sternotomy wire sutures present. No acute osseous abnormality. Stable multifocal infiltrates suggesting pneumonia probable small effusions greater on the left unchanged.      VL DUP LOWER EXTREMITY VENOUS LEFT    Result Date: 12/16/2021    [facility]  Vascular Lower Extremities DVT Study Procedure   Patient Name   Dawson Mukherjee Date of Study           12/16/2021   Date of Birth  10/31/1930   Gender                  Male   Age            80 year(s)   Race                       Room Number    2006   Corporate ID # [id number]   Patient Acct # [de-identified]   MR #           [id number]       Sonographer             Milo Jeffery RVT   Accession #    [id number]  Interpreting Physician  Mae Barajas MD   Referring                   Referring Physician     Radha Aggarwal  Nurse  Practitioner  Additional Comments Results were faxed to  12/16/2021 @ [phone] Procedure Type of Study:   Veins: Lower Extremities DVT Study, Venous Scan Lower Left. Indications for Study:Edema. Patient Status:ER. Technical Quality:Limited visualization. Limitation reason:edema. patient positioning . Comments:Simultaneous real time imaging utilizing B-Mode, color doppler and spectral waveform analysis was performed on the left lower extremity for venous examination of the deep and superficial systems. Conclusions   Summary   No evidence of superficial or deep venous thrombosis in the left lower  extremity. Technically limited visualization. Signature   ----------------------------------------------------------------  Electronically signed by Berkley Bass RVT(Sonographer) on  12/16/2021 09:47 AM  ----------------------------------------------------------------   ----------------------------------------------------------------  Electronically signed by Siva Brown MD(Interpreting  physician) on 12/16/2021 04:37 PM  ----------------------------------------------------------------    Left Impression:   The common femoral, superficial and deep femoral, popliteal, tibials,   peroneal, and saphenous veins were evaluated. All visualized veins were compressible with normal doppler responses. Unable to visualize the peroneal and great saphenous veins due to edema. Velocities are measured in cm/s ; Diameters are measured in cm Left Lower Extremities DVT Study Measurements Left 2D Measurements +------------------------------------+----------+---------------+----------+ ! Location                            ! Visualized! Compressibility! Thrombosis! +------------------------------------+----------+---------------+----------+ ! Common Femoral                      !Yes       ! Yes            ! None      ! +------------------------------------+----------+---------------+----------+ ! Prox Femoral                        !Yes       ! Yes            ! None      ! +------------------------------------+----------+---------------+----------+ ! Mid Femoral                         !Yes       ! Yes            ! None      ! +------------------------------------+----------+---------------+----------+ ! Dist Femoral                        !Yes       ! Yes            ! None      ! +------------------------------------+----------+---------------+----------+ ! Deep Femoral                        !Yes       ! Yes            ! None      ! +------------------------------------+----------+---------------+----------+ ! Popliteal                           !Yes       ! Yes            ! None      ! +------------------------------------+----------+---------------+----------+ ! Sapheno Femoral Junction            ! Yes       ! Yes            ! None      ! +------------------------------------+----------+---------------+----------+ ! PTV                                 ! Yes       ! Yes            ! None      ! +------------------------------------+----------+---------------+----------+ ! Peroneal                            !No        !               ! None      ! +------------------------------------+----------+---------------+----------+ ! Gastroc                             ! Yes       ! Yes            ! None      ! +------------------------------------+----------+---------------+----------+ ! GSV Thigh                           ! No        !               !          ! +------------------------------------+----------+---------------+----------+ ! GSV Knee                            ! No        !               !          ! +------------------------------------+----------+---------------+----------+ ! GSV Ankle                           ! No        !               !          ! +------------------------------------+----------+---------------+----------+ ! SSV                                 ! Yes       ! Yes            ! None      ! +------------------------------------+----------+---------------+----------+ Left Doppler Measurements +--------------------------+---------+------+------------------------------+ ! Location                  ! Signal   !Reflux! Reflux (msec)                 ! +--------------------------+---------+------+------------------------------+ ! Common Femoral            !Pulsatile!      !                              ! +--------------------------+---------+------+------------------------------+ ! Prox Femoral              !Pulsatile!      !                              ! +--------------------------+---------+------+------------------------------+ ! Popliteal                 !Pulsatile!      !                              ! +--------------------------+---------+------+------------------------------+      Assessment and Plan:  Patient Active Problem List    Diagnosis Date Noted    Moderate malnutrition ([address] 12/17/2021    Community acquired bacterial pneumonia 12/16/2021    Hyperglycemia 12/07/2021    Renal failure 12/07/2021    Acute cystitis without hematuria 12/07/2021    Elevated troponin 12/07/2021    On amiodarone therapy     Microcytic hypochromic anemia     Hyperkalemia 12/06/2021    Urinary retention 11/27/2021    Mild malnutrition ([address] 11/17/2021    Acute and chronic respiratory failure with hypoxia (HCC) 11/16/2021    Paroxysmal atrial flutter ([address] 11/16/2021    Stage 4 chronic kidney disease ([address] 11/16/2021    ASHD (arteriosclerotic heart disease)     S/P CABG (coronary artery bypass graft)     COVID-19 virus infection / Marie Charnley Vaccine 11/15/2021    Cerebral infarction due to embolism of right middle cerebral artery ([address] 03/28/2018    Left leg weakness 03/28/2018    Falling 03/28/2018    Stroke, lacunar ([address] 03/28/2018    Hypertension 03/27/2018    Type 2 diabetes mellitus without complication, without long-term current use of insulin ([address] 03/27/2018    Cerebrovascular accident (CVA) ([address] 03/26/2018    Right inguinal hernia 04/02/2014        Discharge Medications: Medication List      START taking these medications    cephALEXin 500 MG capsule  Commonly known as: KEFLEX  Take 1 capsule by mouth 3 times daily for 10 days     doxycycline hyclate 100 MG tablet  Commonly known as: VIBRA-TABS  Take 1 tablet by mouth 2 times daily for 10 days     midodrine 10 MG tablet  Commonly known as: PROAMATINE  Take 1 tablet by mouth 3 times daily (with meals)        CONTINUE taking these medications    acetaminophen 650 MG suppository  Commonly known as: TYLENOL     albuterol (2.5 MG/3ML) 0.083% nebulizer solution  Commonly known as: PROVENTIL  Take 3 mLs by nebulization every 4 hours as needed for Wheezing     amiodarone 200 MG tablet  Commonly known as: CORDARONE  Take 1 tablet by mouth 2 times daily     apixaban 2.5 MG Tabs tablet  Commonly known as: ELIQUIS  Take 1 tablet by mouth 2 times daily     ascorbic acid 1000 MG tablet  Commonly known as: VITAMIN C  Take 0.5 tablets by mouth 2 times daily     aspirin 81 MG EC tablet     atorvastatin 40 MG tablet  Commonly known as: LIPITOR  Take 1 tablet by mouth nightly     benzonatate 100 MG capsule  Commonly known as: TESSALON     budesonide 0.5 MG/2ML nebulizer suspension  Commonly known as: PULMICORT  Take 2 mLs by nebulization 2 times daily     clopidogrel 75 MG tablet  Commonly known as: PLAVIX  Take 1 tablet by mouth daily     famotidine 10 MG tablet  Commonly known as: PEPCID  Take 1 tablet by mouth nightly     furosemide 40 MG tablet  Commonly known as: LASIX     glipiZIDE 5 MG tablet  Commonly known as: GLUCOTROL     ipratropium-albuterol 0.5-2.5 (3) MG/3ML Soln nebulizer solution  Commonly known as: DUONEB  Inhale 3 mLs into the lungs 4 times daily     metoprolol succinate 25 MG extended release tablet  Commonly known as: TOPROL XL  Take 1 tablet by mouth daily     * polyethylene glycol 17 g packet  Commonly known as: GLYCOLAX  Take 17 g by mouth daily as needed for Constipation     * polyethylene glycol 17 GM/SCOOP powder  Commonly Medication Reconciliation  Preparation of Discharge Prescriptions    Signed:  FRANKLIN Luis - CNP, FRANKLIN, NP-C  12/21/2021, 11:26 AM

## 2021-12-21 NOTE — PROGRESS NOTES
Physical Therapy  Facility/Department: [facility] AT THE [facility] MED SURG  Daily Treatment Note  NAME: Maggi Morton  : 10/31/1930  MRN: [id number]    Date of Service: 2021    Discharge Recommendations:  ECF with PT        Assessment   Treatment Diagnosis: GENERALIZED WEAKNESS. Prognosis: Good  Decision Making: Medium Complexity  PT Education: General Safety;Home Exercise Program;Pressure Relief  REQUIRES PT FOLLOW UP: Yes  Activity Tolerance  Activity Tolerance: Patient limited by endurance; Patient limited by fatigue     Patient Diagnosis(es): The primary encounter diagnosis was Pneumonia due to infectious organism, unspecified laterality, unspecified part of lung. Diagnoses of Hyperkalemia and Elevated troponin were also pertinent to this visit. has a past medical history of Acute and chronic respiratory failure with hypoxia ([address] CAD (coronary artery disease), Cataracts, bilateral, Elevated troponin, Glaucoma, Hyperlipidemia, Hypertension, TIA (transient ischemic attack), and Type II or unspecified type diabetes mellitus without mention of complication, not stated as uncontrolled. has a past surgical history that includes Coronary artery bypass graft (); Cataract removal with implant (Bilateral,  and 12/3/2013); and Inguinal hernia repair (Bilateral, 12). Restrictions  Restrictions/Precautions  Restrictions/Precautions: General Precautions,Fall Risk  Required Braces or Orthoses?: No  Subjective   General  Chart Reviewed: Yes  Additional Pertinent Hx: PNEUMONIA,POST COVID  Response To Previous Treatment: Patient with no complaints from previous session. Family / Caregiver Present: No  Referring Practitioner: Dr. Emerson Nails: Pt reports feeling good, states his legs feel weak.           Orientation  Orientation  Overall Orientation Status: Within Functional Limits  Cognition      Objective   Bed mobility  Rolling to Left: Minimal assistance  Rolling to Right: Minimal assistance  Supine to Sit: Minimal assistance  Sit to Supine: Minimal assistance; Moderate assistance  Scooting: Moderate assistance;Minimal assistance  Comment: Pt able to assist with scooting to [facility] this visit. Transfers  Sit to Stand: Unable to assess  Stand to sit: Unable to assess  Comment: Did not complete this visit d/t low BP. Ambulation  Ambulation?: No     Balance  Sitting - Static: Fair;+  Sitting - Dynamic: Fair  Standing - Static: Poor;+  Standing - Dynamic: Poor  Comments: Pt able to sit EOB with CGA this visit. Exercises  Straight Leg Raise: x20  Heelslides: x20  Hip Flexion: x15  Hip Abduction: x20  Knee Long Arc Quad: x20  Knee Short Arc Quad: x20  Ankle Pumps: x20  Comments: BLE ther ex completed in supine and seated EOB. Pt required frequent RBs d/t fatigue. After 1 ex sitting EOB pt reported feeling weak and leaned back onto bed, BP taken 94/69. Pt assisted into supine position, pt reported feeling better upon laying down. Goals  Short term goals  Time Frame for Short term goals: 3 DAYS  Short term goal 1: MIN ASSIST TRANSFERS. Short term goal 2: MIN ASSIST BED MOBILITY  Short term goal 3: MIN ASSIST GAIT FWW 10 FT. Long term goals  Time Frame for Long term goals : 4 WEEKS  Long term goal 1: IND TRANSFERS. Long term goal 2: IND GAIT 100 FT  FWW. Patient Goals   Patient goals : RETURN HOME WITH ASSISTANCE    Plan    Plan  Times per day: Twice a day  Current Treatment Recommendations: Usha Fair Re-education,Patient/Caregiver Education & Training,ADL/Self-care Training,Gait Training,Balance Training,Functional Mobility Training  Safety Devices  Type of devices:  All fall risk precautions in place,Bed alarm in place,Call light within reach,Left in bed,Nurse notified,Gait belt,Patient at risk for falls     Therapy Time   Individual Concurrent Group Co-treatment   Time In 1509         Time Out 0876 Minutes [address] [place], [place]

## 2021-12-21 NOTE — PROGRESS NOTES
Patient leaving floor at this time via [address] transportation. Belongings sent with patient.

## 2021-12-21 NOTE — DISCHARGE INSTR - DIET

## 2021-12-21 NOTE — PROGRESS NOTES
Dr. Michael Osorio and Kalie Hernandez CNP made aware at this time of patient's low urine output and low blood pressures. No new orders regarding urine output, see new order regarding low blood pressures.

## 2021-12-21 NOTE — PROGRESS NOTES
Physical Therapy  Facility/Department: [facility] AT THE [facility] MED SURG  Daily Treatment Note  NAME: Irineo Zamarripa  : 10/31/1930  MRN: [id number]    Date of Service: 2021    Discharge Recommendations:  ECF with PT        Assessment   Treatment Diagnosis: GENERALIZED WEAKNESS. Prognosis: Good  Decision Making: Medium Complexity  PT Education: General Safety;PT Role;Transfer Training  REQUIRES PT FOLLOW UP: Yes  Activity Tolerance  Activity Tolerance: Patient limited by endurance; Patient limited by fatigue     Patient Diagnosis(es): The primary encounter diagnosis was Pneumonia due to infectious organism, unspecified laterality, unspecified part of lung. Diagnoses of Hyperkalemia and Elevated troponin were also pertinent to this visit. has a past medical history of Acute and chronic respiratory failure with hypoxia ([address] CAD (coronary artery disease), Cataracts, bilateral, Elevated troponin, Glaucoma, Hyperlipidemia, Hypertension, TIA (transient ischemic attack), and Type II or unspecified type diabetes mellitus without mention of complication, not stated as uncontrolled. has a past surgical history that includes Coronary artery bypass graft (); Cataract removal with implant (Bilateral,  and 12/3/2013); and Inguinal hernia repair (Bilateral, 12). Restrictions  Restrictions/Precautions  Restrictions/Precautions: General Precautions,Fall Risk  Required Braces or Orthoses?: No  Subjective   General  Chart Reviewed: Yes  Additional Pertinent Hx: PNEUMONIA,POST COVID  Response To Previous Treatment: Patient with no complaints from previous session. Family / Caregiver Present: No  Referring Practitioner: Dr. Bailee Quan: Pt denies pain, states he slept really good last night, wants to get out of bed.           Orientation  Orientation  Overall Orientation Status: Within Functional Limits  Cognition      Objective   Bed mobility  Supine to Sit: Minimal assistance  Sit to Supine: Maximum assistance;2 Person assistance  Transfers  Sit to Stand: Maximum Assistance;2 Person Assistance  Stand to sit: Maximum Assistance;2 Person Assistance  Comment: Attempted to stand, maxA x2 pt demo moderate posterior lean and assisted to EOB. Upon sitting pt began leaning forward and required maxA to maintin sitting position, pt assisted back to supine, nursing notified. Ambulation  Ambulation?: No     Balance  Sitting - Static: Fair;+  Sitting - Dynamic: Fair  Standing - Static: Poor;+  Standing - Dynamic: Poor  Comments: Prior to attempting to stand pt able to sit upright without assistance, after STS attempt pt required maxA to maintain seated balance. Exercises  Hip Flexion: x15  Hip Abduction: x15  Knee Long Arc Quad: x15  Comments: BLE ther ex completed sitting EOB, required frequent RBs d/t fatigue. Goals  Short term goals  Time Frame for Short term goals: 3 DAYS  Short term goal 1: MIN ASSIST TRANSFERS. Short term goal 2: MIN ASSIST BED MOBILITY  Short term goal 3: MIN ASSIST GAIT FWW 10 FT. Long term goals  Time Frame for Long term goals : 4 WEEKS  Long term goal 1: IND TRANSFERS. Long term goal 2: IND GAIT 100 FT  FWW. Patient Goals   Patient goals : RETURN HOME WITH ASSISTANCE    Plan    Plan  Times per day: Twice a day  Current Treatment Recommendations: Vincent Inches Re-education,Patient/Caregiver Education & Training,ADL/Self-care Training,Gait Training,Balance Training,Functional Mobility Training  Safety Devices  Type of devices:  All fall risk precautions in place,Bed alarm in place,Call light within reach,Left in bed,Nurse notified,Gait belt,Patient at risk for falls     Therapy Time   Individual Concurrent Group Co-treatment   Time In 0837         Time Out 0904         Minutes [address] [place] [address]

## 2021-12-21 NOTE — PROGRESS NOTES
Writer spoke with Emmy RUFFIN at this time regarding del rio, per Emmy Quezada, patient is to be discharged with the del rio but change out the del rio before patient is discharged. Patient is also to follow up with urology in 2 weeks and be discharged on flomax twice daily. Claudia Ray CNP updated on plan.

## 2021-12-21 NOTE — PROGRESS NOTES
Per verbal order from urology, old catheter removed and new catheter placed at this time. 12 [place] catheter placed, yellow, clear urine returned. Securing device on leg. Patient tolerated well, will continue to monitor.

## 2021-12-21 NOTE — PROGRESS NOTES
Spoke with Yari Carmona patient's nurse. Patient is being discharged to [facility] today. Patient's Robertson catheter was placed on 11/26/2021. Patient is due to have Robertson catheter changed 28 days after insertion which will be 12/24/2021. We do feel as though patient needs bladder rest.  Per nurse nephrology agrees with maintaining Robertson catheter. We will have them change the Robertson catheter today and have him follow-up in 2 weeks in our office to discuss possible Robertson catheter removal.  Continue Flomax twice a day.     Christy Nobles PA-C, PA-C  12/21/2021, 12:26 PM

## 2021-12-21 NOTE — DISCHARGE INSTR - COC
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    Mild malnutrition (HCC) E44.1    Urinary retention R33.9    Hyperkalemia E87.5    Hyperglycemia R73.9    Renal failure N19    Acute cystitis without hematuria N30.00    Elevated troponin R77.8    On amiodarone therapy Z79.899    Microcytic hypochromic anemia D50.9    Community acquired bacterial pneumonia J15.9    Moderate malnutrition ([address] E44.0       Isolation/Infection:   Isolation            No Isolation          Patient Infection Status       Infection Onset Added Last Indicated Last Indicated By Review Planned Expiration Resolved Resolved By    None active    Resolved    COVID-19 (Rule Out) 12/16/21 12/16/21 12/16/21 Respiratory Panel, Molecular, with COVID-19 (Restricted: peds pts or suitable admitted adults) (Ordered)   12/17/21 Rule-Out Test Resulted    COVID-19 (Rule Out) 12/16/21 12/16/21 12/16/21 COVID-19, Rapid (Ordered)   12/16/21 Rule-Out Test Resulted    COVID-19 11/12/21 11/15/21 11/15/21 COVID-19, Rapid   11/24/21 Ezra Wei RN    S/S 11/12. Resolved per provider with system guidance. Nurse Assessment:  Last Vital Signs: BP (!) 91/47   Pulse 70   Temp 97.6 °F (36.4 °C) (Oral)   Resp 20   Ht 5' 4\" (1.626 m)   Wt 169 lb 1.6 oz (76.7 kg)   SpO2 100%   BMI 29.03 kg/m²     Last documented pain score (0-10 scale): Pain Level: 0  Last Weight:   Wt Readings from Last 1 Encounters:   12/21/21 169 lb 1.6 oz (76.7 kg)     Mental Status:  oriented and alert    IV Access:  - None    Nursing Mobility/ADLs:  Walking   Assisted  Transfer  Assisted  Bathing  Assisted  Dressing  Assisted  Toileting  Assisted  Feeding  Independent  Med Admin  Assisted  Med Delivery   whole    Wound Care Documentation and Therapy:  Wound 12/06/21 Coccyx Medial (Active)   Wound Etiology Pressure Stage  2 12/18/21 0803   Dressing Status Clean;Dry; Intact 12/21/21 0747   Wound Cleansed Soap and water 12/18/21 1813 Dressing/Treatment Other (comment); Moisture barrier;Zinc paste 12/17/21 2325   Wound Assessment Other (Comment) 12/21/21 0727   Drainage Amount None 12/21/21 0727   Drainage Description Sanguinous 12/06/21 2225   Odor None 12/21/21 0727   Kiya-wound Assessment Intact 12/10/21 0222   Number of days: 14        Elimination:  Continence: Bowel: Yes  Bladder: Robertson  Urinary Catheter: Insertion Date: 12/21/2021 and Indication for Use of Catheter: Urology/Urologist seeing this patient or inserted indwelling catheter and Acute urinary retention/obstruction   Colostomy/Ileostomy/Ileal Conduit: No       Date of Last BM: 12/20/2021    Intake/Output Summary (Last 24 hours) at 12/21/2021 1126  Last data filed at 12/21/2021 0731  Gross per 24 hour   Intake 1100 ml   Output 475 ml   Net 625 ml     I/O last 3 completed shifts: In: 900 [P.O.:900]  Out: 700 [Urine:700]    Safety Concerns:     History of Falls (last 30 days) and At Risk for Falls    Impairments/Disabilities:      Vision and Hearing    Nutrition Therapy:  Current Nutrition Therapy:   - Oral Diet:  Carb Control 4 carbs/meal (1800kcals/day) and no added salt, low potassium (less than 3000 mg/day)    Routes of Feeding: Oral  Liquids: No Restrictions  Daily Fluid Restriction: no  Last Modified Barium Swallow with Video (Video Swallowing Test): not done    Treatments at the Time of Hospital Discharge:   Respiratory Treatments: See [facility]  Oxygen Therapy:  is on oxygen at 3 L/min per nasal cannula.   Ventilator:    - No ventilator support    Rehab Therapies: Physical Therapy and Occupational Therapy  Weight Bearing Status/Restrictions: No weight bearing restirctions  Other Medical Equipment (for information only, NOT a DME order):  walker and bedside commode  Other Treatments: None    Patient's personal belongings (please select all that are sent with patient):  Glasses, 2 blankets, cell phone, bag of clothes, cards, fake plant/Burt tree, electric razor    RN SIGNATURE:  Electronically signed by Robbin Castaneda RN on 12/21/21 at 12:35 PM EST    CASE MANAGEMENT/SOCIAL WORK SECTION    Inpatient Status Date: 12/16/21    Readmission Risk Assessment Score:  Readmission Risk              Risk of Unplanned Readmission:  40           Discharging to Facility/ Agency   Name: [place] [address] [address] [facility]  Phone:  [phone]  Fax:    Dialysis Facility (if applicable)   Name:  Address:  Dialysis Schedule:  Phone:  Fax:    / signature: Electronically signed by oLri Baez RN on 12/21/21 at 2:27 PM EST    PHYSICIAN SECTION    Prognosis: Fair    Condition at Discharge: Stable    Rehab Potential (if transferring to Rehab): Fair    Recommended Labs or Other Treatments After Discharge: cbc w/ diff and bmp 1 week    Physician Certification: I certify the above information and transfer of Hiwot Hayes  is necessary for the continuing treatment of the diagnosis listed and that he requires [place] for greater 30 days.      Update Admission H&P: No change in H&P    PHYSICIAN SIGNATURE:  Electronically signed by FRANKLIN Orr CNP on 12/21/21 at 11:26 AM EST

## 2021-12-21 NOTE — PROGRESS NOTES
Occupational Therapy  Facility/Department: [facility] AT THE [facility] MED SURG  Daily Treatment Note  NAME: Jessica Inmna  : 10/31/1930  MRN: [id number]    Date of Service: 2021    Discharge Recommendations:  ECF with OT,Subacute/Skilled Nursing Facility       Assessment      Activity Tolerance  Activity Tolerance: Patient Tolerated treatment well  Safety Devices  Safety Devices in place: Yes  Type of devices: Call light within reach; Left in bed         Patient Diagnosis(es): The primary encounter diagnosis was Pneumonia due to infectious organism, unspecified laterality, unspecified part of lung. Diagnoses of Hyperkalemia and Elevated troponin were also pertinent to this visit. has a past medical history of Acute and chronic respiratory failure with hypoxia ([address] CAD (coronary artery disease), Cataracts, bilateral, Elevated troponin, Glaucoma, Hyperlipidemia, Hypertension, TIA (transient ischemic attack), and Type II or unspecified type diabetes mellitus without mention of complication, not stated as uncontrolled. has a past surgical history that includes Coronary artery bypass graft (); Cataract removal with implant (Bilateral,  and 12/3/2013); and Inguinal hernia repair (Bilateral, 12). Restrictions  Restrictions/Precautions  Restrictions/Precautions: General Precautions,Fall Risk  Required Braces or Orthoses?: No  Subjective   General  Chart Reviewed: Yes  Patient assessed for rehabilitation services?: Yes  Response to previous treatment: Patient with no complaints from previous session  Family / Caregiver Present: No  Referring Practitioner: Dr. Ailyn Jackson  Diagnosis: Shortness of Breath  Subjective  Subjective: Pt had no complaints of pain this date. General Comment  Comments: Pt sitting up in bed upon arrival. Pt agreed to participate in therapy session this date.       Orientation     Objective                                                          Additional Activities Comment  Additional Activities: Pt educated on d/c folder, AE/DME, and safety with G understanding this date. Type of ROM/Therapeutic Exercise  Type of ROM/Therapeutic Exercise: AROM  Comment: Pt tolerated BUE ther ex with 1# dumbbell x 7 planes x 15 reps x 1 set, red flex bar x 15 reps x 3 variations x 1 set to increase UE strength and endurance in order to ease completion of ADL tasks. Pt required RBs as needed secondary to fatigue. O2 93-99% on NC. Plan   Plan  Times per week: 7  Times per day: Daily  Current Treatment Recommendations: Aimee Arreguin Education & Training,Self-Care / ADL,Functional Mobility Training  G-Code     OutComes Score                                                  AM-PAC Score             Goals  Short term goals  Time Frame for Short term goals: 21 visits  Short term goal 1: Patient to engage in functional transfers with min A. Short term goal 2: Patient to complete UB grooming/dressing with SBA  Short term goal 3: Patient to demo LB dressing with mod A.        Therapy Time   Individual Concurrent Group Co-treatment   Time In 0957         Time Out 1030         Minutes [address] [facility] [place], SILVA/L

## 2021-12-21 NOTE — PROGRESS NOTES
Patient attempted to have a bowel movement on the bedpan. Not successful. Patient turned on left side with pillow support at this time. Bed alarm on. Will continue to monitor.

## 2021-12-22 ENCOUNTER — HOSPITAL ENCOUNTER (INPATIENT)
Age: 86
LOS: 7 days | Discharge: NURSING, REHAB OR LONG-TERM CARE | DRG: 947 | End: 2021-12-29
Attending: EMERGENCY MEDICINE | Admitting: INTERNAL MEDICINE
Payer: MEDICARE

## 2021-12-22 ENCOUNTER — HOSPITAL ENCOUNTER (OUTPATIENT)
Age: 86
Setting detail: SPECIMEN
Discharge: HOME | End: 2021-12-22

## 2021-12-22 ENCOUNTER — APPOINTMENT (OUTPATIENT)
Dept: GENERAL RADIOLOGY | Age: 86
DRG: 947 | End: 2021-12-22
Payer: MEDICARE

## 2021-12-22 ENCOUNTER — APPOINTMENT (OUTPATIENT)
Dept: CT IMAGING | Age: 86
DRG: 947 | End: 2021-12-22
Payer: MEDICARE

## 2021-12-22 ENCOUNTER — TELEPHONE (OUTPATIENT)
Dept: OTHER | Facility: CLINIC | Age: 86
End: 2021-12-22

## 2021-12-22 DIAGNOSIS — R93.89 ABNORMAL CHEST X-RAY: ICD-10-CM

## 2021-12-22 DIAGNOSIS — R55 SYNCOPE, UNSPECIFIED SYNCOPE TYPE: Primary | ICD-10-CM

## 2021-12-22 DIAGNOSIS — R82.81 PYURIA: ICD-10-CM

## 2021-12-22 DIAGNOSIS — N28.9 RENAL DYSFUNCTION: ICD-10-CM

## 2021-12-22 DIAGNOSIS — R77.8 ELEVATED TROPONIN: ICD-10-CM

## 2021-12-22 PROBLEM — R41.82 ACUTE ALTERATION IN MENTAL STATUS: Status: ACTIVE | Noted: 2021-12-22

## 2021-12-22 LAB
-: NORMAL
ABSOLUTE EOS #: 0.14 K/UL (ref 0–0.44)
ABSOLUTE IMMATURE GRANULOCYTE: 0.14 K/UL (ref 0–0.3)
ABSOLUTE LYMPH #: 2.05 K/UL (ref 1.1–3.7)
ABSOLUTE MONO #: 0.93 K/UL (ref 0.1–1.2)
ALBUMIN SERPL-MCNC: 2.9 G/DL (ref 3.5–5.2)
ALBUMIN/GLOBULIN RATIO: 0.8 (ref 1–2.5)
ALLEN TEST: NORMAL
ALP BLD-CCNC: 167 U/L (ref 40–129)
ALT SERPL-CCNC: 17 U/L (ref 5–41)
AMORPHOUS: NORMAL
ANION GAP SERPL CALCULATED.3IONS-SCNC: 19 MMOL/L (ref 9–17)
AST SERPL-CCNC: 25 U/L
BACTERIA: NORMAL
BASOPHILS # BLD: 0 % (ref 0–2)
BASOPHILS ABSOLUTE: 0.04 K/UL (ref 0–0.2)
BILIRUB SERPL-MCNC: 0.51 MG/DL (ref 0.3–1.2)
BILIRUBIN URINE: NEGATIVE
BNP INTERPRETATION: ABNORMAL
BUN BLDV-MCNC: 83 MG/DL (ref 8–23)
BUN/CREAT BLD: 23 (ref 9–20)
CALCIUM SERPL-MCNC: 8.9 MG/DL (ref 8.6–10.4)
CARBOXYHEMOGLOBIN: NORMAL % (ref 0–5)
CASTS UA: NORMAL /LPF
CASTS UA: NORMAL /LPF
CHLORIDE BLD-SCNC: 94 MMOL/L (ref 98–107)
CHP ED QC CHECK: YES
CO2: 23 MMOL/L (ref 20–31)
COLOR: YELLOW
COMMENT UA: ABNORMAL
CREAT SERPL-MCNC: 3.67 MG/DL (ref 0.7–1.2)
CRYSTALS, UA: NORMAL /HPF
DIFFERENTIAL TYPE: ABNORMAL
EKG ATRIAL RATE: 79 BPM
EKG P AXIS: 34 DEGREES
EKG P-R INTERVAL: 228 MS
EKG Q-T INTERVAL: 412 MS
EKG QRS DURATION: 116 MS
EKG QTC CALCULATION (BAZETT): 472 MS
EKG R AXIS: -43 DEGREES
EKG T AXIS: 126 DEGREES
EKG VENTRICULAR RATE: 79 BPM
EOSINOPHILS RELATIVE PERCENT: 1 % (ref 1–4)
EPITHELIAL CELLS UA: NORMAL /HPF (ref 0–5)
FIO2: NORMAL
GFR AFRICAN AMERICAN: 19 ML/MIN
GFR NON-AFRICAN AMERICAN: 16 ML/MIN
GFR SERPL CREATININE-BSD FRML MDRD: ABNORMAL ML/MIN/{1.73_M2}
GFR SERPL CREATININE-BSD FRML MDRD: ABNORMAL ML/MIN/{1.73_M2}
GLUCOSE BLD-MCNC: 310 MG/DL
GLUCOSE BLD-MCNC: 310 MG/DL (ref 74–100)
GLUCOSE BLD-MCNC: 333 MG/DL (ref 70–99)
GLUCOSE URINE: ABNORMAL
HCO3 VENOUS: 28.4 MMOL/L (ref 24–30)
HCT VFR BLD CALC: 26.2 % (ref 40.7–50.3)
HEMOGLOBIN: 7.9 G/DL (ref 13–17)
IMMATURE GRANULOCYTES: 1 %
KETONES, URINE: NEGATIVE
LEUKOCYTE ESTERASE, URINE: ABNORMAL
LIPASE: 20 U/L (ref 13–60)
LYMPHOCYTES # BLD: 17 % (ref 24–43)
MCH RBC QN AUTO: 30.5 PG (ref 25.2–33.5)
MCHC RBC AUTO-ENTMCNC: 30.2 G/DL (ref 28.4–34.8)
MCV RBC AUTO: 101.2 FL (ref 82.6–102.9)
METHEMOGLOBIN: NORMAL % (ref 0–1.9)
MODE: NORMAL
MONOCYTES # BLD: 8 % (ref 3–12)
MUCUS: NORMAL
NEGATIVE BASE EXCESS, VEN: NORMAL MMOL/L (ref 0–2)
NITRITE, URINE: NEGATIVE
NOTIFICATION TIME: NORMAL
NOTIFICATION: NORMAL
NRBC AUTOMATED: 0 PER 100 WBC
O2 DEVICE/FLOW/%: NORMAL
O2 SAT, VEN: 70.2 % (ref 60–85)
OTHER OBSERVATIONS UA: NORMAL
OXYHEMOGLOBIN: NORMAL % (ref 95–98)
PATIENT TEMP: 37
PCO2, VEN, TEMP ADJ: NORMAL MMHG (ref 39–55)
PCO2, VEN: 52.9 (ref 39–55)
PDW BLD-RTO: 16 % (ref 11.8–14.4)
PEEP/CPAP: NORMAL
PH UA: 6 (ref 5–9)
PH VENOUS: 7.35 (ref 7.32–7.42)
PH, VEN, TEMP ADJ: NORMAL (ref 7.32–7.42)
PLATELET # BLD: 234 K/UL (ref 138–453)
PLATELET ESTIMATE: ABNORMAL
PMV BLD AUTO: 11.4 FL (ref 8.1–13.5)
PO2, VEN, TEMP ADJ: NORMAL MMHG (ref 30–50)
PO2, VEN: 39.1 (ref 30–50)
POSITIVE BASE EXCESS, VEN: 1.7 MMOL/L (ref 0–2)
POTASSIUM SERPL-SCNC: 5.2 MMOL/L (ref 3.7–5.3)
PRO-BNP: ABNORMAL PG/ML
PROTEIN UA: NEGATIVE
PSV: NORMAL
PT. POSITION: NORMAL
RBC # BLD: 2.59 M/UL (ref 4.21–5.77)
RBC # BLD: ABNORMAL 10*6/UL
RBC UA: NORMAL /HPF (ref 0–2)
RENAL EPITHELIAL, UA: NORMAL /HPF
RESPIRATORY RATE: NORMAL
SAMPLE SITE: NORMAL
SEG NEUTROPHILS: 73 % (ref 36–65)
SEGMENTED NEUTROPHILS ABSOLUTE COUNT: 8.72 K/UL (ref 1.5–8.1)
SET RATE: NORMAL
SODIUM BLD-SCNC: 136 MMOL/L (ref 135–144)
SPECIFIC GRAVITY UA: 1.01 (ref 1.01–1.02)
TEXT FOR RESPIRATORY: NORMAL
TOTAL HB: NORMAL G/DL (ref 12–16)
TOTAL PROTEIN: 6.5 G/DL (ref 6.4–8.3)
TOTAL RATE: NORMAL
TRICHOMONAS: NORMAL
TROPONIN INTERP: ABNORMAL
TROPONIN INTERP: ABNORMAL
TROPONIN T: ABNORMAL NG/ML
TROPONIN T: ABNORMAL NG/ML
TROPONIN, HIGH SENSITIVITY: 154 NG/L (ref 0–22)
TROPONIN, HIGH SENSITIVITY: 201 NG/L (ref 0–22)
TURBIDITY: CLEAR
URINE HGB: ABNORMAL
UROBILINOGEN, URINE: NORMAL
VT: NORMAL
WBC # BLD: 12 K/UL (ref 3.5–11.3)
WBC # BLD: ABNORMAL 10*3/UL
WBC UA: NORMAL /HPF (ref 0–5)
YEAST: NORMAL

## 2021-12-22 PROCEDURE — 84484 ASSAY OF TROPONIN QUANT: CPT

## 2021-12-22 PROCEDURE — 93005 ELECTROCARDIOGRAM TRACING: CPT | Performed by: EMERGENCY MEDICINE

## 2021-12-22 PROCEDURE — 99284 EMERGENCY DEPT VISIT MOD MDM: CPT

## 2021-12-22 PROCEDURE — 80053 COMPREHEN METABOLIC PANEL: CPT

## 2021-12-22 PROCEDURE — 2580000003 HC RX 258: Performed by: EMERGENCY MEDICINE

## 2021-12-22 PROCEDURE — 36415 COLL VENOUS BLD VENIPUNCTURE: CPT

## 2021-12-22 PROCEDURE — 93010 ELECTROCARDIOGRAM REPORT: CPT | Performed by: FAMILY MEDICINE

## 2021-12-22 PROCEDURE — 82947 ASSAY GLUCOSE BLOOD QUANT: CPT

## 2021-12-22 PROCEDURE — 82805 BLOOD GASES W/O2 SATURATION: CPT

## 2021-12-22 PROCEDURE — 81001 URINALYSIS AUTO W/SCOPE: CPT

## 2021-12-22 PROCEDURE — 83690 ASSAY OF LIPASE: CPT

## 2021-12-22 PROCEDURE — 85025 COMPLETE CBC W/AUTO DIFF WBC: CPT

## 2021-12-22 PROCEDURE — 1200000000 HC SEMI PRIVATE

## 2021-12-22 PROCEDURE — 70450 CT HEAD/BRAIN W/O DYE: CPT

## 2021-12-22 PROCEDURE — 71045 X-RAY EXAM CHEST 1 VIEW: CPT

## 2021-12-22 PROCEDURE — 87086 URINE CULTURE/COLONY COUNT: CPT

## 2021-12-22 PROCEDURE — 83880 ASSAY OF NATRIURETIC PEPTIDE: CPT

## 2021-12-22 RX ORDER — SODIUM CHLORIDE 9 MG/ML
1000 INJECTION, SOLUTION INTRAVENOUS CONTINUOUS
Status: DISCONTINUED | OUTPATIENT
Start: 2021-12-22 | End: 2021-12-23

## 2021-12-22 RX ADMIN — SODIUM CHLORIDE 1000 ML: 9 INJECTION, SOLUTION INTRAVENOUS at 23:56

## 2021-12-22 RX ADMIN — SODIUM CHLORIDE 1000 ML: 9 INJECTION, SOLUTION INTRAVENOUS at 14:37

## 2021-12-22 NOTE — ED NOTES
Cheyenne Merchant LPN at [facility] aware of patients admission status.       Denise Doan RN  12/22/21 4835

## 2021-12-22 NOTE — TELEPHONE ENCOUNTER
Writer contacted ED provider Rupinder Phoenix  to inform of 30 day readmission risk. ED provider informed writer of readmission.     Call Back: If you need to call back to inform of disposition you can contact me at [phone]

## 2021-12-22 NOTE — ED NOTES
Patient assisted on and and off of bedpan. Large formed bowel movement noted. Patient repositioned and turned to left side with pillow support. Call light within reach and family at bedside.       Brandee Schroeder RN  12/22/21 1754

## 2021-12-22 NOTE — ED PROVIDER NOTES
[facility]  EMERGENCY DEPARTMENT ENCOUNTER      Pt Name: Maritza Brito  MRN: [id number]  [place] 10/31/1930  Date of evaluation: 12/22/2021  Provider: Nasima Osorio MD    CHIEF COMPLAINT       Chief Complaint   Patient presents with    Altered Mental Status     from [facility] after recent admission with pneumonia; staff states pt was unresponsive to sternal rub    Emesis     onset today         HISTORY OF PRESENT ILLNESS   (Location/Symptom, Timing/Onset, Context/Setting, Quality, Duration, Modifying Factors, Severity)  Note limiting factors. Maritza Brito is a 80 y.o. male who presents to the emergency department     80year-old patient presents emergency department with history for brief episode of unresponsiveness while at rehab center. .  The patient was most recently discharged from [facility] 1 day prior to rehab. The patient himself voices no concerns. However upon arrival he did have 1 bout of emesis. Emesis was not bloody or was at [place] no history for chest pain. The patient is aware of his surroundings, his age and the date. Nursing Notes were reviewed. REVIEW OF SYSTEMS    (2-9 systems for level 4, 10 or more for level 5)     Review of Systems   All other systems reviewed and are negative. Except as noted above the remainder of the review of systems was reviewed and negative.        PAST MEDICAL HISTORY     Past Medical History:   Diagnosis Date    Acute and chronic respiratory failure with hypoxia ([address] 11/16/2021    CAD (coronary artery disease)     Cataracts, bilateral     Elevated troponin 12/7/2021    Glaucoma     Hyperlipidemia     Hypertension     TIA (transient ischemic attack)     Type II or unspecified type diabetes mellitus without mention of complication, not stated as uncontrolled          SURGICAL HISTORY       Past Surgical History:   Procedure Laterality Date    CATARACT REMOVAL WITH IMPLANT Bilateral 11/219/2013 and 12/3/2013  CORONARY ARTERY BYPASS GRAFT  2000    INGUINAL HERNIA REPAIR Bilateral 06/19/12         CURRENT MEDICATIONS       Previous Medications    ACETAMINOPHEN (TYLENOL) 650 MG SUPPOSITORY    Place 650 mg rectally every 8 hours as needed for Fever    ALBUTEROL (PROVENTIL) (2.5 MG/3ML) 0.083% NEBULIZER SOLUTION    Take 3 mLs by nebulization every 4 hours as needed for Wheezing    AMIODARONE (CORDARONE) 200 MG TABLET    Take 1 tablet by mouth 2 times daily    APIXABAN (ELIQUIS) 2.5 MG TABS TABLET    Take 1 tablet by mouth 2 times daily    ASCORBIC ACID (VITAMIN C) 1000 MG TABLET    Take 0.5 tablets by mouth 2 times daily    ASPIRIN 81 MG EC TABLET    Take 81 mg by mouth nightly     ATORVASTATIN (LIPITOR) 40 MG TABLET    Take 1 tablet by mouth nightly    BENZONATATE (TESSALON) 100 MG CAPSULE    Take 100 mg by mouth 3 times daily as needed for Cough    BUDESONIDE (PULMICORT) 0.5 MG/2ML NEBULIZER SUSPENSION    Take 2 mLs by nebulization 2 times daily    CEPHALEXIN (KEFLEX) 500 MG CAPSULE    Take 1 capsule by mouth 3 times daily for 10 days    CLOPIDOGREL (PLAVIX) 75 MG TABLET    Take 1 tablet by mouth daily    DOXYCYCLINE HYCLATE (VIBRA-TABS) 100 MG TABLET    Take 1 tablet by mouth 2 times daily for 10 days    FAMOTIDINE (PEPCID) 10 MG TABLET    Take 1 tablet by mouth nightly    FUROSEMIDE (LASIX) 40 MG TABLET    Take 40 mg by mouth daily    GLIPIZIDE (GLUCOTROL) 5 MG TABLET    Take 5 mg by mouth 2 times daily (before meals)    IPRATROPIUM-ALBUTEROL (DUONEB) 0.5-2.5 (3) MG/3ML SOLN NEBULIZER SOLUTION    Inhale 3 mLs into the lungs 4 times daily    METOPROLOL SUCCINATE (TOPROL XL) 25 MG EXTENDED RELEASE TABLET    Take 1 tablet by mouth daily    MIDODRINE (PROAMATINE) 10 MG TABLET    Take 1 tablet by mouth 3 times daily (with meals)    POLYETHYLENE GLYCOL (GLYCOLAX) 17 G PACKET    Take 17 g by mouth daily as needed for Constipation    POLYETHYLENE GLYCOL (GLYCOLAX) 17 GM/SCOOP POWDER    Take 17 g by mouth daily    SODIUM BICARBONATE 650 MG TABLET    Take 1 tablet by mouth 2 times daily    TAMSULOSIN (FLOMAX) 0.4 MG CAPSULE    Take 1 capsule by mouth 2 times daily    TRAZODONE (DESYREL) 50 MG TABLET    Take 50 mg by mouth nightly    VITAMIN D3 (CHOLECALCIFEROL) 25 MCG (1000 UT) TABS TABLET    Take 1 tablet by mouth daily    ZINC SULFATE (ZINCATE) 220 (50 ZN) MG CAPSULE    Take 2 capsules by mouth daily    ZOLPIDEM (AMBIEN) 5 MG TABLET    Take 5 mg by mouth nightly as needed for Sleep. ALLERGIES     Patient has no known allergies. FAMILY HISTORY       Family History   Problem Relation Age of Onset    Heart Disease Father     Heart Disease Brother           SOCIAL HISTORY       Social History     Socioeconomic History    Marital status:      Spouse name: None    Number of children: None    Years of education: None    Highest education level: None   Occupational History    None   Tobacco Use    Smoking status: Never Smoker    Smokeless tobacco: Never Used   Substance and Sexual Activity    Alcohol use: Yes     Comment: very very very seldom    Drug use: No    Sexual activity: None   Other Topics Concern    None   Social History Narrative    None     Social Determinants of Health     Financial Resource Strain:     Difficulty of Paying Living Expenses: Not on file   Food Insecurity:     Worried About Running Out of Food in the Last Year: Not on file    Mera of Food in the Last Year: Not on file   Transportation Needs:     Lack of Transportation (Medical): Not on file    Lack of Transportation (Non-Medical):  Not on file   Physical Activity:     Days of Exercise per Week: Not on file    Minutes of Exercise per Session: Not on file   Stress:     Feeling of Stress : Not on file   Social Connections:     Frequency of Communication with Friends and Family: Not on file    Frequency of Social Gatherings with Friends and Family: Not on file    Attends Sabianist Services: Not on file   [employer] of Clubs or Organizations: Not on file    Attends Club or Organization Meetings: Not on file    Marital Status: Not on file   Intimate Partner Violence:     Fear of Current or Ex-Partner: Not on file    Emotionally Abused: Not on file    Physically Abused: Not on file    Sexually Abused: Not on file   Housing Stability:     Unable to Pay for Housing in the Last Year: Not on file    Number of [place] in the Last Year: Not on file    Unstable Housing in the Last Year: Not on file       SCREENINGS                        PHYSICAL EXAM    (up to 7 for level 4, 8 or more for level 5)     ED Triage Vitals   BP Temp Temp src Pulse Resp SpO2 Height Weight   12/22/21 1308 -- -- 12/22/21 1305 12/22/21 1305 12/22/21 1300 -- --   115/67   76 17 (!) 76 %         Physical Exam  Vitals and nursing note reviewed. Constitutional:       General: He is not in acute distress. Appearance: He is not ill-appearing, toxic-appearing or diaphoretic. HENT:      Head: Normocephalic. Eyes:      Extraocular Movements: Extraocular movements intact. Cardiovascular:      Rate and Rhythm: Normal rate and regular rhythm. Heart sounds: Normal heart sounds. Pulmonary:      Comments: Scattered rhonchi bilateral bases  Abdominal:      General: Bowel sounds are normal.      Palpations: Abdomen is soft. Tenderness: There is no abdominal tenderness. There is no guarding. Comments: Patient does have a Robertson catheter in place   Musculoskeletal:         General: No swelling or tenderness. Normal range of motion. Cervical back: Normal range of motion and neck supple. Skin:     General: Skin is warm and dry. Findings: No erythema or rash. Neurological:      Mental Status: He is alert and oriented to person, place, and time. GCS: GCS eye subscore is 1. GCS verbal subscore is 1. GCS motor subscore is 1. Cranial Nerves: No cranial nerve deficit, dysarthria or facial asymmetry.       Sensory: No sensory deficit. Motor: No weakness. Psychiatric:         Speech: Speech normal.         DIAGNOSTIC RESULTS     EKG: All EKG's are interpreted by the Emergency Department Physician who either signs or Co-signs this chart in the absence of a cardiologist.      RADIOLOGY:   Non-plain film images such as CT, Ultrasound and MRI are read by the radiologist. Plain radiographic images are visualized and preliminarily interpreted by the emergency physician with the below findings:      Interpretation per the Radiologist below, if available at the time of this note:    CT Head WO Contrast   Final Result   No acute intracranial abnormality. XR CHEST PORTABLE   Final Result   No change in multifocal infiltrates suggesting pneumonia probable small   effusions. Recommend continued follow-up.                ED BEDSIDE ULTRASOUND:   Performed by ED Physician - none    LABS:  Labs Reviewed   CBC WITH AUTO DIFFERENTIAL - Abnormal; Notable for the following components:       Result Value    WBC 12.0 (*)     RBC 2.59 (*)     Hemoglobin 7.9 (*)     Hematocrit 26.2 (*)     RDW 16.0 (*)     Seg Neutrophils 73 (*)     Lymphocytes 17 (*)     Immature Granulocytes 1 (*)     Segs Absolute 8.72 (*)     All other components within normal limits   TROPONIN - Abnormal; Notable for the following components:    Troponin, High Sensitivity 201 (*)     All other components within normal limits   GLUCOSE, WHOLE BLOOD - Abnormal; Notable for the following components:    POC Glucose 310 (*)     All other components within normal limits   COMPREHENSIVE METABOLIC PANEL W/ REFLEX TO MG FOR LOW K - Abnormal; Notable for the following components:    Glucose 333 (*)     BUN 83 (*)     CREATININE 3.67 (*)     Bun/Cre Ratio 23 (*)     Chloride 94 (*)     Anion Gap 19 (*)     Alkaline Phosphatase 167 (*)     Albumin 2.9 (*)     Albumin/Globulin Ratio 0.8 (*)     GFR Non- 16 (*)     GFR  19 (*)     All other components within normal limits   BRAIN NATRIURETIC PEPTIDE - Abnormal; Notable for the following components:    Pro-BNP 17,716 (*)     All other components within normal limits   URINE RT REFLEX TO CULTURE - Abnormal; Notable for the following components:    Glucose, Ur 1+ (*)     Urine Hgb 3+ (*)     Leukocyte Esterase, Urine TRACE (*)     All other components within normal limits   TROPONIN - Abnormal; Notable for the following components:    Troponin, High Sensitivity 154 (*)     All other components within normal limits   POCT GLUCOSE - Abnormal   CULTURE, URINE   LIPASE   BLOOD GAS, VENOUS   MICROSCOPIC URINALYSIS       All other labs were within normal range or not returned as of this dictation.     EMERGENCY DEPARTMENT COURSE and DIFFERENTIAL DIAGNOSIS/MDM:   Vitals:    Vitals:    12/22/21 1721 12/22/21 1726 12/22/21 1728 12/22/21 1745   BP: (!) 90/52 (!) 90/53  (!) 94/51   Pulse:  64 64 63   Resp:  19 25 19   SpO2:  94% 95% 96%         MDM  Number of Diagnoses or Management Options  Diagnosis management comments: 80year-old patient presents from rehab with history for brief episode of unresponsiveness without associated seizure activity prior to ED arrival    Patient was most recently discharged from [facility] 1 day prior to ED arrival    Laboratory studies imaging studies electrocardiogram interpretation have been reviewed with the patient as well as the patient's family members       Amount and/or Complexity of Data Reviewed  Decide to obtain previous medical records or to obtain history from someone other than the patient: yes          REASSESSMENT       ED Course as of 12/22/21 1819   Wed Dec 22, 2021   1334 EKG 12 Lead  Performed at 1305-the ventricular rate 79, sinus rhythm first-degree AV block-VA interval 0.228-QRS duration 0.116 QTc corrected 0.472 there is poor R wave progression no ST segment elevation [RS]   1424 XR CHEST PORTABLE [RS]   1815 CT Head WO Contrast  CT of the patient's brain no acute processes radiologist read [RS]   1816 XR CHEST PORTABLE  Chest x-ray no change in multifocal opacities [RS]      ED Course User Index  [RS] Mari Serna MD         CRITICAL CARE TIME   Total Critical Care time was minutes, excluding separately reportable procedures. There was a high probability of clinically significant/life threatening deterioration in the patient's condition which required my urgent intervention. CONSULTS:  IP CONSULT TO CASE MANAGEMENT   Consultation undertaken with adult hospitalist Dr. Raheem Matos:  Unless otherwise noted below, none     Procedures    FINAL IMPRESSION      1. Syncope, unspecified syncope type    2. Abnormal chest x-ray    3. Renal dysfunction    4. Elevated troponin    5. Pyuria          DISPOSITION/PLAN   DISPOSITION Admitted 12/22/2021 05:08:23 PM      PATIENT REFERRED TO:  No follow-up provider specified. DISCHARGE MEDICATIONS:  New Prescriptions    No medications on file     Controlled Substances Monitoring:     No flowsheet data found.     (Please note that portions of this note were completed with a voice recognition program.  Efforts were made to edit the dictations but occasionally words are mis-transcribed.)    Mari Serna MD (electronically signed)  Attending Emergency Physician           Mari Serna MD  12/22/21 [phone]

## 2021-12-22 NOTE — ED NOTES
[place] tube clamped to attempt sterile urine collection from [place] catheter.       Jose Pedroza RN  12/22/21 0819

## 2021-12-23 PROBLEM — N17.9 ACUTE KIDNEY INJURY SUPERIMPOSED ON CKD (HCC): Status: ACTIVE | Noted: 2021-12-07

## 2021-12-23 PROBLEM — N18.9 ACUTE KIDNEY INJURY SUPERIMPOSED ON CKD (HCC): Status: ACTIVE | Noted: 2021-12-07

## 2021-12-23 PROBLEM — I21.A1 TYPE 2 MI (MYOCARDIAL INFARCTION) (HCC): Status: ACTIVE | Noted: 2021-12-23

## 2021-12-23 LAB
CULTURE: NO GROWTH
GLUCOSE BLD-MCNC: 381 MG/DL (ref 74–100)
Lab: NORMAL
SPECIMEN DESCRIPTION: NORMAL
TROPONIN INTERP: ABNORMAL
TROPONIN T: ABNORMAL NG/ML
TROPONIN, HIGH SENSITIVITY: 187 NG/L (ref 0–22)

## 2021-12-23 PROCEDURE — 97161 PT EVAL LOW COMPLEX 20 MIN: CPT

## 2021-12-23 PROCEDURE — 97110 THERAPEUTIC EXERCISES: CPT

## 2021-12-23 PROCEDURE — 94761 N-INVAS EAR/PLS OXIMETRY MLT: CPT

## 2021-12-23 PROCEDURE — 84484 ASSAY OF TROPONIN QUANT: CPT

## 2021-12-23 PROCEDURE — 6370000000 HC RX 637 (ALT 250 FOR IP): Performed by: INTERNAL MEDICINE

## 2021-12-23 PROCEDURE — 2580000003 HC RX 258: Performed by: EMERGENCY MEDICINE

## 2021-12-23 PROCEDURE — 6370000000 HC RX 637 (ALT 250 FOR IP): Performed by: NURSE PRACTITIONER

## 2021-12-23 PROCEDURE — 94640 AIRWAY INHALATION TREATMENT: CPT

## 2021-12-23 PROCEDURE — 97530 THERAPEUTIC ACTIVITIES: CPT

## 2021-12-23 PROCEDURE — 99222 1ST HOSP IP/OBS MODERATE 55: CPT | Performed by: FAMILY MEDICINE

## 2021-12-23 PROCEDURE — 6360000002 HC RX W HCPCS: Performed by: INTERNAL MEDICINE

## 2021-12-23 PROCEDURE — 2700000000 HC OXYGEN THERAPY PER DAY

## 2021-12-23 PROCEDURE — 36415 COLL VENOUS BLD VENIPUNCTURE: CPT

## 2021-12-23 PROCEDURE — 1200000000 HC SEMI PRIVATE

## 2021-12-23 PROCEDURE — 94664 DEMO&/EVAL PT USE INHALER: CPT

## 2021-12-23 PROCEDURE — 2580000003 HC RX 258: Performed by: INTERNAL MEDICINE

## 2021-12-23 PROCEDURE — 82947 ASSAY GLUCOSE BLOOD QUANT: CPT

## 2021-12-23 PROCEDURE — 97166 OT EVAL MOD COMPLEX 45 MIN: CPT

## 2021-12-23 RX ORDER — VITAMIN B COMPLEX
1000 TABLET ORAL DAILY
Status: DISCONTINUED | OUTPATIENT
Start: 2021-12-23 | End: 2021-12-29 | Stop reason: HOSPADM

## 2021-12-23 RX ORDER — POLYETHYLENE GLYCOL 3350 17 G/17G
17 POWDER, FOR SOLUTION ORAL DAILY PRN
Status: DISCONTINUED | OUTPATIENT
Start: 2021-12-23 | End: 2021-12-29 | Stop reason: HOSPADM

## 2021-12-23 RX ORDER — MIDODRINE HYDROCHLORIDE 5 MG/1
10 TABLET ORAL
Status: DISCONTINUED | OUTPATIENT
Start: 2021-12-23 | End: 2021-12-29 | Stop reason: HOSPADM

## 2021-12-23 RX ORDER — ZINC SULFATE 50(220)MG
100 CAPSULE ORAL DAILY
Status: DISCONTINUED | OUTPATIENT
Start: 2021-12-23 | End: 2021-12-29 | Stop reason: HOSPADM

## 2021-12-23 RX ORDER — ACETAMINOPHEN 325 MG/1
650 TABLET ORAL EVERY 6 HOURS PRN
Status: DISCONTINUED | OUTPATIENT
Start: 2021-12-23 | End: 2021-12-29 | Stop reason: HOSPADM

## 2021-12-23 RX ORDER — BUDESONIDE 0.5 MG/2ML
0.5 INHALANT ORAL 2 TIMES DAILY
Status: DISCONTINUED | OUTPATIENT
Start: 2021-12-23 | End: 2021-12-29 | Stop reason: HOSPADM

## 2021-12-23 RX ORDER — ATORVASTATIN CALCIUM 40 MG/1
40 TABLET, FILM COATED ORAL NIGHTLY
Status: DISCONTINUED | OUTPATIENT
Start: 2021-12-23 | End: 2021-12-29 | Stop reason: HOSPADM

## 2021-12-23 RX ORDER — DEXTROSE MONOHYDRATE 50 MG/ML
100 INJECTION, SOLUTION INTRAVENOUS PRN
Status: DISCONTINUED | OUTPATIENT
Start: 2021-12-23 | End: 2021-12-29 | Stop reason: HOSPADM

## 2021-12-23 RX ORDER — SODIUM CHLORIDE 0.9 % (FLUSH) 0.9 %
10 SYRINGE (ML) INJECTION PRN
Status: DISCONTINUED | OUTPATIENT
Start: 2021-12-23 | End: 2021-12-29 | Stop reason: HOSPADM

## 2021-12-23 RX ORDER — CLOPIDOGREL BISULFATE 75 MG/1
75 TABLET ORAL DAILY
Status: DISCONTINUED | OUTPATIENT
Start: 2021-12-23 | End: 2021-12-29 | Stop reason: HOSPADM

## 2021-12-23 RX ORDER — TAMSULOSIN HYDROCHLORIDE 0.4 MG/1
0.4 CAPSULE ORAL 2 TIMES DAILY
Status: DISCONTINUED | OUTPATIENT
Start: 2021-12-23 | End: 2021-12-29 | Stop reason: HOSPADM

## 2021-12-23 RX ORDER — SODIUM CHLORIDE 9 MG/ML
25 INJECTION, SOLUTION INTRAVENOUS PRN
Status: DISCONTINUED | OUTPATIENT
Start: 2021-12-23 | End: 2021-12-29 | Stop reason: HOSPADM

## 2021-12-23 RX ORDER — ACETAMINOPHEN 650 MG/1
650 SUPPOSITORY RECTAL EVERY 8 HOURS PRN
Status: DISCONTINUED | OUTPATIENT
Start: 2021-12-23 | End: 2021-12-29 | Stop reason: HOSPADM

## 2021-12-23 RX ORDER — ACETAMINOPHEN 650 MG/1
650 SUPPOSITORY RECTAL EVERY 6 HOURS PRN
Status: DISCONTINUED | OUTPATIENT
Start: 2021-12-23 | End: 2021-12-29 | Stop reason: HOSPADM

## 2021-12-23 RX ORDER — GLIPIZIDE 5 MG/1
5 TABLET ORAL
Status: DISCONTINUED | OUTPATIENT
Start: 2021-12-23 | End: 2021-12-29 | Stop reason: HOSPADM

## 2021-12-23 RX ORDER — SODIUM CHLORIDE 0.9 % (FLUSH) 0.9 %
5-40 SYRINGE (ML) INJECTION EVERY 12 HOURS SCHEDULED
Status: DISCONTINUED | OUTPATIENT
Start: 2021-12-23 | End: 2021-12-29 | Stop reason: HOSPADM

## 2021-12-23 RX ORDER — ONDANSETRON 4 MG/1
4 TABLET, ORALLY DISINTEGRATING ORAL EVERY 8 HOURS PRN
Status: DISCONTINUED | OUTPATIENT
Start: 2021-12-23 | End: 2021-12-23

## 2021-12-23 RX ORDER — SODIUM BICARBONATE 650 MG/1
650 TABLET ORAL 2 TIMES DAILY
Status: DISCONTINUED | OUTPATIENT
Start: 2021-12-23 | End: 2021-12-25

## 2021-12-23 RX ORDER — CEPHALEXIN 500 MG/1
500 CAPSULE ORAL 3 TIMES DAILY
Status: DISCONTINUED | OUTPATIENT
Start: 2021-12-23 | End: 2021-12-24

## 2021-12-23 RX ORDER — ALBUTEROL SULFATE 2.5 MG/3ML
2.5 SOLUTION RESPIRATORY (INHALATION) EVERY 4 HOURS PRN
Status: DISCONTINUED | OUTPATIENT
Start: 2021-12-23 | End: 2021-12-29 | Stop reason: HOSPADM

## 2021-12-23 RX ORDER — POLYETHYLENE GLYCOL 3350 17 G/17G
17 POWDER, FOR SOLUTION ORAL DAILY
Status: DISCONTINUED | OUTPATIENT
Start: 2021-12-23 | End: 2021-12-29 | Stop reason: HOSPADM

## 2021-12-23 RX ORDER — NICOTINE POLACRILEX 4 MG
15 LOZENGE BUCCAL PRN
Status: DISCONTINUED | OUTPATIENT
Start: 2021-12-23 | End: 2021-12-29 | Stop reason: HOSPADM

## 2021-12-23 RX ORDER — DOXYCYCLINE HYCLATE 100 MG/1
100 CAPSULE ORAL 2 TIMES DAILY
Status: DISCONTINUED | OUTPATIENT
Start: 2021-12-23 | End: 2021-12-29 | Stop reason: HOSPADM

## 2021-12-23 RX ORDER — DEXTROSE MONOHYDRATE 25 G/50ML
12.5 INJECTION, SOLUTION INTRAVENOUS PRN
Status: DISCONTINUED | OUTPATIENT
Start: 2021-12-23 | End: 2021-12-29 | Stop reason: HOSPADM

## 2021-12-23 RX ORDER — ZOLPIDEM TARTRATE 5 MG/1
5 TABLET ORAL NIGHTLY PRN
Status: DISCONTINUED | OUTPATIENT
Start: 2021-12-23 | End: 2021-12-24

## 2021-12-23 RX ORDER — FAMOTIDINE 10 MG
10 TABLET ORAL NIGHTLY
Status: DISCONTINUED | OUTPATIENT
Start: 2021-12-23 | End: 2021-12-29 | Stop reason: HOSPADM

## 2021-12-23 RX ORDER — METOPROLOL SUCCINATE 25 MG/1
25 TABLET, EXTENDED RELEASE ORAL DAILY
Status: DISCONTINUED | OUTPATIENT
Start: 2021-12-23 | End: 2021-12-29 | Stop reason: HOSPADM

## 2021-12-23 RX ORDER — BENZONATATE 100 MG/1
100 CAPSULE ORAL 3 TIMES DAILY PRN
Status: DISCONTINUED | OUTPATIENT
Start: 2021-12-23 | End: 2021-12-29 | Stop reason: HOSPADM

## 2021-12-23 RX ORDER — ASCORBIC ACID 500 MG
500 TABLET ORAL 2 TIMES DAILY
Status: DISCONTINUED | OUTPATIENT
Start: 2021-12-23 | End: 2021-12-29 | Stop reason: HOSPADM

## 2021-12-23 RX ORDER — FUROSEMIDE 40 MG/1
40 TABLET ORAL DAILY
Status: DISCONTINUED | OUTPATIENT
Start: 2021-12-23 | End: 2021-12-25

## 2021-12-23 RX ORDER — ONDANSETRON 2 MG/ML
4 INJECTION INTRAMUSCULAR; INTRAVENOUS EVERY 6 HOURS PRN
Status: DISCONTINUED | OUTPATIENT
Start: 2021-12-23 | End: 2021-12-23

## 2021-12-23 RX ORDER — AMIODARONE HYDROCHLORIDE 200 MG/1
200 TABLET ORAL 2 TIMES DAILY
Status: DISCONTINUED | OUTPATIENT
Start: 2021-12-23 | End: 2021-12-29 | Stop reason: HOSPADM

## 2021-12-23 RX ORDER — IPRATROPIUM BROMIDE AND ALBUTEROL SULFATE 2.5; .5 MG/3ML; MG/3ML
3 SOLUTION RESPIRATORY (INHALATION) 4 TIMES DAILY
Status: DISCONTINUED | OUTPATIENT
Start: 2021-12-23 | End: 2021-12-29 | Stop reason: HOSPADM

## 2021-12-23 RX ORDER — TRAZODONE HYDROCHLORIDE 50 MG/1
50 TABLET ORAL NIGHTLY
Status: DISCONTINUED | OUTPATIENT
Start: 2021-12-23 | End: 2021-12-29 | Stop reason: HOSPADM

## 2021-12-23 RX ORDER — ASPIRIN 81 MG/1
81 TABLET ORAL NIGHTLY
Status: DISCONTINUED | OUTPATIENT
Start: 2021-12-23 | End: 2021-12-29 | Stop reason: HOSPADM

## 2021-12-23 RX ADMIN — TAMSULOSIN HYDROCHLORIDE 0.4 MG: 0.4 CAPSULE ORAL at 09:08

## 2021-12-23 RX ADMIN — ATORVASTATIN CALCIUM 40 MG: 40 TABLET, FILM COATED ORAL at 21:42

## 2021-12-23 RX ADMIN — AMIODARONE HYDROCHLORIDE 200 MG: 200 TABLET ORAL at 21:42

## 2021-12-23 RX ADMIN — MIDODRINE HYDROCHLORIDE 10 MG: 5 TABLET ORAL at 09:08

## 2021-12-23 RX ADMIN — ZINC SULFATE 220 MG (50 MG) CAPSULE 100 MG: CAPSULE at 09:08

## 2021-12-23 RX ADMIN — OXYCODONE HYDROCHLORIDE AND ACETAMINOPHEN 500 MG: 500 TABLET ORAL at 21:42

## 2021-12-23 RX ADMIN — METOPROLOL SUCCINATE 25 MG: 25 TABLET, EXTENDED RELEASE ORAL at 09:08

## 2021-12-23 RX ADMIN — TAMSULOSIN HYDROCHLORIDE 0.4 MG: 0.4 CAPSULE ORAL at 17:24

## 2021-12-23 RX ADMIN — FUROSEMIDE 40 MG: 40 TABLET ORAL at 09:08

## 2021-12-23 RX ADMIN — APIXABAN 2.5 MG: 2.5 TABLET, FILM COATED ORAL at 21:42

## 2021-12-23 RX ADMIN — CEPHALEXIN 500 MG: 500 CAPSULE ORAL at 13:23

## 2021-12-23 RX ADMIN — GLIPIZIDE 5 MG: 5 TABLET ORAL at 17:23

## 2021-12-23 RX ADMIN — ASPIRIN 81 MG: 81 TABLET, COATED ORAL at 21:42

## 2021-12-23 RX ADMIN — BUDESONIDE 500 MCG: 0.5 SUSPENSION RESPIRATORY (INHALATION) at 20:05

## 2021-12-23 RX ADMIN — CLOPIDOGREL BISULFATE 75 MG: 75 TABLET ORAL at 09:08

## 2021-12-23 RX ADMIN — OXYCODONE HYDROCHLORIDE AND ACETAMINOPHEN 500 MG: 500 TABLET ORAL at 09:08

## 2021-12-23 RX ADMIN — IPRATROPIUM BROMIDE AND ALBUTEROL SULFATE 3 ML: .5; 3 SOLUTION RESPIRATORY (INHALATION) at 11:13

## 2021-12-23 RX ADMIN — DOXYCYCLINE HYCLATE 100 MG: 100 CAPSULE ORAL at 09:08

## 2021-12-23 RX ADMIN — CEPHALEXIN 500 MG: 500 CAPSULE ORAL at 21:42

## 2021-12-23 RX ADMIN — IPRATROPIUM BROMIDE AND ALBUTEROL SULFATE 3 ML: .5; 3 SOLUTION RESPIRATORY (INHALATION) at 07:50

## 2021-12-23 RX ADMIN — AMIODARONE HYDROCHLORIDE 200 MG: 200 TABLET ORAL at 09:14

## 2021-12-23 RX ADMIN — TRAZODONE HYDROCHLORIDE 50 MG: 50 TABLET ORAL at 21:42

## 2021-12-23 RX ADMIN — SODIUM CHLORIDE 1000 ML: 9 INJECTION, SOLUTION INTRAVENOUS at 09:10

## 2021-12-23 RX ADMIN — FAMOTIDINE 10 MG: 10 TABLET ORAL at 21:42

## 2021-12-23 RX ADMIN — DOXYCYCLINE HYCLATE 100 MG: 100 CAPSULE ORAL at 21:42

## 2021-12-23 RX ADMIN — SODIUM BICARBONATE TAB 650 MG 650 MG: 650 TAB at 21:42

## 2021-12-23 RX ADMIN — BUDESONIDE 500 MCG: 0.5 SUSPENSION RESPIRATORY (INHALATION) at 07:50

## 2021-12-23 RX ADMIN — APIXABAN 2.5 MG: 2.5 TABLET, FILM COATED ORAL at 09:09

## 2021-12-23 RX ADMIN — SODIUM CHLORIDE, PRESERVATIVE FREE 5 ML: 5 INJECTION INTRAVENOUS at 21:43

## 2021-12-23 RX ADMIN — MIDODRINE HYDROCHLORIDE 10 MG: 5 TABLET ORAL at 17:24

## 2021-12-23 RX ADMIN — IPRATROPIUM BROMIDE AND ALBUTEROL SULFATE 3 ML: .5; 3 SOLUTION RESPIRATORY (INHALATION) at 20:04

## 2021-12-23 RX ADMIN — Medication 1000 UNITS: at 09:08

## 2021-12-23 RX ADMIN — SODIUM BICARBONATE TAB 650 MG 650 MG: 650 TAB at 09:08

## 2021-12-23 RX ADMIN — POLYETHYLENE GLYCOL (3350) 17 G: 17 POWDER, FOR SOLUTION ORAL at 09:09

## 2021-12-23 RX ADMIN — MIDODRINE HYDROCHLORIDE 10 MG: 5 TABLET ORAL at 12:35

## 2021-12-23 RX ADMIN — INSULIN LISPRO 5 UNITS: 100 INJECTION, SOLUTION INTRAVENOUS; SUBCUTANEOUS at 22:01

## 2021-12-23 RX ADMIN — IPRATROPIUM BROMIDE AND ALBUTEROL SULFATE 3 ML: .5; 3 SOLUTION RESPIRATORY (INHALATION) at 16:02

## 2021-12-23 RX ADMIN — CEPHALEXIN 500 MG: 500 CAPSULE ORAL at 09:08

## 2021-12-23 RX ADMIN — GLIPIZIDE 5 MG: 5 TABLET ORAL at 09:14

## 2021-12-23 ASSESSMENT — PAIN SCALES - GENERAL
PAINLEVEL_OUTOF10: 0
PAINLEVEL_OUTOF10: 0

## 2021-12-23 NOTE — DISCHARGE INSTR - COC
Continuity of Care Form    Patient Name: Katy Underwood   :  10/31/1930  MRN:  [id number]    Admit date:  2021  Discharge date:  2021    Code Status Order: DNR-CCA   Advance Directives:      Admitting Physician:  Melissa Reese MD  PCP: Christos Méndez DO    Discharging Nurse: [facility] Unit/Room#: 7201/1882-52  Discharging Unit Phone Number: [phone]    Emergency Contact:   Extended Emergency Contact Information  Primary Emergency Contact: Anila Loya  Address: [address] [place] [address], [address] [place] [address] Phone: [phone]  Relation: Spouse  Other needs: None  Preferred language: English   needed? No  Secondary Emergency Contact: [facility] [address] Phone: [phone]  Relation: Child   needed?  No    Past Surgical History:  Past Surgical History:   Procedure Laterality Date    CATARACT REMOVAL WITH IMPLANT Bilateral  and 12/3/2013    CORONARY ARTERY BYPASS GRAFT  2000    INGUINAL HERNIA REPAIR Bilateral 12       Immunization History:   Immunization History   Administered Date(s) Administered    COVID-19, Matthew Sow, Primary or Immunocompromised, PF, 100mcg/0.5mL 2021, 2021, 2021    COVID-19, Russ Wahl, PF, 30mcg/0.3mL 2021       Active Problems:  Patient Active Problem List   Diagnosis Code    Right inguinal hernia K40.90    Cerebrovascular accident (CVA) ([address] I63.9    Hypertension I10    Type 2 diabetes mellitus without complication, without long-term current use of insulin ([address] E11.9    Cerebral infarction due to embolism of right middle cerebral artery ([address] I63.411    Left leg weakness R29.898    Falling R29.6    Stroke, lacunar (HCC) I63.81    COVID-19 virus infection / Matthew Sow Vaccine U07.1    Acute and chronic respiratory failure with hypoxia (HCC) J96.21    ASHD (arteriosclerotic heart disease) I25.10    Paroxysmal atrial flutter (HCC) I48.92 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    Mild malnutrition (HCC) E44.1    Urinary retention R33.9    Hyperkalemia E87.5    Hyperglycemia R73.9    Acute kidney injury superimposed on CKD ([facility] N17.9, N18.9    Acute cystitis without hematuria N30.00    Elevated troponin R77.8    On amiodarone therapy Z79.899    Microcytic hypochromic anemia D50.9    Community acquired bacterial pneumonia J15.9    Moderate malnutrition ([facility] E44.0    Acute alteration in mental status R41.82       Isolation/Infection:   Isolation            No Isolation          Patient Infection Status       Infection Onset Added Last Indicated Last Indicated By Review Planned Expiration Resolved Resolved By    None active    Resolved    COVID-19 (Rule Out) 12/16/21 12/16/21 12/16/21 Respiratory Panel, Molecular, with COVID-19 (Restricted: peds pts or suitable admitted adults) (Ordered)   12/17/21 Rule-Out Test Resulted    COVID-19 (Rule Out) 12/16/21 12/16/21 12/16/21 COVID-19, Rapid (Ordered)   12/16/21 Rule-Out Test Resulted    COVID-19 11/12/21 11/15/21 11/15/21 COVID-19, Rapid   11/24/21 Mauro Rutledge RN    S/S 11/12. Resolved per provider with system guidance. Nurse Assessment:  Last Vital Signs: BP (!) 94/44   Pulse 82   Temp 97.7 °F (36.5 °C) (Temporal)   Resp 22   Ht 5' 4\" (1.626 m)   Wt 169 lb (76.7 kg)   SpO2 95%   BMI 29.01 kg/m²     Last documented pain score (0-10 scale): Pain Level: 0  Last Weight:   Wt Readings from Last 1 Encounters:   12/23/21 169 lb (76.7 kg)     Mental Status:  oriented and alert    IV Access:  - None    Nursing Mobility/ADLs:  Walking   Dependent  Transfer  Assisted  Bathing  Assisted  Dressing  Dependent  Toileting  Dependent  Feeding  Independent  Med Admin  Assisted  Med Delivery   whole    Wound Care Documentation and Therapy:  Wound 12/06/21 Coccyx Medial (Active)   Number of days: 16        Elimination:  Continence:    Bowel: Yes  Bladder: Robertson in place  Urinary Catheter: Indication for Use of Catheter: Acute urinary retention/obstruction   Colostomy/Ileostomy/Ileal Conduit: No       Date of Last BM: 12/26/2021    Intake/Output Summary (Last 24 hours) at 12/23/2021 1655  Last data filed at 12/23/2021 1323  Gross per 24 hour   Intake 701 ml   Output 1350 ml   Net -649 ml     I/O last 3 completed shifts: In: 351 [P.O.:240; I.V.:461]  Out: 1350 [Urine:1350]    Safety Concerns:     History of Falls (last 30 days) and At Risk for Falls    Impairments/Disabilities:      Vision and Hearing    Nutrition Therapy:  Current Nutrition Therapy:   - Oral Diet:  Carb Control 3 carbs/meal (1500kcals/day) and Low Sodium (2gm)    Routes of Feeding: Oral  Liquids: No Restrictions  Daily Fluid Restriction: no  Last Modified Barium Swallow with Video (Video Swallowing Test): not done    Treatments at the Time of Hospital Discharge:   Respiratory Treatments: See [facility]  Oxygen Therapy:  is on oxygen at 3 L/min per nasal cannula.   Ventilator:    - No ventilator support    Rehab Therapies: Physical Therapy and Occupational Therapy  Weight Bearing Status/Restrictions: No weight bearing restirctions  Other Medical Equipment (for information only, NOT a DME order):  wheelchair, walker, bedside commode, and hospital bed  Other Treatments: None    Patient's personal belongings (please select all that are sent with patient):  Glasses, blankets x2    RN SIGNATURE:  Electronically signed by Bonita Villatoro RN on 12/29/21 at 12:47 PM EST    CASE MANAGEMENT/SOCIAL WORK SECTION    Inpatient Status Date: 12/22/2021    Readmission Risk Assessment Score:  Readmission Risk              Risk of Unplanned Readmission:  37           Discharging to Facility/ Agency   Name: [facility]  Address: [address] [place] [address]  Phone: [phone]  Fax: [phone]    Dialysis Facility (if applicable)   Name:  Address:  Dialysis Schedule:  Phone:  Fax:    /Social Worker signature: Electronically signed by Martin Greene on 12/23/21 at 4:56 PM EST    PHYSICIAN SECTION    Prognosis: Poor    Condition at Discharge: Terminal    Rehab Potential (if transferring to Rehab): Poor    Recommended Labs or Other Treatments After Discharge: Hospice    Physician Certification: I certify the above information and transfer of Best Frazier  is necessary for the continuing treatment of the diagnosis listed and that he requires Hospice for greater 30 days.      Update Admission H&P: No change in H&P    PHYSICIAN SIGNATURE:  Electronically signed by FRANKLIN Hernandez CNP on 12/29/21 at 12:32 PM EST

## 2021-12-23 NOTE — FLOWSHEET NOTE
PT/OT in room to get patient up to the chair at bedside. States patient \"passed  out\"  briefly after in the chair but came around quickly. Vitals checked. Patient alert and oriented x 3. Personal alarm on for safety. Call light within reach. Continue to monitor.

## 2021-12-23 NOTE — PROGRESS NOTES
Physical Therapy    Facility/Department: [facility] MED SURG  Initial Assessment    NAME: Tyler Osborne  : 10/31/1930  MRN: [id number]    Date of Service: 2021    Discharge Recommendations:  Continue to assess pending progress,Subacute/Skilled Nursing Facility,IP Rehab        Assessment   Assessment: Patient is 80year old male with dx of pyuria who presents with decreased B LE strength, decreased functional mobility and endurance and decreased safety and balance during transfers and ambulation and would benefit from physical therapy to address all concerns and safely return to [facility]. Treatment Diagnosis: General weakness  Prognosis: Good  Decision Making: Medium Complexity  REQUIRES PT FOLLOW UP: Yes  Activity Tolerance  Activity Tolerance: Patient Tolerated treatment well;Patient limited by endurance; Patient limited by fatigue;Treatment limited secondary to medical complications (free text)  Activity Tolerance: Orthostatic hypotension noted during transfer BP: 82/42; RN notified       Patient Diagnosis(es): The primary encounter diagnosis was Syncope, unspecified syncope type. Diagnoses of Abnormal chest x-ray, Renal dysfunction, Elevated troponin, and Pyuria were also pertinent to this visit. has a past medical history of Acute and chronic respiratory failure with hypoxia ([address] CAD (coronary artery disease), Cataracts, bilateral, Elevated troponin, Glaucoma, Hyperlipidemia, Hypertension, TIA (transient ischemic attack), and Type II or unspecified type diabetes mellitus without mention of complication, not stated as uncontrolled. has a past surgical history that includes Coronary artery bypass graft (); Cataract removal with implant (Bilateral,  and 12/3/2013); and Inguinal hernia repair (Bilateral, 12).     Restrictions  Restrictions/Precautions  Restrictions/Precautions: General Precautions,Fall Risk  Vision/Hearing  Vision: Impaired  Vision Exceptions: Wears glasses at all times  Hearing: Within functional limits     Subjective  General  Chart Reviewed: Yes  Patient assessed for rehabilitation services?: Yes  Response To Previous Treatment: Not applicable  Family / Caregiver Present: No  Referring Practitioner: Dr. Candelario Chavez MD  Referral Date : 12/23/21  Diagnosis: Pyuria, R82.81  Follows Commands: Within Functional Limits  Subjective  Subjective: Patient denies pain and agrees to PT eval.  Pain Screening  Patient Currently in Pain: Denies          Orientation  Orientation  Overall Orientation Status: Within Functional Limits  Social/Functional History  Social/Functional History  Lives With: Spouse,Son  Type of Home: House  Home Layout: Two level  Home Access: Stairs to enter with rails  Entrance Stairs - Number of Steps: patient reports 20 steps to go upstairs  Entrance Stairs - Rails: Both  Bathroom Shower/Tub: Tub/Shower unit,Walk-in shower  Bathroom Toilet: Handicap height  Bathroom Equipment: Shower chair  Home Equipment: 4 wheeled walker,Rolling walker,Cane  ADL Assistance: Needs assistance  Homemaking Assistance: Needs assistance  Ambulation Assistance: Needs assistance  Transfer Assistance: Needs assistance  Additional Comments: Patient returning to hospital from [address] [place] [address]        Objective          AROM RLE (degrees)  RLE AROM: WFL  AROM LLE (degrees)  LLE AROM : WFL  Strength RLE  Comment: 3+/5 grossly  Strength LLE  Comment: 3+/5 grossly        Bed mobility  Supine to Sit: Moderate assistance;2 Person assistance  Scooting: Moderate assistance;2 Person assistance  Comment: Vc's for hand placement and safe technique  Transfers  Sit to Stand: Moderate Assistance;2 Person Assistance  Stand to sit: Moderate Assistance;2 Person Assistance  Stand Pivot Transfers: Moderate Assistance;2 Person Assistance  Comment: Pt required vc's for hand placement but was able to support body weight with legs during transfer from bed to chair.   Ambulation  Ambulation?: No     Balance  Sitting - Static: Fair;-  Sitting - Dynamic: Poor;+  Standing - Static: Poor  Standing - Dynamic: Poor        Plan   Plan  Times per week: 7  Times per day: Twice a day (daily on weekends)  Current Treatment Recommendations: Strengthening,Neuromuscular Re-education,Home Exercise Program,ROM,Safety Education & Richard Friday Training,Patient/Caregiver Education & Training,Functional Mobility Training,Transfer Training,Gait Training  Safety Devices  Type of devices: All fall risk precautions in place,Left in chair,Call light within reach,Chair alarm in place,Nurse notified,Gait belt,Patient at risk for falls    Goals  Short term goals  Time Frame for Short term goals: 20 days  Short term goal 1: Patient to complete sit/stand and stand pivot transfers with CG/minAx1 and no LOB to decrease fall risk. Short term goal 2: Patient to ambulate 20ft with minAx1 and no LOB to decrease fall risk and improve mobility. Short term goal 3: Patient to tolerate 20-30 min of ther ex/act to improve functional strength. Short term goal 4: Patient to have good static sitting balance to decrease fall risk.        Therapy Time   Individual Concurrent Group Co-treatment   Time In 1001         Time Out 1025         Minutes 24         Timed Code Treatment Minutes: [address], PT, DPT

## 2021-12-23 NOTE — FLOWSHEET NOTE
On BSC with x2 PT staff. Had a large BM and to bed. PT states patient stared once on edge of bed, no verbal response. Put to bed and HOB up. After about 3 minutes patient began talking. Vitals checked. Bed alarm on for safety.

## 2021-12-23 NOTE — PROGRESS NOTES
JACK met with pt, spouse, and son to complete assessment and discuss discharge plans. Pt is alert and oriented and pleasant. Pt is a 80year old  male admitted for acute AMS. Pt typically lives with his spouse in their home in [place]. Pt has been in hospital and rehab since November 15th. Pt has a walker, 4 prong cane, and shower chair at home to use. [facility] and pt plan to return there. Pt was driving prior to illness but family will assist if needed. Pt is a DNR CCA code and follows with Dr Micky Amin as PCP. Pt has advance directives and copy was requested for medical record. Pt's spouse and dtr Kranthi are decision makers. Pt reports that medicines are covered well by insurance. Pt plans to return to [facility] at discharge. [facility] and [facility]. Referral faxed back over to [facility]. Family reports that they would like him to return there first and then they are working with [facility] for placement after [address] [place] [address] Road. Family reports that pt's belongings are in his room at [address] [place] [address] Road. SW will follow and plan for return to [facility] if they will accept.  Shanna BOYLE 12/23/2021

## 2021-12-23 NOTE — FLOWSHEET NOTE
4 family members in room. Update on code status given. Voices understanding. Informed of only 2 family members limit to visit. Voices understanding.

## 2021-12-23 NOTE — PROGRESS NOTES
Occupational Therapy   Occupational Therapy Initial Assessment  Date: 2021   Patient Name: Jose Soler  MRN: [id number]     : 10/31/1930    Date of Service: 2021    Discharge Recommendations:  ECF with OT,Subacute/Skilled Nursing Facility       Assessment   Performance deficits / Impairments: Decreased functional mobility ; Decreased ADL status; Decreased strength;Decreased endurance  Assessment: Patient is a 81 y/o male admitted due to Pyuria; he presents with deconditioning and decreased strength/endurance, limiting ADL participation. Patient to benefit from OT services in order to address these deficits. Treatment Diagnosis: M62.81-Generalized Weakness  Prognosis: Fair  Decision Making: Medium Complexity  OT Education: OT Role;Plan of Care  Barriers to Learning: none  REQUIRES OT FOLLOW UP: Yes  Activity Tolerance  Activity Tolerance: Patient limited by fatigue;Treatment limited secondary to medical complications (free text)  Activity Tolerance: Noted orthostatic hypotensive episode following transfer from bed to bedside chair. Safety Devices  Safety Devices in place: Yes  Type of devices: Left in chair;Nurse notified; Chair alarm in place;Call light within reach           Patient Diagnosis(es): The primary encounter diagnosis was Syncope, unspecified syncope type. Diagnoses of Abnormal chest x-ray, Renal dysfunction, Elevated troponin, and Pyuria were also pertinent to this visit. has a past medical history of Acute and chronic respiratory failure with hypoxia ([address] CAD (coronary artery disease), Cataracts, bilateral, Elevated troponin, Glaucoma, Hyperlipidemia, Hypertension, TIA (transient ischemic attack), and Type II or unspecified type diabetes mellitus without mention of complication, not stated as uncontrolled. has a past surgical history that includes Coronary artery bypass graft ();  Cataract removal with implant (Bilateral,  and 12/3/2013); and Inguinal hernia repair (Bilateral, 06/19/12). Treatment Diagnosis: M62.81-Generalized Weakness      Restrictions  Restrictions/Precautions  Restrictions/Precautions: General Precautions,Fall Risk    Subjective   General  Chart Reviewed: Yes  Patient assessed for rehabilitation services?: Yes  Family / Caregiver Present: No  Referring Practitioner: Dr. Christos Roberts  Diagnosis: Pyuria  Subjective  Subjective: Patient denies pain, reports he does not recall what happened the other day when at [facility], but he was told of what happened. Following transfer patient noted to have a brief episode of passing out and slight confusion; BP taken in left arm following transfer at 82/42; RN informed. After a couple of minutes patient demo'd A+0 X3  Vital Signs  Pulse: 83  BP: 105/67  BP Location: Right upper arm  MAP (mmHg): 79  Oxygen Therapy  SpO2: 93 %  Pulse Oximeter Device Mode: Continuous  Pulse Oximeter Device Location: Finger  O2 Device: Nasal cannula  Skin Assessment: Clean, dry, & intact  O2 Flow Rate (L/min): 5 L/min  Social/Functional History  Social/Functional History  Lives With: Spouse,Son  Type of Home: House  Home Layout: Two level  Home Access: Stairs to enter with rails  Entrance Stairs - Number of Steps: patient reports 20 steps to go upstairs  Entrance Stairs - Rails: Both  Bathroom Shower/Tub: Tub/Shower unit,Walk-in shower  Bathroom Toilet: Handicap height  Bathroom Equipment: Shower chair  Home Equipment: 4 wheeled walker,Rolling walker,Cane  ADL Assistance: Needs assistance  Homemaking Assistance: Needs assistance  Ambulation Assistance: Needs assistance  Transfer Assistance: Needs assistance  Additional Comments: Patient returning to hospital from [facility]       Objective   Vision: Impaired  Vision Exceptions: Wears glasses at all times  Hearing: Within functional limits    Orientation  Overall Orientation Status: Within Functional Limits        ADL  Feeding: Setup;Modified independent   Grooming: Minimal assistance  UE Bathing:  Moderate assistance  LE Bathing: Dependent/Total  UE Dressing: Moderate assistance  LE Dressing: Dependent/Total  Toileting: Maximum assistance           Transfers  Stand Pivot Transfers: Moderate assistance;2 Person assistance  Transfer Comments: PT reports mod A X2 for transfers                       LUE AROM (degrees)  LUE AROM : WFL  Left Hand AROM (degrees)  Left Hand AROM: WFL  RUE AROM (degrees)  RUE AROM : WFL  Right Hand AROM (degrees)  Right Hand AROM: WFL  LUE Strength  L Hand General: 3+/5  RUE Strength  R Hand General: 3+/5                   Plan   Plan  Times per week: 7  Times per day: Daily  Current Treatment Recommendations: Strengthening,Safety Education & Training,Self-Care / ADL,Functional Mobility Training,Endurance Training      AM-PAC Score        AM-[facility] Inpatient Daily Activity Raw Score: 15 (12/23/21 1113)  AM-PAC Inpatient ADL T-Scale Score : 34.69 (12/23/21 1113)  ADL Inpatient CMS 0-100% Score: 56.46 (12/23/21 1113)  ADL Inpatient CMS G-Code Modifier : CK (12/23/21 1113)    Goals  Short term goals  Time Frame for Short term goals: 21 visits  Short term goal 1: Patient to tolerate 10 minutes ther-ex/ther-act to increase strength/endurance for ADLs maintaining SPO2 above 90%  Short term goal 2: Patient to perform functional transfers with min A X2. Short term goal 3: Patient to complete ADL grooming with setup. Short term goal 4: Patient to complete UB bathing/dressing wtih min A.        Therapy Time   Individual Concurrent Group Co-treatment   Time In 1001         Time Out 1025         Minutes [address] OTR/L

## 2021-12-23 NOTE — PROGRESS NOTES
Comprehensive Nutrition Assessment    Type and Reason for Visit:  Initial (missing nutrition screenings)    Nutrition Recommendations/Plan:  Encourage oral intakes    Nutrition Assessment:  Moderate malnutrition r/t altered respiratory status, AEB low PO reported pta and mild muscle lossses. Recent acute hospitalization and currently at rehab. + emesis yesterday, but feeeling fine currently. Worsening renal indices. Aware of salt avoidance in diet, as discussed last hospitalization. Will add Glucerna. Malnutrition Assessment:  Malnutrition Status: Moderate malnutrition    Context:  Acute Illness     Findings of the 6 clinical characteristics of malnutrition:  Energy Intake:  1 - 75% or less of estimated energy requirements for 7 or more days (per interview)  Weight Loss:  No significant weight loss     Body Fat Loss:  No significant body fat loss     Muscle Mass Loss:  1 - Mild muscle mass loss Temples (temporalis)  Fluid Accumulation:  7 - Moderate to Severe Extremities   Strength:  Not Performed    Estimated Daily Nutrient Needs:  Energy (kcal):  1374-4240 (18-22); Weight Used for Energy Requirements:  Current     Protein (g):  71-83 (1.2-1.4); Weight Used for Protein Requirements:  Ideal        Fluid (ml/day):  1700; Method Used for Fluid Requirements:  1 ml/kcal      Nutrition Related Findings:  lower lean body mass, 2+ BLE, trace BUE edema, active b/s      Wounds:  Stage II (coccyx last admission)       Current Nutrition Therapies:    ADULT DIET; Regular; 3 carb choices (45 gm/meal)  ADULT ORAL NUTRITION SUPPLEMENT; Breakfast, Lunch, Dinner; Diabetic Oral Supplement    Anthropometric Measures:  · Height: 5' 4\" (162.6 cm)  · Current Body Weight: 169 lb (76.7 kg)   · Admission Body Weight: 169 lb (76.7 kg)    · Usual Body Weight: 170 lb (77.1 kg)     · Ideal Body Weight: 130 lbs; % Ideal Body Weight 130 %   · BMI: 29  · Adjusted Body Weight:  ; No Adjustment   · BMI Categories: Overweight (BMI 25.0-29. 9)

## 2021-12-23 NOTE — PROGRESS NOTES
Physical Therapy  Facility/Department: [facility] AT THE [facility] MED SURG  Daily Treatment Note  NAME: Jose Swift  : 10/31/1930  MRN: [id number]    Date of Service: 2021    Discharge Recommendations:  Continue to assess pending progress,Subacute/Skilled Nursing Facility,IP Rehab        Assessment   Treatment Diagnosis: General weakness  Prognosis: Good  Decision Making: Medium Complexity  REQUIRES PT FOLLOW UP: Yes  Activity Tolerance  Activity Tolerance: Patient Tolerated treatment well;Patient limited by endurance; Patient limited by fatigue;Treatment limited secondary to medical complications (free text)  Activity Tolerance: Pt. began to have slurred speech once seated EOB and blank stare, RN notfied and vitals were taken. Patient Diagnosis(es): The primary encounter diagnosis was Syncope, unspecified syncope type. Diagnoses of Abnormal chest x-ray, Renal dysfunction, Elevated troponin, and Pyuria were also pertinent to this visit. has a past medical history of Acute and chronic respiratory failure with hypoxia ([address] CAD (coronary artery disease), Cataracts, bilateral, Elevated troponin, Glaucoma, Hyperlipidemia, Hypertension, TIA (transient ischemic attack), and Type II or unspecified type diabetes mellitus without mention of complication, not stated as uncontrolled. has a past surgical history that includes Coronary artery bypass graft (); Cataract removal with implant (Bilateral,  and 12/3/2013); and Inguinal hernia repair (Bilateral, 12). Restrictions  Restrictions/Precautions  Restrictions/Precautions: General Precautions,Fall Risk  Subjective   General  Chart Reviewed: Yes  Response To Previous Treatment: Patient with no complaints from previous session. Family / Caregiver Present: No  Referring Practitioner: Dr. Constance Aj MD  Subjective  Subjective: Pt. denies pain and reports having to use commode,pt. agreeable to use BSC.   Pain Screening  Patient Currently in Pain: Denies  Vital Signs  Patient Currently in Pain: Denies       Orientation  Orientation  Overall Orientation Status: Within Functional Limits  Cognition      Objective   Bed mobility  Rolling to Left: Maximum assistance;2 Person assistance  Rolling to Right: Maximum assistance;2 Person assistance  Sit to Supine: Maximum assistance;2 Person assistance  Scooting: Maximal assistance;2 Person assistance  Transfers  Sit to Stand: Moderate Assistance;2 Person Assistance;Maximum Assistance  Stand to sit: Moderate Assistance;2 Person Assistance;Maximum Assistance  Stand Pivot Transfers: Moderate Assistance;2 Person Assistance;Maximum Assistance  Ambulation  Ambulation?: Yes  Ambulation 1  Surface: level tile  Device: Hand-Held Assist  Assistance: Moderate assistance;2 Person assistance;Maximum assistance  Gait Deviations: Shuffles; Slow Nayeli;Decreased step length;Decreased step height  Distance: 3ftx1  Comment: BSC use and transfers     Goals  Short term goals  Time Frame for Short term goals: 20 days  Short term goal 1: Patient to complete sit/stand and stand pivot transfers with CG/minAx1 and no LOB to decrease fall risk. Short term goal 2: Patient to ambulate 20ft with minAx1 and no LOB to decrease fall risk and improve mobility. Short term goal 3: Patient to tolerate 20-30 min of ther ex/act to improve functional strength. Short term goal 4: Patient to have good static sitting balance to decrease fall risk. Plan    Plan  Times per week: 7  Times per day: Twice a day  Current Treatment Recommendations: Strengthening,Neuromuscular Re-education,Home Exercise Program,ROM,Safety Education & Cheryl Job Training,Patient/Caregiver Education & Training,Functional Mobility Training,Transfer Training,Gait Training  Safety Devices  Type of devices:  All fall risk precautions in place,Call light within reach,Nurse notified,Gait belt,Patient at risk for falls,Bed alarm in place,Left in bed     Therapy Time Individual Concurrent Group Co-treatment   Time In 5261         Time Out 1315         Minutes 30         Timed Code Treatment Minutes: [facility], PTA

## 2021-12-23 NOTE — H&P
History and Physical    Patient:  Merry Schaefer  MRN: [id number]    Chief Complaint: Altered mental status    History Obtained From:  patient, electronic medical record    PCP: Alisa Colby DO    History of Present Illness: The patient is a 80 y.o. male who [facility] to unresponsiveness with sternal rub. Episode was brief. Patient was discharged from [facility] day prior to rehab. Upon arrival and during his evaluation patient reported no concerns. Patient did state he did have 1 emesis. He denied chest pain. Patient was found to be hypoxic at 76%. CT of his head was negative. Chest x-ray showed no change in multifocal infiltrates with effusions. Troponin was elevated at 201. Patient does have stage IV CKD and BUN and creatinine were 83 and 3.67. Patient was hospitalized 12/16/2021 through 12/21/2021 for community-acquired bacterial pneumonia. During that admission patient was placed on IV Rocephin and Zithromax and due to UTI patient was changed to IV Rocephin as well as doxycycline. Patient does have retention and continues with Robertson catheter as well as Flomax twice daily. On discharge patient was discharged with Keflex as well as doxycycline. Patient was requiring oxygen and was discharged with that as well. Prior to that admission patient was hospitalized for Covid from November 15, 2021 through November 27, 2021. Currently patient is alert oriented in no acute distress. Dr. Leilani Ortiz and myself had a long discussion with him regarding his end-stage kidney disease as well as heart failure. Patient does not wish to die at this time however he does understand and does not wish to have CPR or ventilator.   Patient will be changed to a DNR CC arrest.    Past Medical History:        Diagnosis Date    Acute and chronic respiratory failure with hypoxia ([facility] [address] 11/16/2021    CAD (coronary artery disease)     Cataracts, bilateral     Elevated troponin 12/7/2021    Glaucoma     Hyperlipidemia     Hypertension     TIA (transient ischemic attack)     Type II or unspecified type diabetes mellitus without mention of complication, not stated as uncontrolled        Past Surgical History:        Procedure Laterality Date    CATARACT REMOVAL WITH IMPLANT Bilateral 11/219/2013 and 12/3/2013    CORONARY ARTERY BYPASS GRAFT  2000    INGUINAL HERNIA REPAIR Bilateral 06/19/12       Medications Prior to Admission:    Prior to Admission medications    Medication Sig Start Date End Date Taking?  Authorizing Provider   midodrine (PROAMATINE) 10 MG tablet Take 1 tablet by mouth 3 times daily (with meals) 12/21/21  Yes FRANKLIN Neil CNP   doxycycline hyclate (VIBRA-TABS) 100 MG tablet Take 1 tablet by mouth 2 times daily for 10 days 12/21/21 12/31/21 Yes FRANKLIN Neil CNP   cephALEXin (KEFLEX) 500 MG capsule Take 1 capsule by mouth 3 times daily for 10 days 12/21/21 12/31/21 Yes FRANKLIN Neil CNP   tamsulosin (FLOMAX) 0.4 MG capsule Take 1 capsule by mouth 2 times daily  Patient taking differently: Take 0.4 mg by mouth daily  12/21/21  Yes FRANKLIN Neil CNP   metoprolol succinate (TOPROL XL) 25 MG extended release tablet Take 1 tablet by mouth daily 12/11/21  Yes FRANKLIN Neil CNP   ascorbic acid (VITAMIN C) 1000 MG tablet Take 0.5 tablets by mouth 2 times daily 12/10/21  Yes FRANKLIN Neil CNP   traZODone (DESYREL) 50 MG tablet Take 50 mg by mouth nightly   Yes Historical Provider, MD   polyethylene glycol (GLYCOLAX) 17 GM/SCOOP powder Take 17 g by mouth daily 12/2/21 1/1/22 Yes FRANKLIN Price CNP   sodium bicarbonate 650 MG tablet Take 1 tablet by mouth 2 times daily 11/27/21  Yes FRANKLIN Neil CNP   amiodarone (CORDARONE) 200 MG tablet Take 1 tablet by mouth 2 times daily 11/27/21  Yes FRANKLIN Neil CNP   budesonide (PULMICORT) 0.5 MG/2ML nebulizer suspension Take 2 mLs by nebulization 2 times daily 11/27/21  Yes FRANKLIN Nieto CNP   ipratropium-albuterol (DUONEB) 0.5-2.5 (3) MG/3ML SOLN nebulizer solution Inhale 3 mLs into the lungs 4 times daily 11/27/21  Yes FRANKLIN Nieto CNP   apixaban (ELIQUIS) 2.5 MG TABS tablet Take 1 tablet by mouth 2 times daily 11/27/21  Yes FRANKLIN Nieto CNP   polyethylene glycol (GLYCOLAX) 17 g packet Take 17 g by mouth daily as needed for Constipation 11/27/21 12/27/21 Yes FRANKLIN Nieto CNP   zinc sulfate (ZINCATE) 220 (50 Zn) MG capsule Take 2 capsules by mouth daily 11/28/21  Yes FRANKLIN Nieto CNP   famotidine (PEPCID) 10 MG tablet Take 1 tablet by mouth nightly 11/27/21  Yes FRANKLIN Nieto CNP   vitamin D3 (CHOLECALCIFEROL) 25 MCG (1000 UT) TABS tablet Take 1 tablet by mouth daily 11/27/21  Yes FRANKLIN Nieto CNP   furosemide (LASIX) 40 MG tablet Take 40 mg by mouth daily   Yes Historical Provider, MD   glipiZIDE (GLUCOTROL) 5 MG tablet Take 5 mg by mouth 2 times daily (before meals)   Yes Historical Provider, MD   atorvastatin (LIPITOR) 40 MG tablet Take 1 tablet by mouth nightly 3/28/18  Yes Yony Campos MD   clopidogrel (PLAVIX) 75 MG tablet Take 1 tablet by mouth daily 3/29/18  Yes Yony Campos MD   aspirin 81 MG EC tablet Take 81 mg by mouth nightly    Yes Historical Provider, MD   acetaminophen (TYLENOL) 650 MG suppository Place 650 mg rectally every 8 hours as needed for Fever    Historical Provider, MD   zolpidem (AMBIEN) 5 MG tablet Take 5 mg by mouth nightly as needed for Sleep. Historical Provider, MD   benzonatate (TESSALON) 100 MG capsule Take 100 mg by mouth 3 times daily as needed for Cough    Historical Provider, MD   albuterol (PROVENTIL) (2.5 MG/3ML) 0.083% nebulizer solution Take 3 mLs by nebulization every 4 hours as needed for Wheezing 11/27/21   FRANKLIN Nieto CNP       Allergies:  Patient has no known allergies.     Social History: TOBACCO:   reports that he has never smoked. He has never used smokeless tobacco.  ETOH:   reports current alcohol use. Family History:       Problem Relation Age of Onset    Heart Disease Father     Heart Disease Brother        Allergies:  Patient has no known allergies. Medications Prior to Admission:    Prior to Admission medications    Medication Sig Start Date End Date Taking?  Authorizing Provider   midodrine (PROAMATINE) 10 MG tablet Take 1 tablet by mouth 3 times daily (with meals) 12/21/21  Yes FRANKLIN Holder CNP   doxycycline hyclate (VIBRA-TABS) 100 MG tablet Take 1 tablet by mouth 2 times daily for 10 days 12/21/21 12/31/21 Yes FRANKLIN Holder CNP   cephALEXin (KEFLEX) 500 MG capsule Take 1 capsule by mouth 3 times daily for 10 days 12/21/21 12/31/21 Yes FRANKLIN Holder CNP   tamsulosin (FLOMAX) 0.4 MG capsule Take 1 capsule by mouth 2 times daily  Patient taking differently: Take 0.4 mg by mouth daily  12/21/21  Yes FRANKLIN Holder CNP   metoprolol succinate (TOPROL XL) 25 MG extended release tablet Take 1 tablet by mouth daily 12/11/21  Yes FRANKLIN Holder CNP   ascorbic acid (VITAMIN C) 1000 MG tablet Take 0.5 tablets by mouth 2 times daily 12/10/21  Yes FRANKLIN Holder CNP   traZODone (DESYREL) 50 MG tablet Take 50 mg by mouth nightly   Yes Historical Provider, MD   polyethylene glycol (GLYCOLAX) 17 GM/SCOOP powder Take 17 g by mouth daily 12/2/21 1/1/22 Yes FRANKLIN Wolff CNP   sodium bicarbonate 650 MG tablet Take 1 tablet by mouth 2 times daily 11/27/21  Yes FRANKLIN Holder CNP   amiodarone (CORDARONE) 200 MG tablet Take 1 tablet by mouth 2 times daily 11/27/21  Yes FRANKLIN Holder CNP   budesonide (PULMICORT) 0.5 MG/2ML nebulizer suspension Take 2 mLs by nebulization 2 times daily 11/27/21  Yes FRANKLIN Holder CNP   ipratropium-albuterol (DUONEB) 0.5-2.5 (3) MG/3ML SOLN nebulizer solution Inhale 3 mLs into the lungs 4 times daily 11/27/21  Yes FRANKLIN Cuadra CNP   apixaban (ELIQUIS) 2.5 MG TABS tablet Take 1 tablet by mouth 2 times daily 11/27/21  Yes FRANKLIN Cuadra CNP   polyethylene glycol (GLYCOLAX) 17 g packet Take 17 g by mouth daily as needed for Constipation 11/27/21 12/27/21 Yes FRANKLIN Cuadra CNP   zinc sulfate (ZINCATE) 220 (50 Zn) MG capsule Take 2 capsules by mouth daily 11/28/21  Yes FRANKLIN Cuadra CNP   famotidine (PEPCID) 10 MG tablet Take 1 tablet by mouth nightly 11/27/21  Yes FRANKLIN Cuadra CNP   vitamin D3 (CHOLECALCIFEROL) 25 MCG (1000 UT) TABS tablet Take 1 tablet by mouth daily 11/27/21  Yes FRANKLIN Cuadra CNP   furosemide (LASIX) 40 MG tablet Take 40 mg by mouth daily   Yes Historical Provider, MD   glipiZIDE (GLUCOTROL) 5 MG tablet Take 5 mg by mouth 2 times daily (before meals)   Yes Historical Provider, MD   atorvastatin (LIPITOR) 40 MG tablet Take 1 tablet by mouth nightly 3/28/18  Yes Kalpesh Guerra MD   clopidogrel (PLAVIX) 75 MG tablet Take 1 tablet by mouth daily 3/29/18  Yes Kalpesh Guerra MD   aspirin 81 MG EC tablet Take 81 mg by mouth nightly    Yes Historical Provider, MD   acetaminophen (TYLENOL) 650 MG suppository Place 650 mg rectally every 8 hours as needed for Fever    Historical Provider, MD   zolpidem (AMBIEN) 5 MG tablet Take 5 mg by mouth nightly as needed for Sleep. Historical Provider, MD   benzonatate (TESSALON) 100 MG capsule Take 100 mg by mouth 3 times daily as needed for Cough    Historical Provider, MD   albuterol (PROVENTIL) (2.5 MG/3ML) 0.083% nebulizer solution Take 3 mLs by nebulization every 4 hours as needed for Wheezing 11/27/21   FRANKLIN Cuadra CNP       Review of Systems:  Constitutional:negative  for fevers, and negative for chills.   Eyes: negative for visual disturbance   ENT: negative for sore throat, negative nasal congestion, and negative for earache  Respiratory: positive for shortness of breath, negative for cough, and negative for wheezing  Cardiovascular: negative for chest pain, negative for palpitations, and negative for syncope  Gastrointestinal: negative for abdominal pain, negative for nausea,negative for vomiting, negative for diarrhea, negative for constipation, and negative for hematochezia or melena  Genitourinary: negative for dysuria, negative for urinary urgency, negative for urinary frequency, and negative for hematuria  Skin: negative for skin rash, and negative for skin lesions  Neurological: negative for unilateral weakness, numbness or tingling. Physical Exam:    Vitals:   Temp: 97.7 °F (36.5 °C)  BP: (!) 94/44  Resp: 22  Pulse: 82  SpO2: 95 %  24HR INTAKE/OUTPUT:      Intake/Output Summary (Last 24 hours) at 12/23/2021 1619  Last data filed at 12/23/2021 1323  Gross per 24 hour   Intake 701 ml   Output 1350 ml   Net -649 ml       Weight    Body mass index is 29.01 kg/m². Exam:  GEN:    Awake, alert and oriented x3. EYES:  EOMI, pupils equal   NECK: Supple. No lymphadenopathy. No carotid bruit  CVS:    regular rate and rhythm, no audible murmur  PULM:  diminished but clear without wheezing, rales or rhonchi, no acute respiratory distress  ABD:    Bowels sounds normal.  Abdomen is soft. No distention. no tenderness to palpation. EXT:   no edema bilaterally . No calf tenderness. NEURO: Moves all extremities. Motor and sensory are grossly intact  SKIN:  No rashes.   No skin lesions.    -----------------------------------------------------------------  Diagnostic Data:     DATA:    CBC:   Lab Results   Component Value Date    WBC 12.0 (H) 12/22/2021    RBC 2.59 (L) 12/22/2021    HGB 7.9 (L) 12/22/2021    HCT 26.2 (L) 12/22/2021    .2 12/22/2021     12/22/2021        CMP:   Lab Results   Component Value Date    GLUCOSE 333 (H) 12/22/2021    BUN 83 (H) 12/22/2021    CREATININE 3.67 (H) 12/22/2021     12/22/2021    K 5.2 12/22/2021    CALCIUM 8.9 12/22/2021    CL 94 (L) 12/22/2021    CO2 23 12/22/2021    PROT 6.5 12/22/2021    LABALBU 2.9 (L) 12/22/2021    BILITOT 0.51 12/22/2021    ALKPHOS 167 (H) 12/22/2021    ALT 17 12/22/2021    AST 25 12/22/2021       UA:   Lab Results   Component Value Date    COLORU Yellow 12/22/2021    SPECGRAV 1.015 12/22/2021    WBCUA 10 TO 20 12/22/2021    RBCUA 10 TO 20 12/22/2021    EPITHUA 2 TO 5 12/22/2021    LEUKOCYTESUR TRACE (A) 12/22/2021    GLUCOSEU 1+ (A) 12/22/2021    KETUA NEGATIVE 12/22/2021    PROTEINU NEGATIVE 12/22/2021    HGBUR 3+ (A) 12/22/2021    CASTUA HYALINE 12/22/2021    CASTUA 10 TO 20 12/22/2021    CRYSTUA NOT REPORTED 12/22/2021    BACTERIA NOT REPORTED 12/22/2021    YEAST NOT REPORTED 12/22/2021       Lactic Acid:   Lab Results   Component Value Date    LACTA 2.2 11/15/2021       D-Dimer:  Lab Results   Component Value Date    DDIMER 1.31 (H) 11/19/2021       PT/INR:  Lab Results   Component Value Date    PROTIME 16.3 11/17/2021    INR 1.3 11/17/2021       High Sensitivity Troponin:  Recent Labs     12/22/21  1316 12/22/21  1543 12/23/21  0815   TROPHS 201* 154* 187*       ABGs:   Lab Results   Component Value Date    FIO2 NOT REPORTED 12/22/2021           CT Head WO Contrast   Final Result   No acute intracranial abnormality. XR CHEST PORTABLE   Final Result   No change in multifocal infiltrates suggesting pneumonia probable small   effusions. Recommend continued follow-up. EKG reviewed    Assessment:    Principal Problem:    Acute alteration in mental status  Active Problems:    Hypertension    Type 2 diabetes mellitus without complication, without long-term current use of insulin (HCC)    ASHD (arteriosclerotic heart disease)    Acute kidney injury superimposed on CKD (HCC)    Elevated troponin    Moderate malnutrition (HCC)  Resolved Problems:    * No resolved hospital problems.  *      Patient Active Problem List    Diagnosis Date Noted    Acute alteration in mental status 12/22/2021    Moderate malnutrition ([address] 12/17/2021    Community acquired bacterial pneumonia 12/16/2021    Hyperglycemia 12/07/2021    Acute kidney injury superimposed on CKD ([address] 12/07/2021    Acute cystitis without hematuria 12/07/2021    Elevated troponin 12/07/2021    On amiodarone therapy     Microcytic hypochromic anemia     Hyperkalemia 12/06/2021    Urinary retention 11/27/2021    Mild malnutrition ([address] 11/17/2021    Acute and chronic respiratory failure with hypoxia (HCC) 11/16/2021    Paroxysmal atrial flutter ([address] 11/16/2021    Stage 4 chronic kidney disease ([address] 11/16/2021    ASHD (arteriosclerotic heart disease)     S/P CABG (coronary artery bypass graft)     COVID-19 virus infection / Babar Bran Vaccine 11/15/2021    Cerebral infarction due to embolism of right middle cerebral artery ([address] 03/28/2018    Left leg weakness 03/28/2018    Falling 03/28/2018    Stroke, lacunar ([address] 03/28/2018    Hypertension 03/27/2018    Type 2 diabetes mellitus without complication, without long-term current use of insulin ([address] 03/27/2018    Cerebrovascular accident (CVA) ([address] 03/26/2018    Right inguinal hernia 04/02/2014       Plan:     · This patient requires inpatient admission because of acute alteration in mental status  · Factors affecting the medical complexity of this patient include JANINA on CKD, ASHD, hypertension, type 2 diabetes  · Estimated length of stay is 3 days  · Acute alteration in mental status  · Resolved  · JANINA on CKD  · Appreciate nephrology  · Trend labs  · IV fluids  · Continue sodium bicarb  · ASHD  · Trend troponin  · Appreciate cardiology  · Continue Cordarone, Eliquis, aspirin, Lipitor, Plavix, Lasix, Toprol-XL  · Hypertension  · Continue Toprol-XL  · Type 2 diabetes  · Continue Glucotrol  · POCT before meals and at bedtime  · Medium dose sliding scale  · Hypoglycemic protocol  · DVT prophylaxis: already on chronic anticoagulation  · Peptic ulcer prophylaxis: Pepcid  · High risk medications: none  · Social Service and Case Management consults for DC planning  · Dietician consult initiated    CORE MEASURES  DVT prophylaxis: already on chronic anticoagulation  Decubitus ulcer present on admission: No  CODE STATUS: DNR-CCA  Nutrition Status: good   Physical therapy: Yes   Old Charts reviewed: Yes  EKG Reviewed:  Yes  Advance Directive Addressed: Yes    FRANKLIN De Guzman - CNP, FRANKLIN, NP-C  12/23/2021, 4:19 PM

## 2021-12-24 LAB
ABSOLUTE EOS #: 0.2 K/UL (ref 0–0.44)
ABSOLUTE IMMATURE GRANULOCYTE: 0.08 K/UL (ref 0–0.3)
ABSOLUTE LYMPH #: 1.79 K/UL (ref 1.1–3.7)
ABSOLUTE MONO #: 0.65 K/UL (ref 0.1–1.2)
ALBUMIN SERPL-MCNC: 2.5 G/DL (ref 3.5–5.2)
ALBUMIN/GLOBULIN RATIO: 0.8 (ref 1–2.5)
ALP BLD-CCNC: 129 U/L (ref 40–129)
ALT SERPL-CCNC: 17 U/L (ref 5–41)
ANION GAP SERPL CALCULATED.3IONS-SCNC: 15 MMOL/L (ref 9–17)
AST SERPL-CCNC: 17 U/L
BASOPHILS # BLD: 0 % (ref 0–2)
BASOPHILS ABSOLUTE: <0.03 K/UL (ref 0–0.2)
BILIRUB SERPL-MCNC: 0.36 MG/DL (ref 0.3–1.2)
BUN BLDV-MCNC: 92 MG/DL (ref 8–23)
BUN/CREAT BLD: 28 (ref 9–20)
CALCIUM SERPL-MCNC: 8.4 MG/DL (ref 8.6–10.4)
CHLORIDE BLD-SCNC: 101 MMOL/L (ref 98–107)
CO2: 26 MMOL/L (ref 20–31)
CREAT SERPL-MCNC: 3.33 MG/DL (ref 0.7–1.2)
DIFFERENTIAL TYPE: ABNORMAL
EOSINOPHILS RELATIVE PERCENT: 3 % (ref 1–4)
GFR AFRICAN AMERICAN: 21 ML/MIN
GFR NON-AFRICAN AMERICAN: 17 ML/MIN
GFR SERPL CREATININE-BSD FRML MDRD: ABNORMAL ML/MIN/{1.73_M2}
GFR SERPL CREATININE-BSD FRML MDRD: ABNORMAL ML/MIN/{1.73_M2}
GLUCOSE BLD-MCNC: 177 MG/DL (ref 74–100)
GLUCOSE BLD-MCNC: 177 MG/DL (ref 74–100)
GLUCOSE BLD-MCNC: 178 MG/DL (ref 70–99)
GLUCOSE BLD-MCNC: 227 MG/DL (ref 74–100)
HCT VFR BLD CALC: 24.1 % (ref 40.7–50.3)
HEMOGLOBIN: 7.4 G/DL (ref 13–17)
IMMATURE GRANULOCYTES: 1 %
LYMPHOCYTES # BLD: 23 % (ref 24–43)
MCH RBC QN AUTO: 30.7 PG (ref 25.2–33.5)
MCHC RBC AUTO-ENTMCNC: 30.7 G/DL (ref 28.4–34.8)
MCV RBC AUTO: 100 FL (ref 82.6–102.9)
MONOCYTES # BLD: 8 % (ref 3–12)
NRBC AUTOMATED: 0 PER 100 WBC
PDW BLD-RTO: 16.4 % (ref 11.8–14.4)
PLATELET # BLD: 185 K/UL (ref 138–453)
PLATELET ESTIMATE: ABNORMAL
PMV BLD AUTO: 11.4 FL (ref 8.1–13.5)
POTASSIUM SERPL-SCNC: 4.9 MMOL/L (ref 3.7–5.3)
RBC # BLD: 2.41 M/UL (ref 4.21–5.77)
RBC # BLD: ABNORMAL 10*6/UL
SEG NEUTROPHILS: 65 % (ref 36–65)
SEGMENTED NEUTROPHILS ABSOLUTE COUNT: 5.22 K/UL (ref 1.5–8.1)
SODIUM BLD-SCNC: 142 MMOL/L (ref 135–144)
TOTAL PROTEIN: 5.5 G/DL (ref 6.4–8.3)
WBC # BLD: 8 K/UL (ref 3.5–11.3)
WBC # BLD: ABNORMAL 10*3/UL

## 2021-12-24 PROCEDURE — 2700000000 HC OXYGEN THERAPY PER DAY

## 2021-12-24 PROCEDURE — 94761 N-INVAS EAR/PLS OXIMETRY MLT: CPT

## 2021-12-24 PROCEDURE — 94640 AIRWAY INHALATION TREATMENT: CPT

## 2021-12-24 PROCEDURE — 82947 ASSAY GLUCOSE BLOOD QUANT: CPT

## 2021-12-24 PROCEDURE — 6360000002 HC RX W HCPCS: Performed by: INTERNAL MEDICINE

## 2021-12-24 PROCEDURE — 97530 THERAPEUTIC ACTIVITIES: CPT

## 2021-12-24 PROCEDURE — 80053 COMPREHEN METABOLIC PANEL: CPT

## 2021-12-24 PROCEDURE — 6370000000 HC RX 637 (ALT 250 FOR IP): Performed by: INTERNAL MEDICINE

## 2021-12-24 PROCEDURE — 85025 COMPLETE CBC W/AUTO DIFF WBC: CPT

## 2021-12-24 PROCEDURE — 1200000000 HC SEMI PRIVATE

## 2021-12-24 PROCEDURE — 36415 COLL VENOUS BLD VENIPUNCTURE: CPT

## 2021-12-24 PROCEDURE — 2580000003 HC RX 258: Performed by: INTERNAL MEDICINE

## 2021-12-24 RX ORDER — CEPHALEXIN 250 MG/1
250 CAPSULE ORAL DAILY
Status: DISCONTINUED | OUTPATIENT
Start: 2021-12-25 | End: 2021-12-25

## 2021-12-24 RX ADMIN — IPRATROPIUM BROMIDE AND ALBUTEROL SULFATE 3 ML: .5; 3 SOLUTION RESPIRATORY (INHALATION) at 20:10

## 2021-12-24 RX ADMIN — DOXYCYCLINE HYCLATE 100 MG: 100 CAPSULE ORAL at 20:53

## 2021-12-24 RX ADMIN — OXYCODONE HYDROCHLORIDE AND ACETAMINOPHEN 500 MG: 500 TABLET ORAL at 07:49

## 2021-12-24 RX ADMIN — AMIODARONE HYDROCHLORIDE 200 MG: 200 TABLET ORAL at 20:54

## 2021-12-24 RX ADMIN — METOPROLOL SUCCINATE 25 MG: 25 TABLET, EXTENDED RELEASE ORAL at 07:49

## 2021-12-24 RX ADMIN — GLIPIZIDE 5 MG: 5 TABLET ORAL at 16:11

## 2021-12-24 RX ADMIN — TAMSULOSIN HYDROCHLORIDE 0.4 MG: 0.4 CAPSULE ORAL at 16:11

## 2021-12-24 RX ADMIN — SODIUM BICARBONATE TAB 650 MG 650 MG: 650 TAB at 07:49

## 2021-12-24 RX ADMIN — FUROSEMIDE 40 MG: 40 TABLET ORAL at 07:48

## 2021-12-24 RX ADMIN — CLOPIDOGREL BISULFATE 75 MG: 75 TABLET ORAL at 07:49

## 2021-12-24 RX ADMIN — SODIUM CHLORIDE, PRESERVATIVE FREE 10 ML: 5 INJECTION INTRAVENOUS at 07:49

## 2021-12-24 RX ADMIN — SODIUM CHLORIDE, PRESERVATIVE FREE 10 ML: 5 INJECTION INTRAVENOUS at 20:54

## 2021-12-24 RX ADMIN — POLYETHYLENE GLYCOL (3350) 17 G: 17 POWDER, FOR SOLUTION ORAL at 07:49

## 2021-12-24 RX ADMIN — IPRATROPIUM BROMIDE AND ALBUTEROL SULFATE 3 ML: .5; 3 SOLUTION RESPIRATORY (INHALATION) at 05:15

## 2021-12-24 RX ADMIN — GLIPIZIDE 5 MG: 5 TABLET ORAL at 07:48

## 2021-12-24 RX ADMIN — ATORVASTATIN CALCIUM 40 MG: 40 TABLET, FILM COATED ORAL at 20:54

## 2021-12-24 RX ADMIN — APIXABAN 2.5 MG: 2.5 TABLET, FILM COATED ORAL at 07:48

## 2021-12-24 RX ADMIN — MIDODRINE HYDROCHLORIDE 10 MG: 5 TABLET ORAL at 11:06

## 2021-12-24 RX ADMIN — BUDESONIDE 500 MCG: 0.5 SUSPENSION RESPIRATORY (INHALATION) at 10:29

## 2021-12-24 RX ADMIN — Medication 1000 UNITS: at 07:48

## 2021-12-24 RX ADMIN — FAMOTIDINE 10 MG: 10 TABLET ORAL at 20:54

## 2021-12-24 RX ADMIN — CEPHALEXIN 500 MG: 500 CAPSULE ORAL at 07:49

## 2021-12-24 RX ADMIN — ASPIRIN 81 MG: 81 TABLET, COATED ORAL at 20:53

## 2021-12-24 RX ADMIN — ZINC SULFATE 220 MG (50 MG) CAPSULE 100 MG: CAPSULE at 08:05

## 2021-12-24 RX ADMIN — SODIUM BICARBONATE TAB 650 MG 650 MG: 650 TAB at 20:54

## 2021-12-24 RX ADMIN — AMIODARONE HYDROCHLORIDE 200 MG: 200 TABLET ORAL at 07:48

## 2021-12-24 RX ADMIN — APIXABAN 2.5 MG: 2.5 TABLET, FILM COATED ORAL at 20:54

## 2021-12-24 RX ADMIN — TRAZODONE HYDROCHLORIDE 50 MG: 50 TABLET ORAL at 20:54

## 2021-12-24 RX ADMIN — DOXYCYCLINE HYCLATE 100 MG: 100 CAPSULE ORAL at 07:49

## 2021-12-24 RX ADMIN — MIDODRINE HYDROCHLORIDE 10 MG: 5 TABLET ORAL at 16:11

## 2021-12-24 RX ADMIN — IPRATROPIUM BROMIDE AND ALBUTEROL SULFATE 3 ML: .5; 3 SOLUTION RESPIRATORY (INHALATION) at 10:18

## 2021-12-24 RX ADMIN — TAMSULOSIN HYDROCHLORIDE 0.4 MG: 0.4 CAPSULE ORAL at 07:49

## 2021-12-24 RX ADMIN — MIDODRINE HYDROCHLORIDE 10 MG: 5 TABLET ORAL at 07:48

## 2021-12-24 RX ADMIN — BUDESONIDE 500 MCG: 0.5 SUSPENSION RESPIRATORY (INHALATION) at 20:10

## 2021-12-24 RX ADMIN — IPRATROPIUM BROMIDE AND ALBUTEROL SULFATE 3 ML: .5; 3 SOLUTION RESPIRATORY (INHALATION) at 16:40

## 2021-12-24 RX ADMIN — OXYCODONE HYDROCHLORIDE AND ACETAMINOPHEN 500 MG: 500 TABLET ORAL at 20:54

## 2021-12-24 ASSESSMENT — PAIN SCALES - GENERAL: PAINLEVEL_OUTOF10: 0

## 2021-12-24 NOTE — PROGRESS NOTES
Spoke with Staff at [employer]. States that the unit she is currently on does not have availability at this time but we could check back on Monday. They use [employer]. Spoke with daughter Migdalia Brennan, updated on above. States that they would prefer to have him at the [facility] but that [address] at [facility] would be a back up plan. Message left with  regarding above.      [address] Carlos Nurse Coordinator  12/24/2021 10:31 AM

## 2021-12-24 NOTE — PROGRESS NOTES
Palliative Care Notes    Reason for Consult:  hospice discussion    Patient Active Problem List   Diagnosis    Right inguinal hernia    [facility]    Hypertension    Type 2 diabetes mellitus without complication, without long-term current use of insulin ([facility]    Cerebral infarction due to embolism of right middle cerebral artery ([facility]    Left leg weakness    Falling    Stroke, lacunar ([facility]    COVID-19 virus infection / Moderna Vaccine    Acute and chronic respiratory failure with hypoxia (HCC)    ASHD (arteriosclerotic heart disease)    Paroxysmal atrial flutter (HCC)    Stage 4 chronic kidney disease (HCC)    S/P CABG (coronary artery bypass graft)    Mild malnutrition (HCC)    Urinary retention    Hyperkalemia    Hyperglycemia    Acute kidney injury superimposed on CKD ([facility]    Acute cystitis without hematuria    Elevated troponin    On amiodarone therapy    Microcytic hypochromic anemia    Community acquired bacterial pneumonia    Moderate malnutrition ([facility]    Acute alteration in mental status    Type 2 MI (myocardial infarction) ([facility]    Syncope       Advance Directives:  Code status: DNR-CCA  Patient has capacity for medical decisions: yes  Health Care Power of : yes  Living Will: yes        Pain Management:  The patient is not having any pain. Symptom Management:  Are there any other symptoms that are distressing to the patient or family that needs addressed?                                            Anxiety:  Denies                          Dyspnea:  acute dyspnea, chronic dyspnea, non-productive cough, nasal congestion and currently on home oxygen                          Fatigue:  exercise intolerance                          Bladder function:  Robertson in place                          Bowel function:  none    Other:  Pressure sore to coccyx     Spiritual history/needs:   notified: n/a    Palliative Performance Scale:  ___70% Ambulation reduced; Some disease; Can't do normal job or work; intake normal or reduced; can do full self care; LOC full  ___60%  Ambulation reduced; Significant disease; Can't do hobbies/housework; intake normal or reduced; occasional assist; LOC full/confusion  ___50%  Mainly sit/lie; Extensive disease; Can't do any work; Considerable assist; intake normal or reduced; LOC full/confusion  _x__40%  Mainly in bed; Extensive disease; Mainly assist; intake normal or reduced; LOC full/confusion   ___30%  Bed Bound; Extensive disease; Total care; intake reduced; LOC full/confusion  ___20%  Bed Bound; Extensive disease; Total care; intake minimal; Drowsy/coma  ___10%  Bed Bound; Extensive disease; Total care; Mouth care only; Drowsy/coma  ___0       Death    Readmission Risk:  29%    Notes:   Bernard Can is resting in bed for our conversation. He is very clear at this moment and states that he is aware that he is at the end of what can be offered medically for his CHF and CKD. States that he is feeling well today but that he spoke with the physician today and he recommended hospice. Bernard Can states that he is very open to hospice. He would like to be discharged [facility]. \" Requests that writer speak with his daughter Tari Barraza to make further plans. Bernard Can is very upbeat during our conversation and shares stories about his children. Denies further needs. Spoke with Tari Madi via phone. States that she would prefer Bernard Can to be discharged back to [facility] if he is able to be discharged before Monday. She agrees to hospice plan and eventual placement in [facility] with hospice. States that she is going to call the facility on Monday and see if she can speak with them about placement. She would like to hold the hospice consult until she knows what agencies the facility uses. Denies further needs.      Palliative Care Plan:  Education/support to family  Education/support to patient  Discharge planning/helping to

## 2021-12-24 NOTE — PROGRESS NOTES
[facility]  Inpatient/Observation/Outpatient Rehabilitation    Date: 2021  Patient Name: Hiwot Hayes       [x] Inpatient Acute/Observation       []  Outpatient  : 10/31/1930       [] Pt no showed for scheduled appointment    [] Pt refused/declined therapy at this time due to:           [] Pt cancelled due to:  [] No Reason Given   [] Sick/ill   [x] Other:  Pt not seen per nursing request .    Will attempt evaluation at our earliest opportunity.        Veronika Guadalupe Date: 2021

## 2021-12-24 NOTE — CONSULTS
Patient: Merry Schaefer  : 10/31/1930  Date of Admission: 2021  Primary Care Physician: Alisa Colby  Today's Date: 2021    REASON FOR CONSULTATION: Altered Mental Status (from [facility] after recent admission with pneumonia; staff states pt was unresponsive to sternal rub) and Emesis (onset today)      HPI: Mr. Glynda Moritz is a 80 y.o. male who was admitted to the hospital with what sounds to be a possible syncopal episode. He has a recent history of recurrent sepsis and has continued treatment for what sounds to be a UTI with sepsis. Mr. Glynda Moritz has a known history of CABG x 3 in . He says he has followed up in the past with Dr. Dayo Bearden in [facility], his last visit in 2020. Mr. Glynda Moritz does report a history of chest pain once every 2 months or so. Says it is a pressure quality. He denied any current or recent chest pain, abdominal pain, bleeding problems, problems with his medications or any other concerns at this time. Past Medical History:   Diagnosis Date    Acute and chronic respiratory failure with hypoxia ([facility] 2021    CAD (coronary artery disease)     Cataracts, bilateral     Elevated troponin 2021    Glaucoma     Hyperlipidemia     Hypertension     TIA (transient ischemic attack)     Type II or unspecified type diabetes mellitus without mention of complication, not stated as uncontrolled        CURRENT ALLERGIES: Patient has no known allergies. REVIEW OF SYSTEMS: 14 systems were reviewed. Pertinent positives and negatives as above, all else negative.      Past Surgical History:   Procedure Laterality Date    CATARACT REMOVAL WITH IMPLANT Bilateral  and 12/3/2013    CORONARY ARTERY BYPASS GRAFT  2000    INGUINAL HERNIA REPAIR Bilateral 12    Social History:  Social History     Tobacco Use    Smoking status: Never Smoker    Smokeless tobacco: Never Used   Substance Use Topics    Alcohol use: Yes     Comment: very very very seldom    Drug use: No        CURRENT MEDICATIONS:  Outpatient Medications Marked as Taking for the 12/22/21 encounter [facility] Encounter)   Medication Sig Dispense Refill    midodrine (PROAMATINE) 10 MG tablet Take 1 tablet by mouth 3 times daily (with meals) 90 tablet 3    doxycycline hyclate (VIBRA-TABS) 100 MG tablet Take 1 tablet by mouth 2 times daily for 10 days 20 tablet 0    cephALEXin (KEFLEX) 500 MG capsule Take 1 capsule by mouth 3 times daily for 10 days 30 capsule 0    tamsulosin (FLOMAX) 0.4 MG capsule Take 1 capsule by mouth 2 times daily (Patient taking differently: Take 0.4 mg by mouth daily ) 30 capsule 3    metoprolol succinate (TOPROL XL) 25 MG extended release tablet Take 1 tablet by mouth daily 30 tablet 3    ascorbic acid (VITAMIN C) 1000 MG tablet Take 0.5 tablets by mouth 2 times daily      traZODone (DESYREL) 50 MG tablet Take 50 mg by mouth nightly      polyethylene glycol (GLYCOLAX) 17 GM/SCOOP powder Take 17 g by mouth daily 1530 g 3    sodium bicarbonate 650 MG tablet Take 1 tablet by mouth 2 times daily      amiodarone (CORDARONE) 200 MG tablet Take 1 tablet by mouth 2 times daily      budesonide (PULMICORT) 0.5 MG/2ML nebulizer suspension Take 2 mLs by nebulization 2 times daily 60 each 3    ipratropium-albuterol (DUONEB) 0.5-2.5 (3) MG/3ML SOLN nebulizer solution Inhale 3 mLs into the lungs 4 times daily 360 mL     apixaban (ELIQUIS) 2.5 MG TABS tablet Take 1 tablet by mouth 2 times daily 60 tablet     polyethylene glycol (GLYCOLAX) 17 g packet Take 17 g by mouth daily as needed for Constipation 527 g 1    zinc sulfate (ZINCATE) 220 (50 Zn) MG capsule Take 2 capsules by mouth daily 30 capsule 3    famotidine (PEPCID) 10 MG tablet Take 1 tablet by mouth nightly 60 tablet 3    vitamin D3 (CHOLECALCIFEROL) 25 MCG (1000 UT) TABS tablet Take 1 tablet by mouth daily 30 tablet 0    furosemide (LASIX) 40 MG tablet Take 40 mg by mouth daily      glipiZIDE (GLUCOTROL) 5 MG tablet Take 5 mg by mouth 2 times daily (before meals)      atorvastatin (LIPITOR) 40 MG tablet Take 1 tablet by mouth nightly 30 tablet 3    clopidogrel (PLAVIX) 75 MG tablet Take 1 tablet by mouth daily 30 tablet 3    aspirin 81 MG EC tablet Take 81 mg by mouth nightly          acetaminophen (TYLENOL) suppository 650 mg, Q8H PRN  amiodarone (CORDARONE) tablet 200 mg, BID  apixaban (ELIQUIS) tablet 2.5 mg, BID  ascorbic acid (VITAMIN C) tablet 500 mg, BID  aspirin EC tablet 81 mg, Nightly  atorvastatin (LIPITOR) tablet 40 mg, Nightly  benzonatate (TESSALON) capsule 100 mg, TID PRN  budesonide (PULMICORT) nebulizer suspension 500 mcg, BID  cephALEXin (KEFLEX) capsule 500 mg, TID  clopidogrel (PLAVIX) tablet 75 mg, Daily  doxycycline hyclate (VIBRAMYCIN) capsule 100 mg, BID  famotidine (PEPCID) tablet 10 mg, Nightly  furosemide (LASIX) tablet 40 mg, Daily  glipiZIDE (GLUCOTROL) tablet 5 mg, BID AC  ipratropium-albuterol (DUONEB) nebulizer solution 3 mL, 4x daily  metoprolol succinate (TOPROL XL) extended release tablet 25 mg, Daily  midodrine (PROAMATINE) tablet 10 mg, TID WC  sodium bicarbonate tablet 650 mg, BID  tamsulosin (FLOMAX) capsule 0.4 mg, BID  traZODone (DESYREL) tablet 50 mg, Nightly  zolpidem (AMBIEN) tablet 5 mg, Nightly PRN  sodium chloride flush 0.9 % injection 5-40 mL, 2 times per day  sodium chloride flush 0.9 % injection 10 mL, PRN  0.9 % sodium chloride infusion, PRN  polyethylene glycol (GLYCOLAX) packet 17 g, Daily PRN  acetaminophen (TYLENOL) tablet 650 mg, Q6H PRN   Or  acetaminophen (TYLENOL) suppository 650 mg, Q6H PRN  albuterol (PROVENTIL) nebulizer solution 2.5 mg, Q4H PRN  polyethylene glycol (GLYCOLAX) packet 17 g, Daily  Vitamin D (CHOLECALCIFEROL) tablet 1,000 Units, Daily  zinc sulfate (ZINCATE) capsule 100 mg, Daily  glucose (GLUTOSE) 40 % oral gel 15 g, PRN  dextrose 50 % IV solution, PRN  glucagon (rDNA) injection 1 mg, PRN  dextrose 5 % solution, PRN  insulin lispro (HUMALOG) injection vial 0-12 Units, TID WC  insulin lispro (HUMALOG) injection vial 0-6 Units, Nightly         FAMILY HISTORY: family history includes Heart Disease in his brother and father. PHYSICAL EXAM:   BP (!) 99/50   Pulse 67   Temp 97 °F (36.1 °C) (Temporal)   Resp 22   Ht 5' 4\" (1.626 m)   Wt 169 lb (76.7 kg)   SpO2 94%   BMI 29.01 kg/m²  Body mass index is 29.01 kg/m². Constitutional: He is oriented to person, place, and time. He appears well-developed and well-nourished. In no acute distress. HEENT: Normocephalic and atraumatic. No JVD present. Carotid bruit is not present. No mass and no thyromegaly present. No lymphadenopathy present. Cardiovascular: Normal rate, regular rhythm, normal heart sounds. Exam reveals no gallop and no friction rubs. 2/6 systolic murmur, 5th intercostal space on the LEFT in the mid-clavicular line (cardiac apex). Pulmonary/Chest: Effort normal and breath sounds normal. No respiratory distress. He has no wheezes, rhonchi or rales. Abdominal: Soft, non-tender. Bowel sounds and aorta are normal. He exhibits no organomegaly, mass or bruit. Extremities: Trace-1+ up to the knees bilaterally. No cyanosis or clubbing. 2+ radial and carotid pulses. Distal extremity pulses: 2+ bilaterally. .  Neurological: He is alert and oriented to person, place, and time. No evidence of gross cranial nerve deficit. Coordination appeared normal.   Skin: Skin is warm and dry. There is no rash or diaphoresis. Psychiatric: He has a normal mood and affect.  His speech is normal and behavior is normal.      MOST RECENT LABS ON RECORD:   Lab Results   Component Value Date    WBC 12.0 (H) 12/22/2021    HGB 7.9 (L) 12/22/2021    HCT 26.2 (L) 12/22/2021     12/22/2021    CHOL 87 11/17/2021    TRIG 63 11/17/2021    HDL 27 (L) 11/17/2021    LDLCHOLESTEROL 47 11/17/2021    ALT 17 12/22/2021    AST 25 12/22/2021     12/22/2021    K 5.2 12/22/2021    CL 94 (L) 12/22/2021    CREATININE 3.67 (H) 12/22/2021    BUN 83 (H) 12/22/2021    CO2 23 12/22/2021    TSH 1.12 11/17/2021    PSA 1.37 11/18/2021    INR 1.3 11/17/2021    LABA1C 10.3 (H) 12/16/2021        ASSESSMENT:  Patient Active Problem List    Diagnosis Date Noted    Acute alteration in mental status 12/22/2021    Moderate malnutrition ([address] 12/17/2021    Community acquired bacterial pneumonia 12/16/2021    Hyperglycemia 12/07/2021    Acute kidney injury superimposed on CKD ([address] 12/07/2021    Acute cystitis without hematuria 12/07/2021    Elevated troponin 12/07/2021    On amiodarone therapy     Microcytic hypochromic anemia     Hyperkalemia 12/06/2021    Urinary retention 11/27/2021    Mild malnutrition ([address] 11/17/2021    Acute and chronic respiratory failure with hypoxia ([address] 11/16/2021    Paroxysmal atrial flutter ([address] 11/16/2021    Stage 4 chronic kidney disease ([address] 11/16/2021    ASHD (arteriosclerotic heart disease)     S/P CABG (coronary artery bypass graft)     COVID-19 virus infection / [place] Vaccine 11/15/2021    Cerebral infarction due to embolism of right middle cerebral artery ([address] 03/28/2018    Left leg weakness 03/28/2018    Falling 03/28/2018    Stroke, lacunar ([address] 03/28/2018    Hypertension 03/27/2018    Type 2 diabetes mellitus without complication, without long-term current use of insulin ([address] 03/27/2018    Cerebrovascular accident (CVA) ([address] 03/26/2018    Right inguinal hernia 04/02/2014       PLAN:     Chronic diastolic heart failure: New York Heart Association Class: III (Asymptomatic only at rest)   Beta Blocker: Not indicated at this time.  ACE Inibitor/ARB: Not indicated at this time.  Diuretics: Continue furosemide (Lasix) 40 mg every morning.  Heart failure counseling: I advised them to try and keep their legs up whenever possible and to limit salt in their diet.    Additional Testing List: None    Type 2 Myocardial Infarction: Mr. Forrest Rosa meets criteria for a Type 2 MI defined by a rise and fall of troponin with at least one value above the 99th percentile and evidence of an imbalance between myocardial oxygen supply and demand unrelated to coronary thrombosis, evidenced by symptoms of acute myocardial ischemia which has manifested as the development of heart failure and shortness of breath at rest. However, I do not think that any further testing and/or treatment is indicated at this time. · Generalized weakness due to chronic illness: Continue with PT/OT    · UTI: continue antibiotics. · Syncope of unclear etiology: suspect related to UTI and possible sepsis:  · Continue telemetry for now  · Continue to monitor Troponin but declining. · Continue antibiotic treatment    Once again, thank you for allowing me to participate in this patients care. Please do not hesitate to contact me if I could be of any further assistance. Sincerely,  Lyndsay Smith MD, MS, F.A.C.C. [facility] Cardiology Specialist    [address] [facility]  Phone: [phone], Fax: [phone]     I believe that the risk of significant morbidity and mortality related to the patient's current medical conditions are: Intermediate.         December 23, 2021

## 2021-12-24 NOTE — PROGRESS NOTES
Occupational Therapy  Facility/Department: [facility] MED SURG  Daily Treatment Note  NAME: Katy Underwood  : 10/31/1930  MRN: [id number]    Date of Service: 2021    Discharge Recommendations:  ECF with OT,Subacute/Skilled Nursing Facility       Assessment      OT Education: OT Role;Plan of Care  Patient Education: Pt educated on discharge folder with G verbalized understanding. Barriers to Learning: none  Activity Tolerance  Activity Tolerance: Patient limited by fatigue;Patient Tolerated treatment well  Safety Devices  Safety Devices in place: Yes  Type of devices: Left in bed;Call light within reach; Bed alarm in place         Patient Diagnosis(es): The primary encounter diagnosis was Syncope, unspecified syncope type. Diagnoses of Abnormal chest x-ray, Renal dysfunction, Elevated troponin, and Pyuria were also pertinent to this visit. has a past medical history of Acute and chronic respiratory failure with hypoxia ([facility] CAD (coronary artery disease), Cataracts, bilateral, Elevated troponin, Glaucoma, Hyperlipidemia, Hypertension, TIA (transient ischemic attack), and Type II or unspecified type diabetes mellitus without mention of complication, not stated as uncontrolled. has a past surgical history that includes Coronary artery bypass graft (); Cataract removal with implant (Bilateral,  and 12/3/2013); and Inguinal hernia repair (Bilateral, 12). Restrictions  Restrictions/Precautions  Restrictions/Precautions: General Precautions,Fall Risk  Subjective   General  Chart Reviewed: Yes  Patient assessed for rehabilitation services?: Yes  Response to previous treatment: Patient with no complaints from previous session  Family / Caregiver Present: No  Referring Practitioner: Dr. Oumar Dey  Diagnosis: Pyuria  Subjective  Subjective: Pt denies pain at time of session but reports difficulty sleeping last night.  Pt reports he has a sore bottom, and states he has been using a pillow for comfort. Pt reports needing to rest due to lack of sleep last night. Orientation     Objective                Bed mobility  Rolling to Left: Maximum assistance                                                                    Plan   Plan  Times per week: 7  Times per day: Daily  Current Treatment Recommendations: Strengthening,Safety Education & Training,Self-Care / ADL,Functional Mobility Training,Endurance Training  G-Code     OutComes Score                                                  AM-PAC Score             Goals  Short term goals  Time Frame for Short term goals: 21 visits  Short term goal 1: Patient to tolerate 10 minutes ther-ex/ther-act to increase strength/endurance for ADLs maintaining SPO2 above 90%  Short term goal 2: Patient to perform functional transfers with min A X2. Short term goal 3: Patient to complete ADL grooming with setup. Short term goal 4: Patient to complete UB bathing/dressing wtih min A.        Therapy Time   Individual Concurrent Group Co-treatment   Time In 0806         Time Out 0821         Minutes Manuel Marroquin 6 Geo Caruso

## 2021-12-24 NOTE — PROGRESS NOTES
[facility] Department of Pharmacy   Pharmacy Renal Adjustment Note    Jessica Inman is a 80 y.o. male. Pharmacist assessment of renally cleared medications. Recent Labs     12/22/21  1316 12/24/21  0518   CREATININE 3.67* 3.33*     Estimated Creatinine Clearance: 14 mL/min (A) (based on SCr of 3.33 mg/dL (H)). Height:   Ht Readings from Last 1 Encounters:   12/23/21 5' 4\" (1.626 m)     Weight:  Wt Readings from Last 1 Encounters:   12/23/21 169 lb (76.7 kg)       The following medication has been adjusted based upon renal function:             Cephalexin 500mg PO TID decreased to cephalexin 250mg po DAILY for CrCl 5-14mL/min. Pharmacy will continue to monitor closely.       Thank you,  Casa Garcia [facility],12/24/2021,11:56 AM

## 2021-12-24 NOTE — PROGRESS NOTES
[facility] -Progress Note    SUBJECTIVE:    Patient seen for f/u of Acute alteration in mental status. He sitting up in bed alert oriented no acute distress. He is resting in the bed reading the paper. Stated that he has some decisions to make regarding his end-stage CHF/CRF. ROS:   Constitutional: negative  for fevers, and negative for chills. Respiratory: negative for shortness of breath, positive for cough, and negative for wheezing  Cardiovascular: negative for chest pain, and negative for palpitations  Gastrointestinal: negative for abdominal pain, negative for nausea,negative for vomiting, negative for diarrhea, and negative for constipation     All other systems were reviewed with the patient and are negative unless otherwise stated in HPI      OBJECTIVE:        VITAL SIGNS:  Patient Vitals for the past 8 hrs:   BP Temp Temp src Pulse Resp SpO2   21 0524 -- 97 °F (36.1 °C) Temporal 62 20 92 %   21 0517 -- -- -- -- -- 92 %   21 0301 (!) 90/46 96.8 °F (36 °C) Temporal 62 22 93 %         Temp: 97 °F (36.1 °C)  Temp range:    Temp  Av.1 °F (36.2 °C)  Min: 96.8 °F (36 °C)  Max: 97.7 °F (36.5 °C)    BP: (!) 90/46  BP Range:      Systolic (62ZCJ), KDC:54 , Min:82 , QLF:934      Diastolic (36PZN), XFB:51, Min:42, Max:77    Pulse: 62  Pulse Range:    Pulse  Av.8  Min: 62  Max: 83    Resp: 20  Resp Range:   Resp  Av.2  Min: 20  Max: 22    SpO2: 92 % nasal cannula  SpO2 range:   SpO2  Av.8 %  Min: 92 %  Max: 96 %      PaO2/FiO2 RATIO:  No results for input(s): POCPO2 in the last 72 hours. Exam:    GEN:    Awake, alert and oriented x3. EYES:   EOMI, pupils equal   NECK: Supple. No lymphadenopathy. No carotid bruit  CVS:     regular rate and rhythm, systolic murmur  PULM:  diminished with fine rales bases, no acute respiratory distress  ABD:     Bowels sounds normal.  Abdomen is soft. No distention. no tenderness to palpation. EXT:     trace edema bilaterally . pneumonia probable small   effusions. Recommend continued follow-up. ASSESSMENT / PLAN:  Acute alteration in mental status  · Continue current therapy   · Resolved  · ASHD  · Continue aspirin, Eliquis, Lipitor, Plavix, amiodarone, ASA, Toprol XL  · Trend Troponin  · Appreciate Cardiology  · CKD stage IV  · Trend labs worsening  · Appreciate nephrology  · Type 2 diabetes  · Continue Glucotrol  · POCT before meals and at bedtime  · Insulin sliding scale-medium sliding scale  · Hypoglycemia protocol  · HTN  · Continue Toprol XL  · Nutrition status:   · at risk for malnutrition  · Dietician consult initiated  · Hospital Prophylaxis:   · DVT: Eliquis   · Stress Ulcer: H2 Blocker   · High risk medications: none   · Disposition:    · Plan will be SNF  · Recommend Kaia Fang had a long discussion with him regarding this due to cardiorenal syndrome which is end-stage.       FRANKLIN Glasgow - CNP , APRIRWIN, NP-C  Hospitalist Medicine        12/24/2021, 8:57 AM

## 2021-12-24 NOTE — PROGRESS NOTES
tenderness. NEURO: Moves all extremities. Motor and sensory are grossly intact  SKIN:  No rashes. No skin lesions.    -----------------------------------------------------------------  DATA:  Complete Blood Count:   Recent Labs     12/22/21  1316 12/24/21  0518   WBC 12.0* 8.0   RBC 2.59* 2.41*   HGB 7.9* 7.4*   HCT 26.2* 24.1*   .2 100.0   MCH 30.5 30.7   MCHC 30.2 30.7   RDW 16.0* 16.4*    185   MPV 11.4 11.4        Last 3 Blood Glucose:   Recent Labs     12/22/21  1310 12/22/21  1316 12/24/21  0518   GLUCOSE 310 333* 178*        Comprehensive Metabolic Profile:   Recent Labs     12/22/21  1310 12/22/21  1316 12/24/21  0518   NA  --  136 142   K  --  5.2 4.9   CL  --  94* 101   CO2  --  23 26   BUN  --  83* 92*   CREATININE  --  3.67* 3.33*   GLUCOSE 310 333* 178*   CALCIUM  --  8.9 8.4*   PROT  --  6.5 5.5*   LABALBU  --  2.9* 2.5*   BILITOT  --  0.51 0.36   ALKPHOS  --  167* 129   AST  --  25 17   ALT  --  17 17        Urinalysis:   Lab Results   Component Value Date    NITRU NEGATIVE 12/22/2021    COLORU Yellow 12/22/2021    PHUR 6.0 12/22/2021    WBCUA 10 TO 20 12/22/2021    RBCUA 10 TO 20 12/22/2021    MUCUS NOT REPORTED 12/22/2021    TRICHOMONAS NOT REPORTED 12/22/2021    YEAST NOT REPORTED 12/22/2021    BACTERIA NOT REPORTED 12/22/2021    SPECGRAV 1.015 12/22/2021    LEUKOCYTESUR TRACE 12/22/2021    UROBILINOGEN Normal 12/22/2021    BILIRUBINUR NEGATIVE 12/22/2021    GLUCOSEU 1+ 12/22/2021    KETUA NEGATIVE 12/22/2021    AMORPHOUS NOT REPORTED 12/22/2021       HgBA1c:    Lab Results   Component Value Date    LABA1C 10.3 12/16/2021       Lactic Acid:   Lab Results   Component Value Date    LACTA 2.2 11/15/2021        High Sensitivity Troponin:  Recent Labs     12/22/21  1316 12/22/21  1543 12/23/21  0815   TROPHS 201* 154* 187*         Radiology/Imaging:  CT Head WO Contrast   Final Result   No acute intracranial abnormality.          XR CHEST PORTABLE   Final Result   No change in multifocal infiltrates suggesting pneumonia probable small   effusions. Recommend continued follow-up.                  ASSESSMENT / PLAN:  Acute alteration in mental status / generalized weakness / metabolic encephalopathy  · Improving   · Continue PT / OT  · DC ambien (but continue Trazodone, which has less cognitive side effects)  · Type 2 myocardial infection with acute rise then fall in troponin  · Appreciate cardiology consult  · Chronic diastolic CHF  · Continue Lasix  · ACE / BB not indicated per cardiology notes  · Paroxysmal atrial flutter  · Continue Amiodarone for rhythm control  · Continue Eliquis for stroke prophylaxis  · Recent Klebsiella UTI on 12/6/21  · DC Keflex - last 2 UA's negative 12/16/21 and 12/22/21  · Last 2 urine cultures 12/16/221 and 12/22/21 = no growth  · Diabetes Mellitus  · Continue Glipizide  · Hypoglycemic protocol in place   · Nutrition status:   · moderate malnutrition  · Dietician consult initiated  · Hospital Prophylaxis:   · DVT: Eliquis   · Stress Ulcer: H2 Blocker   · High risk medications: none   · Disposition:    · Discharge plan is pending    Cleve Gross MD , M.D.  12/24/2021  12:11 PM

## 2021-12-24 NOTE — FLOWSHEET NOTE
Awake, sitting up in bed watching TV. Morning medications given . Denies pain this morning. O2 continued 5 liters of O2 with sats 93 %. No needs at present time.  Continue to monitor

## 2021-12-25 LAB
GLUCOSE BLD-MCNC: 162 MG/DL (ref 74–100)
GLUCOSE BLD-MCNC: 185 MG/DL (ref 74–100)
GLUCOSE BLD-MCNC: 210 MG/DL (ref 74–100)
GLUCOSE BLD-MCNC: 241 MG/DL (ref 74–100)

## 2021-12-25 PROCEDURE — 99223 1ST HOSP IP/OBS HIGH 75: CPT | Performed by: INTERNAL MEDICINE

## 2021-12-25 PROCEDURE — 94640 AIRWAY INHALATION TREATMENT: CPT

## 2021-12-25 PROCEDURE — 94761 N-INVAS EAR/PLS OXIMETRY MLT: CPT

## 2021-12-25 PROCEDURE — 2580000003 HC RX 258: Performed by: INTERNAL MEDICINE

## 2021-12-25 PROCEDURE — 1200000000 HC SEMI PRIVATE

## 2021-12-25 PROCEDURE — 82947 ASSAY GLUCOSE BLOOD QUANT: CPT

## 2021-12-25 PROCEDURE — 6370000000 HC RX 637 (ALT 250 FOR IP): Performed by: INTERNAL MEDICINE

## 2021-12-25 PROCEDURE — 97110 THERAPEUTIC EXERCISES: CPT

## 2021-12-25 PROCEDURE — 6360000002 HC RX W HCPCS: Performed by: INTERNAL MEDICINE

## 2021-12-25 PROCEDURE — 2700000000 HC OXYGEN THERAPY PER DAY

## 2021-12-25 RX ORDER — FUROSEMIDE 40 MG/1
40 TABLET ORAL 2 TIMES DAILY
Status: DISCONTINUED | OUTPATIENT
Start: 2021-12-25 | End: 2021-12-28

## 2021-12-25 RX ADMIN — FAMOTIDINE 10 MG: 10 TABLET ORAL at 21:48

## 2021-12-25 RX ADMIN — SODIUM BICARBONATE TAB 650 MG 650 MG: 650 TAB at 08:12

## 2021-12-25 RX ADMIN — AMIODARONE HYDROCHLORIDE 200 MG: 200 TABLET ORAL at 21:48

## 2021-12-25 RX ADMIN — TAMSULOSIN HYDROCHLORIDE 0.4 MG: 0.4 CAPSULE ORAL at 16:55

## 2021-12-25 RX ADMIN — SODIUM CHLORIDE, PRESERVATIVE FREE 10 ML: 5 INJECTION INTRAVENOUS at 21:55

## 2021-12-25 RX ADMIN — MIDODRINE HYDROCHLORIDE 10 MG: 5 TABLET ORAL at 08:12

## 2021-12-25 RX ADMIN — Medication 1000 UNITS: at 08:12

## 2021-12-25 RX ADMIN — BUDESONIDE 500 MCG: 0.5 SUSPENSION RESPIRATORY (INHALATION) at 10:03

## 2021-12-25 RX ADMIN — SODIUM CHLORIDE, PRESERVATIVE FREE 10 ML: 5 INJECTION INTRAVENOUS at 08:13

## 2021-12-25 RX ADMIN — OXYCODONE HYDROCHLORIDE AND ACETAMINOPHEN 500 MG: 500 TABLET ORAL at 21:48

## 2021-12-25 RX ADMIN — GLIPIZIDE 5 MG: 5 TABLET ORAL at 16:55

## 2021-12-25 RX ADMIN — INSULIN LISPRO 1 UNITS: 100 INJECTION, SOLUTION INTRAVENOUS; SUBCUTANEOUS at 21:53

## 2021-12-25 RX ADMIN — ZINC SULFATE 220 MG (50 MG) CAPSULE 100 MG: CAPSULE at 08:12

## 2021-12-25 RX ADMIN — FUROSEMIDE 40 MG: 40 TABLET ORAL at 16:55

## 2021-12-25 RX ADMIN — TRAZODONE HYDROCHLORIDE 50 MG: 50 TABLET ORAL at 21:48

## 2021-12-25 RX ADMIN — ASPIRIN 81 MG: 81 TABLET, COATED ORAL at 21:48

## 2021-12-25 RX ADMIN — MIDODRINE HYDROCHLORIDE 10 MG: 5 TABLET ORAL at 12:11

## 2021-12-25 RX ADMIN — OXYCODONE HYDROCHLORIDE AND ACETAMINOPHEN 500 MG: 500 TABLET ORAL at 08:12

## 2021-12-25 RX ADMIN — ATORVASTATIN CALCIUM 40 MG: 40 TABLET, FILM COATED ORAL at 21:48

## 2021-12-25 RX ADMIN — FUROSEMIDE 40 MG: 40 TABLET ORAL at 08:12

## 2021-12-25 RX ADMIN — MIDODRINE HYDROCHLORIDE 10 MG: 5 TABLET ORAL at 16:55

## 2021-12-25 RX ADMIN — POLYETHYLENE GLYCOL (3350) 17 G: 17 POWDER, FOR SOLUTION ORAL at 08:13

## 2021-12-25 RX ADMIN — CLOPIDOGREL BISULFATE 75 MG: 75 TABLET ORAL at 08:13

## 2021-12-25 RX ADMIN — IPRATROPIUM BROMIDE AND ALBUTEROL SULFATE 3 ML: .5; 3 SOLUTION RESPIRATORY (INHALATION) at 10:03

## 2021-12-25 RX ADMIN — DOXYCYCLINE HYCLATE 100 MG: 100 CAPSULE ORAL at 21:48

## 2021-12-25 RX ADMIN — GLIPIZIDE 5 MG: 5 TABLET ORAL at 08:12

## 2021-12-25 RX ADMIN — TAMSULOSIN HYDROCHLORIDE 0.4 MG: 0.4 CAPSULE ORAL at 08:12

## 2021-12-25 RX ADMIN — AMIODARONE HYDROCHLORIDE 200 MG: 200 TABLET ORAL at 08:12

## 2021-12-25 RX ADMIN — APIXABAN 2.5 MG: 2.5 TABLET, FILM COATED ORAL at 21:48

## 2021-12-25 RX ADMIN — APIXABAN 2.5 MG: 2.5 TABLET, FILM COATED ORAL at 08:12

## 2021-12-25 RX ADMIN — IPRATROPIUM BROMIDE AND ALBUTEROL SULFATE 3 ML: .5; 3 SOLUTION RESPIRATORY (INHALATION) at 05:03

## 2021-12-25 RX ADMIN — DOXYCYCLINE HYCLATE 100 MG: 100 CAPSULE ORAL at 08:12

## 2021-12-25 RX ADMIN — CEPHALEXIN 250 MG: 250 CAPSULE ORAL at 08:12

## 2021-12-25 ASSESSMENT — PAIN SCALES - GENERAL
PAINLEVEL_OUTOF10: 0

## 2021-12-25 NOTE — PROGRESS NOTES
Physical Therapy  Facility/Department: [facility] AT THE [facility] MED SURG  Daily Treatment Note  NAME: Fernando Galeas  : 10/31/1930  MRN: [id number]    Date of Service: 2021    Discharge Recommendations:  Continue to assess pending progress,Subacute/Skilled Nursing Facility,IP Rehab        Assessment   Treatment Diagnosis: General weakness  Prognosis: Good  PT Education: PT Role;Goals;Plan of Care  Patient Education: Educated pt on above with fair to good understanding. REQUIRES PT FOLLOW UP: Yes  Activity Tolerance  Activity Tolerance: Patient Tolerated treatment well;Patient limited by endurance; Patient limited by fatigue;Treatment limited secondary to medical complications (free text)  Activity Tolerance: Pt limited d/t low BPs. Patient Diagnosis(es): The primary encounter diagnosis was Syncope, unspecified syncope type. Diagnoses of Abnormal chest x-ray, Renal dysfunction, Elevated troponin, and Pyuria were also pertinent to this visit. has a past medical history of Acute and chronic respiratory failure with hypoxia ([address] CAD (coronary artery disease), Cataracts, bilateral, Elevated troponin, Glaucoma, Hyperlipidemia, Hypertension, TIA (transient ischemic attack), and Type II or unspecified type diabetes mellitus without mention of complication, not stated as uncontrolled. has a past surgical history that includes Coronary artery bypass graft (); Cataract removal with implant (Bilateral,  and 12/3/2013); and Inguinal hernia repair (Bilateral, 12). Restrictions  Restrictions/Precautions  Restrictions/Precautions: General Precautions,Fall Risk  Subjective   General  Chart Reviewed: Yes  Response To Previous Treatment: Patient with no complaints from previous session. Family / Caregiver Present: No  Referring Practitioner: Dr. Yahaira Carson MD  Subjective  Subjective: Pt with no complaints of pain. General Comment  Comments: Hold transfers per Callie Silva RN d/t low BPs. Orientation  Orientation  Overall Orientation Status: Within Functional Limits  Cognition      Objective   Bed mobility  Supine to Sit: Unable to assess  Sit to Supine: Unable to assess  Comment: Held per pt request d/t fatigue. Transfers  Sit to Stand: Unable to assess  Stand to sit: Unable to assess  Comment: Hold transfers per Jennifer Crum RN d/t low BPs. Ambulation  Ambulation?: No        Exercises  Straight Leg Raise: 20x  Quad Sets: 20x  Heelslides: 20x  Gluteal Sets: 20x  Hip Abduction: 20x  Knee Short Arc Quad: 20x  Ankle Pumps: 20x  Comments: Above exercises completed in supine with AAROM as needed bilat. G-Code     OutComes Score    AM-PAC Score    Goals  Short term goals  Time Frame for Short term goals: 20 days  Short term goal 1: Patient to complete sit/stand and stand pivot transfers with CG/minAx1 and no LOB to decrease fall risk. Short term goal 2: Patient to ambulate 20ft with minAx1 and no LOB to decrease fall risk and improve mobility. Short term goal 3: Patient to tolerate 20-30 min of ther ex/act to improve functional strength. Short term goal 4: Patient to have good static sitting balance to decrease fall risk. Plan    Plan  Times per week: 7  Times per day: Twice a day  Current Treatment Recommendations: Strengthening,Neuromuscular Re-education,Home Exercise Program,ROM,Safety Education & [place] Training,Patient/Caregiver Education & Training,Functional Mobility Training,Transfer Training,Gait Training  Safety Devices  Type of devices:  All fall risk precautions in place,Call light within reach,Nurse notified,Gait belt,Patient at risk for falls,Bed alarm in place,Left in bed     Therapy Time   Individual Concurrent Group Co-treatment   Time In 0850         Time Out 0915         Minutes [address] [place] [address]

## 2021-12-25 NOTE — CONSULTS
[facility]    [facility]  12/25/2021 2:00 PM    Pt Name:    Tyler Osborne  MRN:     [id number]   [id number]  YOB: 1930  Admit Date:    12/22/2021 12:57 PM  Primary Care Physician:  Bobbi Hardin DO    [facility] Number:   [id number]    Reason for Consult:  JANINA, CKD, diuretic management  Requesting provider:  SADIQ Bhandari  TELEHEALTH EVALUATION -- Audio/Visual ([facility])     Patient's Location: [facility]  [de-identified] (myself) location:   [address] [address]  Patient's RN: Present    History:   The patient is a 80 y.o. elderly white male with history of diabetes, CKD stage IV, hypertension, coronary artery disease, history of coronary artery bypass graft, mild pulmonary hypertension who was recently discharged from the hospital to rehab. Apparently after patient reached his rehab place he started to have some nausea. There was also an episode of unresponsiveness per medical chart. .  He had an episode of vomiting. He was found to be hypoxic. He was sent back to the emergency room. He was noted to have elevated troponin. CT head was negative. Chest x-ray showed multifocal infiltrates. Serum creatinine was 3.67. Patient's baseline serum creatinine runs close to 2.7-3.0. More recently his creatinine has been around 2.5-3.0. Patient seen and examined via video visit. Currently he is requiring 5 L of nasal cannula. Patient has shortness of breath worse with minimal exertion or activity. He has quite a bit of lower extremity edema. He is very hard of hearing. Family members are in the room. Case discussed with RN.       Past Medical History:  Past Medical History:   Diagnosis Date    Acute and chronic respiratory failure with hypoxia ([address] 11/16/2021    CAD (coronary artery disease)     Cataracts, bilateral     Elevated troponin 12/7/2021    Glaucoma     Hyperlipidemia     Hypertension  TIA (transient ischemic attack)     Type II or unspecified type diabetes mellitus without mention of complication, not stated as uncontrolled        Past Surgical History:  Past Surgical History:   Procedure Laterality Date    CATARACT REMOVAL WITH IMPLANT Bilateral 11/219/2013 and 12/3/2013    CORONARY ARTERY BYPASS GRAFT  2000    INGUINAL HERNIA REPAIR Bilateral 06/19/12       Family History:  Family History   Problem Relation Age of Onset    Heart Disease Father     Heart Disease Brother        Social History:  Social History     Socioeconomic History    Marital status:      Spouse name: Not on file    Number of children: Not on file    Years of education: Not on file    Highest education level: Not on file   Occupational History    Not on file   Tobacco Use    Smoking status: Never Smoker    Smokeless tobacco: Never Used   Substance and Sexual Activity    Alcohol use: Yes     Comment: very very very seldom    Drug use: No    Sexual activity: Not on file   Other Topics Concern    Not on file   Social History Narrative    Not on file     Social Determinants of Health     Financial Resource Strain:     Difficulty of Paying Living Expenses: Not on file   Food Insecurity:     Worried About Running Out of Food in the Last Year: Not on file    Mera of Food in the Last Year: Not on file   Transportation Needs:     Lack of Transportation (Medical): Not on file    Lack of Transportation (Non-Medical):  Not on file   Physical Activity:     Days of Exercise per Week: Not on file    Minutes of Exercise per Session: Not on file   Stress:     Feeling of Stress : Not on file   Social Connections:     Frequency of Communication with Friends and Family: Not on file    Frequency of Social Gatherings with Friends and Family: Not on file    Attends Evangelical Services: Not on file    Active Member of Clubs or Organizations: Not on file    Attends Club or Organization Meetings: Not on file  Marital Status: Not on file   Intimate Partner Violence:     Fear of Current or Ex-Partner: Not on file    Emotionally Abused: Not on file    Physically Abused: Not on file    Sexually Abused: Not on file   Housing Stability:     Unable to Pay for Housing in the Last Year: Not on file    Number of [place] in the Last Year: Not on file    Unstable Housing in the Last Year: Not on file       Home Meds:  Prior to Admission medications    Medication Sig Start Date End Date Taking?  Authorizing Provider   midodrine (PROAMATINE) 10 MG tablet Take 1 tablet by mouth 3 times daily (with meals) 12/21/21  Yes FRANKLIN Gonzalez CNP   doxycycline hyclate (VIBRA-TABS) 100 MG tablet Take 1 tablet by mouth 2 times daily for 10 days 12/21/21 12/31/21 Yes FRANKLIN Gonzalez CNP   cephALEXin (KEFLEX) 500 MG capsule Take 1 capsule by mouth 3 times daily for 10 days 12/21/21 12/31/21 Yes FRANKLIN Gonzalez CNP   tamsulosin (FLOMAX) 0.4 MG capsule Take 1 capsule by mouth 2 times daily  Patient taking differently: Take 0.4 mg by mouth daily  12/21/21  Yes FRANKLIN Gonzalez CNP   metoprolol succinate (TOPROL XL) 25 MG extended release tablet Take 1 tablet by mouth daily 12/11/21  Yes FRANKLIN Gonzalez CNP   ascorbic acid (VITAMIN C) 1000 MG tablet Take 0.5 tablets by mouth 2 times daily 12/10/21  Yes FRANKLIN Gonzalez CNP   traZODone (DESYREL) 50 MG tablet Take 50 mg by mouth nightly   Yes Historical Provider, MD   polyethylene glycol (GLYCOLAX) 17 GM/SCOOP powder Take 17 g by mouth daily 12/2/21 1/1/22 Yes Nilsa HeartFRANKLIN CNP   sodium bicarbonate 650 MG tablet Take 1 tablet by mouth 2 times daily 11/27/21  Yes FRANKLIN Gonzalez CNP   amiodarone (CORDARONE) 200 MG tablet Take 1 tablet by mouth 2 times daily 11/27/21  Yes FRANKLIN Gonzalez CNP   budesonide (PULMICORT) 0.5 MG/2ML nebulizer suspension Take 2 mLs by nebulization 2 times daily 11/27/21  Yes Anatoliy MCCOY Barbara Boyle, APRN - CNP   ipratropium-albuterol (DUONEB) 0.5-2.5 (3) MG/3ML SOLN nebulizer solution Inhale 3 mLs into the lungs 4 times daily 11/27/21  Yes FRANKLIN Shin CNP   apixaban (ELIQUIS) 2.5 MG TABS tablet Take 1 tablet by mouth 2 times daily 11/27/21  Yes FRANKLIN Shin CNP   polyethylene glycol (GLYCOLAX) 17 g packet Take 17 g by mouth daily as needed for Constipation 11/27/21 12/27/21 Yes FRANKLIN Shin CNP   zinc sulfate (ZINCATE) 220 (50 Zn) MG capsule Take 2 capsules by mouth daily 11/28/21  Yes FRANKLIN Shin CNP   famotidine (PEPCID) 10 MG tablet Take 1 tablet by mouth nightly 11/27/21  Yes FRANKLIN Shin CNP   vitamin D3 (CHOLECALCIFEROL) 25 MCG (1000 UT) TABS tablet Take 1 tablet by mouth daily 11/27/21  Yes FRANKLIN Shin CNP   furosemide (LASIX) 40 MG tablet Take 40 mg by mouth daily   Yes Historical Provider, MD   glipiZIDE (GLUCOTROL) 5 MG tablet Take 5 mg by mouth 2 times daily (before meals)   Yes Historical Provider, MD   atorvastatin (LIPITOR) 40 MG tablet Take 1 tablet by mouth nightly 3/28/18  Yes Caity Guzman MD   clopidogrel (PLAVIX) 75 MG tablet Take 1 tablet by mouth daily 3/29/18  Yes Caity Guzman MD   aspirin 81 MG EC tablet Take 81 mg by mouth nightly    Yes Historical Provider, MD   acetaminophen (TYLENOL) 650 MG suppository Place 650 mg rectally every 8 hours as needed for Fever    Historical Provider, MD   zolpidem (AMBIEN) 5 MG tablet Take 5 mg by mouth nightly as needed for Sleep.     Historical Provider, MD   benzonatate (TESSALON) 100 MG capsule Take 100 mg by mouth 3 times daily as needed for Cough    Historical Provider, MD   albuterol (PROVENTIL) (2.5 MG/3ML) 0.083% nebulizer solution Take 3 mLs by nebulization every 4 hours as needed for Wheezing 11/27/21   FRANKLIN Shin CNP       Review of Systems:  Constitutional: Positive for shortness of breath, generalized weakness  Head: Negative for headaches  Eyes: Negative for blurry vision or discharge  Ears: Negative for ear pain or hearing changes  Nose: Negative for runny nose or epistaxis  Respiratory: Positive for shortness of breath  Cardiovascular: Negative for chest pain  GI: Positive for nausea and vomiting. Negative for hematochezia and melena  Musculoskeletal: Negative for joint pain    All other review of systems were reviewed and negative    Current Meds:  Infusion:    sodium chloride      dextrose       Meds:    amiodarone  200 mg Oral BID    apixaban  2.5 mg Oral BID    ascorbic acid  500 mg Oral BID    aspirin  81 mg Oral Nightly    atorvastatin  40 mg Oral Nightly    budesonide  0.5 mg Nebulization BID    clopidogrel  75 mg Oral Daily    doxycycline hyclate  100 mg Oral BID    famotidine  10 mg Oral Nightly    furosemide  40 mg Oral Daily    glipiZIDE  5 mg Oral BID AC    ipratropium-albuterol  3 mL Inhalation 4x daily    metoprolol succinate  25 mg Oral Daily    midodrine  10 mg Oral TID WC    sodium bicarbonate  650 mg Oral BID    tamsulosin  0.4 mg Oral BID    traZODone  50 mg Oral Nightly    sodium chloride flush  5-40 mL IntraVENous 2 times per day    polyethylene glycol  17 g Oral Daily    Vitamin D  1,000 Units Oral Daily    zinc sulfate  100 mg Oral Daily    insulin lispro  0-12 Units SubCUTAneous TID WC    insulin lispro  0-6 Units SubCUTAneous Nightly     Meds prn: acetaminophen, benzonatate, sodium chloride flush, sodium chloride, polyethylene glycol, acetaminophen **OR** acetaminophen, albuterol, glucose, dextrose, glucagon (rDNA), dextrose     Allergies/Intolerances: ALLERGIES: Patient has no known allergies. 24HR INTAKE/OUTPUT:      Intake/Output Summary (Last 24 hours) at 12/25/2021 1400  Last data filed at 12/25/2021 1314  Gross per 24 hour   Intake 1200 ml   Output 1350 ml   Net -150 ml     I/O last 3 completed shifts:   In: 1340 [P.O.:1340]  Out: 900 [Urine:900]  I/O this shift:  In: 100 [P.O.:100]  Out: 450 [Urine:450]  Admission weight: 169 lb (76.7 kg)  Wt Readings from Last 3 Encounters:   12/23/21 169 lb (76.7 kg)   12/21/21 169 lb 1.6 oz (76.7 kg)   12/10/21 171 lb 3.2 oz (77.7 kg)     Body mass index is 29.01 kg/m². Physical Examination:  VITALS:   Vitals:    12/25/21 0458 12/25/21 0505 12/25/21 0750 12/25/21 1003   BP: (!) 96/45  (!) 95/45    Pulse:   66    Resp:   20    Temp:   98.3 °F (36.8 °C)    TempSrc:   Temporal    SpO2:  91% 91% 90%   Weight:       Height:         Weight:   Wt Readings from Last 3 Encounters:   12/23/21 169 lb (76.7 kg)   12/21/21 169 lb 1.6 oz (76.7 kg)   12/10/21 171 lb 3.2 oz (77.7 kg)     Constitutional and General Appearance: alert and cooperative, appears comfortable, no apparent distress, not diaphoretic, Appears well-developed and well-nourished. Hard of hearing  Lungs: no use of accessory muscles or labored breathing noted, Respiratory effort observed to be normal  Extremities: Bilateral lower extremity edema noted  Psychiatric: Normal mood and affect      Due to this being a TeleHealth encounter, evaluation of the following organ systems is limited: EENT/Resp/CV/GI//MS/Neuro/Skin/Lymph      Lab Data  CBC:   Recent Labs     12/24/21  0518   WBC 8.0   HGB 7.4*   HCT 24.1*        BMP:  Recent Labs     12/24/21  0518      K 4.9      CO2 26   BUN 92*   CREATININE 3.33*   GLUCOSE 178*   CALCIUM 8.4*     Hepatic:   Recent Labs     12/24/21  0518   LABALBU 2.5*   AST 17   ALT 17   BILITOT 0.36   ALKPHOS 129       Chest x-ray shows multifocal infiltrates  proBNP 17,000    Old tests reviewed. Echo: Echo November 2021 EF 55%, mild pulmonary hypertension. Labs: Baseline serum creatinine 2.7-3.0. Impression and Plan:  1. JANINA on CKD 4 in setting of cardiorenal syndrome and some chronic hypotension. Serum creatinine fluctuating depending on volume status. Clinically has some evidence of volume overload.   Has elevated beta natruretic peptide. Has 2-3+ lower extremity edema. Will increase Lasix to 40 mg twice daily to optimize fluid status. Patient/family considering comfort care measures. Awaiting to discuss with hospice on Monday. 2. Acute hypoxic respiratory failure in setting of recent pneumonia as well as diastolic CHF. Chest x-ray shows multifocal infiltrates. 3. Acute on chronic diastolic CHF  4. Azotemia in setting of diuretics  5. Diabetes mellitus  6. Recent urinary retention  7. Anemia. Check iron studies in a.m. Thank you for the consult. Please feel free to call me if you have any questions. Discussed with patient and staff    Jose Guadalupe Orozco MD  [facility]    Pursuant to the emergency declaration under the [facility] waiver authority and the Coronavirus Preparedness and [employer] Act, this Virtual  Visit was conducted, with patient's consent, to reduce the patient's risk of exposure to COVID-19. Caregiver (Patient's RN) was present when appropriate. Virtual visit was done to address concerns as mentioned above. Due to this being a TeleHealth encounter (During EYPJE-05 public health emergency), evaluation of the following organ systems was limited: EENT/Resp/CV/GI//MS/Neuro/Skin/Lymph     Services were provided through a video synchronous discussion virtually to substitute for in-person visit.

## 2021-12-25 NOTE — PROGRESS NOTES
Deisi Casas M.D. Internal Medicine Progress Note    Patient: Sally Cook  Date of Admission: 12/22/2021 12:57 PM  Hospital Day # 3  Date of Evaluation: 12/25/2021      SUBJECTIVE:    Patient seen for f/u of Acute alteration in mental status. He is feeling better and is alert and oriented. He states he knows he \"needs to make some decisions\" about end of life issues and his end-stage CHF / CKD. His family was here on Thursday and they had a \"long talk\" about those issues that helped make him feel better. He feels OK today. He denies any chest pain or SOB. He is tolerating diet. He would like to go to an ECF in [facility] when he is DC'd    ROS:   Constitutional: negative  for fevers, and negative for chills. Respiratory: negative for shortness of breath, negative for cough, and negative for wheezing  Cardiovascular: negative for chest pain, and negative for palpitations  Gastrointestinal: negative for abdominal pain, negative for nausea,negative for vomiting, negative for diarrhea, and negative for constipation     All other systems were reviewed with the patient and are negative unless otherwise stated in HPI    -----------------------------------------------------------------  OBJECTIVE:  Vitals:   Temp: 98.3 °F (36.8 °C)  BP: (!) 95/45  Resp: 20  Pulse: 66  SpO2: 91 % on supplemental O2    Weight  Wt Readings from Last 3 Encounters:   12/23/21 169 lb (76.7 kg)   12/21/21 169 lb 1.6 oz (76.7 kg)   12/10/21 171 lb 3.2 oz (77.7 kg)     Body mass index is 29.01 kg/m². 24HR INTAKE/OUTPUT:      Intake/Output Summary (Last 24 hours) at 12/25/2021 1000  Last data filed at 12/25/2021 0807  Gross per 24 hour   Intake 1440 ml   Output 1050 ml   Net 390 ml       Exam:  GEN:    Awake, alert and oriented x3. EYES:  EOMI, pupils equal   NECK: Supple. No lymphadenopathy.   No carotid bruit  CVS:    regular rate and rhythm, no audible murmur  PULM:  CTA, no wheezes, rales or rhonchi, no acute respiratory distress  ABD:    Bowels sounds normal.  Abdomen is soft. No distention. no tenderness to palpation. EXT:   no edema bilaterally . No calf tenderness. NEURO: Moves all extremities. Motor and sensory are grossly intact  SKIN:  No rashes.   No skin lesions.    -----------------------------------------------------------------  DATA:  Complete Blood Count:   Recent Labs     12/22/21  1316 12/24/21  0518   WBC 12.0* 8.0   RBC 2.59* 2.41*   HGB 7.9* 7.4*   HCT 26.2* 24.1*   .2 100.0   MCH 30.5 30.7   MCHC 30.2 30.7   RDW 16.0* 16.4*    185   MPV 11.4 11.4        Last 3 Blood Glucose:   Recent Labs     12/22/21  1310 12/22/21  1316 12/24/21  0518   GLUCOSE 310 333* 178*        Comprehensive Metabolic Profile:   Recent Labs     12/22/21  1310 12/22/21  1316 12/24/21  0518   NA  --  136 142   K  --  5.2 4.9   CL  --  94* 101   CO2  --  23 26   BUN  --  83* 92*   CREATININE  --  3.67* 3.33*   GLUCOSE 310 333* 178*   CALCIUM  --  8.9 8.4*   PROT  --  6.5 5.5*   LABALBU  --  2.9* 2.5*   BILITOT  --  0.51 0.36   ALKPHOS  --  167* 129   AST  --  25 17   ALT  --  17 17        Urinalysis:   Lab Results   Component Value Date    NITRU NEGATIVE 12/22/2021    COLORU Yellow 12/22/2021    PHUR 6.0 12/22/2021    WBCUA 10 TO 20 12/22/2021    RBCUA 10 TO 20 12/22/2021    MUCUS NOT REPORTED 12/22/2021    TRICHOMONAS NOT REPORTED 12/22/2021    YEAST NOT REPORTED 12/22/2021    BACTERIA NOT REPORTED 12/22/2021    SPECGRAV 1.015 12/22/2021    LEUKOCYTESUR TRACE 12/22/2021    UROBILINOGEN Normal 12/22/2021    BILIRUBINUR NEGATIVE 12/22/2021    GLUCOSEU 1+ 12/22/2021    KETUA NEGATIVE 12/22/2021    AMORPHOUS NOT REPORTED 12/22/2021       HgBA1c:    Lab Results   Component Value Date    LABA1C 10.3 12/16/2021       Lactic Acid:   Lab Results   Component Value Date    LACTA 2.2 11/15/2021        High Sensitivity Troponin:  Recent Labs     12/22/21  1316 12/22/21  1543 12/23/21  0815   TROPHS 201* 154* 187* Radiology/Imaging:  CT Head WO Contrast   Final Result   No acute intracranial abnormality. XR CHEST PORTABLE   Final Result   No change in multifocal infiltrates suggesting pneumonia probable small   effusions. Recommend continued follow-up.                  ASSESSMENT / PLAN:  Acute alteration in mental status / generalized weakness / metabolic encephalopathy  · Improving   · Continue PT / OT  · DC ambien (but continue Trazodone, which has less cognitive side effects)  · Type 2 myocardial infection with acute rise then fall in troponin  · Appreciate cardiology consult  · Chronic diastolic CHF  · Continue Lasix  · ACE / BB not indicated per cardiology notes  · Paroxysmal atrial flutter  · Continue Amiodarone for rhythm control  · Continue Eliquis for stroke prophylaxis  · Recent Klebsiella UTI on 12/6/21  · DC Keflex - last 2 UA's negative 12/16/21 and 12/22/21  · Last 2 urine cultures 12/16/221 and 12/22/21 = no growth  · Diabetes Mellitus  · Continue Glipizide  · Hypoglycemic protocol in place   · Nutrition status:   · moderate malnutrition  · Dietician consult initiated  · Hospital Prophylaxis:   · DVT: Eliquis   · Stress Ulcer: H2 Blocker   · High risk medications: none   · Disposition:    · Discharge plan is pending - Pt would like to go to [facility] in [facility] when Solomon Raymundo MD , M.D.  12/25/2021  10:00 AM

## 2021-12-25 NOTE — PROGRESS NOTES
Shift assessment and vitals obtained at this time as charted. SpO2 91% on 5 L via nasal cannula. Patient is alert and oriented x4. Patient also assisted to reposition in the bed and sit up to eat breakfast. Morning medications also given at this time. Patient denies other needs, will continue to monitor.

## 2021-12-25 NOTE — PROGRESS NOTES
Occupational Therapy  Facility/Department: [facility] AT THE [facility] MED SURG  Daily Treatment Note  NAME: Jessica Inman  : 10/31/1930  MRN: [id number]    Date of Service: 2021    Discharge Recommendations:  ECF with OT,Subacute/Skilled Nursing Facility       Assessment      OT Education: Energy Conservation  Patient Education: Educated on deep breathing techniques during ther ex. Safety Devices  Safety Devices in place: Yes  Type of devices: Left in bed;Call light within reach; Bed alarm in place         Patient Diagnosis(es): The primary encounter diagnosis was Syncope, unspecified syncope type. Diagnoses of Abnormal chest x-ray, Renal dysfunction, Elevated troponin, and Pyuria were also pertinent to this visit. has a past medical history of Acute and chronic respiratory failure with hypoxia ([address] CAD (coronary artery disease), Cataracts, bilateral, Elevated troponin, Glaucoma, Hyperlipidemia, Hypertension, TIA (transient ischemic attack), and Type II or unspecified type diabetes mellitus without mention of complication, not stated as uncontrolled. has a past surgical history that includes Coronary artery bypass graft (); Cataract removal with implant (Bilateral,  and 12/3/2013); and Inguinal hernia repair (Bilateral, 12). Restrictions  Restrictions/Precautions  Restrictions/Precautions: General Precautions,Fall Risk  Subjective   General  Chart Reviewed: Yes  Patient assessed for rehabilitation services?: Yes  Response to previous treatment: Patient with no complaints from previous session  Family / Caregiver Present: No  Referring Practitioner: Dr. Aleman Innocent  Diagnosis: Pyuria  Subjective  Subjective: Pt denies pain however reports fatigue due to inability to sleep at night. General Comment  Comments: Pt supine in bed with HOB elevated. Agreeable to B UE ther ex. Nursing requests pt to remain in bed.       Orientation     Objective Type of ROM/Therapeutic Exercise  Type of ROM/Therapeutic Exercise: Free weights  Comment: Pt completed B UE ther ex with 1# dumbell, 10 reps x 1 set x 5 planes with verbal prompts for EC and deep breathing techniques. Plan   Plan  Times per week: 7  Times per day: Daily  Current Treatment Recommendations: Strengthening,Safety Education & Training,Self-Care / ADL,Functional Mobility Training,Endurance Training  G-Code     OutComes Score                                                  AM-PAC Score             Goals  Short term goals  Time Frame for Short term goals: 21 visits  Short term goal 1: Patient to tolerate 10 minutes ther-ex/ther-act to increase strength/endurance for ADLs maintaining SPO2 above 90%  Short term goal 2: Patient to perform functional transfers with min A X2. Short term goal 3: Patient to complete ADL grooming with setup. Short term goal 4: Patient to complete UB bathing/dressing wtih min A.        Therapy Time   Individual Concurrent Group Co-treatment   Time In 0933         Time Out 0946         Minutes 13                 RICARDO Ivan/JANET

## 2021-12-26 LAB
ANION GAP SERPL CALCULATED.3IONS-SCNC: 12 MMOL/L (ref 9–17)
BUN BLDV-MCNC: 89 MG/DL (ref 8–23)
BUN/CREAT BLD: 28 (ref 9–20)
CALCIUM SERPL-MCNC: 8.8 MG/DL (ref 8.6–10.4)
CHLORIDE BLD-SCNC: 102 MMOL/L (ref 98–107)
CO2: 26 MMOL/L (ref 20–31)
CREAT SERPL-MCNC: 3.14 MG/DL (ref 0.7–1.2)
FERRITIN: 266 UG/L (ref 30–400)
GFR AFRICAN AMERICAN: 23 ML/MIN
GFR NON-AFRICAN AMERICAN: 19 ML/MIN
GFR SERPL CREATININE-BSD FRML MDRD: ABNORMAL ML/MIN/{1.73_M2}
GFR SERPL CREATININE-BSD FRML MDRD: ABNORMAL ML/MIN/{1.73_M2}
GLUCOSE BLD-MCNC: 131 MG/DL (ref 74–100)
GLUCOSE BLD-MCNC: 154 MG/DL (ref 70–99)
GLUCOSE BLD-MCNC: 201 MG/DL (ref 74–100)
GLUCOSE BLD-MCNC: 251 MG/DL (ref 74–100)
GLUCOSE BLD-MCNC: 261 MG/DL (ref 74–100)
HCT VFR BLD CALC: 26.5 % (ref 40.7–50.3)
HEMOGLOBIN: 8 G/DL (ref 13–17)
IRON: 36 UG/DL (ref 59–158)
MCH RBC QN AUTO: 30.3 PG (ref 25.2–33.5)
MCHC RBC AUTO-ENTMCNC: 30.2 G/DL (ref 28.4–34.8)
MCV RBC AUTO: 100.4 FL (ref 82.6–102.9)
NRBC AUTOMATED: 0 PER 100 WBC
PDW BLD-RTO: 17 % (ref 11.8–14.4)
PLATELET # BLD: 179 K/UL (ref 138–453)
PMV BLD AUTO: 11.2 FL (ref 8.1–13.5)
POTASSIUM SERPL-SCNC: 5.3 MMOL/L (ref 3.7–5.3)
RBC # BLD: 2.64 M/UL (ref 4.21–5.77)
SODIUM BLD-SCNC: 140 MMOL/L (ref 135–144)
WBC # BLD: 8.1 K/UL (ref 3.5–11.3)

## 2021-12-26 PROCEDURE — 6360000002 HC RX W HCPCS: Performed by: INTERNAL MEDICINE

## 2021-12-26 PROCEDURE — 85027 COMPLETE CBC AUTOMATED: CPT

## 2021-12-26 PROCEDURE — 97110 THERAPEUTIC EXERCISES: CPT

## 2021-12-26 PROCEDURE — 97530 THERAPEUTIC ACTIVITIES: CPT

## 2021-12-26 PROCEDURE — 80048 BASIC METABOLIC PNL TOTAL CA: CPT

## 2021-12-26 PROCEDURE — 82947 ASSAY GLUCOSE BLOOD QUANT: CPT

## 2021-12-26 PROCEDURE — 94640 AIRWAY INHALATION TREATMENT: CPT

## 2021-12-26 PROCEDURE — 83540 ASSAY OF IRON: CPT

## 2021-12-26 PROCEDURE — 6370000000 HC RX 637 (ALT 250 FOR IP): Performed by: INTERNAL MEDICINE

## 2021-12-26 PROCEDURE — 2580000003 HC RX 258: Performed by: INTERNAL MEDICINE

## 2021-12-26 PROCEDURE — 94761 N-INVAS EAR/PLS OXIMETRY MLT: CPT

## 2021-12-26 PROCEDURE — 82728 ASSAY OF FERRITIN: CPT

## 2021-12-26 PROCEDURE — 36415 COLL VENOUS BLD VENIPUNCTURE: CPT

## 2021-12-26 PROCEDURE — 1200000000 HC SEMI PRIVATE

## 2021-12-26 PROCEDURE — 2700000000 HC OXYGEN THERAPY PER DAY

## 2021-12-26 RX ADMIN — IPRATROPIUM BROMIDE AND ALBUTEROL SULFATE 3 ML: .5; 3 SOLUTION RESPIRATORY (INHALATION) at 05:17

## 2021-12-26 RX ADMIN — MIDODRINE HYDROCHLORIDE 10 MG: 5 TABLET ORAL at 12:03

## 2021-12-26 RX ADMIN — INSULIN LISPRO 3 UNITS: 100 INJECTION, SOLUTION INTRAVENOUS; SUBCUTANEOUS at 21:29

## 2021-12-26 RX ADMIN — DOXYCYCLINE HYCLATE 100 MG: 100 CAPSULE ORAL at 21:28

## 2021-12-26 RX ADMIN — TRAZODONE HYDROCHLORIDE 50 MG: 50 TABLET ORAL at 21:28

## 2021-12-26 RX ADMIN — IPRATROPIUM BROMIDE AND ALBUTEROL SULFATE 3 ML: .5; 3 SOLUTION RESPIRATORY (INHALATION) at 19:50

## 2021-12-26 RX ADMIN — ZINC SULFATE 220 MG (50 MG) CAPSULE 100 MG: CAPSULE at 09:11

## 2021-12-26 RX ADMIN — IPRATROPIUM BROMIDE AND ALBUTEROL SULFATE 3 ML: .5; 3 SOLUTION RESPIRATORY (INHALATION) at 16:11

## 2021-12-26 RX ADMIN — FUROSEMIDE 40 MG: 40 TABLET ORAL at 09:11

## 2021-12-26 RX ADMIN — OXYCODONE HYDROCHLORIDE AND ACETAMINOPHEN 500 MG: 500 TABLET ORAL at 09:11

## 2021-12-26 RX ADMIN — APIXABAN 2.5 MG: 2.5 TABLET, FILM COATED ORAL at 09:11

## 2021-12-26 RX ADMIN — FAMOTIDINE 10 MG: 10 TABLET ORAL at 21:28

## 2021-12-26 RX ADMIN — FUROSEMIDE 40 MG: 40 TABLET ORAL at 17:47

## 2021-12-26 RX ADMIN — BUDESONIDE 500 MCG: 0.5 SUSPENSION RESPIRATORY (INHALATION) at 19:50

## 2021-12-26 RX ADMIN — MIDODRINE HYDROCHLORIDE 10 MG: 5 TABLET ORAL at 17:47

## 2021-12-26 RX ADMIN — GLIPIZIDE 5 MG: 5 TABLET ORAL at 17:47

## 2021-12-26 RX ADMIN — GLIPIZIDE 5 MG: 5 TABLET ORAL at 06:38

## 2021-12-26 RX ADMIN — METOPROLOL SUCCINATE 25 MG: 25 TABLET, EXTENDED RELEASE ORAL at 09:12

## 2021-12-26 RX ADMIN — POLYETHYLENE GLYCOL (3350) 17 G: 17 POWDER, FOR SOLUTION ORAL at 09:21

## 2021-12-26 RX ADMIN — APIXABAN 2.5 MG: 2.5 TABLET, FILM COATED ORAL at 21:28

## 2021-12-26 RX ADMIN — ASPIRIN 81 MG: 81 TABLET, COATED ORAL at 21:28

## 2021-12-26 RX ADMIN — TAMSULOSIN HYDROCHLORIDE 0.4 MG: 0.4 CAPSULE ORAL at 09:11

## 2021-12-26 RX ADMIN — DOXYCYCLINE HYCLATE 100 MG: 100 CAPSULE ORAL at 09:11

## 2021-12-26 RX ADMIN — CLOPIDOGREL BISULFATE 75 MG: 75 TABLET ORAL at 09:12

## 2021-12-26 RX ADMIN — OXYCODONE HYDROCHLORIDE AND ACETAMINOPHEN 500 MG: 500 TABLET ORAL at 21:28

## 2021-12-26 RX ADMIN — ATORVASTATIN CALCIUM 40 MG: 40 TABLET, FILM COATED ORAL at 21:28

## 2021-12-26 RX ADMIN — SODIUM CHLORIDE, PRESERVATIVE FREE 10 ML: 5 INJECTION INTRAVENOUS at 09:21

## 2021-12-26 RX ADMIN — Medication 1000 UNITS: at 09:11

## 2021-12-26 RX ADMIN — MIDODRINE HYDROCHLORIDE 10 MG: 5 TABLET ORAL at 09:11

## 2021-12-26 RX ADMIN — TAMSULOSIN HYDROCHLORIDE 0.4 MG: 0.4 CAPSULE ORAL at 17:46

## 2021-12-26 RX ADMIN — AMIODARONE HYDROCHLORIDE 200 MG: 200 TABLET ORAL at 09:11

## 2021-12-26 RX ADMIN — SODIUM CHLORIDE, PRESERVATIVE FREE 10 ML: 5 INJECTION INTRAVENOUS at 21:27

## 2021-12-26 RX ADMIN — AMIODARONE HYDROCHLORIDE 200 MG: 200 TABLET ORAL at 21:28

## 2021-12-26 ASSESSMENT — PAIN SCALES - GENERAL
PAINLEVEL_OUTOF10: 0

## 2021-12-26 NOTE — PROGRESS NOTES
Pt. Is alert and oriented at time of assessment. Pt. Answers all orientation questions appropriately. Pt. Vitals are assessed, pt. Vitals are WDL. Pt. Denies any pain at time of assessment. Pt. Has call light in reach and is positioned for comfort. Pt. Denies any further needs at this time.

## 2021-12-26 NOTE — PROGRESS NOTES
tasks.      Orientation     Objective                                                                Type of ROM/Therapeutic Exercise  Type of ROM/Therapeutic Exercise: Free weights  Comment: (B)UE ther ex, 1# dumbells x15 reps and x5 variations for increased UE stg necessary in ADL tasks. Intermittent RBs taken as needed throughout secondary to increased fatigue. Plan   Plan  Times per week: 7  Times per day: Daily  Current Treatment Recommendations: Strengthening,Safety Education & Training,Self-Care / ADL,Functional Mobility Training,Endurance Training  G-Code     OutComes Score                                                  AM-PAC Score             Goals  Short term goals  Time Frame for Short term goals: 21 visits  Short term goal 1: Patient to tolerate 10 minutes ther-ex/ther-act to increase strength/endurance for ADLs maintaining SPO2 above 90%  Short term goal 2: Patient to perform functional transfers with min A X2. Short term goal 3: Patient to complete ADL grooming with setup. Short term goal 4: Patient to complete UB bathing/dressing wtih min A.        Therapy Time   Individual Concurrent Group Co-treatment   Time In [address] [facility]         Time Out 1055         Minutes Manuel Haque

## 2021-12-26 NOTE — PROGRESS NOTES
Courtney Gerard M.D. Internal Medicine Progress Note    Patient: Tho Rojo  Date of Admission: 12/22/2021 12:57 PM  Hospital Day # 4  Date of Evaluation: 12/26/2021      SUBJECTIVE:    Patient seen for f/u of Acute alteration in mental status. He is feeling better and is alert and oriented. He states he feels tired today, and is \"wiped out\" after PT this morning. He denies any chest pain or SOB. He is tolerating diet. He would like to go to an ECF in [facility] when he is DC'd    ROS:   Constitutional: negative  for fevers, and negative for chills. Respiratory: negative for shortness of breath, negative for cough, and negative for wheezing  Cardiovascular: negative for chest pain, and negative for palpitations  Gastrointestinal: negative for abdominal pain, negative for nausea,negative for vomiting, negative for diarrhea, and negative for constipation     All other systems were reviewed with the patient and are negative unless otherwise stated in HPI    -----------------------------------------------------------------  OBJECTIVE:  Vitals:   Temp: 96.9 °F (36.1 °C)  BP: (!) 109/52  Resp: 20  Pulse: 70  SpO2: 94 % on supplemental O2    Weight  Wt Readings from Last 3 Encounters:   12/23/21 169 lb (76.7 kg)   12/21/21 169 lb 1.6 oz (76.7 kg)   12/10/21 171 lb 3.2 oz (77.7 kg)     Body mass index is 29.01 kg/m². 24HR INTAKE/OUTPUT:      Intake/Output Summary (Last 24 hours) at 12/26/2021 1118  Last data filed at 12/26/2021 0527  Gross per 24 hour   Intake 500 ml   Output 1300 ml   Net -800 ml       Exam:  GEN:    Awake, alert and oriented x3. EYES:  EOMI, pupils equal   NECK: Supple. No lymphadenopathy. No carotid bruit  CVS:    regular rate and rhythm, no audible murmur  PULM:  CTA, no wheezes, rales or rhonchi, no acute respiratory distress  ABD:    Bowels sounds normal.  Abdomen is soft. No distention. no tenderness to palpation. EXT:   no edema bilaterally . No calf tenderness.    NEURO: Moves all extremities. Motor and sensory are grossly intact  SKIN:  No rashes. No skin lesions.    -----------------------------------------------------------------  DATA:  Complete Blood Count:   Recent Labs     12/24/21 0518 12/26/21  0535   WBC 8.0 8.1   RBC 2.41* 2.64*   HGB 7.4* 8.0*   HCT 24.1* 26.5*   .0 100.4   MCH 30.7 30.3   MCHC 30.7 30.2   RDW 16.4* 17.0*    179   MPV 11.4 11.2        Last 3 Blood Glucose:   Recent Labs     12/24/21  0518 12/26/21  0535   GLUCOSE 178* 154*        Comprehensive Metabolic Profile:   Recent Labs     12/24/21 0518 12/26/21  0535    140   K 4.9 5.3    102   CO2 26 26   BUN 92* 89*   CREATININE 3.33* 3.14*   GLUCOSE 178* 154*   CALCIUM 8.4* 8.8   PROT 5.5*  --    LABALBU 2.5*  --    BILITOT 0.36  --    ALKPHOS 129  --    AST 17  --    ALT 17  --         Urinalysis:   Lab Results   Component Value Date    NITRU NEGATIVE 12/22/2021    COLORU Yellow 12/22/2021    PHUR 6.0 12/22/2021    WBCUA 10 TO 20 12/22/2021    RBCUA 10 TO 20 12/22/2021    MUCUS NOT REPORTED 12/22/2021    TRICHOMONAS NOT REPORTED 12/22/2021    YEAST NOT REPORTED 12/22/2021    BACTERIA NOT REPORTED 12/22/2021    SPECGRAV 1.015 12/22/2021    LEUKOCYTESUR TRACE 12/22/2021    UROBILINOGEN Normal 12/22/2021    BILIRUBINUR NEGATIVE 12/22/2021    GLUCOSEU 1+ 12/22/2021    KETUA NEGATIVE 12/22/2021    AMORPHOUS NOT REPORTED 12/22/2021     HgBA1c:    Lab Results   Component Value Date    LABA1C 10.3 12/16/2021     Radiology/Imaging:  CT Head WO Contrast   Final Result   No acute intracranial abnormality. XR CHEST PORTABLE   Final Result   No change in multifocal infiltrates suggesting pneumonia probable small   effusions. Recommend continued follow-up.                  ASSESSMENT / PLAN:  Acute alteration in mental status / generalized weakness / metabolic encephalopathy  · Mental status improving   · Continue PT / OT  · DC ambien (but continue Trazodone, which has less cognitive side effects)  · JANINA superimposed on CKD stage 4  · Appreciate nephrology consult  · Monitor renal function   · Bilateral multifocal opacities on CXR persistent since 11/15/21  · Has had multiple Abx courses with no change in infiltrates, therefore these likely represent sequelae of underlying interstitial lung disease vs persistent pneumonia (less likely)  · On doxycycline since 12/23  · Keflex DC'd  · Type 2 myocardial infection with acute rise then fall in troponin  · Appreciate cardiology consult  · Chronic diastolic CHF  · Continue Lasix  · ACE / BB not indicated per cardiology notes  · Paroxysmal atrial flutter  · Continue Amiodarone for rhythm control  · Continue Eliquis for stroke prophylaxis  · Recent Klebsiella UTI on 12/6/21  · DC Keflex - last 2 UA's negative 12/16/21 and 12/22/21  · Last 2 urine cultures 12/16/221 and 12/22/21 = no growth  · Diabetes Mellitus  · Continue Glipizide  · Hypoglycemic protocol in place   · Nutrition status:   · moderate malnutrition  · Dietician consult initiated  · Hospital Prophylaxis:   · DVT: Eliquis   · Stress Ulcer: H2 Blocker   · High risk medications: none   · Disposition:    · Discharge plan is pending - Pt would like to go to [facility] when Lucas Ruiz MD , M.D.  12/26/2021  11:18 AM

## 2021-12-26 NOTE — PROGRESS NOTES
Physical Therapy  Facility/Department: [facility] AT THE [facility] MED SURG  Daily Treatment Note  NAME: Andre Mccarthy  : 10/31/1930  MRN: [id number]    Date of Service: 2021    Discharge Recommendations:  Continue to assess pending progress,Subacute/Skilled Nursing Facility,IP Rehab        Assessment   Treatment Diagnosis: General weakness  Prognosis: Good  PT Education: PT Role;Goals;Plan of Care  Patient Education: Educated pt on above and on bed mobility with fair to good understanding. REQUIRES PT FOLLOW UP: Yes  Activity Tolerance  Activity Tolerance: Patient Tolerated treatment well;Patient limited by endurance; Patient limited by fatigue     Patient Diagnosis(es): The primary encounter diagnosis was Syncope, unspecified syncope type. Diagnoses of Abnormal chest x-ray, Renal dysfunction, Elevated troponin, and Pyuria were also pertinent to this visit. has a past medical history of Acute and chronic respiratory failure with hypoxia ([facility] [address] CAD (coronary artery disease), Cataracts, bilateral, Elevated troponin, Glaucoma, Hyperlipidemia, Hypertension, TIA (transient ischemic attack), and Type II or unspecified type diabetes mellitus without mention of complication, not stated as uncontrolled. has a past surgical history that includes Coronary artery bypass graft (); Cataract removal with implant (Bilateral,  and 12/3/2013); and Inguinal hernia repair (Bilateral, 12). Restrictions  Restrictions/Precautions  Restrictions/Precautions: General Precautions,Fall Risk  Subjective   General  Chart Reviewed: Yes  Response To Previous Treatment: Patient with no complaints from previous session. Family / Caregiver Present: No  Referring Practitioner: Dr. Ruy Buckley MD  Subjective  Subjective: Pt with no complaints of pain. Orientation  Orientation  Overall Orientation Status: Within Functional Limits  Cognition      Objective   Bed mobility  Rolling to Left: Minimal assistance; Moderate assistance  Rolling to Right: Minimal assistance; Moderate assistance  Supine to Sit: Unable to assess  Sit to Supine: Unable to assess  Scooting: Maximal assistance;2 Person assistance  Comment: Assisted pt with repostioning in bed. Held supine/sit d/t fatigue. Transfers  Sit to Stand: Unable to assess  Stand to sit: Unable to assess  Comment: Hold transfers per Brad Chaudhary RN. Ambulation  Ambulation?: No        Exercises  Straight Leg Raise: 20x  Quad Sets: 20x  Heelslides: 20x  Gluteal Sets: 20x  Hip Abduction: 20x  Knee Short Arc Quad: 20x  Ankle Pumps: 20x  Comments: Above exercises completed in supine with AAROM as needed bilat. Frequent rest breaks throughout treatment d/t fatigue. G-Code     OutComes Score    AM-PAC Score    Goals  Short term goals  Time Frame for Short term goals: 20 days  Short term goal 1: Patient to complete sit/stand and stand pivot transfers with CG/minAx1 and no LOB to decrease fall risk. Short term goal 2: Patient to ambulate 20ft with minAx1 and no LOB to decrease fall risk and improve mobility. Short term goal 3: Patient to tolerate 20-30 min of ther ex/act to improve functional strength. Short term goal 4: Patient to have good static sitting balance to decrease fall risk. Plan    Plan  Times per week: 7  Times per day: Twice a day  Current Treatment Recommendations: Strengthening,Neuromuscular Re-education,Home Exercise Program,ROM,Safety Education & Dellia William Training,Patient/Caregiver Education & Training,Functional Mobility Training,Transfer Training,Gait Training  Safety Devices  Type of devices:  All fall risk precautions in place,Call light within reach,Nurse notified,Gait belt,Patient at risk for falls,Bed alarm in place,Left in bed     Therapy Time   Individual Concurrent Group Co-treatment   Time In 1340         Time Out 1420         Minutes [place], [place]

## 2021-12-27 LAB
ANION GAP SERPL CALCULATED.3IONS-SCNC: 11 MMOL/L (ref 9–17)
BUN BLDV-MCNC: 91 MG/DL (ref 8–23)
BUN/CREAT BLD: 30 (ref 9–20)
CALCIUM SERPL-MCNC: 8.4 MG/DL (ref 8.6–10.4)
CHLORIDE BLD-SCNC: 99 MMOL/L (ref 98–107)
CO2: 27 MMOL/L (ref 20–31)
CREAT SERPL-MCNC: 3.05 MG/DL (ref 0.7–1.2)
GFR AFRICAN AMERICAN: 23 ML/MIN
GFR NON-AFRICAN AMERICAN: 19 ML/MIN
GFR SERPL CREATININE-BSD FRML MDRD: ABNORMAL ML/MIN/{1.73_M2}
GFR SERPL CREATININE-BSD FRML MDRD: ABNORMAL ML/MIN/{1.73_M2}
GLUCOSE BLD-MCNC: 125 MG/DL (ref 74–100)
GLUCOSE BLD-MCNC: 133 MG/DL (ref 70–99)
GLUCOSE BLD-MCNC: 200 MG/DL (ref 74–100)
GLUCOSE BLD-MCNC: 245 MG/DL (ref 74–100)
GLUCOSE BLD-MCNC: 280 MG/DL (ref 74–100)
POTASSIUM SERPL-SCNC: 5.1 MMOL/L (ref 3.7–5.3)
SODIUM BLD-SCNC: 137 MMOL/L (ref 135–144)

## 2021-12-27 PROCEDURE — 97110 THERAPEUTIC EXERCISES: CPT

## 2021-12-27 PROCEDURE — 6360000002 HC RX W HCPCS: Performed by: INTERNAL MEDICINE

## 2021-12-27 PROCEDURE — 94640 AIRWAY INHALATION TREATMENT: CPT

## 2021-12-27 PROCEDURE — 97530 THERAPEUTIC ACTIVITIES: CPT

## 2021-12-27 PROCEDURE — 2580000003 HC RX 258: Performed by: INTERNAL MEDICINE

## 2021-12-27 PROCEDURE — 99232 SBSQ HOSP IP/OBS MODERATE 35: CPT | Performed by: PHYSICIAN ASSISTANT

## 2021-12-27 PROCEDURE — 6370000000 HC RX 637 (ALT 250 FOR IP): Performed by: INTERNAL MEDICINE

## 2021-12-27 PROCEDURE — 99232 SBSQ HOSP IP/OBS MODERATE 35: CPT | Performed by: INTERNAL MEDICINE

## 2021-12-27 PROCEDURE — 80048 BASIC METABOLIC PNL TOTAL CA: CPT

## 2021-12-27 PROCEDURE — 82947 ASSAY GLUCOSE BLOOD QUANT: CPT

## 2021-12-27 PROCEDURE — 94761 N-INVAS EAR/PLS OXIMETRY MLT: CPT

## 2021-12-27 PROCEDURE — 2700000000 HC OXYGEN THERAPY PER DAY

## 2021-12-27 PROCEDURE — 1200000000 HC SEMI PRIVATE

## 2021-12-27 PROCEDURE — 36415 COLL VENOUS BLD VENIPUNCTURE: CPT

## 2021-12-27 RX ADMIN — TAMSULOSIN HYDROCHLORIDE 0.4 MG: 0.4 CAPSULE ORAL at 16:58

## 2021-12-27 RX ADMIN — FUROSEMIDE 40 MG: 40 TABLET ORAL at 16:58

## 2021-12-27 RX ADMIN — AMIODARONE HYDROCHLORIDE 200 MG: 200 TABLET ORAL at 21:07

## 2021-12-27 RX ADMIN — IPRATROPIUM BROMIDE AND ALBUTEROL SULFATE 3 ML: .5; 3 SOLUTION RESPIRATORY (INHALATION) at 09:51

## 2021-12-27 RX ADMIN — ASPIRIN 81 MG: 81 TABLET, COATED ORAL at 21:08

## 2021-12-27 RX ADMIN — APIXABAN 2.5 MG: 2.5 TABLET, FILM COATED ORAL at 08:44

## 2021-12-27 RX ADMIN — FAMOTIDINE 10 MG: 10 TABLET ORAL at 21:07

## 2021-12-27 RX ADMIN — ZINC SULFATE 220 MG (50 MG) CAPSULE 100 MG: CAPSULE at 08:43

## 2021-12-27 RX ADMIN — IPRATROPIUM BROMIDE AND ALBUTEROL SULFATE 3 ML: .5; 3 SOLUTION RESPIRATORY (INHALATION) at 05:44

## 2021-12-27 RX ADMIN — OXYCODONE HYDROCHLORIDE AND ACETAMINOPHEN 500 MG: 500 TABLET ORAL at 21:08

## 2021-12-27 RX ADMIN — SODIUM CHLORIDE, PRESERVATIVE FREE 5 ML: 5 INJECTION INTRAVENOUS at 21:26

## 2021-12-27 RX ADMIN — MIDODRINE HYDROCHLORIDE 10 MG: 5 TABLET ORAL at 16:58

## 2021-12-27 RX ADMIN — MIDODRINE HYDROCHLORIDE 10 MG: 5 TABLET ORAL at 08:43

## 2021-12-27 RX ADMIN — TAMSULOSIN HYDROCHLORIDE 0.4 MG: 0.4 CAPSULE ORAL at 08:44

## 2021-12-27 RX ADMIN — DOXYCYCLINE HYCLATE 100 MG: 100 CAPSULE ORAL at 21:07

## 2021-12-27 RX ADMIN — CLOPIDOGREL BISULFATE 75 MG: 75 TABLET ORAL at 08:44

## 2021-12-27 RX ADMIN — TRAZODONE HYDROCHLORIDE 50 MG: 50 TABLET ORAL at 21:08

## 2021-12-27 RX ADMIN — IPRATROPIUM BROMIDE AND ALBUTEROL SULFATE 3 ML: .5; 3 SOLUTION RESPIRATORY (INHALATION) at 15:10

## 2021-12-27 RX ADMIN — ATORVASTATIN CALCIUM 40 MG: 40 TABLET, FILM COATED ORAL at 21:07

## 2021-12-27 RX ADMIN — APIXABAN 2.5 MG: 2.5 TABLET, FILM COATED ORAL at 21:07

## 2021-12-27 RX ADMIN — IPRATROPIUM BROMIDE AND ALBUTEROL SULFATE 3 ML: .5; 3 SOLUTION RESPIRATORY (INHALATION) at 20:54

## 2021-12-27 RX ADMIN — POLYETHYLENE GLYCOL (3350) 17 G: 17 POWDER, FOR SOLUTION ORAL at 08:44

## 2021-12-27 RX ADMIN — FUROSEMIDE 40 MG: 40 TABLET ORAL at 08:43

## 2021-12-27 RX ADMIN — INSULIN LISPRO 2 UNITS: 100 INJECTION, SOLUTION INTRAVENOUS; SUBCUTANEOUS at 21:20

## 2021-12-27 RX ADMIN — Medication 1000 UNITS: at 08:44

## 2021-12-27 RX ADMIN — BUDESONIDE 500 MCG: 0.5 SUSPENSION RESPIRATORY (INHALATION) at 09:51

## 2021-12-27 RX ADMIN — GLIPIZIDE 5 MG: 5 TABLET ORAL at 16:21

## 2021-12-27 RX ADMIN — GLIPIZIDE 5 MG: 5 TABLET ORAL at 06:46

## 2021-12-27 RX ADMIN — AMIODARONE HYDROCHLORIDE 200 MG: 200 TABLET ORAL at 08:43

## 2021-12-27 RX ADMIN — OXYCODONE HYDROCHLORIDE AND ACETAMINOPHEN 500 MG: 500 TABLET ORAL at 08:43

## 2021-12-27 RX ADMIN — DOXYCYCLINE HYCLATE 100 MG: 100 CAPSULE ORAL at 08:43

## 2021-12-27 RX ADMIN — SODIUM CHLORIDE, PRESERVATIVE FREE 10 ML: 5 INJECTION INTRAVENOUS at 08:44

## 2021-12-27 RX ADMIN — METOPROLOL SUCCINATE 25 MG: 25 TABLET, EXTENDED RELEASE ORAL at 08:43

## 2021-12-27 RX ADMIN — MIDODRINE HYDROCHLORIDE 10 MG: 5 TABLET ORAL at 11:28

## 2021-12-27 RX ADMIN — BUDESONIDE 500 MCG: 0.5 SUSPENSION RESPIRATORY (INHALATION) at 20:54

## 2021-12-27 ASSESSMENT — PAIN SCALES - GENERAL
PAINLEVEL_OUTOF10: 0
PAINLEVEL_OUTOF10: 0

## 2021-12-27 NOTE — PROGRESS NOTES
phyPhysical Therapy  Facility/Department: [facility] AT THE [facility] MED SURG  Daily Treatment Note  NAME: Geovani Huynh  : 10/31/1930  MRN: [id number]    Date of Service: 2021    Discharge Recommendations:  Continue to assess pending progress,Subacute/Skilled Nursing Facility,IP Rehab        Assessment   Treatment Diagnosis: General weakness  Prognosis: Good  Decision Making: Medium Complexity  PT Education: General Safety;Pressure Relief  Patient Education: Educated pt on above and on bed mobility with fair to good understanding. REQUIRES PT FOLLOW UP: Yes  Activity Tolerance  Activity Tolerance: Patient Tolerated treatment well;Patient limited by endurance; Patient limited by fatigue     Patient Diagnosis(es): The primary encounter diagnosis was Syncope, unspecified syncope type. Diagnoses of Abnormal chest x-ray, Renal dysfunction, Elevated troponin, and Pyuria were also pertinent to this visit. has a past medical history of Acute and chronic respiratory failure with hypoxia ([address] CAD (coronary artery disease), Cataracts, bilateral, Elevated troponin, Glaucoma, Hyperlipidemia, Hypertension, TIA (transient ischemic attack), and Type II or unspecified type diabetes mellitus without mention of complication, not stated as uncontrolled. has a past surgical history that includes Coronary artery bypass graft (); Cataract removal with implant (Bilateral,  and 12/3/2013); and Inguinal hernia repair (Bilateral, 12). Restrictions  Restrictions/Precautions  Restrictions/Precautions: General Precautions,Fall Risk  Subjective   General  Chart Reviewed: Yes  Response To Previous Treatment: Patient with no complaints from previous session. Family / Caregiver Present: No  Referring Practitioner: Dr. Hellen Meehan MD  Subjective  Subjective: Pt. call light on upon arrival and reports being finished on bed pan, RN aware and asked PTA to assist in bed mobility and pt. care in bed at this time. Orientation  Orientation  Overall Orientation Status: Within Functional Limits  Cognition      Objective   Bed mobility  Rolling to Left: Minimal assistance;Contact guard assistance  Rolling to Right: Minimal assistance;Contact guard assistance  Supine to Sit: Unable to assess  Sit to Supine: Unable to assess  Scooting: Maximal assistance;2 Person assistance  Transfers  Sit to Stand: Unable to assess  Stand to sit: Unable to assess  Ambulation  Ambulation?: No                  Comment: assisted in pt. care while pt. was in bed     Goals  Short term goals  Time Frame for Short term goals: 20 days  Short term goal 1: Patient to complete sit/stand and stand pivot transfers with CG/minAx1 and no LOB to decrease fall risk. Short term goal 2: Patient to ambulate 20ft with minAx1 and no LOB to decrease fall risk and improve mobility. Short term goal 3: Patient to tolerate 20-30 min of ther ex/act to improve functional strength. Short term goal 4: Patient to have good static sitting balance to decrease fall risk. Plan    Plan  Times per week: 7  Times per day: Twice a day  Current Treatment Recommendations: Strengthening,Neuromuscular Re-education,Home Exercise Program,ROM,Safety Education & Dalphine Ibis Training,Patient/Caregiver Education & Training,Functional Mobility Training,Transfer Training,Gait Training  Safety Devices  Type of devices:  All fall risk precautions in place,Call light within reach,Nurse notified,Patient at risk for falls,Left in bed     Therapy Time   Individual Concurrent Group Co-treatment   Time In 1331         Time Out 1400         Minutes [address] [place], [place], [facility]

## 2021-12-27 NOTE — PROGRESS NOTES
Renal Progress Note  [facility]      TELEHEALTH EVALUATION -- Audio/Visual (During [facility] / NHRM[facility]-62 public health emergency)     Telehealth service was provided with the patient at his room in [address] [place] [address] and myself the physician in my office in [place], [place] and the patient's RN, Joya Jamil, who has initiated the visit. Pursuant to the emergency declaration under the [facility] [place] [address] [facility] authority and the [employer] Act, this Virtual  Visit was conducted, with patient's consent, to reduce the patient's risk of exposure to COVID-19 and provide continuity of care for an established patient. Services were provided through a video synchronous discussion virtually to substitute for in-person clinic visit. Patient :  Maritza Brito; 80 y.o. MRN# [id number]  Location:  4763/6071-63  Attending:  Abimbola Charles MD  Admit Date:  12/22/2021   Hospital Day: 5      Subjective:     Nephrology is following the patient for CKD IV. Patient seen via video visit. On nasal canula 4 liters. C/o weakness.      Outpatient Medications:     Medications Prior to Admission: midodrine (PROAMATINE) 10 MG tablet, Take 1 tablet by mouth 3 times daily (with meals)  doxycycline hyclate (VIBRA-TABS) 100 MG tablet, Take 1 tablet by mouth 2 times daily for 10 days  cephALEXin (KEFLEX) 500 MG capsule, Take 1 capsule by mouth 3 times daily for 10 days  tamsulosin (FLOMAX) 0.4 MG capsule, Take 1 capsule by mouth 2 times daily (Patient taking differently: Take 0.4 mg by mouth daily )  metoprolol succinate (TOPROL XL) 25 MG extended release tablet, Take 1 tablet by mouth daily  ascorbic acid (VITAMIN C) 1000 MG tablet, Take 0.5 tablets by mouth 2 times daily  traZODone (DESYREL) 50 MG tablet, Take 50 mg by mouth nightly  polyethylene glycol (GLYCOLAX) 17 GM/SCOOP powder, Take 17 g by mouth daily  sodium bicarbonate 650 MG tablet, Take 1 tablet by mouth 2 times daily  amiodarone (CORDARONE) 200 MG tablet, Take 1 tablet by mouth 2 times daily  budesonide (PULMICORT) 0.5 MG/2ML nebulizer suspension, Take 2 mLs by nebulization 2 times daily  ipratropium-albuterol (DUONEB) 0.5-2.5 (3) MG/3ML SOLN nebulizer solution, Inhale 3 mLs into the lungs 4 times daily  apixaban (ELIQUIS) 2.5 MG TABS tablet, Take 1 tablet by mouth 2 times daily  polyethylene glycol (GLYCOLAX) 17 g packet, Take 17 g by mouth daily as needed for Constipation  zinc sulfate (ZINCATE) 220 (50 Zn) MG capsule, Take 2 capsules by mouth daily  famotidine (PEPCID) 10 MG tablet, Take 1 tablet by mouth nightly  vitamin D3 (CHOLECALCIFEROL) 25 MCG (1000 UT) TABS tablet, Take 1 tablet by mouth daily  furosemide (LASIX) 40 MG tablet, Take 40 mg by mouth daily  glipiZIDE (GLUCOTROL) 5 MG tablet, Take 5 mg by mouth 2 times daily (before meals)  atorvastatin (LIPITOR) 40 MG tablet, Take 1 tablet by mouth nightly  clopidogrel (PLAVIX) 75 MG tablet, Take 1 tablet by mouth daily  aspirin 81 MG EC tablet, Take 81 mg by mouth nightly   acetaminophen (TYLENOL) 650 MG suppository, Place 650 mg rectally every 8 hours as needed for Fever  zolpidem (AMBIEN) 5 MG tablet, Take 5 mg by mouth nightly as needed for Sleep.  benzonatate (TESSALON) 100 MG capsule, Take 100 mg by mouth 3 times daily as needed for Cough  albuterol (PROVENTIL) (2.5 MG/3ML) 0.083% nebulizer solution, Take 3 mLs by nebulization every 4 hours as needed for Wheezing    Current Medications:     Scheduled Meds:    furosemide  40 mg Oral BID    amiodarone  200 mg Oral BID    apixaban  2.5 mg Oral BID    ascorbic acid  500 mg Oral BID    aspirin  81 mg Oral Nightly    atorvastatin  40 mg Oral Nightly    budesonide  0.5 mg Nebulization BID    clopidogrel  75 mg Oral Daily    doxycycline hyclate  100 mg Oral BID    famotidine  10 mg Oral Nightly    glipiZIDE  5 mg Oral BID AC    ipratropium-albuterol  3 mL Inhalation 4x daily    metoprolol succinate  25 mg Oral Daily    midodrine  10 mg Oral TID WC    tamsulosin  0.4 mg Oral BID    traZODone  50 mg Oral Nightly    sodium chloride flush  5-40 mL IntraVENous 2 times per day    polyethylene glycol  17 g Oral Daily    Vitamin D  1,000 Units Oral Daily    zinc sulfate  100 mg Oral Daily    insulin lispro  0-12 Units SubCUTAneous TID     insulin lispro  0-6 Units SubCUTAneous Nightly     Continuous Infusions:    sodium chloride      dextrose       PRN Meds:  acetaminophen, benzonatate, sodium chloride flush, sodium chloride, polyethylene glycol, acetaminophen **OR** acetaminophen, albuterol, glucose, dextrose, glucagon (rDNA), dextrose    Input/Output:       I/O last 3 completed shifts: In: 700 [P.O.:700]  Out: 2450 [Urine:2450]. No data found. Vital Signs:   Temperature:  Temp: 96.9 °F (36.1 °C)  TMax:   Temp (24hrs), Av.3 °F (36.3 °C), Min:96.9 °F (36.1 °C), Max:97.9 °F (36.6 °C)    Respirations:  Resp: 20  Pulse:   Pulse: 66  BP:    BP: 109/83  BP Range: Systolic (04FGU), CWX:047 , Min:99 , ZKI:362       Diastolic (41EOU), XRA:99, Min:45, Max:83      Physical Examination:     General -- no distress, hard of hearing  Oral Mucosa -- moist  Neck --  JVD - no   Extremities -- legs are wrapped, edema noted  CNS - awake and alert   Pschy - not agitated, mood and memory normal    Due to this being a TeleHealth encounter, evaluation of the following organ systems is limited: Vitals/EENT/Resp/CV/GI//MS/Neuro/Skin/Heme-Lymph-Imm. Labs:       Recent Labs     21  0535   WBC 8.1   RBC 2.64*   HGB 8.0*   HCT 26.5*   .4   MCH 30.3   MCHC 30.2   RDW 17.0*      MPV 11.2      BMP:   Recent Labs     21  0535 21  0518    137   K 5.3 5.1    99   CO2 26 27   BUN 89* 91*   CREATININE 3.14* 3.05*   GLUCOSE 154* 133*   CALCIUM 8.8 8.4*      Phosphorus:   No results for input(s): PHOS in the last 72 hours.   Magnesium:  No results for input(s): MG in the last 72 hours. Albumin:  No results for input(s): LABALBU in the last 72 hours. BNP:    No results found for: BNP  HÉCTOR:    No results found for: HÉCTOR  SPEP:  Lab Results   Component Value Date    PROT 5.5 12/24/2021     UPEP:   No results found for: LABPE  C3:   No results found for: C3  C4:   No results found for: C4  MPO ANCA:   No results found for: MPO  PR3 ANCA:   No results found for: PR3  Anti-GBM:   No results found for: GBMABIGG  Hep BsAg:       No results found for: HEPBSAG  Hep C AB:        No results found for: HEPCAB    Urinalysis/Chemistries:      Lab Results   Component Value Date    NITRU NEGATIVE 12/22/2021    COLORU Yellow 12/22/2021    PHUR 6.0 12/22/2021    WBCUA 10 TO 20 12/22/2021    RBCUA 10 TO 20 12/22/2021    MUCUS NOT REPORTED 12/22/2021    TRICHOMONAS NOT REPORTED 12/22/2021    YEAST NOT REPORTED 12/22/2021    BACTERIA NOT REPORTED 12/22/2021    SPECGRAV 1.015 12/22/2021    LEUKOCYTESUR TRACE 12/22/2021    UROBILINOGEN Normal 12/22/2021    BILIRUBINUR NEGATIVE 12/22/2021    GLUCOSEU 1+ 12/22/2021    KETUA NEGATIVE 12/22/2021    AMORPHOUS NOT REPORTED 12/22/2021     Urine Sodium:   No results found for: SOHAM  Urine Potassium:  No results found for: KUR  Urine Chloride:  No results found for: CLUR  Urine Osmolarity: No results found for: OSMOU  Urine Protein:   No components found for: TOTALPROTEIN, URINE   Urine Creatinine:     Lab Results   Component Value Date    LABCREA 51.4 02/11/2021     Urine Eosinophils:  No components found for: UEOS        Impression and Plan:  1. CKD IV: recent JANINA, creatinine improving and likely near his baseline. BUN high from diuretics. Ok to continue lasix 40 mg bid. Having decent urine output, will monitor  2. Lytes: stable  3. Acute hypoxic respiratory failure  4. Diastolic CHF  5. Pneumonia  6. Anemia  7. Elevated troponin  8. DM    Patient considering SNF with hospice        Please don't hesitate to call with any questions.   Electronically signed by Kameron Quezada DO on 12/27/2021 at 4:25 PM

## 2021-12-27 NOTE — PROGRESS NOTES
Dennis Mckeon am scribing for and in the presence of Breanna L. Blondell Eisenmenger, PA-C. Patient: Jose Soler  : 10/31/1930  Date of Admission: 2021  Primary Care Physician: Edwina Appiah  Today's Date: 2021    REASON FOR CONSULTATION: Altered Mental Status (from [facility] after recent admission with pneumonia; staff states pt was unresponsive to sternal rub) and Emesis (onset today)    HPI: Mr. Melissa White is a 80 y.o. male who was admitted to the hospital with what sounds to be a possible syncopal episode. He has a recent history of recurrent sepsis and has continued treatment for what sounds to be a UTI with sepsis. Mr. Melissa White has a known history of CABG x 3 in . He says he has followed up in the past with Dr. Ernesto Shelley in [facility], his last visit in 2020. Mr. Melissa White does report a history of chest pain once every 2 months or so. Says it is a pressure quality. He denied any current or recent chest pain, abdominal pain, bleeding problems, problems with his medications or any other concerns at this time. Mr. Melissa White said he didn't feel so well this morning and felt like he was gasping for air, but since he ate, had a breathing treatment and did one set of exercises he feels better. He states he has noticed his breathing being more difficult. He also notes he has felt weaker since being in bed, he notes his legs are extremely weak and have been having increased swelling. He has had no chest pain or pressure. No abdominal pain, nausea or vomiting. No bleeding problems. Blood work reviewed, stable at his baseline.      Past Medical History:   Diagnosis Date    Acute and chronic respiratory failure with hypoxia ([facility] 2021    CAD (coronary artery disease)     Cataracts, bilateral     Elevated troponin 2021    Glaucoma     Hyperlipidemia     Hypertension     TIA (transient ischemic attack)     Type II or unspecified type diabetes mellitus without mention of complication, not stated as uncontrolled        CURRENT ALLERGIES: Patient has no known allergies. REVIEW OF SYSTEMS: 14 systems were reviewed. Pertinent positives and negatives as above, all else negative.      Past Surgical History:   Procedure Laterality Date    CATARACT REMOVAL WITH IMPLANT Bilateral 11/219/2013 and 12/3/2013    CORONARY ARTERY BYPASS GRAFT  2000    INGUINAL HERNIA REPAIR Bilateral 06/19/12    Social History:  Social History     Tobacco Use    Smoking status: Never Smoker    Smokeless tobacco: Never Used   Substance Use Topics    Alcohol use: Yes     Comment: very very very seldom    Drug use: No        CURRENT MEDICATIONS:  Outpatient Medications Marked as Taking for the 12/22/21 encounter [facility] Encounter)   Medication Sig Dispense Refill    midodrine (PROAMATINE) 10 MG tablet Take 1 tablet by mouth 3 times daily (with meals) 90 tablet 3    doxycycline hyclate (VIBRA-TABS) 100 MG tablet Take 1 tablet by mouth 2 times daily for 10 days 20 tablet 0    cephALEXin (KEFLEX) 500 MG capsule Take 1 capsule by mouth 3 times daily for 10 days 30 capsule 0    tamsulosin (FLOMAX) 0.4 MG capsule Take 1 capsule by mouth 2 times daily (Patient taking differently: Take 0.4 mg by mouth daily ) 30 capsule 3    metoprolol succinate (TOPROL XL) 25 MG extended release tablet Take 1 tablet by mouth daily 30 tablet 3    ascorbic acid (VITAMIN C) 1000 MG tablet Take 0.5 tablets by mouth 2 times daily      traZODone (DESYREL) 50 MG tablet Take 50 mg by mouth nightly      polyethylene glycol (GLYCOLAX) 17 GM/SCOOP powder Take 17 g by mouth daily 1530 g 3    sodium bicarbonate 650 MG tablet Take 1 tablet by mouth 2 times daily      amiodarone (CORDARONE) 200 MG tablet Take 1 tablet by mouth 2 times daily      budesonide (PULMICORT) 0.5 MG/2ML nebulizer suspension Take 2 mLs by nebulization 2 times daily 60 each 3    ipratropium-albuterol (DUONEB) 0.5-2.5 (3) MG/3ML SOLN nebulizer solution Inhale 3 mLs into the lungs 4 times daily 360 mL     apixaban (ELIQUIS) 2.5 MG TABS tablet Take 1 tablet by mouth 2 times daily 60 tablet     polyethylene glycol (GLYCOLAX) 17 g packet Take 17 g by mouth daily as needed for Constipation 527 g 1    zinc sulfate (ZINCATE) 220 (50 Zn) MG capsule Take 2 capsules by mouth daily 30 capsule 3    famotidine (PEPCID) 10 MG tablet Take 1 tablet by mouth nightly 60 tablet 3    vitamin D3 (CHOLECALCIFEROL) 25 MCG (1000 UT) TABS tablet Take 1 tablet by mouth daily 30 tablet 0    furosemide (LASIX) 40 MG tablet Take 40 mg by mouth daily      glipiZIDE (GLUCOTROL) 5 MG tablet Take 5 mg by mouth 2 times daily (before meals)      atorvastatin (LIPITOR) 40 MG tablet Take 1 tablet by mouth nightly 30 tablet 3    clopidogrel (PLAVIX) 75 MG tablet Take 1 tablet by mouth daily 30 tablet 3    aspirin 81 MG EC tablet Take 81 mg by mouth nightly          furosemide (LASIX) tablet 40 mg, BID  acetaminophen (TYLENOL) suppository 650 mg, Q8H PRN  amiodarone (CORDARONE) tablet 200 mg, BID  apixaban (ELIQUIS) tablet 2.5 mg, BID  ascorbic acid (VITAMIN C) tablet 500 mg, BID  aspirin EC tablet 81 mg, Nightly  atorvastatin (LIPITOR) tablet 40 mg, Nightly  benzonatate (TESSALON) capsule 100 mg, TID PRN  budesonide (PULMICORT) nebulizer suspension 500 mcg, BID  clopidogrel (PLAVIX) tablet 75 mg, Daily  doxycycline hyclate (VIBRAMYCIN) capsule 100 mg, BID  famotidine (PEPCID) tablet 10 mg, Nightly  glipiZIDE (GLUCOTROL) tablet 5 mg, BID AC  ipratropium-albuterol (DUONEB) nebulizer solution 3 mL, 4x daily  metoprolol succinate (TOPROL XL) extended release tablet 25 mg, Daily  midodrine (PROAMATINE) tablet 10 mg, TID WC  tamsulosin (FLOMAX) capsule 0.4 mg, BID  traZODone (DESYREL) tablet 50 mg, Nightly  sodium chloride flush 0.9 % injection 5-40 mL, 2 times per day  sodium chloride flush 0.9 % injection 10 mL, PRN  0.9 % sodium chloride infusion, PRN  polyethylene glycol (GLYCOLAX) packet 17 g, Daily PRN  acetaminophen (TYLENOL) tablet 650 mg, Q6H PRN   Or  acetaminophen (TYLENOL) suppository 650 mg, Q6H PRN  albuterol (PROVENTIL) nebulizer solution 2.5 mg, Q4H PRN  polyethylene glycol (GLYCOLAX) packet 17 g, Daily  Vitamin D (CHOLECALCIFEROL) tablet 1,000 Units, Daily  zinc sulfate (ZINCATE) capsule 100 mg, Daily  glucose (GLUTOSE) 40 % oral gel 15 g, PRN  dextrose 50 % IV solution, PRN  glucagon (rDNA) injection 1 mg, PRN  dextrose 5 % solution, PRN  insulin lispro (HUMALOG) injection vial 0-12 Units, TID WC  insulin lispro (HUMALOG) injection vial 0-6 Units, Nightly         FAMILY HISTORY: family history includes Heart Disease in his brother and father. PHYSICAL EXAM:   BP (!) 109/52   Pulse 82   Temp 97.9 °F (36.6 °C) (Temporal)   Resp 24   Ht 5' 4\" (1.626 m)   Wt 169 lb (76.7 kg)   SpO2 (!) 88%   BMI 29.01 kg/m²  Body mass index is 29.01 kg/m². Constitutional: He is oriented to person, place, and time. He appears well-developed and well-nourished. In no acute distress. HEENT: Normocephalic and atraumatic. No JVD present. Carotid bruit is not present. No mass and no thyromegaly present. No lymphadenopathy present. Cardiovascular: Normal rate, regular rhythm, normal heart sounds. Exam reveals no gallop and no friction rubs. 2/6 systolic murmur, 5th intercostal space on the LEFT in the mid-clavicular line (cardiac apex). Pulmonary/Chest: Effort normal and breath sounds normal. No respiratory distress. He has no wheezes, rhonchi or rales. Abdominal: Soft, non-tender. Bowel sounds and aorta are normal. He exhibits no organomegaly, mass or bruit. Extremities: 1-2+ 1/2 up to the knees bilaterally. No cyanosis or clubbing. 2+ radial and carotid pulses. Distal extremity pulses: 2+ bilaterally. .  Neurological: He is alert and oriented to person, place, and time. No evidence of gross cranial nerve deficit. Coordination appeared normal.   Skin: Skin is warm and dry. There is no rash or diaphoresis.    Psychiatric: He has a normal mood and affect.  His speech is normal and behavior is normal.      MOST RECENT LABS ON RECORD:   Lab Results   Component Value Date    WBC 8.1 12/26/2021    HGB 8.0 (L) 12/26/2021    HCT 26.5 (L) 12/26/2021     12/26/2021    CHOL 87 11/17/2021    TRIG 63 11/17/2021    HDL 27 (L) 11/17/2021    LDLCHOLESTEROL 47 11/17/2021    ALT 17 12/24/2021    AST 17 12/24/2021     12/27/2021    K 5.1 12/27/2021    CL 99 12/27/2021    CREATININE 3.05 (H) 12/27/2021    BUN 91 (H) 12/27/2021    CO2 27 12/27/2021    TSH 1.12 11/17/2021    PSA 1.37 11/18/2021    INR 1.3 11/17/2021    LABA1C 10.3 (H) 12/16/2021        ASSESSMENT:  Patient Active Problem List    Diagnosis Date Noted    Type 2 MI (myocardial infarction) ([address] 12/23/2021    Syncope     Acute alteration in mental status 12/22/2021    Moderate malnutrition ([address] 12/17/2021    Community acquired bacterial pneumonia 12/16/2021    Hyperglycemia 12/07/2021    Acute kidney injury superimposed on CKD ([address] 12/07/2021    Acute cystitis without hematuria 12/07/2021    Elevated troponin 12/07/2021    On amiodarone therapy     Microcytic hypochromic anemia     Hyperkalemia 12/06/2021    Urinary retention 11/27/2021    Mild malnutrition ([address] 11/17/2021    Acute and chronic respiratory failure with hypoxia (HCC) 11/16/2021    Paroxysmal atrial flutter ([address] 11/16/2021    Stage 4 chronic kidney disease ([address] 11/16/2021    ASHD (arteriosclerotic heart disease)     S/P CABG (coronary artery bypass graft)     COVID-19 virus infection / Clarene Truong Vaccine 11/15/2021    Cerebral infarction due to embolism of right middle cerebral artery ([address] 03/28/2018    Left leg weakness 03/28/2018    Falling 03/28/2018    [facility] 03/28/2018    Hypertension 03/27/2018    Type 2 diabetes mellitus without complication, without long-term current use of insulin ([facility] 03/27/2018    Cerebrovascular accident (CVA) ([facility] 03/26/2018    Right inguinal hernia 04/02/2014       PLAN:     Chronic diastolic heart failure: New York Heart Association Class: III (Asymptomatic only at rest)   Beta Blocker: Continue Metoprolol succinate (Toprol XL) 25 mg daily.  ACE Inibitor/ARB: Not indicated at this time.  Diuretics: Continue furosemide (Lasix) 40 mg 2 times daily. Pending weight and I&O's may increase to 80 mg Lasix daily.  Heart failure counseling: I told them to start wearing lower extremity compression stockings and I advised them to try and keep their legs up whenever possible and to limit salt in their diet.  Ordered daily weight and in's and out's to be recorded.  Additional Testing List: None    Type 2 Myocardial Infarction: Mr. Brock Lala meets criteria for a Type 2 MI defined by a rise and fall of troponin with at least one value above the 99th percentile and evidence of an imbalance between myocardial oxygen supply and demand unrelated to coronary thrombosis, evidenced by symptoms of acute myocardial ischemia which has manifested as the development of heart failure and shortness of breath at rest. However, I do not think that any further testing and/or treatment is indicated at this time. · Generalized weakness due to chronic illness: Continue with PT/OT    · Syncope of unclear etiology: suspect related to UTI and possible sepsis:  · Continue telemetry for now    Once again, thank you for allowing me to participate in this patients care. Please do not hesitate to contact me if I could be of any further assistance. Sincerely,  Charity Martínez, [id number] [facility] Specialist    [address] [facility], [address] [place] [address]  Phone: [phone], Fax: [phone]     I believe that the risk of significant morbidity and mortality related to the patient's current medical conditions are: intermediate-high    The documentation recorded by the scribe, accurately and completely reflects the services I personally performed and the decisions made by maria antonia Lazo PA-C    December 27, 2021

## 2021-12-27 NOTE — PROGRESS NOTES
Occupational Therapy  Facility/Department: Critical access hospital AT THE [facility] MED SURG  Daily Treatment Note  NAME: Tho Rojo  : 10/31/1930  MRN: [id number]    Date of Service: 2021    Discharge Recommendations:  ECF with OT,Subacute/Skilled Nursing Facility       Assessment      OT Education: Energy Conservation;OT Role  Patient Education: Educated on deep breathing techniques during ther ex. Patient Diagnosis(es): The primary encounter diagnosis was Syncope, unspecified syncope type. Diagnoses of Abnormal chest x-ray, Renal dysfunction, Elevated troponin, and Pyuria were also pertinent to this visit. has a past medical history of Acute and chronic respiratory failure with hypoxia ([facility] CAD (coronary artery disease), Cataracts, bilateral, Elevated troponin, Glaucoma, Hyperlipidemia, Hypertension, TIA (transient ischemic attack), and Type II or unspecified type diabetes mellitus without mention of complication, not stated as uncontrolled. has a past surgical history that includes Coronary artery bypass graft (); Cataract removal with implant (Bilateral,  and 12/3/2013); and Inguinal hernia repair (Bilateral, 12). Restrictions  Restrictions/Precautions  Restrictions/Precautions: General Precautions,Fall Risk  Subjective   General  Chart Reviewed: Yes  Patient assessed for rehabilitation services?: Yes  Response to previous treatment: Patient with no complaints from previous session  Family / Caregiver Present: No  Referring Practitioner: Dr. Jarvis Roy  Diagnosis: Pyuria  Subjective  Subjective: Pt states fatigue and SOB. Pt denies pain. General Comment  Comments: Pt supine in bed upon therapist arrival with [facility] raised. Pt agreeable to OT session.       Orientation     Objective                                                                Type of ROM/Therapeutic Exercise  Type of ROM/Therapeutic Exercise: Free weights  Comment: (B)UE ther ex, 1# dumbells x15 reps and x5 variations for increased UE stg necessary in ADL tasks. Intermittent RBs taken as needed throughout secondary to increased fatigue. Plan   Plan  Times per week: 7  Times per day: Daily  Current Treatment Recommendations: Strengthening,Safety Education & Training,Self-Care / ADL,Functional Mobility Training,Endurance Training  G-Code     OutComes Score                                                  AM-PAC Score             Goals  Short term goals  Time Frame for Short term goals: 21 visits  Short term goal 1: Patient to tolerate 10 minutes ther-ex/ther-act to increase strength/endurance for ADLs maintaining SPO2 above 90%  Short term goal 2: Patient to perform functional transfers with min A X2. Short term goal 3: Patient to complete ADL grooming with setup. Short term goal 4: Patient to complete UB bathing/dressing wtih min A.        Therapy Time   Individual Concurrent Group Co-treatment   Time In 1410         Time Out 1435         Minutes [address]

## 2021-12-27 NOTE — PROGRESS NOTES
JACK contacted [facility] after faxing referral early this morning. [facility] has no beds available and do not expect any openings this week. SW made referral to [facility] at [facility] as this was second choice and they are willing to accept pt and will have a bed available for him on Wednesday 12/29/2021. JACK spoke with pt's dtr Kranthi when she called in and informed of above. Kranthi reports that they would like pt to go to the facility skilled first and once he is at max potential then he will transition to hospice per family if they feel he needs it then. JACK notified [facility] of plan per family. Plan for transfer to [facility] on Wednesday 12/29/2021 as a skilled placement.  Julia Stubbs MSW LSW 12/27/2021

## 2021-12-27 NOTE — PROGRESS NOTES
With assistance from therapy, pt. Was bed bathed at this time. Pt. Linens and brief were changed. Pt. Skin care was provided, del rio care performed. Pt. Had barrier cream on bottom, while rolling, pt. Had sore on coccyx region that popped open in length 1.5cm in length. Pt. Positioned for comfort following bed bath and has call light in reach.

## 2021-12-27 NOTE — PROGRESS NOTES
[facility] -Progress Note    SUBJECTIVE:    Patient seen for f/u of Acute alteration in mental status. He resting in bed alert and oriented. Complains of some SOB today. Afebrile. ROS:   Constitutional: negative  for fevers, and negative for chills. Respiratory: negative for shortness of breath, positive for cough, and negative for wheezing  Cardiovascular: negative for chest pain, and negative for palpitations  Gastrointestinal: negative for abdominal pain, negative for nausea,negative for vomiting, negative for diarrhea, and negative for constipation     All other systems were reviewed with the patient and are negative unless otherwise stated in HPI      OBJECTIVE:        VITAL SIGNS:  Patient Vitals for the past 8 hrs:   BP Temp Temp src Pulse Resp SpO2   21 0645 (!) 109/52 97.9 °F (36.6 °C) Temporal 82 18 92 %   21 0544 -- -- -- -- 20 98 %   21 0030 (!) 99/45 97.2 °F (36.2 °C) Temporal 62 20 92 %         Temp: 97.9 °F (36.6 °C)  Temp range:    Temp  Av.1 °F (36.2 °C)  Min: 96.2 °F (35.7 °C)  Max: 97.9 °F (36.6 °C)    BP: (!) 109/52  BP Range:      Systolic (60QDI), OZN:935 , Min:99 , UDP:309      Diastolic (12YFW), DNE:55, Min:45, Max:61    Pulse: 82  Pulse Range:    Pulse  Av.3  Min: 62  Max: 82    Resp: 18  Resp Range:   Resp  Av.5  Min: 18  Max: 20    SpO2: 92 % nasal cannula  SpO2 range:   SpO2  Av.3 %  Min: 90 %  Max: 98 %      PaO2/FiO2 RATIO:  No results for input(s): POCPO2 in the last 72 hours. Exam:    GEN:    Awake, alert and oriented x3. EYES:   EOMI, pupils equal   NECK: Supple. No lymphadenopathy. No carotid bruit  CVS:     regular rate and rhythm, systolic murmur  PULM:  diminished with fine rales bases, no acute respiratory distress  ABD:     Bowels sounds normal.  Abdomen is soft. No distention. no tenderness to palpation. EXT:     trace edema bilaterally . No calf tenderness. NEURO: Moves all extremities.   Motor and sensory are grossly intact  SKIN:    No rashes. No skin lesions. Diagnostic Data:      Complete Blood Count:   Recent Labs     12/26/21 0535   WBC 8.1   RBC 2.64*   HGB 8.0*   HCT 26.5*   .4   MCH 30.3   MCHC 30.2   RDW 17.0*      MPV 11.2        Last 3 Blood Glucose:   Recent Labs     12/26/21  0535 12/27/21  0518   GLUCOSE 154* 133*        Comprehensive Metabolic Profile:   Recent Labs     12/26/21  0535 12/27/21  0518    137   K 5.3 5.1    99   CO2 26 27   BUN 89* 91*   CREATININE 3.14* 3.05*   GLUCOSE 154* 133*   CALCIUM 8.8 8.4*        Urinalysis:   Lab Results   Component Value Date    NITRU NEGATIVE 12/22/2021    COLORU Yellow 12/22/2021    PHUR 6.0 12/22/2021    WBCUA 10 TO 20 12/22/2021    RBCUA 10 TO 20 12/22/2021    MUCUS NOT REPORTED 12/22/2021    TRICHOMONAS NOT REPORTED 12/22/2021    YEAST NOT REPORTED 12/22/2021    BACTERIA NOT REPORTED 12/22/2021    SPECGRAV 1.015 12/22/2021    LEUKOCYTESUR TRACE 12/22/2021    UROBILINOGEN Normal 12/22/2021    BILIRUBINUR NEGATIVE 12/22/2021    GLUCOSEU 1+ 12/22/2021    KETUA NEGATIVE 12/22/2021    AMORPHOUS NOT REPORTED 12/22/2021       HgBA1c:    Lab Results   Component Value Date    LABA1C 10.3 12/16/2021       Lactic Acid:   Lab Results   Component Value Date    LACTA 2.2 11/15/2021        Troponin: No results for input(s): TROPONINI in the last 72 hours. CRP:    No results for input(s): CRP in the last 72 hours. Radiology/Imaging:  CT Head WO Contrast   Final Result   No acute intracranial abnormality. XR CHEST PORTABLE   Final Result   No change in multifocal infiltrates suggesting pneumonia probable small   effusions. Recommend continued follow-up.                ASSESSMENT / PLAN:  Acute alteration in mental status  · Continue current therapy   · Resolved  · ASHD  · Continue aspirin, Eliquis, Lipitor, Plavix, amiodarone, ASA, Toprol XL  · Trend Troponin  · Appreciate Cardiology  · CKD stage IV  · Trend labs worsening  · Appreciate nephrology  · Type 2 diabetes  · Continue Glucotrol  · POCT before meals and at bedtime  · Insulin sliding scale-medium sliding scale  · Hypoglycemia protocol  · HTN  · Continue Toprol XL  · Nutrition status:   · at risk for malnutrition  · Dietician consult initiated  · Hospital Prophylaxis:   · DVT: Eliquis   · Stress Ulcer: H2 Blocker   · High risk medications: none   · Disposition:    · Plan will be SNF  · SNF with Hospice.   FRANKLIN Cortez - CNP , FRANKLIN, NP-C  Hospitalist Medicine        12/27/2021, 7:22 AM

## 2021-12-27 NOTE — PROGRESS NOTES
Pt. Is alert at time of assessment, pt. Requests to use the bedpan at this time. Pt. Placed on bedpan and provided with call light to press when finished. Pt. Reports he was unable to have a bowel movement at this time. Pt. Vitals are assessed, vitals are WDL. Pt. Positioned for comfort and call light is in reach. Will continue to monitor pt.

## 2021-12-27 NOTE — PROGRESS NOTES
Physical Therapy  Facility/Department: [facility] AT THE [facility] MED SURG  Daily Treatment Note  NAME: Maritza Brito  : 10/31/1930  MRN: [id number]    Date of Service: 2021    Discharge Recommendations:  Continue to assess pending progress,Subacute/Skilled Nursing Facility,IP Rehab        Assessment   Treatment Diagnosis: General weakness  Prognosis: Good  Decision Making: Medium Complexity  PT Education: General Safety;Pressure Relief  REQUIRES PT FOLLOW UP: Yes  Activity Tolerance  Activity Tolerance: Patient Tolerated treatment well;Patient limited by endurance; Patient limited by fatigue     Patient Diagnosis(es): The primary encounter diagnosis was Syncope, unspecified syncope type. Diagnoses of Abnormal chest x-ray, Renal dysfunction, Elevated troponin, and Pyuria were also pertinent to this visit. has a past medical history of Acute and chronic respiratory failure with hypoxia ([address] CAD (coronary artery disease), Cataracts, bilateral, Elevated troponin, Glaucoma, Hyperlipidemia, Hypertension, TIA (transient ischemic attack), and Type II or unspecified type diabetes mellitus without mention of complication, not stated as uncontrolled. has a past surgical history that includes Coronary artery bypass graft (); Cataract removal with implant (Bilateral,  and 12/3/2013); and Inguinal hernia repair (Bilateral, 12). Restrictions  Restrictions/Precautions  Restrictions/Precautions: General Precautions,Fall Risk  Subjective   General  Chart Reviewed: Yes  Response To Previous Treatment: Patient with no complaints from previous session. Family / Caregiver Present: No  Referring Practitioner: Dr. Ashia Xavier MD  Subjective  Subjective: Pt states he did not sleep well last night, denies pain.           Orientation  Orientation  Overall Orientation Status: Within Functional Limits  Cognition      Objective                  Exercises  Straight Leg Raise: 20x  Quad Sets: 20x  Heelslides: 20x  Hip Abduction: 20x  Ankle Pumps: 20x  Comments: BLE ther ex completed in supine, AAROM as needed. Pt required frequent RBs d/t fatigue/SOB. Goals  Short term goals  Time Frame for Short term goals: 20 days  Short term goal 1: Patient to complete sit/stand and stand pivot transfers with CG/minAx1 and no LOB to decrease fall risk. Short term goal 2: Patient to ambulate 20ft with minAx1 and no LOB to decrease fall risk and improve mobility. Short term goal 3: Patient to tolerate 20-30 min of ther ex/act to improve functional strength. Short term goal 4: Patient to have good static sitting balance to decrease fall risk. Plan    Plan  Times per week: 7  Times per day: Twice a day  Current Treatment Recommendations: Strengthening,Neuromuscular Re-education,Home Exercise Program,ROM,Safety Education & Vanessa Lanius Training,Patient/Caregiver Education & Training,Functional Mobility Training,Transfer Training,Gait Training  Safety Devices  Type of devices:  All fall risk precautions in place,Call light within reach,Nurse notified,Patient at risk for falls,Left in bed     Therapy Time   Individual Concurrent Group Co-treatment   Time In 0822         Time Out 0855         Minutes [address], [place]

## 2021-12-28 LAB
ANION GAP SERPL CALCULATED.3IONS-SCNC: 16 MMOL/L (ref 9–17)
BUN BLDV-MCNC: 86 MG/DL (ref 8–23)
BUN/CREAT BLD: 28 (ref 9–20)
CALCIUM SERPL-MCNC: 8.4 MG/DL (ref 8.6–10.4)
CHLORIDE BLD-SCNC: 93 MMOL/L (ref 98–107)
CO2: 27 MMOL/L (ref 20–31)
CREAT SERPL-MCNC: 3.1 MG/DL (ref 0.7–1.2)
GFR AFRICAN AMERICAN: 23 ML/MIN
GFR NON-AFRICAN AMERICAN: 19 ML/MIN
GFR SERPL CREATININE-BSD FRML MDRD: ABNORMAL ML/MIN/{1.73_M2}
GFR SERPL CREATININE-BSD FRML MDRD: ABNORMAL ML/MIN/{1.73_M2}
GLUCOSE BLD-MCNC: 119 MG/DL (ref 74–100)
GLUCOSE BLD-MCNC: 141 MG/DL (ref 70–99)
GLUCOSE BLD-MCNC: 153 MG/DL (ref 74–100)
GLUCOSE BLD-MCNC: 178 MG/DL (ref 74–100)
GLUCOSE BLD-MCNC: 184 MG/DL (ref 74–100)
POTASSIUM SERPL-SCNC: 4.8 MMOL/L (ref 3.7–5.3)
SODIUM BLD-SCNC: 136 MMOL/L (ref 135–144)

## 2021-12-28 PROCEDURE — 36415 COLL VENOUS BLD VENIPUNCTURE: CPT

## 2021-12-28 PROCEDURE — 94640 AIRWAY INHALATION TREATMENT: CPT

## 2021-12-28 PROCEDURE — 6370000000 HC RX 637 (ALT 250 FOR IP): Performed by: PHYSICIAN ASSISTANT

## 2021-12-28 PROCEDURE — 97110 THERAPEUTIC EXERCISES: CPT

## 2021-12-28 PROCEDURE — 6370000000 HC RX 637 (ALT 250 FOR IP): Performed by: INTERNAL MEDICINE

## 2021-12-28 PROCEDURE — 99232 SBSQ HOSP IP/OBS MODERATE 35: CPT | Performed by: PHYSICIAN ASSISTANT

## 2021-12-28 PROCEDURE — 2580000003 HC RX 258: Performed by: INTERNAL MEDICINE

## 2021-12-28 PROCEDURE — 6360000002 HC RX W HCPCS: Performed by: INTERNAL MEDICINE

## 2021-12-28 PROCEDURE — 2700000000 HC OXYGEN THERAPY PER DAY

## 2021-12-28 PROCEDURE — 1200000000 HC SEMI PRIVATE

## 2021-12-28 PROCEDURE — 80048 BASIC METABOLIC PNL TOTAL CA: CPT

## 2021-12-28 PROCEDURE — 94761 N-INVAS EAR/PLS OXIMETRY MLT: CPT

## 2021-12-28 PROCEDURE — 82947 ASSAY GLUCOSE BLOOD QUANT: CPT

## 2021-12-28 RX ORDER — FUROSEMIDE 80 MG
80 TABLET ORAL DAILY
Status: DISCONTINUED | OUTPATIENT
Start: 2021-12-29 | End: 2021-12-29 | Stop reason: HOSPADM

## 2021-12-28 RX ORDER — FUROSEMIDE 40 MG/1
40 TABLET ORAL ONCE
Status: COMPLETED | OUTPATIENT
Start: 2021-12-28 | End: 2021-12-28

## 2021-12-28 RX ADMIN — FUROSEMIDE 40 MG: 40 TABLET ORAL at 16:33

## 2021-12-28 RX ADMIN — Medication 1000 UNITS: at 09:13

## 2021-12-28 RX ADMIN — OXYCODONE HYDROCHLORIDE AND ACETAMINOPHEN 500 MG: 500 TABLET ORAL at 09:14

## 2021-12-28 RX ADMIN — APIXABAN 2.5 MG: 2.5 TABLET, FILM COATED ORAL at 09:14

## 2021-12-28 RX ADMIN — CLOPIDOGREL BISULFATE 75 MG: 75 TABLET ORAL at 09:14

## 2021-12-28 RX ADMIN — IPRATROPIUM BROMIDE AND ALBUTEROL SULFATE 3 ML: .5; 3 SOLUTION RESPIRATORY (INHALATION) at 20:47

## 2021-12-28 RX ADMIN — GLIPIZIDE 5 MG: 5 TABLET ORAL at 16:33

## 2021-12-28 RX ADMIN — TAMSULOSIN HYDROCHLORIDE 0.4 MG: 0.4 CAPSULE ORAL at 16:33

## 2021-12-28 RX ADMIN — TAMSULOSIN HYDROCHLORIDE 0.4 MG: 0.4 CAPSULE ORAL at 09:13

## 2021-12-28 RX ADMIN — TRAZODONE HYDROCHLORIDE 50 MG: 50 TABLET ORAL at 22:10

## 2021-12-28 RX ADMIN — DOXYCYCLINE HYCLATE 100 MG: 100 CAPSULE ORAL at 09:14

## 2021-12-28 RX ADMIN — MIDODRINE HYDROCHLORIDE 10 MG: 5 TABLET ORAL at 11:51

## 2021-12-28 RX ADMIN — IPRATROPIUM BROMIDE AND ALBUTEROL SULFATE 3 ML: .5; 3 SOLUTION RESPIRATORY (INHALATION) at 11:02

## 2021-12-28 RX ADMIN — ACETAMINOPHEN 650 MG: 325 TABLET ORAL at 09:13

## 2021-12-28 RX ADMIN — FUROSEMIDE 40 MG: 40 TABLET ORAL at 09:13

## 2021-12-28 RX ADMIN — METOPROLOL SUCCINATE 25 MG: 25 TABLET, EXTENDED RELEASE ORAL at 09:15

## 2021-12-28 RX ADMIN — POLYETHYLENE GLYCOL (3350) 17 G: 17 POWDER, FOR SOLUTION ORAL at 09:14

## 2021-12-28 RX ADMIN — MIDODRINE HYDROCHLORIDE 10 MG: 5 TABLET ORAL at 09:14

## 2021-12-28 RX ADMIN — SODIUM CHLORIDE, PRESERVATIVE FREE 5 ML: 5 INJECTION INTRAVENOUS at 21:00

## 2021-12-28 RX ADMIN — AMIODARONE HYDROCHLORIDE 200 MG: 200 TABLET ORAL at 09:14

## 2021-12-28 RX ADMIN — IPRATROPIUM BROMIDE AND ALBUTEROL SULFATE 3 ML: .5; 3 SOLUTION RESPIRATORY (INHALATION) at 15:12

## 2021-12-28 RX ADMIN — BUDESONIDE 500 MCG: 0.5 SUSPENSION RESPIRATORY (INHALATION) at 20:47

## 2021-12-28 RX ADMIN — GLIPIZIDE 5 MG: 5 TABLET ORAL at 07:52

## 2021-12-28 RX ADMIN — SODIUM CHLORIDE, PRESERVATIVE FREE 10 ML: 5 INJECTION INTRAVENOUS at 09:15

## 2021-12-28 RX ADMIN — INSULIN LISPRO 1 UNITS: 100 INJECTION, SOLUTION INTRAVENOUS; SUBCUTANEOUS at 22:10

## 2021-12-28 RX ADMIN — ZINC SULFATE 220 MG (50 MG) CAPSULE 100 MG: CAPSULE at 09:13

## 2021-12-28 RX ADMIN — MIDODRINE HYDROCHLORIDE 10 MG: 5 TABLET ORAL at 16:33

## 2021-12-28 RX ADMIN — IPRATROPIUM BROMIDE AND ALBUTEROL SULFATE 3 ML: .5; 3 SOLUTION RESPIRATORY (INHALATION) at 04:35

## 2021-12-28 RX ADMIN — BUDESONIDE 500 MCG: 0.5 SUSPENSION RESPIRATORY (INHALATION) at 11:02

## 2021-12-28 ASSESSMENT — PAIN SCALES - GENERAL
PAINLEVEL_OUTOF10: 3
PAINLEVEL_OUTOF10: 0

## 2021-12-28 NOTE — PROGRESS NOTES
Attempted to do video visit but unfortunately it wasn't working. Chart reviewed and discussed with RN. Lasix was increased to 80 mg daily. Bps stable. Urine output looking a little better. On 3 L NC. Renal fxn overall stable.

## 2021-12-28 NOTE — PROGRESS NOTES
Physical Therapy  Facility/Department: [facility] AT THE [facility] MED SURG  Daily Treatment Note  NAME: Andria Enciso  : 10/31/1930  MRN: [id number]    Date of Service: 2021    Discharge Recommendations:  Continue to assess pending progress,Subacute/Skilled Nursing Facility,IP Rehab        Assessment   Treatment Diagnosis: General weakness  Prognosis: Good  PT Education: General Safety;PT Role;Pressure Relief  REQUIRES PT FOLLOW UP: Yes  Activity Tolerance  Activity Tolerance: Patient Tolerated treatment well;Patient limited by endurance; Patient limited by fatigue     Patient Diagnosis(es): The primary encounter diagnosis was Syncope, unspecified syncope type. Diagnoses of Abnormal chest x-ray, Renal dysfunction, Elevated troponin, and Pyuria were also pertinent to this visit. has a past medical history of Acute and chronic respiratory failure with hypoxia ([address] CAD (coronary artery disease), Cataracts, bilateral, Elevated troponin, Glaucoma, Hyperlipidemia, Hypertension, TIA (transient ischemic attack), and Type II or unspecified type diabetes mellitus without mention of complication, not stated as uncontrolled. has a past surgical history that includes Coronary artery bypass graft (); Cataract removal with implant (Bilateral,  and 12/3/2013); and Inguinal hernia repair (Bilateral, 12). Restrictions  Restrictions/Precautions  Restrictions/Precautions: General Precautions,Fall Risk  Subjective   General  Chart Reviewed: Yes  Response To Previous Treatment: Patient with no complaints from previous session.   Family / Caregiver Present: No  Referring Practitioner: Dr. Rajendra Mccabe MD  Subjective  Subjective: Pt pleasant and cooperative          Orientation  Orientation  Overall Orientation Status: Within Functional Limits  Cognition      Objective   Exercises  Straight Leg Raise: x15  Quad Sets: x15  Heelslides: x15  Gluteal Sets: x15  Hip Abduction: x15  Ankle Pumps: 20x  Comments: Pt completed above listed ex in supine position with HOB elevated. Pt with frequent short rest breaks d/t fatigue and mild SOB. G-Code     OutComes Score    AM-PAC Score    Goals  Short term goals  Time Frame for Short term goals: 20 days  Short term goal 1: Patient to complete sit/stand and stand pivot transfers with CG/minAx1 and no LOB to decrease fall risk. Short term goal 2: Patient to ambulate 20ft with minAx1 and no LOB to decrease fall risk and improve mobility. Short term goal 3: Patient to tolerate 20-30 min of ther ex/act to improve functional strength. Short term goal 4: Patient to have good static sitting balance to decrease fall risk. Plan    Plan  Times per week: 7  Times per day: Twice a day  Current Treatment Recommendations: Strengthening,Neuromuscular Re-education,Home Exercise Program,ROM,Safety Education & Gonzalez Monas Training,Patient/Caregiver Education & Training,Functional Mobility Training,Transfer Training,Gait Training  Safety Devices  Type of devices:  All fall risk precautions in place,Call light within reach,Nurse notified,Patient at risk for falls,Left in bed     Therapy Time   Individual Concurrent Group Co-treatment   Time In 1300         Time Out 1317         Minutes [address] [place] [address]

## 2021-12-28 NOTE — PROGRESS NOTES
Saskia Loomis am scribing for and in the presence of Charity Saldana PA-C. Patient: Jose Swift  : 10/31/1930  Date of Admission: 2021  Primary Care Physician: Florencio Johnson  Today's Date: 2021    REASON FOR CONSULTATION: Altered Mental Status (from [facility] after recent admission with pneumonia; staff states pt was unresponsive to sternal rub) and Emesis (onset today)    HPI: Mr. Everton Cruz is a 80 y.o. male who was admitted to the hospital with what sounds to be a possible syncopal episode. He has a recent history of recurrent sepsis and has continued treatment for what sounds to be a UTI with sepsis. Mr. Everton Cruz has a known history of CABG x 3 in . He says he has followed up in the past with Dr. Eunice Ulrich in [facility], his last visit in 2020. Mr. Everton Cruz does report a history of chest pain once every 2 months or so. Says it is a pressure quality. He denied any current or recent chest pain, abdominal pain, bleeding problems, problems with his medications or any other concerns at this time. Mr. Everton Cruz reports he still has shortness of breath that comes and goes. He is using his oxygen without issues. He complains of having right sided abdominal pain that started this morning. He states he has not had a bowel movement in a couple of days, they gave him something to help with his constipation this morning. He reports he typically weighs 160 lbs at home. He was eating okay but did end up vomiting after lunch today. He has had no chest pain or pressure. No bleeding problems. Blood work reviewed, stable at his baseline.   - 700 overnight  Weight was 176 today, last recorded weight 169 on 2021     Past Medical History:   Diagnosis Date    Acute and chronic respiratory failure with hypoxia ([address] 2021    CAD (coronary artery disease)     Cataracts, bilateral     Elevated troponin 2021    Glaucoma     Hyperlipidemia     Hypertension     TIA (transient ischemic attack)     Type II or unspecified type diabetes mellitus without mention of complication, not stated as uncontrolled        CURRENT ALLERGIES: Patient has no known allergies. REVIEW OF SYSTEMS: 14 systems were reviewed. Pertinent positives and negatives as above, all else negative.      Past Surgical History:   Procedure Laterality Date    CATARACT REMOVAL WITH IMPLANT Bilateral 11/219/2013 and 12/3/2013    CORONARY ARTERY BYPASS GRAFT  2000    INGUINAL HERNIA REPAIR Bilateral 06/19/12    Social History:  Social History     Tobacco Use    Smoking status: Never Smoker    Smokeless tobacco: Never Used   Substance Use Topics    Alcohol use: Yes     Comment: very very very seldom    Drug use: No        CURRENT MEDICATIONS:  Outpatient Medications Marked as Taking for the 12/22/21 encounter [facility] Encounter)   Medication Sig Dispense Refill    midodrine (PROAMATINE) 10 MG tablet Take 1 tablet by mouth 3 times daily (with meals) 90 tablet 3    doxycycline hyclate (VIBRA-TABS) 100 MG tablet Take 1 tablet by mouth 2 times daily for 10 days 20 tablet 0    cephALEXin (KEFLEX) 500 MG capsule Take 1 capsule by mouth 3 times daily for 10 days 30 capsule 0    tamsulosin (FLOMAX) 0.4 MG capsule Take 1 capsule by mouth 2 times daily (Patient taking differently: Take 0.4 mg by mouth daily ) 30 capsule 3    metoprolol succinate (TOPROL XL) 25 MG extended release tablet Take 1 tablet by mouth daily 30 tablet 3    ascorbic acid (VITAMIN C) 1000 MG tablet Take 0.5 tablets by mouth 2 times daily      traZODone (DESYREL) 50 MG tablet Take 50 mg by mouth nightly      polyethylene glycol (GLYCOLAX) 17 GM/SCOOP powder Take 17 g by mouth daily 1530 g 3    sodium bicarbonate 650 MG tablet Take 1 tablet by mouth 2 times daily      amiodarone (CORDARONE) 200 MG tablet Take 1 tablet by mouth 2 times daily      budesonide (PULMICORT) 0.5 MG/2ML nebulizer suspension Take 2 mLs by nebulization 2 times daily 60 each 3    ipratropium-albuterol (DUONEB) 0.5-2.5 (3) MG/3ML SOLN nebulizer solution Inhale 3 mLs into the lungs 4 times daily 360 mL     apixaban (ELIQUIS) 2.5 MG TABS tablet Take 1 tablet by mouth 2 times daily 60 tablet     [] polyethylene glycol (GLYCOLAX) 17 g packet Take 17 g by mouth daily as needed for Constipation 527 g 1    zinc sulfate (ZINCATE) 220 (50 Zn) MG capsule Take 2 capsules by mouth daily 30 capsule 3    famotidine (PEPCID) 10 MG tablet Take 1 tablet by mouth nightly 60 tablet 3    vitamin D3 (CHOLECALCIFEROL) 25 MCG (1000 UT) TABS tablet Take 1 tablet by mouth daily 30 tablet 0    furosemide (LASIX) 40 MG tablet Take 40 mg by mouth daily      glipiZIDE (GLUCOTROL) 5 MG tablet Take 5 mg by mouth 2 times daily (before meals)      atorvastatin (LIPITOR) 40 MG tablet Take 1 tablet by mouth nightly 30 tablet 3    clopidogrel (PLAVIX) 75 MG tablet Take 1 tablet by mouth daily 30 tablet 3    aspirin 81 MG EC tablet Take 81 mg by mouth nightly          furosemide (LASIX) tablet 40 mg, BID  acetaminophen (TYLENOL) suppository 650 mg, Q8H PRN  amiodarone (CORDARONE) tablet 200 mg, BID  apixaban (ELIQUIS) tablet 2.5 mg, BID  ascorbic acid (VITAMIN C) tablet 500 mg, BID  aspirin EC tablet 81 mg, Nightly  atorvastatin (LIPITOR) tablet 40 mg, Nightly  benzonatate (TESSALON) capsule 100 mg, TID PRN  budesonide (PULMICORT) nebulizer suspension 500 mcg, BID  clopidogrel (PLAVIX) tablet 75 mg, Daily  doxycycline hyclate (VIBRAMYCIN) capsule 100 mg, BID  famotidine (PEPCID) tablet 10 mg, Nightly  glipiZIDE (GLUCOTROL) tablet 5 mg, BID AC  ipratropium-albuterol (DUONEB) nebulizer solution 3 mL, 4x daily  metoprolol succinate (TOPROL XL) extended release tablet 25 mg, Daily  midodrine (PROAMATINE) tablet 10 mg, TID WC  tamsulosin (FLOMAX) capsule 0.4 mg, BID  traZODone (DESYREL) tablet 50 mg, Nightly  sodium chloride flush 0.9 % injection 5-40 mL, 2 times per day  sodium chloride flush 0.9 % injection 10 mL, PRN  0.9 % sodium chloride infusion, PRN  polyethylene glycol (GLYCOLAX) packet 17 g, Daily PRN  acetaminophen (TYLENOL) tablet 650 mg, Q6H PRN   Or  acetaminophen (TYLENOL) suppository 650 mg, Q6H PRN  albuterol (PROVENTIL) nebulizer solution 2.5 mg, Q4H PRN  polyethylene glycol (GLYCOLAX) packet 17 g, Daily  Vitamin D (CHOLECALCIFEROL) tablet 1,000 Units, Daily  zinc sulfate (ZINCATE) capsule 100 mg, Daily  glucose (GLUTOSE) 40 % oral gel 15 g, PRN  dextrose 50 % IV solution, PRN  glucagon (rDNA) injection 1 mg, PRN  dextrose 5 % solution, PRN  insulin lispro (HUMALOG) injection vial 0-12 Units, TID WC  insulin lispro (HUMALOG) injection vial 0-6 Units, Nightly         FAMILY HISTORY: family history includes Heart Disease in his brother and father. PHYSICAL EXAM:   BP (!) 98/47   Pulse 63   Temp 97.9 °F (36.6 °C) (Temporal)   Resp 16   Ht 5' 4\" (1.626 m)   Wt 176 lb 14.4 oz (80.2 kg)   SpO2 90%   BMI 30.36 kg/m²  Body mass index is 30.36 kg/m². Constitutional: He is oriented to person, place, and time. He appears well-developed and well-nourished. In no acute distress. HEENT: Normocephalic and atraumatic. No JVD present. Carotid bruit is not present. No mass and no thyromegaly present. No lymphadenopathy present. Cardiovascular: Normal rate, regular rhythm, normal heart sounds. Exam reveals no gallop and no friction rubs. 2/6 systolic murmur, 5th intercostal space on the LEFT in the mid-clavicular line (cardiac apex). Pulmonary/Chest: Effort normal and breath sounds normal. No respiratory distress. He has no wheezes, rhonchi or rales. Abdominal: Soft, non-tender. Bowel sounds and aorta are normal. He exhibits no organomegaly, mass or bruit. Negative McBurney's point. Extremities: Trace-1+ 1/2 up to the knees bilaterally. No cyanosis or clubbing. 2+ radial and carotid pulses. Distal extremity pulses: 2+ bilaterally. Compression stockings in place.   Neurological: He is alert and oriented to person, place, and time. No evidence of gross cranial nerve deficit. Coordination appeared normal.   Skin: Skin is warm and dry. There is no rash or diaphoresis. Psychiatric: He has a normal mood and affect.  His speech is normal and behavior is normal.      MOST RECENT LABS ON RECORD:   Lab Results   Component Value Date    WBC 8.1 12/26/2021    HGB 8.0 (L) 12/26/2021    HCT 26.5 (L) 12/26/2021     12/26/2021    CHOL 87 11/17/2021    TRIG 63 11/17/2021    HDL 27 (L) 11/17/2021    LDLCHOLESTEROL 47 11/17/2021    ALT 17 12/24/2021    AST 17 12/24/2021     12/28/2021    K 4.8 12/28/2021    CL 93 (L) 12/28/2021    CREATININE 3.10 (H) 12/28/2021    BUN 86 (H) 12/28/2021    CO2 27 12/28/2021    TSH 1.12 11/17/2021    PSA 1.37 11/18/2021    INR 1.3 11/17/2021    LABA1C 10.3 (H) 12/16/2021        ASSESSMENT:  Patient Active Problem List    Diagnosis Date Noted    Type 2 MI (myocardial infarction) ([address] 12/23/2021    Syncope     Acute alteration in mental status 12/22/2021    Moderate malnutrition ([address] 12/17/2021    Community acquired bacterial pneumonia 12/16/2021    Hyperglycemia 12/07/2021    Acute kidney injury superimposed on CKD ([address] 12/07/2021    Acute cystitis without hematuria 12/07/2021    Elevated troponin 12/07/2021    On amiodarone therapy     Microcytic hypochromic anemia     Hyperkalemia 12/06/2021    Urinary retention 11/27/2021    Mild malnutrition ([address] 11/17/2021    Acute and chronic respiratory failure with hypoxia (HCC) 11/16/2021    Paroxysmal atrial flutter ([address] 11/16/2021    Stage 4 chronic kidney disease ([address] 11/16/2021    ASHD (arteriosclerotic heart disease)     S/P CABG (coronary artery bypass graft)     COVID-19 virus infection / Harvey Ser Vaccine 11/15/2021    Cerebral infarction due to embolism of right middle cerebral artery ([facility] 03/28/2018    Left leg weakness 03/28/2018    Falling 03/28/2018    Stroke, lacunar ([facility] 03/28/2018    Hypertension 03/27/2018    Type 2 diabetes mellitus without complication, without long-term current use of insulin ([facility] 03/27/2018    Cerebrovascular accident (CVA) ([facility] 03/26/2018    Right inguinal hernia 04/02/2014       PLAN:     Chronic diastolic heart failure: New York Heart Association Class: III (Asymptomatic only at rest) Increase in weight from 169 to 176 - 700 overnight   Beta Blocker: Continue Metoprolol succinate (Toprol XL) 25 mg daily.  ACE Inibitor/ARB: Not indicated at this time.  Diuretics: INCREASE to furosemide (Lasix) 80 mg every morning.  Heart failure counseling: I told them to start wearing lower extremity compression stockings and I advised them to try and keep their legs up whenever possible and to limit salt in their diet.  Continue to monitor daily weight and in's and out's to be recorded.  Additional Testing List: None    Type 2 Myocardial Infarction: Mr. Melissa White meets criteria for a Type 2 MI defined by a rise and fall of troponin with at least one value above the 99th percentile and evidence of an imbalance between myocardial oxygen supply and demand unrelated to coronary thrombosis, evidenced by symptoms of acute myocardial ischemia which has manifested as the development of heart failure and shortness of breath at rest. However, I do not think that any further testing and/or treatment is indicated at this time. · Generalized weakness due to chronic illness: Continue with PT/OT    · Syncope of unclear etiology: suspect related to UTI and possible sepsis:  · Continue telemetry for now    Once again, thank you for allowing me to participate in this patients care. Please do not hesitate to contact me if I could be of any further assistance. Sincerely,  Charity CALDERON [facility], [address] [place] [address] Cardiology Specialist     [facility], [address] [place] [address]  Phone: [phone], Fax: [phone]     I believe that the risk of significant morbidity and mortality related to the patient's current medical conditions are: intermediate-high    The documentation recorded by the scribe, accurately and completely reflects the services I personally performed and the decisions made by me.   Manish Pina PA-C    December 28, 2021

## 2021-12-28 NOTE — PROGRESS NOTES
Physician Progress Note      George Atkins  [facility] #:                  [id number]  :                       10/31/1930  ADMIT DATE:       2021 12:57 PM  [address] [place] DATE:  RESPONDING  PROVIDER #:        Maritza Gonzalez MD          QUERY TEXT:    Patient admitted with AMS. Noted documentation of acute hypoxic respiratory   failure in consult note on . In order to support the diagnosis of acute   respiratory failure, please include additional clinical indicators in your   documentation. Or please document if the diagnosis of acute respiratory   failure has been ruled out after further study. The medical record reflects the following:  Risk Factors: Covid-19 pneumonia hospitalized 11/15-, recent PNA   hospitalized -, diastolic CHF, CKD stage IV; age 80 years, no   underlying chronic lung conditions, never smoker  Clinical Indicators: was discharged to rehab on oxygen at 3-4l/min per nc on   ;   venous blood gas results WDL;   SpO2 90-94% on 5L/min oxygen per   nc;  RR 17-27/min;  Per  consult note:  shortness of breath with minimal   exertion or activity,  no apparent distress;  exam notes:  Lungs: \"no use of   accessory muscles or labored breathing noted, Respiratory effort observed to   be normal.\"  Treatment: continue oxygen per nc with attempts to wean, SpO2 monitoring,   Pulmicort, Duoneb, Doxycycline continued    Acute Respiratory Failure Clinical Indicators per [employer] Training Guide and   Quick Reference Guide:  pO2 < 60 mmHg or SpO2 (pulse oximetry) < 91% breathing room air  pCO2 > 50 and pH < 7.35  P/F ratio (pO2 / FIO2) < 300  pO2 decrease or pCO2 increase by 10 mmHg from baseline (if known)  Supplemental oxygen of 40% or more  Presence of respiratory distress, tachypnea, dyspnea, wheezing  Unable to speak in complete sentences  Use of accessory muscles to breathe  Extreme anxiety and feeling of impending doom  Tripod position  Confusion/altered mental status/obtunded  Options provided:  -- Acute Respiratory Failure ruled out after study  -- Acute Respiratory Failure as evidenced by, Please document evidence.   -- Other - I will add my own diagnosis  -- Disagree - Not applicable / Not valid  -- Disagree - Clinically unable to determine / Unknown  -- Refer to Clinical Documentation Reviewer    PROVIDER RESPONSE TEXT:    This patient is in acute respiratory failure as evidenced by Pulse oximetry   90% on Dec 25 at 10:03 AM    Query created by: Sandy Torres on 12/27/2021 4:48 PM      Electronically signed by:  Cynthia Suero MD 12/28/2021 4:03 PM

## 2021-12-28 NOTE — PROGRESS NOTES
Physician Progress Note      Jackelin Suarez  [facility] #:                  [id number]  :                       10/31/1930  ADMIT DATE:       2021 12:57 PM  [address] [place] DATE:  RESPONDING  PROVIDER #:        Lico Watts MD          QUERY TEXT:    Patient admitted with AMS. Noted documentation of Acute Kidney Injury on CKD   stage IV in  H & P and  progress note as well as by nephrology   consultant on . In order to support the diagnosis of JANINA, please include   additional clinical indicators in your documentation. Or please document if   the diagnosis of JANINA has been ruled out after further study    The medical record reflects the following:  Risk Factors: age 80, underlying CKD stage IV, on lasix for CHF, hx HTN,   currently hypotensive  Clinical Indicators:  Nephrology consult note in HPI section:  \"   Patient's baseline serum creatinine runs close to 2.7-3.0. More recently his   creatinine has been around 2.5-3.0. \"  BUN/CR/GFR:  83/3.67/16 on  (from   79/3.33/17 on ), 91/3.05/19 on   Treatment: IVFs -;  oral dose Lasix increased on , monitoring   BMP    Defined by Kidney Disease Improving Global Outcomes (KDIGO) clinical practice   guideline for acute kidney injury:  -Increase in SCr by greater than or equal to 0.3 mg/dl within 48 hours; or  -Increase or decrease in SCr to greater than or equal to 1.5 times baseline,   which is known or presumed to have occurred within the prior 7 days; or  -Urine volume < 0.5ml/kg/h for 6 hours  Options provided:  -- Acute kidney injury ruled out after study  -- Acute kidney injury evidenced by, Please document evidence as well as   baseline creatinine, if known.   -- Other - I will add my own diagnosis  -- Disagree - Not applicable / Not valid  -- Disagree - Clinically unable to determine / Unknown  -- Refer to Clinical Documentation Reviewer    PROVIDER RESPONSE TEXT:    Acute kidney injury evidenced by

## 2021-12-28 NOTE — PROGRESS NOTES
Occupational Therapy  Facility/Department: [facility] AT THE [facility] MED SURG  Daily Treatment Note  NAME: Na Diego  : 10/31/1930  MRN: [id number]    Date of Service: 2021    Discharge Recommendations:  ECF with OT,Subacute/Skilled Nursing Facility       Assessment      Activity Tolerance  Activity Tolerance: Patient Tolerated treatment well  Safety Devices  Safety Devices in place: Yes  Type of devices: Call light within reach; Left in bed;Bed alarm in place         Patient Diagnosis(es): The primary encounter diagnosis was Syncope, unspecified syncope type. Diagnoses of Abnormal chest x-ray, Renal dysfunction, Elevated troponin, and Pyuria were also pertinent to this visit. has a past medical history of Acute and chronic respiratory failure with hypoxia ([address] CAD (coronary artery disease), Cataracts, bilateral, Elevated troponin, Glaucoma, Hyperlipidemia, Hypertension, TIA (transient ischemic attack), and Type II or unspecified type diabetes mellitus without mention of complication, not stated as uncontrolled. has a past surgical history that includes Coronary artery bypass graft (); Cataract removal with implant (Bilateral,  and 12/3/2013); and Inguinal hernia repair (Bilateral, 12). Restrictions  Restrictions/Precautions  Restrictions/Precautions: General Precautions,Fall Risk  Subjective   General  Chart Reviewed: Yes  Patient assessed for rehabilitation services?: Yes  Response to previous treatment: Patient with no complaints from previous session  Family / Caregiver Present: No  Referring Practitioner: Dr. Alessandra Gomez  Diagnosis: Pyuria  Subjective  Subjective: Pt had no complaints of pain this date. General Comment  Comments: Pt sitting upright in bed upon arrival. Pt agreed to participate in therapy session this date.       Orientation     Objective                                                                Type of ROM/Therapeutic Exercise  Type of ROM/Therapeutic Exercise: AROM  Comment: Pt tolerated BUE ther ex with 1# dumbbell x 7 planes x 20 reps x 1 set to increase UE strength and endurance in order to ease completion of ADL tasks. Pt required RBs as needed secondary to fatigue. Plan   Plan  Times per week: 7  Times per day: Daily  Current Treatment Recommendations: Strengthening,Safety Education & Training,Self-Care / ADL,Functional Mobility Training,Endurance Training  G-Code     OutComes Score                                                  AM-PAC Score             Goals  Short term goals  Time Frame for Short term goals: 21 visits  Short term goal 1: Patient to tolerate 10 minutes ther-ex/ther-act to increase strength/endurance for ADLs maintaining SPO2 above 90%  Short term goal 2: Patient to perform functional transfers with min A X2. Short term goal 3: Patient to complete ADL grooming with setup. Short term goal 4: Patient to complete UB bathing/dressing wtih min A.        Therapy Time   Individual Concurrent Group Co-treatment   Time In 1010         Time Out 1039         Minutes 29                 RICARDO Dewitt/L

## 2021-12-28 NOTE — PROGRESS NOTES
Physical Therapy  Facility/Department: [facility] AT THE [facility] MED SURG  Daily Treatment Note  NAME: Sheila Reed  : 10/31/1930  MRN: [id number]    Date of Service: 2021    Discharge Recommendations:  Continue to assess pending progress,Subacute/Skilled Nursing Facility,IP Rehab        Assessment   Treatment Diagnosis: General weakness  Prognosis: Good  Decision Making: Medium Complexity  PT Education: General Safety;PT Role;Pressure Relief  REQUIRES PT FOLLOW UP: Yes  Activity Tolerance  Activity Tolerance: Patient Tolerated treatment well;Patient limited by endurance; Patient limited by fatigue     Patient Diagnosis(es): The primary encounter diagnosis was Syncope, unspecified syncope type. Diagnoses of Abnormal chest x-ray, Renal dysfunction, Elevated troponin, and Pyuria were also pertinent to this visit. has a past medical history of Acute and chronic respiratory failure with hypoxia ([address] CAD (coronary artery disease), Cataracts, bilateral, Elevated troponin, Glaucoma, Hyperlipidemia, Hypertension, TIA (transient ischemic attack), and Type II or unspecified type diabetes mellitus without mention of complication, not stated as uncontrolled. has a past surgical history that includes Coronary artery bypass graft (); Cataract removal with implant (Bilateral,  and 12/3/2013); and Inguinal hernia repair (Bilateral, 12). Restrictions  Restrictions/Precautions  Restrictions/Precautions: General Precautions,Fall Risk  Subjective   General  Chart Reviewed: Yes  Response To Previous Treatment: Patient with no complaints from previous session. Family / Caregiver Present: No  Referring Practitioner: Dr. Mary Joya MD  Subjective  Subjective: Pt reports feeling better than earlier this morning, some relief on his bottom.           Orientation  Orientation  Overall Orientation Status: Within Functional Limits  Cognition      Objective   Bed mobility  Rolling to Left: Minimal assistance;Contact guard assistance  Rolling to Right: Minimal assistance;Contact guard assistance  Scooting: Maximal assistance;2 Person assistance              Exercises  Straight Leg Raise: 20x  Quad Sets: 20x  Heelslides: 20x  Gluteal Sets: 20x  Hip Abduction: 20x  Knee Short Arc Quad: 20x  Ankle Pumps: 20x  Comments: BLE ther ex completed in supine, AAROM as needed. Pt required frequent RBs d/t fatigue/SOB. Goals  Short term goals  Time Frame for Short term goals: 20 days  Short term goal 1: Patient to complete sit/stand and stand pivot transfers with CG/minAx1 and no LOB to decrease fall risk. Short term goal 2: Patient to ambulate 20ft with minAx1 and no LOB to decrease fall risk and improve mobility. Short term goal 3: Patient to tolerate 20-30 min of ther ex/act to improve functional strength. Short term goal 4: Patient to have good static sitting balance to decrease fall risk. Plan    Plan  Times per week: 7  Times per day: Twice a day  Current Treatment Recommendations: Strengthening,Neuromuscular Re-education,Home Exercise Program,ROM,Safety Education & Ladarius Wright Training,Patient/Caregiver Education & Training,Functional Mobility Training,Transfer Training,Gait Training  Safety Devices  Type of devices:  All fall risk precautions in place,Call light within reach,Nurse notified,Patient at risk for falls,Left in bed     Therapy Time   Individual Concurrent Group Co-treatment   Time In 0935         Time Out 0958         Minutes [place], [place]

## 2021-12-28 NOTE — PROGRESS NOTES
[facility] -Progress Note    SUBJECTIVE:    Patient seen for f/u of Acute alteration in mental status. He resting in bed alert and oriented watching tv. No complaints. Afebrile. ROS:   Constitutional: negative  for fevers, and negative for chills. Respiratory: negative for shortness of breath, positive for cough, and negative for wheezing  Cardiovascular: negative for chest pain, and negative for palpitations  Gastrointestinal: negative for abdominal pain, negative for nausea,negative for vomiting, negative for diarrhea, and negative for constipation     All other systems were reviewed with the patient and are negative unless otherwise stated in HPI      OBJECTIVE:        VITAL SIGNS:  Patient Vitals for the past 8 hrs:   BP Temp Temp src Pulse Resp SpO2 Weight   21 0546 -- -- -- -- -- -- 176 lb 14.4 oz (80.2 kg)   21 0442 (!) 109/48 97.2 °F (36.2 °C) Temporal 58 18 -- --   21 0435 -- -- -- -- 18 100 % --         Temp: 97.2 °F (36.2 °C)  Temp range:    Temp  Av.2 °F (36.2 °C)  Min: 96.9 °F (36.1 °C)  Max: 97.3 °F (36.3 °C)    BP: (!) 109/48  BP Range:      Systolic (78OPZ), IGL:638 , Min:95 , WGQ:912      Diastolic (27RFV), BWU:86, Min:41, Max:83    Pulse: 58  Pulse Range:    Pulse  Av  Min: 58  Max: 70    Resp: 18  Resp Range:   Resp  Av.8  Min: 18  Max: 24    SpO2: 100 % nasal cannula  SpO2 range:   SpO2  Av.4 %  Min: 86 %  Max: 100 %      PaO2/FiO2 RATIO:  No results for input(s): POCPO2 in the last 72 hours. Exam:    GEN:    Awake, alert and oriented x3. EYES:   EOMI, pupils equal   NECK: Supple. No lymphadenopathy. No carotid bruit  CVS:     regular rate and rhythm, systolic murmur  PULM:  diminished with fine rales bases, no acute respiratory distress  ABD:     Bowels sounds normal.  Abdomen is soft. No distention. no tenderness to palpation. EXT:     trace edema bilaterally . No calf tenderness. NEURO: Moves all extremities.   Motor and sensory are grossly intact  SKIN:    No rashes. No skin lesions. Diagnostic Data:      Complete Blood Count:   Recent Labs     12/26/21  0535   WBC 8.1   RBC 2.64*   HGB 8.0*   HCT 26.5*   .4   MCH 30.3   MCHC 30.2   RDW 17.0*      MPV 11.2        Last 3 Blood Glucose:   Recent Labs     12/26/21  0535 12/27/21  0518 12/28/21  0506   GLUCOSE 154* 133* 141*        Comprehensive Metabolic Profile:   Recent Labs     12/26/21  0535 12/27/21  0518 12/28/21  0506    137 136   K 5.3 5.1 4.8    99 93*   CO2 26 27 27   BUN 89* 91* 86*   CREATININE 3.14* 3.05* 3.10*   GLUCOSE 154* 133* 141*   CALCIUM 8.8 8.4* 8.4*        Urinalysis:   Lab Results   Component Value Date    NITRU NEGATIVE 12/22/2021    COLORU Yellow 12/22/2021    PHUR 6.0 12/22/2021    WBCUA 10 TO 20 12/22/2021    RBCUA 10 TO 20 12/22/2021    MUCUS NOT REPORTED 12/22/2021    TRICHOMONAS NOT REPORTED 12/22/2021    YEAST NOT REPORTED 12/22/2021    BACTERIA NOT REPORTED 12/22/2021    SPECGRAV 1.015 12/22/2021    LEUKOCYTESUR TRACE 12/22/2021    UROBILINOGEN Normal 12/22/2021    BILIRUBINUR NEGATIVE 12/22/2021    GLUCOSEU 1+ 12/22/2021    KETUA NEGATIVE 12/22/2021    AMORPHOUS NOT REPORTED 12/22/2021       HgBA1c:    Lab Results   Component Value Date    LABA1C 10.3 12/16/2021       Lactic Acid:   Lab Results   Component Value Date    LACTA 2.2 11/15/2021        Troponin: No results for input(s): TROPONINI in the last 72 hours. CRP:    No results for input(s): CRP in the last 72 hours. Radiology/Imaging:  CT Head WO Contrast   Final Result   No acute intracranial abnormality. XR CHEST PORTABLE   Final Result   No change in multifocal infiltrates suggesting pneumonia probable small   effusions. Recommend continued follow-up.                ASSESSMENT / PLAN:  Acute alteration in mental status  · Continue current therapy   · Resolved  · ASHD  · Continue aspirin, Eliquis, Lipitor, Plavix, amiodarone, ASA, Toprol XL  · Trend Troponin  · Appreciate Cardiology  · CKD stage IV  · Trend labs worsening  · Appreciate nephrology  · Type 2 diabetes  · Continue Glucotrol  · POCT before meals and at bedtime  · Insulin sliding scale-medium sliding scale  · Hypoglycemia protocol  · HTN  · Continue Toprol XL  · Nutrition status:   · at risk for malnutrition  · Dietician consult initiated  · Hospital Prophylaxis:   · DVT: Eliquis   · Stress Ulcer: H2 Blocker   · High risk medications: none   · Disposition:    · Plan will be SNF  · SNF with Hospice.    · Ready for DC just awaiting a facility      FRANKLIN Cuadra - CNP , FRANKLIN, NP-C  Hospitalist Medicine        12/28/2021, 7:42 AM

## 2021-12-29 ENCOUNTER — APPOINTMENT (OUTPATIENT)
Dept: GENERAL RADIOLOGY | Age: 86
DRG: 947 | End: 2021-12-29
Payer: MEDICARE

## 2021-12-29 VITALS
BODY MASS INDEX: 29.79 KG/M2 | TEMPERATURE: 97.2 F | HEIGHT: 64 IN | HEART RATE: 65 BPM | SYSTOLIC BLOOD PRESSURE: 129 MMHG | OXYGEN SATURATION: 89 % | WEIGHT: 174.5 LBS | DIASTOLIC BLOOD PRESSURE: 48 MMHG | RESPIRATION RATE: 20 BRPM

## 2021-12-29 LAB
ANION GAP SERPL CALCULATED.3IONS-SCNC: 10 MMOL/L (ref 9–17)
BUN BLDV-MCNC: 83 MG/DL (ref 8–23)
BUN/CREAT BLD: 29 (ref 9–20)
CALCIUM SERPL-MCNC: 9 MG/DL (ref 8.6–10.4)
CHLORIDE BLD-SCNC: 100 MMOL/L (ref 98–107)
CO2: 29 MMOL/L (ref 20–31)
CREAT SERPL-MCNC: 2.91 MG/DL (ref 0.7–1.2)
GFR AFRICAN AMERICAN: 25 ML/MIN
GFR NON-AFRICAN AMERICAN: 20 ML/MIN
GFR SERPL CREATININE-BSD FRML MDRD: ABNORMAL ML/MIN/{1.73_M2}
GFR SERPL CREATININE-BSD FRML MDRD: ABNORMAL ML/MIN/{1.73_M2}
GLUCOSE BLD-MCNC: 191 MG/DL (ref 74–100)
GLUCOSE BLD-MCNC: 79 MG/DL (ref 74–100)
GLUCOSE BLD-MCNC: 91 MG/DL (ref 70–99)
POTASSIUM SERPL-SCNC: 4.9 MMOL/L (ref 3.7–5.3)
SARS-COV-2, RAPID: NOT DETECTED
SODIUM BLD-SCNC: 139 MMOL/L (ref 135–144)
SPECIMEN DESCRIPTION: NORMAL

## 2021-12-29 PROCEDURE — 87635 SARS-COV-2 COVID-19 AMP PRB: CPT

## 2021-12-29 PROCEDURE — C9803 HOPD COVID-19 SPEC COLLECT: HCPCS

## 2021-12-29 PROCEDURE — 6370000000 HC RX 637 (ALT 250 FOR IP): Performed by: INTERNAL MEDICINE

## 2021-12-29 PROCEDURE — 6370000000 HC RX 637 (ALT 250 FOR IP): Performed by: PHYSICIAN ASSISTANT

## 2021-12-29 PROCEDURE — 97530 THERAPEUTIC ACTIVITIES: CPT

## 2021-12-29 PROCEDURE — 71045 X-RAY EXAM CHEST 1 VIEW: CPT

## 2021-12-29 PROCEDURE — 80048 BASIC METABOLIC PNL TOTAL CA: CPT

## 2021-12-29 PROCEDURE — 82947 ASSAY GLUCOSE BLOOD QUANT: CPT

## 2021-12-29 PROCEDURE — 99232 SBSQ HOSP IP/OBS MODERATE 35: CPT | Performed by: PHYSICIAN ASSISTANT

## 2021-12-29 PROCEDURE — 94761 N-INVAS EAR/PLS OXIMETRY MLT: CPT

## 2021-12-29 PROCEDURE — 36415 COLL VENOUS BLD VENIPUNCTURE: CPT

## 2021-12-29 PROCEDURE — 2700000000 HC OXYGEN THERAPY PER DAY

## 2021-12-29 PROCEDURE — 2580000003 HC RX 258: Performed by: INTERNAL MEDICINE

## 2021-12-29 PROCEDURE — 94640 AIRWAY INHALATION TREATMENT: CPT

## 2021-12-29 PROCEDURE — 97110 THERAPEUTIC EXERCISES: CPT

## 2021-12-29 RX ORDER — POLYETHYLENE GLYCOL 3350 17 G/17G
17 POWDER, FOR SOLUTION ORAL DAILY PRN
Qty: 527 G | Refills: 1 | DISCHARGE
Start: 2021-12-29 | End: 2022-01-28

## 2021-12-29 RX ORDER — FUROSEMIDE 80 MG
80 TABLET ORAL DAILY
Qty: 60 TABLET | Refills: 3 | DISCHARGE
Start: 2021-12-30

## 2021-12-29 RX ORDER — TAMSULOSIN HYDROCHLORIDE 0.4 MG/1
0.4 CAPSULE ORAL 2 TIMES DAILY
Qty: 30 CAPSULE | Refills: 3 | DISCHARGE
Start: 2021-12-29

## 2021-12-29 RX ADMIN — APIXABAN 2.5 MG: 2.5 TABLET, FILM COATED ORAL at 08:45

## 2021-12-29 RX ADMIN — IPRATROPIUM BROMIDE AND ALBUTEROL SULFATE 3 ML: .5; 3 SOLUTION RESPIRATORY (INHALATION) at 05:56

## 2021-12-29 RX ADMIN — IPRATROPIUM BROMIDE AND ALBUTEROL SULFATE 3 ML: .5; 3 SOLUTION RESPIRATORY (INHALATION) at 11:53

## 2021-12-29 RX ADMIN — POLYETHYLENE GLYCOL (3350) 17 G: 17 POWDER, FOR SOLUTION ORAL at 08:46

## 2021-12-29 RX ADMIN — POLYETHYLENE GLYCOL (3350) 17 G: 17 POWDER, FOR SOLUTION ORAL at 04:28

## 2021-12-29 RX ADMIN — ZINC SULFATE 220 MG (50 MG) CAPSULE 100 MG: CAPSULE at 08:45

## 2021-12-29 RX ADMIN — METOPROLOL SUCCINATE 25 MG: 25 TABLET, EXTENDED RELEASE ORAL at 08:45

## 2021-12-29 RX ADMIN — DOXYCYCLINE HYCLATE 100 MG: 100 CAPSULE ORAL at 08:45

## 2021-12-29 RX ADMIN — MIDODRINE HYDROCHLORIDE 10 MG: 5 TABLET ORAL at 11:48

## 2021-12-29 RX ADMIN — FUROSEMIDE 80 MG: 80 TABLET ORAL at 08:45

## 2021-12-29 RX ADMIN — GLIPIZIDE 5 MG: 5 TABLET ORAL at 07:44

## 2021-12-29 RX ADMIN — MIDODRINE HYDROCHLORIDE 10 MG: 5 TABLET ORAL at 08:46

## 2021-12-29 RX ADMIN — TAMSULOSIN HYDROCHLORIDE 0.4 MG: 0.4 CAPSULE ORAL at 08:45

## 2021-12-29 RX ADMIN — Medication 1000 UNITS: at 08:45

## 2021-12-29 RX ADMIN — CLOPIDOGREL BISULFATE 75 MG: 75 TABLET ORAL at 08:45

## 2021-12-29 RX ADMIN — OXYCODONE HYDROCHLORIDE AND ACETAMINOPHEN 500 MG: 500 TABLET ORAL at 08:45

## 2021-12-29 RX ADMIN — SODIUM CHLORIDE, PRESERVATIVE FREE 10 ML: 5 INJECTION INTRAVENOUS at 08:48

## 2021-12-29 RX ADMIN — AMIODARONE HYDROCHLORIDE 200 MG: 200 TABLET ORAL at 08:45

## 2021-12-29 ASSESSMENT — PAIN SCALES - GENERAL: PAINLEVEL_OUTOF10: 0

## 2021-12-29 NOTE — PROGRESS NOTES
Patient leaving floor at this time via [facility] transportation. Belongings sent with family.

## 2021-12-29 NOTE — PROGRESS NOTES
Functional Limits  Cognition      Objective   Bed mobility  Rolling to Left: Minimal assistance  Rolling to Right: Minimal assistance  Supine to Sit: Moderate assistance;Maximum assistance;2 Person assistance  Sit to Supine: Maximum assistance;2 Person assistance  Scooting: Moderate assistance;2 Person assistance;Maximal assistance  Comment: Pt sat EOB for several minutes, balance F/F+  Transfers  Sit to Stand: Maximum Assistance;2 Person Assistance  Stand to sit: Maximum Assistance;2 Person Assistance  Comment: Pt static standing EOB ~ 2 minutes than sat EOB ~ 5 minutes. Ambulation  Ambulation?: No     G-Code     OutComes Score    AM-PAC Score    Goals  Short term goals  Time Frame for Short term goals: 20 days  Short term goal 1: Patient to complete sit/stand and stand pivot transfers with CG/minAx1 and no LOB to decrease fall risk. Short term goal 2: Patient to ambulate 20ft with minAx1 and no LOB to decrease fall risk and improve mobility. Short term goal 3: Patient to tolerate 20-30 min of ther ex/act to improve functional strength. Short term goal 4: Patient to have good static sitting balance to decrease fall risk. Plan    Plan  Times per week: 7  Times per day: Twice a day  Current Treatment Recommendations: Strengthening,Neuromuscular Re-education,Home Exercise Program,ROM,Safety Education & Cintron Bering Training,Patient/Caregiver Education & Training,Functional Mobility Training,Transfer Training,Gait Training  Safety Devices  Type of devices:  All fall risk precautions in place,Bed alarm in place,Gait belt,Patient at risk for falls,Left in bed,Nurse notified     Therapy Time   Individual Concurrent Group Co-treatment   Time In 1130         Time Out 1153         Minutes 69[facility]

## 2021-12-29 NOTE — PROGRESS NOTES
Berenice Elizabeth am scribing for and in the presence of Charity Kilgore     Patient: Km Collado  : 10/31/1930  Date of Admission: 2021  Primary Care Physician: Giovani Hernandez  Today's Date: 2021    REASON FOR CONSULTATION: Altered Mental Status (from [facility] after recent admission with pneumonia; staff states pt was unresponsive to sternal rub) and Emesis (onset today)      HPI: Mr. Geovanny Lewis is a 80 y.o. male who was admitted to the hospital with what sounds to be a possible syncopal episode. He has a recent history of recurrent sepsis and has continued treatment for what sounds to be a UTI with sepsis. Mr. Geovanny Lewis has a known history of CABG x 3 in . He says he has followed up in the past with Dr. Erin Naidu in [facility], his last visit in 2020. Mr. Geovanny Lewis does report a history of chest pain once every 2 months or so. Says it is a pressure quality. He denied any current or recent chest pain, abdominal pain, bleeding problems, problems with his medications or any other concerns at this time. Mr. Geovanny Lewis today reports doing fairly well. He was wondering when he was going to be able to go home. He was a bit shortness of breath after getting back into bed from his chair. His nurse stated his O2 level was 92%, however his oxygen was off his face for a bit. Today he states he is not having any chest pain, pressure, or palpitations. He denies any abdominal pain, nausea, or vomiting. Denies blood in his urine or stool. Past Medical History:   Diagnosis Date    Acute and chronic respiratory failure with hypoxia ([facility] 2021    CAD (coronary artery disease)     Cataracts, bilateral     Elevated troponin 2021    Glaucoma     Hyperlipidemia     Hypertension     TIA (transient ischemic attack)     Type II or unspecified type diabetes mellitus without mention of complication, not stated as uncontrolled        CURRENT ALLERGIES: Patient has no known allergies.  REVIEW OF SYSTEMS: 14 systems were reviewed. Pertinent positives and negatives as above, all else negative.      Past Surgical History:   Procedure Laterality Date    CATARACT REMOVAL WITH IMPLANT Bilateral 11/219/2013 and 12/3/2013    CORONARY ARTERY BYPASS GRAFT  2000    INGUINAL HERNIA REPAIR Bilateral 06/19/12    Social History:  Social History     Tobacco Use    Smoking status: Never Smoker    Smokeless tobacco: Never Used   Substance Use Topics    Alcohol use: Yes     Comment: very very very seldom    Drug use: No        CURRENT MEDICATIONS:  Outpatient Medications Marked as Taking for the 12/22/21 encounter [facility] Encounter)   Medication Sig Dispense Refill    midodrine (PROAMATINE) 10 MG tablet Take 1 tablet by mouth 3 times daily (with meals) 90 tablet 3    doxycycline hyclate (VIBRA-TABS) 100 MG tablet Take 1 tablet by mouth 2 times daily for 10 days 20 tablet 0    cephALEXin (KEFLEX) 500 MG capsule Take 1 capsule by mouth 3 times daily for 10 days 30 capsule 0    tamsulosin (FLOMAX) 0.4 MG capsule Take 1 capsule by mouth 2 times daily (Patient taking differently: Take 0.4 mg by mouth daily ) 30 capsule 3    metoprolol succinate (TOPROL XL) 25 MG extended release tablet Take 1 tablet by mouth daily 30 tablet 3    ascorbic acid (VITAMIN C) 1000 MG tablet Take 0.5 tablets by mouth 2 times daily      traZODone (DESYREL) 50 MG tablet Take 50 mg by mouth nightly      polyethylene glycol (GLYCOLAX) 17 GM/SCOOP powder Take 17 g by mouth daily 1530 g 3    sodium bicarbonate 650 MG tablet Take 1 tablet by mouth 2 times daily      amiodarone (CORDARONE) 200 MG tablet Take 1 tablet by mouth 2 times daily      budesonide (PULMICORT) 0.5 MG/2ML nebulizer suspension Take 2 mLs by nebulization 2 times daily 60 each 3    ipratropium-albuterol (DUONEB) 0.5-2.5 (3) MG/3ML SOLN nebulizer solution Inhale 3 mLs into the lungs 4 times daily 360 mL     apixaban (ELIQUIS) 2.5 MG TABS tablet Take 1 tablet by mouth 2 times daily 60 tablet     [] polyethylene glycol (GLYCOLAX) 17 g packet Take 17 g by mouth daily as needed for Constipation 527 g 1    zinc sulfate (ZINCATE) 220 (50 Zn) MG capsule Take 2 capsules by mouth daily 30 capsule 3    famotidine (PEPCID) 10 MG tablet Take 1 tablet by mouth nightly 60 tablet 3    vitamin D3 (CHOLECALCIFEROL) 25 MCG (1000 UT) TABS tablet Take 1 tablet by mouth daily 30 tablet 0    furosemide (LASIX) 40 MG tablet Take 40 mg by mouth daily      glipiZIDE (GLUCOTROL) 5 MG tablet Take 5 mg by mouth 2 times daily (before meals)      atorvastatin (LIPITOR) 40 MG tablet Take 1 tablet by mouth nightly 30 tablet 3    clopidogrel (PLAVIX) 75 MG tablet Take 1 tablet by mouth daily 30 tablet 3    aspirin 81 MG EC tablet Take 81 mg by mouth nightly          furosemide (LASIX) tablet 80 mg, Daily  acetaminophen (TYLENOL) suppository 650 mg, Q8H PRN  amiodarone (CORDARONE) tablet 200 mg, BID  apixaban (ELIQUIS) tablet 2.5 mg, BID  ascorbic acid (VITAMIN C) tablet 500 mg, BID  aspirin EC tablet 81 mg, Nightly  atorvastatin (LIPITOR) tablet 40 mg, Nightly  benzonatate (TESSALON) capsule 100 mg, TID PRN  budesonide (PULMICORT) nebulizer suspension 500 mcg, BID  clopidogrel (PLAVIX) tablet 75 mg, Daily  doxycycline hyclate (VIBRAMYCIN) capsule 100 mg, BID  famotidine (PEPCID) tablet 10 mg, Nightly  glipiZIDE (GLUCOTROL) tablet 5 mg, BID AC  ipratropium-albuterol (DUONEB) nebulizer solution 3 mL, 4x daily  metoprolol succinate (TOPROL XL) extended release tablet 25 mg, Daily  midodrine (PROAMATINE) tablet 10 mg, TID WC  tamsulosin (FLOMAX) capsule 0.4 mg, BID  traZODone (DESYREL) tablet 50 mg, Nightly  sodium chloride flush 0.9 % injection 5-40 mL, 2 times per day  sodium chloride flush 0.9 % injection 10 mL, PRN  0.9 % sodium chloride infusion, PRN  polyethylene glycol (GLYCOLAX) packet 17 g, Daily PRN  acetaminophen (TYLENOL) tablet 650 mg, Q6H PRN   Or  acetaminophen (TYLENOL) suppository 650 mg, Q6H PRN  albuterol (PROVENTIL) nebulizer solution 2.5 mg, Q4H PRN  polyethylene glycol (GLYCOLAX) packet 17 g, Daily  Vitamin D (CHOLECALCIFEROL) tablet 1,000 Units, Daily  zinc sulfate (ZINCATE) capsule 100 mg, Daily  glucose (GLUTOSE) 40 % oral gel 15 g, PRN  dextrose 50 % IV solution, PRN  glucagon (rDNA) injection 1 mg, PRN  dextrose 5 % solution, PRN  insulin lispro (HUMALOG) injection vial 0-12 Units, TID WC  insulin lispro (HUMALOG) injection vial 0-6 Units, Nightly         FAMILY HISTORY: family history includes Heart Disease in his brother and father. PHYSICAL EXAM:   BP (!) 129/48   Pulse 65   Temp 97.2 °F (36.2 °C) (Temporal)   Resp 20   Ht 5' 4\" (1.626 m)   Wt 174 lb 8 oz (79.2 kg)   SpO2 92%   BMI 29.95 kg/m²  Body mass index is 29.95 kg/m². Constitutional: He is oriented to person, place, and time. He appears well-developed and well-nourished. In no acute distress. HEENT: Normocephalic and atraumatic. No JVD present. Carotid bruit is not present. No mass and no thyromegaly present. No lymphadenopathy present. Cardiovascular: Normal rate, regular rhythm, normal heart sounds. Exam reveals no gallop and no friction rubs. 2/6 systolic murmur, 5th intercostal space on the LEFT in the mid-clavicular line (cardiac apex). Pulmonary/Chest: Effort normal and breath sounds normal. No respiratory distress. He has no wheezes, rhonchi or rales. Decreased lung sounds and expletory wheezes. Abdominal: Soft, non-tender. Bowel sounds and aorta are normal. He exhibits no organomegaly, mass or bruit. Extremities: Trace-1+ up to the knees bilaterally. No cyanosis or clubbing. 2+ radial and carotid pulses. Distal extremity pulses: 2+ bilaterally. .  Neurological: He is alert and oriented to person, place, and time. No evidence of gross cranial nerve deficit. Coordination appeared normal.   Skin: Skin is warm and dry. There is no rash or diaphoresis. Psychiatric: He has a normal mood and affect.  His speech is normal and behavior is normal.      MOST RECENT LABS ON RECORD:   Lab Results   Component Value Date    WBC 8.1 12/26/2021    HGB 8.0 (L) 12/26/2021    HCT 26.5 (L) 12/26/2021     12/26/2021    CHOL 87 11/17/2021    TRIG 63 11/17/2021    HDL 27 (L) 11/17/2021    LDLCHOLESTEROL 47 11/17/2021    ALT 17 12/24/2021    AST 17 12/24/2021     12/29/2021    K 4.9 12/29/2021     12/29/2021    CREATININE 2.91 (H) 12/29/2021    BUN 83 (H) 12/29/2021    CO2 29 12/29/2021    TSH 1.12 11/17/2021    PSA 1.37 11/18/2021    INR 1.3 11/17/2021    LABA1C 10.3 (H) 12/16/2021        ASSESSMENT:  Patient Active Problem List    Diagnosis Date Noted    Type 2 MI (myocardial infarction) ([address] 12/23/2021    Syncope     Acute alteration in mental status 12/22/2021    Moderate malnutrition ([address] 12/17/2021    Community acquired bacterial pneumonia 12/16/2021    Hyperglycemia 12/07/2021    Acute kidney injury superimposed on CKD ([address] 12/07/2021    Acute cystitis without hematuria 12/07/2021    Elevated troponin 12/07/2021    On amiodarone therapy     Microcytic hypochromic anemia     Hyperkalemia 12/06/2021    Urinary retention 11/27/2021    Mild malnutrition ([address] 11/17/2021    Acute and chronic respiratory failure with hypoxia ([address] 11/16/2021    Paroxysmal atrial flutter ([address] 11/16/2021    Stage 4 chronic kidney disease ([address] 11/16/2021    ASHD (arteriosclerotic heart disease)     S/P CABG (coronary artery bypass graft)     COVID-19 virus infection / Radha Estimable Vaccine 11/15/2021    Cerebral infarction due to embolism of right middle cerebral artery ([address] 03/28/2018    Left leg weakness 03/28/2018    Falling 03/28/2018    Stroke[facility] 03/28/2018    Hypertension 03/27/2018    Type 2 diabetes mellitus without complication, without long-term current use of insulin ([facility] 03/27/2018    Cerebrovascular accident (CVA) ([facility] 03/26/2018    Right inguinal hernia 04/02/2014 PLAN:   Chronic diastolic heart failure: New York Heart Association Class: III (Asymptomatic only at rest). Down another two pounds at this time.  Beta Blocker: Not indicated at this time.  ACE Inibitor/ARB: Not indicated at this time.  Diuretics: Continue furosemide (Lasix) 40 mg every morning.  Heart failure counseling: I advised them to try and keep their legs up whenever possible and to limit salt in their diet.  Additional Testing: I will order a repeat Chest X-Ray at this time. Type 2 Myocardial Infarction: Mr. Alexandra Blakely meets criteria for a Type 2 MI defined by a rise and fall of troponin with at least one value above the 99th percentile and evidence of an imbalance between myocardial oxygen supply and demand unrelated to coronary thrombosis, evidenced by symptoms of acute myocardial ischemia which has manifested as the development of heart failure and shortness of breath at rest. However, I do not think that any further testing and/or treatment is indicated at this time. · Generalized weakness due to chronic illness: Continue with PT/OT    · UTI: Continue antibiotics. · Syncope of unclear etiology: suspect related to UTI and possible sepsis:  · Continue telemetry for now  · Continue to monitor Troponin but declining. · Continue antibiotic treatment    Once again, thank you for allowing me to participate in this patients care. Please do not hesitate to contact me if I could be of any further assistance. Sincerely,  Charity Barrett, [id number] [place] [address] Cardiology Specialist    [address] [facility], [address] [place] [address]  Phone: [phone], Fax: [phone]     I believe that the risk of significant morbidity and mortality related to the patient's current medical conditions are: Intermediate.         December 29, 2021

## 2021-12-29 NOTE — PROGRESS NOTES
Occupational Therapy  Facility/Department: [facility] AT [facility] MED SURG  Daily Treatment Note  NAME: Emiliano Page  : 10/31/1930  MRN: [id number]    Date of Service: 2021    Discharge Recommendations:  ECF with OT,Subacute/Skilled Nursing Facility       Assessment   OT Education: Energy Conservation;OT Role  Patient Education: Educated on deep breathing techniques during ther ex. Barriers to Learning: none  Activity Tolerance  Activity Tolerance: Patient limited by fatigue  Activity Tolerance: Pt with SOB noted throughout tx session. SpO2 between 88-93% with ther ex while on 3L O2. Safety Devices  Safety Devices in place: Yes  Type of devices: Call light within reach; Left in bed;Bed alarm in place         Patient Diagnosis(es): The primary encounter diagnosis was Syncope, unspecified syncope type. Diagnoses of Abnormal chest x-ray, Renal dysfunction, Elevated troponin, and Pyuria were also pertinent to this visit. has a past medical history of Acute and chronic respiratory failure with hypoxia ([address] CAD (coronary artery disease), Cataracts, bilateral, Elevated troponin, Glaucoma, Hyperlipidemia, Hypertension, TIA (transient ischemic attack), and Type II or unspecified type diabetes mellitus without mention of complication, not stated as uncontrolled. has a past surgical history that includes Coronary artery bypass graft (); Cataract removal with implant (Bilateral,  and 12/3/2013); and Inguinal hernia repair (Bilateral, 12). Restrictions  Restrictions/Precautions  Restrictions/Precautions: General Precautions,Fall Risk  Subjective   General  Chart Reviewed: Yes  Patient assessed for rehabilitation services?: Yes  Response to previous treatment: Patient with no complaints from previous session  Family / Caregiver Present: No  Referring Practitioner: Dr. Clover George  Diagnosis: Pyuria  Subjective  Subjective: Pt had no complaints of pain this date.  Pt reports, \"I just get so winded with everything I do\". General Comment  Comments: Pt sitting upright in bed and easily agreeable to therapy. Pt reports that he sat EOB and stood for a little today with physical therapy and reports that it felt really good. OT asked patient if he would like to sit EOB again for UE exercises, however pt declined. Vital Signs  Patient Currently in Pain: Denies   Orientation  Orientation  Overall Orientation Status: Within Functional Limits  Objective    ADL  Additional Comments: Pt declined ADL session this date, and reported that he would rather to UE exercises. Cognition  Overall Cognitive Status: WFL  Type of ROM/Therapeutic Exercise  Type of ROM/Therapeutic Exercise: Free weights  Comment: Pt tolerated BUE ther ex with 1# dumbbell x 7 planes x 20 reps x 1 set to increase UE strength and endurance in order to ease completion of ADL tasks. Pt required RBs as needed secondary to fatigue and SOB. Pt educated on deep breathing technique with fair follow-through. Plan   Plan  Times per week: 7  Times per day: Daily  Current Treatment Recommendations: Strengthening,Safety Education & Training,Self-Care / ADL,Functional Mobility Training,Endurance Training    Goals  Short term goals  Time Frame for Short term goals: 21 visits  Short term goal 1: Patient to tolerate 10 minutes ther-ex/ther-act to increase strength/endurance for ADLs maintaining SPO2 above 90%  Short term goal 2: Patient to perform functional transfers with min A X2. Short term goal 3: Patient to complete ADL grooming with setup. Short term goal 4: Patient to complete UB bathing/dressing wtih min A.        Therapy Time   Individual Concurrent Group Co-treatment   Time In 1010         Time Out 1036         Minutes 26         Timed Code Treatment Minutes: [place], OTD, OTR/L

## 2021-12-29 NOTE — PROGRESS NOTES
[facility] unable to provide stretcher transport today. Contacted numerous other ambulance companies without success for coordinating alternate ambulance transport. Nurse, Katie Gordon, again approached family re: [facility] offer to transport. Family reluctantly agrees to [facility] for transport. [facility] contacted and will arrange for transport at 3:30.       Deoda. Raissa [facility] [address] [place] [address]  12/29/2021

## 2021-12-29 NOTE — PROGRESS NOTES
Physical Therapy  Facility/Department: [facility] AT THE [facility] MED SURG  Daily Treatment Note  NAME: Katy Underwood  : 10/31/1930  MRN: [id number]    Date of Service: 2021    Discharge Recommendations:  Continue to assess pending progress,Subacute/Skilled Nursing Facility,IP Rehab        Assessment   Treatment Diagnosis: General weakness  Prognosis: Good  PT Education: General Safety;PT Role;Pressure Relief  Patient Education: Educated pt on above and on bed mobility with fair to good understanding. REQUIRES PT FOLLOW UP: Yes  Activity Tolerance  Activity Tolerance: Patient Tolerated treatment well;Patient limited by endurance; Patient limited by fatigue     Patient Diagnosis(es): The primary encounter diagnosis was Syncope, unspecified syncope type. Diagnoses of Abnormal chest x-ray, Renal dysfunction, Elevated troponin, and Pyuria were also pertinent to this visit. has a past medical history of Acute and chronic respiratory failure with hypoxia ([facility] [address] CAD (coronary artery disease), Cataracts, bilateral, Elevated troponin, Glaucoma, Hyperlipidemia, Hypertension, TIA (transient ischemic attack), and Type II or unspecified type diabetes mellitus without mention of complication, not stated as uncontrolled. has a past surgical history that includes Coronary artery bypass graft (); Cataract removal with implant (Bilateral,  and 12/3/2013); and Inguinal hernia repair (Bilateral, 12). Restrictions  Restrictions/Precautions  Restrictions/Precautions: General Precautions,Fall Risk  Subjective   General  Chart Reviewed: Yes  Response To Previous Treatment: Patient with no complaints from previous session.   Family / Caregiver Present: No  Referring Practitioner: Dr. Oumar Dey MD  Subjective  Subjective: Pt pleasant and cooperative          Orientation  Orientation  Overall Orientation Status: Within Functional Limits  Cognition      Objective   Bed mobility  Rolling to Left: Minimal assistance  Supine to Sit: Moderate assistance;Maximum assistance;2 Person assistance  Sit to Supine: Maximum assistance;2 Person assistance  Scooting: Moderate assistance;2 Person assistance  Comment: After treatment patient postioned for pressure relief incuding bilateral heels and buttucks. Transfers  Sit to Stand: Maximum Assistance;2 Person Assistance  Stand to sit: Maximum Assistance;2 Person Assistance  Comment: Pt static standing EOB ~ 3 minutes than sat EOB ~ 5 minutes. Ambulation  Ambulation?: No     G-Code     OutComes Score    AM-PAC Score    Goals  Short term goals  Time Frame for Short term goals: 20 days  Short term goal 1: Patient to complete sit/stand and stand pivot transfers with CG/minAx1 and no LOB to decrease fall risk. Short term goal 2: Patient to ambulate 20ft with minAx1 and no LOB to decrease fall risk and improve mobility. Short term goal 3: Patient to tolerate 20-30 min of ther ex/act to improve functional strength. Short term goal 4: Patient to have good static sitting balance to decrease fall risk. Plan    Plan  Times per week: 7  Times per day: Twice a day  Current Treatment Recommendations: Strengthening,Neuromuscular Re-education,Home Exercise Program,ROM,Safety Education & Rebbecca [place] Training,Patient/Caregiver Education & Training,Functional Mobility Training,Transfer Training,Gait Training  Safety Devices  Type of devices:  All fall risk precautions in place,Call light within reach,Nurse notified,Patient at risk for falls,Left in bed Bernardo Esquivel RN present with patient upon therapist departure)     Therapy Time   Individual Concurrent Group Co-treatment   Time In [phone] 12         Time Out 0746         Minutes [address] [place], [place]

## 2021-12-29 NOTE — DISCHARGE SUMMARY
Discharge Summary    Lois Spatz  :  10/31/1930  MRN:  [id number]    Admit date:  2021      Discharge date: 2021     Admitting Physician:  Joseph Quiros MD    Discharge Diagnoses:    Principal Problem:    Acute alteration in mental status  Active Problems:    Type 2 MI (myocardial infarction) ([facility] [address]    Syncope    Hypertension    Type 2 diabetes mellitus without complication, without long-term current use of insulin (HCC)    ASHD (arteriosclerotic heart disease)    Acute kidney injury superimposed on CKD (HCC)    Elevated troponin    Moderate malnutrition ([facility] [address]  Resolved Problems:    * No resolved hospital problems. *      Hospital Course:   Lois Spatz is a 80 y.o. male admitted with altered mental status. [facility]responsiveness with sternal rub. Episode was brief. Patient was discharged from [facility] 1 day prior to rehab. Upon arrival and during his evaluation patient reported no concerns. Patient did state he did have 1 emesis. He denied chest pain. Patient was found to be hypoxic at 76%. CT of his head was negative. Chest x-ray showed no change in multifocal infiltrates with effusions. Troponin was elevated at 201. Patient does have stage IV CKD and BUN and creatinine were 83 and 3.67. Patient was hospitalized 2021 through 2021 for community-acquired bacterial pneumonia. During that admission patient was placed on IV Rocephin and Zithromax and due to UTI patient was changed to IV Rocephin as well as doxycycline. Patient does have retention and continues with Robertson catheter as well as Flomax twice daily. On discharge patient was discharged with Keflex as well as doxycycline. Patient was requiring oxygen and was discharged with that as well. Prior to that admission patient was hospitalized for Covid from November 15, 2021 through 2021. Upon admission cardiology was consulted.   Conversations took place with the patient as well as his family regarding his current cardiorenal syndrome and and failure on both. Patient changed to a DNR CC arrest.  Further conversations took place through palliative care and patient and family were agreeable to hospice. Patient will be discharged to the [facility] today with hospice. Consultants:  Dr. Tejas Sears, cardiology    Procedures: none    Complications: none    Discharge Condition: stable    Exam:  GEN:    Awake, alert and oriented x3. EYES:   EOMI, pupils equal   NECK: Supple. No lymphadenopathy.  No carotid bruit  CVS:     regular rate and rhythm, systolic murmur  PULM:  diminished with fine rales bases, no acute respiratory distress  ABD:     Bowels sounds normal.  Abdomen is soft.  No distention.  no tenderness to palpation. EXT:     trace edema bilaterally .  No calf tenderness. NEURO: Moves all extremities.  Motor and sensory are grossly intact  SKIN:    No rashes.  No skin lesions.     Significant Diagnostic Studies:   Lab Results   Component Value Date    WBC 8.1 12/26/2021    HGB 8.0 (L) 12/26/2021     12/26/2021       Lab Results   Component Value Date    BUN 83 (H) 12/29/2021    CREATININE 2.91 (H) 12/29/2021     12/29/2021    K 4.9 12/29/2021    CALCIUM 9.0 12/29/2021     12/29/2021    CO2 29 12/29/2021    LABGLOM 20 (L) 12/29/2021       Lab Results   Component Value Date    WBCUA 10 TO 20 12/22/2021    RBCUA 10 TO 20 12/22/2021    EPITHUA 2 TO 5 12/22/2021    LEUKOCYTESUR TRACE (A) 12/22/2021    SPECGRAV 1.015 12/22/2021    GLUCOSEU 1+ (A) 12/22/2021    KETUA NEGATIVE 12/22/2021    PROTEINU NEGATIVE 12/22/2021    HGBUR 3+ (A) 12/22/2021    CASTUA HYALINE 12/22/2021    CASTUA 10 TO 20 12/22/2021    CRYSTUA NOT REPORTED 12/22/2021    BACTERIA NOT REPORTED 12/22/2021    YEAST NOT REPORTED 12/22/2021       CT Head WO Contrast    Result Date: 12/22/2021  EXAMINATION: CT OF THE HEAD WITHOUT CONTRAST  12/22/2021 2:21 pm TECHNIQUE: CT of the Diagnosis Date Noted    Type 2 MI (myocardial infarction) ([facility] [address] 12/23/2021    Syncope     Acute alteration in mental status 12/22/2021    Moderate malnutrition ([address] 12/17/2021    Community acquired bacterial pneumonia 12/16/2021    Hyperglycemia 12/07/2021    Acute kidney injury superimposed on CKD ([address] 12/07/2021    Acute cystitis without hematuria 12/07/2021    Elevated troponin 12/07/2021    On amiodarone therapy     Microcytic hypochromic anemia     Hyperkalemia 12/06/2021    Urinary retention 11/27/2021    Mild malnutrition ([address] 11/17/2021    Acute and chronic respiratory failure with hypoxia (HCC) 11/16/2021    Paroxysmal atrial flutter ([address] 11/16/2021    Stage 4 chronic kidney disease ([facility] [address] 11/16/2021    ASHD (arteriosclerotic heart disease)     S/P CABG (coronary artery bypass graft)     COVID-19 virus infection / Harvey Ser Vaccine 11/15/2021    Cerebral infarction due to embolism of right middle cerebral artery ([facility] [address] 03/28/2018    Left leg weakness 03/28/2018    Falling 03/28/2018    Stroke, lacunar ([facility] [address] 03/28/2018    Hypertension 03/27/2018    Type 2 diabetes mellitus without complication, without long-term current use of insulin ([address] 03/27/2018    C[facility]vascular accident ([facility]) ([facility] 03/26/2018    Right inguinal hernia 04/02/2014        Discharge Medications:         Medication List      CHANGE how you take these medications    furosemide 80 MG tablet  Commonly known as: LASIX  Take 1 tablet by mouth daily  Start taking on: December 30, 2021  What changed:   · medication strength  · how much to take     tamsulosin 0.4 MG capsule  Commonly known as: FLOMAX  Take 1 capsule by mouth 2 times daily  What changed: when to take this        CONTINUE taking these medications    acetaminophen 650 MG suppository  Commonly known as: TYLENOL     albuterol (2.5 MG/3ML) 0.083% nebulizer solution  Commonly known as: PROVENTIL  Take 3 mLs by nebulization every 4 hours as needed for Wheezing     amiodarone 200 MG tablet  Commonly known as: CORDARONE  Take 1 tablet by mouth 2 times daily     apixaban 2.5 MG Tabs tablet  Commonly known as: ELIQUIS  Take 1 tablet by mouth 2 times daily     ascorbic acid 1000 MG tablet  Commonly known as: VITAMIN C  Take 0.5 tablets by mouth 2 times daily     aspirin 81 MG EC tablet     atorvastatin 40 MG tablet  Commonly known as: LIPITOR  Take 1 tablet by mouth nightly     benzonatate 100 MG capsule  Commonly known as: TESSALON     budesonide 0.5 MG/2ML nebulizer suspension  Commonly known as: PULMICORT  Take 2 mLs by nebulization 2 times daily     clopidogrel 75 MG tablet  Commonly known as: PLAVIX  Take 1 tablet by mouth daily     famotidine 10 MG tablet  Commonly known as: PEPCID  Take 1 tablet by mouth nightly     glipiZIDE 5 MG tablet  Commonly known as: GLUCOTROL     ipratropium-albuterol 0.5-2.5 (3) MG/3ML Soln nebulizer solution  Commonly known as: DUONEB  Inhale 3 mLs into the lungs 4 times daily     metoprolol succinate 25 MG extended release tablet  Commonly known as: TOPROL XL  Take 1 tablet by mouth daily     midodrine 10 MG tablet  Commonly known as: PROAMATINE  Take 1 tablet by mouth 3 times daily (with meals)     * polyethylene glycol 17 GM/SCOOP powder  Commonly known as: GLYCOLAX  Take 17 g by mouth daily     * polyethylene glycol 17 g packet  Commonly known as: GLYCOLAX  Take 17 g by mouth daily as needed for Constipation     sodium bicarbonate 650 MG tablet  Take 1 tablet by mouth 2 times daily     traZODone 50 MG tablet  Commonly known as: DESYREL     vitamin D3 25 MCG (1000 UT) Tabs tablet  Commonly known as: CHOLECALCIFEROL  Take 1 tablet by mouth daily     zinc sulfate 220 (50 Zn) MG capsule  Commonly known as: ZINCATE  Take 2 capsules by mouth daily     zolpidem 5 MG tablet  Commonly known as: AMBIEN         * This list has 2 medication(s) that are the same as other medications prescribed for you.  Read the directions carefully, and ask your doctor or other care provider to review them with you. STOP taking these medications    cephALEXin 500 MG capsule  Commonly known as: KEFLEX     doxycycline hyclate 100 MG tablet  Commonly known as: VIBRA-TABS           Where to Get Your Medications      Information about where to get these medications is not yet available    Ask your nurse or doctor about these medications  · furosemide 80 MG tablet  · polyethylene glycol 17 g packet  · tamsulosin 0.4 MG capsule         Patient Instructions:    Activity: activity as tolerated  Diet: cardiac diet  Wound Care: none needed  Other: None    Disposition:   DC to [facility] with hospice    Follow up:  Patient will be followed by Bobbi Hardin DO in 1-2 weeks    CORE MEASURES on Discharge (if applicable)  ACE/ARB in CHF: Yes  Statin in MI: NA  ASA in MI: NA  Statin in CVA: NA  Antiplatelet in CVA: NA    Total time spent on discharge services: 40 minutes    Including the following activities:  Evaluation and Management of patient  Discussion with patient and/or surrogate about current care plan  Coordination with Case Management and/or   Coordination of care with Consultants (if applicable)   Coordination of care with Receiving Facility Physician (if applicable)  Completion of DME forms (if applicable)  Preparation of Discharge Summary  Preparation of Medication Reconciliation  Preparation of Discharge Prescriptions    Signed:  FRANKLIN Luis - CNP, FRANKLIN, NP-C  12/29/2021, 12:39 PM

## 2021-12-29 NOTE — PROGRESS NOTES
Physician Progress Note      Anita Mcmullen  CSN #:                  [id number]  :                       10/31/1930  ADMIT DATE:       2021 12:57 PM  [address] [place] DATE:  RESPONDING  PROVIDER #:        Brandon Reynolds MD          QUERY TEXT:    Patient admitted following episode of unresponsiveness at rehab facility. Noted documentation of metabolic encephalopathy in progress notes on    through . In order to support the diagnosis of metabolic encephalopathy,   please include additional clinical indicators and etiology/cause in your   documentation. Or please document if the diagnosis of metabolic   encephalopathy has been ruled out after further study. The medical record reflects the following:  Risk Factors: age 80 years, recent hospitalization for pna and UTI, discharged   to [place] rehab;  Clinical Indicators:  ED provider notes:  patient is aware of his   surroundings, his age and the date;   ED provider exam notes:  alert and   oriented to person, place and time without cranial nerve deficit, dysarthria   or facial asymmetry;  H & P provider exam notes:  no unilateral weakness,   pt alert and oriented x 3;  by attending on  H & P:  \"Mentation is alert   and talkative. Confused on date only. \"  CT of brain negative for acute   findings on ; Per  H & P: Last 2 urine cultures  and   21 = no growth, D/C Keflex;  Treatment:  Keflex d/c'ed after  dose, continued oral doxycycline, oral   Ambien discontinued, IVFs -  Options provided:  -- Metabolic encephalopathy was ruled out  -- Metabolic encephalopathy present as evidenced by, Please document evidence   and etiology/cause.   -- Other - I will add my own diagnosis  -- Disagree - Not applicable / Not valid  -- Disagree - Clinically unable to determine / Unknown  -- Refer to Clinical Documentation Reviewer    PROVIDER RESPONSE TEXT:    Metabolic encephalopathy was ruled out after study. Query created by: Francesco Armas on 12/27/2021 4:01 PM      QUERY TEXT:    Patient admitted with documented AMS. Noted documentation of acute on   chronic diastolic CHF by nephrology in consult note on 12/25 and chronic   diastolic CHF in progress notes 12/24-12/26 and by cardiology consultant on   12/23. If possible, please document in progress notes and discharge summary if you   are evaluating and /or treating any of the following: The medical record reflects the following:  Risk Factors: age 80, hx of CAD and CABG in 2000; Hx hypertension but   hypotensive on current admission; IVF administration on admission 2 125ml/hr   12/22-12/23, CKD stage IV  Clinical Indicators: 12/22 CXR:  No change in multifocal infiltrates   suggesting pneumonia probable small effusions;  Pro-BNP 17,716 on 12/22;    12/23 H & P:  lung sounds clear but diminished;   reported SOB at rest per   12/23 cardiology exam notes; 12/25 +2-3 lower extremity edema bilaterally per   nephrology consult note (documented as none or trace by providers prior to   this); Treatment: continued Proamatine for BP support, Lasix dose increased from 40mg   oral daily to 40mg oral twice daily on 12/25, IVFs discontinued on 12/23,   continued Amiodarone, Plavix, Eliquis, baby ASA, Lipitor and metoprolol  Options provided:  -- Chronic diastolic CHF confirmed and decompensated diastolic CHF ruled out  -- Decompensated diastolic CHF confirmed which developed following admission  -- Other - I will add my own diagnosis  -- Disagree - Not applicable / Not valid  -- Disagree - Clinically unable to determine / Unknown  -- Refer to Clinical Documentation Reviewer    PROVIDER RESPONSE TEXT:    After study, chronic diastolic CHF confirmed and decompensated diastolic CHF   ruled out.     Query created by: Francesco Armas on 12/27/2021 4:17 PM      Electronically signed by:  Shruthi Colvin MD 12/29/2021 4:02 PM

## 2021-12-29 NOTE — PROGRESS NOTES
Patient to discharge to the [facility] [place] today. Patient, family and facility all aware and agreeable. Discharge paperwork FAXED to the [facility] per this writer.   Will coordinate [facility] to provide the transport per family request.    MONTANA Kenny  12/29/2021

## 2021-12-29 NOTE — PROGRESS NOTES
Patient refuses HS medications. Writer attempts to educate patient on importance of complying with regimen. Patient states \"I am tired of taking medicine. All I do anymore is take damn medicine\". Medication marked as refused. Patient repositioned for comfort and lights deferred to promote rest. Will continue to monitor.

## 2022-01-22 PROBLEM — R77.8 ELEVATED TROPONIN: Status: RESOLVED | Noted: 2021-12-07 | Resolved: 2022-01-22
